# Patient Record
Sex: MALE | Race: WHITE | Employment: OTHER | URBAN - METROPOLITAN AREA
[De-identification: names, ages, dates, MRNs, and addresses within clinical notes are randomized per-mention and may not be internally consistent; named-entity substitution may affect disease eponyms.]

---

## 2017-03-22 ENCOUNTER — TELEPHONE (OUTPATIENT)
Dept: INTERNAL MEDICINE CLINIC | Facility: CLINIC | Age: 67
End: 2017-03-22

## 2017-05-15 ENCOUNTER — TELEPHONE (OUTPATIENT)
Dept: INTERNAL MEDICINE CLINIC | Facility: CLINIC | Age: 67
End: 2017-05-15

## 2017-05-15 NOTE — TELEPHONE ENCOUNTER
Pt is eligible for a Medicare Annual Health Assessment anytime during the next 12 months. Left message to call back [phone].

## 2017-06-26 ENCOUNTER — OFFICE VISIT (OUTPATIENT)
Dept: FAMILY MEDICINE CLINIC | Facility: CLINIC | Age: 67
End: 2017-06-26

## 2017-06-26 ENCOUNTER — LAB ENCOUNTER (OUTPATIENT)
Dept: LAB | Age: 67
End: 2017-06-26
Attending: FAMILY MEDICINE
Payer: MEDICARE

## 2017-06-26 VITALS
SYSTOLIC BLOOD PRESSURE: 124 MMHG | HEART RATE: 67 BPM | BODY MASS INDEX: 19.77 KG/M2 | WEIGHT: 123 LBS | HEIGHT: 66 IN | DIASTOLIC BLOOD PRESSURE: 77 MMHG | TEMPERATURE: 98 F

## 2017-06-26 DIAGNOSIS — G30.1 LATE ONSET ALZHEIMER'S DISEASE WITH BEHAVIORAL DISTURBANCE (HCC): ICD-10-CM

## 2017-06-26 DIAGNOSIS — J44.9 CHRONIC OBSTRUCTIVE PULMONARY DISEASE, UNSPECIFIED COPD TYPE (HCC): ICD-10-CM

## 2017-06-26 DIAGNOSIS — K21.9 GASTROESOPHAGEAL REFLUX DISEASE WITHOUT ESOPHAGITIS: Primary | ICD-10-CM

## 2017-06-26 DIAGNOSIS — F10.10 ALCOHOL ABUSE: ICD-10-CM

## 2017-06-26 DIAGNOSIS — F02.81 LATE ONSET ALZHEIMER'S DISEASE WITH BEHAVIORAL DISTURBANCE (HCC): ICD-10-CM

## 2017-06-26 DIAGNOSIS — K02.9 DENTAL CARIES: ICD-10-CM

## 2017-06-26 DIAGNOSIS — K21.9 GASTROESOPHAGEAL REFLUX DISEASE WITHOUT ESOPHAGITIS: ICD-10-CM

## 2017-06-26 PROCEDURE — 85025 COMPLETE CBC W/AUTO DIFF WBC: CPT

## 2017-06-26 PROCEDURE — G0463 HOSPITAL OUTPT CLINIC VISIT: HCPCS | Performed by: FAMILY MEDICINE

## 2017-06-26 PROCEDURE — 99215 OFFICE O/P EST HI 40 MIN: CPT | Performed by: FAMILY MEDICINE

## 2017-06-26 PROCEDURE — 80053 COMPREHEN METABOLIC PANEL: CPT

## 2017-06-26 PROCEDURE — 36415 COLL VENOUS BLD VENIPUNCTURE: CPT

## 2017-06-26 RX ORDER — FLUTICASONE PROPIONATE 50 MCG
SPRAY, SUSPENSION (ML) NASAL DAILY
COMMUNITY
End: 2017-07-20 | Stop reason: ALTCHOICE

## 2017-06-26 NOTE — PROGRESS NOTES
Patient presents with his wife who gives the entire history past medical history surgical history family history and social issue review.   She states that he is very picky when he is eating he has been drinking much less he drinks now 1 glass of wine daily enlargement or nodularity  Posterior pharynx was normal.  Palate was normal  Lungs were clear bilaterally there was no wheezes, rhonchi or rales  Abdomen was soft non tender non distended bowel sounds were present  Heart was regular rate and rhythm normal

## 2017-07-17 PROBLEM — F17.201 TOBACCO DEPENDENCE IN REMISSION: Status: ACTIVE | Noted: 2017-07-17

## 2017-07-20 ENCOUNTER — OFFICE VISIT (OUTPATIENT)
Dept: FAMILY MEDICINE CLINIC | Facility: CLINIC | Age: 67
End: 2017-07-20

## 2017-07-20 VITALS
DIASTOLIC BLOOD PRESSURE: 61 MMHG | HEIGHT: 66 IN | WEIGHT: 119 LBS | SYSTOLIC BLOOD PRESSURE: 101 MMHG | BODY MASS INDEX: 19.13 KG/M2

## 2017-07-20 DIAGNOSIS — F10.10 ALCOHOL ABUSE: ICD-10-CM

## 2017-07-20 DIAGNOSIS — J44.9 CHRONIC OBSTRUCTIVE PULMONARY DISEASE, UNSPECIFIED COPD TYPE (HCC): ICD-10-CM

## 2017-07-20 DIAGNOSIS — Z00.00 MEDICARE ANNUAL WELLNESS VISIT, SUBSEQUENT: Primary | ICD-10-CM

## 2017-07-20 DIAGNOSIS — Z12.5 SCREENING FOR PROSTATE CANCER: ICD-10-CM

## 2017-07-20 DIAGNOSIS — G30.1 LATE ONSET ALZHEIMER'S DISEASE WITH BEHAVIORAL DISTURBANCE (HCC): ICD-10-CM

## 2017-07-20 DIAGNOSIS — K21.9 GASTROESOPHAGEAL REFLUX DISEASE WITHOUT ESOPHAGITIS: ICD-10-CM

## 2017-07-20 DIAGNOSIS — Z91.81 RISK FOR FALLS: ICD-10-CM

## 2017-07-20 DIAGNOSIS — Z00.00 ENCOUNTER FOR ANNUAL HEALTH EXAMINATION: ICD-10-CM

## 2017-07-20 DIAGNOSIS — H25.9 AGE-RELATED CATARACT OF BOTH EYES, UNSPECIFIED AGE-RELATED CATARACT TYPE: ICD-10-CM

## 2017-07-20 DIAGNOSIS — K02.9 DENTAL CARIES: ICD-10-CM

## 2017-07-20 DIAGNOSIS — F02.81 LATE ONSET ALZHEIMER'S DISEASE WITH BEHAVIORAL DISTURBANCE (HCC): ICD-10-CM

## 2017-07-20 DIAGNOSIS — G81.91 HEMIPLEGIA AFFECTING RIGHT DOMINANT SIDE, UNSPECIFIED ETIOLOGY, UNSPECIFIED HEMIPLEGIA TYPE (HCC): ICD-10-CM

## 2017-07-20 PROBLEM — F17.201 TOBACCO DEPENDENCE IN REMISSION: Status: RESOLVED | Noted: 2017-07-17 | Resolved: 2017-07-20

## 2017-07-20 PROCEDURE — 96160 PT-FOCUSED HLTH RISK ASSMT: CPT | Performed by: FAMILY MEDICINE

## 2017-07-20 NOTE — PROGRESS NOTES
HPI:   Niraj Smith is a 78year old male who presents for a Medicare Subsequent Annual Wellness visit (Pt already had Initial Annual Wellness).     Here with wife Alexandra Moreno   Because of patient's dementia the wife gave me past medical surgical family soci Heterozygous factor V Leiden mutation ([facility] [address] Other and unspecified hyperlipidemia; Other ill-defined conditions(799.89); and Unspecified sleep apnea. He  has a past surgical history that includes electrocardiogram, complete (01/08/1914).     His family h conversations in a noisy background such as a crowded room or restaurant:  No   I get confused about where sounds come from:  No I misunderstand some words in a sentence and need to ask people to repeat themselves:  No   I especially have trouble [place] color, texture, turgor normal, no rashes or lesions   Lymph nodes: Cervical, supraclavicular, and axillary nodes normal   Neurologic: Normal              SUGGESTED VACCINATIONS - Influenza, Pneumococcal, Zoster, Tetanus     Immunization History   Administe [address] [facility] on file in Epic:    [facility] does not have a Power of  for [employer] on file in [place].  Discussed with patient and provided information           PLAN:  The patient indicates understanding of these issues activities?: 0-No     Have you had any memory issues?: 0-No    Fall/Risk Scorin    Scoring Interpretation: 4+ At Risk     Depression Screening (PHQ-2/PHQ-9): Over the LAST 2 WEEKS   Little interest or pleasure in doing things (over the last two weeks)? Glaucoma, AA>50, > 65 No flowsheet data found. Prostate Cancer Screening      PSA  Annually PSA due on 06/02/2018  Update Health Maintenance if applicable   Immunizations      Influenza No orders found for this or any previous visit.  Update Immu

## 2017-07-20 NOTE — PATIENT INSTRUCTIONS
Yoseph Cordero's SCREENING SCHEDULE   Tests on this list are recommended by your physician but may not be covered, or covered at this frequency, by your insurer. Please check with your insurance carrier before scheduling to verify coverage.     Yuan Fernandez to Age 77     Colonoscopy Screen   Covered every 10 years- more often if abnormal Colonoscopy,10 Years due on 06/05/2001 Update Health Maintenance if applicable    Flex Sigmoidoscopy Screen  Covered every 5 years No results found for this or any previous v orders found for this or any previous visit. This may be covered with your prescription benefits, but Medicare does not cover unless Medically needed    Zoster (Not covered by Medicare Part B) No orders found for this or any previous visit.  This may be cov

## 2017-07-28 ENCOUNTER — OFFICE VISIT (OUTPATIENT)
Dept: OPTOMETRY | Facility: CLINIC | Age: 67
End: 2017-07-28

## 2017-07-28 DIAGNOSIS — H52.03 HYPEROPIA WITH PRESBYOPIA, BILATERAL: ICD-10-CM

## 2017-07-28 DIAGNOSIS — H25.813 COMBINED FORMS OF AGE-RELATED CATARACT OF BOTH EYES: Primary | ICD-10-CM

## 2017-07-28 DIAGNOSIS — H52.4 HYPEROPIA WITH PRESBYOPIA, BILATERAL: ICD-10-CM

## 2017-07-28 PROCEDURE — 92014 COMPRE OPH EXAM EST PT 1/>: CPT | Performed by: OPTOMETRIST

## 2017-07-28 PROCEDURE — 92015 DETERMINE REFRACTIVE STATE: CPT | Performed by: OPTOMETRIST

## 2017-07-28 NOTE — ASSESSMENT & PLAN NOTE
New glasses RX given to update as needed. Patient may want to get a distance only pair as he does not read much. Patient's choice.

## 2017-07-28 NOTE — PATIENT INSTRUCTIONS
Hyperopia with presbyopia  New glasses RX given to update as needed. Patient may want to get a distance only pair as he does not read much. Patient's choice. Combined forms of age-related cataract of both eyes  No treatment is required.  Will continue

## 2017-07-28 NOTE — PROGRESS NOTES
Helder Driver is a 78year old male. HPI:     HPI     Patient is in for an annual eye exam. Patient  has mild dementia and was given an RX last year but he did not fill it. Patient has no complaints but wife notes squinting.  She wants to get him a pa Right Left    Pressure 18 12          Pupils       Pupils    Right PERRL    Left PERRL          Visual Fields       Left Right     Full Full          Extraocular Movement       Right Left     Full, Ortho Full, Ortho          Neuro/Psych     Mood/Affect:  N (around 7/28/2018) for Eye Exam.    7/28/2017  Scribed by: Sharmaine Murillo

## 2017-09-05 ENCOUNTER — TELEPHONE (OUTPATIENT)
Dept: OTHER | Age: 67
End: 2017-09-05

## 2017-09-05 NOTE — TELEPHONE ENCOUNTER
Pt called needed referral faxed to Dr Volodymyr Morales office urgently.   Fax# [phone] printed and sent

## 2017-11-07 ENCOUNTER — TELEPHONE (OUTPATIENT)
Dept: FAMILY MEDICINE CLINIC | Facility: CLINIC | Age: 67
End: 2017-11-07

## 2017-11-07 ENCOUNTER — OFFICE VISIT (OUTPATIENT)
Dept: FAMILY MEDICINE CLINIC | Facility: CLINIC | Age: 67
End: 2017-11-07

## 2017-11-07 ENCOUNTER — HOSPITAL ENCOUNTER (OUTPATIENT)
Dept: GENERAL RADIOLOGY | Age: 67
Discharge: HOME | End: 2017-11-07
Attending: FAMILY MEDICINE
Payer: MEDICARE

## 2017-11-07 VITALS
SYSTOLIC BLOOD PRESSURE: 96 MMHG | TEMPERATURE: 98 F | WEIGHT: 126 LBS | RESPIRATION RATE: 18 BRPM | BODY MASS INDEX: 20.25 KG/M2 | DIASTOLIC BLOOD PRESSURE: 58 MMHG | HEART RATE: 84 BPM | HEIGHT: 66 IN

## 2017-11-07 DIAGNOSIS — R93.89 ABNORMAL CHEST X-RAY: Primary | ICD-10-CM

## 2017-11-07 DIAGNOSIS — R05.9 COUGH: ICD-10-CM

## 2017-11-07 DIAGNOSIS — R05.9 COUGH: Primary | ICD-10-CM

## 2017-11-07 PROCEDURE — 71020 XR CHEST PA + LAT CHEST (CPT=71020): CPT | Performed by: FAMILY MEDICINE

## 2017-11-07 PROCEDURE — G0463 HOSPITAL OUTPT CLINIC VISIT: HCPCS | Performed by: FAMILY MEDICINE

## 2017-11-07 PROCEDURE — 99214 OFFICE O/P EST MOD 30 MIN: CPT | Performed by: FAMILY MEDICINE

## 2017-11-07 PROCEDURE — 90653 IIV ADJUVANT VACCINE IM: CPT | Performed by: FAMILY MEDICINE

## 2017-11-07 PROCEDURE — G0008 ADMIN INFLUENZA VIRUS VAC: HCPCS | Performed by: FAMILY MEDICINE

## 2017-11-07 RX ORDER — OMEPRAZOLE 20 MG/1
20 CAPSULE, DELAYED RELEASE ORAL
Qty: 90 CAPSULE | Refills: 1 | Status: SHIPPED | OUTPATIENT
Start: 2017-11-07 | End: 2018-03-15 | Stop reason: ALTCHOICE

## 2017-11-07 NOTE — PROGRESS NOTES
Blood pressure 96/58, pulse 84, temperature 97.6 °F (36.4 °C), temperature source Oral, resp. rate 18, height 5' 6\" (1.676 m), weight 126 lb (57.2 kg). Patient presents today with his wife reporting he has been spitting up for 2 weeks.   She reports elizabet

## 2017-11-08 ENCOUNTER — HOSPITAL ENCOUNTER (OUTPATIENT)
Dept: GENERAL RADIOLOGY | Age: 67
Discharge: HOME | End: 2017-11-08
Attending: FAMILY MEDICINE
Payer: MEDICARE

## 2017-11-08 ENCOUNTER — TELEPHONE (OUTPATIENT)
Dept: FAMILY MEDICINE CLINIC | Facility: CLINIC | Age: 67
End: 2017-11-08

## 2017-11-08 DIAGNOSIS — R93.89 ABNORMAL CHEST X-RAY: ICD-10-CM

## 2017-11-08 PROCEDURE — 71021: CPT

## 2017-11-08 NOTE — TELEPHONE ENCOUNTER
Daughter called back and  was inform of  message below and pt will have it done later in today. Thanks

## 2017-11-08 NOTE — TELEPHONE ENCOUNTER
----- Message from Paco Mansfield DO sent at 11/7/2017  7:17 PM CST -----  Chest xray report request additional view order entered patient to come in for additional view 11/8/2017

## 2017-11-09 ENCOUNTER — TELEPHONE (OUTPATIENT)
Dept: FAMILY MEDICINE CLINIC | Facility: CLINIC | Age: 67
End: 2017-11-09

## 2017-11-09 ENCOUNTER — TELEPHONE (OUTPATIENT)
Dept: OTHER | Age: 67
End: 2017-11-09

## 2017-11-09 NOTE — TELEPHONE ENCOUNTER
Spoke to Pt's daughter . Notified her of  Patient X-ray results . She verbalized understanding. Daughter had further questions and concerns regarding medication Omeprazole. Would like to speak with triage nurse. Pt 's daughter was transferred to ext [phone].

## 2017-11-09 NOTE — TELEPHONE ENCOUNTER
Pt's daughter would like to speak with  [facility] regarding Pt's health status. Daughter wants to know if Pt has stomach ulcers since Pt is taking omeprazole. I informed her Pt was referred to GI in June for stomach issues.  Daughter states she wasn't awar

## 2017-11-10 ENCOUNTER — TELEPHONE (OUTPATIENT)
Dept: FAMILY MEDICINE CLINIC | Facility: CLINIC | Age: 67
End: 2017-11-10

## 2017-11-10 NOTE — TELEPHONE ENCOUNTER
Called and discussed rationale for patient care extensively with his daughter she is to call next week if no improvement with his cough.

## 2018-02-23 ENCOUNTER — PATIENT OUTREACH (OUTPATIENT)
Dept: INTERNAL MEDICINE CLINIC | Facility: CLINIC | Age: 68
End: 2018-02-23

## 2018-03-14 PROBLEM — G30.1 LATE ONSET ALZHEIMER'S DISEASE WITH BEHAVIORAL DISTURBANCE (HCC): Status: ACTIVE | Noted: 2018-03-14

## 2018-03-14 PROBLEM — K21.9 ESOPHAGEAL REFLUX: Status: ACTIVE | Noted: 2018-03-14

## 2018-03-14 PROBLEM — F10.10 ALCOHOL ABUSE: Status: ACTIVE | Noted: 2018-03-14

## 2018-03-14 PROBLEM — I70.0 CALCIFICATION OF AORTA (HCC): Status: ACTIVE | Noted: 2018-03-14

## 2018-03-14 PROBLEM — F02.81 LATE ONSET ALZHEIMER'S DISEASE WITH BEHAVIORAL DISTURBANCE (HCC): Status: ACTIVE | Noted: 2018-03-14

## 2018-03-14 PROBLEM — F02.818 LATE ONSET ALZHEIMER'S DISEASE WITH BEHAVIORAL DISTURBANCE (HCC): Status: ACTIVE | Noted: 2018-03-14

## 2018-03-14 PROBLEM — I70.0 CALCIFICATION OF AORTA: Status: ACTIVE | Noted: 2018-03-14

## 2018-03-15 ENCOUNTER — OFFICE VISIT (OUTPATIENT)
Dept: FAMILY MEDICINE CLINIC | Facility: CLINIC | Age: 68
End: 2018-03-15

## 2018-03-15 VITALS
DIASTOLIC BLOOD PRESSURE: 67 MMHG | HEIGHT: 66 IN | WEIGHT: 134 LBS | TEMPERATURE: 98 F | HEART RATE: 85 BPM | BODY MASS INDEX: 21.53 KG/M2 | SYSTOLIC BLOOD PRESSURE: 112 MMHG

## 2018-03-15 DIAGNOSIS — F10.10 ALCOHOL ABUSE: ICD-10-CM

## 2018-03-15 DIAGNOSIS — F02.81 LATE ONSET ALZHEIMER'S DISEASE WITH BEHAVIORAL DISTURBANCE (HCC): ICD-10-CM

## 2018-03-15 DIAGNOSIS — H25.813 COMBINED FORMS OF AGE-RELATED CATARACT OF BOTH EYES: ICD-10-CM

## 2018-03-15 DIAGNOSIS — G81.91 HEMIPLEGIA AFFECTING RIGHT DOMINANT SIDE, UNSPECIFIED ETIOLOGY, UNSPECIFIED HEMIPLEGIA TYPE (HCC): ICD-10-CM

## 2018-03-15 DIAGNOSIS — Z00.00 MEDICARE ANNUAL WELLNESS VISIT, SUBSEQUENT: Primary | ICD-10-CM

## 2018-03-15 DIAGNOSIS — G82.20 PARAPLEGIA (HCC): ICD-10-CM

## 2018-03-15 DIAGNOSIS — Z00.00 ENCOUNTER FOR ANNUAL HEALTH EXAMINATION: ICD-10-CM

## 2018-03-15 DIAGNOSIS — G30.1 LATE ONSET ALZHEIMER'S DISEASE WITH BEHAVIORAL DISTURBANCE (HCC): ICD-10-CM

## 2018-03-15 DIAGNOSIS — K02.9 DENTAL CARIES: ICD-10-CM

## 2018-03-15 DIAGNOSIS — K21.9 GASTROESOPHAGEAL REFLUX DISEASE, ESOPHAGITIS PRESENCE NOT SPECIFIED: ICD-10-CM

## 2018-03-15 DIAGNOSIS — J44.9 CHRONIC OBSTRUCTIVE PULMONARY DISEASE, UNSPECIFIED COPD TYPE (HCC): ICD-10-CM

## 2018-03-15 DIAGNOSIS — I70.0 CALCIFICATION OF AORTA (HCC): ICD-10-CM

## 2018-03-15 DIAGNOSIS — F10.20 ALCOHOLISM (HCC): ICD-10-CM

## 2018-03-15 PROCEDURE — G0439 PPPS, SUBSEQ VISIT: HCPCS | Performed by: FAMILY MEDICINE

## 2018-03-15 PROCEDURE — 96160 PT-FOCUSED HLTH RISK ASSMT: CPT | Performed by: FAMILY MEDICINE

## 2018-03-15 NOTE — PATIENT INSTRUCTIONS
Yoseph Cordero's SCREENING SCHEDULE   Tests on this list are recommended by your physician but may not be covered, or covered at this frequency, by your insurer. Please check with your insurance carrier before scheduling to verify coverage.     Carl Staley to Age 77     Colonoscopy Screen   Covered every 10 years- more often if abnormal Colonoscopy,10 Years due on 06/05/2001 Update Health Maintenance if applicable    Flex Sigmoidoscopy Screen  Covered every 5 years No results found for this or any previous v orders found for this or any previous visit. This may be covered with your prescription benefits, but Medicare does not cover unless Medically needed    Zoster (Not covered by Medicare Part B) No orders found for this or any previous visit.  This may be cov

## 2018-03-15 NOTE — PROGRESS NOTES
HPI:   Breezy Lund is a 78year old male who presents for a Medicare Subsequent Annual Wellness visit (Pt already had Initial Annual Wellness).       Annual Physical due on 07/20/2018        Fall/Risk Assessment Update needed    Last Fall risk screen w with patient and Family/surrogate (if present), and forms available to patient in AVS       He does NOT have a Power of  for [employer] on file in [facility]    Advance care planning including the explanation and discussion of advance directives standar Allergies.     CURRENT MEDICATIONS:     No outpatient prescriptions have been marked as taking for the 3/15/18 encounter (Office Visit) with Maynor Diaz DO.   MEDICAL INFORMATION:   He  has a past medical history of Anxiety state, unspecified; CVA ( Eyes:  PERRL, conjunctiva/corneas clear, EOM's intact, both eyes   Ears:  Normal TM's and external ear canals, both ears   Nose: Nares normal, septum midline, mucosa normal, no drainage or sinus tenderness   Throat: Lips, mucosa, and tongue normal; teeth ([address]  yuki. Alcohol abuse  Resolved. Combined forms of age-related cataract of both eyes  Due for recheck in Aug  -     OPTOMETRY - INTERNAL    Gastroesophageal reflux disease, esophagitis presence not specified  Stable.     Late onset Alzheimer's dise > 65 No flowsheet data found.     Prostate Cancer Screening      PSA  Annually PSA due on 06/02/2018  Update Health Maintenance if applicable     Immunizations (Update Immunization Activity if applicable)     Influenza  Covered Annually 11/7/2017

## 2018-03-22 ENCOUNTER — APPOINTMENT (OUTPATIENT)
Dept: LAB | Age: 68
End: 2018-03-22
Attending: FAMILY MEDICINE
Payer: MEDICARE

## 2018-03-22 DIAGNOSIS — Z00.00 MEDICARE ANNUAL WELLNESS VISIT, SUBSEQUENT: ICD-10-CM

## 2018-03-22 LAB
CHOLEST SERPL-MCNC: 190 MG/DL (ref 110–200)
GLUCOSE SERPL-MCNC: 86 MG/DL (ref 70–99)
HDLC SERPL-MCNC: 60 MG/DL
LDLC SERPL CALC-MCNC: 113 MG/DL (ref 0–99)
NONHDLC SERPL-MCNC: 130 MG/DL
PSA SERPL-MCNC: 5.4 NG/ML (ref 0–4)
TRIGL SERPL-MCNC: 84 MG/DL (ref 1–149)

## 2018-03-22 PROCEDURE — 82947 ASSAY GLUCOSE BLOOD QUANT: CPT

## 2018-03-22 PROCEDURE — 80061 LIPID PANEL: CPT

## 2018-03-22 PROCEDURE — 36415 COLL VENOUS BLD VENIPUNCTURE: CPT

## 2018-03-27 DIAGNOSIS — R97.20 ELEVATED PSA: Primary | ICD-10-CM

## 2018-05-25 ENCOUNTER — OFFICE VISIT (OUTPATIENT)
Dept: SURGERY | Facility: CLINIC | Age: 68
End: 2018-05-25

## 2018-05-25 ENCOUNTER — APPOINTMENT (OUTPATIENT)
Dept: LAB | Facility: HOSPITAL | Age: 68
End: 2018-05-25
Attending: UROLOGY
Payer: MEDICARE

## 2018-05-25 VITALS
HEART RATE: 80 BPM | BODY MASS INDEX: 20 KG/M2 | SYSTOLIC BLOOD PRESSURE: 102 MMHG | HEIGHT: 68 IN | WEIGHT: 132 LBS | DIASTOLIC BLOOD PRESSURE: 66 MMHG | TEMPERATURE: 98 F

## 2018-05-25 DIAGNOSIS — R97.20 ELEVATED PSA: Primary | ICD-10-CM

## 2018-05-25 DIAGNOSIS — R30.0 DYSURIA: ICD-10-CM

## 2018-05-25 PROCEDURE — 99204 OFFICE O/P NEW MOD 45 MIN: CPT | Performed by: UROLOGY

## 2018-05-25 PROCEDURE — 81001 URINALYSIS AUTO W/SCOPE: CPT

## 2018-05-25 PROCEDURE — G0463 HOSPITAL OUTPT CLINIC VISIT: HCPCS | Performed by: UROLOGY

## 2018-05-25 NOTE — PROGRESS NOTES
SUBJECTIVE:  Maxim Moulton is a 78year old male who presents for a consultation at the request of, and a copy of this note will be sent to, Dr. Gary Brandon, for evaluation of  elevated PSA. He states that the problem is unchanged.  Symptoms include Alcohol use: Yes           0.0 oz/week     Comment: 1-2 glass wine occasional           REVIEW OF SYSTEMS:  RESPIRATORY:  Negative for chest tightness, wheezing, cough, shortness of breath,  sputum production,chest pain or pleurisy.   CARDIOVASCULAR:  Neg discussed options for management. We agreed to have the patient follow-up in 6 months with a repeat PSA at that time. The son believes his father had a biopsy years ago and this is mentioned in my partners note from 2012.   His exam does not demonstrate a

## 2018-08-16 ENCOUNTER — NURSE TRIAGE (OUTPATIENT)
Dept: OTHER | Age: 68
End: 2018-08-16

## 2018-08-16 NOTE — TELEPHONE ENCOUNTER
Daughter reports, Pt has been 'spitting up a lot, spits every where.' Denies spitting up blood. Daughter with Pt at this time, states Pt not in distress, denies shortness of breath.  Pt is alert, confused at times, but states he usually gets confused someti

## 2018-08-17 ENCOUNTER — OFFICE VISIT (OUTPATIENT)
Dept: FAMILY MEDICINE CLINIC | Facility: CLINIC | Age: 68
End: 2018-08-17

## 2018-08-17 ENCOUNTER — HOSPITAL ENCOUNTER (OUTPATIENT)
Dept: GENERAL RADIOLOGY | Age: 68
Discharge: HOME | End: 2018-08-17
Attending: FAMILY MEDICINE
Payer: MEDICARE

## 2018-08-17 VITALS
WEIGHT: 134 LBS | DIASTOLIC BLOOD PRESSURE: 63 MMHG | HEART RATE: 79 BPM | BODY MASS INDEX: 20.31 KG/M2 | RESPIRATION RATE: 16 BRPM | TEMPERATURE: 98 F | SYSTOLIC BLOOD PRESSURE: 101 MMHG | HEIGHT: 68 IN

## 2018-08-17 DIAGNOSIS — R05.9 COUGH: ICD-10-CM

## 2018-08-17 DIAGNOSIS — R05.9 COUGH: Primary | ICD-10-CM

## 2018-08-17 PROCEDURE — 99213 OFFICE O/P EST LOW 20 MIN: CPT | Performed by: FAMILY MEDICINE

## 2018-08-17 PROCEDURE — 71046 X-RAY EXAM CHEST 2 VIEWS: CPT | Performed by: FAMILY MEDICINE

## 2018-08-17 PROCEDURE — G0463 HOSPITAL OUTPT CLINIC VISIT: HCPCS | Performed by: FAMILY MEDICINE

## 2018-08-17 RX ORDER — BENZONATATE 100 MG/1
100 CAPSULE ORAL 3 TIMES DAILY PRN
Qty: 45 CAPSULE | Refills: 0 | Status: SHIPPED | OUTPATIENT
Start: 2018-08-17 | End: 2018-11-30 | Stop reason: ALTCHOICE

## 2018-08-17 RX ORDER — OMEPRAZOLE 20 MG/1
20 CAPSULE, DELAYED RELEASE ORAL
Qty: 90 CAPSULE | Refills: 1 | Status: SHIPPED | OUTPATIENT
Start: 2018-08-17 | End: 2021-09-03

## 2018-08-17 NOTE — PROGRESS NOTES
Blood pressure 101/63, pulse 79, temperature 98.4 °F (36.9 °C), temperature source Oral, resp. rate 16, height 5' 8\" (1.727 m), weight 134 lb (60.8 kg).     Complaining of a cough for 1 week that is nocturnal.  Not some nasal congestion with sneezing and s

## 2018-11-26 ENCOUNTER — OFFICE VISIT (OUTPATIENT)
Dept: SURGERY | Facility: CLINIC | Age: 68
End: 2018-11-26

## 2018-11-26 ENCOUNTER — APPOINTMENT (OUTPATIENT)
Dept: LAB | Age: 68
End: 2018-11-26
Attending: UROLOGY
Payer: MEDICARE

## 2018-11-26 VITALS — DIASTOLIC BLOOD PRESSURE: 65 MMHG | HEART RATE: 80 BPM | TEMPERATURE: 98 F | SYSTOLIC BLOOD PRESSURE: 102 MMHG

## 2018-11-26 DIAGNOSIS — R97.20 ELEVATED PSA: ICD-10-CM

## 2018-11-26 DIAGNOSIS — R97.20 ELEVATED PSA: Primary | ICD-10-CM

## 2018-11-26 PROCEDURE — 84153 ASSAY OF PSA TOTAL: CPT

## 2018-11-26 PROCEDURE — G0463 HOSPITAL OUTPT CLINIC VISIT: HCPCS | Performed by: UROLOGY

## 2018-11-26 PROCEDURE — 99213 OFFICE O/P EST LOW 20 MIN: CPT | Performed by: UROLOGY

## 2018-11-26 PROCEDURE — 36415 COLL VENOUS BLD VENIPUNCTURE: CPT

## 2018-11-26 NOTE — PROGRESS NOTES
Maxim Moulton is a 80year old male. HPI:   Patient presents with:  elevated psa: PT presents for 6 month follow up. PT son states he is not sure if he completed PSA.        25-year-old male with progressive dementia advanced dementia, elevated PSA th Allergies:  No Known Allergies      ROS:       PHYSICAL EXAM:        ASSESSMENT/PLAN:   Assessment   Elevated psa  (primary encounter diagnosis)    Plan:  –We will obtain PSA today. –Will be notified of results in 1-2 weeks.   – Follow-up in 6 months

## 2018-11-29 ENCOUNTER — TELEPHONE (OUTPATIENT)
Dept: SURGERY | Facility: CLINIC | Age: 68
End: 2018-11-29

## 2018-11-29 ENCOUNTER — NURSE TRIAGE (OUTPATIENT)
Dept: OTHER | Age: 68
End: 2018-11-29

## 2018-11-29 NOTE — TELEPHONE ENCOUNTER
Message   Received:  Today   Message Contents   Diego, BEAU [facility] Clinical Staff             Staff,     Patients PSA is significantly elevated compared to 8 months ago.  I spoke to Dr. Chris Mcintosh and he would like to see him in the next

## 2018-11-29 NOTE — TELEPHONE ENCOUNTER
Action Requested: Summary for Provider     []  Critical Lab, Recommendations Needed  [] Need Additional Advice  []   FYI    []   Need Orders  [] Need Medications Sent to Pharmacy  []  Other     SUMMARY: .appt made for tomorrow with [facility]    Reason for

## 2018-11-29 NOTE — TELEPHONE ENCOUNTER
Phoned pt and spoke with son Faheem. He states his father does not understand English well. Informed him he is not listed on CHETAN. He understands to obtain this during next office visit is thankful and advised to call his sister Nelson Benoit.  Phoned Nelson Benoit, pt's Radha Chinchilla

## 2018-11-30 ENCOUNTER — HOSPITAL ENCOUNTER (OUTPATIENT)
Dept: GENERAL RADIOLOGY | Age: 68
Discharge: HOME | End: 2018-11-30
Attending: FAMILY MEDICINE
Payer: MEDICARE

## 2018-11-30 ENCOUNTER — OFFICE VISIT (OUTPATIENT)
Dept: FAMILY MEDICINE CLINIC | Facility: CLINIC | Age: 68
End: 2018-11-30

## 2018-11-30 VITALS
BODY MASS INDEX: 21 KG/M2 | SYSTOLIC BLOOD PRESSURE: 121 MMHG | DIASTOLIC BLOOD PRESSURE: 69 MMHG | WEIGHT: 136 LBS | TEMPERATURE: 99 F | HEART RATE: 90 BPM

## 2018-11-30 DIAGNOSIS — J43.9 ACUTE EXACERBATION OF EMPHYSEMA (HCC): ICD-10-CM

## 2018-11-30 DIAGNOSIS — K21.9 GASTROESOPHAGEAL REFLUX DISEASE, ESOPHAGITIS PRESENCE NOT SPECIFIED: Primary | ICD-10-CM

## 2018-11-30 PROCEDURE — G0463 HOSPITAL OUTPT CLINIC VISIT: HCPCS | Performed by: FAMILY MEDICINE

## 2018-11-30 PROCEDURE — 99214 OFFICE O/P EST MOD 30 MIN: CPT | Performed by: FAMILY MEDICINE

## 2018-11-30 PROCEDURE — 71046 X-RAY EXAM CHEST 2 VIEWS: CPT | Performed by: FAMILY MEDICINE

## 2018-11-30 RX ORDER — AZITHROMYCIN 250 MG/1
TABLET, FILM COATED ORAL
Qty: 6 TABLET | Refills: 0 | Status: SHIPPED | OUTPATIENT
Start: 2018-11-30 | End: 2018-12-14

## 2018-11-30 RX ORDER — PREDNISONE 20 MG/1
20 TABLET ORAL 2 TIMES DAILY
Qty: 10 TABLET | Refills: 0 | Status: SHIPPED | OUTPATIENT
Start: 2018-11-30 | End: 2018-12-05

## 2018-11-30 NOTE — PROGRESS NOTES
Pt complains of cough congestion   Had it for 30 days  No fever   No sob   No chest pain   No neck stiffness  No Headaches    Well-hydrated no shortness of breath no apparent distress but congested   Normocephalic atraumatic pupils were reactive to light a

## 2018-12-05 ENCOUNTER — TELEPHONE (OUTPATIENT)
Dept: FAMILY MEDICINE CLINIC | Facility: CLINIC | Age: 68
End: 2018-12-05

## 2018-12-05 NOTE — TELEPHONE ENCOUNTER
----- Message from Waldemar Marin, DO sent at 12/4/2018  5:29 PM CST -----  Please tell patient or his wife. Patient has no pneumonia just emphysema.

## 2018-12-06 NOTE — TELEPHONE ENCOUNTER
Dr Sepulveda 16- spoke with pt daughter- states pt is still coughing/mucus but better, wants to know if he should be taking anything else for emphysema also wants to know if he recommends he take omeprazole for a certain time period or just indefinitely?

## 2018-12-06 NOTE — TELEPHONE ENCOUNTER
Farooq Bansal (daughter on HIPAA) on Dr. Willa Reddy information and recommendations. Penny Choi verbalized understanding. Advised her to call back if any changes, if patient is worse; she agreed.

## 2018-12-06 NOTE — TELEPHONE ENCOUNTER
2 weeks on omeprazole  They call it excerebration of copd (emphysema) where the emphysema gets a little worse for a time due to infection, over exertion, or inhaling something that make him worse. It should get better.

## 2018-12-14 ENCOUNTER — OFFICE VISIT (OUTPATIENT)
Dept: SURGERY | Facility: CLINIC | Age: 68
End: 2018-12-14

## 2018-12-14 VITALS
SYSTOLIC BLOOD PRESSURE: 107 MMHG | HEART RATE: 88 BPM | BODY MASS INDEX: 20.14 KG/M2 | WEIGHT: 136 LBS | HEIGHT: 69 IN | DIASTOLIC BLOOD PRESSURE: 71 MMHG

## 2018-12-14 DIAGNOSIS — R97.20 ELEVATED PSA: Primary | ICD-10-CM

## 2018-12-14 PROCEDURE — G0463 HOSPITAL OUTPT CLINIC VISIT: HCPCS | Performed by: UROLOGY

## 2018-12-14 PROCEDURE — 99213 OFFICE O/P EST LOW 20 MIN: CPT | Performed by: UROLOGY

## 2018-12-14 RX ORDER — SULFAMETHOXAZOLE AND TRIMETHOPRIM 200; 40 MG/5ML; MG/5ML
20 SUSPENSION ORAL 2 TIMES DAILY
Qty: 280 ML | Refills: 0 | Status: SHIPPED | OUTPATIENT
Start: 2018-12-14 | End: 2019-01-17

## 2018-12-14 RX ORDER — BENZONATATE 100 MG/1
100 CAPSULE ORAL 3 TIMES DAILY PRN
COMMUNITY

## 2018-12-14 NOTE — PROGRESS NOTES
Jolanta Mejia is a 80year old male. HPI:   Patient presents with:  elevated psa: patient presents for elevated psa consult      50-year-old male in follow-up to visit November 26, 2018 accompanied by his son.   He has progressive severe dementia and PHYSICAL EXAM:        ASSESSMENT/PLAN:   Assessment   Elevated psa  (primary encounter diagnosis)    Reviewed findings at length with patient.   I did discuss this with his son and recommended an empiric treatment for 1 week with Bactrim  Double stren

## 2018-12-26 ENCOUNTER — PATIENT OUTREACH (OUTPATIENT)
Dept: CASE MANAGEMENT | Age: 68
End: 2018-12-26

## 2018-12-26 NOTE — PROGRESS NOTES
Outreached to patient in regards to scheduling Medicare Annual exam (AHA). Patient's daughter Hellen Major (verified consent) scheduled appt with pt's PCP for 03/18/2019. Thank you.

## 2019-01-07 ENCOUNTER — TELEPHONE (OUTPATIENT)
Dept: SURGERY | Facility: CLINIC | Age: 69
End: 2019-01-07

## 2019-01-07 NOTE — TELEPHONE ENCOUNTER
Pharmacy calling stating that pt says bactrim medication is too expensive asking if an alternative can be prescribed. Please advise.

## 2019-01-08 NOTE — TELEPHONE ENCOUNTER
Pharmacist asking if there is another antibiotic that can be prescribed for patient? Sulfamethoxazole-trimethoprim is too expensive for patient.       LOV 12/14/18 for Elevated PSA    ASSESSMENT/PLAN:   Assessment   Elevated psa  (primary encounter diagnos

## 2019-01-09 RX ORDER — CIPROFLOXACIN 500 MG/5ML
500 KIT ORAL 2 TIMES DAILY
Qty: 70 ML | Refills: 0 | Status: SHIPPED | OUTPATIENT
Start: 2019-01-09 | End: 2019-01-17 | Stop reason: ALTCHOICE

## 2019-01-09 NOTE — TELEPHONE ENCOUNTER
I called and spoke with pharmacist, she stated she could not tell me how much medications would cost without a script or order. She stated cipro oral suspension could be an option, but I would have to submit an order to see if it is affordable.   Will subm

## 2019-01-09 NOTE — TELEPHONE ENCOUNTER
Bactrim is generic so not sure why it is expensive. Is it because its liquid./  I would ask the pharmacist what he would suggest in liquid form?

## 2019-01-09 NOTE — TELEPHONE ENCOUNTER
Pharmacy calling again, states they have no recommendations regarding alternative rx, states [facility] has to suggest alternative. Pls advise thank you.

## 2019-01-10 ENCOUNTER — TELEPHONE (OUTPATIENT)
Dept: FAMILY MEDICINE CLINIC | Facility: CLINIC | Age: 69
End: 2019-01-10

## 2019-01-10 NOTE — TELEPHONE ENCOUNTER
Patient's daughter calling, has concerns about patient being prescribed multiple antibiotics, was prescribed antibiotic 11/30/18, seen in OV with cough with Dr [facility].  Daughter reports he was later seen by urology and prescribed antibiotics in Dec that was ne

## 2019-01-12 NOTE — TELEPHONE ENCOUNTER
Daughter returned call and notified of below with understanding. Denies further questions at this time.

## 2019-01-12 NOTE — TELEPHONE ENCOUNTER
Called Pt daughter Shayla Bañuelos and the phone number just kept ringing and ringing could not leave a message.

## 2019-01-16 NOTE — TELEPHONE ENCOUNTER
Pt's daughter called pt was rx. But it was too expensive. Caller was not sure of the name of the rx.    Call to advise

## 2019-01-17 RX ORDER — SULFAMETHOXAZOLE AND TRIMETHOPRIM 800; 160 MG/1; MG/1
1 TABLET ORAL 2 TIMES DAILY
Qty: 14 TABLET | Refills: 0 | Status: SHIPPED | OUTPATIENT
Start: 2019-01-17 | End: 2019-01-24

## 2019-01-17 NOTE — TELEPHONE ENCOUNTER
I called and spoke with daughter Celeste Springer. We discussed trying pills vs liquid. Liquid forms were going to cost patient more than $100 out of pocket. I explained the increased cost of these is likely because they are liquid. Pills will likely be cheaper.

## 2019-02-19 ENCOUNTER — OFFICE VISIT (OUTPATIENT)
Dept: FAMILY MEDICINE CLINIC | Facility: CLINIC | Age: 69
End: 2019-02-19
Payer: MEDICARE

## 2019-02-19 VITALS
TEMPERATURE: 98 F | HEART RATE: 81 BPM | DIASTOLIC BLOOD PRESSURE: 70 MMHG | WEIGHT: 136 LBS | HEIGHT: 65.5 IN | SYSTOLIC BLOOD PRESSURE: 113 MMHG | BODY MASS INDEX: 22.39 KG/M2

## 2019-02-19 DIAGNOSIS — F10.10 ALCOHOL ABUSE: ICD-10-CM

## 2019-02-19 DIAGNOSIS — G30.1 LATE ONSET ALZHEIMER'S DISEASE WITH BEHAVIORAL DISTURBANCE (HCC): ICD-10-CM

## 2019-02-19 DIAGNOSIS — J43.9 PULMONARY EMPHYSEMA, UNSPECIFIED EMPHYSEMA TYPE (HCC): ICD-10-CM

## 2019-02-19 DIAGNOSIS — I69.359 HEMIPLEGIA AND HEMIPARESIS FOLLOWING CEREBRAL INFARCTION AFFECTING UNSPECIFIED SIDE (HCC): ICD-10-CM

## 2019-02-19 DIAGNOSIS — E78.00 ELEVATED LDL CHOLESTEROL LEVEL: ICD-10-CM

## 2019-02-19 DIAGNOSIS — K21.9 GASTROESOPHAGEAL REFLUX DISEASE WITHOUT ESOPHAGITIS: ICD-10-CM

## 2019-02-19 DIAGNOSIS — T17.308A CHOKING, INITIAL ENCOUNTER: Primary | ICD-10-CM

## 2019-02-19 DIAGNOSIS — F02.81 LATE ONSET ALZHEIMER'S DISEASE WITH BEHAVIORAL DISTURBANCE (HCC): ICD-10-CM

## 2019-02-19 DIAGNOSIS — I70.0 CALCIFICATION OF AORTA (HCC): ICD-10-CM

## 2019-02-19 PROBLEM — G82.20 PARAPLEGIA (HCC): Status: RESOLVED | Noted: 2018-03-15 | Resolved: 2019-02-19

## 2019-02-19 PROCEDURE — G0463 HOSPITAL OUTPT CLINIC VISIT: HCPCS | Performed by: FAMILY MEDICINE

## 2019-02-19 PROCEDURE — 99215 OFFICE O/P EST HI 40 MIN: CPT | Performed by: FAMILY MEDICINE

## 2019-02-19 NOTE — PROGRESS NOTES
When eating anything  He feels like \"I'm choking. \"  No heartburn  Drinks saira and he can drink    Swallowing difficulty. Coughing on occasion.       Behavior worse   Difficult to control      Patient's past medical surgical family social history was r COMP METABOLIC PANEL (14); Future  - CBC WITH DIFFERENTIAL WITH PLATELET; Future  - ASSAY, THYROID STIM HORMONE; Future    5. Hemiplegia and hemiparesis following cerebral infarction affecting unspecified side (HCC)  stable  - NEURO - INTERNAL    6.  Jessie Treviño

## 2019-03-11 ENCOUNTER — HOSPITAL ENCOUNTER (OUTPATIENT)
Dept: GENERAL RADIOLOGY | Age: 69
Discharge: HOME | End: 2019-03-11
Attending: FAMILY MEDICINE
Payer: MEDICARE

## 2019-03-11 ENCOUNTER — LAB ENCOUNTER (OUTPATIENT)
Dept: LAB | Age: 69
End: 2019-03-11
Attending: FAMILY MEDICINE
Payer: MEDICARE

## 2019-03-11 DIAGNOSIS — R97.20 ELEVATED PSA: ICD-10-CM

## 2019-03-11 DIAGNOSIS — K21.9 GASTROESOPHAGEAL REFLUX DISEASE WITHOUT ESOPHAGITIS: ICD-10-CM

## 2019-03-11 DIAGNOSIS — G30.1 LATE ONSET ALZHEIMER'S DISEASE WITH BEHAVIORAL DISTURBANCE (HCC): ICD-10-CM

## 2019-03-11 DIAGNOSIS — I70.0 CALCIFICATION OF AORTA (HCC): ICD-10-CM

## 2019-03-11 DIAGNOSIS — E78.00 ELEVATED LDL CHOLESTEROL LEVEL: ICD-10-CM

## 2019-03-11 DIAGNOSIS — F02.81 LATE ONSET ALZHEIMER'S DISEASE WITH BEHAVIORAL DISTURBANCE (HCC): ICD-10-CM

## 2019-03-11 DIAGNOSIS — T17.308A CHOKING, INITIAL ENCOUNTER: ICD-10-CM

## 2019-03-11 LAB
ALBUMIN SERPL-MCNC: 3.6 G/DL (ref 3.4–5)
ALBUMIN/GLOB SERPL: 0.8 {RATIO} (ref 1–2)
ALP LIVER SERPL-CCNC: 71 U/L (ref 45–117)
ALT SERPL-CCNC: 38 U/L (ref 16–61)
ANION GAP SERPL CALC-SCNC: 5 MMOL/L (ref 0–18)
AST SERPL-CCNC: 21 U/L (ref 15–37)
BASOPHILS # BLD AUTO: 0.05 X10(3) UL (ref 0–0.2)
BASOPHILS NFR BLD AUTO: 0.5 %
BILIRUB SERPL-MCNC: 0.3 MG/DL (ref 0.1–2)
BUN BLD-MCNC: 17 MG/DL (ref 7–18)
BUN/CREAT SERPL: 23 (ref 10–20)
CALCIUM BLD-MCNC: 9.1 MG/DL (ref 8.5–10.1)
CHLORIDE SERPL-SCNC: 106 MMOL/L (ref 98–107)
CHOLEST SMN-MCNC: 189 MG/DL (ref ?–200)
CO2 SERPL-SCNC: 30 MMOL/L (ref 21–32)
CREAT BLD-MCNC: 0.74 MG/DL (ref 0.7–1.3)
DEPRECATED RDW RBC AUTO: 46.7 FL (ref 35.1–46.3)
EOSINOPHIL # BLD AUTO: 0.33 X10(3) UL (ref 0–0.7)
EOSINOPHIL NFR BLD AUTO: 3.6 %
ERYTHROCYTE [DISTWIDTH] IN BLOOD BY AUTOMATED COUNT: 13.4 % (ref 11–15)
GLOBULIN PLAS-MCNC: 4.6 G/DL (ref 2.8–4.4)
GLUCOSE BLD-MCNC: 85 MG/DL (ref 70–99)
HCT VFR BLD AUTO: 43.1 % (ref 39–53)
HDLC SERPL-MCNC: 63 MG/DL (ref 40–59)
HGB BLD-MCNC: 13.7 G/DL (ref 13–17.5)
IMM GRANULOCYTES # BLD AUTO: 0.04 X10(3) UL (ref 0–1)
IMM GRANULOCYTES NFR BLD: 0.4 %
LDLC SERPL CALC-MCNC: 112 MG/DL (ref ?–100)
LYMPHOCYTES # BLD AUTO: 2.7 X10(3) UL (ref 1–4)
LYMPHOCYTES NFR BLD AUTO: 29.5 %
M PROTEIN MFR SERPL ELPH: 8.2 G/DL (ref 6.4–8.2)
MCH RBC QN AUTO: 30 PG (ref 26–34)
MCHC RBC AUTO-ENTMCNC: 31.8 G/DL (ref 31–37)
MCV RBC AUTO: 94.3 FL (ref 80–100)
MONOCYTES # BLD AUTO: 0.82 X10(3) UL (ref 0.1–1)
MONOCYTES NFR BLD AUTO: 9 %
NEUTROPHILS # BLD AUTO: 5.22 X10 (3) UL (ref 1.5–7.7)
NEUTROPHILS # BLD AUTO: 5.22 X10(3) UL (ref 1.5–7.7)
NEUTROPHILS NFR BLD AUTO: 57 %
NONHDLC SERPL-MCNC: 126 MG/DL (ref ?–130)
OSMOLALITY SERPL CALC.SUM OF ELEC: 293 MOSM/KG (ref 275–295)
PLATELET # BLD AUTO: 309 10(3)UL (ref 150–450)
POTASSIUM SERPL-SCNC: 4 MMOL/L (ref 3.5–5.1)
PSA SERPL-MCNC: 16.2 NG/ML (ref ?–4)
RBC # BLD AUTO: 4.57 X10(6)UL (ref 3.8–5.8)
SODIUM SERPL-SCNC: 141 MMOL/L (ref 136–145)
TRIGL SERPL-MCNC: 70 MG/DL (ref 30–149)
TSI SER-ACNC: 1.92 MIU/ML (ref 0.36–3.74)
VLDLC SERPL CALC-MCNC: 14 MG/DL (ref 0–30)
WBC # BLD AUTO: 9.2 X10(3) UL (ref 4–11)

## 2019-03-11 PROCEDURE — 84153 ASSAY OF PSA TOTAL: CPT

## 2019-03-11 PROCEDURE — 71046 X-RAY EXAM CHEST 2 VIEWS: CPT | Performed by: FAMILY MEDICINE

## 2019-03-11 PROCEDURE — 84443 ASSAY THYROID STIM HORMONE: CPT

## 2019-03-11 PROCEDURE — 80061 LIPID PANEL: CPT

## 2019-03-11 PROCEDURE — 80053 COMPREHEN METABOLIC PANEL: CPT

## 2019-03-11 PROCEDURE — 36415 COLL VENOUS BLD VENIPUNCTURE: CPT

## 2019-03-11 PROCEDURE — 85025 COMPLETE CBC W/AUTO DIFF WBC: CPT

## 2019-03-18 ENCOUNTER — OFFICE VISIT (OUTPATIENT)
Dept: FAMILY MEDICINE CLINIC | Facility: CLINIC | Age: 69
End: 2019-03-18
Payer: MEDICARE

## 2019-03-18 VITALS
HEART RATE: 92 BPM | DIASTOLIC BLOOD PRESSURE: 67 MMHG | BODY MASS INDEX: 22.72 KG/M2 | HEIGHT: 65.5 IN | TEMPERATURE: 97 F | WEIGHT: 138 LBS | SYSTOLIC BLOOD PRESSURE: 102 MMHG

## 2019-03-18 DIAGNOSIS — Z00.00 ENCOUNTER FOR ANNUAL HEALTH EXAMINATION: ICD-10-CM

## 2019-03-18 DIAGNOSIS — I69.359 HEMIPLEGIA AND HEMIPARESIS FOLLOWING CEREBRAL INFARCTION AFFECTING UNSPECIFIED SIDE (HCC): ICD-10-CM

## 2019-03-18 DIAGNOSIS — K02.9 DENTAL CARIES: ICD-10-CM

## 2019-03-18 DIAGNOSIS — F99 PSYCHIATRIC DISTURBANCE: ICD-10-CM

## 2019-03-18 DIAGNOSIS — J43.9 PULMONARY EMPHYSEMA, UNSPECIFIED EMPHYSEMA TYPE (HCC): ICD-10-CM

## 2019-03-18 DIAGNOSIS — R97.20 ELEVATED PSA: ICD-10-CM

## 2019-03-18 DIAGNOSIS — Z00.00 MEDICARE ANNUAL WELLNESS VISIT, SUBSEQUENT: Primary | ICD-10-CM

## 2019-03-18 DIAGNOSIS — Z91.81 RISK FOR FALLS: ICD-10-CM

## 2019-03-18 DIAGNOSIS — Z12.5 SCREENING FOR PROSTATE CANCER: ICD-10-CM

## 2019-03-18 DIAGNOSIS — F10.20 ALCOHOLISM (HCC): ICD-10-CM

## 2019-03-18 DIAGNOSIS — H25.9 AGE-RELATED CATARACT OF BOTH EYES, UNSPECIFIED AGE-RELATED CATARACT TYPE: ICD-10-CM

## 2019-03-18 DIAGNOSIS — I70.0 CALCIFICATION OF AORTA (HCC): ICD-10-CM

## 2019-03-18 DIAGNOSIS — G30.1 LATE ONSET ALZHEIMER'S DISEASE WITH BEHAVIORAL DISTURBANCE (HCC): ICD-10-CM

## 2019-03-18 DIAGNOSIS — F02.81 LATE ONSET ALZHEIMER'S DISEASE WITH BEHAVIORAL DISTURBANCE (HCC): ICD-10-CM

## 2019-03-18 DIAGNOSIS — Z86.73 ARTERIAL ISCHEMIC STROKE, MCA (MIDDLE CEREBRL ARTRY) LFT, REMOTE, RSLV: ICD-10-CM

## 2019-03-18 DIAGNOSIS — E78.00 ELEVATED LDL CHOLESTEROL LEVEL: ICD-10-CM

## 2019-03-18 PROCEDURE — G0439 PPPS, SUBSEQ VISIT: HCPCS | Performed by: FAMILY MEDICINE

## 2019-03-18 PROCEDURE — 99397 PER PM REEVAL EST PAT 65+ YR: CPT | Performed by: FAMILY MEDICINE

## 2019-03-18 PROCEDURE — 96160 PT-FOCUSED HLTH RISK ASSMT: CPT | Performed by: FAMILY MEDICINE

## 2019-03-18 NOTE — PATIENT INSTRUCTIONS
Yoseph Cordero's SCREENING SCHEDULE   Tests on this list are recommended by your physician but may not be covered, or covered at this frequency, by your insurer. Please check with your insurance carrier before scheduling to verify coverage.     Sagrario Elmore abnormal Colonoscopy due on 06/05/1950 Update [facility] if applicable    Flex Sigmoidoscopy Screen  Covered every 5 years No results found for this or any previous visit. No flowsheet data found.      Fecal Occult Blood   Covered Annually No result prescription benefits, but Medicare does not cover unless Medically needed    Zoster (Not covered by Medicare Part B) No orders found for this or any previous visit.  This may be covered with your pharmacy  prescription benefits     Recommended Websites for

## 2019-03-18 NOTE — PROGRESS NOTES
HPI:   Fern Rubio is a 80year old male who presents for a Medicare Subsequent Annual Wellness visit (Pt already had Initial Annual Wellness).       His last annual assessment has been over 1 year: Annual Physical due on 03/15/2019     here with nicholas He has Hearing problems based on screening of functional status. Hearing Problems?: Yes  He has Vision problems based on screening of functional status. Vision Problems? : Yes   He has Walking problems based on screening of functional status.    Diffi directives standard forms performed Face to Face with patient and Family/surrogate (if present), and forms available to patient in AVS       He does NOT have a Power of  for [employer] on file in [place].    Advance care planning including the explana ALLERGIES:   He has No Known Allergies.     CURRENT MEDICATIONS:     Outpatient Medications Marked as Taking for the 3/18/19 encounter (Office Visit) with Rae Bernal DO:  omeprazole 20 MG Oral Capsule Delayed Release Take 1 capsule (20 mg to especially have trouble understanding the speech of women and children:  Yes I have trouble understanding the speaker in a large room such as at a meeting or place of Hinduism:  Yes   Many people I talk to seem to mumble (or don't speak clearly):  Yes Peopl commands. Needs assist with adl.               Vaccination History     Immunization History   Administered Date(s) Administered   • FLUAD High Dose 65 yr and older (98317) 11/07/2017   • Pneumococcal (Prevnar 13) 12/19/2016   • Pneumovax 23 06/29/2015   De activity?: Light  How would you describe your current health state?: Fair  How do you maintain positive mental well-being?: Visiting Family    This section provided for quick review of chart, separate sheet to patient  PREVENTATIVE SERVICES  INDICATIONS AN Illicit injectable drug abusers     Tetanus Toxoid  Only covered with a cut with metal- TD and TDaP Not covered by Medicare Part B) No vaccine history found This may be covered with your prescription benefits, but Medicare does not cover unless Medically

## 2019-04-02 ENCOUNTER — TELEPHONE (OUTPATIENT)
Dept: SURGERY | Facility: CLINIC | Age: 69
End: 2019-04-02

## 2019-04-02 NOTE — TELEPHONE ENCOUNTER
Phoned pt's son, Carole Pond, and spoke with him. Informed him of 's request that I speak with him, however no CHETAN on file. I asked son if he is the . He states he can be.  He offered to conference the pt's wife (his mother) and I agreed t

## 2019-04-02 NOTE — TELEPHONE ENCOUNTER
----- Message from Lakeshia Perez MD sent at 4/1/2019  1:09 PM CDT -----  Please contact the patient's son as the person the patient has severe dementia.   Inform him his PSA from 3 weeks ago remains elevated but significantly improved at 16.20 compared wit

## 2019-04-30 ENCOUNTER — OFFICE VISIT (OUTPATIENT)
Dept: SURGERY | Facility: CLINIC | Age: 69
End: 2019-04-30
Payer: MEDICARE

## 2019-04-30 VITALS
HEIGHT: 65.5 IN | HEART RATE: 83 BPM | DIASTOLIC BLOOD PRESSURE: 71 MMHG | WEIGHT: 138 LBS | BODY MASS INDEX: 22.72 KG/M2 | RESPIRATION RATE: 16 BRPM | SYSTOLIC BLOOD PRESSURE: 109 MMHG

## 2019-04-30 DIAGNOSIS — R30.0 DYSURIA: ICD-10-CM

## 2019-04-30 DIAGNOSIS — R97.20 ELEVATED PSA: Primary | ICD-10-CM

## 2019-04-30 PROCEDURE — G0463 HOSPITAL OUTPT CLINIC VISIT: HCPCS | Performed by: UROLOGY

## 2019-04-30 PROCEDURE — 99213 OFFICE O/P EST LOW 20 MIN: CPT | Performed by: UROLOGY

## 2019-05-19 NOTE — PROGRESS NOTES
Jolanta Rob is a 80year old male. HPI:   Patient presents with:  elevated psa      72-year-old male in follow-up to visit December 14, 2018.   He has progressive severe dementia and has seen me originally for elevated PSA but is rapidly fluctuating breakfast. (For stomach acid) Disp: 90 capsule Rfl: 1       Allergies:  No Known Allergies      ROS:       PHYSICAL EXAM:        ASSESSMENT/PLAN:   Assessment   Elevated psa  (primary encounter diagnosis)  Dysuria    Reviewed findings at length with ulises

## 2019-07-02 ENCOUNTER — OFFICE VISIT (OUTPATIENT)
Dept: FAMILY MEDICINE CLINIC | Facility: CLINIC | Age: 69
End: 2019-07-02
Payer: MEDICARE

## 2019-07-02 VITALS
HEIGHT: 65.5 IN | DIASTOLIC BLOOD PRESSURE: 66 MMHG | HEART RATE: 73 BPM | SYSTOLIC BLOOD PRESSURE: 98 MMHG | RESPIRATION RATE: 14 BRPM | TEMPERATURE: 98 F | WEIGHT: 140 LBS | BODY MASS INDEX: 23.04 KG/M2

## 2019-07-02 DIAGNOSIS — L80 VITILIGO: Primary | ICD-10-CM

## 2019-07-02 DIAGNOSIS — N39.42 URINARY INCONTINENCE WITHOUT SENSORY AWARENESS: ICD-10-CM

## 2019-07-02 DIAGNOSIS — R13.10 DYSPHAGIA, UNSPECIFIED TYPE: ICD-10-CM

## 2019-07-02 PROCEDURE — 99213 OFFICE O/P EST LOW 20 MIN: CPT | Performed by: PHYSICIAN ASSISTANT

## 2019-07-03 PROBLEM — R13.10 DYSPHAGIA: Status: ACTIVE | Noted: 2019-07-03

## 2019-07-03 NOTE — PROGRESS NOTES
HPI:   HPI  77year-old male is here in the office complaining of difficulty swallowing for the past 6 months. Dr Crystal Villanueva referred patient to GI since Feb 2019 for further evaluation, however patient has not made an appointment to follow up.  His daughter • Other (Other) Mother         during    • Diabetes Neg    • Glaucoma Neg        Social History:   Social History    Socioeconomic History      Marital status:       Spouse name: Not on file      Number of children: Not on file      Years Seat Belt: Not Asked        Self-Exams: Not Asked    Social History Narrative      The patient does not use an assistive device. .        The patient does live in a home with stairs.       Review of Systems:   Review of Systems   Constitutional: Negative oriented to person, place, and time. He has normal reflexes. Skin: Skin is intact. Psychiatric: He has a normal mood and affect. There are few hyperpigmented patches on scalp and left hand.     Assessment and Plan[de-identified]     Problem List Items Addressed Th

## 2019-07-25 ENCOUNTER — OFFICE VISIT (OUTPATIENT)
Dept: DERMATOLOGY CLINIC | Facility: CLINIC | Age: 69
End: 2019-07-25
Payer: MEDICARE

## 2019-07-25 DIAGNOSIS — L80 VITILIGO: Primary | ICD-10-CM

## 2019-07-25 PROCEDURE — 99202 OFFICE O/P NEW SF 15 MIN: CPT | Performed by: DERMATOLOGY

## 2019-07-25 RX ORDER — HYDROCORTISONE 25 MG/ML
LOTION TOPICAL
Qty: 120 ML | Refills: 2 | Status: SHIPPED | OUTPATIENT
Start: 2019-07-25

## 2019-08-05 NOTE — PROGRESS NOTES
Althea Patel is a 77year old male. Patient presents with:  Derm Problem: New patient with skin discoloration to scalp, neck, and left hand x 5 months. Unknown cause. Denies flaking or itching. No treatment thus far.              Patient has no kno w/idopathic pleuropuricarditis   • Unspecified sleep apnea      Past Surgical History:   Procedure Laterality Date   • ELECTROCARDIOGRAM, COMPLETE  01/08/1914    Scanned to Media Tab     Social History    Socioeconomic History      Marital status:  Not Asked        Back Care: Not Asked        Exercise: No        Bike Helmet: Not Asked        Seat Belt: Not Asked        Self-Exams: Not Asked    Social History Narrative      The patient does not use an assistive device. .        The patient does live in course discussed. Lesions are fairly localized and there are numerous areas of islands of repigmentation around the follicles. Expectations reviewed. Will begin topicals.   Phototherapy not practical cautious sun exposure may help stimulate repigmentatio

## 2019-09-25 NOTE — PROGRESS NOTES
HPI:    Patient ID: Huma Werner is a 77year old [facility] man with a complex history of advanced dementia who is brought in by his wife and daughter to discuss work-up of trouble swallowing. Mr. Maximo Galan follows with Dr. Bruce Street.   Other medi bowel movements which is a change. We briefly discussed colonoscopy examination for colon cancer screening with his relatively young age. She is certain that he would not cooperate with a bowel prep. Former smoker. Body mass index is 22.62 kg/m². Soft. He exhibits no distension. There is no tenderness. Neurological: He is alert. Pleasantly confused as above. Time speaking. Daughter does almost all the talking. Skin: Skin is warm and dry. ASSESSMENT/PLAN:   No diagnosis found. an IV could be started, he could be sedated immediately by the anesthesiologist and we could wheel him back immediately for the procedure. Waking him up could also be unpredictable.   As below, I discussed risks of him sustaining injuries, even falling out She will call back if family wishes to proceed with invasive testing. No orders of the defined types were placed in this encounter.       Meds This Visit:  Requested Prescriptions      No prescriptions requested or ordered in this encounter       Jaclyn

## 2019-11-04 ENCOUNTER — NURSE TRIAGE (OUTPATIENT)
Dept: FAMILY MEDICINE CLINIC | Facility: CLINIC | Age: 69
End: 2019-11-04

## 2019-11-04 ENCOUNTER — TELEPHONE (OUTPATIENT)
Dept: FAMILY MEDICINE CLINIC | Facility: CLINIC | Age: 69
End: 2019-11-04

## 2019-11-04 NOTE — TELEPHONE ENCOUNTER
Daughter, states that patient has a cough, runny nose and is sneezing. Per daughter pt does not sound good. Transferred call to triage.

## 2019-11-04 NOTE — TELEPHONE ENCOUNTER
Action Requested: Summary for Provider     []  Critical Lab, Recommendations Needed  [] Need Additional Advice  []   FYI    []   Need Orders  [] Need Medications Sent to Pharmacy  []  Other     SUMMARY: Heidi Mustafa (daughter) called for pt who has 2-3 days of pe

## 2019-11-05 NOTE — PROGRESS NOTES
Height 5' 5.5\" (1.664 m). Presents today with a one-week history of a cough that is nocturnal per the wife seems to have some dyspnea. Also some nasal congestion. Quit smoking years ago. No fever or chills per the wife. Patient somewhat combative.

## 2020-01-27 ENCOUNTER — TELEPHONE (OUTPATIENT)
Dept: CASE MANAGEMENT | Age: 70
End: 2020-01-27

## 2020-01-27 NOTE — TELEPHONE ENCOUNTER
Patient is eligible for a 2020 Medicare Advantage Supervisit. Left message to call back [phone].

## 2020-03-02 ENCOUNTER — TELEPHONE (OUTPATIENT)
Dept: CASE MANAGEMENT | Age: 70
End: 2020-03-02

## 2020-03-02 NOTE — TELEPHONE ENCOUNTER
Patient is eligible for a 2020 Medicare Advantage Supervisit. Left message to call back [phone].

## 2020-03-05 ENCOUNTER — TELEPHONE (OUTPATIENT)
Dept: FAMILY MEDICINE CLINIC | Facility: CLINIC | Age: 70
End: 2020-03-05

## 2020-07-31 ENCOUNTER — TELEPHONE (OUTPATIENT)
Dept: CASE MANAGEMENT | Age: 70
End: 2020-07-31

## 2020-07-31 NOTE — TELEPHONE ENCOUNTER
Patient is eligible for a 2020 Medicare Advantage Supervisit. Discussed w/son, Kerri Hyatt. He will call back to schedule.

## 2020-09-15 ENCOUNTER — TELEPHONE (OUTPATIENT)
Dept: CASE MANAGEMENT | Age: 70
End: 2020-09-15

## 2020-09-15 NOTE — TELEPHONE ENCOUNTER
Patient is eligible for a 2020 Medicare Annual Wellness visit. Left message to call back [phone].

## 2020-10-13 ENCOUNTER — NURSE TRIAGE (OUTPATIENT)
Dept: FAMILY MEDICINE CLINIC | Facility: CLINIC | Age: 70
End: 2020-10-13

## 2020-10-13 NOTE — TELEPHONE ENCOUNTER
Action Requested: Summary for Provider     []  Critical Lab, Recommendations Needed  [] Need Additional Advice  []   FYI    []   Need Orders  [] Need Medications Sent to Pharmacy  []  Other     SUMMARY:    Virtual Appointment made for today 12/13/2020    T

## 2020-10-14 ENCOUNTER — TELEPHONE (OUTPATIENT)
Dept: FAMILY MEDICINE CLINIC | Facility: CLINIC | Age: 70
End: 2020-10-14

## 2020-10-14 NOTE — PROGRESS NOTES
HPI: HPI    I spoke Yoseph Cordero's daughter Ana Rosa Ron by telephone , verified date of birth, and discussed current concerns. Ana Rosa Ron states that patient dementia is getting worse. Patient is restless, not sleeping at all during the night.  Patient is usi Laterality Date   • Colonoscopy     • Electrocardiogram, complete  01/08[birth year]1914    Scanned to Media Tab       Family History:     Family History   Problem Relation Age of Onset   • Cancer Father         Lung - Smoker  Cause of death   • Other (Other) Mother Hazards: Not Asked        Sleep Concern: Not Asked        Stress Concern: Not Asked        Weight Concern: Not Asked        Special Diet: Not Asked        Back Care: Not Asked        Exercise: No        Bike Helmet: Not Asked        Seat Belt: Not Asked

## 2020-10-14 NOTE — TELEPHONE ENCOUNTER
Called [facility] and spoke with Latesha Scherer, states that she gave 3 Lorazepam pills last night to the daughter (last stock), she can transfer the script to [facility] . Will close this encounter.

## 2020-10-14 NOTE — TELEPHONE ENCOUNTER
Per pharmacy on file the med LORazepam 0.5 MG Oral Tab, they are in back order so would like to know if can be transfer to different [facility] on file.

## 2020-10-15 ENCOUNTER — TELEPHONE (OUTPATIENT)
Dept: FAMILY MEDICINE CLINIC | Facility: CLINIC | Age: 70
End: 2020-10-15

## 2020-10-15 NOTE — TELEPHONE ENCOUNTER
Two faxes received requesting alternative med or PA. First note states:  Dear prescriber,  This RX has a plan limitation/days supply. Plan does not cover over 3.6 tablets PO daily. A greater daily dose requires a PA. Please advise.     Second fax st

## 2020-10-15 NOTE — TELEPHONE ENCOUNTER
[facility] , spoke with Pharmacist  Aleyda Chambers called inn  for Lorazepam as listed below   ( per Chi )

## 2020-10-15 NOTE — TELEPHONE ENCOUNTER
Frederic, please see message below, would you like to change rx or proceed with PA?   Please reply to pool: EM TRIAGE SUPPORT

## 2020-11-06 ENCOUNTER — TELEPHONE (OUTPATIENT)
Dept: CASE MANAGEMENT | Age: 70
End: 2020-11-06

## 2020-11-06 NOTE — TELEPHONE ENCOUNTER
Patient is eligible for a 2020 Medicare Annual Wellness visit. Left message to call back [phone].

## 2020-11-06 NOTE — TELEPHONE ENCOUNTER
Discussed w/daughter Irena Chaves. Due to pts dementia she states that patient will not wear a mask and may run out of the office. Appointment declined.

## 2021-01-14 RX ORDER — LORAZEPAM 0.5 MG/1
0.5 TABLET ORAL EVERY 6 HOURS PRN
Qty: 30 TABLET | Refills: 1 | Status: SHIPPED | OUTPATIENT
Start: 2021-01-14 | End: 2021-04-09

## 2021-01-14 NOTE — TELEPHONE ENCOUNTER
•  LORazepam 0.5 MG Oral Tab, Take 1 tablet (0.5 mg total) by mouth every 6 (six) hours as needed for Anxiety. , Disp: 30 tablet, Rfl: 1

## 2021-03-08 DIAGNOSIS — Z23 NEED FOR VACCINATION: ICD-10-CM

## 2021-03-08 DIAGNOSIS — R82.90 ABNORMAL URINE: ICD-10-CM

## 2021-04-02 ENCOUNTER — TELEPHONE (OUTPATIENT)
Dept: CASE MANAGEMENT | Age: 71
End: 2021-04-02

## 2021-04-08 NOTE — TELEPHONE ENCOUNTER
Apparently it is possible to do a Medicare annual visit via video. Please inform patient's son and help him schedule this.
Patient is eligible for a 2021 Medicare Annual Wellness visit. Son Faheem states that he will call back to schedule.
Please inform patient/son that I am trying to figure out if I am able to do a Medicare annual visit via video visit.   I would be okay with seeing him in the office without his mask however given office policy it may be difficult to get him from the front d
Son called to schedule annual physical. The patient has dementia and son states that he has a hard time getting the patient to wear his mask. Son was reaching out to see what options he has.   Virtual visit is unavailable due to appointment being a physica
Spoke with son Peace Ragland and informed him that yes we can do a medicare video visit per the message below. Peace Ragland, states that he or his [facility] Bridegroom will call back to schedule the appointment with Dr. Annabel Cantor.
Would I be able to do a Medicare Annual via video visit for this patient?
Age appropriate behavior

## 2021-04-09 RX ORDER — LORAZEPAM 0.5 MG/1
0.5 TABLET ORAL EVERY 6 HOURS PRN
Qty: 30 TABLET | Refills: 1 | Status: SHIPPED | OUTPATIENT
Start: 2021-04-09

## 2021-04-09 NOTE — TELEPHONE ENCOUNTER
Refill request on the following. Current Outpatient Medications   Medication Sig Dispense Refill   • LORazepam 0.5 MG Oral Tab Take 1 tablet (0.5 mg total) by mouth every 6 (six) hours as needed for Anxiety.  30 tablet 1

## 2021-04-29 ENCOUNTER — LAB ENCOUNTER (OUTPATIENT)
Dept: LAB | Age: 71
End: 2021-04-29
Attending: FAMILY MEDICINE
Payer: MEDICARE

## 2021-04-29 ENCOUNTER — NURSE TRIAGE (OUTPATIENT)
Dept: FAMILY MEDICINE CLINIC | Facility: CLINIC | Age: 71
End: 2021-04-29

## 2021-04-29 DIAGNOSIS — R82.90 ABNORMAL URINE: Primary | ICD-10-CM

## 2021-04-29 PROCEDURE — 81001 URINALYSIS AUTO W/SCOPE: CPT | Performed by: FAMILY MEDICINE

## 2021-04-29 PROCEDURE — 87077 CULTURE AEROBIC IDENTIFY: CPT | Performed by: FAMILY MEDICINE

## 2021-04-29 PROCEDURE — 87086 URINE CULTURE/COLONY COUNT: CPT | Performed by: FAMILY MEDICINE

## 2021-04-29 NOTE — TELEPHONE ENCOUNTER
Action Requested: Summary for Provider     []  Critical Lab, Recommendations Needed  [x] Need Additional Advice  []   FYI    []   Need Orders  [] Need Medications Sent to Pharmacy  []  Other     SUMMARY: Daughter requesting recommendations or order for UA,

## 2021-04-29 NOTE — TELEPHONE ENCOUNTER
RN Called patient's daughter (CHETAN). RN informed patient of provider's message below. Daughter verbalizes concerns that patient will not be compliant to go to Lab, daughter requesting further instructions on how to deliver specimen correctly.  Grandson jesus

## 2021-04-29 NOTE — TELEPHONE ENCOUNTER
Daughter Heath Liz calling to inform us urine specimen was sent to the lab,     Specimens in process at this time .  Daughter informed when results are complete and been reviewed  she will be called with Dr. Kirsten Winchester' recommendations

## 2021-05-03 ENCOUNTER — TELEMEDICINE (OUTPATIENT)
Dept: FAMILY MEDICINE CLINIC | Facility: CLINIC | Age: 71
End: 2021-05-03

## 2021-05-03 DIAGNOSIS — R97.20 ELEVATED PSA: Primary | ICD-10-CM

## 2021-05-03 DIAGNOSIS — F02.81 DEMENTIA DUE TO MEDICAL CONDITION WITH BEHAVIORAL DISTURBANCE (HCC): ICD-10-CM

## 2021-05-03 PROCEDURE — 99213 OFFICE O/P EST LOW 20 MIN: CPT | Performed by: FAMILY MEDICINE

## 2021-05-03 NOTE — PROGRESS NOTES
VIDEO VISIT    6 minutes duration. Patient with dementia and also agitation pounds on the walls wanders around at night. Daughter interested in home health. His appetite is diminished.   She was concerned about urinary tract infection but urine test was

## 2021-05-09 RX ORDER — TRAZODONE HYDROCHLORIDE 50 MG/1
50 TABLET ORAL NIGHTLY
Qty: 30 TABLET | Refills: 2 | Status: SHIPPED | OUTPATIENT
Start: 2021-05-09 | End: 2021-08-07

## 2021-05-13 ENCOUNTER — TELEPHONE (OUTPATIENT)
Dept: FAMILY MEDICINE CLINIC | Facility: CLINIC | Age: 71
End: 2021-05-13

## 2021-05-13 ENCOUNTER — HOSPITAL ENCOUNTER (EMERGENCY)
Facility: HOSPITAL | Age: 71
Discharge: HOME | End: 2021-05-13
Attending: EMERGENCY MEDICINE
Payer: MEDICARE

## 2021-05-13 ENCOUNTER — APPOINTMENT (OUTPATIENT)
Dept: CT IMAGING | Facility: HOSPITAL | Age: 71
End: 2021-05-13
Attending: EMERGENCY MEDICINE
Payer: MEDICARE

## 2021-05-13 VITALS
RESPIRATION RATE: 18 BRPM | OXYGEN SATURATION: 97 % | HEART RATE: 74 BPM | SYSTOLIC BLOOD PRESSURE: 120 MMHG | DIASTOLIC BLOOD PRESSURE: 66 MMHG | TEMPERATURE: 98 F

## 2021-05-13 DIAGNOSIS — U07.1 COVID-19: Primary | ICD-10-CM

## 2021-05-13 PROCEDURE — 96374 THER/PROPH/DIAG INJ IV PUSH: CPT

## 2021-05-13 PROCEDURE — 96361 HYDRATE IV INFUSION ADD-ON: CPT

## 2021-05-13 PROCEDURE — 80053 COMPREHEN METABOLIC PANEL: CPT

## 2021-05-13 PROCEDURE — 87086 URINE CULTURE/COLONY COUNT: CPT | Performed by: EMERGENCY MEDICINE

## 2021-05-13 PROCEDURE — 81001 URINALYSIS AUTO W/SCOPE: CPT | Performed by: EMERGENCY MEDICINE

## 2021-05-13 PROCEDURE — 74177 CT ABD & PELVIS W/CONTRAST: CPT | Performed by: EMERGENCY MEDICINE

## 2021-05-13 PROCEDURE — 85025 COMPLETE CBC W/AUTO DIFF WBC: CPT

## 2021-05-13 PROCEDURE — 99453 REM MNTR PHYSIOL PARAM SETUP: CPT

## 2021-05-13 PROCEDURE — 85025 COMPLETE CBC W/AUTO DIFF WBC: CPT | Performed by: EMERGENCY MEDICINE

## 2021-05-13 PROCEDURE — 80053 COMPREHEN METABOLIC PANEL: CPT | Performed by: EMERGENCY MEDICINE

## 2021-05-13 PROCEDURE — 99284 EMERGENCY DEPT VISIT MOD MDM: CPT

## 2021-05-13 RX ORDER — LORAZEPAM 2 MG/ML
1 INJECTION INTRAMUSCULAR ONCE
Status: COMPLETED | OUTPATIENT
Start: 2021-05-13 | End: 2021-05-13

## 2021-05-13 RX ORDER — ONDANSETRON 4 MG/1
4 TABLET, ORALLY DISINTEGRATING ORAL EVERY 4 HOURS PRN
Qty: 10 TABLET | Refills: 0 | Status: SHIPPED | OUTPATIENT
Start: 2021-05-13 | End: 2021-05-20

## 2021-05-13 NOTE — ED INITIAL ASSESSMENT (HPI)
Vomiting blood began today. Pt with hx dementia unable to communicate or relay pain/discomfort. Attempting to remove BP cuff/pulse ox in triage.

## 2021-05-13 NOTE — ED QUICK NOTES
Patient was provided with Home Pulse Oximetry and Incentive Spirometry , and instructed on use. Patient/family verbalized understanding.

## 2021-05-13 NOTE — TELEPHONE ENCOUNTER
Son (CHETAN), reported that patient had episode of vomiting Sunday with little blood in it and then another one last night, son is concern about the health issue and what tests to be done, BUT the problem is patient has   dementia   And does not like to be to

## 2021-05-13 NOTE — ED PROVIDER NOTES
Patient Seen in: [facility] AND [facility] Emergency Department      History   Patient presents with:  Nausea/vomiting  GI Bleeding    Stated Complaint: vomiting blood/    HPI/Subjective:   HPI    81-year-old male with past medical history significant for above.    Physical Exam     ED Triage Vitals [05/13/21 1241]   /65   Pulse 94   Resp 20   Temp 97.4 °F (36.3 °C)   Temp src    SpO2 100 %   O2 Device None (Room air)       Current:/72   Pulse 91   Temp 97.4 °F (36.3 °C)   Resp 18   SpO2 100% order CBC WITH DIFFERENTIAL WITH PLATELET.   Procedure                               Abnormality         Status                     ---------                               -----------         ------                     CBC W/ DIFFERENTIAL[[id number]] 0

## 2021-05-13 NOTE — ED QUICK NOTES
Incontinent of urine, cleaned, linens changed. Repositioned on cart for comfort, call light within reach.

## 2021-05-13 NOTE — ED QUICK NOTES
rec'd care of pt from triage. Vomiting blood onset today. Family states it was \"bright red\" blood. Denies diarrhea. #20g iv started to lac, blood drawn and sent to lab. Soft wrist restraints placed on pt due to pt aggressive, yelling, uncooperative.  Pull

## 2021-05-13 NOTE — ED QUICK NOTES
Got pt dressed. Placed in wheelchair and assisted outside to car where family is waiting.  Dc instructions given to family

## 2021-05-14 ENCOUNTER — TELEPHONE (OUTPATIENT)
Dept: FAMILY MEDICINE CLINIC | Facility: CLINIC | Age: 71
End: 2021-05-14

## 2021-05-14 NOTE — TELEPHONE ENCOUNTER
Talked to Sal son of patient, his sister will call back and make an ER video appointment due to patient is Covid+. See message below.

## 2021-05-14 NOTE — TELEPHONE ENCOUNTER
What  was your temp today? - no fever    How did you take your temp?  oral    What was your pulse ox today? Has been     Are you feeling short of breath today? No    Is the shortness of breath better, the same, or worse than yesterday?    No SOB    A

## 2021-05-14 NOTE — TELEPHONE ENCOUNTER
With [facility] , left message to call back-transfer to triage. Son indicated to call back in 30 minutes that he was in a meeting. Patient was in the ER yesterday and tested positive for COVID and had the monoclonal antibody infusion.

## 2021-05-14 NOTE — TELEPHONE ENCOUNTER
Order Summary    Morenita Lyn   [facility] OP HOME MONITORING PROGRAM [[id number] CUSTOM]  Order #:  [id number]    Questions:   Is patient on oxygen at home?: No   Will pulse ox be given at discharge?: Yes   Has the patient received Monoclonal AB treatment?: No

## 2021-05-14 NOTE — TELEPHONE ENCOUNTER
Pt received PAB infusion yesterday in ER for COVID-19. Please f/u with pt for post-infusion assessment and home monitoring. Thank you!

## 2021-05-14 NOTE — TELEPHONE ENCOUNTER
Was at ER yesterday 5/13/21. CSS=please call and assists. No future appointments.       Clinical Impressions       OVID-19  Disposition       Discharge        ED/IC AVS (Printed 5/13/2021)        Follow-Ups: Call Louisa Chiang DO ([facility]

## 2021-05-17 ENCOUNTER — TELEMEDICINE (OUTPATIENT)
Dept: FAMILY MEDICINE CLINIC | Facility: CLINIC | Age: 71
End: 2021-05-17

## 2021-05-17 DIAGNOSIS — R97.20 ELEVATED PSA: Primary | ICD-10-CM

## 2021-05-17 DIAGNOSIS — K92.0 HEMATEMESIS, PRESENCE OF NAUSEA NOT SPECIFIED: ICD-10-CM

## 2021-05-17 PROCEDURE — 99213 OFFICE O/P EST LOW 20 MIN: CPT | Performed by: FAMILY MEDICINE

## 2021-05-17 RX ORDER — OMEPRAZOLE 20 MG/1
20 CAPSULE, DELAYED RELEASE ORAL
Qty: 90 CAPSULE | Refills: 1 | Status: SHIPPED | OUTPATIENT
Start: 2021-05-17 | End: 2021-12-14

## 2021-05-17 NOTE — PROGRESS NOTES
4.  Elevated PSA VIDEO VISIT    Duration of call 14 minutes    Discussed at length with daughter recent emergency room visit for hematemesis and diarrhea. Is Covid positive. Refuses to wear a mask. Patient with some level of dementia.   He does feed hims

## 2021-05-19 NOTE — TELEPHONE ENCOUNTER
DAY 6/7 MAB.  +COVID 5/13. What  was your temp today? Daughter states he does not have a fever    How did you take your temp? N/A    What was your pulse ox today? Has not done    Are you feeling short of breath today?    No      Is the shortness of b

## 2021-05-21 NOTE — TELEPHONE ENCOUNTER
DAY 8/7 MAB     Patient's daughter states father is doing well with no problems. States \"he either eats a lot or very little. \"  Denies cough, shortness of breath, fever, weakness. Closing encounter.

## 2021-06-09 ENCOUNTER — TELEPHONE (OUTPATIENT)
Dept: CASE MANAGEMENT | Age: 71
End: 2021-06-09

## 2021-06-09 NOTE — TELEPHONE ENCOUNTER
Patient is eligible for a 2021 Medicare Annual Wellness visit. Left message to call back [phone].

## 2021-06-14 ENCOUNTER — NURSE TRIAGE (OUTPATIENT)
Dept: FAMILY MEDICINE CLINIC | Facility: CLINIC | Age: 71
End: 2021-06-14

## 2021-06-28 ENCOUNTER — TELEPHONE (OUTPATIENT)
Dept: GASTROENTEROLOGY | Facility: CLINIC | Age: 71
End: 2021-06-28

## 2021-06-28 NOTE — TELEPHONE ENCOUNTER
Cancelled appointment scheduled for today d/t patient unaware. Will await call back to assist with scheduling follow up with Joe Chung.

## 2021-06-28 NOTE — TELEPHONE ENCOUNTER
APN left vm at pts cell phone. APN contacted home number and spoke with pts son. They were not aware of today's visit and will call back to reschedule.

## 2021-07-30 NOTE — TELEPHONE ENCOUNTER
Action Requested: Summary for Provider     []  Critical Lab, Recommendations Needed  [] Need Additional Advice  []   FYI    []   Need Orders  [] Need Medications Sent to Pharmacy  []  Other     SUMMARY: OV per protocol.   Called Dr. Ranelle Lanes office to get i Physical Therapy  Visit Type: treatment  Precautions:  Medical precautions:  fall risk; contact & special precautions.    Pt is a  54 year old male who was admitted to [facility] on 7/21/2021 with diagnosis of Intra-abdominal abscess (CMS/HCC) (K65.1).  Cleared by [facility].   S/P Colectomy 7/27    Lines:     Basic: NG and IV    Complex: J.P. drain (colostomy)      Lines in chart and on patient reviewed, cautions maintained throughout session.  Safety Measures: bed alarm and bed rails    SUBJECTIVE  Patient agreed to participate in therapy this date.  \"I feel better than yesterday.\"  Patient / Family Goal: return to previous functional status, maximize function and return home    Pain     Location: abdomen    At onset of session (out of 10): 6  RN informed on pain level     OBJECTIVE       Arousal alertness: appropriate responses to stimuli    Affect/Behavior: appropriate, calm, cooperative and flat  Functional Communication/Cognition    Overall status:  Within functional limits  Balance    Sitting: Static: independent back unsupported    Standing - Firm Surface - Eyes Open: Static: supervision double upper extremity support  Dynamic: contact guard/touching/steadying assist double upper extremity support    Bed Mobility:    Rolling left: minimal assist, with verbal cues and with tactile cues      Side-lying to sit: supervision and with verbal cues  Training completed:    Tasks: rolling left and supine to sit    Education details: body mechanics and patient safety  Transfers:    Assistive devices: 2-wheeled walker and gait belt    Sit to stand: contact guard/touching/steadying assist    Stand to sit: contact guard/touching/steadying assist and with verbal cues  Training completed:    Tasks: sit to stand and stand to sit    Education details: body mechanics and patient safety    VC for safe hand placement and motor planning w/ RW  Gait/Ambulation:     Assistance: contact  guard/touching/steadying assist   Assistive device: gait belt and 2-wheeled walker    Distance (ft): 15    Type: decreased ivan    Surface: even  Training Completed:    Tasks: gait training on level surfaces and assistive device use    Education details: body mechanics and patient safety      Interventions     Seated    Lower Extremity: Bilateral: knee flexion, knee extensions, toe raises, heel raises and seated hip flexion, AROM, 15 reps, 1 sets  Training provided: gait training, safety training, transfer training, functional ambulation, bed mobility training and activity tolerance    Skilled input: Verbal instruction/cues       ASSESSMENT    Impairments: range of motion, strength, activity tolerance and pain  Functional Limitations: all functional mobility  Pt demonstrating significant progress this session, likely d/t improved pain management. Pt in bed upon arrival, reporting 6-7/10 pain in abdomen at rest, which did not increase w/ mobility. Pt able to perform roll to left w/ min A and transfer from SL to sit w/ supervision. Once seated at EOB pt demonstrated independence w/ static sitting balance and was able to scoot to EOB independently. He stood w/ supervision and ambulated to chair on opposite side of room w/ RW and CGA for safety. Pt required cues to descend to chair safely. Once seated in chair, pt participated in seated LE therex. Pt encouraged to ambulate in room w/ nursing staff and get to chair as much as possible. Anticipate pt may be able to return home at medical discharge if he continues to progress, however still recommending rehab at this time. Will continue to monitor and update DC rec as necessary.     Recommended Consults: PT: OT  Discharge Recommendations   Recommendation for Discharge: PT IL: Patient is appropriate for daily Physical Therapy        PT/OT Mobility Equipment for Discharge: TBD            Skilled therapy is required to address these limitations in attempt to maximize the  patient's independence.  Progress: improving as expected and progressing toward goals    Pain at end of session: RN informed on pain level 6/10, location: abdomen    End of Session:   Location: in chair  Safety measures: alarm system in place/re-engaged, call light within reach, equipment intact and lines intact  Handoff to: nurse    PLAN    Suggestions for next session as indicated: PT Frequency: 5 days/week  Frequency Comments: 2/5 last 7/30 (R1) SCOTT, JOSEPH        Interventions: body mechanics, strengthening, ROM, safety education, bed mobility, functional transfer training, gait training and stairs retraining  Agreement to plan and goals: patient unable to agree with goals and treatment plan        GOALS  Review Date: 7/28/2021  Long Term Goals: (to be met by time of discharge from hospital)  Sit to supine: Patient will complete sit to supine independent.  Status: progressing/ongoing  Supine to sit: Patient will complete supine to sit independent.  Status: progressing/ongoing  Reposition in bed: Patient will complete repositioning in bed independent.  Status: progressing/ongoing  Sit to stand: Patient will complete sit to stand transfer with 2-wheeled walker, modified independent.   Status: progressing/ongoing  Stand to sit: Patient will complete stand to sit transfer with 2-wheeled walker, modified independent.   Status: progressing/ongoing  Ambulation (even): Patient will ambulate on even surface for 100 feet with 2-wheeled walker, modified independent.   Status: progressing/ongoing  Flight of stairs: Patient will ambulate a flight of stairs with using 1 rail, supervision.     Documented in the chart in the following areas: Assessment.      Therapy procedure time and total treatment time can be found documented on the Time Entry flowsheet

## 2021-08-07 RX ORDER — TRAZODONE HYDROCHLORIDE 50 MG/1
50 TABLET ORAL NIGHTLY
Qty: 90 TABLET | Refills: 0 | Status: SHIPPED | OUTPATIENT
Start: 2021-08-07 | End: 2021-08-12 | Stop reason: DRUGHIGH

## 2021-08-08 NOTE — TELEPHONE ENCOUNTER
CSS=please call and assists patient to schedule for FU OV for further medication refills. No future appointments. Please review; protocol failed/no protocol.      Requested Prescriptions   Pending Prescriptions Disp Refills    TRAZODONE 50 MG Oral

## 2021-08-12 NOTE — TELEPHONE ENCOUNTER
Spoke with [facility] [employer] tech, JAZZY verified. She was informed of CARINE KULKARNI recommendation, she stated understanding.

## 2021-08-12 NOTE — TELEPHONE ENCOUNTER
Daughter Maru Mustafa [facility]) indicated that Niall Velazquez increased patient's trazodone from 50mg to 100mg daily. Patient has been taking 2 tablets of the 50mg and out of medication now and [facility] does not have a script for the increased dosage.  Please a

## 2021-12-10 ENCOUNTER — TELEPHONE (OUTPATIENT)
Dept: CASE MANAGEMENT | Age: 71
End: 2021-12-10

## 2021-12-10 NOTE — TELEPHONE ENCOUNTER
Patient is eligible for a 2022 Medicare Annual Wellness visit. Language Line  Aturo [phone] left msg to call back.

## 2021-12-14 RX ORDER — OMEPRAZOLE 20 MG/1
20 CAPSULE, DELAYED RELEASE ORAL
Qty: 90 CAPSULE | Refills: 3 | Status: ON HOLD | OUTPATIENT
Start: 2021-12-14 | End: 2022-10-25

## 2022-01-11 ENCOUNTER — TELEPHONE (OUTPATIENT)
Dept: CASE MANAGEMENT | Age: 72
End: 2022-01-11

## 2022-01-11 NOTE — TELEPHONE ENCOUNTER
Patient is eligible for a 2022 Medicare Annual Wellness visit. This visit can be scheduled any time in the calendar year. Language Line  [phone] Nataliia berg to call back.

## 2022-10-24 ENCOUNTER — APPOINTMENT (OUTPATIENT)
Dept: CT IMAGING | Facility: HOSPITAL | Age: 72
End: 2022-10-24
Attending: EMERGENCY MEDICINE
Payer: MEDICARE

## 2022-10-24 ENCOUNTER — APPOINTMENT (OUTPATIENT)
Dept: GENERAL RADIOLOGY | Facility: HOSPITAL | Age: 72
End: 2022-10-24
Attending: EMERGENCY MEDICINE
Payer: MEDICARE

## 2022-10-24 ENCOUNTER — NURSE TRIAGE (OUTPATIENT)
Dept: FAMILY MEDICINE CLINIC | Facility: CLINIC | Age: 72
End: 2022-10-24

## 2022-10-24 ENCOUNTER — APPOINTMENT (OUTPATIENT)
Dept: ULTRASOUND IMAGING | Facility: HOSPITAL | Age: 72
End: 2022-10-24
Attending: EMERGENCY MEDICINE
Payer: MEDICARE

## 2022-10-24 ENCOUNTER — HOSPITAL ENCOUNTER (OUTPATIENT)
Facility: HOSPITAL | Age: 72
Setting detail: OBSERVATION
Discharge: HOME HEALTH | End: 2022-10-27
Attending: EMERGENCY MEDICINE | Admitting: HOSPITALIST
Payer: MEDICARE

## 2022-10-24 DIAGNOSIS — R53.1 WEAKNESS: Primary | ICD-10-CM

## 2022-10-24 DIAGNOSIS — R26.9 GAIT DISTURBANCE: ICD-10-CM

## 2022-10-24 PROBLEM — R73.9 HYPERGLYCEMIA: Status: ACTIVE | Noted: 2022-10-24

## 2022-10-24 LAB
ANION GAP SERPL CALC-SCNC: 4 MMOL/L (ref 0–18)
BASOPHILS # BLD AUTO: 0.06 X10(3) UL (ref 0–0.2)
BASOPHILS NFR BLD AUTO: 0.5 %
BILIRUB UR QL: NEGATIVE
BUN BLD-MCNC: 11 MG/DL (ref 7–18)
BUN/CREAT SERPL: 13.8 (ref 10–20)
CALCIUM BLD-MCNC: 9.3 MG/DL (ref 8.5–10.1)
CHLORIDE SERPL-SCNC: 104 MMOL/L (ref 98–112)
CLARITY UR: CLEAR
CO2 SERPL-SCNC: 30 MMOL/L (ref 21–32)
COLOR UR: YELLOW
CREAT BLD-MCNC: 0.8 MG/DL
CRP SERPL-MCNC: <0.29 MG/DL (ref ?–0.3)
DEPRECATED RDW RBC AUTO: 44.9 FL (ref 35.1–46.3)
EOSINOPHIL # BLD AUTO: 0.19 X10(3) UL (ref 0–0.7)
EOSINOPHIL NFR BLD AUTO: 1.5 %
ERYTHROCYTE [DISTWIDTH] IN BLOOD BY AUTOMATED COUNT: 13.2 % (ref 11–15)
ERYTHROCYTE [SEDIMENTATION RATE] IN BLOOD: 30 MM/HR
GFR SERPLBLD BASED ON 1.73 SQ M-ARVRAT: 95 ML/MIN/1.73M2 (ref 60–?)
GLUCOSE BLD-MCNC: 110 MG/DL (ref 70–99)
GLUCOSE UR-MCNC: NEGATIVE MG/DL
HCT VFR BLD AUTO: 41.2 %
HGB BLD-MCNC: 13.6 G/DL
IMM GRANULOCYTES # BLD AUTO: 0.04 X10(3) UL (ref 0–1)
IMM GRANULOCYTES NFR BLD: 0.3 %
KETONES UR-MCNC: NEGATIVE MG/DL
LEUKOCYTE ESTERASE UR QL STRIP.AUTO: NEGATIVE
LYMPHOCYTES # BLD AUTO: 1.9 X10(3) UL (ref 1–4)
LYMPHOCYTES NFR BLD AUTO: 15.2 %
MCH RBC QN AUTO: 30.6 PG (ref 26–34)
MCHC RBC AUTO-ENTMCNC: 33 G/DL (ref 31–37)
MCV RBC AUTO: 92.8 FL
MONOCYTES # BLD AUTO: 1.04 X10(3) UL (ref 0.1–1)
MONOCYTES NFR BLD AUTO: 8.3 %
NEUTROPHILS # BLD AUTO: 9.27 X10 (3) UL (ref 1.5–7.7)
NEUTROPHILS # BLD AUTO: 9.27 X10(3) UL (ref 1.5–7.7)
NEUTROPHILS NFR BLD AUTO: 74.2 %
NITRITE UR QL STRIP.AUTO: NEGATIVE
NT-PROBNP SERPL-MCNC: 33 PG/ML (ref ?–125)
OSMOLALITY SERPL CALC.SUM OF ELEC: 286 MOSM/KG (ref 275–295)
PH UR: 6 [PH] (ref 5–8)
PLATELET # BLD AUTO: 228 10(3)UL (ref 150–450)
POTASSIUM SERPL-SCNC: 3.9 MMOL/L (ref 3.5–5.1)
PROT UR-MCNC: NEGATIVE MG/DL
RBC # BLD AUTO: 4.44 X10(6)UL
SARS-COV-2 RNA RESP QL NAA+PROBE: NOT DETECTED
SODIUM SERPL-SCNC: 138 MMOL/L (ref 136–145)
SP GR UR STRIP: 1.02 (ref 1–1.03)
URATE SERPL-MCNC: 3.4 MG/DL
UROBILINOGEN UR STRIP-ACNC: 0.2
WBC # BLD AUTO: 12.5 X10(3) UL (ref 4–11)

## 2022-10-24 PROCEDURE — 93970 EXTREMITY STUDY: CPT | Performed by: EMERGENCY MEDICINE

## 2022-10-24 PROCEDURE — 72170 X-RAY EXAM OF PELVIS: CPT | Performed by: EMERGENCY MEDICINE

## 2022-10-24 PROCEDURE — 73630 X-RAY EXAM OF FOOT: CPT | Performed by: EMERGENCY MEDICINE

## 2022-10-24 PROCEDURE — 70450 CT HEAD/BRAIN W/O DYE: CPT | Performed by: EMERGENCY MEDICINE

## 2022-10-24 RX ORDER — DIAZEPAM 5 MG/ML
2.5 INJECTION, SOLUTION INTRAMUSCULAR; INTRAVENOUS ONCE
Status: COMPLETED | OUTPATIENT
Start: 2022-10-24 | End: 2022-10-24

## 2022-10-24 NOTE — ED INITIAL ASSESSMENT (HPI)
Per daughter patient is a tiffany, states he has had problems walking over the past few days, unknown injury, R foot is swollen, no erythema appreciated in triage, no leg swelling

## 2022-10-24 NOTE — TELEPHONE ENCOUNTER
Relayed Dr Mary Jane Adams message, daughter wasn't too happy since an appt was made to see Rolando Cuenca at 3:15 but will take him for a prompt eval

## 2022-10-25 ENCOUNTER — APPOINTMENT (OUTPATIENT)
Dept: MRI IMAGING | Facility: HOSPITAL | Age: 72
End: 2022-10-25
Attending: Other
Payer: MEDICARE

## 2022-10-25 PROBLEM — R26.9 GAIT DISTURBANCE: Status: ACTIVE | Noted: 2022-10-25

## 2022-10-25 PROBLEM — Z86.73 HISTORY OF STROKE: Status: ACTIVE | Noted: 2019-03-18

## 2022-10-25 PROCEDURE — 95816 EEG AWAKE AND DROWSY: CPT | Performed by: OTHER

## 2022-10-25 PROCEDURE — 4A10X4Z MONITORING OF CENTRAL NERVOUS ELECTRICAL ACTIVITY, EXTERNAL APPROACH: ICD-10-PCS | Performed by: OTHER

## 2022-10-25 PROCEDURE — 99219 INITIAL OBSERVATION CARE,LEVL II: CPT | Performed by: HOSPITALIST

## 2022-10-25 PROCEDURE — 99203 OFFICE O/P NEW LOW 30 MIN: CPT | Performed by: OTHER

## 2022-10-25 RX ORDER — ASPIRIN 81 MG/1
81 TABLET, CHEWABLE ORAL DAILY
Status: DISCONTINUED | OUTPATIENT
Start: 2022-10-25 | End: 2022-10-27

## 2022-10-25 RX ORDER — ONDANSETRON 2 MG/ML
4 INJECTION INTRAMUSCULAR; INTRAVENOUS EVERY 6 HOURS PRN
Status: DISCONTINUED | OUTPATIENT
Start: 2022-10-25 | End: 2022-10-27

## 2022-10-25 RX ORDER — LORAZEPAM 0.5 MG/1
0.5 TABLET ORAL 2 TIMES DAILY PRN
Status: DISCONTINUED | OUTPATIENT
Start: 2022-10-25 | End: 2022-10-27

## 2022-10-25 RX ORDER — HALOPERIDOL 5 MG/ML
0.5 INJECTION INTRAMUSCULAR EVERY 6 HOURS PRN
Status: DISCONTINUED | OUTPATIENT
Start: 2022-10-25 | End: 2022-10-27

## 2022-10-25 RX ORDER — SODIUM CHLORIDE 9 MG/ML
INJECTION, SOLUTION INTRAVENOUS CONTINUOUS
Status: DISCONTINUED | OUTPATIENT
Start: 2022-10-25 | End: 2022-10-27

## 2022-10-25 RX ORDER — HEPARIN SODIUM 5000 [USP'U]/ML
5000 INJECTION, SOLUTION INTRAVENOUS; SUBCUTANEOUS EVERY 12 HOURS SCHEDULED
Status: DISCONTINUED | OUTPATIENT
Start: 2022-10-25 | End: 2022-10-27

## 2022-10-25 RX ORDER — LORAZEPAM 0.5 MG/1
0.5 TABLET ORAL EVERY 6 HOURS PRN
Status: DISCONTINUED | OUTPATIENT
Start: 2022-10-25 | End: 2022-10-25

## 2022-10-25 RX ORDER — BENZONATATE 100 MG/1
100 CAPSULE ORAL 3 TIMES DAILY PRN
Status: DISCONTINUED | OUTPATIENT
Start: 2022-10-25 | End: 2022-10-27

## 2022-10-25 NOTE — ED QUICK NOTES
Care endorsed to [facility]. Patient resting on stretcher, family at bedside. No current needs.

## 2022-10-25 NOTE — ED QUICK NOTES
Orders for admission, patient is aware of plan and ready to go upstairs. Any questions, please call ED RN Severo Lust at extension [phone].      Patient Covid vaccination status: Unvaccinated     COVID Test Ordered in ED: None    COVID Suspicion at Admission: N/A    Running Infusions:  None    Mental Status/LOC at time of transport: Aox0- hx dementia    Other pertinent information: mitts applied in ER to prevent removal of IV   CIWA score: N/A   NIH score:  N/A

## 2022-10-25 NOTE — PLAN OF CARE
Problem: Risk for Violence-Violent Restraints/Seclusion  Goal: Patient will not express any violent or self-destructive behaviors  Description: Interventions:  - Apply the least restrictive restraint to prevent harm if patient strikes out and is not responding to redirection  - Notify patient and family of reasons restraints applied  - Assist the patient to identify the precipitating event  - Assist the patient to identify alternatives to physically acting out  - Assess for any contributing factors to violent behaviors (substances,  medications, history of trauma)  - Assess the patient's physical comfort, circulation, skin condition, hydration, nutrition and elimination needs   - Assess for the need to continue restraints  - Evaluate the need for an emergency medication  Outcome: Progressing     Problem: Patient Centered Care  Goal: Patient preferences are identified and integrated in the patient's plan of care  Description: Interventions:  - What would you like us to know as we care for you?   - Provide timely, complete, and accurate information to patient/family  - Incorporate patient and family knowledge, values, beliefs, and cultural backgrounds into the planning and delivery of care  - Encourage patient/family to participate in care and decision-making at the level they choose  - Honor patient and family perspectives and choices  Outcome: Progressing     Problem: Patient/Family Goals  Goal: Patient/Family Long Term Goal  Description: Patient's Long Term Goal: Discharge home    Interventions:  - Vitals  -Neuro checks  -Labs  - See additional Care Plan goals for specific interventions  Outcome: Progressing  Goal: Patient/Family Short Term Goal  Description: Patient's Short Term Goal: Improve strength and stability    Interventions:   - PT/OT  - See additional Care Plan goals for specific interventions  Outcome: Progressing     Problem: Safety Risk - Non-Violent Restraints  Goal: Patient will remain free from self-harm  Description: INTERVENTIONS:  - Apply the least restrictive restraint to prevent harm  - Notify patient and family of reasons restraints applied  - Assess for any contributing factors to confusion (electrolyte disturbances, delirium, medications)  - Discontinue any unnecessary medical devices as soon as possible  - Assess the patient's physical comfort, circulation, skin condition, hydration, nutrition and elimination needs   - Reorient and redirection as needed  - Assess for the need to continue restraints  Outcome: Progressing     Patient is alert and orientated x 4. Patient is on RA. Neuro checks Q 4 hours in place. Patient has restraints in place for safety. Patient has IVF running. Plan for MRI.

## 2022-10-25 NOTE — CM/SW NOTE
10/25/22 1400   CM/SW Referral Data   Referral Source    Reason for Referral Discharge planning   Informant Son   Pertinent Medical Hx   Does patient have an established PCP? Yes  Elrigo Edgardo)   Patient Info   Patient's Current Mental Status at Time of Assessment Confused or unable to complete assessment   Patient's [address]   Patient lives with Spouse/Significant other;Son;Daughter   Patient Status Prior to Admission   Independent with ADLs and Mobility Yes   Discharge Needs   Anticipated D/C needs To be determined     Pt discussed during nursing rounds. Dx weakness, hx CVA. From home w/spouse, daughter, and son-in-law. Son reports pt is [email or web address] speaking only and has been weaker of late. PT/OT evals needed for dc recommendation, RN is aware. Plan: TBD    / to remain available for support and/or discharge planning.      GARCÍA Vidal    [phone]

## 2022-10-25 NOTE — PLAN OF CARE
Pt is A&O to self, RA. Med rec completed. Son in law at bed side. Neuro q2h until 8 then q4h. Mitts in place and IVF infusing. Plan is for EEG today. Call light within reach and monitoring will continue.      Problem: Safety Risk - Non-Violent Restraints  Goal: Patient will remain free from self-harm  Description: INTERVENTIONS:  - Apply the least restrictive restraint to prevent harm  - Notify patient and family of reasons restraints applied  - Assess for any contributing factors to confusion (electrolyte disturbances, delirium, medications)  - Discontinue any unnecessary medical devices as soon as possible  - Assess the patient's physical comfort, circulation, skin condition, hydration, nutrition and elimination needs   - Reorient and redirection as needed  - Assess for the need to continue restraints  Outcome: Not Progressing      Problem: Patient Centered Care  Goal: Patient preferences are identified and integrated in the patient's plan of care  Description: Interventions:  - What would you like us to know as we care for you?   - Provide timely, complete, and accurate information to patient/family  - Incorporate patient and family knowledge, values, beliefs, and cultural backgrounds into the planning and delivery of care  - Encourage patient/family to participate in care and decision-making at the level they choose  - Honor patient and family perspectives and choices  10/25/2022 0748 by Emily Mayorga RN  Outcome: Progressing  10/25/2022 0518 by Emily Mayorga, RN  Outcome: Progressing     Problem: Patient/Family Goals  Goal: Patient/Family Long Term Goal  Description: Patient's Long Term Goal:     Interventions:  - EEG  - neuro check   - See additional Care Plan goals for specific interventions  10/25/2022 0748 by Emily Mayorga, RN  Outcome: Progressing  10/25/2022 0518 by Emily Mayorga RN  Outcome: Progressing  Goal: Patient/Family Short Term Goal  Description: Patient's Short Term Goal:   Problem: Safety Risk - Non-Violent Restraints  Goal: Patient will remain free from self-harm  Description: INTERVENTIONS:  - Apply the least restrictive restraint to prevent harm  - Notify patient and family of reasons restraints applied  - Assess for any contributing factors to confusion (electrolyte disturbances, delirium, medications)  - Discontinue any unnecessary medical devices as soon as possible  - Assess the patient's physical comfort, circulation, skin condition, hydration, nutrition and elimination needs   - Reorient and redirection as needed  - Assess for the need to continue restraints  Outcome: Not Progressing       Interventions:   -   - See additional Care Plan goals for specific interventions  10/25/2022 0748 by Ashley Maxwell RN  Outcome: Progressing  10/25/2022 0518 by Ashley Maxwell RN  Outcome: Progressing

## 2022-10-25 NOTE — ED QUICK NOTES
Per family patient will pull at lines/tubes. Mitts applied by SAMANTHA. Inga applied as well. Patient tolerating at this time.

## 2022-10-26 ENCOUNTER — APPOINTMENT (OUTPATIENT)
Dept: MRI IMAGING | Facility: HOSPITAL | Age: 72
End: 2022-10-26
Attending: Other
Payer: MEDICARE

## 2022-10-26 LAB
BASOPHILS # BLD AUTO: 0.05 X10(3) UL (ref 0–0.2)
BASOPHILS NFR BLD AUTO: 0.6 %
DEPRECATED RDW RBC AUTO: 45.7 FL (ref 35.1–46.3)
EOSINOPHIL # BLD AUTO: 0.42 X10(3) UL (ref 0–0.7)
EOSINOPHIL NFR BLD AUTO: 5 %
ERYTHROCYTE [DISTWIDTH] IN BLOOD BY AUTOMATED COUNT: 13.2 % (ref 11–15)
HCT VFR BLD AUTO: 40.5 %
HGB BLD-MCNC: 13.2 G/DL
IMM GRANULOCYTES # BLD AUTO: 0.03 X10(3) UL (ref 0–1)
IMM GRANULOCYTES NFR BLD: 0.4 %
LYMPHOCYTES # BLD AUTO: 2.57 X10(3) UL (ref 1–4)
LYMPHOCYTES NFR BLD AUTO: 30.6 %
MCH RBC QN AUTO: 30.8 PG (ref 26–34)
MCHC RBC AUTO-ENTMCNC: 32.6 G/DL (ref 31–37)
MCV RBC AUTO: 94.6 FL
MONOCYTES # BLD AUTO: 0.75 X10(3) UL (ref 0.1–1)
MONOCYTES NFR BLD AUTO: 8.9 %
NEUTROPHILS # BLD AUTO: 4.59 X10 (3) UL (ref 1.5–7.7)
NEUTROPHILS # BLD AUTO: 4.59 X10(3) UL (ref 1.5–7.7)
NEUTROPHILS NFR BLD AUTO: 54.5 %
PLATELET # BLD AUTO: 227 10(3)UL (ref 150–450)
RBC # BLD AUTO: 4.28 X10(6)UL
WBC # BLD AUTO: 8.4 X10(3) UL (ref 4–11)

## 2022-10-26 PROCEDURE — 99226 SUBSEQUENT OBSERVATION CARE: CPT | Performed by: HOSPITALIST

## 2022-10-26 PROCEDURE — 99212 OFFICE O/P EST SF 10 MIN: CPT | Performed by: OTHER

## 2022-10-26 PROCEDURE — 70551 MRI BRAIN STEM W/O DYE: CPT | Performed by: OTHER

## 2022-10-26 NOTE — PLAN OF CARE
PPt  is A&O to self, RA. Med rec completed. Son in law at bed side. Neuro q2h until 8 then q4h. Mitts in place and IVF infusing. Plan for MRI. Call light within reach and monitoring will continue.  roblem: Patient Centered Care  Goal: Patient preferences are identified and integrated in the patient's plan of care  Description: Interventions:  - What would you like us to know as we care for you?   - Provide timely, complete, and accurate information to patient/family  - Incorporate patient and family knowledge, values, beliefs, and cultural backgrounds into the planning and delivery of care  - Encourage patient/family to participate in care and decision-making at the level they choose  - Honor patient and family perspectives and choices  10/26/2022 0628 by June Arthur RN  Outcome: Progressing  10/26/2022 0354 by June Arthur RN  Outcome: Progressing  10/26/2022 0339 by June Arthur RN  Outcome: Progressing     Problem: Patient/Family Goals  Goal: Patient/Family Long Term Goal  Description: Patient's Long Term Goal:    Interventions:  -  - See additional Care Plan goals for specific interventions  10/26/2022 0628 by June Arthur RN  Outcome: Progressing  10/26/2022 0354 by June Arthur RN  Outcome: Progressing  10/26/2022 0339 by June Arthur RN  Outcome: Progressing  Goal: Patient/Family Short Term Goal  Description: Patient's Short Term Goal:    Interventions:   -   - See additional Care Plan goals for specific interventions  10/26/2022 0628 by June Arthur RN  Outcome: Progressing  10/26/2022 0354 by June Arthur RN  Outcome: Progressing  10/26/2022 0339 by June Arthur RN  Outcome: Progressing     Problem: Risk for Violence-Violent Restraints/Seclusion  Goal: Patient will not express any violent or self-destructive behaviors  Description: Interventions:  - Apply the least restrictive restraint to prevent harm if patient strikes out and is not responding to redirection  - Notify patient and family of reasons restraints applied  - Assist the patient to identify the precipitating event  - Assist the patient to identify alternatives to physically acting out  - Assess for any contributing factors to violent behaviors (substances,  medications, history of trauma)  - Assess the patient's physical comfort, circulation, skin condition, hydration, nutrition and elimination needs   - Assess for the need to continue restraints  - Evaluate the need for an emergency medication  10/26/2022 0628 by Emily Mayorga RN  Outcome: Not Progressing  10/26/2022 0354 by Emily Mayorga RN  Outcome: Not Progressing  10/26/2022 0339 by Emily Mayorga RN  Outcome: Progressing     Problem: Safety Risk - Non-Violent Restraints  Goal: Patient will remain free from self-harm  Description: INTERVENTIONS:  - Apply the least restrictive restraint to prevent harm  - Notify patient and family of reasons restraints applied  - Assess for any contributing factors to confusion (electrolyte disturbances, delirium, medications)  - Discontinue any unnecessary medical devices as soon as possible  - Assess the patient's physical comfort, circulation, skin condition, hydration, nutrition and elimination needs   - Reorient and redirection as needed  - Assess for the need to continue restraints  10/26/2022 0628 by Emily Mayorga RN  Outcome: Not Progressing  10/26/2022 0354 by Emily Mayorga RN  Outcome: Not Progressing

## 2022-10-26 NOTE — PLAN OF CARE
Patient alert and oriented to self. Vitals stable on room air. Patient denies pain. Patient in mitten restraints with renewal due by 0900 10/27. Patient is neuro checks Q4. Patient to have MRI tonight or tomorrow morning with IV sedative. Plan to discharge once medically cleared. Problem: Patient Centered Care  Goal: Patient preferences are identified and integrated in the patient's plan of care  Description: Interventions:  - What would you like us to know as we care for you?  From home with family  - Provide timely, complete, and accurate information to patient/family  - Incorporate patient and family knowledge, values, beliefs, and cultural backgrounds into the planning and delivery of care  - Encourage patient/family to participate in care and decision-making at the level they choose  - Honor patient and family perspectives and choices  Outcome: Progressing     Problem: Patient/Family Goals  Goal: Patient/Family Long Term Goal  Description: Patient's Long Term Goal: Be able to discharge    Interventions:  - Medication administration  - Diagnostic testing  - removal of restraints  - See additional Care Plan goals for specific interventions  Outcome: Progressing  Goal: Patient/Family Short Term Goal  Description: Patient's Short Term Goal: Remove restraints    Interventions:   - Distraction from medical equipment  - Keeping him occupied  - See additional Care Plan goals for specific interventions  Outcome: Progressing     Problem: Safety Risk - Non-Violent Restraints  Goal: Patient will remain free from self-harm  Description: INTERVENTIONS:  - Apply the least restrictive restraint to prevent harm  - Notify patient and family of reasons restraints applied  - Assess for any contributing factors to confusion (electrolyte disturbances, delirium, medications)  - Discontinue any unnecessary medical devices as soon as possible  - Assess the patient's physical comfort, circulation, skin condition, hydration, nutrition and elimination needs   - Reorient and redirection as needed  - Assess for the need to continue restraints  Outcome: Progressing

## 2022-10-27 ENCOUNTER — TELEPHONE (OUTPATIENT)
Dept: FAMILY MEDICINE CLINIC | Facility: CLINIC | Age: 72
End: 2022-10-27

## 2022-10-27 VITALS
OXYGEN SATURATION: 98 % | SYSTOLIC BLOOD PRESSURE: 116 MMHG | DIASTOLIC BLOOD PRESSURE: 69 MMHG | BODY MASS INDEX: 23.32 KG/M2 | HEIGHT: 65 IN | WEIGHT: 140 LBS | TEMPERATURE: 98 F | HEART RATE: 72 BPM | RESPIRATION RATE: 18 BRPM

## 2022-10-27 LAB
ANION GAP SERPL CALC-SCNC: 4 MMOL/L (ref 0–18)
BASOPHILS # BLD AUTO: 0.06 X10(3) UL (ref 0–0.2)
BASOPHILS NFR BLD AUTO: 0.6 %
BUN BLD-MCNC: 16 MG/DL (ref 7–18)
BUN/CREAT SERPL: 21.3 (ref 10–20)
CALCIUM BLD-MCNC: 9.1 MG/DL (ref 8.5–10.1)
CHLORIDE SERPL-SCNC: 105 MMOL/L (ref 98–112)
CO2 SERPL-SCNC: 30 MMOL/L (ref 21–32)
CREAT BLD-MCNC: 0.75 MG/DL
DEPRECATED RDW RBC AUTO: 44.4 FL (ref 35.1–46.3)
EOSINOPHIL # BLD AUTO: 0.5 X10(3) UL (ref 0–0.7)
EOSINOPHIL NFR BLD AUTO: 5.1 %
ERYTHROCYTE [DISTWIDTH] IN BLOOD BY AUTOMATED COUNT: 12.9 % (ref 11–15)
GFR SERPLBLD BASED ON 1.73 SQ M-ARVRAT: 96 ML/MIN/1.73M2 (ref 60–?)
GLUCOSE BLD-MCNC: 94 MG/DL (ref 70–99)
HCT VFR BLD AUTO: 40.6 %
HGB BLD-MCNC: 13.2 G/DL
IMM GRANULOCYTES # BLD AUTO: 0.04 X10(3) UL (ref 0–1)
IMM GRANULOCYTES NFR BLD: 0.4 %
LYMPHOCYTES # BLD AUTO: 2.72 X10(3) UL (ref 1–4)
LYMPHOCYTES NFR BLD AUTO: 27.8 %
MCH RBC QN AUTO: 30.6 PG (ref 26–34)
MCHC RBC AUTO-ENTMCNC: 32.5 G/DL (ref 31–37)
MCV RBC AUTO: 94 FL
MONOCYTES # BLD AUTO: 0.88 X10(3) UL (ref 0.1–1)
MONOCYTES NFR BLD AUTO: 9 %
NEUTROPHILS # BLD AUTO: 5.6 X10 (3) UL (ref 1.5–7.7)
NEUTROPHILS # BLD AUTO: 5.6 X10(3) UL (ref 1.5–7.7)
NEUTROPHILS NFR BLD AUTO: 57.1 %
OSMOLALITY SERPL CALC.SUM OF ELEC: 289 MOSM/KG (ref 275–295)
PLATELET # BLD AUTO: 217 10(3)UL (ref 150–450)
POTASSIUM SERPL-SCNC: 3.7 MMOL/L (ref 3.5–5.1)
RBC # BLD AUTO: 4.32 X10(6)UL
SODIUM SERPL-SCNC: 139 MMOL/L (ref 136–145)
WBC # BLD AUTO: 9.8 X10(3) UL (ref 4–11)

## 2022-10-27 PROCEDURE — 99217 OBSERVATION CARE DISCHARGE: CPT | Performed by: HOSPITALIST

## 2022-10-27 PROCEDURE — 99212 OFFICE O/P EST SF 10 MIN: CPT | Performed by: OTHER

## 2022-10-27 RX ORDER — ASPIRIN 81 MG/1
81 TABLET, CHEWABLE ORAL DAILY
Qty: 30 TABLET | Refills: 11 | Status: SHIPPED | OUTPATIENT
Start: 2022-10-28

## 2022-10-27 NOTE — DISCHARGE PLANNING
Patient chart reviewed for discharge: Medication Reconciliation completed, Specialist/PCP follow up listed, and disease specific Instructions/Education included in After Visit Summary. Discharge RN notified patient's RN of AVS completion and verified all consultants have signed off. Patient's RN to notify DC RN if discharge status changes.         Heri Pham RN, Discharge Leader [id number]

## 2022-10-27 NOTE — CM/SW NOTE
10/27/22 1300   Discharge disposition   Expected discharge disposition [address] [facility] Provider [facility]   Discharge transportation Private car     Pt discussed during nursing rounds. Pt is stable for dc today. MD dc order entered. CM provided patient's son with list of accepting [facility] agencies at bedside. [address] [place] [address] is chosen agency for RN and PT services at [facility]. Agency reserved in [address] and notified of patient's dc home w/family today. Pt's son will provide transport at [place]. Plan: Home w/family with [facility] today. / to remain available for support and/or discharge planning.      Jonah Jones, BSN    [phone]

## 2022-10-27 NOTE — DISCHARGE INSTRUCTIONS
Sometimes managing your health at home requires assistance. The [place] team has recognized your preference to use [facility]. They can be reached at [phone]. The fax number for your reference is [phone]  A representative from the home health agency will contact you or your family to schedule your first visit.

## 2022-10-27 NOTE — CM/SW NOTE
PT/OT recommending HH w/PT & OT at dc, family agreeable to dc recommendation. [facility] referrals sent in [place], [place] completed. Plan: Home w/family with [facility] pending agency choice and medical clearance.     BABAK AdamsN    [phone]

## 2022-10-27 NOTE — PLAN OF CARE
Patient is alert to self. Patients vitals stable. Patient denies pain. AVS discussed with patient and family. Patient and family had no further questions. Patient to discharge to home with [facility]. Problem: Risk for Violence-Violent Restraints/Seclusion  Goal: Patient will not express any violent or self-destructive behaviors  Description: Interventions:  - Apply the least restrictive restraint to prevent harm if patient strikes out and is not responding to redirection  - Notify patient and family of reasons restraints applied  - Assist the patient to identify the precipitating event  - Assist the patient to identify alternatives to physically acting out  - Assess for any contributing factors to violent behaviors (substances,  medications, history of trauma)  - Assess the patient's physical comfort, circulation, skin condition, hydration, nutrition and elimination needs   - Assess for the need to continue restraints  - Evaluate the need for an emergency medication  Outcome: Adequate for Discharge     Problem: Patient Centered Care  Goal: Patient preferences are identified and integrated in the patient's plan of care  Description: Interventions:  - What would you like us to know as we care for you?  From home with family  - Provide timely, complete, and accurate information to patient/family  - Incorporate patient and family knowledge, values, beliefs, and cultural backgrounds into the planning and delivery of care  - Encourage patient/family to participate in care and decision-making at the level they choose  - Honor patient and family perspectives and choices  Outcome: Adequate for Discharge     Problem: Patient/Family Goals  Goal: Patient/Family Long Term Goal  Description: Patient's Long Term Goal: Be able to go home    Interventions:  - Medication administration  - See additional Care Plan goals for specific interventions  Outcome: Adequate for Discharge     Problem: Safety Risk - Non-Violent Restraints  Goal: Patient will remain free from self-harm  Description: INTERVENTIONS:  - Apply the least restrictive restraint to prevent harm  - Notify patient and family of reasons restraints applied  - Assess for any contributing factors to confusion (electrolyte disturbances, delirium, medications)  - Discontinue any unnecessary medical devices as soon as possible  - Assess the patient's physical comfort, circulation, skin condition, hydration, nutrition and elimination needs   - Reorient and redirection as needed  - Assess for the need to continue restraints  Outcome: Adequate for Discharge

## 2022-10-28 ENCOUNTER — PATIENT OUTREACH (OUTPATIENT)
Dept: CASE MANAGEMENT | Age: 72
End: 2022-10-28

## 2022-10-31 ENCOUNTER — TELEPHONE (OUTPATIENT)
Dept: FAMILY MEDICINE CLINIC | Facility: CLINIC | Age: 72
End: 2022-10-31

## 2022-10-31 NOTE — PROGRESS NOTES
Attempted to reach the patient/daughter to complete a [facility] FU call. Left a message for the pt/famly to call the NCM back at, [phone].     A TE was sent to the PCP office for an appointment request.

## 2022-10-31 NOTE — TELEPHONE ENCOUNTER
Multiple attempts were made to reach the patient/family for TCM that were unsuccessful. The patient was recently hospitalized for deconditioning/inablity to ambulate. The pt does not have a HFU appt scheduled at this time. A TCM/HFU appt is recommended by 11/3/2022 as the pt is a high risk for readmission. Please advise. BOOK BY DATE (last date for TCM): 11/10/2022      Please f/u with the pt's family and assist with scheduling a TCM/HFU appt. Thank you!

## 2022-11-08 ENCOUNTER — TELEPHONE (OUTPATIENT)
Dept: FAMILY MEDICINE CLINIC | Facility: CLINIC | Age: 72
End: 2022-11-08

## 2022-11-09 NOTE — TELEPHONE ENCOUNTER
[facility] nurse is returning  call. Please call back at [phone]    States her phone for some reason goes straight to voicemail at times. Will await call back from the doctor tomorrow. Additional Notes: Dermaplane,gl,zinc\\nWaxed lip/brows

## 2022-11-09 NOTE — TELEPHONE ENCOUNTER
Eugenie from [facility] is returning the call. Does not want to leave a detail message regarding patient.  Call back #[phone]

## 2022-11-10 NOTE — TELEPHONE ENCOUNTER
Contacted Eugenie from [facility] and call transferred to [address] [place] [address]

## 2022-12-01 ENCOUNTER — TELEPHONE (OUTPATIENT)
Dept: FAMILY MEDICINE CLINIC | Facility: CLINIC | Age: 72
End: 2022-12-01

## 2022-12-01 NOTE — TELEPHONE ENCOUNTER
[facility] orders received, signed by provider and faxed back successfully. [facility] with scan sheet.

## 2022-12-01 NOTE — TELEPHONE ENCOUNTER
Irvin Garber from [facility] is calling on a status update on order that were faxed - 11/25/22.

## 2022-12-15 ENCOUNTER — TELEPHONE (OUTPATIENT)
Dept: FAMILY MEDICINE CLINIC | Facility: CLINIC | Age: 72
End: 2022-12-15

## 2022-12-15 NOTE — TELEPHONE ENCOUNTER
[facility] orders received, signed by provider and faxed back successfully. [facility] with scan sheet.

## 2023-01-03 ENCOUNTER — TELEPHONE (OUTPATIENT)
Dept: FAMILY MEDICINE CLINIC | Facility: CLINIC | Age: 73
End: 2023-01-03

## 2023-01-10 ENCOUNTER — NURSE TRIAGE (OUTPATIENT)
Dept: FAMILY MEDICINE CLINIC | Facility: CLINIC | Age: 73
End: 2023-01-10

## 2023-01-10 NOTE — TELEPHONE ENCOUNTER
Spoke to [facility] and advised of the note below and daughter prefer's to wait until tomorrow. She stated, \"he's eating and drinking ok, has dementia. I think it's his medication making him tired. \" I asked her which medicine is making him tired. She stated the QUEtiapine 25 MG and the Lorazepam. I advised the Quetiapine should only be taken at night and the last time he had Lorazepam filled was     LORazepam 0.5 MG Oral Tab 30 tablet 1 4/9/2021     She will \"see how he's acting/ feeling and will take him to the ER as needed but will bring him into the office as scheduled tomorrow. \"

## 2023-01-10 NOTE — TELEPHONE ENCOUNTER
I called his daughter who advised that he has not been seen in the office in person in over 3 years. Last office visit in person was 11/5/2019. Per his daughter she feels like Dr. Mary Jane Adams doesn't want to see her father so he always tells them to take him to the ER. I did adamately direct her to ER and explained that if he came into the office he would have to submit a urine specimen and I asked her will he be able to do so. She stated, \"I don't know. Can't we just  a cup and have him urinate in that and then bring it? That's what they do with home health. \"  I explained that the ER would be best in case he needs to be straight- cathed in order to get urine specimen (explained what that means) and I advised that can't be done in the office setting. I also advised that an elderly patient that is triaged as below in Jeanna's note that he can become confused and septic and that would require him to be eventually admitted. Sylvia Sellers stated \"one time I called the office about a foot pain and we were told to take him to the ER and he was kept for 4 days and tested and nothing was wrong. I advised that the ER will typically never admit anyone as inpatient unless deemed necessary. Noted in his chart the last annual physical wellness visit was 3/18/19. His daughter insisted to bring patient into the office to see [facility] [employer]. Advised should her father worsen, develop fever to go to ER.

## 2023-01-11 ENCOUNTER — OFFICE VISIT (OUTPATIENT)
Dept: FAMILY MEDICINE CLINIC | Facility: CLINIC | Age: 73
End: 2023-01-11

## 2023-01-11 VITALS
HEIGHT: 65 IN | BODY MASS INDEX: 18.66 KG/M2 | DIASTOLIC BLOOD PRESSURE: 67 MMHG | SYSTOLIC BLOOD PRESSURE: 102 MMHG | WEIGHT: 112 LBS | HEART RATE: 92 BPM

## 2023-01-11 DIAGNOSIS — R82.90 ABNORMAL URINE ODOR: Primary | ICD-10-CM

## 2023-01-11 PROCEDURE — 3008F BODY MASS INDEX DOCD: CPT | Performed by: PHYSICIAN ASSISTANT

## 2023-01-11 PROCEDURE — 3074F SYST BP LT 130 MM HG: CPT | Performed by: PHYSICIAN ASSISTANT

## 2023-01-11 PROCEDURE — 99213 OFFICE O/P EST LOW 20 MIN: CPT | Performed by: PHYSICIAN ASSISTANT

## 2023-01-11 PROCEDURE — 1126F AMNT PAIN NOTED NONE PRSNT: CPT | Performed by: PHYSICIAN ASSISTANT

## 2023-01-11 PROCEDURE — 3078F DIAST BP <80 MM HG: CPT | Performed by: PHYSICIAN ASSISTANT

## 2023-01-11 NOTE — TELEPHONE ENCOUNTER
Requesting Rx:    LORazepam 0.5 MG Oral Tab 30 tablet     Send to [facility] in [place] on file.

## 2023-01-12 RX ORDER — LORAZEPAM 0.5 MG/1
0.5 TABLET ORAL EVERY 6 HOURS PRN
Qty: 30 TABLET | Refills: 1 | Status: SHIPPED | OUTPATIENT
Start: 2023-01-12

## 2023-01-13 ENCOUNTER — LAB ENCOUNTER (OUTPATIENT)
Dept: LAB | Age: 73
End: 2023-01-13
Attending: PHYSICIAN ASSISTANT
Payer: MEDICARE

## 2023-01-13 DIAGNOSIS — R82.90 ABNORMAL URINE ODOR: ICD-10-CM

## 2023-01-13 LAB
BILIRUB UR QL: NEGATIVE
COLOR UR: YELLOW
GLUCOSE UR-MCNC: NEGATIVE MG/DL
KETONES UR-MCNC: NEGATIVE MG/DL
NITRITE UR QL STRIP.AUTO: NEGATIVE
PH UR: 7.5 [PH] (ref 5–8)
RBC #/AREA URNS AUTO: >10 /HPF
RBC #/AREA URNS AUTO: >10 /HPF
SP GR UR STRIP: 1.02 (ref 1–1.03)
UROBILINOGEN UR STRIP-ACNC: 0.2
WBC #/AREA URNS AUTO: >50 /HPF
WBC #/AREA URNS AUTO: >50 /HPF
WBC CLUMPS UR QL AUTO: PRESENT /HPF
WBC CLUMPS UR QL AUTO: PRESENT /HPF

## 2023-01-13 PROCEDURE — 81015 MICROSCOPIC EXAM OF URINE: CPT

## 2023-01-13 PROCEDURE — 81001 URINALYSIS AUTO W/SCOPE: CPT

## 2023-01-13 PROCEDURE — 87086 URINE CULTURE/COLONY COUNT: CPT

## 2023-01-16 ENCOUNTER — TELEPHONE (OUTPATIENT)
Dept: FAMILY MEDICINE CLINIC | Facility: CLINIC | Age: 73
End: 2023-01-16

## 2023-01-16 DIAGNOSIS — R82.90 ABNORMAL URINE ODOR: Primary | ICD-10-CM

## 2023-01-17 NOTE — TELEPHONE ENCOUNTER
Spoke with patient's daughter ( & Name verified). Discussed UA and urine culture results with daughter. Will recheck UA. Patient's daughter verbalized understanding.

## 2023-01-17 NOTE — TELEPHONE ENCOUNTER
Spoke with daughter , states urine continues to have odors , very strong \" acid - vinegar\"  Type of smell     Daughter very concerned with odor and UA results   Patient is incontinent of  urine , uses diapers     Please advise and thank you.       Best call back number: Shaniqua Rosales at [phone]

## 2023-04-17 DIAGNOSIS — F02.818 DEMENTIA DUE TO MEDICAL CONDITION WITH BEHAVIORAL DISTURBANCE (HCC): ICD-10-CM

## 2023-04-19 NOTE — TELEPHONE ENCOUNTER
Please review refill protocol failed/ no protocol  Requested Prescriptions   Pending Prescriptions Disp Refills    QUETIAPINE 25 MG Oral Tab [Pharmacy Med Name: QUETIAPINE 25MG TABLETS] 90 tablet 1     Sig: TAKE 1 TABLET(25 MG) BY MOUTH EVERY NIGHT       There is no refill protocol information for this order

## 2023-04-20 RX ORDER — QUETIAPINE FUMARATE 25 MG/1
25 TABLET, FILM COATED ORAL NIGHTLY
Qty: 90 TABLET | Refills: 3 | Status: SHIPPED | OUTPATIENT
Start: 2023-04-20

## 2023-04-20 NOTE — TELEPHONE ENCOUNTER
No future appointments. Please review; protocol failed/no protocol. Requested Prescriptions   Pending Prescriptions Disp Refills    QUEtiapine 25 MG Oral Tab [Pharmacy Med Name: QUETIAPINE 25MG TABLETS] 90 tablet 3     Sig: Take 1 tablet (25 mg total) by mouth nightly.        There is no refill protocol information for this order           Recent Outpatient Visits              3 months ago Abnormal urine odor    [facility] [place], [employer]    Office Visit    1 year ago Elevated PSA    [facility], [address], [place], [place],     Telemedicine    1 year ago Elevated PSA    [facility], [address], [place], Raul BOOKER, DO    Telemedicine    2 years ago Insomnia due to mental condition    [address], [address], [place], [place]    Telemedicine    3 years ago Late onset Alzheimer's disease with behavioral disturbance [facility])    [address], [facility], [address] [place] [address],     Office Visit

## 2023-07-26 ENCOUNTER — HOSPITAL ENCOUNTER (EMERGENCY)
Facility: HOSPITAL | Age: 73
Discharge: HOME | End: 2023-07-27
Attending: EMERGENCY MEDICINE
Payer: MEDICARE

## 2023-07-26 DIAGNOSIS — S09.90XA INJURY OF HEAD, INITIAL ENCOUNTER: ICD-10-CM

## 2023-07-26 DIAGNOSIS — S01.81XA LACERATION OF FOREHEAD, INITIAL ENCOUNTER: Primary | ICD-10-CM

## 2023-07-26 PROCEDURE — 12011 RPR F/E/E/N/L/M 2.5 CM/<: CPT

## 2023-07-26 PROCEDURE — 99284 EMERGENCY DEPT VISIT MOD MDM: CPT

## 2023-07-27 ENCOUNTER — APPOINTMENT (OUTPATIENT)
Dept: CT IMAGING | Facility: HOSPITAL | Age: 73
End: 2023-07-27
Attending: EMERGENCY MEDICINE
Payer: MEDICARE

## 2023-07-27 VITALS
OXYGEN SATURATION: 98 % | TEMPERATURE: 97 F | DIASTOLIC BLOOD PRESSURE: 69 MMHG | SYSTOLIC BLOOD PRESSURE: 116 MMHG | RESPIRATION RATE: 17 BRPM | BODY MASS INDEX: 20 KG/M2 | WEIGHT: 120 LBS | HEART RATE: 81 BPM

## 2023-07-27 PROCEDURE — 70450 CT HEAD/BRAIN W/O DYE: CPT | Performed by: EMERGENCY MEDICINE

## 2023-07-27 NOTE — ED INITIAL ASSESSMENT (HPI)
Patient fell around 930- 10pm tonight. Per family he has dementia and fell tonight and hit head on kitchen counter. Patient non-verbal at baseline. On ASA. Lac to left forehead. Denies LOC.

## 2023-07-28 ENCOUNTER — PATIENT OUTREACH (OUTPATIENT)
Dept: CASE MANAGEMENT | Age: 73
End: 2023-07-28

## 2023-07-28 NOTE — PROGRESS NOTES
1st attempt ED f/up apt request   Pt son asked to call sister John Wick who handles pt's apts    Leah Long  PCP  [address] [place] [phone]  Apt: Aug 7 @3:10pm    Confirmed w/ pt dtr  Closing encounter

## 2024-01-02 ENCOUNTER — NURSE TRIAGE (OUTPATIENT)
Dept: FAMILY MEDICINE CLINIC | Facility: CLINIC | Age: 74
End: 2024-01-02

## 2024-01-02 NOTE — TELEPHONE ENCOUNTER
Please reply to pool: EM RN TRIAGE  Action Requested: Summary for Provider     []  Critical Lab, Recommendations Needed  [] Need Additional Advice  [x]   FYI    []   Need Orders  [] Need Medications Sent to Pharmacy  []  Other     SUMMARY: Patient's daughter contacts clinic reporting cough x 1 week.  Patient with dementia, she states it is difficult to assess symptoms because he won't report his symptoms to her, but she has heard him coughing x 1 week.  Denies changes in mental status.  Does not believe he has a fever, he does not appear to be shortn of breath.  Has not checked for covid.  Home covid test advised.  Requesting back to back appointments for herself as well as her son.  Home covid test advised, IC for any worsening of symptoms.  Acute visit booked 01/04.      Reason for call: Cough  Onset: Data Unavailable                       Reason for Disposition   Continuous (nonstop) coughing interferes with work or school and no improvement using cough treatment per Care Advice   Patient wants to be seen    Protocols used: Cough-A-OH

## 2024-01-04 ENCOUNTER — OFFICE VISIT (OUTPATIENT)
Dept: FAMILY MEDICINE CLINIC | Facility: CLINIC | Age: 74
End: 2024-01-04

## 2024-01-04 VITALS
BODY MASS INDEX: 21.99 KG/M2 | HEIGHT: 65 IN | WEIGHT: 132 LBS | SYSTOLIC BLOOD PRESSURE: 103 MMHG | DIASTOLIC BLOOD PRESSURE: 70 MMHG | HEART RATE: 85 BPM

## 2024-01-04 DIAGNOSIS — R05.1 ACUTE COUGH: Primary | ICD-10-CM

## 2024-01-04 DIAGNOSIS — R09.81 NASAL CONGESTION: ICD-10-CM

## 2024-01-04 PROCEDURE — 3008F BODY MASS INDEX DOCD: CPT | Performed by: PHYSICIAN ASSISTANT

## 2024-01-04 PROCEDURE — 1159F MED LIST DOCD IN RCRD: CPT | Performed by: PHYSICIAN ASSISTANT

## 2024-01-04 PROCEDURE — 3074F SYST BP LT 130 MM HG: CPT | Performed by: PHYSICIAN ASSISTANT

## 2024-01-04 PROCEDURE — 99213 OFFICE O/P EST LOW 20 MIN: CPT | Performed by: PHYSICIAN ASSISTANT

## 2024-01-04 PROCEDURE — 1126F AMNT PAIN NOTED NONE PRSNT: CPT | Performed by: PHYSICIAN ASSISTANT

## 2024-01-04 PROCEDURE — 3078F DIAST BP <80 MM HG: CPT | Performed by: PHYSICIAN ASSISTANT

## 2024-01-04 RX ORDER — FLUTICASONE PROPIONATE 50 MCG
2 SPRAY, SUSPENSION (ML) NASAL DAILY
Qty: 16 G | Refills: 2 | Status: SHIPPED | OUTPATIENT
Start: 2024-01-04 | End: 2024-02-03

## 2024-01-04 RX ORDER — CODEINE PHOSPHATE AND GUAIFENESIN 10; 100 MG/5ML; MG/5ML
5 SOLUTION ORAL EVERY 6 HOURS PRN
Qty: 120 ML | Refills: 0 | Status: SHIPPED | OUTPATIENT
Start: 2024-01-04

## 2024-01-04 RX ORDER — ASPIRIN 81 MG/1
81 TABLET, CHEWABLE ORAL DAILY
Qty: 30 TABLET | Refills: 11 | Status: SHIPPED | OUTPATIENT
Start: 2024-01-04

## 2024-01-04 NOTE — PROGRESS NOTES
HPI:     Cough  This is a new problem. The current episode started in the past 7 days. The problem has been unchanged. The cough is Non-productive. Associated symptoms include nasal congestion and rhinorrhea. Pertinent negatives include no chest pain, chills, ear pain, fever, postnasal drip, rash, sore throat, shortness of breath or wheezing. He has tried nothing for the symptoms.     Medications:     Current Outpatient Medications   Medication Sig Dispense Refill    guaiFENesin-codeine (CHERATUSSIN AC) 100-10 MG/5ML Oral Solution Take 5 mL by mouth every 6 (six) hours as needed. 120 mL 0    fluticasone propionate 50 MCG/ACT Nasal Suspension 2 sprays by Each Nare route daily for 30 doses. 16 g 2    aspirin 81 MG Oral Chew Tab Chew 1 tablet (81 mg total) by mouth daily. 30 tablet 11    QUEtiapine 25 MG Oral Tab Take 1 tablet (25 mg total) by mouth nightly. 90 tablet 3    LORazepam 0.5 MG Oral Tab Take 1 tablet (0.5 mg total) by mouth every 6 (six) hours as needed for Anxiety. 30 tablet 1       Allergies:   No Known Allergies    History:     Health Maintenance   Topic Date Due    Colorectal Cancer Screening  Never done    COVID-19 Vaccine (1) Never done    Zoster Vaccines (1 of 2) Never done    Pneumococcal Vaccine: 65+ Years (3 - PPSV23 or PCV20) 06/29/2020    PSA  05/13/2023    Influenza Vaccine (1) 10/01/2023    MA Annual Health Assessment  01/01/2024    Annual Depression Screening  01/01/2024    Fall Risk Screening (Annual)  01/01/2024       No LMP for male patient.   Past Medical History:     Past Medical History:   Diagnosis Date    Anxiety state, unspecified     CVA (cerebral infarction)     Dementia (HCC)     Depression     Heterozygous factor V Leiden mutation (HCC)     Other and unspecified hyperlipidemia     Other ill-defined conditions(799.89)     Cardiomegaly Pleural effusion w/idopathic pleuropuricarditis    Stroke (HCC)     Unspecified sleep apnea        Past Surgical History:     Past Surgical History:    Procedure Laterality Date    Colonoscopy      Electrocardiogram, complete  1914    Scanned to Media Tab       Family History:     Family History   Problem Relation Age of Onset    Cancer Father         Lung - Smoker  Cause of death    Other (Other) Mother         during     Diabetes Neg     Glaucoma Neg        Social History:     Social History     Socioeconomic History    Marital status:      Spouse name: Not on file    Number of children: Not on file    Years of education: Not on file    Highest education level: Not on file   Occupational History    Not on file   Tobacco Use    Smoking status: Former    Smokeless tobacco: Never   Vaping Use    Vaping Use: Never used   Substance and Sexual Activity    Alcohol use: No     Alcohol/week: 0.0 standard drinks of alcohol     Comment: none recently    Drug use: No    Sexual activity: Not on file   Other Topics Concern     Service Not Asked    Blood Transfusions Not Asked    Caffeine Concern Yes     Comment: 1 cups coffee daily    Occupational Exposure Not Asked    Hobby Hazards Not Asked    Sleep Concern Not Asked    Stress Concern Not Asked    Weight Concern Not Asked    Special Diet Not Asked    Back Care Not Asked    Exercise No    Bike Helmet Not Asked    Seat Belt Not Asked    Self-Exams Not Asked   Social History Narrative    The patient does not use an assistive device..      The patient does live in a home with stairs.     Social Determinants of Health     Financial Resource Strain: Not on file   Food Insecurity: Not on file   Transportation Needs: Not on file   Physical Activity: Not on file   Stress: Not on file   Social Connections: Not on file   Housing Stability: Not on file       Review of Systems:   Review of Systems   Constitutional:  Negative for activity change, chills, fatigue and fever.   HENT:  Positive for congestion and rhinorrhea. Negative for ear discharge, ear pain, postnasal drip, sinus pressure, sinus pain and  sore throat.    Respiratory:  Positive for cough. Negative for chest tightness, shortness of breath and wheezing.    Cardiovascular:  Negative for chest pain and palpitations.   Gastrointestinal:  Negative for abdominal distention, abdominal pain, blood in stool, constipation, diarrhea, nausea and vomiting.   Skin:  Negative for rash.        Vitals:    01/04/24 1417   BP: 103/70   Pulse: 85   Weight: 132 lb (59.9 kg)   Height: 5' 5\" (1.651 m)     Body mass index is 21.97 kg/m².    Physical Exam:   Physical Exam  Vitals reviewed.   Constitutional:       General: He is not in acute distress.     Appearance: He is well-developed.   HENT:      Head: Normocephalic and atraumatic.      Right Ear: Tympanic membrane, ear canal and external ear normal. There is no impacted cerumen.      Left Ear: Tympanic membrane, ear canal and external ear normal. There is no impacted cerumen.      Nose: Nose normal.      Mouth/Throat:      Mouth: Mucous membranes are moist.      Pharynx: Oropharynx is clear. No oropharyngeal exudate or posterior oropharyngeal erythema.   Eyes:      General:         Right eye: No discharge.         Left eye: No discharge.      Conjunctiva/sclera: Conjunctivae normal.   Cardiovascular:      Rate and Rhythm: Normal rate and regular rhythm.      Heart sounds: Normal heart sounds, S1 normal and S2 normal. No murmur heard.  Pulmonary:      Effort: Pulmonary effort is normal.      Breath sounds: Normal breath sounds. No wheezing or rales.   Chest:      Chest wall: No tenderness.   Lymphadenopathy:      Cervical: No cervical adenopathy.   Skin:     Findings: No rash.   Neurological:      Mental Status: He is alert and oriented to person, place, and time.   Psychiatric:         Behavior: Behavior is cooperative.          Assessment and Plan::     Problem List Items Addressed This Visit    None  Visit Diagnoses       Acute cough    -  Primary    Relevant Medications    guaiFENesin-codeine (CHERATUSSIN AC) 100-10  MG/5ML Oral Solution    Nasal congestion        Relevant Medications    fluticasone propionate 50 MCG/ACT Nasal Suspension          Discussed plan of care with pt and pt is in agreement.All questions answered. Pt to call with questions or concerns.

## 2024-04-04 NOTE — LETTER
[facility]  [address] [facility] [facility] [address]     Office Visit    Denita Miranda   1941 04/04/24       Subjective:     Chief Complaint   Patient presents with    Abdominal Pain     Lower abdominal pain started last week. She has had some nausea.         History of Present illness:  Ms. Miranda is a 83 y.o. female  who presents for the above complaints. She has been weaning off of Lyrica. She is not having any more lower abdominal pain. Had pain at the beginning of the week and had no appetite. Couple of days of diarrhea. No known sick contacts. She has been attending regular Religion services. She relates the stomach pain and nausea to weaning of Lyrica. She experienced a previous episode. She is weaning off Lyrica due to feeling like it has not been effective.     She admits to feeling better than she did. She declines Zofran.     Objective:     Allergies:  No Known Allergies     Medical History:    Past Medical History:   Diagnosis Date    Anxiety state, unspecified 5/11/2015    takes lorazepam     Arthritis     Diverticulosis of colon (without mention of hemorrhage)     pt states hx of diverticulosis; pt states no problems now    GERD (gastroesophageal reflux disease)     managed w/med    History of hypoglycemia     Hypertension     managed w/meds    Multinodular goiter 3/1/2018    Overweight(278.02)     bmi 38.6    Preglaucoma, unspecified     Recurrent pulmonary emboli (HCC) 8/22/2022    Sinus problem         Family History:    Family History   Problem Relation Age of Onset    Alzheimer's Disease Father     Dementia Father     Stroke Mother     Thyroid Disease Neg Hx     Diabetes Neg Hx     Thyroid Cancer Neg Hx         Surgical History:   Past Surgical History:   Procedure Laterality Date    BREAST BIOPSY Right     CERVICAL LAMINECTOMY  11/2018    Cervical laminoplasty C4-C6    CHOLECYSTECTOMY  2003    HERNIA REPAIR      HYSTERECTOMY (CERVIX STATUS UNKNOWN)      OVARY  Date: 8/23/2024  Patient name: Yoseph Cordero  YOB: 1950  Medical Record Number: [id number]  Primary Coverage: Payor: [facility] / Plan: [facility] HMO / Product Type: Medicare /   Secondary Coverage:   Insurance ID: [id number]  Patient Address: [address] [place] [address]  Telephone Information:   Home Phone [phone]   Mobile [phone]       Encounter Date: 8/23/2024  Provider: BEAU Walton  Diagnosis:     ICD-10-CM   1. Pressure injury of sacral region, stage 4 ([facility])  L89.154   2. 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 Pressure injury of right heel, stage 4 (HCC)  L89.614         Wound 07/23/24 1 Heel Right (Active)   Date First Assessed: 07/23/24    Wound Number (Wound Clinic Only): 1  Primary Wound Type: Pressure Injury  Location: Heel  Wound Location Orientation: Right      Assessments 7/23/2024  8:50 AM 8/23/2024 11:17 AM   Wound Image        Site Assessment -- Moist;Red;Pink;Yellow   Closure -- Not approximated   Drainage Amount -- Moderate   Drainage Description -- Serosanguineous   Treatments -- Cleansed;Wound ;Topical (Barrier/Moisturizer/Ointment)   Dressing -- ABD Pad;Kerlix roll;Tape   Dressing Changed -- Changed   Dressing Status -- Clean;Dry;Intact   Wound Length (cm) 6.5 cm 3.8 cm   Wound Width (cm) 4.4 cm 1.5 cm   Wound Surface Area (cm^2) 28.6 cm^2 5.7 cm^2   Wound Depth (cm) 1 cm 0.1 cm   Wound Volume (cm^3) 28.6 cm^3 0.57 cm^3   Wound Healing % -- 98   Margins -- Well-defined edges   Non-staged Wound Description -- Full thickness   Madhuri-wound Assessment -- Dry;Intact;Pink   Wound Granulation Tissue -- Red;Pink   Wound Bed Granulation (%) -- 95 %   Wound Bed Epithelium (%) -- 0 %   Wound Bed Slough (%) -- 5 %   Wound Bed Eschar (%) -- 0 %   Wound Bed Fibrin (%) -- 0 %   State of Healing -- Early/partial granulation   Wound Odor -- None   Exposed Structure Bone Bone   Tunneling? -- No   Pressure Injury Stage -- Stage 4       Active  Orders   Date Order Priority Status Authorizing Provider   07/25/24 1711 OP Wound Dressing Routine Active OrvilleGeorge yoderBEAU     - Dressing:    Collagen     - Dressing:    Hydrofera transfer     - Dressing:    ABD pad     - Dressing:    Kerlix     - Cleansing:    Cleanse with normal saline or wound cleanser     - Frequency:    Change dressing three times a day   07/23/24 1016 Debridement Pressure Injury Right Heel Routine Active Sury Blake DPM       Wound 07/23/24 2 Hip Left (Active)   Date First Assessed: 07/23/24    Wound Number (Wound Clinic Only): 2  Primary Wound Type: Pressure Injury  Location: Hip  Wound Location Orientation: Left      Assessments 7/23/2024  8:50 AM 8/23/2024 11:17 AM   Wound Image       Site Assessment Moist;Tan;Yellow Moist;Yellow;Red   Closure Not approximated Not approximated   Drainage Amount Large Moderate   Drainage Description Serous;Yellow Serosanguineous   Treatments Wound ;Vashe Cleansed;Wound ;Wound Vac - Neg Pressure   Wound Vac Brand -- Medela   Dressing ABD Pad;Gauze Wound vac sponge;Tape   Dressing Changed Changed Changed   Dressing Status Dressing Changed Clean;Dry;Intact   Wound Length (cm) -- 4 cm   Wound Width (cm) -- 4 cm   Wound Surface Area (cm^2) -- 16 cm^2   Wound Depth (cm) -- 0.8 cm   Wound Volume (cm^3) -- 12.8 cm^3   Wound Healing % -- 83   Margins Poorly defined Well-defined edges;Epibole (Rolled edges)   Non-staged Wound Description Full thickness Full thickness   Madhuri-wound Assessment Purple;Pink;Painful Dry;Pink   Wound Granulation Tissue -- Red   Wound Bed Granulation (%) -- 70 %   Wound Bed Epithelium (%) -- 0 %   Wound Bed Slough (%) 100 % 30 %   Wound Bed Eschar (%) -- 0 %   Wound Bed Fibrin (%) -- 0 %   State of Healing Undermining Early/partial granulation   Wound Odor None None   Undermining? Yes Yes   Number of Undermines 1 1   Undermine 1 Start Position 12 12   Undermine 1 End Position 12 2   Pressure Injury Stage -- Stage  3       Active Orders   Date Order Priority Status Authorizing Provider   07/25/24 1711 OP Wound Dressing Routine Active George Sampson APRN     - Dressing:    Dry gauze     - Dressing:    ABD pad     - Additional Wound Dressing Information:    traid     - Cleansing:    Cleanse with normal saline or wound cleanser     - Frequency:    Change dressing daily and PRN   07/25/24 1711 OP Wound Dressing Routine Active George Sampson APRN       Wound 07/23/24 3 Sacrum (Active)   Date First Assessed: 07/23/24    Wound Number (Wound Clinic Only): 3  Primary Wound Type: Pressure Injury  Location: Sacrum      Assessments 7/23/2024  8:50 AM 8/23/2024 11:17 AM   Wound Image       Site Assessment Moist;Painful;Yellow;Red Moist;Black;Yellow;Red   Closure Not approximated Not approximated   Drainage Amount Large Large   Drainage Description Yellow Serosanguineous   Treatments Wound ;Topical (Barrier/Moisturizer/Ointment);Special Tape Cleansed;Wound ;Topical (Barrier/Moisturizer/Ointment);Dakins   Dressing ABD Pad;Gauze Gauze;ABD Pad;Tape   Dressing Changed Changed Changed   Dressing Status -- Clean;Dry;Intact   Wound Length (cm) 7 cm 9 cm   Wound Width (cm) 7 cm 6 cm   Wound Surface Area (cm^2) 49 cm^2 54 cm^2   Wound Depth (cm) 2.6 cm 1.3 cm   Wound Volume (cm^3) 127.4 cm^3 70.2 cm^3   Wound Healing % -- 45   Margins Poorly defined Well-defined edges;Not attached   Non-staged Wound Description Full thickness Full thickness   Madhuri-wound Assessment Maceration;Painful;Excoriated;Pink Excoriated;Pink;Blanchable erythema   Wound Granulation Tissue Pink Red   Wound Bed Granulation (%) 30 % 60 %   Wound Bed Epithelium (%) -- 0 %   Wound Bed Slough (%) 40 % 10 %   Wound Bed Eschar (%) 30 % 20 %   Wound Bed Fibrin (%) -- 0 %   State of Healing -- Early/partial granulation   Wound Odor Mild Mild   Exposed Structure Bone Necrosis;Bone Bone   Undermining? Yes Yes   Number of Undermines 1 1   Undermine 1 Start Position 7 7    Undermine 1 End Position 11 12   Pressure Injury Stage Stage 4 Stage 4       Active Orders   Date Order Priority Status Authorizing Provider   07/25/24 1711 OP Wound Dressing Routine Active George Sampson APRN     - Dressing:    Dry gauze     - Dressing:    ABD pad     - Additional Wound Dressing Information:    dakin soaked     - Cleansing:    Cleanse with Dakins     - Frequency:    Change dressing two times a day   07/25/24 1711 OP Wound Dressing Routine Active George Sampson APRN   07/23/24 1011 Debridement Pressure Injury Sacrum Routine Active George Sampson APRN       Wound 07/23/24 4 Ankle Right;Medial (Active)   Date First Assessed: 07/23/24    Wound Number (Wound Clinic Only): 4  Primary Wound Type: Pressure Injury  Location: Ankle  Wound Location Orientation: Right;Medial      Assessments 7/23/2024  8:50 AM 8/23/2024 11:17 AM   Wound Image       Site Assessment Moist;Pink Dry;Black   Closure Not approximated Not approximated   Drainage Amount Scant Scant   Drainage Description Serosanguineous Serosanguineous   Treatments Wound ;Honey Gel Cleansed;Wound ;Topical (Barrier/Moisturizer/Ointment);Honey Gel   Dressing Kerlix roll ABD Pad;Kerlix roll;Tape   Dressing Changed Changed Changed   Dressing Status Dressing Changed Clean;Dry;Intact   Wound Length (cm) 0.6 cm 1 cm   Wound Width (cm) 0.8 cm 1.2 cm   Wound Surface Area (cm^2) 0.48 cm^2 1.2 cm^2   Wound Depth (cm) 0 cm 0.2 cm   Wound Volume (cm^3) 0 cm^3 0.24 cm^3   Margins Poorly defined Poorly defined   Non-staged Wound Description Full thickness Not applicable   Madhuri-wound Assessment -- Denuded;Red   Wound Bed Granulation (%) 20 % 0 %   Wound Bed Epithelium (%) -- 0 %   Wound Bed Slough (%) -- 0 %   Wound Bed Eschar (%) 80 % 100 %   Wound Bed Fibrin (%) -- 0 %   State of Healing Non-healing Non-healing;Eschar   Wound Odor None None   Undermining? -- Yes   Number of Undermines -- 1   Undermine 1 Start Position -- 9   Undermine 1 End Position  -- 2   Pressure Injury Stage Unstageable Unstageable       Inactive Orders   Date Order Priority Status Authorizing Provider   07/25/24 2500 OP Wound Dressing Routine Completed George Sampson APRN     - Dressing:    Kerlix     - Dressing:    Dry gauze     - Dressing:    Honey gel     - Cleansing:    Cleanse with normal saline or wound cleanser     - Frequency:    Change dressing daily and PRN     Home Health Orders:    Please read orders closely as they have changed.    Wounds: Left ankle and Left Heel: Healed. No dressings needed. Patient to wear offloading boots for protection.     Wound: Left Hip     Wound Cleaning and Dressings:  Showering directions: May not shower  Wound cleansing:  Cleanse with normal saline, wound cleanser or Acetic Acid 0.25% solution  Wound cleaning frequency:  Cleanse with dressing changes  Wound product: Collagen, Black foam, vac drape  Dressing change frequency:  Change dressing 3x per week    Negative Pressure Wound Therapy:  Medela NPWT at 125mmHg continuous suction.    Wound: Sacrum     Wound Cleaning and Dressings:  Showering directions: May not shower  Wound cleansing:  Cleanse with normal saline, wound cleanser or Acetic Acid 0.25% solution  Wound cleaning frequency:  Cleanse with dressing changes  Wound product: Honey gel, gauze or alginate, bordered foam or ABD pad and tape  Dressing change frequency: Change dressing daily and PRN if dressing becomes displaced or soiled by urine or feces    Wound: Right Heel     Wound Cleaning and Dressings:  Showering directions: May not shower  Wound cleansing:  Cleanse with normal saline, wound cleanser or Acetic Acid 0.25% solution  Wound cleaning frequency:  Cleanse with dressing changes  Wound product: Hydrofera transfer, gauze, kerlix, tape.  Dressing change frequency: Change dressing 3x/week    Wound: Right Ankle     Wound Cleaning and Dressings:  Showering directions: May not shower  Wound cleansing:  Cleanse with normal saline, wound  cleanser or Acetic Acid 0.25% solution  Wound cleaning frequency:  Cleanse with dressing changes  Wound product: Honey gel, Hydrofera transfer, ABD pad, kerlix, tape.  Dressing change frequency: Change dressing 3x/week    Miscellaneous/Additional Orders:  Offloading: Air mattress and re-positioning often (every two hours)    Follow Up:  Return 2-3 weeks, for APN x 30 minutes.     UYN LIVINGSTON DNP, CWS, NPI [id number]

## 2024-05-02 ENCOUNTER — HOSPITAL ENCOUNTER (OUTPATIENT)
Dept: GENERAL RADIOLOGY | Age: 74
Discharge: HOME | DRG: 176 | End: 2024-05-02
Attending: FAMILY MEDICINE
Payer: MEDICARE

## 2024-05-02 ENCOUNTER — NURSE TRIAGE (OUTPATIENT)
Dept: FAMILY MEDICINE CLINIC | Facility: CLINIC | Age: 74
End: 2024-05-02

## 2024-05-02 ENCOUNTER — LAB ENCOUNTER (OUTPATIENT)
Dept: LAB | Age: 74
End: 2024-05-02
Attending: FAMILY MEDICINE
Payer: MEDICARE

## 2024-05-02 ENCOUNTER — HOSPITAL ENCOUNTER (OUTPATIENT)
Dept: GENERAL RADIOLOGY | Age: 74
Discharge: HOME | End: 2024-05-02
Attending: FAMILY MEDICINE
Payer: MEDICARE

## 2024-05-02 ENCOUNTER — OFFICE VISIT (OUTPATIENT)
Dept: FAMILY MEDICINE CLINIC | Facility: CLINIC | Age: 74
End: 2024-05-02

## 2024-05-02 ENCOUNTER — LAB ENCOUNTER (OUTPATIENT)
Dept: LAB | Age: 74
DRG: 176 | End: 2024-05-02
Attending: FAMILY MEDICINE
Payer: MEDICARE

## 2024-05-02 VITALS
BODY MASS INDEX: 21.66 KG/M2 | HEART RATE: 97 BPM | WEIGHT: 130 LBS | SYSTOLIC BLOOD PRESSURE: 130 MMHG | HEIGHT: 65 IN | DIASTOLIC BLOOD PRESSURE: 67 MMHG

## 2024-05-02 DIAGNOSIS — R23.3 ECCHYMOSES, SPONTANEOUS: ICD-10-CM

## 2024-05-02 DIAGNOSIS — G30.1 LATE ONSET ALZHEIMER'S DISEASE WITH BEHAVIORAL DISTURBANCE (HCC): ICD-10-CM

## 2024-05-02 DIAGNOSIS — M79.89 SWELLING OF RIGHT FOOT: ICD-10-CM

## 2024-05-02 DIAGNOSIS — F02.818 LATE ONSET ALZHEIMER'S DISEASE WITH BEHAVIORAL DISTURBANCE (HCC): ICD-10-CM

## 2024-05-02 DIAGNOSIS — Z86.73 HISTORY OF STROKE: ICD-10-CM

## 2024-05-02 DIAGNOSIS — M79.89 SWELLING OF RIGHT FOOT: Primary | ICD-10-CM

## 2024-05-02 DIAGNOSIS — G81.91 HEMIPLEGIA AFFECTING RIGHT DOMINANT SIDE, UNSPECIFIED ETIOLOGY, UNSPECIFIED HEMIPLEGIA TYPE (HCC): ICD-10-CM

## 2024-05-02 DIAGNOSIS — F02.818 DEMENTIA DUE TO MEDICAL CONDITION WITH BEHAVIORAL DISTURBANCE (HCC): ICD-10-CM

## 2024-05-02 LAB
ALBUMIN SERPL-MCNC: 4.4 G/DL (ref 3.2–4.8)
ALBUMIN/GLOB SERPL: 1.3 {RATIO} (ref 1–2)
ALP LIVER SERPL-CCNC: 79 U/L
ALT SERPL-CCNC: 11 U/L
ANION GAP SERPL CALC-SCNC: 7 MMOL/L (ref 0–18)
AST SERPL-CCNC: 17 U/L (ref ?–34)
BASOPHILS # BLD AUTO: 0.08 X10(3) UL (ref 0–0.2)
BASOPHILS NFR BLD AUTO: 0.6 %
BILIRUB SERPL-MCNC: 0.4 MG/DL (ref 0.2–1.1)
BNP SERPL-MCNC: 8 PG/ML
BUN BLD-MCNC: 13 MG/DL (ref 9–23)
BUN/CREAT SERPL: 13.1 (ref 10–20)
CALCIUM BLD-MCNC: 9.5 MG/DL (ref 8.7–10.4)
CHLORIDE SERPL-SCNC: 107 MMOL/L (ref 98–112)
CO2 SERPL-SCNC: 28 MMOL/L (ref 21–32)
CREAT BLD-MCNC: 0.99 MG/DL
D DIMER PPP FEU-MCNC: 11.06 UG/ML FEU (ref ?–0.72)
DEPRECATED RDW RBC AUTO: 43.5 FL (ref 35.1–46.3)
EGFRCR SERPLBLD CKD-EPI 2021: 81 ML/MIN/1.73M2 (ref 60–?)
EOSINOPHIL # BLD AUTO: 0.46 X10(3) UL (ref 0–0.7)
EOSINOPHIL NFR BLD AUTO: 3.7 %
ERYTHROCYTE [DISTWIDTH] IN BLOOD BY AUTOMATED COUNT: 13.1 % (ref 11–15)
FASTING STATUS PATIENT QL REPORTED: YES
GLOBULIN PLAS-MCNC: 3.4 G/DL (ref 2–3.5)
GLUCOSE BLD-MCNC: 131 MG/DL (ref 70–99)
HCT VFR BLD AUTO: 42.1 %
HGB BLD-MCNC: 14 G/DL
IMM GRANULOCYTES # BLD AUTO: 0.05 X10(3) UL (ref 0–1)
IMM GRANULOCYTES NFR BLD: 0.4 %
INR BLD: 0.97 (ref 0.8–1.2)
LYMPHOCYTES # BLD AUTO: 2.12 X10(3) UL (ref 1–4)
LYMPHOCYTES NFR BLD AUTO: 17.2 %
MCH RBC QN AUTO: 30 PG (ref 26–34)
MCHC RBC AUTO-ENTMCNC: 33.3 G/DL (ref 31–37)
MCV RBC AUTO: 90.1 FL
MONOCYTES # BLD AUTO: 1.14 X10(3) UL (ref 0.1–1)
MONOCYTES NFR BLD AUTO: 9.3 %
NEUTROPHILS # BLD AUTO: 8.47 X10 (3) UL (ref 1.5–7.7)
NEUTROPHILS # BLD AUTO: 8.47 X10(3) UL (ref 1.5–7.7)
NEUTROPHILS NFR BLD AUTO: 68.8 %
OSMOLALITY SERPL CALC.SUM OF ELEC: 296 MOSM/KG (ref 275–295)
PLATELET # BLD AUTO: 214 10(3)UL (ref 150–450)
POTASSIUM SERPL-SCNC: 4.2 MMOL/L (ref 3.5–5.1)
PROT SERPL-MCNC: 7.8 G/DL (ref 5.7–8.2)
PROTHROMBIN TIME: 13.5 SECONDS (ref 11.6–14.8)
RBC # BLD AUTO: 4.67 X10(6)UL
SODIUM SERPL-SCNC: 142 MMOL/L (ref 136–145)
TSI SER-ACNC: 2.83 MIU/ML (ref 0.55–4.78)
URATE SERPL-MCNC: 3.9 MG/DL
WBC # BLD AUTO: 12.3 X10(3) UL (ref 4–11)

## 2024-05-02 PROCEDURE — 1160F RVW MEDS BY RX/DR IN RCRD: CPT | Performed by: FAMILY MEDICINE

## 2024-05-02 PROCEDURE — 85025 COMPLETE CBC W/AUTO DIFF WBC: CPT

## 2024-05-02 PROCEDURE — 85610 PROTHROMBIN TIME: CPT

## 2024-05-02 PROCEDURE — 3008F BODY MASS INDEX DOCD: CPT | Performed by: FAMILY MEDICINE

## 2024-05-02 PROCEDURE — 84443 ASSAY THYROID STIM HORMONE: CPT

## 2024-05-02 PROCEDURE — 83880 ASSAY OF NATRIURETIC PEPTIDE: CPT

## 2024-05-02 PROCEDURE — 73610 X-RAY EXAM OF ANKLE: CPT | Performed by: FAMILY MEDICINE

## 2024-05-02 PROCEDURE — 85379 FIBRIN DEGRADATION QUANT: CPT

## 2024-05-02 PROCEDURE — 99214 OFFICE O/P EST MOD 30 MIN: CPT | Performed by: FAMILY MEDICINE

## 2024-05-02 PROCEDURE — 3078F DIAST BP <80 MM HG: CPT | Performed by: FAMILY MEDICINE

## 2024-05-02 PROCEDURE — 80053 COMPREHEN METABOLIC PANEL: CPT

## 2024-05-02 PROCEDURE — 1159F MED LIST DOCD IN RCRD: CPT | Performed by: FAMILY MEDICINE

## 2024-05-02 PROCEDURE — 3075F SYST BP GE 130 - 139MM HG: CPT | Performed by: FAMILY MEDICINE

## 2024-05-02 PROCEDURE — 36415 COLL VENOUS BLD VENIPUNCTURE: CPT

## 2024-05-02 PROCEDURE — 73630 X-RAY EXAM OF FOOT: CPT | Performed by: FAMILY MEDICINE

## 2024-05-02 PROCEDURE — 84550 ASSAY OF BLOOD/URIC ACID: CPT

## 2024-05-02 RX ORDER — AMOXICILLIN 250 MG/5ML
500 POWDER, FOR SUSPENSION ORAL 3 TIMES DAILY
Qty: 300 ML | Refills: 0 | Status: SHIPPED | OUTPATIENT
Start: 2024-05-02 | End: 2024-05-05

## 2024-05-02 NOTE — PROGRESS NOTES
Blood pressure 130/67, pulse 97, height 5' 5\" (1.651 m), weight 130 lb (59 kg).          2-day history of swelling and some redness of the right foot.  Patient is nonverbal.  History of stroke and Alzheimer's.  Patient does stand on his own at home.  Currently is in a wheelchair.  He lives with family.  They feed him regularly.  Has a least 1 bowel movement daily.  Daughter notices some bruising dorsum of left hand no trauma noted previously.    Objective edema noted dorsum of right foot with some erythema laterally    Patient does grimace when his right calf is squeezed    Otherwise he is nonverbal and cooperative.    Assessment #1 right foot swelling #2 Alzheimer's dementia #3 hemiplegia #4 nonverbal #5 ecchymosis #6 calf tenderness    Plan #1 x-ray of right foot and ankle ordered #2 follow-up with family #3 hemiplegia wheelchair as needed #4 follow-up with family #5 blood test ordered #6 D-dimer    Cephalexin prescription possible cellulitis    Will follow with results

## 2024-05-02 NOTE — TELEPHONE ENCOUNTER
Action Requested: Summary for Provider     []  Critical Lab, Recommendations Needed  [] Need Additional Advice  [x]   FYI    []   Need Orders  [] Need Medications Sent to Pharmacy  []  Other     SUMMARY: Daughter Tyra(on HIPAA) indicated that thinks patient fell because she found him on his knees on the floor and helped him get up. Patient has dementia so can't really tell her much. Since then has noticed that patient is having trouble walking-does not look like patient is pressure on the right foot, the right foot is swollen, and squints like he is in pain when daughter touches his foot. No other symptoms. Wanted patient seen. Appointment made for today at 4:10pm with Dr Sheets in [place].   Reason for call: Fall (Right foot swollen, not able to put pressure on it)  Onset: Apr 30, 2024  Reason for Disposition   Patient wants to be seen    Protocols used: Ankle and Foot Injury-A-[place]

## 2024-05-03 ENCOUNTER — HOSPITAL ENCOUNTER (OUTPATIENT)
Dept: ULTRASOUND IMAGING | Facility: HOSPITAL | Age: 74
Discharge: HOME | End: 2024-05-03
Attending: FAMILY MEDICINE
Payer: MEDICARE

## 2024-05-03 ENCOUNTER — HOSPITAL ENCOUNTER (OUTPATIENT)
Dept: CT IMAGING | Facility: HOSPITAL | Age: 74
Discharge: HOME | End: 2024-05-03
Attending: FAMILY MEDICINE
Payer: MEDICARE

## 2024-05-03 ENCOUNTER — HOSPITAL ENCOUNTER (OUTPATIENT)
Dept: ULTRASOUND IMAGING | Facility: HOSPITAL | Age: 74
Discharge: HOME | DRG: 176 | End: 2024-05-03
Attending: FAMILY MEDICINE
Payer: MEDICARE

## 2024-05-03 ENCOUNTER — HOSPITAL ENCOUNTER (INPATIENT)
Facility: HOSPITAL | Age: 74
LOS: 2 days | Discharge: HOME HEALTH | End: 2024-05-05
Attending: STUDENT IN AN ORGANIZED HEALTH CARE EDUCATION/TRAINING PROGRAM | Admitting: STUDENT IN AN ORGANIZED HEALTH CARE EDUCATION/TRAINING PROGRAM
Payer: MEDICARE

## 2024-05-03 ENCOUNTER — HOSPITAL ENCOUNTER (INPATIENT)
Facility: HOSPITAL | Age: 74
LOS: 2 days | Discharge: HOME HEALTH | DRG: 176 | End: 2024-05-05
Attending: STUDENT IN AN ORGANIZED HEALTH CARE EDUCATION/TRAINING PROGRAM | Admitting: STUDENT IN AN ORGANIZED HEALTH CARE EDUCATION/TRAINING PROGRAM
Payer: MEDICARE

## 2024-05-03 ENCOUNTER — HOSPITAL ENCOUNTER (OUTPATIENT)
Dept: CT IMAGING | Facility: HOSPITAL | Age: 74
Discharge: HOME | DRG: 176 | End: 2024-05-03
Attending: FAMILY MEDICINE
Payer: MEDICARE

## 2024-05-03 DIAGNOSIS — R00.0 TACHYCARDIA: ICD-10-CM

## 2024-05-03 DIAGNOSIS — I26.99 ACUTE PULMONARY EMBOLISM WITHOUT ACUTE COR PULMONALE, UNSPECIFIED PULMONARY EMBOLISM TYPE (HCC): Primary | ICD-10-CM

## 2024-05-03 DIAGNOSIS — R60.0 LEG EDEMA, RIGHT: ICD-10-CM

## 2024-05-03 LAB
ANION GAP SERPL CALC-SCNC: 8 MMOL/L (ref 0–18)
APTT PPP: 25 SECONDS (ref 23.3–35.6)
BASOPHILS # BLD AUTO: 0.07 X10(3) UL (ref 0–0.2)
BASOPHILS NFR BLD AUTO: 0.5 %
BNP SERPL-MCNC: 10 PG/ML
BUN BLD-MCNC: 11 MG/DL (ref 9–23)
BUN/CREAT SERPL: 14.5 (ref 10–20)
CALCIUM BLD-MCNC: 9.4 MG/DL (ref 8.7–10.4)
CHLORIDE SERPL-SCNC: 106 MMOL/L (ref 98–112)
CO2 SERPL-SCNC: 30 MMOL/L (ref 21–32)
CREAT BLD-MCNC: 0.76 MG/DL
DEPRECATED RDW RBC AUTO: 45.1 FL (ref 35.1–46.3)
EGFRCR SERPLBLD CKD-EPI 2021: 95 ML/MIN/1.73M2 (ref 60–?)
EOSINOPHIL # BLD AUTO: 0.48 X10(3) UL (ref 0–0.7)
EOSINOPHIL NFR BLD AUTO: 3.6 %
ERYTHROCYTE [DISTWIDTH] IN BLOOD BY AUTOMATED COUNT: 13.3 % (ref 11–15)
GLUCOSE BLD-MCNC: 104 MG/DL (ref 70–99)
HCT VFR BLD AUTO: 40.9 %
HGB BLD-MCNC: 13.3 G/DL
IMM GRANULOCYTES # BLD AUTO: 0.04 X10(3) UL (ref 0–1)
IMM GRANULOCYTES NFR BLD: 0.3 %
INR BLD: 0.99 (ref 0.8–1.2)
LYMPHOCYTES # BLD AUTO: 2.35 X10(3) UL (ref 1–4)
LYMPHOCYTES NFR BLD AUTO: 17.7 %
MCH RBC QN AUTO: 29.6 PG (ref 26–34)
MCHC RBC AUTO-ENTMCNC: 32.5 G/DL (ref 31–37)
MCV RBC AUTO: 91.1 FL
MONOCYTES # BLD AUTO: 1.22 X10(3) UL (ref 0.1–1)
MONOCYTES NFR BLD AUTO: 9.2 %
NEUTROPHILS # BLD AUTO: 9.1 X10 (3) UL (ref 1.5–7.7)
NEUTROPHILS # BLD AUTO: 9.1 X10(3) UL (ref 1.5–7.7)
NEUTROPHILS NFR BLD AUTO: 68.7 %
OSMOLALITY SERPL CALC.SUM OF ELEC: 298 MOSM/KG (ref 275–295)
PLATELET # BLD AUTO: 238 10(3)UL (ref 150–450)
POTASSIUM SERPL-SCNC: 4.2 MMOL/L (ref 3.5–5.1)
PROTHROMBIN TIME: 13.7 SECONDS (ref 11.6–14.8)
RBC # BLD AUTO: 4.49 X10(6)UL
SODIUM SERPL-SCNC: 144 MMOL/L (ref 136–145)
TROPONIN I SERPL HS-MCNC: 7 NG/L
WBC # BLD AUTO: 13.3 X10(3) UL (ref 4–11)

## 2024-05-03 PROCEDURE — 99223 1ST HOSP IP/OBS HIGH 75: CPT | Performed by: HOSPITALIST

## 2024-05-03 PROCEDURE — 71260 CT THORAX DX C+: CPT | Performed by: FAMILY MEDICINE

## 2024-05-03 PROCEDURE — 93971 EXTREMITY STUDY: CPT | Performed by: FAMILY MEDICINE

## 2024-05-03 RX ORDER — HALOPERIDOL 5 MG/ML
2 INJECTION INTRAMUSCULAR EVERY 6 HOURS PRN
Status: DISCONTINUED | OUTPATIENT
Start: 2024-05-03 | End: 2024-05-05

## 2024-05-03 RX ORDER — QUETIAPINE FUMARATE 25 MG/1
25 TABLET, FILM COATED ORAL NIGHTLY
Status: DISCONTINUED | OUTPATIENT
Start: 2024-05-04 | End: 2024-05-05

## 2024-05-03 RX ORDER — HEPARIN SODIUM AND DEXTROSE 10000; 5 [USP'U]/100ML; G/100ML
INJECTION INTRAVENOUS CONTINUOUS
Status: DISCONTINUED | OUTPATIENT
Start: 2024-05-04 | End: 2024-05-05

## 2024-05-03 RX ORDER — HEPARIN SODIUM AND DEXTROSE 10000; 5 [USP'U]/100ML; G/100ML
18 INJECTION INTRAVENOUS ONCE
Status: COMPLETED | OUTPATIENT
Start: 2024-05-03 | End: 2024-05-04

## 2024-05-03 RX ORDER — HYDRALAZINE HYDROCHLORIDE 20 MG/ML
10 INJECTION INTRAMUSCULAR; INTRAVENOUS EVERY 4 HOURS PRN
Status: DISCONTINUED | OUTPATIENT
Start: 2024-05-03 | End: 2024-05-05

## 2024-05-03 RX ORDER — LORAZEPAM 0.5 MG/1
0.5 TABLET ORAL EVERY 6 HOURS PRN
Status: DISCONTINUED | OUTPATIENT
Start: 2024-05-03 | End: 2024-05-05

## 2024-05-03 RX ORDER — HEPARIN SODIUM 1000 [USP'U]/ML
80 INJECTION, SOLUTION INTRAVENOUS; SUBCUTANEOUS ONCE
Status: COMPLETED | OUTPATIENT
Start: 2024-05-03 | End: 2024-05-03

## 2024-05-03 RX ORDER — PANTOPRAZOLE SODIUM 40 MG/1
40 TABLET, DELAYED RELEASE ORAL
Status: DISCONTINUED | OUTPATIENT
Start: 2024-05-04 | End: 2024-05-05

## 2024-05-03 RX ORDER — ACETAMINOPHEN 325 MG/1
650 TABLET ORAL EVERY 6 HOURS PRN
Status: DISCONTINUED | OUTPATIENT
Start: 2024-05-03 | End: 2024-05-05

## 2024-05-03 RX ORDER — ONDANSETRON 2 MG/ML
4 INJECTION INTRAMUSCULAR; INTRAVENOUS EVERY 6 HOURS PRN
Status: DISCONTINUED | OUTPATIENT
Start: 2024-05-03 | End: 2024-05-05

## 2024-05-03 NOTE — ED INITIAL ASSESSMENT (HPI)
Pt was sent to ED from Dr Sheets for CT that was done today that showed PE. Pt also had an xray done    Pt is non verbal at baseline. Hx of Alzheimer's. Pt had a xray of the foot and it showed that foot is fracture.

## 2024-05-04 LAB
ANION GAP SERPL CALC-SCNC: 7 MMOL/L (ref 0–18)
APTT PPP: 41.1 SECONDS (ref 23.3–35.6)
APTT PPP: 70.3 SECONDS (ref 23.3–35.6)
ATRIAL RATE: 90 BPM
BASOPHILS # BLD AUTO: 0.06 X10(3) UL (ref 0–0.2)
BASOPHILS NFR BLD AUTO: 0.5 %
BILIRUB UR QL: NEGATIVE
BUN BLD-MCNC: 9 MG/DL (ref 9–23)
BUN/CREAT SERPL: 12.2 (ref 10–20)
CALCIUM BLD-MCNC: 9.2 MG/DL (ref 8.7–10.4)
CHLORIDE SERPL-SCNC: 106 MMOL/L (ref 98–112)
CLARITY UR: CLEAR
CO2 SERPL-SCNC: 28 MMOL/L (ref 21–32)
CREAT BLD-MCNC: 0.74 MG/DL
DEPRECATED RDW RBC AUTO: 42.4 FL (ref 35.1–46.3)
EGFRCR SERPLBLD CKD-EPI 2021: 96 ML/MIN/1.73M2 (ref 60–?)
EOSINOPHIL # BLD AUTO: 0.4 X10(3) UL (ref 0–0.7)
EOSINOPHIL NFR BLD AUTO: 3.4 %
ERYTHROCYTE [DISTWIDTH] IN BLOOD BY AUTOMATED COUNT: 13 % (ref 11–15)
GLUCOSE BLD-MCNC: 123 MG/DL (ref 70–99)
GLUCOSE UR-MCNC: NORMAL MG/DL
HCT VFR BLD AUTO: 38.3 %
HGB BLD-MCNC: 13.3 G/DL
IMM GRANULOCYTES # BLD AUTO: 0.05 X10(3) UL (ref 0–1)
IMM GRANULOCYTES NFR BLD: 0.4 %
KETONES UR-MCNC: NEGATIVE MG/DL
LEUKOCYTE ESTERASE UR QL STRIP.AUTO: NEGATIVE
LYMPHOCYTES # BLD AUTO: 2.23 X10(3) UL (ref 1–4)
LYMPHOCYTES NFR BLD AUTO: 18.8 %
MCH RBC QN AUTO: 30.9 PG (ref 26–34)
MCHC RBC AUTO-ENTMCNC: 34.7 G/DL (ref 31–37)
MCV RBC AUTO: 88.9 FL
MONOCYTES # BLD AUTO: 1.02 X10(3) UL (ref 0.1–1)
MONOCYTES NFR BLD AUTO: 8.6 %
NEUTROPHILS # BLD AUTO: 8.11 X10 (3) UL (ref 1.5–7.7)
NEUTROPHILS # BLD AUTO: 8.11 X10(3) UL (ref 1.5–7.7)
NEUTROPHILS NFR BLD AUTO: 68.3 %
NITRITE UR QL STRIP.AUTO: NEGATIVE
OSMOLALITY SERPL CALC.SUM OF ELEC: 292 MOSM/KG (ref 275–295)
P AXIS: 51 DEGREES
P-R INTERVAL: 142 MS
PH UR: 6 [PH] (ref 5–8)
PLATELET # BLD AUTO: 217 10(3)UL (ref 150–450)
POTASSIUM SERPL-SCNC: 3.6 MMOL/L (ref 3.5–5.1)
PROT UR-MCNC: NEGATIVE MG/DL
Q-T INTERVAL: 362 MS
QRS DURATION: 70 MS
QTC CALCULATION (BEZET): 442 MS
R AXIS: 61 DEGREES
RBC # BLD AUTO: 4.31 X10(6)UL
SODIUM SERPL-SCNC: 141 MMOL/L (ref 136–145)
SP GR UR STRIP: 1.01 (ref 1–1.03)
T AXIS: 57 DEGREES
UROBILINOGEN UR STRIP-ACNC: NORMAL
VENTRICULAR RATE: 90 BPM
WBC # BLD AUTO: 11.9 X10(3) UL (ref 4–11)

## 2024-05-04 PROCEDURE — 99233 SBSQ HOSP IP/OBS HIGH 50: CPT | Performed by: HOSPITALIST

## 2024-05-04 RX ORDER — HEPARIN SODIUM 1000 [USP'U]/ML
30 INJECTION, SOLUTION INTRAVENOUS; SUBCUTANEOUS ONCE
Qty: 10 ML | Refills: 0 | Status: COMPLETED | OUTPATIENT
Start: 2024-05-04 | End: 2024-05-04

## 2024-05-04 NOTE — ED QUICK NOTES
Orders for admission, patient is aware of plan and ready to go upstairs. Any questions, please call ED RN Richa at extension [phone].     Patient Covid vaccination status: Unvaccinated     COVID Test Ordered in ED: None    COVID Suspicion at Admission: N/A    Running Infusions:    [START ON 5/4/2024] continuous dose heparin          Mental Status/LOC at time of transport: Aox0-1, nonverbal    Other pertinent information:   CIWA score: N/A   NIH score:  N/A

## 2024-05-04 NOTE — H&P
[facility]  part of [facility]    History & Physical    Yoseph Cordero Patient Status:  Emergency    1950 MRN [id number]   Location [facility] EMERGENCY DEPARTMENT Attending Erin Spring MD   Hosp Day # 0 PCP Irving Sheets, DO     Date:  5/3/2024  Date of Admission:  5/3/2024    Chief Complaint:  Chief Complaint   Patient presents with    Abnormal Result       History of Present Illness:  Yoseph Cordero is a(n) 73 year old male, with a past medical history significant for CVA, factor V Leiden mutation and dementia is minimally verbal however as per family they had been noticing he had been limping somewhat appeared to be discomfort in relation to his right foot he also noted some swelling and redness prompting a visit to his primary care physician's office where workup indicated an elevated D-dimer and a possible subacute/chronic medial malleolus fracture of the right ankle.  A follow-up CT scan and lower extremity Dopplers indicated presence of a right lower lobe segmental PE with no signs of a DVT.  As per family patient has not shown any signs of chest discomfort or shortness of breath, at this time smiling does not appear to be in any distress    History:  Past Medical History:    Anxiety state, unspecified    CVA (cerebral infarction)    Dementia (HCC)    Depression    Heterozygous factor V Leiden mutation (HCC)    Other and unspecified hyperlipidemia    Other ill-defined conditions(799.89)    Cardiomegaly Pleural effusion w/idopathic pleuropuricarditis    Stroke (HCC)    Unspecified sleep apnea     Past Surgical History:   Procedure Laterality Date    Colonoscopy      Electrocardiogram, complete  1914    Scanned to Media Tab     Family History   Problem Relation Age of Onset    Cancer Father         Lung - Smoker  Cause of death    Other (Other) Mother         during     Diabetes Neg     Glaucoma Neg       reports that he has quit smoking. He has never  used smokeless tobacco. He reports that he does not drink alcohol and does not use drugs.    Allergies:  No Known Allergies    Home Medications:  Prior to Admission Medications   Prescriptions Last Dose Informant Patient Reported? Taking?   LORazepam 0.5 MG Oral Tab   No No   Sig: Take 1 tablet (0.5 mg total) by mouth every 6 (six) hours as needed for Anxiety.   QUEtiapine 25 MG Oral Tab   No No   Sig: Take 1 tablet (25 mg total) by mouth nightly.   amoxicillin 250 MG/5ML Oral Recon Susp   No No   Sig: Take 10 mL (500 mg total) by mouth 3 (three) times daily.   aspirin 81 MG Oral Chew Tab   No No   Sig: Chew 1 tablet (81 mg total) by mouth daily.   fluticasone propionate 50 MCG/ACT Nasal Suspension   No No   Si sprays by Each Nare route daily for 30 doses.   guaiFENesin-codeine (CHERATUSSIN AC) 100-10 MG/5ML Oral Solution   No No   Sig: Take 5 mL by mouth every 6 (six) hours as needed.      Facility-Administered Medications: None       Review of Systems:  Unable to obtain secondary to patient current mental status    Physical Exam:  Temp:  [97.5 °F (36.4 °C)] 97.5 °F (36.4 °C)  Pulse:  [89-97] 97  Resp:  [16-22] 16  BP: (112-151)/(68-96) 151/71  SpO2:  [92 %-99 %] 98 %    General: Confused  Diffuse skin problem:  None.  Eye:  Pupils are equal, round and reactive to light, extraocular movements are intact, Normal conjunctiva.  HENT:  Normocephalic, oral mucosa is moist.  Head:  Normocephalic, atraumatic.  Neck:  Supple, non-tender, no carotid bruit, no jugular venous distention, no lymphadenopathy, no thyromegaly.  Respiratory:  Lungs are clear to auscultation, respirations are non-labored, breath sounds are equal, symmetrical chest wall expansion.  Cardiovascular:  Normal rate, regular rhythm, no murmur, no edema.  Gastrointestinal:  Soft, non-tender, non-distended, normal bowel sounds, no organomegaly.  Lymphatics:  No lymphadenopathy neck, axilla, groin.  Musculoskeletal: Right leg splinted  Feet:  Normal  pulses.  Neurologic: Confused, no focal deficits, cranial nerves II-XII are grossly intact.  Cognition and Speech: Nonverbal  Psychiatric: Demented      Laboratory Data:   Lab Results   Component Value Date    WBC 13.3 05/03/2024    HGB 13.3 05/03/2024    HCT 40.9 05/03/2024    .0 05/03/2024    CREATSERUM 0.76 05/03/2024    BUN 11 05/03/2024     05/03/2024    K 4.2 05/03/2024     05/03/2024    CO2 30.0 05/03/2024     05/03/2024    CA 9.4 05/03/2024    PTT 25.0 05/03/2024    INR 0.99 05/03/2024       Imaging:  CT CHEST(CONTRAST ONLY) (CPT=71260)    Result Date: 5/3/2024  CONCLUSION:  1. Segmental right lower lobe pulmonary emboli.  Probable small nonocclusive embolus at the left main pulmonary artery branch point.  A dedicated pulmonary embolus protocol was not utilized. 2. 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 3. Mild-to-moderate coronary artery calcification. 4. Prior granulomatous disease in the chest..    Dictated by (CST): Apolinar Barry MD on 5/03/2024 at 4:58 PM     Finalized by (CST): Apolinar Barry MD on 5/03/2024 at 5:12 PM          US VENOUS DOPPLER LEG RIGHT - DIAG IMG (CPT=93971)    Result Date: 5/3/2024  CONCLUSION: No DVT    Dictated by (CST): Crow Ellis MD on 5/03/2024 at 3:51 PM     Finalized by (CST): Crow Ellis MD on 5/03/2024 at 3:52 PM          XR FOOT, COMPLETE (MIN 3 VIEWS), RIGHT (CPT=73630)    Result Date: 5/3/2024  CONCLUSION:  1. Cortical irregularity involving the medial malleolus, which could represent a nondisplaced fracture of uncertain chronicity, possibly subacute to chronic.  Correlate clinically for overlying point tenderness. 2. Stable mild Achilles enthesopathy.    Dictated by (CST): El Weiss MD on 5/03/2024 at 9:22 AM     Finalized by (CST): El Weiss MD on 5/03/2024 at 9:23 AM          XR ANKLE (MIN 3 VIEWS), RIGHT  (CPT=73610)    Result Date: 5/3/2024  CONCLUSION:  1. Cortical irregularity involving the medial malleolus, which could represent a nondisplaced fracture of uncertain chronicity, possibly subacute to chronic.  Correlate clinically for overlying point tenderness. 2. Stable mild Achilles enthesopathy.    Dictated by (CST): El Weiss MD on 5/03/2024 at 9:17 AM     Finalized by (CST): El Weiss MD on 5/03/2024 at 9:22 AM            Assessment and Plan:    Acute right lower lobe segmental PE  Patient currently on heparin drip, will likely require lifelong anticoagulation in view of factor V mutation, will check 2D echo to rule out right heart strain.  Consider need for pulmonary consult    Right medial malleolus fracture  Currently splinted, orthopedics consulted, Tylenol/Norco as needed for pain    Dementia without signs of agitation  Continue supportive care, use Haldol if show signs of agitation.    Leukocytosis  Likely stress response, however will check UA since patient is demented unable to give a history    Prophylaxis  IV heparin drip    CODE STATUS  Full    Primary care physician  Irving Sheets,     MDM: High, acute and severe exacerbation of chronic illness posing threat to life.  IV medications requiring close inpatient monitoring  60 minutes spent on this admission - examining patient, obtaining history, reviewing previous medical records, going over test results/imaging and discussing plan of care. All questions answered.     Disposition  Clinical course will dictate outcome      TJ COX MD  5/3/2024  11:10 PM

## 2024-05-04 NOTE — PROGRESS NOTES
[facility]  Progress Note     Yoseph Cordero  : 1950    Status: Inpatient  Day #: 1    Attending: Ric Evans MD  PCP: Irving Sheets,      Assessment and Plan:    Acute right lower lobe segmental PE  H/o factor V mutation  -cont heparin drip  -f/u echo  -monitor respiratory status     Right medial malleolus fracture  Currently splinted, orthopedics consulted, Tylenol/Norco as needed for pain  F/u ortho     Dementia without signs of agitation  Continue supportive care, use Haldol if show signs of agitation.     Leukocytosis  Likely stress response, however will check UA since patient is demented unable to give a history  -UA ok       DVT Mechanical Prophylaxis:        DVT Pharmacologic Prophylaxis   Medication    heparin (Porcine) 99541 units/250mL infusion PE/DVT/THROMBUS CONTINUOUS               Subjective:      Initial Chief Complaint:  right foot swelling    nonverbal      Objective:      Temp:  [97.5 °F (36.4 °C)-99.4 °F (37.4 °C)] 99.4 °F (37.4 °C)  Pulse:  [89-97] 96  Resp:  [16-22] 18  BP: (112-151)/(57-96) 150/57  SpO2:  [92 %-99 %] 95 %  General:  Alert, no distress  HEENT:  Normocephalic, atraumatic  Cardiac:  Regular rate, regular rhythm  Pulmonary:  Clear to auscultation bilaterally, respirations unlabored  Gastrointestinal:  Soft, non-tender, normal bowel sounds  Musculoskeletal: RLE splinted  Extremities:  No edema, no cyanosis, no clubbing  Neurologic:  nonfocal  Psychiatric:  Normal affect, calm and appropriate    Intake/Output Summary (Last 24 hours) at 2024 1413  Last data filed at 2024 1408  Gross per 24 hour   Intake 480 ml   Output 1600 ml   Net -1120 ml         Recent Labs   Lab 24  1649 24  2044 24  0412   WBC 12.3* 13.3* 11.9*   HGB 14.0 13.3 13.3   HCT 42.1 40.9 38.3*   .0 238.0 217.0   RBC 4.67 4.49 4.31   MCV 90.1 91.1 88.9   MCH 30.0 29.6 30.9   MCHC 33.3 32.5 34.7   RDW 13.1 13.3 13.0   NEPRELIM 8.47* 9.10* 8.11*     Recent  Labs   Lab 05/02/24  1649 05/03/24  2044 05/04/24  0412 05/04/24  1124   BUN 13 11 9  --    CREATSERUM 0.99 0.76 0.74  --    CA 9.5 9.4 9.2  --    ALB 4.4  --   --   --     144 141  --    K 4.2 4.2 3.6  --     106 106  --    CO2 28.0 30.0 28.0  --    * 104* 123*  --    BILT 0.4  --   --   --    AST 17  --   --   --    ALT 11  --   --   --    ALKPHO 79  --   --   --    TP 7.8  --   --   --    PTT  --  25.0 41.1* 70.3*   INR 0.97 0.99  --   --    DDIMER 11.06*  --   --   --    TSH 2.834  --   --   --        CT CHEST(CONTRAST ONLY) (CPT=71260)    Result Date: 5/3/2024  CONCLUSION:  1. Segmental right lower lobe pulmonary emboli.  Probable small nonocclusive embolus at the left main pulmonary artery branch point.  A dedicated pulmonary embolus protocol was not utilized. 2. 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 3. Mild-to-moderate coronary artery calcification. 4. Prior granulomatous disease in the chest..    Dictated by (CST): Apolinar Barry MD on 5/03/2024 at 4:58 PM     Finalized by (CST): Apolinar Barry MD on 5/03/2024 at 5:12 PM          US VENOUS DOPPLER LEG RIGHT - DIAG IMG (CPT=93971)    Result Date: 5/3/2024  CONCLUSION: No DVT    Dictated by (CST): Crow Ellis MD on 5/03/2024 at 3:51 PM     Finalized by (CST): Crow Ellis MD on 5/03/2024 at 3:52 PM          XR FOOT, COMPLETE (MIN 3 VIEWS), RIGHT (CPT=73630)    Result Date: 5/3/2024  CONCLUSION:  1. Cortical irregularity involving the medial malleolus, which could represent a nondisplaced fracture of uncertain chronicity, possibly subacute to chronic.  Correlate clinically for overlying point tenderness. 2. Stable mild Achilles enthesopathy.    Dictated by (CST): El Weiss MD on 5/03/2024 at 9:22 AM     Finalized by (CST): El Weiss MD on 5/03/2024 at 9:23 AM          XR ANKLE (MIN 3 VIEWS), RIGHT (CPT=73610)    Result Date:  5/3/2024  CONCLUSION:  1. Cortical irregularity involving the medial malleolus, which could represent a nondisplaced fracture of uncertain chronicity, possibly subacute to chronic.  Correlate clinically for overlying point tenderness. 2. Stable mild Achilles enthesopathy.    Dictated by (CST): El Weiss MD on 5/03/2024 at 9:17 AM     Finalized by (CST): El Weiss MD on 5/03/2024 at 9:22 AM         EKG 12 Lead    Result Date: 5/4/2024  Normal sinus rhythm Possible Left atrial enlargement Low voltage QRS, consider pulmonary disease, pericardial effusion, or normal variant Borderline ECG No previous ECGs found in Muse Confirmed by CHLOÉ BUTCHER, CHI ([id number]) on 5/4/2024 12:07:14 PM   Medications:   QUEtiapine  25 mg Oral Nightly    pantoprazole  40 mg Oral QAM AC      PRN Meds:   LORazepam    ondansetron    acetaminophen    hydrALAzine    haloperidol lactate    Supplementary Documentation:        [facility]. Time spent on chart/note review, review of labs/imaging, discussion with patient's family, physical exam, discussion with staff, consultants, coordinating care, writing progress note, discussion of plan of care.      Ric Evans MD

## 2024-05-04 NOTE — PROGRESS NOTES
PT orders received chart reviewed. Pt undergoing ortho consult for R medial malleolus fx. PT will defer eval until pt has weight bearing and activity orders.   Per ortho notes: right medial malleolus fracture, non-displaced. Recommend splint for now. F/u as outpt in next 1-2 weeks for cast or camwalker boot. Pulmonary embolism management per medical service. No surgery planned on right ankle.

## 2024-05-04 NOTE — CONSULTS
Pt seen and examined.  Full consult dictated.  Impression:  right medial malleolus fracture, non-displaced.  Recommend splint for now.  F/u as outpt in next 1-2 weeks for cast or camwalker boot.  Pulmonary embolism management per medical service.  No surgery planned on right ankle.

## 2024-05-04 NOTE — CONSULTS
[facility]    PATIENT'S NAME: KUSH GODINEZ   ATTENDING PHYSICIAN: Ric Evans MD   CONSULTING PHYSICIAN: Prieto Ling MD   PATIENT ACCOUNT#:   [id number]    LOCATION:  [address] [place] [address]  MEDICAL RECORD #:   [id number]       YOB: 1950  ADMISSION DATE:       05/03/2024      CONSULT DATE:  05/04/2024    REPORT OF CONSULTATION      REASON FOR CONSULTATION:  Right ankle injury.    HISTORY OF PRESENT ILLNESS:  The patient is a 73-year-old male with dementia who apparently suffered a fall at home this past Tuesday or Wednesday.  He was found on the floor at the side of the bed and may have fallen from the bed.  He was reluctant to bear weight after the injury.  It is unclear whether he is having significant pain as he is not verbal, but his facial expressions would indicate some discomfort with movement or pressure on the right foot.  He was eventually brought to the emergency room, where laboratory findings showed an elevated D-dimer.  He eventually underwent a CT scan of the chest, which was suggestive of a pulmonary embolism.  He is now admitted for anticoagulation and treatment of his pulmonary embolism.  A splint was applied to the right ankle at the time of evaluation in the ER.  He normally ambulates with no assistive devices.  He did have a stroke at a young age and has right hemiplegia.  He does not normally use a cane or a walker for ambulation.    PAST MEDICAL HISTORY:  Factor V Leiden mutation, dementia, previous CVA, and hemiplegia.    MEDICATIONS:  Multiple, listed on the record.    ALLERGIES:  No known drug allergies.    SOCIAL HISTORY:  The patient lives with his daughter and wife.  He normally ambulates with no assistive devices.  He has slowed in his activity level due to the dementia.  He is a nonsmoker.    REVIEW OF SYSTEMS:  Unremarkable.  There is no chest pain, abdominal pain, nausea, vomiting, night sweats, fevers, chills, or unexplained weight loss noted.      PHYSICAL  EXAMINATION:    GENERAL:  An elderly male in no distress.  He does not answer questions, but appears in no distress and no discomfort.    EXTREMITIES:  Examination of the right ankle reveals skin intact with minimal ecchymosis along the medial ankle.  He is tender over the medial malleolus.  Lateral ankle nontender.  Toes are neurologically intact to light touch sensory testing.    X-rays of the right ankle show a nondisplaced medial malleolar fracture.  No significant degenerative change in the ankle.    ASSESSMENT AND PLAN:  The patient has a nondisplaced medial malleolar fracture.  I recommend nonoperative treatment.  Suggest continued use of the temporary splint for now.  Advised nonweightbearing.  Continue anticoagulation per the medical service.  Anticipate transitioning to a walker boot or short leg cast in the next 7 to 10 days.  Follow up as an outpatient for further treatment.  I discussed the plan with his son who was present in the room.    Dictated By Prieto Ling MD  d: 05/04/2024 11:14:27  t: 05/04/2024 12:15:42  [facility] [id number]  [facility]/

## 2024-05-04 NOTE — ED PROVIDER NOTES
[facility] Note  Patient: Yoseph Cordero Age: 73 year old Sex: male      MRN: [id number]  : 1950    Patient Seen in: [facility] Emergency Department    History     Chief Complaint   Patient presents with    Abnormal Result     Stated Complaint: PE and R foot fracture    History obtained from: Patient and patient's son    Patient is a 73-year-old male with a past medical history of CVA with residual hemiplegia, factor V Leiden mutation, dementia sent in by PCP after outpatient workup showed evidence of pulmonary embolism.  Per patient's son, beginning on 5 days ago, the patient began limping and complaining of pain in the right ankle.  Was seen by PCP yesterday who had labs and x-rays performed.  X-ray showed evidence of a medial malleolus irregularity concerning for possible fracture.  D-dimer was elevated so patient had a CT chest with IV contrast and a right lower extremity venous duplex performed.  No evidence of DVT on the ultrasound but CT showed evidence of a pulmonary embolism and patient was directed to the emergency department.  No reported shortness of breath, cough or fevers.  No complaints of chest pain.    Review of Systems:  Review of Systems  Positive for stated complaint: PE and R foot fracture. Constitutional and vital signs reviewed. All other systems reviewed and negative except as noted above.    Patient History:  Past Medical History:    Anxiety state, unspecified    CVA (cerebral infarction)    Dementia (HCC)    Depression    Heterozygous factor V Leiden mutation (HCC)    Other and unspecified hyperlipidemia    Other ill-defined conditions(799.89)    Cardiomegaly Pleural effusion w/idopathic pleuropuricarditis    Stroke (HCC)    Unspecified sleep apnea       Past Surgical History:   Procedure Laterality Date    Colonoscopy      Electrocardiogram, complete  1914    Scanned to Media Tab        Family History   Problem Relation Age of Onset    Cancer Father          Lung - Smoker  Cause of death    Other (Other) Mother         during     Diabetes Neg     Glaucoma Neg        Specific Social Determinants of Health:   Social History     Socioeconomic History    Marital status:    Tobacco Use    Smoking status: Former    Smokeless tobacco: Never   Vaping Use    Vaping status: Never Used   Substance and Sexual Activity    Alcohol use: No     Alcohol/week: 0.0 standard drinks of alcohol     Comment: none recently    Drug use: No   Other Topics Concern    Caffeine Concern Yes     Comment: 1 cups coffee daily    Exercise No   Social History Narrative    The patient does not use an assistive device..      The patient does live in a home with stairs.           PSFH elements reviewed from today and agreed except as otherwise stated in HPI.    Physical Exam     ED Triage Vitals [24 1823]   /88   Pulse 94   Resp 18   Temp 97.5 °F (36.4 °C)   Temp src    SpO2 92 %   O2 Device None (Room air)       Current:/71   Pulse 97   Temp 97.5 °F (36.4 °C)   Resp 16   Wt 58.4 kg   SpO2 98%   BMI 21.42 kg/m²         Physical Exam  Constitutional:       Appearance: He is well-developed.   HENT:      Head: Normocephalic and atraumatic.      Right Ear: External ear normal.      Left Ear: External ear normal.      Nose: Nose normal.   Eyes:      Conjunctiva/sclera: Conjunctivae normal.      Pupils: Pupils are equal, round, and reactive to light.   Cardiovascular:      Rate and Rhythm: Normal rate and regular rhythm.      Pulses: Normal pulses.      Heart sounds: Normal heart sounds.   Pulmonary:      Effort: Pulmonary effort is normal.      Breath sounds: Normal breath sounds.   Abdominal:      General: Bowel sounds are normal.      Palpations: Abdomen is soft.      Tenderness: There is no abdominal tenderness.   Musculoskeletal:         General: Normal range of motion.      Cervical back: Normal range of motion and neck supple.      Comments: Mild tenderness to  palpation over the medial malleolus of the right leg, no significant lower extremity swelling or calf tenderness or skin changes   Skin:     General: Skin is warm and dry.      Findings: No rash.   Neurological:      General: No focal deficit present.      Mental Status: He is alert and oriented to person, place, and time.      Deep Tendon Reflexes: Reflexes are normal and symmetric.   Psychiatric:         Mood and Affect: Mood normal.         Behavior: Behavior normal.         ED Course   Labs:   Labs Reviewed   BASIC METABOLIC PANEL (8) - Abnormal; Notable for the following components:       Result Value    Glucose 104 (*)     Calculated Osmolality 298 (*)     All other components within normal limits   CBC W/ DIFFERENTIAL - Abnormal; Notable for the following components:    WBC 13.3 (*)     Neutrophil Absolute Prelim 9.10 (*)     Neutrophil Absolute 9.10 (*)     Monocyte Absolute 1.22 (*)     All other components within normal limits   TROPONIN I HIGH SENSITIVITY - Normal   BNP (B TYPE NATRIURETIC PEPTIDE) - Normal   PTT, ACTIVATED - Normal   PROTHROMBIN TIME (PT) - Normal   CBC WITH DIFFERENTIAL WITH PLATELET    Narrative:     The following orders were created for panel order CBC With Differential With Platelet.  Procedure                               Abnormality         Status                     ---------                               -----------         ------                     CBC W/ DIFFERENTIAL[[id number]]          Abnormal            Final result                 Please view results for these tests on the individual orders.     Radiology findings:  I personally reviewed the images.   CT CHEST(CONTRAST ONLY) (CPT=71260)    Result Date: 5/3/2024  CONCLUSION:  1. Segmental right lower lobe pulmonary emboli.  Probable small nonocclusive embolus at the left main pulmonary artery branch point.  A dedicated pulmonary embolus protocol was not utilized. 2. Peripheral left lower lobe opacity may be related to  postinflammatory scarring, or residual from a small previous pulmonary infarct. 3. Mild-to-moderate coronary artery calcification. 4. Prior granulomatous disease in the chest..    Dictated by (CST): Apolinar Barry MD on 5/03/2024 at 4:58 PM     Finalized by (CST): Apolinar Barry MD on 5/03/2024 at 5:12 PM          US VENOUS DOPPLER LEG RIGHT - DIAG IMG (CPT=93971)    Result Date: 5/3/2024  CONCLUSION: No DVT    Dictated by (CST): Crow Ellis MD on 5/03/2024 at 3:51 PM     Finalized by (CST): Crow Ellis MD on 5/03/2024 at 3:52 PM           EKG as interpreted by me: Ventricular rate 90, normal sinus rhythm, normal axis, no parable prolongation, narrow QRS, QTc 442 ms, no ST segment elevations or depressions, no abnormal T wave inversions  Cardiac Monitor: Interpreted by me.   Pulse Readings from Last 1 Encounters:   05/03/24 97   , sinus,     External non-ED records reviewed independently by me: None ankle x-rays performed yesterday were reviewed and as follows:    Impression   CONCLUSION:  1. Cortical irregularity involving the medial malleolus, which could represent a nondisplaced fracture of uncertain chronicity, possibly subacute to chronic.  Correlate clinically for overlying point tenderness.  2. Stable mild Achilles enthesopathy.       MDM   73-year-old male with a past medical history of CVA with residual hemiplegia, factor V Leiden mutation, dementia brought in by PCP after outpatient workup showed evidence of pulmonary embolism and possible right ankle fracture.    Differential diagnoses considered includes, but is not limited to: Pulmonary embolism, right ankle fracture versus osteoarthritis    Will obtain the following tests:, Troponin, BNP, PT, PTT, EKG  Please see ED course for my independent review of these tests/imaging results.    Initial Medications/Therapeutics administered: Heparin    Chronic conditions affecting care: CVA with residual hemiplegia, factor V Leiden mutation, dementia    Workup  and medications considered but not ordered: No elevated biomarkers or evidence of right heart strain to indicate need for emergent echo    Social Determinants of Health that impacted care: None    ED Course as of 05/03/24 2330  ------------------------------------------------------------  Time: 05/03 2329  Comment: Reassuring with no troponin or BNP elevation to suggest right heart strain.  Remained hemodynamically stable with no hypoxia, tachycardia or tachypnea.  Heparin started for anticoagulation.  I discussed patient's case with the [facility] accepted the patient for admission.  Short leg postmold splint was applied to the right lower extremity and orthopedic surgery was made aware of new consult.        A short leg postmold splint was applied to the lower extremity.  After application of the splint I returned and re-examined the patient.  The splint was adequately immobilizing the joint, and distal to the splint, the patient's circulation and sensation was intact.      Critical Care:  I spent a total of 36 minutes of critical care time in obtaining history, performing a physical exam, bedside monitoring of interventions, collecting and interpreting tests and discussion with consultants but not including time spent performing procedures.      Disposition and Plan     Clinical Impression:  1. Acute pulmonary embolism without acute cor pulmonale, unspecified pulmonary embolism type (HCC)        Disposition:  Admit    Follow-up:  No follow-up provider specified.    Medications Prescribed:  Current Discharge Medication List          Hospital Problems       Present on Admission  Date Reviewed: 5/2/2024            ICD-10-CM Noted POA    * (Principal) Acute pulmonary embolism without acute cor pulmonale, unspecified pulmonary embolism type (HCC) I26.99 5/3/2024 Unknown        This note may have been created using voice dictation technology and may include inadvertent errors.      [facility] MD  Emergency  Medicine

## 2024-05-04 NOTE — PLAN OF CARE
Problem: Patient Centered Care  Goal: Patient preferences are identified and integrated in the patient's plan of care  Description: Interventions:  - What would you like us to know as we care for you? Patient lives at home with family  - Provide timely, complete, and accurate information to patient/family  - Incorporate patient and family knowledge, values, beliefs, and cultural backgrounds into the planning and delivery of care  - Encourage patient/family to participate in care and decision-making at the level they choose  - Honor patient and family perspectives and choices  Outcome: Progressing     Problem: Safety Risk - Non-Violent Restraints  Goal: Patient will remain free from self-harm  Description: INTERVENTIONS:  - Apply the least restrictive restraint to prevent harm  - Notify patient and family of reasons restraints applied  - Assess for any contributing factors to confusion (electrolyte disturbances, delirium, medications)  - Discontinue any unnecessary medical devices as soon as possible  - Assess the patient's physical comfort, circulation, skin condition, hydration, nutrition and elimination needs   - Reorient and redirection as needed  - Assess for the need to continue restraints  Outcome: Progressing     Problem: RESPIRATORY - ADULT  Goal: Achieves optimal ventilation and oxygenation  Description: INTERVENTIONS:  - Assess for changes in respiratory status  - Assess for changes in mentation and behavior  - Position to facilitate oxygenation and minimize respiratory effort  - Oxygen supplementation based on oxygen saturation or ABGs  - Provide Smoking Cessation handout, if applicable  - Encourage broncho-pulmonary hygiene including cough, deep breathe, Incentive Spirometry  - Assess the need for suctioning and perform as needed  - Assess and instruct to report SOB or any respiratory difficulty  - Respiratory Therapy support as indicated  - Manage/alleviate anxiety  - Monitor for signs/symptoms of CO2  retention  Outcome: Progressing   Patient is nonverbal baseline; hx alzheimers and stroke. Heparin gtt infusing for PE. Awaiting orthopedic sx consult; fx to right foot. Urine sent overnight. Primofit in place. Hand roxane restraints in place; patient pulling lines. 2Decho ordered. Please call tyson Mayen [phone] with updates. Family made aware of poc. All needs met at this time.

## 2024-05-04 NOTE — PLAN OF CARE
Alert to self. Assistance with meals. Restraints in place. Heparin drip at 12ml next ptt tomorrow morning 5/5. Alarms in place. Seen by ortho non surgical for right foot fx. Splint in place. Echo to be done tomorrow morning  Problem: Patient Centered Care  Goal: Patient preferences are identified and integrated in the patient's plan of care  Description: Interventions:  - What would you like us to know as we care for you? Lives with wife and daughter  - Provide timely, complete, and accurate information to patient/family  - Incorporate patient and family knowledge, values, beliefs, and cultural backgrounds into the planning and delivery of care  - Encourage patient/family to participate in care and decision-making at the level they choose  - Honor patient and family perspectives and choices  Outcome: Progressing     Problem: RESPIRATORY - ADULT  Goal: Achieves optimal ventilation and oxygenation  Description: INTERVENTIONS:  - Assess for changes in respiratory status  - Assess for changes in mentation and behavior  - Position to facilitate oxygenation and minimize respiratory effort  - Oxygen supplementation based on oxygen saturation or ABGs  - Provide Smoking Cessation handout, if applicable  - Encourage broncho-pulmonary hygiene including cough, deep breathe, Incentive Spirometry  - Assess the need for suctioning and perform as needed  - Assess and instruct to report SOB or any respiratory difficulty  - Respiratory Therapy support as indicated  - Manage/alleviate anxiety  - Monitor for signs/symptoms of CO2 retention  Outcome: Progressing     Problem: Safety Risk - Non-Violent Restraints  Goal: Patient will remain free from self-harm  Description: INTERVENTIONS:  - Apply the least restrictive restraint to prevent harm  - Notify patient and family of reasons restraints applied  - Assess for any contributing factors to confusion (electrolyte disturbances, delirium, medications)  - Discontinue any unnecessary  medical devices as soon as possible  - Assess the patient's physical comfort, circulation, skin condition, hydration, nutrition and elimination needs   - Reorient and redirection as needed  - Assess for the need to continue restraints  Outcome: Not Progressing

## 2024-05-05 ENCOUNTER — APPOINTMENT (OUTPATIENT)
Dept: CV DIAGNOSTICS | Facility: HOSPITAL | Age: 74
End: 2024-05-05
Attending: HOSPITALIST
Payer: MEDICARE

## 2024-05-05 ENCOUNTER — APPOINTMENT (OUTPATIENT)
Dept: CV DIAGNOSTICS | Facility: HOSPITAL | Age: 74
DRG: 176 | End: 2024-05-05
Attending: HOSPITALIST
Payer: MEDICARE

## 2024-05-05 VITALS
OXYGEN SATURATION: 100 % | DIASTOLIC BLOOD PRESSURE: 71 MMHG | WEIGHT: 131.19 LBS | RESPIRATION RATE: 18 BRPM | TEMPERATURE: 99 F | HEART RATE: 100 BPM | SYSTOLIC BLOOD PRESSURE: 151 MMHG | BODY MASS INDEX: 22 KG/M2

## 2024-05-05 LAB
APTT PPP: 72 SECONDS (ref 23.3–35.6)
BASOPHILS # BLD AUTO: 0.08 X10(3) UL (ref 0–0.2)
BASOPHILS NFR BLD AUTO: 0.6 %
DEPRECATED RDW RBC AUTO: 42.7 FL (ref 35.1–46.3)
EOSINOPHIL # BLD AUTO: 0.55 X10(3) UL (ref 0–0.7)
EOSINOPHIL NFR BLD AUTO: 3.8 %
ERYTHROCYTE [DISTWIDTH] IN BLOOD BY AUTOMATED COUNT: 13.1 % (ref 11–15)
HCT VFR BLD AUTO: 42.2 %
HGB BLD-MCNC: 14.1 G/DL
IMM GRANULOCYTES # BLD AUTO: 0.06 X10(3) UL (ref 0–1)
IMM GRANULOCYTES NFR BLD: 0.4 %
LYMPHOCYTES # BLD AUTO: 3.06 X10(3) UL (ref 1–4)
LYMPHOCYTES NFR BLD AUTO: 21.2 %
MCH RBC QN AUTO: 30.2 PG (ref 26–34)
MCHC RBC AUTO-ENTMCNC: 33.4 G/DL (ref 31–37)
MCV RBC AUTO: 90.4 FL
MONOCYTES # BLD AUTO: 1.28 X10(3) UL (ref 0.1–1)
MONOCYTES NFR BLD AUTO: 8.9 %
NEUTROPHILS # BLD AUTO: 9.38 X10 (3) UL (ref 1.5–7.7)
NEUTROPHILS # BLD AUTO: 9.38 X10(3) UL (ref 1.5–7.7)
NEUTROPHILS NFR BLD AUTO: 65.1 %
PLATELET # BLD AUTO: 250 10(3)UL (ref 150–450)
RBC # BLD AUTO: 4.67 X10(6)UL
WBC # BLD AUTO: 14.4 X10(3) UL (ref 4–11)

## 2024-05-05 PROCEDURE — 93306 TTE W/DOPPLER COMPLETE: CPT | Performed by: HOSPITALIST

## 2024-05-05 PROCEDURE — 99239 HOSP IP/OBS DSCHRG MGMT >30: CPT | Performed by: HOSPITALIST

## 2024-05-05 NOTE — DISCHARGE SUMMARY
[facility]  Discharge Summary     Yoesph Cordero  : 1950    Status: Inpatient  Day #: 2    Attending: Ric Evans MD  PCP: Irving Sheets DO     Date of Admission:  5/3/2024  Date of Discharge:  2024     Hospital Discharge Diagnoses:     Acute RLL segmental PE  H/o factor V mutation  Acute R medial malleolus fracture  Dementia  Leukocytosis      History of Present Illness:     Copied from Admission H&P:    Yoseph Cordero is a(n) 73 year old male, with a past medical history significant for CVA, factor V Leiden mutation and dementia is minimally verbal however as per family they had been noticing he had been limping somewhat appeared to be discomfort in relation to his right foot he also noted some swelling and redness prompting a visit to his primary care physician's office where workup indicated an elevated D-dimer and a possible subacute/chronic medial malleolus fracture of the right ankle.  A follow-up CT scan and lower extremity Dopplers indicated presence of a right lower lobe segmental PE with no signs of a DVT.  As per family patient has not shown any signs of chest discomfort or shortness of breath, at this time smiling does not appear to be in any distress      Hospital Course:     Acute right lower lobe segmental PE  H/o factor V mutation  -cont heparin drip - plan to transition to xarelto. To stay on indefinitely  -echo unremarkable  -monitor respiratory status - 100% RA     Right medial malleolus fracture  Currently splinted, orthopedics consulted, Tylenol/Norco as needed for pain  F/u ortho     Dementia without signs of agitation  Continue supportive care, use Haldol if show signs of agitation.     Leukocytosis  Likely stress response, however will check UA since patient is demented unable to give a history  -UA ok     Consultants         Provider   Role Specialty     Hung Harp MD      Consulting Physician SURGERY, ORTHOPEDIC             Physical Exam:    Blood pressure 151/71, pulse 100, temperature 98.6 °F (37 °C), temperature source Axillary, resp. rate 18, weight 131 lb 3.2 oz (59.5 kg), SpO2 100%.  General:  Alert, no distress  HEENT:  Normocephalic, atraumatic  Cardiac:  Regular rate, regular rhythm  Pulmonary:  Clear to auscultation bilaterally, respirations unlabored  Gastrointestinal:  Soft, non-tender, normal bowel sounds  Musculoskeletal:  No joint swelling  Extremities:  No edema, no cyanosis, no clubbing  Neurologic:  nonfocal  Psychiatric:  Normal affect, calm and appropriate         Discharge Medications        START taking these medications        Instructions Prescription details   rivaroxaban 15 & 20 MG Tbpk      Take As Directed based on package instructions: Days 1-21: 15 mg by mouth twice daily Days 22-30: 20 mg by mouth once daily   Quantity: 51 each  Refills: 0            CONTINUE taking these medications        Instructions Prescription details   guaiFENesin-codeine 100-10 MG/5ML Soln  Commonly known as: Cheratussin AC      Take 5 mL by mouth every 6 (six) hours as needed.   Quantity: 120 mL  Refills: 0     LORazepam 0.5 MG Tabs  Commonly known as: Ativan      Take 1 tablet (0.5 mg total) by mouth every 6 (six) hours as needed for Anxiety.   Quantity: 30 tablet  Refills: 1     QUEtiapine 25 MG Tabs  Commonly known as: SEROquel      Take 1 tablet (25 mg total) by mouth nightly.   Quantity: 90 tablet  Refills: 3            STOP taking these medications      amoxicillin 250 MG/5ML Susr  Commonly known as: AMOXIL        aspirin 81 MG Chew        fluticasone propionate 50 MCG/ACT Susp  Commonly known as: Flonase                  Where to Get Your Medications        These medications were sent to [id number] - [place], [place] - [address] AT [facility], [phone], [phone]  [address], [facility] [address]      Phone: [phone]   rivaroxaban 15 & 20 MG Tbpk        Follow-up  Information       Irving Sheets, DO Follow up.    Specialty: Family Medicine  Contact information:  [address]  [address]  [place] [address] [phone]                             Hospital Discharge Diagnoses:  acute RLL PE    Lace+ Score: 67  59-90 High Risk  29-58 Medium Risk  0-28   Low Risk.    TCM Follow-Up Recommendation:  LACE > 58: High Risk of readmission after discharge from the hospital.        I spent >30 minutes on this discharge. Discussed treatment and discharge plans.       Ric Evans MD

## 2024-05-05 NOTE — DISCHARGE INSTRUCTIONS
Sometimes managing your health at home requires assistance.  The [facility] team has recognized your preference to use [facility].  They can be reached by phone at [phone].  The fax number for your reference is [phone].  A representative from the home health agency will contact you or your family to schedule your first visit.

## 2024-05-05 NOTE — CM/SW NOTE
05/05/24 1300   Discharge disposition   Expected discharge disposition Home-Health   Post Acute Care Provider [facility]   Discharge transportation Private car     SW informed  provider of pt discharge and requested follow up with pt/family for SOC in the community.     Imani Castorena, MSW, LSW  Social Work/Case Management

## 2024-05-05 NOTE — PLAN OF CARE
Patient is non-verbal. Heparin gtt discontinued. Restraint charting completed. Frequent rounding done throughout the shift. Safety precautions in place. Discharge packet gone over with son and any questions had answered at bedside.     Problem: Patient Centered Care  Goal: Patient preferences are identified and integrated in the patient's plan of care  Description: Interventions:  - What would you like us to know as we care for you? From home with family  - Provide timely, complete, and accurate information to patient/family  - Incorporate patient and family knowledge, values, beliefs, and cultural backgrounds into the planning and delivery of care  - Encourage patient/family to participate in care and decision-making at the level they choose  - Honor patient and family perspectives and choices  Outcome: Completed     Problem: Safety Risk - Non-Violent Restraints  Goal: Patient will remain free from self-harm  Description: INTERVENTIONS:  - Apply the least restrictive restraint to prevent harm  - Notify patient and family of reasons restraints applied  - Assess for any contributing factors to confusion (electrolyte disturbances, delirium, medications)  - Discontinue any unnecessary medical devices as soon as possible  - Assess the patient's physical comfort, circulation, skin condition, hydration, nutrition and elimination needs   - Reorient and redirection as needed  - Assess for the need to continue restraints  Outcome: Completed     Problem: RESPIRATORY - ADULT  Goal: Achieves optimal ventilation and oxygenation  Description: INTERVENTIONS:  - Assess for changes in respiratory status  - Assess for changes in mentation and behavior  - Position to facilitate oxygenation and minimize respiratory effort  - Oxygen supplementation based on oxygen saturation or ABGs  - Provide Smoking Cessation handout, if applicable  - Encourage broncho-pulmonary hygiene including cough, deep breathe, Incentive Spirometry  - Assess the  need for suctioning and perform as needed  - Assess and instruct to report SOB or any respiratory difficulty  - Respiratory Therapy support as indicated  - Manage/alleviate anxiety  - Monitor for signs/symptoms of CO2 retention  Outcome: Completed

## 2024-05-05 NOTE — CM/SW NOTE
MDO to ALMA ROSA for Xarelto.  Per Donal at [facility] pt's preferred pharmacy, pt OOP cost will be $47.00 but it is out of stock.  Closest in stock pharmacy is [facility] pharmacy @ [address] in [place] @[phone].  SW informed pt RN.  Pt is current w/[facility] for PT/OT/RN and HHA.  F2F uploaded.    SW/CM to remain available for support and/or discharge planning.      Imani Castorena, MSW, LSW  Social Work/Case Management

## 2024-05-05 NOTE — PROGRESS NOTES
[facility]  Progress Note     Yoseph Cordero  : 1950    Status: Inpatient  Day #: 2    Attending: Ric Evans MD  PCP: Irving Sheets,      Assessment and Plan:    Acute right lower lobe segmental PE  H/o factor V mutation  -cont heparin drip - plan to transition to xarelto  -f/u echo result  -monitor respiratory status - 100% RA     Right medial malleolus fracture  Currently splinted, orthopedics consulted, Tylenol/Norco as needed for pain  F/u ortho     Dementia without signs of agitation  Continue supportive care, use Haldol if show signs of agitation.     Leukocytosis  Likely stress response, however will check UA since patient is demented unable to give a history  -UA ok    Dispo:  anticipate discharge home pending echo result       DVT Mechanical Prophylaxis:        DVT Pharmacologic Prophylaxis   Medication    heparin (Porcine) 06350 units/250mL infusion PE/DVT/THROMBUS CONTINUOUS               Subjective:      Initial Chief Complaint:  right foot swelling    nonverbal      Objective:      Temp:  [98 °F (36.7 °C)-99.2 °F (37.3 °C)] 98.6 °F (37 °C)  Pulse:  [] 100  Resp:  [17-18] 18  BP: (105-160)/(54-89) 160/89  SpO2:  [96 %-100 %] 100 %  General:  Alert, no distress  HEENT:  Normocephalic, atraumatic  Cardiac:  Regular rate, regular rhythm  Pulmonary:  Clear to auscultation bilaterally, respirations unlabored  Gastrointestinal:  Soft, non-tender, normal bowel sounds  Musculoskeletal: RLE splinted  Extremities:  No edema, no cyanosis, no clubbing  Neurologic:  nonfocal  Psychiatric:  Normal affect, calm and appropriate    Intake/Output Summary (Last 24 hours) at 2024 1004  Last data filed at 2024 0857  Gross per 24 hour   Intake 1326.4 ml   Output 2550 ml   Net -1223.6 ml         Recent Labs   Lab 24  2044 24  0412 24  0839   WBC 13.3* 11.9* 14.4*   HGB 13.3 13.3 14.1   HCT 40.9 38.3* 42.2   .0 217.0 250.0   RBC 4.49 4.31 4.67   MCV 91.1 88.9  90.4   MCH 29.6 30.9 30.2   MCHC 32.5 34.7 33.4   RDW 13.3 13.0 13.1   NEPRELIM 9.10* 8.11* 9.38*     Recent Labs   Lab 05/02/24  1649 05/03/24 2044 05/03/24 2044 05/04/24  0412 05/04/24  1124 05/05/24  0840   BUN 13 11  --  9  --   --    CREATSERUM 0.99 0.76  --  0.74  --   --    CA 9.5 9.4  --  9.2  --   --    ALB 4.4  --   --   --   --   --     144  --  141  --   --    K 4.2 4.2  --  3.6  --   --     106  --  106  --   --    CO2 28.0 30.0  --  28.0  --   --    * 104*  --  123*  --   --    BILT 0.4  --   --   --   --   --    AST 17  --   --   --   --   --    ALT 11  --   --   --   --   --    ALKPHO 79  --   --   --   --   --    TP 7.8  --   --   --   --   --    PTT  --  25.0   < > 41.1* 70.3* 72.0*   INR 0.97 0.99  --   --   --   --    DDIMER 11.06*  --   --   --   --   --    TSH 2.834  --   --   --   --   --     < > = values in this interval not displayed.       CT CHEST(CONTRAST ONLY) (CPT=71260)    Result Date: 5/3/2024  CONCLUSION:  1. Segmental right lower lobe pulmonary emboli.  Probable small nonocclusive embolus at the left main pulmonary artery branch point.  A dedicated pulmonary embolus protocol was not utilized. 2. 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 3. Mild-to-moderate coronary artery calcification. 4. Prior granulomatous disease in the chest..    Dictated by (CST): Apolinar aBrry MD on 5/03/2024 at 4:58 PM     Finalized by (CST): Apolinar Barry MD on 5/03/2024 at 5:12 PM          US VENOUS DOPPLER LEG RIGHT - DIAG IMG (CPT=93971)    Result Date: 5/3/2024  CONCLUSION: No DVT    Dictated by (CST): Crow Ellis MD on 5/03/2024 at 3:51 PM     Finalized by (CST): Crow Ellis MD on 5/03/2024 at 3:52 PM         EKG 12 Lead    Result Date: 5/4/2024  Normal sinus rhythm Possible Left atrial enlargement Low voltage QRS, consider pulmonary disease, pericardial effusion, or normal variant  Borderline ECG No previous ECGs found in [facility] Confirmed by CHLOÉ BUTCHER PRATIK ([id number]) on 5/4/2024 12:07:14 PM   Medications:   QUEtiapine  25 mg Oral Nightly    pantoprazole  40 mg Oral QAM AC      PRN Meds:   LORazepam    ondansetron    acetaminophen    hydrALAzine    haloperidol lactate    Supplementary Documentation:        [facility]. Time spent on chart/note review, review of labs/imaging, discussion with patient's family, physical exam, discussion with staff, consultants, coordinating care, writing progress note, discussion of plan of care.      Ric Evans MD

## 2024-05-05 NOTE — PLAN OF CARE
Mitt restraints in place. Heparin drip infusing. ACE wrap on RLE. Fall precautions in place. Plan for ECHO.    Problem: Patient Centered Care  Goal: Patient preferences are identified and integrated in the patient's plan of care  Description: Interventions:  - What would you like us to know as we care for you? From home with family  - Provide timely, complete, and accurate information to patient/family  - Incorporate patient and family knowledge, values, beliefs, and cultural backgrounds into the planning and delivery of care  - Encourage patient/family to participate in care and decision-making at the level they choose  - Honor patient and family perspectives and choices  5/5/2024 0312 by Fina Adan, RN  Outcome: Progressing  5/5/2024 0312 by Fina Adan RN  Outcome: Progressing     Problem: RESPIRATORY - ADULT  Goal: Achieves optimal ventilation and oxygenation  Description: INTERVENTIONS:  - Assess for changes in respiratory status  - Assess for changes in mentation and behavior  - Position to facilitate oxygenation and minimize respiratory effort  - Oxygen supplementation based on oxygen saturation or ABGs  - Provide Smoking Cessation handout, if applicable  - Encourage broncho-pulmonary hygiene including cough, deep breathe, Incentive Spirometry  - Assess the need for suctioning and perform as needed  - Assess and instruct to report SOB or any respiratory difficulty  - Respiratory Therapy support as indicated  - Manage/alleviate anxiety  - Monitor for signs/symptoms of CO2 retention  5/5/2024 0312 by Fina Adan, RN  Outcome: Progressing  5/5/2024 0312 by Fina Adan, RN  Outcome: Progressing

## 2024-05-06 ENCOUNTER — PATIENT OUTREACH (OUTPATIENT)
Dept: CASE MANAGEMENT | Age: 74
End: 2024-05-06

## 2024-05-06 ENCOUNTER — TELEPHONE (OUTPATIENT)
Dept: ORTHOPEDICS CLINIC | Facility: CLINIC | Age: 74
End: 2024-05-06

## 2024-05-06 ENCOUNTER — TELEPHONE (OUTPATIENT)
Dept: FAMILY MEDICINE CLINIC | Facility: CLINIC | Age: 74
End: 2024-05-06

## 2024-05-06 DIAGNOSIS — I26.99 PE (PULMONARY THROMBOEMBOLISM) (HCC): Primary | ICD-10-CM

## 2024-05-06 DIAGNOSIS — Z02.9 ENCOUNTERS FOR UNSPECIFIED ADMINISTRATIVE PURPOSE: ICD-10-CM

## 2024-05-06 PROCEDURE — 1111F DSCHRG MED/CURRENT MED MERGE: CPT

## 2024-05-06 PROCEDURE — 1159F MED LIST DOCD IN RCRD: CPT

## 2024-05-06 NOTE — TELEPHONE ENCOUNTER
Patient's daughter Tyra called (on CHETAN), verified patient's Name and . States patient was recently hospitalized (5/3 to ) - diagnosed with acute right lower lobe segmental PE. He was discharged on Xarelto and was told that he might need to be on the medication for life. Daughter wants to know if there is an assistance program for patients with fixed income where he can enroll to help obtain the medication that is cost effective. Advised to schedule an appointment for hospital followup. Appointment scheduled.    Future Appointments   Date Time Provider Department Center   2024  2:00 PM Irving Sheets DO [facility] [place]   2024  3:00 PM Prieto Ling MD [facility]

## 2024-05-06 NOTE — PROGRESS NOTES
Initial Post Discharge Follow Up   Discharge Date: 5/5/24  Contact Date: 5/6/2024    Consent Verification:  Assessment Completed With: Other: vinicius Mary Permission received per patient?  written  HIPAA Verified?  Yes    Discharge Dx:   Acute RLL segmental PE  H/o factor V mutation  Acute R medial malleolus fracture  Dementia  Leukocytosis    General:   How have you been since your discharge from the hospital? Dtr Tyra reports pt feeling better, since hospital discharge--tolerating Xarelto, as prescribed, soft cast in place to right ankle, appetite adequate, staying hydrated. Dtr denies pt with any fever, chills, headache, vision changes, dizziness, nausea, vomiting, diarrhea, bleeding,chest pain or shortness of breath at this time. Dtr confirms [facility] saw pt today for start of care.  Do you have any pain since discharge?  No    How well was your pain managed while in the hospital?   On a scale of 1-5   1- Very Poor and 5- Very well   Very Well  When you were leaving the hospital were your discharge instructions reviewed with you? Yes  How well were your discharge instructions explained to you?   On a scale of 1-5   1- Very Poor and 5- Very well   Very Well  Do you have any questions about your discharge instructions?  No  Before leaving the hospital was your diagnoses explained to you? No  Do you have any questions about your diagnoses? No  Are you able to perform normal daily activities of living as you have prior to your hospital stay (dressing, bathing, ambulating to the bathroom, etc)? yes  (NCM) Was patient given a different diet per AVS? no    Medications:   Current Outpatient Medications   Medication Sig Dispense Refill    rivaroxaban 15 & 20 MG Oral Tablet Therapy Pack Take As Directed based on package instructions: Days 1-21: 15 mg by mouth twice daily Days 22-30: 20 mg by mouth once daily 51 each 0    guaiFENesin-codeine (CHERATUSSIN AC) 100-10 MG/5ML Oral Solution Take 5 mL by mouth every 6 (six)  hours as needed. 120 mL 0    QUEtiapine 25 MG Oral Tab Take 1 tablet (25 mg total) by mouth nightly. 90 tablet 3    LORazepam 0.5 MG Oral Tab Take 1 tablet (0.5 mg total) by mouth every 6 (six) hours as needed for Anxiety. 30 tablet 1     Were there any changes to your current medication(s) noted on the AVS? Yes  START taking:  rivaroxaban  STOP taking:  amoxicillin 250 MG/5ML Susr (AMOXIL)  aspirin 81 MG Chew  fluticasone propionate 50 MCG/ACT Susp (Flonase)  If so, were these medication changes discussed with you prior to leaving the hospital? Yes  If a new medication was prescribed:    Was the new medication's purpose & side effects reviewed? Yes  Do you have any questions about your new medication? No  Did you  your discharge medications when you left the hospital? Yes  Let's go over your medications together to make sure we are not missing anything. Medications Reviewed  Are there any reasons that keep you from taking your medication as prescribed? No  Are you having any concerns with constipation? No  Did patient receive their flu shot (Sept-March)? No    Discharge medications reviewed/discussed/and reconciled against outpatient medications with patient.  Any changes or updates to medications sent to PCP.  Patient Acknowledged     Referrals/orders at D/C:  Referrals/orders placed at D/C? yes  What services:   Home health--RN, PT/OT   (If HH was ordered) Has HH been set up?  Yes    If Yes: With Whom:   [facility]. They can be reached by phone at [phone]. The fax number for your reference is [phone]  DME ordered at D/C? Yes  What? Soft cast right ankle  From where? hospital  Have you received your (DME)? yes    Discharge orders, AVS reviewed and discussed with patient. Any changes or updates to orders sent to PCP.  Patient Acknowledged      SDOH:   Transportation:   Transportation Needs: No Transportation Needs (5/6/2024)    Transportation Needs     Lack of Transportation: No      Financial Strain:   Financial Resource Strain: Low Risk  (5/6/2024)    Financial Resource Strain     Difficulty of Paying Living Expenses: Not very hard     Med Affordability: No       Follow up appointments:    Follow-up Information    Follow up With Specialties Details Why Contact Info   Irving Sheets, DO Family Medicine Follow up  [address]  [place] [address]  [phone]   Prieto Ling MD SURGERY, ORTHOPEDIC Follow up in 1 week(s)  [address]  [facility] [id number] [phone]     Your appointments       Date & Time Appointment Department ([place])    May 07, 2024 2:00 PM CDT Hospital Follow Up with Irving Sheets,  [facility] [address], [place] ([facility] [place])        May 08, 2024 3:00 PM CDT Consult with Prieto Ling MD [facility] ([facility])              [facility], [place] [facility] [place]  [address]  [place] [id number] [phone] [facility]  [address] [address] [phone]            TCC  Was TCC ordered: No    PCP (If no TCC appointment)  Does patient already have a PCP appointment scheduled? Yes  NCM Confirmed PCP office TCM appointment with patient on 5/07/2024    Specialist    Does the patient have any other follow up appointment(s) needing to be scheduled? Yes  If yes: NCM reviewed upcoming specialist appointment with patient: Yes  Does the patient need assistance scheduling appointment(s): No--dtr already called Dr. Ling's office today--she is awaiting return call RE: appt    Is there any reason as to why you cannot make your appointment(s)?  No     Needs post D/C:   Now that you are home, are there any needs or concerns you need addressed before your next visit with your PCP?  (DME, meds,  questions, etc.): No    Interventions by NCM:   Discussed diet, activity, medications and need for f/u visits. Dtr conforms 5/07/2024 TCM appt with Dr. Sheets. Dtr also called Dr. Ling's office today--awaiting return call RE: when ortho f/u appt should be scheduled. This NCM also relayed to dtr [email or web address] website and # to see if pt qualifies for pt medication assistance program. Dtr aware when to contact PCP/specialists and when to seek emergency care. No further questions/concerns at this time.    Overall Rating:   How would you rate the care you received while in the hospital? excellent    CCM referral placed:    No    BOOK BY DATE: 5/19/2024

## 2024-05-06 NOTE — TELEPHONE ENCOUNTER
Patient was seen by you as a consult in the hospital on 5/4/24. Patient has appointment for 5/8/24 at [facility]. This appointment was made before you saw patient in hospital. Consult note states that you want to see as follow up in 1-2 weeks for CAM boot or cast. Would you like for us to move that appointment to following week for patient?     Please advise

## 2024-05-06 NOTE — TELEPHONE ENCOUNTER
Per daughter was told from Dr Ling to reschedule appointment for next week 5/17 on Friday. No openings available.Please advise

## 2024-05-06 NOTE — TELEPHONE ENCOUNTER
Action Requested: Summary for Provider     []  Critical Lab, Recommendations Needed  [] Need Additional Advice  [x]   FYI    []   Need Orders  [] Need Medications Sent to Pharmacy  []  Other     SUMMARY: Vasiliy nurse from [facility] health    Patient admitted today for physical therapy and nursing with [facility].    Reason for call: No chief complaint on file.  Onset: Data Unavailable

## 2024-05-07 ENCOUNTER — OFFICE VISIT (OUTPATIENT)
Dept: FAMILY MEDICINE CLINIC | Facility: CLINIC | Age: 74
End: 2024-05-07

## 2024-05-07 ENCOUNTER — APPOINTMENT (OUTPATIENT)
Dept: GENERAL RADIOLOGY | Facility: HOSPITAL | Age: 74
DRG: 101 | End: 2024-05-07
Attending: EMERGENCY MEDICINE

## 2024-05-07 ENCOUNTER — APPOINTMENT (OUTPATIENT)
Dept: CT IMAGING | Facility: HOSPITAL | Age: 74
DRG: 101 | End: 2024-05-07
Attending: EMERGENCY MEDICINE

## 2024-05-07 ENCOUNTER — HOSPITAL ENCOUNTER (INPATIENT)
Facility: HOSPITAL | Age: 74
LOS: 2 days | Discharge: HOME HEALTH | DRG: 101 | End: 2024-05-10
Attending: EMERGENCY MEDICINE | Admitting: INTERNAL MEDICINE

## 2024-05-07 VITALS
DIASTOLIC BLOOD PRESSURE: 70 MMHG | HEART RATE: 128 BPM | SYSTOLIC BLOOD PRESSURE: 110 MMHG | HEIGHT: 65 IN | BODY MASS INDEX: 21.83 KG/M2 | WEIGHT: 131 LBS

## 2024-05-07 DIAGNOSIS — R00.0 TACHYCARDIA: ICD-10-CM

## 2024-05-07 DIAGNOSIS — F02.818 LATE ONSET ALZHEIMER'S DISEASE WITH BEHAVIORAL DISTURBANCE (HCC): ICD-10-CM

## 2024-05-07 DIAGNOSIS — D68.51 FACTOR V LEIDEN (HCC): ICD-10-CM

## 2024-05-07 DIAGNOSIS — G30.1 LATE ONSET ALZHEIMER'S DISEASE WITH BEHAVIORAL DISTURBANCE (HCC): ICD-10-CM

## 2024-05-07 DIAGNOSIS — I26.99 ACUTE PULMONARY EMBOLISM WITHOUT ACUTE COR PULMONALE, UNSPECIFIED PULMONARY EMBOLISM TYPE (HCC): ICD-10-CM

## 2024-05-07 DIAGNOSIS — Z86.73 HISTORY OF STROKE: ICD-10-CM

## 2024-05-07 DIAGNOSIS — S92.901D CLOSED FRACTURE OF RIGHT FOOT WITH ROUTINE HEALING, SUBSEQUENT ENCOUNTER: ICD-10-CM

## 2024-05-07 DIAGNOSIS — D72.829 LEUKOCYTOSIS, UNSPECIFIED TYPE: Primary | ICD-10-CM

## 2024-05-07 DIAGNOSIS — R00.0 TACHYCARDIA: Primary | ICD-10-CM

## 2024-05-07 DIAGNOSIS — R41.82 ALTERED MENTAL STATUS, UNSPECIFIED ALTERED MENTAL STATUS TYPE: ICD-10-CM

## 2024-05-07 DIAGNOSIS — G81.91 HEMIPLEGIA AFFECTING RIGHT DOMINANT SIDE, UNSPECIFIED ETIOLOGY, UNSPECIFIED HEMIPLEGIA TYPE (HCC): ICD-10-CM

## 2024-05-07 LAB
ALBUMIN SERPL-MCNC: 4.3 G/DL (ref 3.2–4.8)
ALBUMIN SERPL-MCNC: 4.3 G/DL (ref 3.2–4.8)
ALBUMIN/GLOB SERPL: 1.3 {RATIO} (ref 1–2)
ALP LIVER SERPL-CCNC: 71 U/L
ALP LIVER SERPL-CCNC: 71 U/L
ALT SERPL-CCNC: 20 U/L
ALT SERPL-CCNC: 20 U/L
ANION GAP SERPL CALC-SCNC: 6 MMOL/L (ref 0–18)
ANION GAP SERPL CALC-SCNC: 6 MMOL/L (ref 0–18)
APTT PPP: 39.8 SECONDS (ref 23–36)
AST SERPL-CCNC: 20 U/L (ref ?–34)
AST SERPL-CCNC: 20 U/L (ref ?–34)
BASOPHILS # BLD AUTO: 0.08 X10(3) UL (ref 0–0.2)
BASOPHILS NFR BLD AUTO: 0.5 %
BILIRUB DIRECT SERPL-MCNC: 0.1 MG/DL (ref ?–0.3)
BILIRUB SERPL-MCNC: 0.4 MG/DL (ref 0.2–1.1)
BILIRUB SERPL-MCNC: 0.4 MG/DL (ref 0.2–1.1)
BILIRUB UR QL: NEGATIVE
BNP SERPL-MCNC: 6 PG/ML
BUN BLD-MCNC: 16 MG/DL (ref 9–23)
BUN BLD-MCNC: 16 MG/DL (ref 9–23)
BUN/CREAT SERPL: 20 (ref 10–20)
BUN/CREAT SERPL: 20 (ref 10–20)
CALCIUM BLD-MCNC: 9.5 MG/DL (ref 8.7–10.4)
CALCIUM BLD-MCNC: 9.5 MG/DL (ref 8.7–10.4)
CHLORIDE SERPL-SCNC: 105 MMOL/L (ref 98–112)
CHLORIDE SERPL-SCNC: 105 MMOL/L (ref 98–112)
CLARITY UR: CLEAR
CO2 SERPL-SCNC: 31 MMOL/L (ref 21–32)
CO2 SERPL-SCNC: 31 MMOL/L (ref 21–32)
COLOR UR: YELLOW
CREAT BLD-MCNC: 0.8 MG/DL
CREAT BLD-MCNC: 0.8 MG/DL
DEPRECATED RDW RBC AUTO: 45.2 FL (ref 35.1–46.3)
EGFRCR SERPLBLD CKD-EPI 2021: 94 ML/MIN/1.73M2 (ref 60–?)
EGFRCR SERPLBLD CKD-EPI 2021: 94 ML/MIN/1.73M2 (ref 60–?)
EOSINOPHIL # BLD AUTO: 0.25 X10(3) UL (ref 0–0.7)
EOSINOPHIL NFR BLD AUTO: 1.7 %
ERYTHROCYTE [DISTWIDTH] IN BLOOD BY AUTOMATED COUNT: 13.4 % (ref 11–15)
FLUAV + FLUBV RNA SPEC NAA+PROBE: NEGATIVE
FLUAV + FLUBV RNA SPEC NAA+PROBE: NEGATIVE
GLOBULIN PLAS-MCNC: 3.2 G/DL (ref 2–3.5)
GLUCOSE BLD-MCNC: 111 MG/DL (ref 70–99)
GLUCOSE BLD-MCNC: 111 MG/DL (ref 70–99)
GLUCOSE UR-MCNC: NORMAL MG/DL
HCT VFR BLD AUTO: 39.7 %
HGB BLD-MCNC: 12.6 G/DL
IMM GRANULOCYTES # BLD AUTO: 0.08 X10(3) UL (ref 0–1)
IMM GRANULOCYTES NFR BLD: 0.5 %
INR BLD: 1.83 (ref 0.8–1.2)
KETONES UR-MCNC: NEGATIVE MG/DL
LEUKOCYTE ESTERASE UR QL STRIP.AUTO: NEGATIVE
LYMPHOCYTES # BLD AUTO: 1.82 X10(3) UL (ref 1–4)
LYMPHOCYTES NFR BLD AUTO: 12.1 %
MCH RBC QN AUTO: 29.3 PG (ref 26–34)
MCHC RBC AUTO-ENTMCNC: 31.7 G/DL (ref 31–37)
MCV RBC AUTO: 92.3 FL
MONOCYTES # BLD AUTO: 1.55 X10(3) UL (ref 0.1–1)
MONOCYTES NFR BLD AUTO: 10.3 %
NEUTROPHILS # BLD AUTO: 11.29 X10 (3) UL (ref 1.5–7.7)
NEUTROPHILS # BLD AUTO: 11.29 X10(3) UL (ref 1.5–7.7)
NEUTROPHILS NFR BLD AUTO: 74.9 %
NITRITE UR QL STRIP.AUTO: NEGATIVE
OSMOLALITY SERPL CALC.SUM OF ELEC: 296 MOSM/KG (ref 275–295)
OSMOLALITY SERPL CALC.SUM OF ELEC: 296 MOSM/KG (ref 275–295)
PH UR: 5.5 [PH] (ref 5–8)
PLATELET # BLD AUTO: 346 10(3)UL (ref 150–450)
POTASSIUM SERPL-SCNC: 3.8 MMOL/L (ref 3.5–5.1)
POTASSIUM SERPL-SCNC: 3.8 MMOL/L (ref 3.5–5.1)
PROT SERPL-MCNC: 7.5 G/DL (ref 5.7–8.2)
PROT SERPL-MCNC: 7.5 G/DL (ref 5.7–8.2)
PROT UR-MCNC: 20 MG/DL
PROTHROMBIN TIME: 22.3 SECONDS (ref 11.6–14.8)
RBC # BLD AUTO: 4.3 X10(6)UL
RBC #/AREA URNS AUTO: >10 /HPF
RSV RNA SPEC NAA+PROBE: NEGATIVE
SARS-COV-2 RNA RESP QL NAA+PROBE: NOT DETECTED
SODIUM SERPL-SCNC: 142 MMOL/L (ref 136–145)
SODIUM SERPL-SCNC: 142 MMOL/L (ref 136–145)
SP GR UR STRIP: 1.02 (ref 1–1.03)
TROPONIN I SERPL HS-MCNC: 7 NG/L
UROBILINOGEN UR STRIP-ACNC: NORMAL
WBC # BLD AUTO: 15.1 X10(3) UL (ref 4–11)

## 2024-05-07 PROCEDURE — 1160F RVW MEDS BY RX/DR IN RCRD: CPT | Performed by: FAMILY MEDICINE

## 2024-05-07 PROCEDURE — 70450 CT HEAD/BRAIN W/O DYE: CPT | Performed by: EMERGENCY MEDICINE

## 2024-05-07 PROCEDURE — 1111F DSCHRG MED/CURRENT MED MERGE: CPT | Performed by: FAMILY MEDICINE

## 2024-05-07 PROCEDURE — 3008F BODY MASS INDEX DOCD: CPT | Performed by: FAMILY MEDICINE

## 2024-05-07 PROCEDURE — 99496 TRANSJ CARE MGMT HIGH F2F 7D: CPT | Performed by: FAMILY MEDICINE

## 2024-05-07 PROCEDURE — 3074F SYST BP LT 130 MM HG: CPT | Performed by: FAMILY MEDICINE

## 2024-05-07 PROCEDURE — 1159F MED LIST DOCD IN RCRD: CPT | Performed by: FAMILY MEDICINE

## 2024-05-07 PROCEDURE — 3078F DIAST BP <80 MM HG: CPT | Performed by: FAMILY MEDICINE

## 2024-05-07 PROCEDURE — 71045 X-RAY EXAM CHEST 1 VIEW: CPT | Performed by: EMERGENCY MEDICINE

## 2024-05-07 NOTE — ED INITIAL ASSESSMENT (HPI)
Pt to ED accompanied by family.  Pt w/ hx of dementia, was recently discharged from hospital d/t foot fx and was incidentally found to have a PE.  Per family, pt was at f/u visit w/ Dr. Sheets and was told to come to ED d/t increased fatigue and tachycardia.  Per daughter, denies any n/v/d, fevers, hematuria, melena, or hematemesis.

## 2024-05-07 NOTE — ED PROVIDER NOTES
Patient Seen in: [facility] Emergency Department    History     Chief Complaint   Patient presents with    Tachycardia    Fatigue       HPI    73-year-old male with history of previous CVA, dementia who is a very poor historian so all history is from family was at the bedside family brought the patient here today with decreased responsiveness and worsening mental status been going on for the past couple days.  Patient was just discharged from the hospital couple days ago after suffering an ankle fracture and subsequently diagnosed with a PE during his hospital stay.  Patient was started on Xarelto prior to discharge from the hospital.  Since then lates that his responsiveness and mental state has decreased.  They saw his doctor today who sent him here for evaluation since patient also has been tachycardic up to 120 and his primary care doctor's office today.    History reviewed.   Past Medical History:    Anxiety state, unspecified    CVA (cerebral infarction)    Dementia (HCC)    Depression    Heterozygous factor V Leiden mutation (HCC)    Other and unspecified hyperlipidemia    Other ill-defined conditions(799.89)    Cardiomegaly Pleural effusion w/idopathic pleuropuricarditis    Stroke (HCC)    Unspecified sleep apnea       History reviewed.   Past Surgical History:   Procedure Laterality Date    Colonoscopy      Electrocardiogram, complete  1914    Scanned to Media Tab         Medications :  (Not in a hospital admission)       Family History   Problem Relation Age of Onset    Cancer Father         Lung - Smoker  Cause of death    Other (Other) Mother         during     Diabetes Neg     Glaucoma Neg        Smoking Status:   Social History     Socioeconomic History    Marital status:    Tobacco Use    Smoking status: Former    Smokeless tobacco: Never   Vaping Use    Vaping status: Never Used   Substance and Sexual Activity    Alcohol use: No     Alcohol/week: 0.0 standard drinks of  alcohol     Comment: none recently    Drug use: No   Other Topics Concern    Caffeine Concern Yes     Comment: 1 cups coffee daily    Exercise No       Constitutional and vital signs reviewed.      Social History and Family History elements reviewed from today, pertinent positives to the presenting problem noted.    Physical Exam     ED Triage Vitals [05/07/24 1723]   /74   Pulse 113   Resp 16   Temp 97.8 °F (36.6 °C)   Temp src Temporal   SpO2 97 %   O2 Device None (Room air)       All measures to prevent infection transmission during my interaction with the patient were taken. Handwashing was performed prior to and after the exam.  Stethoscope and any equipment used during my examination was cleaned with super sani-cloth germicidal wipes following the exam.     Physical Exam  Vitals and nursing note reviewed.   Eyes:      Pupils: Pupils are equal, round, and reactive to light.   Cardiovascular:      Rate and Rhythm: Tachycardia present.   Pulmonary:      Effort: Pulmonary effort is normal.   Abdominal:      Palpations: Abdomen is soft.      Comments: Rectal exam normal tone, brown stool, guaiac negative   Skin:     General: Skin is warm and dry.   Neurological:      Mental Status: He is alert.         ED Course        Labs Reviewed   COMP METABOLIC PANEL (14) - Abnormal; Notable for the following components:       Result Value    Glucose 111 (*)     Calculated Osmolality 296 (*)     All other components within normal limits   BASIC METABOLIC PANEL (8) - Abnormal; Notable for the following components:    Glucose 111 (*)     Calculated Osmolality 296 (*)     All other components within normal limits   PTT, ACTIVATED - Abnormal; Notable for the following components:    PTT 39.8 (*)     All other components within normal limits   PROTHROMBIN TIME (PT) - Abnormal; Notable for the following components:    PT 22.3 (*)     INR 1.83 (*)     All other components within normal limits   URINALYSIS, ROUTINE - Abnormal;  Notable for the following components:    Blood Urine 2+ (*)     Protein Urine 20 (*)     RBC Urine >10 (*)     All other components within normal limits   CBC W/ DIFFERENTIAL - Abnormal; Notable for the following components:    WBC 15.1 (*)     HGB 12.6 (*)     Neutrophil Absolute Prelim 11.29 (*)     Neutrophil Absolute 11.29 (*)     Monocyte Absolute 1.55 (*)     All other components within normal limits   HEPATIC FUNCTION PANEL (7) - Normal   TROPONIN I HIGH SENSITIVITY - Normal   BNP (B TYPE NATRIURETIC PEPTIDE) - Normal   SARS-COV-2/FLU A AND B/RSV BY PCR (GENEXPERT) - Normal    Narrative:     This test is intended for the qualitative detection and differentiation of SARS-CoV-2, influenza A, influenza B, and respiratory syncytial virus (RSV) viral RNA in nasopharyngeal or nares swabs from individuals suspected of respiratory viral infection consistent with COVID-19 by their healthcare provider. Signs and symptoms of respiratory viral infection due to SARS-CoV-2, influenza, and RSV can be similar.                                    Test performed using the Xpert Xpress SARS-CoV-2/FLU/RSV (real time RT-PCR)  assay on the GeneXpert instrument, [email or web address], [place] [address].                   This test is being used under the Food and Drug Administration's Emergency Use Authorization.                                    The authorized Fact Sheet for Healthcare Providers for this assay is available upon request from the laboratory.   CBC WITH DIFFERENTIAL WITH PLATELET    Narrative:     The following orders were created for panel order CBC With Differential With Platelet.                  Procedure                               Abnormality         Status                                     ---------                               -----------         ------                                     CBC W/ DIFFERENTIAL[[id number]]          Abnormal            Final result                                                 Please  view results for these tests on the individual orders.   RAINBOW DRAW BLUE   URINE CULTURE, ROUTINE     EKG    Rate, intervals and axes as noted on EKG Report.  Rate: 111  Rhythm: Sinus tachycardia  Reading: Sinus tachycardia, 111, normal axis normal axis with , left posterior fascicular block           As Interpreted by me    Imaging Results Available and Reviewed while in ED: CT BRAIN OR HEAD (16522)    Result Date: 5/7/2024  CONCLUSION:   No acute intracranial abnormality.  Moderately extensive left hemispheric and right temporal lobe gliosis is similar to the prior exam.  Mild-to-moderate sinusitis.    elm-remote     Dictated by (CST): Quintin Strickland MD on 5/07/2024 at 9:41 PM     Finalized by (CST): Quintin Strickland MD on 5/07/2024 at 9:43 PM          XR CHEST AP PORTABLE  (CPT=71045)    Result Date: 5/7/2024  CONCLUSION:  1. Stable mild scarring in the left lower lobe. 2. Otherwise no acute cardiopulmonary process.    Dictated by (CST): El Weiss MD on 5/07/2024 at 6:24 PM     Finalized by (CST): El Weiss MD on 5/07/2024 at 6:25 PM         ED Medications Administered:   Medications   sodium chloride 0.9 % IV bolus 500 mL (0 mL Intravenous Stopped 5/7/24 2333)         MDM     Vitals:    05/07/24 2200 05/07/24 2230 05/07/24 2330 05/08/24 0017   BP: 147/59 153/65 146/60 140/54   BP Location:    Right arm   Pulse: 110 107 115 119   Resp: 15 18 17 20   Temp:    99.6 °F (37.6 °C)   TempSrc:    Axillary   SpO2: 96% 96% 95% 95%   Weight:         *I personally reviewed and interpreted all ED vitals.    Pulse Ox: 97%, Room air, Normal     Monitor Interpretation:   sinus tachycardia as interpreted by me.  The cardiac monitor was ordered to monitor cardiac rate and rhythm.    Differential Diagnosis/ Diagnostic Considerations: Including but not limited to infection, arrhythmia, electrode abnormalities, CVA, intracranial hemorrhage    Complicating Factors: The patient already has does not have any pertinent  problems on file. to contribute to the complexity of this ED evaluation.    Medical Decision Making  Patient brought in by family due to altered mental status and persistent tachycardia for the past couple days after recently getting discharged from the hospital.  Saw primary care provider today and sent him to the ER.  Will need to get a full workup including labs, x-ray, EKG, CT    Amount and/or Complexity of Data Reviewed  Independent Historian: caregiver     Details: Family at bedside  External Data Reviewed: labs, radiology and ECG.     Details: From previous admission  Labs: ordered. Decision-making details documented in ED Course.  Radiology: ordered and independent interpretation performed.  ECG/medicine tests: ordered and independent interpretation performed.  Discussion of management or test interpretation with external provider(s): Discussed case with cardiology and notified of consult    Also discussed case with neurology and notified of consult    Risk  Prescription drug management.  Decision regarding hospitalization.    Critical Care  Total time providing critical care: 45 minutes      I reviewed images of the chest x-ray agree with stable findings from previous as read by radiologist.  Labs have a slightly elevated WBC count but otherwise there is nothing else acute on labs.  EKG shows sinus tachycardia but otherwise nothing else acute.  CT did not show anything acute on a lot of chronic findings.  At this time there is no signs of infection and unsure why the patient has persistent tachycardia.  But due to the altered mental status versus tachycardia will admit for further evaluation along with cardiology and neurology consultation.  I discussed in detail all these findings and my discussions with the specialist with the family at the bedside.  They do agree with this plan.    I discussed case with hospitalist who accepts admission    Condition upon leaving the department: Stable    Disposition and  Plan     Clinical Impression:  1. Tachycardia    2. Altered mental status, unspecified altered mental status type        Disposition:  Admit    Follow-up:  No follow-up provider specified.    Medications Prescribed:  Current Discharge Medication List          Hospital Problems       Present on Admission  Date Reviewed: 5/7/2024            ICD-10-CM Noted POA    * (Principal) Tachycardia R00.0 5/7/2024 Unknown    Leukocytosis D72.829 5/7/2024 Yes

## 2024-05-07 NOTE — PAYOR COMM NOTE
--------------  ADMISSION REVIEW     [place] [facility] [place] #:  [id number]  Authorization Number: [id number]    Admit date: 5/3/24  Admit time: 11:31 PM       History   Patient is a 73-year-old male with a past medical history of CVA with residual hemiplegia, factor V Leiden mutation, dementia sent in by PCP after outpatient workup showed evidence of pulmonary embolism.  Per patient's son, beginning on 5 days ago, the patient began limping and complaining of pain in the right ankle.  Was seen by PCP yesterday who had labs and x-rays performed.  X-ray showed evidence of a medial malleolus irregularity concerning for possible fracture.  D-dimer was elevated so patient had a CT chest with IV contrast and a right lower extremity venous duplex performed.  No evidence of DVT on the ultrasound but CT showed evidence of a pulmonary embolism and patient was directed to the emergency department.  No reported shortness of breath, cough or fevers.  No complaints of chest pain.  ED Triage Vitals [05/03/24 1823]   /88   Pulse 94   Resp 18   Temp 97.5 °F (36.4 °C)   SpO2 92 %   O2 Device None (Room air)   Physical Exam  HENT:      Head: Normocephalic and atraumatic.      Right Ear: External ear normal.      Left Ear: External ear normal.      Nose: Nose normal.   Eyes:      Conjunctiva/sclera: Conjunctivae normal.      Pupils: Pupils are equal, round, and reactive to light.   Cardiovascular:      Rate and Rhythm: Normal rate and regular rhythm.      Pulses: Normal pulses.      Heart sounds: Normal heart sounds.   Pulmonary:      Effort: Pulmonary effort is normal.      Breath sounds: Normal breath sounds.   Abdominal:      General: Bowel sounds are normal.      Palpations: Abdomen is soft.      Tenderness: There is no abdominal tenderness.   Musculoskeletal:         General: Normal range of motion.      Cervical back: Normal range of motion and neck supple.      Comments: Mild tenderness to palpation over the medial  malleolus of the right leg, no significant lower extremity swelling or calf tenderness or skin changes   Skin:     General: Skin is warm and dry.      Findings: No rash.   Neurological:      General: No focal deficit present.      Mental Status: He is alert and oriented to person, place, and time.      Deep Tendon Reflexes: Reflexes are normal and symmetric.     Labs Reviewed   BASIC METABOLIC PANEL (8) - Abnormal; Notable for the following components:       Result Value    Glucose 104 (*)     Calculated Osmolality 298 (*)     All other components within normal limits   CBC W/ DIFFERENTIAL - Abnormal; Notable for the following components:    WBC 13.3 (*)     Neutrophil Absolute Prelim 9.10 (*)     Neutrophil Absolute 9.10 (*)     Monocyte Absolute 1.22 (*)      Disposition and Plan   Clinical Impression:  1. Acute pulmonary embolism without acute cor pulmonale, unspecified pulmonary embolism type (HCC)    Disposition:  Admit      History & Physical  History of Present Illness:  Yoseph Cordero is a(n) 73 year old male, with a past medical history significant for CVA, factor V Leiden mutation and dementia is minimally verbal however as per family they had been noticing he had been limping somewhat appeared to be discomfort in relation to his right foot he also noted some swelling and redness prompting a visit to his primary care physician's office where workup indicated an elevated D-dimer and a possible subacute/chronic medial malleolus fracture of the right ankle.  A follow-up CT scan and lower extremity Dopplers indicated presence of a right lower lobe segmental PE with no signs of a DVT.    Temp:  [97.5 °F (36.4 °C)] 97.5 °F (36.4 °C)  Pulse:  [89-97] 97  Resp:  [16-22] 16  BP: (112-151)/(68-96) 151/71  SpO2:  [92 %-99 %] 98 %     General: Confused  Diffuse skin problem:  None.  Eye:  Pupils are equal, round and reactive to light, extraocular movements are intact, Normal conjunctiva.  HENT:  Normocephalic, oral  mucosa is moist.  Head:  Normocephalic, atraumatic.  Neck:  Supple, non-tender, no carotid bruit, no jugular venous distention, no lymphadenopathy, no thyromegaly.  Respiratory:  Lungs are clear to auscultation, respirations are non-labored, breath sounds are equal, symmetrical chest wall expansion.  Cardiovascular:  Normal rate, regular rhythm, no murmur, no edema.  Gastrointestinal:  Soft, non-tender, non-distended, normal bowel sounds, no organomegaly.  Lymphatics:  No lymphadenopathy neck, axilla, groin.  Musculoskeletal: Right leg splinted  Feet:  Normal pulses.  Neurologic: Confused, no focal deficits, cranial nerves II-XII are grossly intact.  Cognition and Speech: Nonverbal  Psychiatric: Demented        Laboratory Data:         Lab Results   Component Value Date     WBC 13.3 05/03/2024     HGB 13.3 05/03/2024     HCT 40.9 05/03/2024     .0 05/03/2024     CREATSERUM 0.76 05/03/2024     BUN 11 05/03/2024      05/03/2024     K 4.2 05/03/2024      05/03/2024     CO2 30.0 05/03/2024      05/03/2024     CA 9.4 05/03/2024     PTT 25.0 05/03/2024     INR 0.99 05/03/2024       Imaging:  CT CHEST(CONTRAST ONLY) (CPT=71260)  Result Date: 5/3/2024  CONCLUSION:         1. Segmental right lower lobe pulmonary emboli.  Probable small nonocclusive embolus at the left main pulmonary artery branch point.  A dedicated pulmonary embolus protocol was not utilized. 2. 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 3. Mild-to-moderate coronary artery calcification. 4. Prior granulomatous disease in the chest..    Dictated by (CST): Apolinar Barry MD on 5/03/2024 at 4:58 PM     Finalized by (CST): Apolinar Barry MD on 5/03/2024 at 5:12 PM           US VENOUS DOPPLER LEG RIGHT - DIAG IMG (CPT=93971)   Result Date: 5/3/2024  CONCLUSION:        No DVT    Dictated by (CST): Crow Ellis MD on 5/03/2024 at 3:51 PM      Finalized by (CST): Crow Ellis MD on 5/03/2024 at 3:52 PM           XR FOOT, COMPLETE (MIN 3 VIEWS), RIGHT (CPT=73630)   Result Date: 5/3/2024  CONCLUSION:         1. Cortical irregularity involving the medial malleolus, which could represent a nondisplaced fracture of uncertain chronicity, possibly subacute to chronic.  Correlate clinically for overlying point tenderness. 2. Stable mild Achilles enthesopathy.    Dictated by (CST): El Weiss MD on 5/03/2024 at 9:22 AM     Finalized by (CST): El Weiss MD on 5/03/2024 at 9:23 AM           XR ANKLE (MIN 3 VIEWS), RIGHT (CPT=73610)   Result Date: 5/3/2024  CONCLUSION:         1. Cortical irregularity involving the medial malleolus, which could represent a nondisplaced fracture of uncertain chronicity, possibly subacute to chronic.  Correlate clinically for overlying point tenderness. 2. Stable mild Achilles enthesopathy.    Dictated by (CST): El Weiss MD on 5/03/2024 at 9:17 AM     Finalized by (CST): El Weiss MD on 5/03/2024 at 9:22 AM             Assessment and Plan:  Acute right lower lobe segmental PE  Patient currently on heparin drip, will likely require lifelong anticoagulation in view of factor V mutation, will check 2D echo to rule out right heart strain.  Consider need for pulmonary consult     Right medial malleolus fracture  Currently splinted, orthopedics consulted, Tylenol/Norco as needed for pain     Dementia without signs of agitation  Continue supportive care, use Haldol if show signs of agitation.     Leukocytosis  Likely stress response, however will check UA since patient is demented unable to give a history     Prophylaxis  IV heparin drip       5/4/24  Temp:  [97.5 °F (36.4 °C)-99.4 °F (37.4 °C)] 99.4 °F (37.4 °C)  Pulse:  [89-97] 96  Resp:  [16-22] 18  BP: (112-151)/(57-96) 150/57  SpO2:  [92 %-99 %] 95 %    General:  Alert  HEENT:  Normocephalic, atraumatic  Cardiac:  Regular rate, regular  rhythm  Pulmonary:  Clear to auscultation bilaterally, respirations unlabored  Gastrointestinal:  Soft, non-tender, normal bowel sounds  Musculoskeletal: RLE splinted  Extremities:  No edema, no cyanosis, no clubbing  Neurologic:  nonfocal  Lab 05/02/24 1649 05/03/24 2044 05/04/24 0412   WBC 12.3* 13.3* 11.9*   HGB 14.0 13.3 13.3   HCT 42.1 40.9 38.3*   .0 238.0 217.0   RBC 4.67 4.49 4.31   MCV 90.1 91.1 88.9   MCH 30.0 29.6 30.9   MCHC 33.3 32.5 34.7   RDW 13.1 13.3 13.0   NEPRELIM 8.47* 9.10* 8.11*      Lab 05/02/24 1649 05/03/24 2044 05/04/24 0412 05/04/24  1124   BUN 13 11 9  --    CREATSERUM 0.99 0.76 0.74  --    CA 9.5 9.4 9.2  --    ALB 4.4  --   --   --     144 141  --    K 4.2 4.2 3.6  --     106 106  --    CO2 28.0 30.0 28.0  --    * 104* 123*  --    BILT 0.4  --   --   --    AST 17  --   --   --    ALT 11  --   --   --    ALKPHO 79  --   --   --    TP 7.8  --   --   --    PTT  --  25.0 41.1* 70.3*     Assessment and Plan:  Acute right lower lobe segmental PE  H/o factor V mutation  -cont heparin drip  -f/u echo  -monitor respiratory status     Right medial malleolus fracture  Currently splinted, orthopedics consulted, Tylenol/Norco as needed for pain  F/u ortho     Dementia without signs of agitation  Continue supportive care, use Haldol if show signs of agitation.     Leukocytosis  Likely stress response, however will check UA since patient is demented unable to give a history  -UA ok        DATE OF DISCHARGE: 5/5/24    Vitals (last day) before discharge       Date/Time Temp Pulse Resp BP SpO2 Weight O2 Device O2 Flow Rate (L/min) [facility]    05/05/24 0700 98.6 °F (37 °C) 100 18 160/89 100 % -- None (Room air) --     05/05/24 0532 98 °F (36.7 °C) 100 18 105/59 96 % -- None (Room air) --     05/04/24 1900 99.2 °F (37.3 °C) -- 18 137/54 97 % -- None (Room air) --     05/04/24 1800 99 °F (37.2 °C) -- 17 132/62 97 % -- -- -- AT    05/04/24 0926 99.4 °F (37.4 °C) -- 18 150/57  95 % -- None (Room air) -- AT

## 2024-05-07 NOTE — PROGRESS NOTES
Blood pressure 110/70, pulse (!) 128, height 5' 5\" (1.651 m), weight 131 lb (59.4 kg).          Presents today following up for hospitalization.Subjective:   Yoseph Cordero is a 73 year old male who presents for hospital follow up.   He was discharged from [facility] to Home Health Care Services  Admission Date: 5/3/24   Discharge Date: 5/5/24  Hospital Discharge Diagnosis:     Interactive contact within 2 business days post discharge first initiated on Date: 5/6/2024    During the visit, the following was completed:  Obtained and reviewed discharge summary, continuity of care documents, and Hospitalist notes  Reviewed Labs (CBC, CMP)    HPI: Patient recently admitted for foot fracture and pulmonary embolism discharged 2 days ago    History/Other:   Current Medications:  Medication Reconciliation:  I am aware of an inpatient discharge within the last 30 days.  The discharge medication list has been reconciled with the patient's current medication list and reviewed by me.  See medication list for additions of new medication, and changes to current doses of medications and discontinued medications.  Outpatient Medications Marked as Taking for the 5/7/24 encounter (Office Visit) with Irving Sheets, DO   Medication Sig    rivaroxaban 15 & 20 MG Oral Tablet Therapy Pack Take As Directed based on package instructions: Days 1-21: 15 mg by mouth twice daily Days 22-30: 20 mg by mouth once daily    LORazepam 0.5 MG Oral Tab Take 1 tablet (0.5 mg total) by mouth every 6 (six) hours as needed for Anxiety.       Review of Systems     History obtained from unobtainable from patient due to mental status  Alert, cooperative, no distress, appears stated age   Normocephalic, without obvious abnormality, atraumatic   PERRL, conjunctiva/corneas clear, EOM's intact, both eyes   Normal TM's and external ear canals, both ears   Nares normal, septum midline, mucosa normal, no drainage or sinus tenderness   Lips, mucosa, and tongue  normal; teeth and gums normal   Supple, symmetrical, trachea midline, no adenopathy, thyroid: not enlarged, symmetric, no tenderness/mass/nodules, no carotid bruit or JVD   Symmetric, no curvature, ROM normal, no CVA tenderness   Clear to auscultation bilaterally, respirations unlabored   No tenderness or deformity   Regular rate and rhythm, S1, S2 normal, no murmur, rub or gallop   Soft, non-tender, bowel sounds active all four quadrants,  no masses, no organomegaly   Normal male   Normal tone, normal prostate, no masses or tenderness   Extremities normal, atraumatic, no cyanosis or edema   2+ and symmetric   Skin color, texture, turgor normal, no rashes or lesions   Cervical, supraclavicular, and axillary nodes normal   Normal     Objective:   Physical Exam  General Appearance:  Somnolent   Head:  Atraumatic   Eyes:  Sunken   Ears:  No trauma externally   Nose: No trauma externally   Throat: Poor dentition   Neck: No masses   Back:   No trauma   Lungs:   Clear to auscultation   Chest Wall:  Symmetric   Heart:  Tachycardic regular rate rhythm   Abdomen:   No masses   Genitalia: Deferred   Rectal: Deferred   Extremities: Splint placed   Pulses: Weak   Skin: Intact   Lymph nodes: No enlargement   Neurologic: Not responsive to verbal stimuli     /70   Pulse (!) 128   Ht 5' 5\" (1.651 m)   Wt 131 lb (59.4 kg)   BMI 21.80 kg/m²  Estimated body mass index is 21.8 kg/m² as calculated from the following:    Height as of this encounter: 5' 5\" (1.651 m).    Weight as of this encounter: 131 lb (59.4 kg).    Assessment & Plan:   1. Leukocytosis, unspecified type (Primary)  2. Tachycardia        No follow-ups on file.   1. Leukocytosis, unspecified type  Somnolent with tachycardia    2. Tachycardia  Will send to [facility] ER discussed with Jeramy in triage    3. Acute pulmonary embolism without acute cor pulmonale, unspecified pulmonary embolism type (HCC)  On rivaroxaban family interested in more affordable treatment    4.  Closed fracture of right foot with routine healing, subsequent encounter  Nonweightbearing    5. Late onset Alzheimer's disease with behavioral disturbance (HCC)  Discussed case with son and daughter present    6. History of stroke  In wheelchair    7. Hemiplegia affecting right dominant side, unspecified etiology, unspecified hemiplegia type (HCC)  In wheelchair    8. Factor V Leiden (HCC)  Rivaroxaban

## 2024-05-08 ENCOUNTER — APPOINTMENT (OUTPATIENT)
Dept: CV DIAGNOSTICS | Facility: HOSPITAL | Age: 74
DRG: 101 | End: 2024-05-08
Attending: INTERNAL MEDICINE

## 2024-05-08 PROBLEM — R56.9 SEIZURES (HCC): Status: ACTIVE | Noted: 2024-05-08

## 2024-05-08 PROBLEM — R41.82 ALTERED MENTAL STATUS, UNSPECIFIED ALTERED MENTAL STATUS TYPE: Status: ACTIVE | Noted: 2024-05-08

## 2024-05-08 LAB
ATRIAL RATE: 111 BPM
P AXIS: 76 DEGREES
P-R INTERVAL: 124 MS
Q-T INTERVAL: 324 MS
QRS DURATION: 74 MS
QTC CALCULATION (BEZET): 440 MS
R AXIS: 113 DEGREES
T AXIS: 57 DEGREES
VENTRICULAR RATE: 111 BPM

## 2024-05-08 PROCEDURE — 93308 TTE F-UP OR LMTD: CPT | Performed by: INTERNAL MEDICINE

## 2024-05-08 PROCEDURE — 99223 1ST HOSP IP/OBS HIGH 75: CPT | Performed by: OTHER

## 2024-05-08 PROCEDURE — 99233 SBSQ HOSP IP/OBS HIGH 50: CPT | Performed by: HOSPITALIST

## 2024-05-08 PROCEDURE — 99223 1ST HOSP IP/OBS HIGH 75: CPT | Performed by: INTERNAL MEDICINE

## 2024-05-08 PROCEDURE — 95816 EEG AWAKE AND DROWSY: CPT | Performed by: OTHER

## 2024-05-08 RX ORDER — LEVETIRACETAM 500 MG/1
500 TABLET ORAL 2 TIMES DAILY
Status: DISCONTINUED | OUTPATIENT
Start: 2024-05-08 | End: 2024-05-08

## 2024-05-08 RX ORDER — LORAZEPAM 0.5 MG/1
0.5 TABLET ORAL EVERY 6 HOURS PRN
Status: DISCONTINUED | OUTPATIENT
Start: 2024-05-08 | End: 2024-05-10

## 2024-05-08 NOTE — TELEPHONE ENCOUNTER
Soft cast should be stable enough for him at this time. We will get x-rays at follow up appointment to see if walker boot is needed at that time

## 2024-05-08 NOTE — ED QUICK NOTES
Orders for admission, patient is aware of plan and ready to go upstairs. Any questions, please call ED RN Britney at extension [phone].     Patient Covid vaccination status: Unvaccinated     COVID Test Ordered in ED: SARS-CoV-2/Flu A and B/RSV by PCR (GeneXpert)    COVID Suspicion at Admission: N/A    Running Infusions:  None    Mental Status/LOC at time of transport: A+Ox1     Other pertinent information: dementia  CIWA score: N/A   NIH score:  N/A

## 2024-05-08 NOTE — PLAN OF CARE
Patient transferred from ED. Nepali speaking, non-verbal. Patient's son assisted with admission questions. Max assist due to right ankle fracture --ACE wrap in place.  Blood cultures taken. IV fluid bolus given. Fall precautions in place. Plan for EEG.    Problem: Patient Centered Care  Goal: Patient preferences are identified and integrated in the patient's plan of care  Description: Interventions:  - What would you like us to know as we care for you? Speaks Peruvian  - Provide timely, complete, and accurate information to patient/family  - Incorporate patient and family knowledge, values, beliefs, and cultural backgrounds into the planning and delivery of care  - Encourage patient/family to participate in care and decision-making at the level they choose  - Honor patient and family perspectives and choices  5/8/2024 0345 by Fina Adan RN  Outcome: Progressing  5/8/2024 0345 by Fina Adan RN  Outcome: Progressing     Problem: Patient/Family Goals  Goal: Patient/Family Long Term Goal  Description: Patient's Long Term Goal: Discharge home    Interventions:  - Control HR  - give scheduled meds  - See additional Care Plan goals for specific interventions  5/8/2024 0345 by Fina Adan RN  Outcome: Progressing  5/8/2024 0345 by Fina Adan RN  Outcome: Progressing  Goal: Patient/Family Short Term Goal  Description: Patient's Short Term Goal: Control HR    Interventions:   - Monitor HR  - give meds as needed  - cardiology to see  - See additional Care Plan goals for specific interventions  5/8/2024 0345 by Fina Adan RN  Outcome: Not Progressing  5/8/2024 0345 by Fina Adan RN  Outcome: Progressing     Problem: NEUROLOGICAL - ADULT  Goal: Achieves stable or improved neurological status  Description: INTERVENTIONS  - Assess for and report changes in neurological status  - Initiate measures to prevent increased intracranial pressure  - Maintain blood pressure and fluid volume within  ordered parameters to optimize cerebral perfusion and minimize risk of hemorrhage  - Monitor temperature, glucose, and sodium. Initiate appropriate interventions as ordered  5/8/2024 0345 by Fina Adan RN  Outcome: Progressing  5/8/2024 0345 by Fina Adan RN  Outcome: Progressing     Problem: Altered Communication/Language Barrier  Goal: Patient/Family is able to understand and participate in their care  Description: Interventions:  - Assess communication ability and preferred communication style  - Implement communication aides and strategies  - Use visual cues when possible  - Listen attentively, be patient, do not interrupt  - Minimize distractions  - Allow time for understanding and response  - Establish method for patient to ask for assistance (call light)  - Provide an  as needed  - Communicate barriers and strategies to overcome with those who interact with patient  5/8/2024 0345 by Fina Adan RN  Outcome: Progressing  5/8/2024 0345 by Fina Adan RN  Outcome: Progressing     Problem: CARDIOVASCULAR - ADULT  Goal: Maintains optimal cardiac output and hemodynamic stability  Description: INTERVENTIONS:  - Monitor vital signs, rhythm, and trends  - Monitor for bleeding, hypotension and signs of decreased cardiac output  - Evaluate effectiveness of vasoactive medications to optimize hemodynamic stability  - Monitor arterial and/or venous puncture sites for bleeding and/or hematoma  - Assess quality of pulses, skin color and temperature  - Assess for signs of decreased coronary artery perfusion - ex. Angina  - Evaluate fluid balance, assess for edema, trend weights  5/8/2024 0345 by Fina Adan RN  Outcome: Progressing  5/8/2024 0345 by Fina Adan RN  Outcome: Progressing  Goal: Absence of cardiac arrhythmias or at baseline  Description: INTERVENTIONS:  - Continuous cardiac monitoring, monitor vital signs, obtain 12 lead EKG if indicated  - Evaluate effectiveness  of antiarrhythmic and heart rate control medications as ordered  - Initiate emergency measures for life threatening arrhythmias  - Monitor electrolytes and administer replacement therapy as ordered  5/8/2024 0345 by Fina Adan, RN  Outcome: Progressing  5/8/2024 0345 by Fina Adan, RN  Outcome: Progressing

## 2024-05-08 NOTE — CM/SW NOTE
05/08/24 0800   CM/SW Referral Data   Referral Source Social Work (self-referral)   Reason for Referral Discharge planning   Informant EMR   Readmission Assessment   Factors that patient feels contributed to this readmission Acute/Chronic Clinical Presentation   Pt's living situation prior to admission? Home with family   Pt's level of independence at discharge? Some assist (mod)   Was full assessment completed by SW/DERICK on prior admission? No (comment)   Was the recommended discharge plan achieved? Yes   Was pt. discharged w/out services? No   Medical Hx   Does patient have an established PCP? Yes  (Irving Sheets)   Patient Info   Patient's Home Environment House   Patient lives with Spouse/Significant other;Son;Daughter   Patient Status Prior to Admission   Independent with ADLs and Mobility No   Pt. requires assistance with Housework;Driving;Ambulating   Services in place prior to admission Home Health Care;DME/Supplies at home   Home Health Provider Info [facility]   Type of DME/Supplies Standard Walker   Discharge Needs   Anticipated D/C needs Home health care       SW self referred pt for DC Planning as pt is a READMISSION. Pt last DC 'd from [facility] on 5/5.     Per chart, pt is from home w/ family. Above assessment based on chart review. Per RN rounds, Pt is non verbal at baseline.     Pt is currently active w/ [facility] for RN, PT/OT and HH aide services. F2F entered.    PLAN: Home w/ [facility] - pending med clear      SW/DERICK to remain available for support and/or discharge planning.         Myra Luu, MSW, LSW [id number]

## 2024-05-08 NOTE — H&P
[facility]  part of [facility]    History and Physical    Yoseph Cordero Patient Status:  Emergency    1950 MRN [id number]   Location [facility] EMERGENCY DEPARTMENT Attending Pj Avilez,    Hosp Day # 0 PCP Irving Sheets DO     Date:  2024  Date of Admission:  2024    History provided by:patient and spouse  HPI:     Chief Complaint   Patient presents with    Tachycardia    Fatigue     HPI  73-year-old male with significant past medical history of dementia, history of CVA, recent diagnosis pulmonary embolism, factor V Leyden mutation on Xarelto who present to the emergency room after visit with PCP today.  Of note patient was recently admitted from May 3, 2024 through May 5, 2024.  During that admission was diagnosed with an acute right lower lobe segmental PE.  TTE unremarkable.  Recent diagnosis of right medial malleolus fracture as well.  Discharged with appropriate follow-up.  Today while visiting his primary care provider based on exam patient was found to be somnolent/lethargic with tachycardia.  Decision was made to send back to the emergency room.    In the ED blood pressure 140/54, pulse 119 temperature 99.6.  Complete blood count notable for a white blood cell count of 15.1, otherwise unremarkable workup.  CT brain with no acute intracranial abnormality.    Cardiology and neurology consulted.      History     Past Medical History:    Anxiety state, unspecified    CVA (cerebral infarction)    Dementia (HCC)    Depression    Heterozygous factor V Leiden mutation (HCC)    Other and unspecified hyperlipidemia    Other ill-defined conditions(799.89)    Cardiomegaly Pleural effusion w/idopathic pleuropuricarditis    Stroke (HCC)    Unspecified sleep apnea     Past Surgical History:   Procedure Laterality Date    Colonoscopy      Electrocardiogram, complete  1914    Scanned to Media Tab     Family History   Problem Relation Age of Onset    Cancer  Father         Lung - Smoker  Cause of death    Other (Other) Mother         during     Diabetes Neg     Glaucoma Neg      Social History:  Social History     Socioeconomic History    Marital status:    Tobacco Use    Smoking status: Former    Smokeless tobacco: Never   Vaping Use    Vaping status: Never Used   Substance and Sexual Activity    Alcohol use: No     Alcohol/week: 0.0 standard drinks of alcohol     Comment: none recently    Drug use: No   Other Topics Concern    Caffeine Concern Yes     Comment: 1 cups coffee daily    Exercise No   Social History Narrative    The patient does not use an assistive device..      The patient does live in a home with stairs.     Social Determinants of Health     Financial Resource Strain: Low Risk  (2024)    Financial Resource Strain     Difficulty of Paying Living Expenses: Not very hard     Med Affordability: No   Food Insecurity: No Food Insecurity (2024)    Food Insecurity     Food Insecurity: Never true   Transportation Needs: No Transportation Needs (2024)    Transportation Needs     Lack of Transportation: No   Housing Stability: Low Risk  (2024)    Housing Stability     Housing Instability: No     Allergies/Medications:   Allergies: No Known Allergies  (Not in a hospital admission)      Review of Systems:     Unable to perform ROS: Dementia       Physical Exam:   Vital Signs:  Blood pressure 153/65, pulse 107, temperature 97.8 °F (36.6 °C), temperature source Temporal, resp. rate 18, weight 130 lb 15.3 oz (59.4 kg), SpO2 96%.  Physical Exam  Constitutional:       Appearance: He is ill-appearing.   HENT:      Head: Normocephalic.      Nose: Nose normal.      Mouth/Throat:      Mouth: Mucous membranes are moist.   Eyes:      Pupils: Pupils are equal, round, and reactive to light.   Cardiovascular:      Rate and Rhythm: Tachycardia present.   Pulmonary:      Effort: Pulmonary effort is normal.      Breath sounds: Normal breath sounds.    Abdominal:      General: Abdomen is flat.   Skin:     General: Skin is warm.   Neurological:      Mental Status: He is disoriented.           Results:     Lab Results   Component Value Date    WBC 15.1 (H) 05/07/2024    HGB 12.6 (L) 05/07/2024    HCT 39.7 05/07/2024    .0 05/07/2024    CREATSERUM 0.80 05/07/2024    CREATSERUM 0.80 05/07/2024    BUN 16 05/07/2024    BUN 16 05/07/2024     05/07/2024     05/07/2024    K 3.8 05/07/2024    K 3.8 05/07/2024     05/07/2024     05/07/2024    CO2 31.0 05/07/2024    CO2 31.0 05/07/2024     (H) 05/07/2024     (H) 05/07/2024    CA 9.5 05/07/2024    CA 9.5 05/07/2024    ALB 4.3 05/07/2024    ALB 4.3 05/07/2024    ALKPHO 71 05/07/2024    ALKPHO 71 05/07/2024    BILT 0.4 05/07/2024    BILT 0.4 05/07/2024    TP 7.5 05/07/2024    TP 7.5 05/07/2024    AST 20 05/07/2024    AST 20 05/07/2024    ALT 20 05/07/2024    ALT 20 05/07/2024    PTT 39.8 (H) 05/07/2024    INR 1.83 (H) 05/07/2024    TSH 2.834 05/02/2024    PSA 16.20 (H) 03/11/2019    DDIMER 11.06 (H) 05/02/2024    ESRML 30 (H) 10/24/2022    CRP <0.29 10/24/2022    TROPHS 7 05/07/2024     CT BRAIN OR HEAD (02119)    Result Date: 5/7/2024  CONCLUSION:   No acute intracranial abnormality.  Moderately extensive left hemispheric and right temporal lobe gliosis is similar to the prior exam.  Mild-to-moderate sinusitis.    elm-remote     Dictated by (CST): Quintin Strickland MD on 5/07/2024 at 9:41 PM     Finalized by (CST): Quintin Strickland MD on 5/07/2024 at 9:43 PM          XR CHEST AP PORTABLE  (CPT=71045)    Result Date: 5/7/2024  CONCLUSION:  1. Stable mild scarring in the left lower lobe. 2. Otherwise no acute cardiopulmonary process.    Dictated by (CST): El Weiss MD on 5/07/2024 at 6:24 PM     Finalized by (CST): El Weiss MD on 5/07/2024 at 6:25 PM         EKG 12 Lead    Result Date: 5/7/2024  Sinus tachycardia Left posterior fascicular block Abnormal ECG When compared  with ECG of 03-MAY-2024 18:33, Left posterior fascicular block is now Present     Assessment/Plan:   #  Tachycardia  -  Recent PE diagnosis.    -  Unclear cause.  Continue on cardiac monitor.   -  Cardiology consulted await further recs.      #  Leukocytosis  No clear infectious source.  Urine and blood cultures pending.   - Possible stress reaction.      # PE  #Factor V Leyden Mutation  - On Xarelto (currently on 15 mg BID until completed 21 days then switch to 20 mg dialy).      #Dementia  -Alzheimer's Diagnosis.    - On Quetiapine nightly.  Lorazapem for anxiety.      diet: general  ivf: none  dvt prophylaxis: On xarelto  antibiotics: none  tubes: none  central lines: none  code status: full code  dispo: home upon medical clearance    Greater than 70 minutes, >50% time spent counseling and coordinating care regarding treatment and workup.            **Certification      PHYSICIAN Certification of Need for Inpatient Hospitalization - Initial Certification    Patient will require inpatient services that will reasonably be expected to span two midnight's based on the clinical documentation in H+P.   Based on patients current state of illness, I anticipate that, after discharge, patient will require TBD.        Frank Gates MD  5/7/2024

## 2024-05-08 NOTE — PLAN OF CARE
Yoseph Cordero Patient Status:  Inpatient    1950 MRN [id number]   Location [facility] 3W/SW Attending Rocio Cavazos MD   Hosp Day # 0 PCP Irving Sheets, DO       Cardiology Nocturnal APN Note    Page Received: Dr. Avilez, ED Physician    HPI:     Patient is a 73 year old male with PMH of  CVA, factor V Leiden, HLD, sleep apnea and dementia. Pt presented to the ED with altered mental status for the past couple of days. Patient recently hospitalized earlier this month for acute ankle fracture. Hospital course complicated by PE. Patient was started on Xarelto. Discharged on 2024. Pt seen by his doctor and sent to ED for evaluation of tachycardia and altered mental status that has progressed from baseline. In ED, pt's HR was noted to be in the 120s. MCI consulted for tachycardia. Echocardiogram 2024 showed normal left ventricular cavity size, wall thickness and systolic function. EF 60-65%. HPI obtained from chart review and information.         ED Clinical Course    EKG: Sinus tachycardia with rate in the 110s    Labs: Troponin negative, BNP negative, WBC 15.1, Hgb 12.6    Imaging: CXR unremarkable  CT brain/head unremarkable    Medications: 0.9 NS bolus        Assessment/Plan:    - Continue to monitor overnight on telemetry  - Formal cardiology consult to follow in AM.         BEAU Richardson  [facility]  2024  5:09 AM

## 2024-05-08 NOTE — HOME CARE LIAISON
This pt is current with [facility] for RN/PT/OT services. Pt will need a CHELLY entered and Quality data delivered by [address]. Notified ALMA ROSA Renteria.

## 2024-05-08 NOTE — PROGRESS NOTES
[facility]  part of [facility]    Progress Note    Yoseph Cordero Patient Status:  Inpatient    1950 MRN [id number]   Location [facility] 3W/SW Attending Bridget Davis MD   Hosp Day # 0 PCP Irving Sheets DO     Chief Complaint: tachycardia    SUBJECTIVE:    No acute events overnight.  Patient denies chest pain, sob.  No fevers/chills.  No n/v/abd pain.  Patient nonverbal at baseline.      10 ROS completed and otherwise negative.    OBJECTIVE:  Vital signs in last 24 hours:  /59 (BP Location: Right arm)   Pulse 100   Temp 98.5 °F (36.9 °C) (Axillary)   Resp 18   Wt 141 lb (64 kg)   SpO2 99%   BMI 23.46 kg/m²     Intake/Output:    Intake/Output Summary (Last 24 hours) at 2024 1605  Last data filed at 2024 1500  Gross per 24 hour   Intake 2125 ml   Output 1700 ml   Net 425 ml       Wt Readings from Last 3 Encounters:   24 141 lb (64 kg)   24 131 lb (59.4 kg)   24 131 lb 3.2 oz (59.5 kg)       Exam     Gen: No acute distress  Pulm: Lungs clear, normal respiratory effort  CV: Heart with regular rate and rhythm  Abd: Abdomen soft, nontender, bowel sounds present  Neuro: No acute focal deficits  MSK: Full range of motion in extremities  Skin: Warm and dry  Psych: Normal affect  Ext: no c/c/e    Data Review:     Labs:   Lab Results   Component Value Date    WBC 15.1 2024    HGB 12.6 2024    HCT 39.7 2024    .0 2024    CREATSERUM 0.80 2024    CREATSERUM 0.80 2024    BUN 16 2024    BUN 16 2024     2024     2024    K 3.8 2024    K 3.8 2024     2024     2024    CO2 31.0 2024    CO2 31.0 2024     2024     2024    CA 9.5 2024    CA 9.5 2024    ALB 4.3 2024    ALB 4.3 2024    ALKPHO 71 2024    ALKPHO 71 2024    BILT 0.4 2024    BILT 0.4 2024     TP 7.5 05/07/2024    TP 7.5 05/07/2024    AST 20 05/07/2024    AST 20 05/07/2024    ALT 20 05/07/2024    ALT 20 05/07/2024    PTT 39.8 05/07/2024    INR 1.83 05/07/2024       Imaging: Reviewed    CT BRAIN OR HEAD (09118)    Result Date: 5/7/2024  CONCLUSION:   No acute intracranial abnormality.  Moderately extensive left hemispheric and right temporal lobe gliosis is similar to the prior exam.  Mild-to-moderate sinusitis.    elm-remote     Dictated by (CST): Quintin Strickland MD on 5/07/2024 at 9:41 PM     Finalized by (CST): Quintin Strickland MD on 5/07/2024 at 9:43 PM          XR CHEST AP PORTABLE  (CPT=71045)    Result Date: 5/7/2024  CONCLUSION:  1. Stable mild scarring in the left lower lobe. 2. Otherwise no acute cardiopulmonary process.    Dictated by (CST): El Weiss MD on 5/07/2024 at 6:24 PM     Finalized by (CST): El Weiss MD on 5/07/2024 at 6:25 PM           Meds:   Current Facility-Administered Medications   Medication Dose Route Frequency    LORazepam (Ativan) tab 0.5 mg  0.5 mg Oral Q6H PRN    rivaroxaban (Xarelto) tab 15 mg  15 mg Oral BID with meals    [START ON 5/26/2024] rivaroxaban (Xarelto) tab 20 mg  20 mg Oral Daily with food         Assessment  Patient Active Problem List   Diagnosis    Hemiplegia affecting right dominant side (HCC)    Chronic obstructive pulmonary disease (HCC)    Combined forms of age-related cataract of both eyes    Esophageal reflux    Late onset Alzheimer's disease with behavioral disturbance (HCC)    Alcohol abuse    Calcification of aorta (HCC)    Alcoholism (HCC)    History of stroke    Dysphagia    Hyperglycemia    Weakness    Gait disturbance    Abnormal involuntary movement    Acute pulmonary embolism without acute cor pulmonale, unspecified pulmonary embolism type (HCC)    Factor V Leiden (HCC)    Leukocytosis    Tachycardia    Altered mental status, unspecified altered mental status type    Seizures ([facility])       Plan:     #  Tachycardia - Sinus  -   Recent PE diagnosis.    -  Unclear cause.  Continue on cardiac monitor. Could be related to fever/seizures  -  Cardiology consulted      #  Leukocytosis  - No clear infectious source.  Urine and blood cultures pending.   - Possible stress reaction.  Will Monitor     # PE  #Factor V Leyden Mutation  - On Xarelto (currently on 15 mg BID until completed 21 days then switch to 20 mg dialy).       #Dementia  -Alzheimer's Diagnosis.    - On Quetiapine nightly.  Lorazapem for anxiety.       #Seizures  -with abnormal EEG neuro recs were for Keppra 500 mg bid but daughter wanted to discuss with family - she was counseled on risk of delaying treatment - will start if family agrees    diet: general  ivf: none  dvt prophylaxis: On xarelto  antibiotics: none  tubes: none  central lines: none  code status: full code  dispo: home upon medical clearance      MDM: high level, severe exacerbation of chronic illness posing a threat to life, IV medication requiring close monitoring in hospital.     Bridget Davis MD  5/8/2024  4:05 PM

## 2024-05-08 NOTE — PROCEDURES
EEG report    REFERRING PHYSICIAN: Bridget Davis MD  PCP and phone number:  Irving Sheets DO  [phone]    TECHNIQUE: 21 channels of EEG, 2 channels of EOG, and 1 channel of EKG were recorded utilizing the International 10/20 System. The recording was performed in a digitized monopolar referential format and playback was reformatted into various referential and bipolar montages utilizing appropriate filter settings. Automatic seizure and spike detection programs were utilized throughout the recording.  Video was recorded during the study    CLINICAL DATA:  Patient is sent for the evaluation of possible seizures.    MEDICATION:  Continuous Medications:    Scheduled Medications:  No current outpatient medications on file.  PRN Medications:    LORazepam    ACTIVATION:  Hyperventilation: Not done  Photic stimulation: Not done  Sleep: Normal sleep architecture was seen.    BACKGROUND  While the patient was awake, the posterior dominant rhythm consisted of well-regulated 8-9 Hz rhythmic waveforms, symmetrically distributed over both posterior quadrants and was reactive to eye opening.    EEG ABNORMALITY    Epileptiform Discharges,  Sharp waves slow wave complexes were seen occasionally with phase reversal at C3.    IMPRESSION:    This is an abnormal EEG. Focal epileptiform discharges were seen in the left central region. This constellation of findings is consistent with an abnormality in the left central region that is potentially epileptogenic in nature.

## 2024-05-08 NOTE — CONSULTS
[facility]  [address] [place] [address], [address]  [facility] [address]  [phone]          INPATIENT NEUROLOGY   INITIAL CONSULT NOTE       [facility]  part of [facility]    Report of Consultation    Yoseph Cordero Patient Status:  Inpatient     1950 MRN [id number]    Location [facility] 3W/SW Attending Bridget Davis MD    Hosp Day # 0 PCP Irving Sheets DO      Date of Admission:  2024  Date of Consult:  2024    HPI:     Yoseph Cordero is a 73 year old man with past medical history of stroke with right-sided hemiparesis, dementia, recent diagnosis of PE put on Xarelto, factor V Leiden, hyperlipidemia who presented with lethargy, tachycardia and a low-grade temperature.  EEG with sharp waves in the left central region  He has been encephalopathic for the past few days.  He was hospitalized with an ankle fracture that was complicated by a PE.      CURRENT MEDICATIONS  No current outpatient medications on file.       OUTPATIENT MEDICATIONS  No current facility-administered medications on file prior to encounter.     Current Outpatient Medications on File Prior to Encounter   Medication Sig Dispense Refill    rivaroxaban 15 & 20 MG Oral Tablet Therapy Pack Take As Directed based on package instructions: Days 1-21: 15 mg by mouth twice daily Days 22-30: 20 mg by mouth once daily 51 each 0    LORazepam 0.5 MG Oral Tab Take 1 tablet (0.5 mg total) by mouth every 6 (six) hours as needed for Anxiety. 30 tablet 1        MEDICAL HISTORY  Past Medical History:    Anxiety state, unspecified    CVA (cerebral infarction)    Dementia (HCC)    Depression    Heterozygous factor V Leiden mutation (HCC)    Other and unspecified hyperlipidemia    Other ill-defined conditions(799.89)    Cardiomegaly Pleural effusion w/idopathic pleuropuricarditis    Pulmonary embolism (HCC)    Stroke (HCC)    Unspecified sleep apnea       ?SURGICAL HISTORY  Past Surgical  History:   Procedure Laterality Date    Colonoscopy      Electrocardiogram, complete  1914    Scanned to Media Tab       SOCIAL HISTORY  Social History     Socioeconomic History    Marital status:    Tobacco Use    Smoking status: Former    Smokeless tobacco: Never   Vaping Use    Vaping status: Never Used   Substance and Sexual Activity    Alcohol use: No     Alcohol/week: 0.0 standard drinks of alcohol     Comment: none recently    Drug use: No   Other Topics Concern    Caffeine Concern Yes     Comment: 1 cups coffee daily    Exercise No       FAMILY HISTORY  Family History   Problem Relation Age of Onset    Cancer Father         Lung - Smoker  Cause of death    Other (Other) Mother         during     Diabetes Neg     Glaucoma Neg        ALLERGIES  No Known Allergies    ?REVIEW OF SYSTEMS:   Unable to obtain     ?PHYSICAL EXAM:   /68 (BP Location: Right arm)   Pulse 94   Temp 99.5 °F (37.5 °C) (Axillary)   Resp 18   Wt 141 lb (64 kg)   SpO2 97%   BMI 23.46 kg/m²   General appearance: Well appearing, and in no acute distress  Skin: skin color, texture normal.  No rashes or lesions.    Head: Normocephalic, atraumatic.    Neurological:  Mental Status: Alert, globally aphasic   Cranial Nerves: EOMI, right facial droop  Motor: right upper extremity plegia, LUE moves to stimulation, LE moves below gravity   Reflexes: UE and LE reflexes are equal and reactive  Sensation: intact to light touch  Coordination: Finger-to- nose-finger intact bilaterally   Gait: not assessed         LABS/DATA:    Lab Results   Component Value Date    WBC 15.1 2024    HGB 12.6 2024    HCT 39.7 2024    .0 2024    CREATSERUM 0.80 2024    CREATSERUM 0.80 2024    BUN 16 2024    BUN 16 2024     2024     2024    K 3.8 2024    K 3.8 2024     2024     2024    CO2 31.0 2024    CO2 31.0 2024      05/07/2024     05/07/2024    CA 9.5 05/07/2024    CA 9.5 05/07/2024    ALB 4.3 05/07/2024    ALB 4.3 05/07/2024    ALKPHO 71 05/07/2024    ALKPHO 71 05/07/2024    BILT 0.4 05/07/2024    BILT 0.4 05/07/2024    TP 7.5 05/07/2024    TP 7.5 05/07/2024    AST 20 05/07/2024    AST 20 05/07/2024    ALT 20 05/07/2024    ALT 20 05/07/2024    PTT 39.8 05/07/2024    INR 1.83 05/07/2024       HGBA1C:  No results found for: \"A1C\", \"EAG\"             IMAGING:  CT BRAIN OR HEAD (04383)    Result Date: 5/7/2024  CONCLUSION:   No acute intracranial abnormality.  Moderately extensive left hemispheric and right temporal lobe gliosis is similar to the prior exam.  Mild-to-moderate sinusitis.    elm-remote     Dictated by (CST): Quintin Strickland MD on 5/07/2024 at 9:41 PM     Finalized by (CST): Quintin Strickland MD on 5/07/2024 at 9:43 PM          I PERSONALLY REVIEWED THESE IMAGES.     ASSESSMENT:  The patient is a 73 year old man with past medical history of stroke with right-sided hemiparesis, dementia, recent diagnosis of PE put on Xarelto, factor V Leiden, hyperlipidemia who presented with lethargy, tachycardia and a low-grade temperature.  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  I explained that untreated seizures can lead to significant morbidity and mortality.  If they decide to start Keppra please let neurology know or order Keppra 500 mg twice daily.    -Seizure precautions  -MRI brain with and without unlikely to be diagnostic as EEG abnormalities are left central which is in the area of the old stroke bed    No further inpatient neurological testing  needed.  Follow up in 1 month.  Please call w/ further questions.        This note was prepared using Dragon Medical voice recognition dictation software and as a result, errors may occur. When identified, these errors have been corrected. While every attempt is made to correct errors during dictation, discrepancies may still exist    BRANDI Amanda DO   Staff Neurologist   5/8/2024  11:39 AM

## 2024-05-08 NOTE — PLAN OF CARE
Patient nonverbal and unable to access orientation as patient does not answer questions or nod in English or when  is used. Patient calm and cooperative. Patient with good appetite. EEG completed this morning - see neurology note. Echo completed this evening. Family at bedside throughout shift and updated on plan of care.    Problem: Patient Centered Care  Goal: Patient preferences are identified and integrated in the patient's plan of care  Description: Interventions:  - What would you like us to know as we care for you? Speaks [place]ian  - Provide timely, complete, and accurate information to patient/family  - Incorporate patient and family knowledge, values, beliefs, and cultural backgrounds into the planning and delivery of care  - Encourage patient/family to participate in care and decision-making at the level they choose  - Honor patient and family perspectives and choices  Outcome: Progressing     Problem: Patient/Family Goals  Goal: Patient/Family Long Term Goal  Description: Patient's Long Term Goal: Discharge home    Interventions:  - Control HR  - give scheduled meds  - See additional Care Plan goals for specific interventions  Outcome: Progressing  Goal: Patient/Family Short Term Goal  Description: Patient's Short Term Goal: Control HR    Interventions:   - Monitor HR  - give meds as needed  - cardiology to see  - See additional Care Plan goals for specific interventions  Outcome: Progressing     Problem: NEUROLOGICAL - ADULT  Goal: Achieves stable or improved neurological status  Description: INTERVENTIONS  - Assess for and report changes in neurological status  - Initiate measures to prevent increased intracranial pressure  - Maintain blood pressure and fluid volume within ordered parameters to optimize cerebral perfusion and minimize risk of hemorrhage  - Monitor temperature, glucose, and sodium. Initiate appropriate interventions as ordered  Outcome: Progressing     Problem: SAFETY ADULT -  FALL  Goal: Free from fall injury  Description: INTERVENTIONS:  - Assess pt frequently for physical needs  - Identify cognitive and physical deficits and behaviors that affect risk of falls.  - [place] fall precautions as indicated by assessment.  - Educate pt/family on patient safety including physical limitations  - Instruct pt to call for assistance with activity based on assessment  - Modify environment to reduce risk of injury  - Provide assistive devices as appropriate  - Consider OT/PT consult to assist with strengthening/mobility  - Encourage toileting schedule  Outcome: Progressing     Problem: Altered Communication/Language Barrier  Goal: Patient/Family is able to understand and participate in their care  Description: Interventions:  - Assess communication ability and preferred communication style  - Implement communication aides and strategies  - Use visual cues when possible  - Listen attentively, be patient, do not interrupt  - Minimize distractions  - Allow time for understanding and response  - Establish method for patient to ask for assistance (call light)  - Provide an  as needed  - Communicate barriers and strategies to overcome with those who interact with patient  Outcome: Progressing     Problem: CARDIOVASCULAR - ADULT  Goal: Maintains optimal cardiac output and hemodynamic stability  Description: INTERVENTIONS:  - Monitor vital signs, rhythm, and trends  - Monitor for bleeding, hypotension and signs of decreased cardiac output  - Evaluate effectiveness of vasoactive medications to optimize hemodynamic stability  - Monitor arterial and/or venous puncture sites for bleeding and/or hematoma  - Assess quality of pulses, skin color and temperature  - Assess for signs of decreased coronary artery perfusion - ex. Angina  - Evaluate fluid balance, assess for edema, trend weights  Outcome: Progressing  Goal: Absence of cardiac arrhythmias or at baseline  Description: INTERVENTIONS:  -  Continuous cardiac monitoring, monitor vital signs, obtain 12 lead EKG if indicated  - Evaluate effectiveness of antiarrhythmic and heart rate control medications as ordered  - Initiate emergency measures for life threatening arrhythmias  - Monitor electrolytes and administer replacement therapy as ordered  Outcome: Progressing

## 2024-05-08 NOTE — TELEPHONE ENCOUNTER
Spoke with patient's daughter (HIPAA verified). She endorses that her dad is back in the hospital. She canceled the appointment for later today and rescheduled for next week, 5/16/24 at 2 pm. She was wondering if patient could be rounded on in the hospital, patient is in a soft cast and was wondering if he needs a boot or something more stable.

## 2024-05-09 ENCOUNTER — APPOINTMENT (OUTPATIENT)
Dept: GENERAL RADIOLOGY | Facility: HOSPITAL | Age: 74
DRG: 101 | End: 2024-05-09
Attending: ORTHOPAEDIC SURGERY

## 2024-05-09 LAB
ANION GAP SERPL CALC-SCNC: 7 MMOL/L (ref 0–18)
BASOPHILS # BLD AUTO: 0.07 X10(3) UL (ref 0–0.2)
BASOPHILS NFR BLD AUTO: 0.5 %
BUN BLD-MCNC: 13 MG/DL (ref 9–23)
BUN/CREAT SERPL: 17.1 (ref 10–20)
CALCIUM BLD-MCNC: 9.4 MG/DL (ref 8.7–10.4)
CHLORIDE SERPL-SCNC: 103 MMOL/L (ref 98–112)
CO2 SERPL-SCNC: 27 MMOL/L (ref 21–32)
CREAT BLD-MCNC: 0.76 MG/DL
DEPRECATED RDW RBC AUTO: 42.1 FL (ref 35.1–46.3)
EGFRCR SERPLBLD CKD-EPI 2021: 95 ML/MIN/1.73M2 (ref 60–?)
EOSINOPHIL # BLD AUTO: 0.44 X10(3) UL (ref 0–0.7)
EOSINOPHIL NFR BLD AUTO: 3.3 %
ERYTHROCYTE [DISTWIDTH] IN BLOOD BY AUTOMATED COUNT: 12.8 % (ref 11–15)
GLUCOSE BLD-MCNC: 124 MG/DL (ref 70–99)
HCT VFR BLD AUTO: 38.3 %
HGB BLD-MCNC: 12.5 G/DL
IMM GRANULOCYTES # BLD AUTO: 0.08 X10(3) UL (ref 0–1)
IMM GRANULOCYTES NFR BLD: 0.6 %
LYMPHOCYTES # BLD AUTO: 1.9 X10(3) UL (ref 1–4)
LYMPHOCYTES NFR BLD AUTO: 14.4 %
MAGNESIUM SERPL-MCNC: 2.1 MG/DL (ref 1.6–2.6)
MCH RBC QN AUTO: 29.3 PG (ref 26–34)
MCHC RBC AUTO-ENTMCNC: 32.6 G/DL (ref 31–37)
MCV RBC AUTO: 89.9 FL
MONOCYTES # BLD AUTO: 1.3 X10(3) UL (ref 0.1–1)
MONOCYTES NFR BLD AUTO: 9.8 %
NEUTROPHILS # BLD AUTO: 9.41 X10 (3) UL (ref 1.5–7.7)
NEUTROPHILS # BLD AUTO: 9.41 X10(3) UL (ref 1.5–7.7)
NEUTROPHILS NFR BLD AUTO: 71.4 %
OSMOLALITY SERPL CALC.SUM OF ELEC: 286 MOSM/KG (ref 275–295)
PLATELET # BLD AUTO: 374 10(3)UL (ref 150–450)
POTASSIUM SERPL-SCNC: 3.8 MMOL/L (ref 3.5–5.1)
RBC # BLD AUTO: 4.26 X10(6)UL
SODIUM SERPL-SCNC: 137 MMOL/L (ref 136–145)
WBC # BLD AUTO: 13.2 X10(3) UL (ref 4–11)

## 2024-05-09 PROCEDURE — 99232 SBSQ HOSP IP/OBS MODERATE 35: CPT | Performed by: OTHER

## 2024-05-09 PROCEDURE — 1159F MED LIST DOCD IN RCRD: CPT | Performed by: HOSPITALIST

## 2024-05-09 PROCEDURE — 3074F SYST BP LT 130 MM HG: CPT | Performed by: HOSPITALIST

## 2024-05-09 PROCEDURE — 99233 SBSQ HOSP IP/OBS HIGH 50: CPT | Performed by: HOSPITALIST

## 2024-05-09 PROCEDURE — 3078F DIAST BP <80 MM HG: CPT | Performed by: HOSPITALIST

## 2024-05-09 PROCEDURE — 73610 X-RAY EXAM OF ANKLE: CPT | Performed by: ORTHOPAEDIC SURGERY

## 2024-05-09 RX ORDER — POTASSIUM CHLORIDE 20 MEQ/1
40 TABLET, EXTENDED RELEASE ORAL ONCE
Status: COMPLETED | OUTPATIENT
Start: 2024-05-09 | End: 2024-05-09

## 2024-05-09 RX ORDER — LEVETIRACETAM 500 MG/1
500 TABLET ORAL 2 TIMES DAILY
Status: DISCONTINUED | OUTPATIENT
Start: 2024-05-09 | End: 2024-05-10

## 2024-05-09 NOTE — PROGRESS NOTES
[facility]  [address] [place] [address], [address]  [facility] [address]  [phone]          INPATIENT NEUROLOGY   FOLLOW UP CONSULT NOTE       [facility]  part of [facility]    Report of Consultation    Yoseph Cordero Patient Status:  Inpatient     1950 MRN [id number]    Location [facility] 3W/SW Attending Bridget Davis MD    Hosp Day # 1 PCP Irving Sheets DO      Date of Admission:  2024  Date of Consult Follow Up:  2024        INTERVAL HPI:   -No acute events.         ?PHYSICAL EXAM:   /77 (BP Location: Right arm)   Pulse 111   Temp 99.3 °F (37.4 °C) (Axillary)   Resp 18   Wt 133 lb 9.6 oz (60.6 kg)   SpO2 97%   BMI 22.23 kg/m²   General appearance: Well appearing, and in no acute distress  Skin: skin color, texture normal.  No rashes or lesions.    Head: Normocephalic, atraumatic.    Neurological exam:  Awake, alert, sang one sentence, otherwise aphasic       LABS/DATA:    Lab Results   Component Value Date    WBC 13.2 2024    HGB 12.5 2024    HCT 38.3 2024    .0 2024    CREATSERUM 0.76 2024    BUN 13 2024     2024    K 3.8 2024     2024    CO2 27.0 2024     2024    CA 9.4 2024    MG 2.1 2024       HGBA1C:  No results found for: \"A1C\", \"EAG\"           IMAGING:  CT BRAIN OR HEAD (32459)    Result Date: 2024  CONCLUSION:   No acute intracranial abnormality.  Moderately extensive left hemispheric and right temporal lobe gliosis is similar to the prior exam.  Mild-to-moderate sinusitis.    elm-remote     Dictated by (CST): Quintin Strickland MD on 2024 at 9:41 PM     Finalized by (CST): Quintin Strickland MD on 2024 at 9:43 PM          XR CHEST AP PORTABLE  (CPT=71045)    Result Date: 2024  CONCLUSION:  1. Stable mild scarring in the left lower lobe. 2. Otherwise no acute cardiopulmonary process.    Dictated by (CST):  El Weiss MD on 5/07/2024 at 6:24 PM     Finalized by (CST): El Weiss MD on 5/07/2024 at 6:25 PM          CT CHEST(CONTRAST ONLY) (CPT=71260)    Result Date: 5/3/2024  CONCLUSION:  1. Segmental right lower lobe pulmonary emboli.  Probable small nonocclusive embolus at the left main pulmonary artery branch point.  A dedicated pulmonary embolus protocol was not utilized. 2. 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 3. Mild-to-moderate coronary artery calcification. 4. Prior granulomatous disease in the chest..    Dictated by (CST): Apolinar Barry MD on 5/03/2024 at 4:58 PM     Finalized by (CST): Apolinar Barry MD on 5/03/2024 at 5:12 PM          US VENOUS DOPPLER LEG RIGHT - DIAG IMG (CPT=93971)    Result Date: 5/3/2024  CONCLUSION: No DVT    Dictated by (CST): Crow Ellis MD on 5/03/2024 at 3:51 PM     Finalized by (CST): Crow Ellis MD on 5/03/2024 at 3:52 PM          XR FOOT, COMPLETE (MIN 3 VIEWS), RIGHT (CPT=73630)    Result Date: 5/3/2024  CONCLUSION:  1. Cortical irregularity involving the medial malleolus, which could represent a nondisplaced fracture of uncertain chronicity, possibly subacute to chronic.  Correlate clinically for overlying point tenderness. 2. Stable mild Achilles enthesopathy.    Dictated by (CST): El Weiss MD on 5/03/2024 at 9:22 AM     Finalized by (CST): El Weiss MD on 5/03/2024 at 9:23 AM          XR ANKLE (MIN 3 VIEWS), RIGHT (CPT=73610)    Result Date: 5/3/2024  CONCLUSION:  1. Cortical irregularity involving the medial malleolus, which could represent a nondisplaced fracture of uncertain chronicity, possibly subacute to chronic.  Correlate clinically for overlying point tenderness. 2. Stable mild Achilles enthesopathy.    Dictated by (CST): El Weiss MD on 5/03/2024 at 9:17 AM     Finalized by (CST): El Weiss MD on 5/03/2024 at 9:22  AM              ASSESSMENT:  The patient is a 73 year old man with past medical history of stroke with right-sided hemiparesis, dementia, recent diagnosis of PE put on Xarelto, factor V Leiden, hyperlipidemia who presented with lethargy, tachycardia and a low-grade temperature.  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  Follow up in 1 month with epileptologist only.  Please call w/ further questions.        This note was prepared using Dragon Medical voice recognition dictation software and as a result, errors may occur. When identified, these errors have been corrected. While every attempt is made to correct errors during dictation, discrepancies may still exist    BRANDI Amanda DO   Staff Neurologist   5/9/2024  1:30 PM

## 2024-05-09 NOTE — TELEPHONE ENCOUNTER
Spoke with daughter, relayed information about boot v soft cast. She is asking if someone can come see him in the hospital. He is inpatient right now.

## 2024-05-09 NOTE — PROGRESS NOTES
Cardiology Progress Note    Yoseph Cordero Patient Status:  Inpatient    1950 MRN [id number]   Location [facility] 3W/SW Attending Bridget Davis MD   Hosp Day # 1 PCP Irving Sheets, DO     Interval Note:  Patient resting in bed  No acute distress      --------------------------------------------------------------------------------------------------------------------------------  ROS 12 systems reviewed, pertinent findings above.  ROS    History:  Past Medical History:    Anxiety state, unspecified    CVA (cerebral infarction)    Dementia (HCC)    Depression    Heterozygous factor V Leiden mutation (HCC)    Other and unspecified hyperlipidemia    Other ill-defined conditions(799.89)    Cardiomegaly Pleural effusion w/idopathic pleuropuricarditis    Pulmonary embolism (HCC)    Stroke (HCC)    Unspecified sleep apnea     Past Surgical History:   Procedure Laterality Date    Colonoscopy      Electrocardiogram, complete  1914    Scanned to Media Tab     Family History   Problem Relation Age of Onset    Cancer Father         Lung - Smoker  Cause of death    Other (Other) Mother         during     Diabetes Neg     Glaucoma Neg       reports that he has quit smoking. He has never used smokeless tobacco. He reports that he does not drink alcohol and does not use drugs.    Objective:   Temp: 99.3 °F (37.4 °C)  Pulse: 111  Resp: 18  BP: 139/77    Intake/Output:     Intake/Output Summary (Last 24 hours) at 2024 1419  Last data filed at 2024 1205  Gross per 24 hour   Intake 550 ml   Output 2150 ml   Net -1600 ml       Physical Exam:    General: Resting in bed  No shortness of breath, looking comfortable  HEENT: Normocephalic, anicteric sclera, neck supple.  Neck: No JVD, carotids 2+, no bruits.  Cardiac: Regular rate and rhythm. S1, S2 normal. No murmur, pericardial rub, S3.  Lungs: Clear without wheezes, rales, rhonchi or dullness.  Normal excursions and effort.  Abdomen:  Soft, non-tender. BS-present.  Extremities: Without clubbing, cyanosis or edema.  Peripheral pulses are 2+.  Neurologic: Non-focal  Skin: Warm and dry.       Assessment     CVA with R sided hemiparesis  Dementia & altered mental state  Neurology following  EEG positive for seizures     Sinus tachycardia:   Can be multifactorial including low grade fever and seizures.  Recent PE   Troponin I- 7  BNP 6  LV EF 60-65%     Right ankle fracture  Status post fall           Plan:  Xarelto 15 mg twice daily  SZ medications per Neurology  Will sign off.  Please call with any concerns or questions    Level of care: L3    Guy Jalloh MD  [facility]      5/9/2024  2:19 PM

## 2024-05-09 NOTE — PROGRESS NOTES
[facility]  part of [facility]    Progress Note    Yoseph Cordero Patient Status:  Inpatient    1950 MRN [id number]   Location [facility] 3W/SW Attending Bridget Davis MD   Hosp Day # 1 PCP Irving Sheets DO     Chief Complaint: tachycardia    SUBJECTIVE:    No acute events overnight.  Appears comfortable. Patient nonverbal at baseline.        OBJECTIVE:  Vital signs in last 24 hours:  /77 (BP Location: Right arm)   Pulse 111   Temp 99.3 °F (37.4 °C) (Axillary)   Resp 18   Wt 133 lb 9.6 oz (60.6 kg)   SpO2 97%   BMI 22.23 kg/m²     Intake/Output:    Intake/Output Summary (Last 24 hours) at 2024 1435  Last data filed at 2024 1205  Gross per 24 hour   Intake 550 ml   Output 2150 ml   Net -1600 ml       Wt Readings from Last 3 Encounters:   24 133 lb 9.6 oz (60.6 kg)   24 131 lb (59.4 kg)   24 131 lb 3.2 oz (59.5 kg)       Exam     Gen: No acute distress  Pulm: Lungs clear, normal respiratory effort  CV: Heart with regular rate and rhythm  Abd: Abdomen soft, nontender, bowel sounds present  Neuro: No acute focal deficits  MSK: Full range of motion in extremities  Skin: Warm and dry  Psych: Normal affect  Ext: no c/c/e    Data Review:     Labs:   Lab Results   Component Value Date    WBC 13.2 2024    HGB 12.5 2024    HCT 38.3 2024    .0 2024    CREATSERUM 0.76 2024    BUN 13 2024     2024    K 3.8 2024     2024    CO2 27.0 2024     2024    CA 9.4 2024    MG 2.1 2024       Imaging: Reviewed    CT BRAIN OR HEAD (19095)    Result Date: 2024  CONCLUSION:   No acute intracranial abnormality.  Moderately extensive left hemispheric and right temporal lobe gliosis is similar to the prior exam.  Mild-to-moderate sinusitis.    elm-remote     Dictated by (CST): Quintin Strickland MD on 2024 at 9:41 PM     Finalized by (CST): Quintin Strickland  LEOPOLDO GARZA on 5/07/2024 at 9:43 PM          XR CHEST AP PORTABLE  (CPT=71045)    Result Date: 5/7/2024  CONCLUSION:  1. Stable mild scarring in the left lower lobe. 2. Otherwise no acute cardiopulmonary process.    Dictated by (CST): El Weiss MD on 5/07/2024 at 6:24 PM     Finalized by (CST): El Weiss MD on 5/07/2024 at 6:25 PM           Meds:   Current Facility-Administered Medications   Medication Dose Route Frequency    levETIRAcetam (Keppra) tab 500 mg  500 mg Oral BID    LORazepam (Ativan) tab 0.5 mg  0.5 mg Oral Q6H PRN    rivaroxaban (Xarelto) tab 15 mg  15 mg Oral BID with meals    [START ON 5/26/2024] rivaroxaban (Xarelto) tab 20 mg  20 mg Oral Daily with food         Assessment  Patient Active Problem List   Diagnosis    Hemiplegia affecting right dominant side (HCC)    Chronic obstructive pulmonary disease (HCC)    Combined forms of age-related cataract of both eyes    Esophageal reflux    Late onset Alzheimer's disease with behavioral disturbance (HCC)    Alcohol abuse    Calcification of aorta (HCC)    Alcoholism (HCC)    History of stroke    Dysphagia    Hyperglycemia    Weakness    Gait disturbance    Abnormal involuntary movement    Acute pulmonary embolism without acute cor pulmonale, unspecified pulmonary embolism type (HCC)    Factor V Leiden (HCC)    Leukocytosis    Tachycardia    Altered mental status, unspecified altered mental status type    Seizures ([facility])       Plan:     #  Tachycardia - Sinus  -  Recent PE diagnosis.    -  Unclear cause.  Continue on cardiac monitor. Could be related to fever/seizures  -  Cardiology consulted      #  Leukocytosis  - No clear infectious source.  Urine and blood cultures pending.   - Possible stress reaction.  Will Monitor     # PE  #Factor V Leyden Mutation  - On Xarelto (currently on 15 mg BID until completed 21 days then switch to 20 mg dialy).       #Dementia  -Alzheimer's Diagnosis.    - On Quetiapine nightly.  Lorazapem for anxiety.        #Seizures  -with abnormal EEG neuro recs were for Keppra 500 mg bid but daughter wanted to discuss with family - she was counseled on risk of delaying treatment - family agrees to starting Keppra - they are anxious about he will tolerate the medication so would like to watch him tonight on the new medication    diet: general  ivf: none  dvt prophylaxis: On xarelto  antibiotics: none  tubes: none  central lines: none  code status: full code  dispo: home upon medical clearance      MDM: high level, severe exacerbation of chronic illness posing a threat to life, IV medication requiring close monitoring in hospital.       Greater than 55 minutes spent, Greater than 50% spent counseling and in coordination of care, reviewing labs, discussing results with patient, coordinating care with care team and ordering labs for tomorrow and adjusting medications as needed

## 2024-05-09 NOTE — PROGRESS NOTES
Progress Note  Yoseph Cordero Patient Status:  Inpatient    1950 MRN [id number]   Location [facility] 3W/SW Attending Bridget Davis MD   Hosp Day # 1 PCP Irving Sheets,      SUBJECTIVE:      VITALS:  /77 (BP Location: Right arm)   Pulse 111   Temp 99.3 °F (37.4 °C) (Axillary)   Resp 18   Wt 133 lb 9.6 oz (60.6 kg)   SpO2 97%   BMI 22.23 kg/m²     INTAKE/OUTPUT:    Intake/Output Summary (Last 24 hours) at 2024 1127  Last data filed at 2024 0844  Gross per 24 hour   Intake 1020 ml   Output 2500 ml   Net -1480 ml     Last 3 Weights   24 0430 133 lb 9.6 oz (60.6 kg)   24 0130 141 lb (64 kg)   24 1723 130 lb 15.3 oz (59.4 kg)   24 1409 131 lb (59.4 kg)   24 0532 131 lb 3.2 oz (59.5 kg)   24 0000 131 lb 3.2 oz (59.5 kg)   24 2105 128 lb 12 oz (58.4 kg)     LABS:  Recent Labs   Lab 24  0412 24  1831 24  0601   * 111*  111* 124*   BUN 9 16  16 13   CREATSERUM 0.74 0.80  0.80 0.76   EGFRCR 96 94  94 95   CA 9.2 9.5  9.5 9.4    142  142 137   K 3.6 3.8  3.8 3.8    105  105 103   CO2 28.0 31.0  31.0 27.0     Recent Labs   Lab 24  0839 24  18324  0601   RBC 4.67 4.30 4.26   HGB 14.1 12.6* 12.5*   HCT 42.2 39.7 38.3*   MCV 90.4 92.3 89.9   MCH 30.2 29.3 29.3   MCHC 33.4 31.7 32.6   RDW 13.1 13.4 12.8   NEPRELIM 9.38* 11.29* 9.41*   WBC 14.4* 15.1* 13.2*   .0 346.0 374.0     No results for input(s): \"TROP\", \"CK\" in the last 168 hours.    DIAGNOSTICS:    TELEMETRY:       ROS: Negative unless noted above     PHYSICAL EXAM:  General: Alert and oriented x 3. No apparent distress.  HEENT: Normocephalic, sclera are nonicteric. Hearing appropriate bilaterally.  Neck: No JVD or Carotid bruits. Trachea midline.   Cardiac: Regular rate and rhythm. S1, S2 auscultated. No murmurs, rubs, or gallops appreciated.   Lungs: Clear without wheezes, rales, rhonchi or dullness. Chest  expansion symmetrical. Regular effort.  Abdomen: Soft, non-tender, +BS. No hepatosplenomegaly or appreciable masses.   Extremities: Without clubbing, cyanosis or edema.  Peripheral pulses are 2+.  Neurologic: Motor and sensory nerves grossly intact.   Psych: Appropriate affect   Skin: Warm and dry. No obvious lesions, wounds, or ulcerations.     MEDICATIONS:   rivaroxaban  15 mg Oral BID with meals    [START ON 5/26/2024] rivaroxaban  20 mg Oral Daily with food     ASSESSMENT:    Acute PE, Diagnosed on recent admission 5/3/24-5/5/24  Factor V Leiden Mutation   - On Xarelto acute PE dosing     Tachycardia   - On admission sinus with rates in 110s-120    Leukocytosis   - Urine and Blood cx pending     Seizures  - Neurology following, Abnormal EEG, Keppra     Dementia  - Non verbal at baseline     Hx CVA     PLAN:            Plan of care discussed with patient and RN.         Violet Pimentel, APRN  5/9/2024  11:27 AM  [phone] ([place])  [phone] ([place])

## 2024-05-09 NOTE — CONSULTS
Pt known to me from prior admission.  Recommend transitioning to cam walker boot for right ankle injury.  Repeat xrays ordered.

## 2024-05-09 NOTE — DISCHARGE SUMMARY
[facility]  part of [facility]    Discharge Summary    Yoseph Cordero Patient Status:  Inpatient    1950 MRN [id number]   Location [facility] 3W/SW Attending Bridget Davis MD   Hosp Day # 2 PCP Irving Sheets DO     Date of Admission: 2024      Date of Discharge: 05/10/24      Admitting Diagnosis: Tachycardia [R00.0]  Altered mental status, unspecified altered mental status type [R41.82]    Hospital Discharge Diagnoses:  Seizure disorder    Lace+ Score: 79  59-90 High Risk  29-58 Medium Risk  0-28   Low Risk.    TCM Follow-Up Recommendation:  LACE > 58: High Risk of readmission after discharge from the hospital.          Problem List:   Patient Active Problem List   Diagnosis    Hemiplegia affecting right dominant side (HCC)    Chronic obstructive pulmonary disease (HCC)    Combined forms of age-related cataract of both eyes    Esophageal reflux    Late onset Alzheimer's disease with behavioral disturbance (HCC)    Alcohol abuse    Calcification of aorta (HCC)    Alcoholism (HCC)    History of stroke    Dysphagia    Hyperglycemia    Weakness    Gait disturbance    Abnormal involuntary movement    Acute pulmonary embolism without acute cor pulmonale, unspecified pulmonary embolism type (HCC)    Factor V Leiden (HCC)    Leukocytosis    Tachycardia    Altered mental status, unspecified altered mental status type    Seizures (HCC)         Physical Exam:     Gen: No acute distress  Pulm: Lungs clear, normal respiratory effort  CV: Heart with regular rate and rhythm  Abd: Abdomen soft, nontender, nondistended, bowel sounds present  Neuro: No acute focal deficits  MSK: Full range of motion in extremities  Skin: Warm and dry  Psych: Normal affect  Ext: no c/c/e      History of Present Illness: Per Dr Gates    73-year-old male with significant past medical history of dementia, history of CVA, recent diagnosis pulmonary embolism, factor V Leyden mutation on Xarelto  who present to the emergency room after visit with PCP today.  Of note patient was recently admitted from May 3, 2024 through May 5, 2024.  During that admission was diagnosed with an acute right lower lobe segmental PE.  TTE unremarkable.  Recent diagnosis of right medial malleolus fracture as well.  Discharged with appropriate follow-up.  Today while visiting his primary care provider based on exam patient was found to be somnolent/lethargic with tachycardia.  Decision was made to send back to the emergency room.     In the ED blood pressure 140/54, pulse 119 temperature 99.6.  Complete blood count notable for a white blood cell count of 15.1, otherwise unremarkable workup.  CT brain with no acute intracranial abnormality.     Cardiology and neurology consulted.    Hospital Course:     #  Tachycardia - Sinus  -  Recent PE diagnosis.    -  Unclear cause.  Continue on cardiac monitor. Could be related to fever/seizures  -  Cardiology consulted      #  Leukocytosis  - No clear infectious source.  Urine and blood cultures pending.   - Possible stress reaction.  Will Monitor     # PE  #Factor V Leyden Mutation  - On Xarelto (currently on 15 mg BID until completed 21 days then switch to 20 mg dialy).       #Dementia  -Alzheimer's Diagnosis.    - On Quetiapine nightly.  Lorazapem for anxiety.       #Seizures  -with abnormal EEG neuro recs were for Keppra 500 mg bid     Discharge Condition: Stable    Discharge Medications:      Discharge Medications        START taking these medications        Instructions Prescription details   levETIRAcetam 500 MG Tabs  Commonly known as: Keppra      Take 1 tablet (500 mg total) by mouth 2 (two) times daily.   Quantity: 60 tablet  Refills: 0            CONTINUE taking these medications        Instructions Prescription details   LORazepam 0.5 MG Tabs  Commonly known as: Ativan      Take 1 tablet (0.5 mg total) by mouth every 6 (six) hours as needed for Anxiety.   Quantity: 30  tablet  Refills: 1     rivaroxaban 15 & 20 MG Tbpk      Take As Directed based on package instructions: Days 1-21: 15 mg by mouth twice daily Days 22-30: 20 mg by mouth once daily   Quantity: 51 each  Refills: 0               Where to Get Your Medications        These medications were sent to [id number] - [place], [place] - [address] AT [facility] [address] [place], [phone], [phone] [address], [place] [address]      Phone: [phone]   levETIRAcetam 500 MG Tabs             Greater than 30 minutes spent on preparation and coordination of this discharge

## 2024-05-09 NOTE — PAYOR COMM NOTE
--------------  ADMISSION REVIEW     Payor: RAYMOND PENA O  Subscriber #:  [id number]  Authorization Number: [id number]    Admit date: 5/8/24  Admit time: 12:54 AM       History   HPI  73-year-old male with history of previous CVA, dementia who is a very poor historian so all history is from family was at the bedside family brought the patient here today with decreased responsiveness and worsening mental status been going on for the past couple days.  Patient was just discharged from the hospital couple days ago after suffering an ankle fracture and subsequently diagnosed with a PE during his hospital stay.  Patient was started on Xarelto prior to discharge from the hospital.  Since then lates that his responsiveness and mental state has decreased.  They saw his doctor today who sent him here for evaluation since patient also has been tachycardic up to 120 and his primary care doctor's office today.    ED Triage Vitals [05/07/24 1723]   /74   Pulse 113   Resp 16   Temp 97.8 °F (36.6 °C)   Temp src Temporal   SpO2 97 %   O2 Device None (Room air)     Eyes:      Pupils: Pupils are equal, round, and reactive to light.   Cardiovascular:      Rate and Rhythm: Tachycardia present.   Pulmonary:      Effort: Pulmonary effort is normal.   Abdominal:      Palpations: Abdomen is soft.      Comments: Rectal exam normal tone, brown stool, guaiac negative   Skin:     General: Skin is warm and dry.   Neurological:      Mental Status: He is alert.   Labs Reviewed   COMP METABOLIC PANEL (14) - Abnormal; Notable for the following components:       Result Value    Glucose 111 (*)     Calculated Osmolality 296 (*)     All other components within normal limits   BASIC METABOLIC PANEL (8) - Abnormal; Notable for the following components:    Glucose 111 (*)     Calculated Osmolality 296 (*)     All other components within normal limits   PTT, ACTIVATED - Abnormal; Notable for the following components:    PTT 39.8 (*)     All other  components within normal limits   PROTHROMBIN TIME (PT) - Abnormal; Notable for the following components:    PT 22.3 (*)     INR 1.83 (*)     All other components within normal limits   URINALYSIS, ROUTINE - Abnormal; Notable for the following components:    Blood Urine 2+ (*)     Protein Urine 20 (*)     RBC Urine >10 (*)     All other components within normal limits   CBC W/ DIFFERENTIAL - Abnormal; Notable for the following components:    WBC 15.1 (*)     HGB 12.6 (*)     Neutrophil Absolute Prelim 11.29 (*)     Neutrophil Absolute 11.29 (*)     Monocyte Absolute 1.55 (*)    EKG    Rate, intervals and axes as noted on EKG Report.  Rate: 111  Rhythm: Sinus tachycardia  Reading: Sinus tachycardia, 111, normal axis normal axis with , left posterior fascicular block    Imaging Results Available and Reviewed while in ED: CT BRAIN OR HEAD (34637)  Result Date: 5/7/2024  CONCLUSION:   No acute intracranial abnormality.  Moderately extensive left hemispheric and right temporal lobe gliosis is similar to the prior exam.  Mild-to-moderate sinusitis.    elm-remote     Dictated by (CST): Quintin Strickland MD on 5/07/2024 at 9:41 PM     Finalized by (CST): Quintin Strickland MD on 5/07/2024 at 9:43 PM          XR CHEST AP PORTABLE  (CPT=71045)  Result Date: 5/7/2024  CONCLUSION:  1. Stable mild scarring in the left lower lobe. 2. Otherwise no acute cardiopulmonary process.    Dictated by (CST): El Weiss MD on 5/07/2024 at 6:24 PM     Finalized by (CST): El Weiss MD on 5/07/2024 at 6:25 PM         ED Medications Administered:   Medications   sodium chloride 0.9 % IV bolus 500 mL (0 mL Intravenous Stopped 5/7/24 1601)      Disposition and Plan   Clinical Impression:  1. Tachycardia    2. Altered mental status, unspecified altered mental status type      Disposition:  Admit    History and Physical      HPI:   73-year-old male with significant past medical history of dementia, history of CVA, recent diagnosis pulmonary  embolism, factor V Leyden mutation on Xarelto who present to the emergency room after visit with PCP today.  Of note patient was recently admitted from May 3, 2024 through May 5, 2024.  During that admission was diagnosed with an acute right lower lobe segmental PE.  TTE unremarkable.  Recent diagnosis of right medial malleolus fracture as well.  Discharged with appropriate follow-up.  Today while visiting his primary care provider based on exam patient was found to be somnolent/lethargic with tachycardia.  Decision was made to send back to the emergency room.     In the ED blood pressure 140/54, pulse 119 temperature 99.6.  Complete blood count notable for a white blood cell count of 15.1, otherwise unremarkable workup.  CT brain with no acute intracranial abnormality.     Cardiology and neurology consulted.   Blood pressure 153/65, pulse 107, temperature 97.8 °F (36.6 °C), temperature source Temporal, resp. rate 18, weight 130 lb 15.3 oz (59.4 kg), SpO2 96%.  Physical Exam  Constitutional:       Appearance: He is ill-appearing.   HENT:      Head: Normocephalic.      Nose: Nose normal.      Mouth/Throat:      Mouth: Mucous membranes are moist.   Eyes:      Pupils: Pupils are equal, round, and reactive to light.   Cardiovascular:      Rate and Rhythm: Tachycardia present.   Pulmonary:      Effort: Pulmonary effort is normal.      Breath sounds: Normal breath sounds.   Abdominal:      General: Abdomen is flat.   Skin:     General: Skin is warm.   Neurological:      Mental Status: He is disoriented.               Results:            Lab Results   Component Value Date     WBC 15.1 (H) 05/07/2024     HGB 12.6 (L) 05/07/2024     HCT 39.7 05/07/2024     .0 05/07/2024     CREATSERUM 0.80 05/07/2024     CREATSERUM 0.80 05/07/2024     BUN 16 05/07/2024     BUN 16 05/07/2024      05/07/2024      05/07/2024     K 3.8 05/07/2024     K 3.8 05/07/2024      05/07/2024      05/07/2024     CO2 31.0  05/07/2024     CO2 31.0 05/07/2024      (H) 05/07/2024      (H) 05/07/2024     CA 9.5 05/07/2024     CA 9.5 05/07/2024     ALB 4.3 05/07/2024     ALB 4.3 05/07/2024     ALKPHO 71 05/07/2024     ALKPHO 71 05/07/2024     BILT 0.4 05/07/2024     BILT 0.4 05/07/2024     TP 7.5 05/07/2024     TP 7.5 05/07/2024     AST 20 05/07/2024     AST 20 05/07/2024     ALT 20 05/07/2024     ALT 20 05/07/2024     PTT 39.8 (H) 05/07/2024     INR 1.83 (H) 05/07/2024     Assessment/Plan:   #  Tachycardia  -  Recent PE diagnosis.    -  Unclear cause.  Continue on cardiac monitor.   -  Cardiology consulted await further recs.       #  Leukocytosis  No clear infectious source.  Urine and blood cultures pending.   - Possible stress reaction.       # PE  #Factor V Leyden Mutation  - On Xarelto (currently on 15 mg BID until completed 21 days then switch to 20 mg dialy).       #Dementia  -Alzheimer's Diagnosis.    - On Quetiapine nightly.  Lorazapem for anxiety.       diet: general        5/8/24  HPI.  Yoseph Cordero is a 73 year old male with a history of CVA & right-sided hemiparesis, factor V Leiden deficiency, HLD and  severe dementia who was admitted yesterday for altered mental state and tachycardia.     Patient was admitted over the weekend for right ankle fracture and he was diagnosed with pulmonary embolism.  He was started on Xarelto and discharged home on Sunday.     Post hospital discharge, patient was seen by his physician at home who noticed that he was more lethargic and he was sent to hospital.   Patient was noted to have heart rate in 120s.  His temperature was noted to be 99.6 °F.     Cardiac workup  Troponin I 7  BNP 6  EKG: Sinus tachycardia at 111 bpm left posterior hemiblock  CBC: WBC 15.1 H/H 12.6/39.7    HEENT: Normocephalic, anicteric sclera, neck supple.  Neck: No JVD, carotids 2+, no bruits.  Cardiac: Regular rate and rhythm. S1, S2 normal. No murmur, pericardial rub, S3.  Lungs: Clear without  wheezes, rales, rhonchi or dullness.  Normal excursions and effort.  Abdomen: Soft, non-tender. BS-present.  Extremities: Without clubbing, cyanosis or edema.  Peripheral pulses are 2+.  Neurologic: Right-sided hemiparesis with contractures  Skin: Warm and dry.         Assessment:       CVA with R sided hemiparesis  Dementia & altered mental state  Neurology following  EEG positive for seizures     Sinus tachycardia:   Can be multifactorial including low grade fever and seizures.  Recent PE   Troponin I- 7  BNP 6     Right ankle fracture  Status post fall         Plan:  Xarelto 15 mg twice daily  Repeat echo & Doppler  Follow CBC      NEUROLOGY  HPI:   Yoseph Cordero is a 73 year old man with past medical history of stroke with right-sided hemiparesis, dementia, recent diagnosis of PE put on Xarelto, factor V Leiden, hyperlipidemia who presented with lethargy, tachycardia and a low-grade temperature.  EEG with sharp waves in the left central region  He has been encephalopathic for the past few days.  He was hospitalized with an ankle fracture that was complicated by a PE.       Neurological:  Mental Status: Alert, globally aphasic   Cranial Nerves: EOMI, right facial droop  Motor: right upper extremity plegia, LUE moves to stimulation, LE moves below gravity   Reflexes: UE and LE reflexes are equal and reactive  Sensation: intact to light touch  Coordination: Finger-to- nose-finger intact bilaterally   Gait: not assessed      Lab Results   Component Value Date     WBC 15.1 05/07/2024     HGB 12.6 05/07/2024     HCT 39.7 05/07/2024     .0 05/07/2024   ASSESSMENT:  The patient is a 73 year old man with past medical history of stroke with right-sided hemiparesis, dementia, recent diagnosis of PE put on Xarelto, factor V Leiden, hyperlipidemia who presented with lethargy, tachycardia and a low-grade temperature.  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  I explained that untreated seizures can lead to significant morbidity and mortality.  If they decide to start Keppra please let neurology know or order Keppra 500 mg twice daily.    -Seizure precautions  -MRI brain with and without unlikely to be diagnostic as EEG abnormalities are left central which is in the area of the old stroke bed       MEDICATIONS ADMINISTERED IN LAST 1 DAY:  levETIRAcetam (Keppra) tab 500 mg       Date Action Dose Route User    5/8/2024 1205 Given 500 mg Oral Shiloh Nevarez RN          potassium chloride (Klor-Con M20) tab 40 mEq       Date Action Dose Route User    5/9/2024 0805 Given 40 mEq Oral Shiloh Nevarez RN          rivaroxaban (Xarelto) tab 15 mg       Date Action Dose Route User    5/8/2024 2258 Given 15 mg Oral Fina Adan RN            Vitals (last day)       Date/Time Temp Pulse Resp BP SpO2 Weight O2 Device O2 Flow Rate (L/min) [facility]    05/09/24 0804 99.3 °F (37.4 °C) 111 18 139/77 97 % -- None (Room air) --     05/09/24 0207 99.3 °F (37.4 °C) 109 18 116/70 96 % -- None (Room air) --     05/08/24 2101 99.9 °F (37.7 °C) 118 -- 150/72 96 % -- None (Room air) --     05/08/24 1444 98.5 °F (36.9 °C) 100 18 123/59 99 % -- None (Room air) --     05/08/24 0802 99.5 °F (37.5 °C) 94 18 139/68 97 % -- None (Room air) --     05/08/24 0353 99.1 °F (37.3 °C) 100 18 136/63 95 % -- None (Room air) --     05/08/24 0017 99.6 °F (37.6 °C) 119 20 140/54 95 % -- None (Room air) -- IM

## 2024-05-09 NOTE — PLAN OF CARE
Patient calm and cooperative. With tachycardia overnight. Offered fluids frequently. Fall precautions in place. Family updated on plan of care. Plan for family discussion with Neurology Thursday regarding seizure medication.    Problem: Patient Centered Care  Goal: Patient preferences are identified and integrated in the patient's plan of care  Description: Interventions:  - What would you like us to know as we care for you? Speaks French  - Provide timely, complete, and accurate information to patient/family  - Incorporate patient and family knowledge, values, beliefs, and cultural backgrounds into the planning and delivery of care  - Encourage patient/family to participate in care and decision-making at the level they choose  - Honor patient and family perspectives and choices  Outcome: Progressing     Problem: Patient/Family Goals  Goal: Patient/Family Long Term Goal  Description: Patient's Long Term Goal: Discharge home    Interventions:  - Control HR  - give scheduled meds  - See additional Care Plan goals for specific interventions  Outcome: Progressing     Problem: Patient/Family Goals  Goal: Patient/Family Short Term Goal  Description: Patient's Short Term Goal: Control HR    Interventions:   - Monitor HR  - give meds as needed  - cardiology to see  - See additional Care Plan goals for specific interventions  Outcome: Progressing     Problem: NEUROLOGICAL - ADULT    Problem: Patient Centered Care  Goal: Patient preferences are identified and integrated in the patient's plan of care  Description: Interventions:  - What would you like us to know as we care for you? Speaks French  - Provide timely, complete, and accurate information to patient/family  - Incorporate patient and family knowledge, values, beliefs, and cultural backgrounds into the planning and delivery of care  - Encourage patient/family to participate in care and decision-making at the level they choose  - Honor patient and family perspectives  and choices  5/9/2024 0126 by Fina Adan RN  Outcome: Progressing  5/9/2024 0125 by Fina Adan RN  Outcome: Progressing     Problem: Patient/Family Goals  Goal: Patient/Family Long Term Goal  Description: Patient's Long Term Goal: Discharge home    Interventions:  - Control HR  - give scheduled meds  - See additional Care Plan goals for specific interventions  5/9/2024 0126 by Fina Adan RN  Outcome: Progressing  5/9/2024 0125 by Fina Adan RN  Outcome: Progressing     Problem: Patient/Family Goals  Goal: Patient/Family Short Term Goal  Description: Patient's Short Term Goal: Control HR    Interventions:   - Monitor HR  - give meds as needed  - cardiology to see  - See additional Care Plan goals for specific interventions  5/9/2024 0126 by Fina Adan RN  Outcome: Progressing  5/9/2024 0125 by Fina Adan RN  Outcome: Progressing     Problem: NEUROLOGICAL - ADULT  Goal: Achieves stable or improved neurological status  Description: INTERVENTIONS  - Assess for and report changes in neurological status  - Initiate measures to prevent increased intracranial pressure  - Maintain blood pressure and fluid volume within ordered parameters to optimize cerebral perfusion and minimize risk of hemorrhage  - Monitor temperature, glucose, and sodium. Initiate appropriate interventions as ordered  5/9/2024 0126 by Fina Adan RN  Outcome: Progressing  5/9/2024 0125 by Fina Adan RN  Outcome: Progressing     Problem: SAFETY ADULT - FALL  Goal: Free from fall injury  Description: INTERVENTIONS:  - Assess pt frequently for physical needs  - Identify cognitive and physical deficits and behaviors that affect risk of falls.  - [place] fall precautions as indicated by assessment.  - Educate pt/family on patient safety including physical limitations  - Instruct pt to call for assistance with activity based on assessment  - Modify environment to reduce risk of injury  - Provide  assistive devices as appropriate  - Consider OT/PT consult to assist with strengthening/mobility  - Encourage toileting schedule  5/9/2024 0126 by Fina Adan RN  Outcome: Progressing  5/9/2024 0125 by Fina Adan RN  Outcome: Progressing     Problem: Altered Communication/Language Barrier  Goal: Patient/Family is able to understand and participate in their care  Description: Interventions:  - Assess communication ability and preferred communication style  - Implement communication aides and strategies  - Use visual cues when possible  - Listen attentively, be patient, do not interrupt  - Minimize distractions  - Allow time for understanding and response  - Establish method for patient to ask for assistance (call light)  - Provide an  as needed  - Communicate barriers and strategies to overcome with those who interact with patient  5/9/2024 0126 by Fina Adan RN  Outcome: Progressing  5/9/2024 0125 by Fina Adan RN  Outcome: Progressing     Problem: CARDIOVASCULAR - ADULT  Goal: Maintains optimal cardiac output and hemodynamic stability  Description: INTERVENTIONS:  - Monitor vital signs, rhythm, and trends  - Monitor for bleeding, hypotension and signs of decreased cardiac output  - Evaluate effectiveness of vasoactive medications to optimize hemodynamic stability  - Monitor arterial and/or venous puncture sites for bleeding and/or hematoma  - Assess quality of pulses, skin color and temperature  - Assess for signs of decreased coronary artery perfusion - ex. Angina  - Evaluate fluid balance, assess for edema, trend weights  5/9/2024 0126 by Fina Adan RN  Outcome: Progressing  5/9/2024 0125 by Fina Adan RN  Outcome: Progressing  Goal: Absence of cardiac arrhythmias or at baseline  Description: INTERVENTIONS:  - Continuous cardiac monitoring, monitor vital signs, obtain 12 lead EKG if indicated  - Evaluate effectiveness of antiarrhythmic and heart rate control  medications as ordered  - Initiate emergency measures for life threatening arrhythmias  - Monitor electrolytes and administer replacement therapy as ordered  5/9/2024 0126 by Fina Adan, RN  Outcome: Not Progressing  5/9/2024 0125 by Fina Adan, RN  Outcome: Progressing

## 2024-05-10 ENCOUNTER — TELEPHONE (OUTPATIENT)
Dept: FAMILY MEDICINE CLINIC | Facility: CLINIC | Age: 74
End: 2024-05-10

## 2024-05-10 VITALS
DIASTOLIC BLOOD PRESSURE: 68 MMHG | RESPIRATION RATE: 18 BRPM | WEIGHT: 136.5 LBS | TEMPERATURE: 99 F | BODY MASS INDEX: 23 KG/M2 | OXYGEN SATURATION: 96 % | HEART RATE: 110 BPM | SYSTOLIC BLOOD PRESSURE: 126 MMHG

## 2024-05-10 LAB
ANION GAP SERPL CALC-SCNC: 9 MMOL/L (ref 0–18)
BASOPHILS # BLD AUTO: 0.07 X10(3) UL (ref 0–0.2)
BASOPHILS NFR BLD AUTO: 0.5 %
BUN BLD-MCNC: 14 MG/DL (ref 9–23)
BUN/CREAT SERPL: 19.7 (ref 10–20)
CALCIUM BLD-MCNC: 9.7 MG/DL (ref 8.7–10.4)
CHLORIDE SERPL-SCNC: 102 MMOL/L (ref 98–112)
CO2 SERPL-SCNC: 27 MMOL/L (ref 21–32)
CREAT BLD-MCNC: 0.71 MG/DL
DEPRECATED RDW RBC AUTO: 41.6 FL (ref 35.1–46.3)
EGFRCR SERPLBLD CKD-EPI 2021: 97 ML/MIN/1.73M2 (ref 60–?)
EOSINOPHIL # BLD AUTO: 0.32 X10(3) UL (ref 0–0.7)
EOSINOPHIL NFR BLD AUTO: 2.1 %
ERYTHROCYTE [DISTWIDTH] IN BLOOD BY AUTOMATED COUNT: 12.8 % (ref 11–15)
GLUCOSE BLD-MCNC: 114 MG/DL (ref 70–99)
HCT VFR BLD AUTO: 38.9 %
HGB BLD-MCNC: 12.8 G/DL
IMM GRANULOCYTES # BLD AUTO: 0.11 X10(3) UL (ref 0–1)
IMM GRANULOCYTES NFR BLD: 0.7 %
LYMPHOCYTES # BLD AUTO: 2.19 X10(3) UL (ref 1–4)
LYMPHOCYTES NFR BLD AUTO: 14.4 %
MAGNESIUM SERPL-MCNC: 2.2 MG/DL (ref 1.6–2.6)
MCH RBC QN AUTO: 29.2 PG (ref 26–34)
MCHC RBC AUTO-ENTMCNC: 32.9 G/DL (ref 31–37)
MCV RBC AUTO: 88.8 FL
MONOCYTES # BLD AUTO: 1.29 X10(3) UL (ref 0.1–1)
MONOCYTES NFR BLD AUTO: 8.5 %
NEUTROPHILS # BLD AUTO: 11.2 X10 (3) UL (ref 1.5–7.7)
NEUTROPHILS # BLD AUTO: 11.2 X10(3) UL (ref 1.5–7.7)
NEUTROPHILS NFR BLD AUTO: 73.8 %
OSMOLALITY SERPL CALC.SUM OF ELEC: 287 MOSM/KG (ref 275–295)
PLATELET # BLD AUTO: 419 10(3)UL (ref 150–450)
POTASSIUM SERPL-SCNC: 4.1 MMOL/L (ref 3.5–5.1)
POTASSIUM SERPL-SCNC: 4.1 MMOL/L (ref 3.5–5.1)
RBC # BLD AUTO: 4.38 X10(6)UL
SODIUM SERPL-SCNC: 138 MMOL/L (ref 136–145)
WBC # BLD AUTO: 15.2 X10(3) UL (ref 4–11)

## 2024-05-10 PROCEDURE — 99239 HOSP IP/OBS DSCHRG MGMT >30: CPT | Performed by: HOSPITALIST

## 2024-05-10 RX ORDER — LEVETIRACETAM 500 MG/1
500 TABLET ORAL 2 TIMES DAILY
Qty: 60 TABLET | Refills: 0 | Status: SHIPPED | OUTPATIENT
Start: 2024-05-10

## 2024-05-10 NOTE — CM/SW NOTE
05/10/24 0800   Discharge disposition   Expected discharge disposition Home-Health   Post Acute Care Provider [facility]   Discharge transportation Private car       Per chart and RN rounds, pt is cleared for DC today.    [facility] notified.      Pt is cleared from SW/CM stand point.      PLAN: Home w/ [facility]          Myra Luu, MSW, LSW [id number]

## 2024-05-10 NOTE — PLAN OF CARE
No acute changes overnight. No complaints of pain. Q2 turns. Safety precautions maintained and in place. Plan for home w/ HH pending medical clearance.    Problem: Patient Centered Care  Goal: Patient preferences are identified and integrated in the patient's plan of care  Description: Interventions:  - What would you like us to know as we care for you? Speaks Beninese  - Provide timely, complete, and accurate information to patient/family  - Incorporate patient and family knowledge, values, beliefs, and cultural backgrounds into the planning and delivery of care  - Encourage patient/family to participate in care and decision-making at the level they choose  - Honor patient and family perspectives and choices  Outcome: Progressing     Problem: Patient/Family Goals  Goal: Patient/Family Long Term Goal  Description: Patient's Long Term Goal: Discharge home    Interventions:  - Control HR  - give scheduled meds  - See additional Care Plan goals for specific interventions  Outcome: Progressing  Goal: Patient/Family Short Term Goal  Description: Patient's Short Term Goal: Control HR    Interventions:   - Monitor HR  - give meds as needed  - cardiology to see  - See additional Care Plan goals for specific interventions  Outcome: Progressing     Problem: NEUROLOGICAL - ADULT  Goal: Achieves stable or improved neurological status  Description: INTERVENTIONS  - Assess for and report changes in neurological status  - Initiate measures to prevent increased intracranial pressure  - Maintain blood pressure and fluid volume within ordered parameters to optimize cerebral perfusion and minimize risk of hemorrhage  - Monitor temperature, glucose, and sodium. Initiate appropriate interventions as ordered  Outcome: Progressing     Problem: SAFETY ADULT - FALL  Goal: Free from fall injury  Description: INTERVENTIONS:  - Assess pt frequently for physical needs  - Identify cognitive and physical deficits and behaviors that affect risk of  falls.  - [place] fall precautions as indicated by assessment.  - Educate pt/family on patient safety including physical limitations  - Instruct pt to call for assistance with activity based on assessment  - Modify environment to reduce risk of injury  - Provide assistive devices as appropriate  - Consider OT/PT consult to assist with strengthening/mobility  - Encourage toileting schedule  Outcome: Progressing     Problem: Altered Communication/Language Barrier  Goal: Patient/Family is able to understand and participate in their care  Description: Interventions:  - Assess communication ability and preferred communication style  - Implement communication aides and strategies  - Use visual cues when possible  - Listen attentively, be patient, do not interrupt  - Minimize distractions  - Allow time for understanding and response  - Establish method for patient to ask for assistance (call light)  - Provide an  as needed  - Communicate barriers and strategies to overcome with those who interact with patient  Outcome: Progressing     Problem: CARDIOVASCULAR - ADULT  Goal: Maintains optimal cardiac output and hemodynamic stability  Description: INTERVENTIONS:  - Monitor vital signs, rhythm, and trends  - Monitor for bleeding, hypotension and signs of decreased cardiac output  - Evaluate effectiveness of vasoactive medications to optimize hemodynamic stability  - Monitor arterial and/or venous puncture sites for bleeding and/or hematoma  - Assess quality of pulses, skin color and temperature  - Assess for signs of decreased coronary artery perfusion - ex. Angina  - Evaluate fluid balance, assess for edema, trend weights  Outcome: Progressing  Goal: Absence of cardiac arrhythmias or at baseline  Description: INTERVENTIONS:  - Continuous cardiac monitoring, monitor vital signs, obtain 12 lead EKG if indicated  - Evaluate effectiveness of antiarrhythmic and heart rate control medications as ordered  - Initiate  emergency measures for life threatening arrhythmias  - Monitor electrolytes and administer replacement therapy as ordered  Outcome: Progressing

## 2024-05-10 NOTE — CONSULTS
Ankle radiographs 5/9/24 reviewed.  No change in alignment of medial malleolus fracture/mortise.  Ok to discharge home from orthopedic surgery standpoint.  F/u as outpt in 2 weeks.  Remain NWB RLE.

## 2024-05-10 NOTE — TELEPHONE ENCOUNTER
Received a call from Jessica RN with [facility] for resumption of care however, per Jessica they cannot reach the patient or anyone listed on the release of information. She advised the care may be delayed until Sunday. After disconnecting the call I saw in the chart that patient was in [facility] and released today at 12:26 so I called Jessica back to inform her of this. She thanked me and will attempt to reach patient.

## 2024-05-10 NOTE — DISCHARGE PLANNING
Patient and his daughter were provided with discharge instructions, education, and follow up information. Prescriptions were already sent electronically to patient's pharmacy. Patient's daughter verbalizes understanding of follow up information, specifically following up with neurologist specializing in epilepsy, PCP, orthopedic doctor, wearing CAM boot, patient's non-weight bearing status for the right leg, understanding seizure precautions and safety, medications prescribed. They were provided with the Xarelto 30 day free trial card and assistance program phone number on the back of the card. Patient and his daughter  have no questions after reviewing all instructions and will be going home.     Adeola SINGH, Discharge Leader [phone]

## 2024-05-13 ENCOUNTER — PATIENT OUTREACH (OUTPATIENT)
Dept: CASE MANAGEMENT | Age: 74
End: 2024-05-13

## 2024-05-13 NOTE — PAYOR COMM NOTE
--------------  DISCHARGE REVIEW    Payor: [facility] [place]  Subscriber #:  [id number]  Authorization Number: [id number]    Admit date: 24  Admit time:  12:54 AM  Discharge Date: 5/10/2024 12:26 PM     Admitting Physician: Rocio Cavazos MD  Attending Physician:  No att. providers found  Primary Care Physician: Irving Sheets DO          Discharge Summary Notes        Discharge Summary signed by Bridget Davis MD at 5/10/2024 10:06 AM       Author: Bridget Davis MD Specialty: HOSPITALIST, Internal Medicine Author Type: Physician    Filed: 5/10/2024 10:06 AM Date of Service: 5/10/2024 10:06 AM Status: Addendum    : Bridget Davis MD (Physician)    Related Notes: Original Note by Bridget Davis MD (Physician) filed at 2024 12:25 PM           [facility]  part of [facility]    Discharge Summary    Yoseph Cordero Patient Status:  Inpatient    1950 MRN [id number]   Location [facility] 3W/SW Attending Bridget Davis MD   Hosp Day # 2 PCP Irving Sheets DO     Date of Admission: 2024      Date of Discharge: 05/10/24      Admitting Diagnosis: Tachycardia [R00.0]  Altered mental status, unspecified altered mental status type [R41.82]    Hospital Discharge Diagnoses:  Seizure disorder    Lace+ Score: 79  59-90 High Risk  29-58 Medium Risk  0-28   Low Risk.    TCM Follow-Up Recommendation:  LACE > 58: High Risk of readmission after discharge from the hospital.          Problem List:   Patient Active Problem List   Diagnosis    Hemiplegia affecting right dominant side (HCC)    Chronic obstructive pulmonary disease (HCC)    Combined forms of age-related cataract of both eyes    Esophageal reflux    Late onset Alzheimer's disease with behavioral disturbance (HCC)    Alcohol abuse    Calcification of aorta (HCC)    Alcoholism (HCC)    History of stroke    Dysphagia    Hyperglycemia    Weakness    Gait disturbance    Abnormal  involuntary movement    Acute pulmonary embolism without acute cor pulmonale, unspecified pulmonary embolism type (HCC)    Factor V Leiden (HCC)    Leukocytosis    Tachycardia    Altered mental status, unspecified altered mental status type    Seizures (HCC)         Physical Exam:     Gen: No acute distress  Pulm: Lungs clear, normal respiratory effort  CV: Heart with regular rate and rhythm  Abd: Abdomen soft, nontender, nondistended, bowel sounds present  Neuro: No acute focal deficits  MSK: Full range of motion in extremities  Skin: Warm and dry  Psych: Normal affect  Ext: no c/c/e      History of Present Illness: Per Dr Gates    73-year-old male with significant past medical history of dementia, history of CVA, recent diagnosis pulmonary embolism, factor V Leyden mutation on Xarelto who present to the emergency room after visit with PCP today.  Of note patient was recently admitted from May 3, 2024 through May 5, 2024.  During that admission was diagnosed with an acute right lower lobe segmental PE.  TTE unremarkable.  Recent diagnosis of right medial malleolus fracture as well.  Discharged with appropriate follow-up.  Today while visiting his primary care provider based on exam patient was found to be somnolent/lethargic with tachycardia.  Decision was made to send back to the emergency room.     In the ED blood pressure 140/54, pulse 119 temperature 99.6.  Complete blood count notable for a white blood cell count of 15.1, otherwise unremarkable workup.  CT brain with no acute intracranial abnormality.     Cardiology and neurology consulted.    Hospital Course:     #  Tachycardia - Sinus  -  Recent PE diagnosis.    -  Unclear cause.  Continue on cardiac monitor. Could be related to fever/seizures  -  Cardiology consulted      #  Leukocytosis  - No clear infectious source.  Urine and blood cultures pending.   - Possible stress reaction.  Will Monitor     # PE  #Factor V Leyden Mutation  - On Xarelto  (currently on 15 mg BID until completed 21 days then switch to 20 mg dialy).       #Dementia  -Alzheimer's Diagnosis.    - On Quetiapine nightly.  Lorazapem for anxiety.       #Seizures  -with abnormal EEG neuro recs were for Keppra 500 mg bid     Discharge Condition: Stable    Discharge Medications:      Discharge Medications        START taking these medications        Instructions Prescription details   levETIRAcetam 500 MG Tabs  Commonly known as: Keppra      Take 1 tablet (500 mg total) by mouth 2 (two) times daily.   Quantity: 60 tablet  Refills: 0            CONTINUE taking these medications        Instructions Prescription details   LORazepam 0.5 MG Tabs  Commonly known as: Ativan      Take 1 tablet (0.5 mg total) by mouth every 6 (six) hours as needed for Anxiety.   Quantity: 30 tablet  Refills: 1     rivaroxaban 15 & 20 MG Tbpk      Take As Directed based on package instructions: Days 1-21: 15 mg by mouth twice daily Days 22-30: 20 mg by mouth once daily   Quantity: 51 each  Refills: 0               Where to Get Your Medications        These medications were sent to [id number] - [place], [place] - [address] AT [facility], [phone], [phone]  [address] , [place] [address]      Phone: [phone]   levETIRAcetam 500 MG Tabs             Greater than 30 minutes spent on preparation and coordination of this discharge    Electronically signed by Bridget Davis MD on 5/10/2024 10:06 AM         REVIEWER COMMENTS

## 2024-05-13 NOTE — PROGRESS NOTES
NCM placed call to patient for HFU Non-TCM, LM requesting a call back to [phone] with a condition update.    Discharge Dx:   Tachycardia  Altered mental status, unspecified AMS type  Seizure disorder  Recent DX of right medial malleolus fracture

## 2024-05-15 ENCOUNTER — TELEPHONE (OUTPATIENT)
Dept: FAMILY MEDICINE CLINIC | Facility: CLINIC | Age: 74
End: 2024-05-15

## 2024-05-15 NOTE — TELEPHONE ENCOUNTER
HH orders received, signed by provider and faxed back successfully.    Placed in HH folder with scan sheet.

## 2024-05-16 ENCOUNTER — HOSPITAL ENCOUNTER (OUTPATIENT)
Dept: GENERAL RADIOLOGY | Facility: HOSPITAL | Age: 74
Discharge: HOME | End: 2024-05-16
Attending: ORTHOPAEDIC SURGERY

## 2024-05-16 ENCOUNTER — OFFICE VISIT (OUTPATIENT)
Dept: ORTHOPEDICS CLINIC | Facility: CLINIC | Age: 74
End: 2024-05-16

## 2024-05-16 DIAGNOSIS — Z47.89 ORTHOPEDIC AFTERCARE: ICD-10-CM

## 2024-05-16 DIAGNOSIS — S82.54XD NONDISPLACED FRACTURE OF MEDIAL MALLEOLUS OF RIGHT TIBIA, SUBSEQUENT ENCOUNTER FOR CLOSED FRACTURE WITH ROUTINE HEALING: Primary | ICD-10-CM

## 2024-05-16 PROCEDURE — 99213 OFFICE O/P EST LOW 20 MIN: CPT | Performed by: ORTHOPAEDIC SURGERY

## 2024-05-16 PROCEDURE — 1159F MED LIST DOCD IN RCRD: CPT | Performed by: ORTHOPAEDIC SURGERY

## 2024-05-16 PROCEDURE — 1111F DSCHRG MED/CURRENT MED MERGE: CPT | Performed by: ORTHOPAEDIC SURGERY

## 2024-05-16 PROCEDURE — 1160F RVW MEDS BY RX/DR IN RCRD: CPT | Performed by: ORTHOPAEDIC SURGERY

## 2024-05-16 PROCEDURE — 73610 X-RAY EXAM OF ANKLE: CPT | Performed by: ORTHOPAEDIC SURGERY

## 2024-05-16 NOTE — PROGRESS NOTES
NURSING INTAKE COMMENTS:   Chief Complaint   Patient presents with    Consult     Right ankle fracture, unable to say if he is in pain       HPI: This 73 year old male presents today with complaints of right ankle injury follow-up.  Is been 2 weeks since time of his injury.  He has been wearing the boot remain nonweightbearing since the injury.  He has dementia and his family cares for him.  His grandson has been carrying him up and down stairs the family has noted some swelling in the right knee over the past few days.  No recent injury.    Past Medical History:    Anxiety state, unspecified    CVA (cerebral infarction)    Dementia (HCC)    Depression    Heterozygous factor V Leiden mutation (HCC)    Other and unspecified hyperlipidemia    Other ill-defined conditions(799.89)    Cardiomegaly Pleural effusion w/idopathic pleuropuricarditis    Pulmonary embolism (HCC)    Stroke ([facility])    Unspecified sleep apnea     Past Surgical History:   Procedure Laterality Date    Colonoscopy      Electrocardiogram, complete  1914    Scanned to Media Tab     Current Outpatient Medications   Medication Sig Dispense Refill    levETIRAcetam 500 MG Oral Tab Take 1 tablet (500 mg total) by mouth 2 (two) times daily. 60 tablet 0    rivaroxaban 15 & 20 MG Oral Tablet Therapy Pack Take As Directed based on package instructions: Days 1-21: 15 mg by mouth twice daily Days 22-30: 20 mg by mouth once daily 51 each 0    LORazepam 0.5 MG Oral Tab Take 1 tablet (0.5 mg total) by mouth every 6 (six) hours as needed for Anxiety. 30 tablet 1     No Known Allergies  Family History   Problem Relation Age of Onset    Cancer Father         Lung - Smoker  Cause of death    Other (Other) Mother         during     Diabetes Neg     Glaucoma Neg        Social History     Occupational History    Not on file   Tobacco Use    Smoking status: Former    Smokeless tobacco: Never   Vaping Use    Vaping status: Never Used   Substance and Sexual  Activity    Alcohol use: No     Alcohol/week: 0.0 standard drinks of alcohol     Comment: none recently    Drug use: No    Sexual activity: Not on file        Review of Systems:  GENERAL: denies fevers, chills, night sweats, fatigue, unintentional weight loss/gain  SKIN: denies skin lesions, open sores, rash  HEENT:denies recent vision change, new nasal congestion,hearing loss, tinnitus, sore throat, headaches  RESPIRATORY: denies new shortness of breath, cough, asthma, wheezing  CARDIOVASCULAR: denies chest pain, leg cramps with exertion, palpitations, leg swelling  GI: denies abdominal pain, nausea, vomiting, diarrhea, constipation, hematochezia, worsening heartburn or stomach ulcers  : denies dysuria, hematuria, incontinence, increased frequency, urgency, difficulty urinating  MUSCULOSKELETAL: denies musculoskeletal complaints other than in HPI  NEURO: denies numbness, tingling, weakness, balance issues, dizziness, memory loss  PSYCHIATRIC: denies Hx of depression, anxiety, other psychiatric disorders  HEMATOLOGIC: denies blood clots, anemia, blood clotting disorders, blood transfusion  ENDOCRINE: denies autoimmune disease, thyroid issues, or diabetes  ALLERGY: denies asthma, seasonal allergies    Physical Examination:    There were no vitals taken for this visit.  Constitutional: appears well hydrated, alert and responsive, no acute distress noted  Extremities: Right knee 1+ effusion.  There does not appear to be any pain with passive range of motion of the knee.  Right ankle completely nontender.  Swelling dramatically improvement.  Mild ecchymosis over the anterior distal leg medial and lateral.  Neurological: Unchanged    Imaging:   XR ANKLE (MIN 3 VIEWS), RIGHT (CPT=73610)    Result Date: 5/16/2024         PROCEDURE: XR ANKLE (MIN 3 VIEWS), RIGHT (CPT=73610)  COMPARISON: [facility], XR ANKLE (MIN 3 VIEWS), RIGHT (CPT=73610), 5/09/2024, 7:03 PM.  INDICATIONS: Right ankle orthopedic aftercare   TECHNIQUE: Three views Findings and impression:  Stable 5 x 1 millimeter mildly distracted cortical fracture along the lateral margin of the medial malleolus  No other fracture  Normal alignment    Dictated by (CST): Crow Ellis MD on 2024 at 2:41 PM     Finalized by (CST): Crow Ellis MD on 2024 at 2:43 PM          XR ANKLE (MIN 3 VIEWS), RIGHT (CPT=73610)    Result Date: 2024  PROCEDURE: XR ANKLE (MIN 3 VIEWS), RIGHT (CPT=73610)  COMPARISON: [facility], XR ANKLE (MIN 3 VIEWS), RIGHT (CPT=73610), 2024, 4:44 PM.  INDICATIONS: Follow up right ankle fracture post accidental fall.  TECHNIQUE: 3 views were obtained.    FINDINGS/CONCLUSION:          Unchanged nondisplaced fracture of the medial malleolus.  No new fracture.  No acute dislocation.  Vascular calcifications are again seen.  Soft tissue swelling of the ankle.    elm-remote     Dictated by (CST): Quintin Strickland MD on 2024 at 7:48 PM     Finalized by (CST): Quintin Strickland MD on 2024 at 7:50 PM          CARD ECHO LIMITED (CPT=93308)    Result Date: 2024  Transthoracic Echocardiogram Name:Yoseph Cordero Date: 2024 :  1950 Ht:  (65in)  BP: 123 / 59 MRN:  [id number]    Age:  73years    Wt:  (141lb) HR: 108bpm Loc:  [facility]       Gndr: M          BSA: 1.71m^2 Sonographer: Barbra DALE Ordering:    Guy Jalloh Consulting:  Aaron Cheung ---------------------------------------------------------------------------- History/Indications:   Tachycardia, Recent PE. ---------------------------------------------------------------------------- Procedure information:  A transthoracic echocardiogram, limited study was performed. Additional evaluation included M-mode and limited 2D.  Patient status:  Inpatient.  Location:  Echo laboratory.    Comparison was made to the study of 2024.    This was a routine study. Transthoracic echocardiography for diagnosis. Image quality was adequate. The study  was technically limited due to poor acoustic window availability, poor patient compliance, and agitation. ---------------------------------------------------------------------------- Conclusions: 1. Left ventricle: The cavity size was normal. Wall thickness was normal.    Systolic function was normal. The estimated ejection fraction was 60-65%,    by biplane method of disks. Wall motion is normal; there are no regional    wall motion abnormalities. Unable to assess LV diastolic function. 2. Right ventricle: The cavity size was normal. Systolic function was    normal. 3. Pulmonary arteries: The peak systolic pressure is 31mm Hg. * ---------------------------------------------------------------------------- * Findings: Left ventricle:  The cavity size was normal. Wall thickness was normal. Systolic function was normal. The estimated ejection fraction was 60-65%, by biplane method of disks. Wall motion is normal; there are no regional wall motion abnormalities. Unable to assess LV diastolic function. Left atrium:  The atrium was normal in size. Right ventricle:  The cavity size was normal. Systolic function was normal. Mitral valve:  The valve was structurally normal. Leaflet separation was normal. Aortic valve:  Well visualized.  The valve was trileaflet. The leaflets were normal thickness. Cusp separation was normal. Tricuspid valve:  Well visualized. The annulus is normal-sized. The leaflets are normal thickness. No echocardiographic evidence for tricuspid prolapse. Doppler:  Transvalvular velocity was within the normal range. There was no evidence for stenosis. There was trace regurgitation. Pericardium:   There was no pericardial effusion. Pleura:  No evidence of pleural fluid accumulation. Pulmonary arteries: There was no evidence of pulmonary hypertension. Systemic veins:  Central venous respirophasic diameter changes are blunted (< 50%). Inferior vena cava: The IVC was normal-sized.  ---------------------------------------------------------------------------- Measurements  Left ventricle         Value        Ref       05/05/2024  IVS thickness, ED,     1.0   cm     0.6 - 1.0 1.0  PLAX  LV ID, ED, PLAX    (L) 3.9   cm     4.2 - 5.8 4.1  LV ID, ES, PLAX        2.6   cm     2.5 - 4.0 2.6  LV PW thickness,       1.0   cm     0.6 - 1.0 1.0  ED, PLAX  IVS/LV PW ratio,       1.00         --------- 1.00  ED, PLAX  LV PW/LV ID ratio,     0.26         --------- 0.24  ED, PLAX  LV ejection            63    %      52 - 72   67  fraction  LV end-diastolic       69    ml     69 - 185  ----------  volume, 1-p A4C  LV ejection            66    %      46 - 74   ----------  fraction, 1-p A4C  Stroke volume, 1-p     45    ml     --------- ----------  A4C  LV end-diastolic       41    ml/m^2 37 - 93   ----------  volume/bsa, 1-p  A4C  Stroke volume/bsa,     27    ml/m^2 --------- ----------  1-p A4C  LV end-diastolic   (L) 46    ml     62 - 150  ----------  volume, 2-p  LV end-systolic    (L) 17    ml     21 - 61   ----------  volume, 2-p  LV ejection            64    %      52 - 72   ----------  fraction, 2-p  Stroke volume, 2-p     29    ml     --------- ----------  LV end-diastolic   (L) 27    ml/m^2 34 - 74   ----------  volume/bsa, 2-p  LV end-systolic    (L) 10    ml/m^2 11 - 31   ----------  volume/bsa, 2-p  Stroke volume/bsa,     16.9  ml/m^2 --------- ----------  2-p  Pulmonary artery       Value        Ref       05/05/2024  PA pressure, S, DP     31    mm Hg  --------- ----------  Tricuspid valve        Value        Ref       05/05/2024  Tricuspid regurg       2.42  m/sec  <=2.8     2.27  peak velocity  Tricuspid peak         23    mm Hg  --------- 21  RV-RA gradient  Systemic veins         Value        Ref       05/05/2024  Estimated CVP          8     mm Hg  --------- ----------  Inferior vena cava     Value        Ref       05/05/2024  ID                     1.7   cm     <=2.1     ----------  Right  ventricle        Value        Ref       05/05/2024  TAPSE, 2D              1.93  cm     >=1.70    1.65  TAPSE, MM              1.93  cm     >=1.70    1.65  RV pressure, S, DP     31    mm Hg  --------- ----------  RV s', lateral         18.5  cm/sec >=9.5     11.0 Legend: (L)  and  (H)  marta values outside specified reference range. ---------------------------------------------------------------------------- Prepared and electronically signed by Logan Milligan 05/09/2024 07:03     CT BRAIN OR HEAD (55781)    Result Date: 5/7/2024  PROCEDURE: CT BRAIN OR HEAD (CPT=70450)  COMPARISON: [facility], CT BRAIN OR HEAD (CPT=70450), 7/27/2023, 0:27 AM.  [facility], MRI BRAIN WO ACUTE (3) SEQUENCE(CPT=70551), 10/26/2022, 9:01 PM.  [facility], CT BRAIN OR HEAD (CPT=70450), 10/24/2022,  8:59 PM.  INDICATIONS: altered mental status.  TECHNIQUE: CT images were obtained without contrast material.  Automated exposure control for dose reduction was used.  Dose information is transmitted to the ACR (American College of Radiology) NRDR (National Radiology Data Registry) which includes the Dose Index Registry.  FINDINGS:  VENTRICLES: No hydrocephalus.  EXTRA-AXIAL: No extraaxial hemorrhage.  No midline shift or herniation.  PARENCHYMA: No CT evidence of acute or subacute infarct.  No mass.  Moderately extensive left hemispheric encephalomalacia and gliosis, as well as right temporal gliosis.  These findings are similar to the prior exam.    SOFT TISSUES: No acute abnormality. BONES: No acute fracture. SINUSES: Mucosal thickening in the maxillary and ethmoid sinuses. ORBITS: No acute abnormality. MASTOIDS: Clear. EACS: Clear.          CONCLUSION:   No acute intracranial abnormality.  Moderately extensive left hemispheric and right temporal lobe gliosis is similar to the prior exam.  Mild-to-moderate sinusitis.    elm-remote     Dictated by (CST): Quintin Strickland MD on 5/07/2024 at 9:41 PM      Finalized by (CST): Quintin Strickland MD on 2024 at 9:43 PM          XR CHEST AP PORTABLE  (CPT=71045)    Result Date: 2024  PROCEDURE: XR CHEST AP PORTABLE  (CPT=71045) TIME: 18:04.  COMPARISON: [facility], CT CHEST(CONTRAST ONLY) (CPT=71260), 2024, 4:08 PM.  [facility], XR CHEST PA + LAT CHEST (ZHD=25804), 3/11/2019, 10:19 AM.  INDICATIONS: Rapid heart rate.  TECHNIQUE:   Single view.   FINDINGS:  CARDIAC/VASC: Normal.  No cardiac silhouette abnormality or cardiomegaly.  Unremarkable pulmonary vasculature.  MEDIAST/MARTINA:   No visible mass or adenopathy. LUNGS/PLEURA: Mild scarring is again noted in the left lower lobe.  No pleural effusion or pneumothorax. BONES: There is mild acromioclavicular joint osteoarthritis bilaterally. OTHER: Negative.          CONCLUSION:  1. Stable mild scarring in the left lower lobe. 2. Otherwise no acute cardiopulmonary process.    Dictated by (CST): El Weiss MD on 2024 at 6:24 PM     Finalized by (CST): El Weiss MD on 2024 at 6:25 PM          CARD ECHO 2D DOPPLER (CPT=93306)    Result Date: 2024  Transthoracic Echocardiogram Name:Yoseph Cordero Date: 2024 :  1950 Ht:  (65in)  BP: 105 / 59 MRN:  [id number]    Age:  73years    Wt:  (131lb) HR: 97bpm Loc:  [facility]       Gndr: M          BSA: 1.65m^2 Sonographer: Jewel Ordering:    Nyasia Stafford Consulting:  Hung Harp ---------------------------------------------------------------------------- History/Indications:  Pulmonary Embolism.  Elevated D-Dimer. ---------------------------------------------------------------------------- Procedure information:  A transthoracic complete 2D study was performed. Additional evaluation included M-mode, complete spectral Doppler, and color Doppler.  Patient status:  Inpatient.  Location:  Bedside.    No prior study was available for comparison.    This was a routine study.  Transthoracic echocardiography for diagnosis and ventricular function evaluation. Image quality was adequate. The study was technically limited due to poor patient compliance, restricted patient mobility, body habitus, agitation, and patient supine. ECG rhythm:   Normal sinus ---------------------------------------------------------------------------- Conclusions: 1. Left ventricle: The cavity size was normal. Wall thickness was normal.    Systolic function was normal. The estimated ejection fraction was 60-65%,    by visual assessment. No diagnostic evidence for regional wall motion    abnormalities. Left ventricular diastolic function parameters were    normal. 2. Left atrium: The left atrial volume was normal. 3. Aortic valve: There was mild regurgitation. Impressions:  No previous study was available for comparison. * ---------------------------------------------------------------------------- * Findings: Left ventricle:  The cavity size was normal. Wall thickness was normal. Systolic function was normal. The estimated ejection fraction was 60-65%, by visual assessment. No diagnostic evidence for regional wall motion abnormalities. Left ventricular diastolic function parameters were normal. Left atrium:  The left atrial volume was normal. Right ventricle:  The cavity size was normal. Systolic function was normal. Right atrium:  Well visualized. The atrium was normal in size. Mitral valve:  The valve was structurally normal. Leaflet separation was normal.  Doppler:  Transvalvular velocity was within the normal range. There was no evidence for stenosis. There was trace regurgitation. Aortic valve:   The valve was trileaflet. The leaflets were mildly calcified. Cusp separation was normal.  Doppler:  Transvalvular velocity was within the normal range. There was no evidence for stenosis. There was mild regurgitation.    The peak systolic gradient was 6mm Hg. Tricuspid valve:  The valve is structurally normal. Leaflet  separation was normal.  Doppler:  Transvalvular velocity was within the normal range. There was no evidence for stenosis. There was trivial regurgitation. Pulmonic valve:   The valve is structurally normal. Cusp separation was normal.  Doppler:  Transvalvular velocity was within the normal range. There was no evidence for stenosis. There was no significant regurgitation. Pericardium:   There was no pericardial effusion. Aorta: Aortic root: The aortic root was normal. Pulmonary arteries: Systolic pressure could not be accurately estimated. Systemic veins:  Not visualized. ---------------------------------------------------------------------------- Measurements  Left ventricle                    Value        Ref  IVS thickness, ED, PLAX           1.0   cm     0.6 -                                                 1.0  LV ID, ED, PLAX               (L) 4.1   cm     4.2 -                                                 5.8  LV ID, ES, PLAX                   2.6   cm     2.5 -                                                 4.0  LV PW thickness, ED, PLAX         1.0   cm     0.6 -                                                 1.0  IVS/LV PW ratio, ED, PLAX         1.00         --------  LV PW/LV ID ratio, ED, PLAX       0.24         --------  LV ejection fraction              67    %      52 - 72  Stroke volume/bsa, 2D             22    ml/m^2 --------  LV e', lateral                    13.1  cm/sec >=10.0  LV E/e', lateral                  6            <=13  LV e', medial                     9.3   cm/sec >=7.0  LV E/e', medial                   8            --------  LV e', average                    11.2  cm/sec --------  LV E/e', average                  7            <=14  LVOT                              Value        Ref  LVOT ID                           1.8   cm     --------  LVOT peak velocity, S             0.9   m/sec  --------  LVOT VTI, S                       14.0  cm     --------  LVOT peak gradient, S              3     mm Hg  --------  LVOT mean gradient, S             2     mm Hg  --------  Stroke volume (SV), LVOT DP       36    ml     --------  Stroke index (SV/bsa), LVOT       22    ml/m^2 --------  DP  Aortic valve                      Value        Ref  Aortic valve peak velocity, S     1.25  m/sec  --------  Aortic peak gradient, S           6     mm Hg  --------  Velocity ratio, peak, LVOT/AV     0.72         --------  Aortic root                       Value        Ref  Aortic root ID                    3.4   cm     2.4 -                                                 3.9  Left atrium                       Value        Ref  LA volume, S                      31    ml     18 - 58  LA volume/bsa, S                  19    ml/m^2 16 - 34  LA volume, ES, 1-p A4C            29    ml     18 - 58  LA volume, ES, 1-p A2C            31    ml     18 - 58  LA volume, ES, A/L                32    ml     --------  LA volume/bsa, ES, A/L            19    ml/m^2 16 - 34  Mitral valve                      Value        Ref  Mitral E-wave peak velocity       0.75  m/sec  --------  Mitral A-wave peak velocity       0.92  m/sec  --------  Mitral deceleration time          179   ms     --------  Mitral peak gradient, D           2     mm Hg  --------  Mitral E/A ratio, peak            0.8          --------  Tricuspid valve                   Value        Ref  Tricuspid regurg peak             2.27  m/sec  <=2.8  velocity  Tricuspid peak RV-RA gradient     21    mm Hg  --------  Right atrium                      Value        Ref  RA ID, S-I, ES, A4C               3.7   cm     3.4 -                                                 5.3  RA ID/bsa, S-I, ES, A4C           2.3   cm/m^2 1.8 -                                                 3.0  RA area, ES, A4C              (L) 9     cm^2   10 - 18  RA volume, ES, 1-p A4C            19    ml     --------  RA volume/bsa, ES, 1-p A4C        11    ml/m^2 11 - 39  Right ventricle                    Value        Ref  TAPSE, 2D                     (L) 1.65  cm     >=1.70  TAPSE, MM                     (L) 1.65  cm     >=1.70  RV s', lateral                    11.0  cm/sec >=9.5 Legend: (L)  and  (H)  marta values outside specified reference range. ---------------------------------------------------------------------------- Prepared and electronically signed by Darrion Kimball MD 05/05/2024 12:35     CT CHEST(CONTRAST ONLY) (CPT=71260)    Result Date: 5/3/2024  PROCEDURE: CT CHEST(CONTRAST ONLY) (CPT=71260)  COMPARISON: [facility], CT ABDOMEN PELVIS IV CONTRAST NO ORAL (ER), 5/13/2021, 2:37 PM.  [facility], CT CHEST W CONTRAST, 1/02/2008, 5:30 AM.  INDICATIONS: Tachycardia.  Palpitations.  TECHNIQUE: CT images of the chest were obtained with non-ionic intravenous contrast material.  Automated exposure control for dose reduction was used. Adjustment of the mA and/or kV was done based on the patient's size. Use of iterative reconstruction technique for dose reduction was used. Dose information is transmitted to the ACR (American College of Radiology) NRDR (National Radiology Data Registry) which includes the Dose Index Registry.  FINDINGS:  CARDIAC: Mild-to-moderate coronary artery calcification. MEDIASTINUM/MARTINA: No suspicious mass or lymphadenopathy.  Calcified mediastinal and left hilar lymph nodes. PULMONARY ARTERIES: Segmental nonocclusive emboli in the right lower lobe.  Probable small nonocclusive embolus at the left main pulmonary artery branch point.  A pulmonary embolus protocol was not utilized to evaluate beyond segmental vessels. AORTA: Normal.  No aneurysm or dissection. LUNGS/PLEURA: Few calcified bilateral pulmonary granulomas.  Biapical pleural parenchymal scarring with left apical pleural calcification.  Probable subsegmental scarring in the lateral basal and posterior basal left lower lobe. No consolidation or pleural effusion. CHEST WALL: Mild symmetric bilateral  gynecomastia is unchanged.  No mass or axillary adenopathy.  LIMITED ABDOMEN: Small hiatal hernia.  A 3.5 cm enhancing lesion in the spleen measured 2.8 cm in 2008, and is unchanged from 2021. and likely represents a hemangioma. BONES: Normal.  No suspect bone lesion or fracture.  OTHER: Negative.          CONCLUSION:  1. Segmental right lower lobe pulmonary emboli.  Probable small nonocclusive embolus at the left main pulmonary artery branch point.  A dedicated pulmonary embolus protocol was not utilized. 2. 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 3. Mild-to-moderate coronary artery calcification. 4. Prior granulomatous disease in the chest..    Dictated by (CST): Apolinar Barry MD on 5/03/2024 at 4:58 PM     Finalized by (CST): Apolinar Barry MD on 5/03/2024 at 5:12 PM          US VENOUS DOPPLER LEG RIGHT - DIAG IMG (CPT=93971)    Result Date: 5/3/2024  PROCEDURE: US VENOUS DOPPLER LEG RIGHT-DIAG IMG (CPT=93971)  COMPARISON: [facility], US VENOUS DOPPLER LEG BILAT-DIAG IMG (CPT=93970), 10/24/2022, 7:50 PM.  INDICATIONS: Rt Leg edema; Tachycardia  TECHNIQUE: Color duplex Doppler venous ultrasound of the right lower extremity was performed in the usual manner.  FINDINGS: The femoral and popliteal veins appear normal.  Normal flow was demonstrated with color and pulsed Doppler.  Visualized portions of the great and small saphenous, posterior tibial, and peroneal veins appear normal.   THROMBI: None visible. COMPRESSIBILITY: Normal. OTHER: Negative.         CONCLUSION: No DVT    Dictated by (CST): Crow Elils MD on 5/03/2024 at 3:51 PM     Finalized by (CST): Crow Ellis MD on 5/03/2024 at 3:52 PM          XR FOOT, COMPLETE (MIN 3 VIEWS), RIGHT (CPT=73630)    Result Date: 5/3/2024  PROCEDURE: XR FOOT, COMPLETE (MIN 3 VIEWS), RIGHT (CPT=73630)  COMPARISON: [facility], XR  ANKLE (MIN 3 VIEWS), RIGHT (CPT=73610), 5/02/2024, 4:44 PM.  [facility], XR FOOT, COMPLETE (MIN 3 VIEWS), RIGHT (CPT=73630), 10/24/2022, 8:09 PM.  INDICATIONS: Right foot pain and swelling post fall.  TECHNIQUE: 3 views were obtained.   FINDINGS:  BONES: There is subtle cortical irregularity involving the medial aspect of the medial malleolus.  The right foot is otherwise without acute fracture or dislocation.  There is stable mild Achilles enthesopathy. SOFT TISSUES: There is soft tissue swelling throughout the dorsal aspect of the foot extending to the ankle.  Vascular calcifications are again noted posterior to the ankle. EFFUSION: None visible. OTHER: Negative.         CONCLUSION:  1. Cortical irregularity involving the medial malleolus, which could represent a nondisplaced fracture of uncertain chronicity, possibly subacute to chronic.  Correlate clinically for overlying point tenderness. 2. Stable mild Achilles enthesopathy.    Dictated by (CST): El Weiss MD on 5/03/2024 at 9:22 AM     Finalized by (CST): El Weiss MD on 5/03/2024 at 9:23 AM          XR ANKLE (MIN 3 VIEWS), RIGHT (CPT=73610)    Result Date: 5/3/2024  PROCEDURE: XR ANKLE (MIN 3 VIEWS), RIGHT (CPT=73610)  COMPARISON: [facility], XR FOOT, COMPLETE (MIN 3 VIEWS), RIGHT (CPT=73630), 10/24/2022, 8:09 PM.  [facility], XR FOOT, COMPLETE (MIN 3 VIEWS), RIGHT (CPT=73630), 5/02/2024, 4:44 PM.  INDICATIONS: Right ankle pain and swelling post fall.  TECHNIQUE: 3 views were obtained.   FINDINGS:  BONES: There is subtle cortical irregularity involving the medial aspect of the medial malleolus.  The mortise is otherwise intact.  There is stable mild Achilles enthesopathy. SOFT TISSUES: There is soft tissue swelling throughout the dorsal aspect of the foot extending to the ankle.  Vascular calcifications are again noted posterior to the ankle. EFFUSION: None visible.  OTHER: Negative.         CONCLUSION:  1. Cortical irregularity involving the medial malleolus, which could represent a nondisplaced fracture of uncertain chronicity, possibly subacute to chronic.  Correlate clinically for overlying point tenderness. 2. Stable mild Achilles enthesopathy.    Dictated by (CST): El Weiss MD on 5/03/2024 at 9:17 AM     Finalized by (CST): El Weiss MD on 5/03/2024 at 9:22 AM             Labs:  Lab Results   Component Value Date    WBC 15.2 (H) 05/10/2024    HGB 12.8 (L) 05/10/2024    .0 05/10/2024      Lab Results   Component Value Date     (H) 05/10/2024    BUN 14 05/10/2024    CREATSERUM 0.71 05/10/2024    GFR >60 07/07/2014    GFRNAA 92 05/13/2021    GFRAA 106 05/13/2021        Assessment and Plan:  Diagnoses and all orders for this visit:    Nondisplaced fracture of medial malleolus of right tibia, subsequent encounter for closed fracture with routine healing    Orthopedic aftercare  -     XR ANKLE (MIN 3 VIEWS), RIGHT (CPT=73610); Future        Assessment: Right medial malleolus fracture, healing.  Right knee effusion    Plan: I advised icing and topical Voltaren gel to the right knee.  Consider aspiration and injection if the swelling persist.  As far as the ankle is concerned, advised continued use of the walker boot.  He may begin partial weightbearing on the extremity with a walker.  Follow-up x-rays in 3 to 4 weeks.    Follow Up: Return in about 4 weeks (around 6/13/2024).    SHAYNA GUZMAN MD

## 2024-05-17 ENCOUNTER — TELEPHONE (OUTPATIENT)
Dept: FAMILY MEDICINE CLINIC | Facility: CLINIC | Age: 74
End: 2024-05-17

## 2024-05-17 DIAGNOSIS — F02.818 LATE ONSET ALZHEIMER'S DISEASE WITH BEHAVIORAL DISTURBANCE (HCC): ICD-10-CM

## 2024-05-17 DIAGNOSIS — G30.1 LATE ONSET ALZHEIMER'S DISEASE WITH BEHAVIORAL DISTURBANCE (HCC): ICD-10-CM

## 2024-05-17 DIAGNOSIS — Z86.73 HISTORY OF STROKE: ICD-10-CM

## 2024-05-17 DIAGNOSIS — D72.829 LEUKOCYTOSIS, UNSPECIFIED TYPE: Primary | ICD-10-CM

## 2024-05-17 NOTE — TELEPHONE ENCOUNTER
Spoke with dennis SINGH at [facility], Date of Birth verified  She was informed of MD recommendation, she stated understanding.  She will have HH phlebotomist handle the labs, it's more convenient for patient.   Other order will be fax to our office.         JORY

## 2024-05-17 NOTE — TELEPHONE ENCOUNTER
Spoke with Jessica SINGH at [facility],  Date of Birth verified.  She is calling to notify PCP, she did care services today for patient. She is requesting order for  for eval, care giver resources to patient.  Patient has stage 2 pressure ulcer, wound care RN is needed for evaluation/ consultation, she will fax to us order for review.  She stated usually they use barrier cream in the area, keep the area clean and dry.   Also need order for one additional nurse visit for continuing care as patient is high risk for re admission, we'll need additional assessment.  Also pt will need lab order as his wbc was high during the hospital stay.  Please call [phone]  pls advise, thanks in advance.

## 2024-05-22 ENCOUNTER — TELEPHONE (OUTPATIENT)
Dept: FAMILY MEDICINE CLINIC | Facility: CLINIC | Age: 74
End: 2024-05-22

## 2024-05-22 NOTE — TELEPHONE ENCOUNTER
Spoke to Salud- supervisor from [facility]. She just wanted to update Dr. Sheets that they will be doing  the social work evaluation the week of 6/16/24.    Dr. Sheets, informational message. No action needed.

## 2024-05-29 ENCOUNTER — TELEPHONE (OUTPATIENT)
Dept: FAMILY MEDICINE CLINIC | Facility: CLINIC | Age: 74
End: 2024-05-29

## 2024-05-29 NOTE — TELEPHONE ENCOUNTER
I received a call from patient daughter and she stated that needs a refill on Xarelto. Per daughter patient was given the starter pack when patient was in the hospital. Per daughter patient is on 20 mg of Xarelto once daily now.  Per daughter its hard to bring patient in to the office. If you need to see him can a video visit be done? Please advise   I

## 2024-05-31 NOTE — TELEPHONE ENCOUNTER
Left detailed voicemail for daughter, Tyra (ok per consent form) to call back our office or send a [employer] message if has any questions. Office phone number provided with telephone hours.    Informed of medication sent and to which pharmacy

## 2024-06-03 ENCOUNTER — NURSE TRIAGE (OUTPATIENT)
Dept: FAMILY MEDICINE CLINIC | Facility: CLINIC | Age: 74
End: 2024-06-03

## 2024-06-03 ENCOUNTER — OFFICE VISIT (OUTPATIENT)
Dept: FAMILY MEDICINE CLINIC | Facility: CLINIC | Age: 74
End: 2024-06-03

## 2024-06-03 VITALS
RESPIRATION RATE: 18 BRPM | HEIGHT: 65 IN | SYSTOLIC BLOOD PRESSURE: 119 MMHG | WEIGHT: 136 LBS | HEART RATE: 112 BPM | BODY MASS INDEX: 22.66 KG/M2 | DIASTOLIC BLOOD PRESSURE: 68 MMHG

## 2024-06-03 DIAGNOSIS — L89.619 PRESSURE INJURY OF SKIN OF RIGHT HEEL, UNSPECIFIED INJURY STAGE: Primary | ICD-10-CM

## 2024-06-03 DIAGNOSIS — B35.3 TINEA PEDIS OF RIGHT FOOT: ICD-10-CM

## 2024-06-03 PROCEDURE — 3078F DIAST BP <80 MM HG: CPT | Performed by: FAMILY MEDICINE

## 2024-06-03 PROCEDURE — 1160F RVW MEDS BY RX/DR IN RCRD: CPT | Performed by: FAMILY MEDICINE

## 2024-06-03 PROCEDURE — 1111F DSCHRG MED/CURRENT MED MERGE: CPT | Performed by: FAMILY MEDICINE

## 2024-06-03 PROCEDURE — 3074F SYST BP LT 130 MM HG: CPT | Performed by: FAMILY MEDICINE

## 2024-06-03 PROCEDURE — 99214 OFFICE O/P EST MOD 30 MIN: CPT | Performed by: FAMILY MEDICINE

## 2024-06-03 PROCEDURE — 3008F BODY MASS INDEX DOCD: CPT | Performed by: FAMILY MEDICINE

## 2024-06-03 PROCEDURE — 1159F MED LIST DOCD IN RCRD: CPT | Performed by: FAMILY MEDICINE

## 2024-06-03 RX ORDER — CEPHALEXIN 500 MG/1
500 CAPSULE ORAL 4 TIMES DAILY
Qty: 28 CAPSULE | Refills: 0 | Status: SHIPPED | OUTPATIENT
Start: 2024-06-03 | End: 2024-06-10

## 2024-06-03 NOTE — PROGRESS NOTES
Yoseph Cordero is a 73 year old male.  HPI:   Patient is here due to lesion noted in heel that has been draining, pt had recent fracture and has been in walking boot that triggered lesion, of note he also has a pressure sacral ulcer that has been managed with colloid cream with improvement, this cream has been used in heel with improvement, no fever reported  Current Outpatient Medications   Medication Sig Dispense Refill    miconazole 2 % External Powder Apply in toes daily 85 g 0    cephalexin 500 MG Oral Cap Take 1 capsule (500 mg total) by mouth 4 (four) times daily for 7 days. 28 capsule 0    rivaroxaban (XARELTO) 20 MG Oral Tab Take 1 tablet (20 mg total) by mouth daily with food. 90 tablet 0    levETIRAcetam 500 MG Oral Tab Take 1 tablet (500 mg total) by mouth 2 (two) times daily. 60 tablet 0    LORazepam 0.5 MG Oral Tab Take 1 tablet (0.5 mg total) by mouth every 6 (six) hours as needed for Anxiety. 30 tablet 1      Past Medical History:    Anxiety state, unspecified    CVA (cerebral infarction)    Dementia (HCC)    Depression    Heterozygous factor V Leiden mutation (HCC)    Other and unspecified hyperlipidemia    Other ill-defined conditions(799.89)    Cardiomegaly Pleural effusion w/idopathic pleuropuricarditis    Pulmonary embolism (HCC)    Stroke (HCC)    Unspecified sleep apnea      Social History:  Social History     Socioeconomic History    Marital status:    Tobacco Use    Smoking status: Former    Smokeless tobacco: Never   Vaping Use    Vaping status: Never Used   Substance and Sexual Activity    Alcohol use: No     Alcohol/week: 0.0 standard drinks of alcohol     Comment: none recently    Drug use: No   Other Topics Concern    Caffeine Concern Yes     Comment: 1 cups coffee daily    Exercise No   Social History Narrative    The patient does not use an assistive device..      The patient does live in a home with stairs.     Social Determinants of Health     Financial Resource Strain: Low  Risk  (5/6/2024)    Financial Resource Strain     Difficulty of Paying Living Expenses: Not very hard     Med Affordability: No   Food Insecurity: No Food Insecurity (5/8/2024)    Food Insecurity     Food Insecurity: Never true   Transportation Needs: No Transportation Needs (5/8/2024)    Transportation Needs     Lack of Transportation: No   Housing Stability: Low Risk  (5/8/2024)    Housing Stability     Housing Instability: No          EXAM:   /68   Pulse 112   Resp 18   Ht 5' 5\" (1.651 m)   Wt 136 lb (61.7 kg)   BMI 22.63 kg/m²     Physical Exam  Vitals and nursing note reviewed.   Constitutional:       General: He is not in acute distress.  Pulmonary:      Effort: Pulmonary effort is normal.   Musculoskeletal:        Feet:    Feet:      Right foot:      Skin integrity: Fissure (between toes) present.   Neurological:      Mental Status: He is alert and oriented to person, place, and time.   Psychiatric:         Mood and Affect: Mood normal.         Behavior: Behavior normal.            ASSESSMENT AND PLAN:   1. Pressure injury of skin of right heel, unspecified injury stage  Noted pictures from home with evidence of cellulitis, lesion has improved, continue wound care and will contact HH to increase frequency of visit, sample sent for evaluation, start antibiotic per orders  - Anaerobic Culture  - Aerobic Bacterial Culture [E]; Future  - cephalexin 500 MG Oral Cap; Take 1 capsule (500 mg total) by mouth 4 (four) times daily for 7 days.  Dispense: 28 capsule; Refill: 0  - Aerobic Bacterial Culture [E]    2. Tinea pedis of right foot    - miconazole 2 % External Powder; Apply in toes daily  Dispense: 85 g; Refill: 0       The patient indicates understanding of these issues and agrees to the plan.      The above note was creating using Dragon speech recognition technology. Please excuse any typos.

## 2024-06-03 NOTE — TELEPHONE ENCOUNTER
Action Requested: Summary for Provider     []  Critical Lab, Recommendations Needed  [] Need Additional Advice  []   FYI    []   Need Orders  [] Need Medications Sent to Pharmacy  []  Other     SUMMARY: IC. Daughter declined and prefers to take patient to [facility] office for wound care.     Future Appointments   Date Time Provider Department Center   6/3/2024  6:00 PM Tyra Pereira MD [facility] [place]   6/13/2024  3:00 PM Prieto Ling MD [facility]     Reason for call: Skin Problem  Onset: Data Unavailable    Patient sees home health nurse. He was seen 2 weeks ago. He now has a sore on his heel. There is black and oozing. Daughter has been putting a cream on it that was prescribed for his skin ulcer by his tailbone. Denies pain, fever.    Reason for Disposition   Black (necrotic) or blisters develop in wound    Protocols used: Wound Infection-A-OH

## 2024-06-04 ENCOUNTER — TELEPHONE (OUTPATIENT)
Dept: FAMILY MEDICINE CLINIC | Facility: CLINIC | Age: 74
End: 2024-06-04

## 2024-06-04 ENCOUNTER — MED REC SCAN ONLY (OUTPATIENT)
Dept: FAMILY MEDICINE CLINIC | Facility: CLINIC | Age: 74
End: 2024-06-04

## 2024-06-04 NOTE — TELEPHONE ENCOUNTER
Patient's daughter Tyra called (on CHETAN), verified patient's Name and . States patient was seen yesterday and was prescribed cephalexin. She wants to confirm dosing instruction for the medication as she has never seen an antibiotic that needs to be taken as often as four times a day. Prescription reviewed and confirmed correct dosing. Relayed that patient can take medication with apple sauce, yogurt or shakes if he has a hard time swallowing pills. Verbalized understanding and had no further questions at this time.

## 2024-06-06 ENCOUNTER — TELEPHONE (OUTPATIENT)
Dept: FAMILY MEDICINE CLINIC | Facility: CLINIC | Age: 74
End: 2024-06-06

## 2024-06-10 ENCOUNTER — PATIENT MESSAGE (OUTPATIENT)
Dept: FAMILY MEDICINE CLINIC | Facility: CLINIC | Age: 74
End: 2024-06-10

## 2024-06-10 ENCOUNTER — TELEPHONE (OUTPATIENT)
Dept: FAMILY MEDICINE CLINIC | Facility: CLINIC | Age: 74
End: 2024-06-10

## 2024-06-10 DIAGNOSIS — R56.9 SEIZURES (HCC): Primary | ICD-10-CM

## 2024-06-10 RX ORDER — LEVETIRACETAM 500 MG/1
500 TABLET ORAL 2 TIMES DAILY
Qty: 180 TABLET | Refills: 3 | Status: SHIPPED | OUTPATIENT
Start: 2024-06-10

## 2024-06-10 NOTE — TELEPHONE ENCOUNTER
Verified name and  of patient.    Daughter of patient (on CHETAN) was advised the referral was placed.    Information provided:             Referred to Department Information      Department Address City Phone     [facility] [phone] [facility] [address] [place] [phone]

## 2024-06-10 NOTE — TELEPHONE ENCOUNTER
Daughter of patient is requesting neurologist referral for epilepsy to provider closer to where patient lives- states that they were referred to [facility] in [place] after being discharged from [facility] but that it is a long commute.    Please advise and thank you.        Verified name and  of patient.    Daughter of patient (Tyra on CHETAN) calling to request that forms be filled out by Dr. Sheets to receive patient financial assistance for Xarelto.    Patient is taking Xarelto due to pulmonary embolism.    This RN assisted daughter of patient with attaching document to M[employer]ie [employer] message- message forwarded to Dr. Sheets in patient message encounter 6/10/24.

## 2024-06-10 NOTE — TELEPHONE ENCOUNTER
Verified name and  of patient.    Daughter of patient calling to follow up on refill request- she was advised that prescription was sent:         Disp Refills Start End    levETIRAcetam 500 MG Oral Tab 180 tablet 3 6/10/2024 --    Sig - Route: Take 1 tablet (500 mg total) by mouth 2 (two) times daily. - Oral    Sent to pharmacy as: levETIRAcetam 500 MG Oral Tablet (Keppra)    E-Prescribing Status: Receipt confirmed by pharmacy (6/10/2024 12:52 PM CDT)      Pharmacy    [id number] - [place], [place] - [facility] [address] AT [facility]  [address], [phone], [phone]

## 2024-06-10 NOTE — TELEPHONE ENCOUNTER
I received a call from [facility] that patient will like for you to refill his levETIRAcetam 500 MG Oral Tab. This medication has not been given by you in the past. Please advise

## 2024-06-11 ENCOUNTER — TELEPHONE (OUTPATIENT)
Dept: FAMILY MEDICINE CLINIC | Facility: CLINIC | Age: 74
End: 2024-06-11

## 2024-06-12 NOTE — TELEPHONE ENCOUNTER
Noted      Future Appointments   Date Time Provider Department Center   6/13/2024  3:00 PM Prieto Ling MD [facility]   6/25/2024 10:00 AM Nikki Sabillon MD [facility]

## 2024-06-13 ENCOUNTER — HOSPITAL ENCOUNTER (OUTPATIENT)
Dept: GENERAL RADIOLOGY | Facility: HOSPITAL | Age: 74
Discharge: HOME | End: 2024-06-13
Attending: ORTHOPAEDIC SURGERY
Payer: MEDICARE

## 2024-06-13 ENCOUNTER — OFFICE VISIT (OUTPATIENT)
Dept: ORTHOPEDICS CLINIC | Facility: CLINIC | Age: 74
End: 2024-06-13
Payer: MEDICARE

## 2024-06-13 DIAGNOSIS — S82.54XD NONDISPLACED FRACTURE OF MEDIAL MALLEOLUS OF RIGHT TIBIA, SUBSEQUENT ENCOUNTER FOR CLOSED FRACTURE WITH ROUTINE HEALING: Primary | ICD-10-CM

## 2024-06-13 DIAGNOSIS — S82.54XD NONDISPLACED FRACTURE OF MEDIAL MALLEOLUS OF RIGHT TIBIA, SUBSEQUENT ENCOUNTER FOR CLOSED FRACTURE WITH ROUTINE HEALING: ICD-10-CM

## 2024-06-13 PROCEDURE — 1160F RVW MEDS BY RX/DR IN RCRD: CPT

## 2024-06-13 PROCEDURE — 99024 POSTOP FOLLOW-UP VISIT: CPT

## 2024-06-13 PROCEDURE — 73610 X-RAY EXAM OF ANKLE: CPT | Performed by: ORTHOPAEDIC SURGERY

## 2024-06-13 PROCEDURE — 1159F MED LIST DOCD IN RCRD: CPT

## 2024-06-13 NOTE — PROGRESS NOTES
NURSING INTAKE COMMENTS:   Chief Complaint   Patient presents with    Fracture     F/u right ankle Fracture. Patient has dementia and when he tries to apply pressure on foot he does facial expression that has pain. He was hospitalized 5/7/24, has a new wound that  started 3 weeks ago. He is taking Cephalexin, son does not know dosage.       HPI: This 74 year old male presents today with complaints of right ankle injury follow up. It has been 6 weeks since the time of his injury. He has remained nonweightbearing at this time. Family states that they are trying to get a pressure wound under control on the right heel at this time. The wound care nurse placed a new dressing yesterday and family was told to leave in place at this time. Family states patient makes faces that he is in pain when trying to stand on this foot. They are unsure if it is dur to the ankle fracture or the pressure wound.     Past Medical History:    Anxiety state, unspecified    CVA (cerebral infarction)    Dementia (HCC)    Depression    Heterozygous factor V Leiden mutation ([facility])    Other and unspecified hyperlipidemia    Other ill-defined conditions(799.89)    Cardiomegaly Pleural effusion w/idopathic pleuropuricarditis    Pulmonary embolism (HCC)    Stroke ([facility])    Unspecified sleep apnea     Past Surgical History:   Procedure Laterality Date    Colonoscopy      Electrocardiogram, complete  01/08/1914    Scanned to Media Tab     Current Outpatient Medications   Medication Sig Dispense Refill    levETIRAcetam 500 MG Oral Tab Take 1 tablet (500 mg total) by mouth 2 (two) times daily. 180 tablet 3    miconazole 2 % External Powder Apply in toes daily 85 g 0    rivaroxaban (XARELTO) 20 MG Oral Tab Take 1 tablet (20 mg total) by mouth daily with food. 90 tablet 0    LORazepam 0.5 MG Oral Tab Take 1 tablet (0.5 mg total) by mouth every 6 (six) hours as needed for Anxiety. 30 tablet 1     No Known Allergies  Family History   Problem Relation Age of  Onset    Cancer Father         Lung - Smoker  Cause of death    Other (Other) Mother         during     Diabetes Neg     Glaucoma Neg        Social History     Occupational History    Not on file   Tobacco Use    Smoking status: Former    Smokeless tobacco: Never   Vaping Use    Vaping status: Never Used   Substance and Sexual Activity    Alcohol use: No     Alcohol/week: 0.0 standard drinks of alcohol     Comment: none recently    Drug use: No    Sexual activity: Not on file        Review of Systems:  GENERAL: denies fevers, chills, night sweats, fatigue, unintentional weight loss/gain  SKIN: denies skin lesions, open sores, rash  HEENT:denies recent vision change, new nasal congestion,hearing loss, tinnitus, sore throat, headaches  RESPIRATORY: denies new shortness of breath, cough, asthma, wheezing  CARDIOVASCULAR: denies chest pain, leg cramps with exertion, palpitations, leg swelling  GI: denies abdominal pain, nausea, vomiting, diarrhea, constipation, hematochezia, worsening heartburn or stomach ulcers  : denies dysuria, hematuria, incontinence, increased frequency, urgency, difficulty urinating  MUSCULOSKELETAL: denies musculoskeletal complaints other than in HPI  NEURO: denies numbness, tingling, weakness, balance issues, dizziness, memory loss  PSYCHIATRIC: denies Hx of depression, anxiety, other psychiatric disorders  HEMATOLOGIC: denies blood clots, anemia, blood clotting disorders, blood transfusion  ENDOCRINE: denies autoimmune disease, thyroid issues, or diabetes  ALLERGY: denies asthma, seasonal allergies    Physical Examination:    There were no vitals taken for this visit.  Constitutional: appears well hydrated, alert and responsive, no acute distress noted  Extremities: Hard to assess tenderness over medial malleolus due to current pressure wound and dressing in the area. Swelling has continued to improve. Good capillary refill of all five digits on the right foot.   Neurological:  Unchanged     Imaging:   XR ANKLE (MIN 3 VIEWS), RIGHT (CPT=73610)    Result Date: 5/16/2024         PROCEDURE: XR ANKLE (MIN 3 VIEWS), RIGHT (CPT=73610)  COMPARISON: [facility], XR ANKLE (MIN 3 VIEWS), RIGHT (CPT=73610), 5/09/2024, 7:03 PM.  INDICATIONS: Right ankle orthopedic aftercare  TECHNIQUE: Three views Findings and impression:  Stable 5 x 1 millimeter mildly distracted cortical fracture along the lateral margin of the medial malleolus  No other fracture  Normal alignment    Dictated by (CST): Crow Ellis MD on 5/16/2024 at 2:41 PM     Finalized by (CST): Crow Ellis MD on 5/16/2024 at 2:43 PM             Labs:  Lab Results   Component Value Date    WBC 15.2 (H) 05/10/2024    HGB 12.8 (L) 05/10/2024    .0 05/10/2024      Lab Results   Component Value Date     (H) 05/10/2024    BUN 14 05/10/2024    CREATSERUM 0.71 05/10/2024    GFR >60 07/07/2014    GFRNAA 92 05/13/2021    GFRAA 106 05/13/2021        Assessment and Plan:  Diagnoses and all orders for this visit:    Nondisplaced fracture of medial malleolus of right tibia, subsequent encounter for closed fracture with routine healing  -     XR ANKLE (MIN 3 VIEWS), RIGHT (CPT=73610); Future        Assessment: Right medial malleolus fracture, healing. Pressure wound over left foot    Plan: I advised icing and elevation for swelling in the foot and ankle at this time. I provided some home exercises for patient to work on quadriceps strengthening to help with balance and transfers. He may continue to weight bear as tolerated. Discontinue walker boot. Continue pressure wound care as instructed by wound clinic. Accompanied by several family members.     Follow up as needed     Follow Up: Return if symptoms worsen or fail to improve.    Cass Salazar PA-C

## 2024-06-14 ENCOUNTER — TELEPHONE (OUTPATIENT)
Dept: FAMILY MEDICINE CLINIC | Facility: CLINIC | Age: 74
End: 2024-06-14

## 2024-06-14 NOTE — TELEPHONE ENCOUNTER
Verified name and  of patient.    Daughter of patient (on CHETAN) calling to states that the patient assistance program is requesting a letter that states the following:    Patient cannot afford the Xarelto with me program co-pay.    Patient cannot afford the insurance co-pay.    Patient needs to appeal the decision.    Letter pended in communications for your approval.    She is asking that letter be signed by Dr. Sheets and faxed to:    Fax# [phone]   She is requesting a [employer] message when this has been completed.

## 2024-06-20 ENCOUNTER — TELEPHONE (OUTPATIENT)
Dept: FAMILY MEDICINE CLINIC | Facility: CLINIC | Age: 74
End: 2024-06-20

## 2024-06-21 ENCOUNTER — TELEPHONE (OUTPATIENT)
Dept: FAMILY MEDICINE CLINIC | Facility: CLINIC | Age: 74
End: 2024-06-21

## 2024-06-21 NOTE — TELEPHONE ENCOUNTER
[facility]  [phone] phone or fax, same # she states    Order for wound care was faxed to Denise; on June 12th and 14th; could he sign and fax back?

## 2024-06-21 NOTE — TELEPHONE ENCOUNTER
HH orders from 06/17/24 received, signed by provider and faxed back successfully.    Placed in HH folder with scan sheet.

## 2024-06-24 ENCOUNTER — TELEPHONE (OUTPATIENT)
Dept: FAMILY MEDICINE CLINIC | Facility: CLINIC | Age: 74
End: 2024-06-24

## 2024-06-24 NOTE — TELEPHONE ENCOUNTER
Please contact family and advise of above message provide information for  for possible nursing home placement.

## 2024-06-24 NOTE — TELEPHONE ENCOUNTER
DME order from [facility] for Hospital Bed.  Signed by .  Faxed back successfully.

## 2024-06-24 NOTE — TELEPHONE ENCOUNTER
[facility] orders dated 6/12/24 and 6/21/24 received, signed by provider and faxed back successfully.    Placed in HH folder with scan sheet.

## 2024-06-24 NOTE — TELEPHONE ENCOUNTER
Spoke to Hui- RN from [facility]. She said that this has not been discussed with the family.     Hui has been trying to get a  involved for awhile for the daughter and she seems uncertain about moving forward.     There is an appointment scheduled for Wednesday for a  to meet with the family and RN asked if this could be discussed. Hui said that she will have the  begin this discussion and provide resources if agreeable.     She will update us.

## 2024-06-24 NOTE — TELEPHONE ENCOUNTER
Per Hui/nurse, nurse is suggesting that patient needs a long term or skilled facility as suppose to a Home Health Care due to patient medical needs or necessity.

## 2024-06-25 ENCOUNTER — OFFICE VISIT (OUTPATIENT)
Dept: NEUROLOGY | Facility: CLINIC | Age: 74
End: 2024-06-25

## 2024-06-25 VITALS — OXYGEN SATURATION: 97 % | HEART RATE: 118 BPM | SYSTOLIC BLOOD PRESSURE: 144 MMHG | DIASTOLIC BLOOD PRESSURE: 67 MMHG

## 2024-06-25 DIAGNOSIS — G30.8 SEVERE ALZHEIMER'S DEMENTIA OF OTHER ONSET WITHOUT BEHAVIORAL DISTURBANCE, PSYCHOTIC DISTURBANCE, MOOD DISTURBANCE, OR ANXIETY (HCC): ICD-10-CM

## 2024-06-25 DIAGNOSIS — G40.009 LOCALIZATION-RELATED (FOCAL) (PARTIAL) IDIOPATHIC EPILEPSY AND EPILEPTIC SYNDROMES WITH SEIZURES OF LOCALIZED ONSET, NOT INTRACTABLE, WITHOUT STATUS EPILEPTICUS (HCC): Primary | ICD-10-CM

## 2024-06-25 DIAGNOSIS — F02.C0 SEVERE ALZHEIMER'S DEMENTIA OF OTHER ONSET WITHOUT BEHAVIORAL DISTURBANCE, PSYCHOTIC DISTURBANCE, MOOD DISTURBANCE, OR ANXIETY (HCC): ICD-10-CM

## 2024-06-25 PROCEDURE — 1160F RVW MEDS BY RX/DR IN RCRD: CPT | Performed by: INTERNAL MEDICINE

## 2024-06-25 PROCEDURE — 1159F MED LIST DOCD IN RCRD: CPT | Performed by: INTERNAL MEDICINE

## 2024-06-25 PROCEDURE — 3077F SYST BP >= 140 MM HG: CPT | Performed by: INTERNAL MEDICINE

## 2024-06-25 PROCEDURE — 1111F DSCHRG MED/CURRENT MED MERGE: CPT | Performed by: INTERNAL MEDICINE

## 2024-06-25 PROCEDURE — G2211 COMPLEX E/M VISIT ADD ON: HCPCS | Performed by: INTERNAL MEDICINE

## 2024-06-25 PROCEDURE — 3078F DIAST BP <80 MM HG: CPT | Performed by: INTERNAL MEDICINE

## 2024-06-25 PROCEDURE — 99215 OFFICE O/P EST HI 40 MIN: CPT | Performed by: INTERNAL MEDICINE

## 2024-06-25 NOTE — PROGRESS NOTES
[facility]  [address] [place] [address], [address]  [facility] [address]  [phone]            Neurology Initial Visit     Referred By: Dr. Sheets    Chief Complaint:   Chief Complaint   Patient presents with    Hospital F/U     Pt seen in hospital & had an abnormal EEG. He was started on levetiracetam 500 mg twice daily. Spouse & daughter would like to discuss. Pt was \"dozing off\" in the hospital otherwise had no apparent signs of seizure activity.        HPI:     Yoseph Cordero is a 74 year old male with left MCA stroke in his 20s, severe dementia, factor V Leiden mutation, pulmonary embolism on Xarelto who presents for evaluation of seizures.  Patient was hospitalized in May for tachycardia, found to have PE.  Had also recently been found to have right ankle fracture.  At that time, he had an overall downtrend in interaction over past few months which prompted EEG to evaluate for seizures.  EEG showed left posterior quadrant epileptiform activity which prompted addition of Keppra 500 mg twice daily.  He has never had a clinical seizure that they are aware of, but since discharge from the hospital in May, he has been declining further.  Per family, decline is somewhat related to a pressure ulcer that he developed over his right heel.  It has made it difficult for him to walk and he has been getting weaker in both legs as a result.  He will stand for transfers but when he stands with assistance, both legs bend and he luis to the ground.  Getting PT, OT, home health services which is helping.  Prior to the right ankle fracture and pressure ulcer he was able to stand and walk, previously would have wandering behavior where he would leave the house frequently.  He will stare off occasionally.    Now behavior is more calm, sleeps throughout the night.  He will feed himself with a spoon but needs reminders to continue eating though he has a good appetite overall.  They have to cut  food very small for him to be able to swallow and chew it.  Needs assistance with all ADLs.  Still taking Keppra 500 mg twice daily.  They have to crush it up and mix it in with food but he is able to keep down    Had stroke when he was in his 20s but had very good recovery from this.  Was ambulatory with some right arm weakness, but language function recovered well.  He was the life of the party before he started showing signs of dementia nearly 8 years ago.  In the past, tried Aricept but could not tolerate.    Past Medical History:    Anxiety state, unspecified    CVA (cerebral infarction)    Dementia (HCC)    Depression    Heterozygous factor V Leiden mutation (HCC)    Other and unspecified hyperlipidemia    Other ill-defined conditions(799.89)    Cardiomegaly Pleural effusion w/idopathic pleuropuricarditis    Pulmonary embolism (HCC)    Stroke (HCC)    Unspecified sleep apnea       Past Surgical History:   Procedure Laterality Date    Colonoscopy      Electrocardiogram, complete  1914    Scanned to Media Tab       Social history:  History   Smoking Status    Former   Smokeless Tobacco    Never     History   Alcohol Use No     Comment: none recently     History   Drug Use No       Family History   Problem Relation Age of Onset    Cancer Father         Lung - Smoker  Cause of death    Other (Other) Mother         during     Diabetes Neg     Glaucoma Neg          Current Outpatient Medications:     levETIRAcetam 500 MG Oral Tab, Take 1 tablet (500 mg total) by mouth 2 (two) times daily., Disp: 180 tablet, Rfl: 3    miconazole 2 % External Powder, Apply in toes daily, Disp: 85 g, Rfl: 0    rivaroxaban (XARELTO) 20 MG Oral Tab, Take 1 tablet (20 mg total) by mouth daily with food., Disp: 90 tablet, Rfl: 0    LORazepam 0.5 MG Oral Tab, Take 1 tablet (0.5 mg total) by mouth every 6 (six) hours as needed for Anxiety., Disp: 30 tablet, Rfl: 1    No Known Allergies    ROS:   As in HPI, the rest of the 14  system review was done and was negative      Physical Exam:  Vitals:    06/25/24 1021   BP: 144/67   Pulse: 118   SpO2: 97%       General: No apparent distress, well nourished, well groomed.  Head- Normocephalic, atraumatic  Eyes- No redness or swelling  ENT- Hearing intact  Neck- No masses or adenopathy  Cv: pulses were palpable and normal, no cyanosis or edema     Neurological:   Mental Status:  Alert, follows some commands with me making, does not follow verbal cues.  Mostly nonverbal except able to speak 1 word when prompted by daughter.  Occasionally stares off but looks up immediately when you touch shoulder    Cranial Nerves:  Tracks examiner, blinks to threat in all quadrants, face symmetric    Motor Exam:  Few spontaneous movements in all 4 limbs, but able to reach for a glass with left arm and hold glass.    Cogwheel rigidity and occasional rest tremor in LUE  Flexion contracture in RUE at elbow    Sensory Exam:  Pinprick- Looks at each limb with PP, no withdrawal to noxious    Deep Tendon Reflexes:  2+ BUE  Left quad contracts with patellar but otherwise absent in BLE    Coordination:  No abnormal movements except occasional rest tremor LUE    Gait:  Non-ambulatory    Labs:    Lab Results   Component Value Date    TSH 2.834 05/02/2024     Lab Results   Component Value Date    HDL 63 (H) 03/11/2019     (H) 03/11/2019    TRIG 70 03/11/2019     Lab Results   Component Value Date    HGB 12.8 (L) 05/10/2024    HCT 38.9 (L) 05/10/2024    MCV 88.8 05/10/2024    WBC 15.2 (H) 05/10/2024    .0 05/10/2024      Lab Results   Component Value Date    BUN 14 05/10/2024    CREAT 0.8 07/07/2014    CA 9.7 05/10/2024    ALT 20 05/07/2024    ALT 20 05/07/2024    AST 20 05/07/2024    AST 20 05/07/2024    ALKPHOS 61 06/30/2015    ALB 4.3 05/07/2024    ALB 4.3 05/07/2024     05/10/2024    K 4.1 05/10/2024    K 4.1 05/10/2024     05/10/2024    CO2 27.0 05/10/2024      I have reviewed labs.    Imaging  Studies:  I have independently reviewed imaging.  CT brain 5/7/24: Severe left hemipshere encephalomalacia and global atrophy      Assessment   Yoseph Cordero is a 74 year old male with left MCA stroke in his 20s, severe dementia, factor V Leiden mutation, pulmonary embolism on Xarelto, who presents for evaluation of seizures and dementia.  Had abnormal EEG in May but no clinical seizures have been noted.  Family is concerned about decrease in walking and interaction.  Decreased interaction and walking are likely multifactorial related to progression of dementia and physical deconditioning related to right ankle fracture and right heel wound.  Overall seems less likely that seizures or antiseizure medicines are contributing to his decline.    1. 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  Offered to liquid suspension, they will think about it for now.    2. Severe Alzheimer's dementia of other onset without behavioral disturbance, psychotic disturbance, mood disturbance, or anxiety (HCC)  -Could be mixed vascular/AD but overall is severe at this point  -Discussed that family should discuss goals of care  -Offered palliative consult, they will let me know if they want to proceed       Education and counseling provided to patient. Instructed patient to call my office or seek medical attention immediately if symptoms worsen.  Patient verbalized understanding of information given. All  questions were answered. All side effects of drugs were discussed.     Time spent for examination, counseling and coordination of care such as potential treatment options- 54 minutes with more than 50% of the time spent counseling the patient.    Return to clinic in: Return in about 8 weeks (around 8/20/2024) for Video visit.    Nikki Sabillon MD

## 2024-06-26 ENCOUNTER — TELEPHONE (OUTPATIENT)
Dept: FAMILY MEDICINE CLINIC | Facility: CLINIC | Age: 74
End: 2024-06-26

## 2024-06-26 NOTE — TELEPHONE ENCOUNTER
JORY Ramos I received a call from [facility]  from [facility] . To inform you that the social evaluation has completed today and referral were made with the family for in home care and transportation and discussed long term options with family.

## 2024-06-28 ENCOUNTER — TELEPHONE (OUTPATIENT)
Dept: FAMILY MEDICINE CLINIC | Facility: CLINIC | Age: 74
End: 2024-06-28

## 2024-07-08 ENCOUNTER — TELEPHONE (OUTPATIENT)
Dept: FAMILY MEDICINE CLINIC | Facility: CLINIC | Age: 74
End: 2024-07-08

## 2024-07-08 NOTE — TELEPHONE ENCOUNTER
Spoke with patient's daughter per CHETAN, Date of Birth verified  She is looking for appt with Dr Sheets for patient wound check on his tail bone, right heel and on left side of hip.   Patient is under [facility] nursing services but she is requesting patient to be seen more than once a visit, as she think patient wound is getting worse and been draining a lot, he will need more dressing supply.    She did called [facility] but the Nurse will see patient later this week, the wound care RN comes once week, they need to come more often.   Last week, [facility] advised patient to see PCP for follow up on wound check.   Follow up appt made with MD.           Future Appointments   Date Time Provider Department Center   7/9/2024  3:00 PM Irving Sheets DO [facility] [place]   7/17/2024  3:00 PM George Sampson APRN [facility] [facility] [place]   8/21/2024  2:00 PM Nikki Sabillon MD [facility] [place] [facility]

## 2024-07-09 ENCOUNTER — HOSPITAL ENCOUNTER (INPATIENT)
Facility: HOSPITAL | Age: 74
LOS: 10 days | Discharge: NURSING, REHAB OR LONG-TERM CARE | End: 2024-07-19
Attending: EMERGENCY MEDICINE | Admitting: STUDENT IN AN ORGANIZED HEALTH CARE EDUCATION/TRAINING PROGRAM
Payer: MEDICARE

## 2024-07-09 ENCOUNTER — OFFICE VISIT (OUTPATIENT)
Dept: FAMILY MEDICINE CLINIC | Facility: CLINIC | Age: 74
End: 2024-07-09

## 2024-07-09 ENCOUNTER — APPOINTMENT (OUTPATIENT)
Dept: GENERAL RADIOLOGY | Facility: HOSPITAL | Age: 74
End: 2024-07-09
Attending: EMERGENCY MEDICINE
Payer: MEDICARE

## 2024-07-09 ENCOUNTER — TELEPHONE (OUTPATIENT)
Dept: INTERNAL MEDICINE UNIT | Facility: HOSPITAL | Age: 74
End: 2024-07-09

## 2024-07-09 VITALS
DIASTOLIC BLOOD PRESSURE: 64 MMHG | OXYGEN SATURATION: 97 % | HEART RATE: 124 BPM | HEIGHT: 65 IN | BODY MASS INDEX: 23 KG/M2 | SYSTOLIC BLOOD PRESSURE: 108 MMHG

## 2024-07-09 DIAGNOSIS — L89.614 PRESSURE INJURY OF RIGHT HEEL, STAGE 4 (HCC): Primary | ICD-10-CM

## 2024-07-09 DIAGNOSIS — L89.304 PRESSURE INJURY OF BUTTOCK, STAGE 4, UNSPECIFIED LATERALITY (HCC): ICD-10-CM

## 2024-07-09 DIAGNOSIS — L89.224 PRESSURE INJURY OF LEFT HIP, STAGE 4 (HCC): ICD-10-CM

## 2024-07-09 DIAGNOSIS — L98.499 INFECTED ULCER OF SKIN, UNSPECIFIED ULCER STAGE (HCC): Primary | ICD-10-CM

## 2024-07-09 DIAGNOSIS — L08.9 INFECTED ULCER OF SKIN, UNSPECIFIED ULCER STAGE (HCC): Primary | ICD-10-CM

## 2024-07-09 PROBLEM — L89.90 INFECTED DECUBITUS ULCER: Status: ACTIVE | Noted: 2024-07-09

## 2024-07-09 LAB
ALBUMIN SERPL-MCNC: 4.1 G/DL (ref 3.2–4.8)
ALBUMIN/GLOB SERPL: 1.1 {RATIO} (ref 1–2)
ALP LIVER SERPL-CCNC: 71 U/L
ALT SERPL-CCNC: 28 U/L
ANION GAP SERPL CALC-SCNC: 9 MMOL/L (ref 0–18)
AST SERPL-CCNC: 26 U/L (ref ?–34)
BASOPHILS # BLD AUTO: 0.06 X10(3) UL (ref 0–0.2)
BASOPHILS NFR BLD AUTO: 0.3 %
BILIRUB SERPL-MCNC: 0.4 MG/DL (ref 0.2–1.1)
BUN BLD-MCNC: 13 MG/DL (ref 9–23)
BUN/CREAT SERPL: 14.8 (ref 10–20)
CALCIUM BLD-MCNC: 9.2 MG/DL (ref 8.7–10.4)
CHLORIDE SERPL-SCNC: 105 MMOL/L (ref 98–112)
CO2 SERPL-SCNC: 26 MMOL/L (ref 21–32)
CREAT BLD-MCNC: 0.88 MG/DL
DEPRECATED RDW RBC AUTO: 47.3 FL (ref 35.1–46.3)
EGFRCR SERPLBLD CKD-EPI 2021: 91 ML/MIN/1.73M2 (ref 60–?)
EOSINOPHIL # BLD AUTO: 0.03 X10(3) UL (ref 0–0.7)
EOSINOPHIL NFR BLD AUTO: 0.1 %
ERYTHROCYTE [DISTWIDTH] IN BLOOD BY AUTOMATED COUNT: 14.7 % (ref 11–15)
GLOBULIN PLAS-MCNC: 3.6 G/DL (ref 2–3.5)
GLUCOSE BLD-MCNC: 155 MG/DL (ref 70–99)
HCT VFR BLD AUTO: 34.9 %
HGB BLD-MCNC: 10.7 G/DL
IMM GRANULOCYTES # BLD AUTO: 0.21 X10(3) UL (ref 0–1)
IMM GRANULOCYTES NFR BLD: 0.9 %
LACTATE SERPL-SCNC: 2.2 MMOL/L (ref 0.5–2)
LACTATE SERPL-SCNC: 2.8 MMOL/L (ref 0.5–2)
LYMPHOCYTES # BLD AUTO: 0.5 X10(3) UL (ref 1–4)
LYMPHOCYTES NFR BLD AUTO: 2.2 %
MCH RBC QN AUTO: 26.7 PG (ref 26–34)
MCHC RBC AUTO-ENTMCNC: 30.7 G/DL (ref 31–37)
MCV RBC AUTO: 87 FL
MONOCYTES # BLD AUTO: 0.49 X10(3) UL (ref 0.1–1)
MONOCYTES NFR BLD AUTO: 2.2 %
NEUTROPHILS # BLD AUTO: 21.05 X10 (3) UL (ref 1.5–7.7)
NEUTROPHILS # BLD AUTO: 21.05 X10(3) UL (ref 1.5–7.7)
NEUTROPHILS NFR BLD AUTO: 94.3 %
OSMOLALITY SERPL CALC.SUM OF ELEC: 293 MOSM/KG (ref 275–295)
PLATELET # BLD AUTO: 565 10(3)UL (ref 150–450)
POTASSIUM SERPL-SCNC: 3.6 MMOL/L (ref 3.5–5.1)
PROT SERPL-MCNC: 7.7 G/DL (ref 5.7–8.2)
RBC # BLD AUTO: 4.01 X10(6)UL
SODIUM SERPL-SCNC: 140 MMOL/L (ref 136–145)
WBC # BLD AUTO: 22.3 X10(3) UL (ref 4–11)

## 2024-07-09 PROCEDURE — 3008F BODY MASS INDEX DOCD: CPT | Performed by: FAMILY MEDICINE

## 2024-07-09 PROCEDURE — 99223 1ST HOSP IP/OBS HIGH 75: CPT | Performed by: HOSPITALIST

## 2024-07-09 PROCEDURE — 1160F RVW MEDS BY RX/DR IN RCRD: CPT | Performed by: FAMILY MEDICINE

## 2024-07-09 PROCEDURE — 1159F MED LIST DOCD IN RCRD: CPT | Performed by: FAMILY MEDICINE

## 2024-07-09 PROCEDURE — 3078F DIAST BP <80 MM HG: CPT | Performed by: FAMILY MEDICINE

## 2024-07-09 PROCEDURE — 73630 X-RAY EXAM OF FOOT: CPT | Performed by: EMERGENCY MEDICINE

## 2024-07-09 PROCEDURE — 99213 OFFICE O/P EST LOW 20 MIN: CPT | Performed by: FAMILY MEDICINE

## 2024-07-09 PROCEDURE — 3074F SYST BP LT 130 MM HG: CPT | Performed by: FAMILY MEDICINE

## 2024-07-09 RX ORDER — ACETAMINOPHEN 160 MG/5ML
1000 SOLUTION ORAL ONCE
Status: COMPLETED | OUTPATIENT
Start: 2024-07-09 | End: 2024-07-09

## 2024-07-09 RX ORDER — ACETAMINOPHEN 500 MG
500 TABLET ORAL EVERY 4 HOURS PRN
Status: DISCONTINUED | OUTPATIENT
Start: 2024-07-09 | End: 2024-07-15

## 2024-07-09 RX ORDER — LEVETIRACETAM 500 MG/1
500 TABLET ORAL 2 TIMES DAILY
Status: DISCONTINUED | OUTPATIENT
Start: 2024-07-09 | End: 2024-07-12

## 2024-07-09 RX ORDER — SODIUM CHLORIDE 9 MG/ML
INJECTION, SOLUTION INTRAVENOUS CONTINUOUS
Status: DISCONTINUED | OUTPATIENT
Start: 2024-07-09 | End: 2024-07-14

## 2024-07-09 RX ORDER — ONDANSETRON 2 MG/ML
4 INJECTION INTRAMUSCULAR; INTRAVENOUS EVERY 6 HOURS PRN
Status: DISCONTINUED | OUTPATIENT
Start: 2024-07-09 | End: 2024-07-19

## 2024-07-09 NOTE — PROGRESS NOTES
Blood pressure 108/64, pulse (!) 124, height 5' 5\" (1.651 m), SpO2 97%.          Patient presents today with daughter and wife reporting he has pressure ulcers multiple.  The large 1 on the hip 1 large 1 on the tailbone 1 on the heel    Objective left hip with large pressure ulcer noted visible subcutaneous tissue    Sacral area with large ulcer noted also muscle visible    Right heel with very large pressure ulcer noted and exposed muscle    Significant odor noted from presacral ulcer and from right heel ulcer    Assessment large infected pressure ulcers deep multiple    Patient with tachycardia    Plan emergency department advised family refused ambulance    Discussed with the ER triage nurse James

## 2024-07-09 NOTE — TELEPHONE ENCOUNTER
Received call from Dr. Sheets office in regards to patient being directly admitted to hospital. Hospital at full capacity. Patient directed to go to ER.

## 2024-07-10 ENCOUNTER — APPOINTMENT (OUTPATIENT)
Dept: MRI IMAGING | Facility: HOSPITAL | Age: 74
End: 2024-07-10
Attending: HOSPITALIST
Payer: MEDICARE

## 2024-07-10 ENCOUNTER — TELEPHONE (OUTPATIENT)
Dept: FAMILY MEDICINE CLINIC | Facility: CLINIC | Age: 74
End: 2024-07-10

## 2024-07-10 LAB
ANION GAP SERPL CALC-SCNC: 6 MMOL/L (ref 0–18)
BASOPHILS # BLD AUTO: 0.04 X10(3) UL (ref 0–0.2)
BASOPHILS NFR BLD AUTO: 0.2 %
BUN BLD-MCNC: 8 MG/DL (ref 9–23)
BUN/CREAT SERPL: 14 (ref 10–20)
CALCIUM BLD-MCNC: 8.5 MG/DL (ref 8.7–10.4)
CHLORIDE SERPL-SCNC: 105 MMOL/L (ref 98–112)
CO2 SERPL-SCNC: 27 MMOL/L (ref 21–32)
CREAT BLD-MCNC: 0.57 MG/DL
DEPRECATED RDW RBC AUTO: 46.9 FL (ref 35.1–46.3)
EGFRCR SERPLBLD CKD-EPI 2021: 104 ML/MIN/1.73M2 (ref 60–?)
EOSINOPHIL # BLD AUTO: 0.14 X10(3) UL (ref 0–0.7)
EOSINOPHIL NFR BLD AUTO: 0.8 %
ERYTHROCYTE [DISTWIDTH] IN BLOOD BY AUTOMATED COUNT: 14.8 % (ref 11–15)
GLUCOSE BLD-MCNC: 117 MG/DL (ref 70–99)
HCT VFR BLD AUTO: 27.5 %
HGB BLD-MCNC: 8.6 G/DL
IMM GRANULOCYTES # BLD AUTO: 0.15 X10(3) UL (ref 0–1)
IMM GRANULOCYTES NFR BLD: 0.9 %
LACTATE SERPL-SCNC: 1.4 MMOL/L (ref 0.5–2)
LACTATE SERPL-SCNC: 2.5 MMOL/L (ref 0.5–2)
LYMPHOCYTES # BLD AUTO: 1.25 X10(3) UL (ref 1–4)
LYMPHOCYTES NFR BLD AUTO: 7.4 %
MCH RBC QN AUTO: 26.9 PG (ref 26–34)
MCHC RBC AUTO-ENTMCNC: 31.3 G/DL (ref 31–37)
MCV RBC AUTO: 85.9 FL
MONOCYTES # BLD AUTO: 0.66 X10(3) UL (ref 0.1–1)
MONOCYTES NFR BLD AUTO: 3.9 %
NEUTROPHILS # BLD AUTO: 14.67 X10 (3) UL (ref 1.5–7.7)
NEUTROPHILS # BLD AUTO: 14.67 X10(3) UL (ref 1.5–7.7)
NEUTROPHILS NFR BLD AUTO: 86.8 %
OSMOLALITY SERPL CALC.SUM OF ELEC: 285 MOSM/KG (ref 275–295)
PLATELET # BLD AUTO: 436 10(3)UL (ref 150–450)
POTASSIUM SERPL-SCNC: 3.2 MMOL/L (ref 3.5–5.1)
RBC # BLD AUTO: 3.2 X10(6)UL
SODIUM SERPL-SCNC: 138 MMOL/L (ref 136–145)
WBC # BLD AUTO: 16.9 X10(3) UL (ref 4–11)

## 2024-07-10 PROCEDURE — 73718 MRI LOWER EXTREMITY W/O DYE: CPT | Performed by: HOSPITALIST

## 2024-07-10 PROCEDURE — 99222 1ST HOSP IP/OBS MODERATE 55: CPT | Performed by: SURGERY

## 2024-07-10 PROCEDURE — 99233 SBSQ HOSP IP/OBS HIGH 50: CPT | Performed by: HOSPITALIST

## 2024-07-10 RX ORDER — SODIUM HYPOCHLORITE 1.25 MG/ML
SOLUTION TOPICAL 2 TIMES DAILY
Status: DISCONTINUED | OUTPATIENT
Start: 2024-07-10 | End: 2024-07-17

## 2024-07-10 RX ORDER — IBUPROFEN 600 MG/1
600 TABLET ORAL EVERY 4 HOURS PRN
Status: DISCONTINUED | OUTPATIENT
Start: 2024-07-10 | End: 2024-07-16

## 2024-07-10 NOTE — PROGRESS NOTES
[facility]  part of [facility]    Progress Note    Yoseph Cordero Patient Status:  Inpatient    1950 MRN [id number]   Location [facility] 5SW/SE Attending Bridget Davis MD   Hosp Day # 1 PCP Irving Sheets DO     Chief Complaint: rt heel wound    SUBJECTIVE:    No acute events overnight.  Patient denies chest pain, sob.  No fevers/chills.  No n/v/abd pain.  Pt comfortable.     10 ROS completed and otherwise negative.    OBJECTIVE:  Vital signs in last 24 hours:  BP (P) 127/69 (BP Location: Right arm)   Pulse (!) (P) 127   Temp 100.1 °F (37.8 °C) (Axillary)   Resp 20   Ht 5' 7\" (1.702 m)   Wt 135 lb 11.2 oz (61.6 kg)   SpO2 97%   BMI 21.25 kg/m²     Intake/Output:    Intake/Output Summary (Last 24 hours) at 7/10/2024 1206  Last data filed at 7/10/2024 1029  Gross per 24 hour   Intake 2895 ml   Output 1300 ml   Net 1595 ml       Wt Readings from Last 3 Encounters:   24 135 lb 11.2 oz (61.6 kg)   24 135 lb 11.2 oz (61.6 kg)   24 136 lb (61.7 kg)       Exam     Gen: No acute distress  Pulm: Lungs clear, normal respiratory effort  CV: Heart with regular rate and rhythm  Abd: Abdomen soft, nontender, bowel sounds present  Neuro: No acute focal deficits  MSK: moves extremities  Skin: sacral decub, hip decub dressing in place; rt heel ulcer  Psych: Normal affect  Ext: no cyanosis    Data Review:     Labs:   Lab Results   Component Value Date    WBC 16.9 07/10/2024    HGB 8.6 07/10/2024    HCT 27.5 07/10/2024    .0 07/10/2024    CREATSERUM 0.57 07/10/2024    BUN 8 07/10/2024     07/10/2024    K 3.2 07/10/2024     07/10/2024    CO2 27.0 07/10/2024     07/10/2024    CA 8.5 07/10/2024    ALB 4.1 2024    ALKPHO 71 2024    BILT 0.4 2024    TP 7.7 2024    AST 26 2024    ALT 28 2024       Imaging: Reviewed    XR FOOT, COMPLETE (MIN 3 VIEWS), RIGHT (CPT=73630)    Result Date:  7/9/2024  CONCLUSION:  1. Again seen is osseous erosion involving the posterior calcaneus, not significant changed from the prior study.  2. Soft tissue swelling of the right foot without radiographic evidence of underlying osseous injury.    Dictated by (CST): Marcial Mendoza MD on 7/09/2024 at 8:22 PM     Finalized by (CST): Marcial Mendoza MD on 7/09/2024 at 8:25 PM           Meds:   Current Facility-Administered Medications   Medication Dose Route Frequency    potassium chloride 40 mEq in 250mL sodium chloride 0.9% IVPB premix  40 mEq Intravenous Once    sodium hypochlorite (Dakin's) 0.125 % external solution   Topical BID    vancomycin (Vancocin) 1,000 mg in sodium chloride 0.9% 250 mL IVPB-ADDV  15 mg/kg Intravenous Q24H    piperacillin-tazobactam (Zosyn) 3.375 g in dextrose 5% 100 mL IVPB-ADDV  3.375 g Intravenous Q8H    sodium chloride 0.9% infusion   Intravenous Continuous    acetaminophen (Tylenol Extra Strength) tab 500 mg  500 mg Oral Q4H PRN    ondansetron (Zofran) 4 MG/2ML injection 4 mg  4 mg Intravenous Q6H PRN    OLANZapine (Zyprexa) 5 mg in sterile water for injection (PF) IM injection  5 mg Intramuscular Q12H PRN    levETIRAcetam (Keppra) tab 500 mg  500 mg Oral BID         Assessment  Patient Active Problem List   Diagnosis    Hemiplegia affecting right dominant side (HCC)    Chronic obstructive pulmonary disease (HCC)    Combined forms of age-related cataract of both eyes    Esophageal reflux    Late onset Alzheimer's disease with behavioral disturbance (HCC)    Alcohol abuse    Calcification of aorta (HCC)    Alcoholism ([facility])    History of stroke    Dysphagia    Hyperglycemia    Weakness    Gait disturbance    Abnormal involuntary movement    Acute pulmonary embolism without acute cor pulmonale, unspecified pulmonary embolism type (HCC)    Factor V Leiden (HCC)    Leukocytosis    Tachycardia    Altered mental status, unspecified altered mental status type    Seizures ([facility])    Infected ulcer of  skin, unspecified ulcer stage (HCC)    Infected decubitus ulcer       Plan:     Infected rt heel pressure ulcer  Sacral decubitus ulcer  Left Hip ulcer  - all POA  - wound care on consult  - podiatry consulted for heel ulcer   - MRI pending for this  - gen surgery consulted for sacral and hip ulcer - plan bedside debridement of sacral and left hip ulcers tomorrow      Sepsis with bacteremia  - due to above  - cont IV abx  - await final wound and blood cx  - ID on consult     Other PMH  CVA with chronic rt hemiparesis  Dementia - essentially nonverbal  PE, Factor V Leiden - on xarelto  HTN  HOLLI    Prophylaxis:   DVT with xarelto - hold for debridement - resume when cleared by surgery    Dispo: pending clinical course    Global A/P  - reviewed labs and vitals from today  - reviewed notes of the day  - cbc, bmp, mag ordered for tomorrow  - discussed need to stay in the hospital today due to sepsis  - cont IV abx  - discussed with patient/RN and care team    MDM: high level,    Bridget Davis MD  7/10/2024  12:06 PM    **Certification      PHYSICIAN Certification of Need for Inpatient Hospitalization - Initial Certification    Patient will require inpatient services that will reasonably be expected to span two midnight's based on the clinical documentation in H+P.   Based on patients current state of illness, I anticipate that, after discharge, patient will require TBD.

## 2024-07-10 NOTE — CONSULTS
[facility]  part of [facility] ID CONSULT NOTE    Yoseph Cordero Patient Status:  Inpatient    1950 MRN [id number]   Location [facility] 5SW/SE Attending Bridget Davis MD   Hosp Day # 1 PCP Irving Sheets DO       Reason for Consultation:  Bacteremia, wounds    ASSESSMENT:    Antibiotics: Vancomycin, zosyn    # Acute Proteus bacteremia from likely wound source with sacral, ischial and R heel wound  # Leukocytosis  # CVA, bedbound  # H/o PE    PLAN:  -  Continue on vancomycin and zosyn.  -  Surgery and podiatry to see.  -  Follow fever curve, wbc.  -  Reviewed labs, micro, imaging reports, available old records.  -  Case d/w patient, RN.    History of Present Illness:  Yoseph Cordero is a 74 year old male with a history of CVA, PE, dementia, mostly bedbound, who presented to [facility] ED on  after being seen by PCP with concerns for worsening wounds. Patient did have a XR of R foot showing osteomyelitis on . On arrival, Tmax 101.7, wbc 22.3, lactate 2.8, wound swab obtained, started on vancomycin and zosyn. Blood cx with Proteus. Plans to be seen by podiatry and general surgery. ID consulted.    History:  Past Medical History:    Anxiety state, unspecified    CVA (cerebral infarction)    Dementia (HCC)    Depression    Heterozygous factor V Leiden mutation (HCC)    Other and unspecified hyperlipidemia    Other ill-defined conditions(799.89)    Cardiomegaly Pleural effusion w/idopathic pleuropuricarditis    Pulmonary embolism (HCC)    Stroke (HCC)    Unspecified sleep apnea     Past Surgical History:   Procedure Laterality Date    Colonoscopy      Electrocardiogram, complete  1914    Scanned to Media Tab     Family History   Problem Relation Age of Onset    Cancer Father         Lung - Smoker  Cause of death    Other (Other) Mother         during     Diabetes Neg     Glaucoma Neg       reports that he has quit smoking. He has never used  smokeless tobacco. He reports that he does not drink alcohol and does not use drugs.    Allergies:  No Known Allergies    Medications:    Current Facility-Administered Medications:     sodium hypochlorite (Dakin's) 0.125 % external solution, , Topical, BID    vancomycin (Vancocin) 1,000 mg in sodium chloride 0.9% 250 mL IVPB-ADDV, 15 mg/kg, Intravenous, Q24H    piperacillin-tazobactam (Zosyn) 3.375 g in dextrose 5% 100 mL IVPB-ADDV, 3.375 g, Intravenous, Q8H    sodium chloride 0.9% infusion, , Intravenous, Continuous    acetaminophen (Tylenol Extra Strength) tab 500 mg, 500 mg, Oral, Q4H PRN    ondansetron (Zofran) 4 MG/2ML injection 4 mg, 4 mg, Intravenous, Q6H PRN    OLANZapine (Zyprexa) 5 mg in sterile water for injection (PF) IM injection, 5 mg, Intramuscular, Q12H PRN    levETIRAcetam (Keppra) tab 500 mg, 500 mg, Oral, BID    Review of Systems:  ROS unable to be reviewed.    Physical Exam:  Vital signs: Blood pressure 127/69, pulse 115, temperature (!) 100.5 °F (38.1 °C), temperature source Axillary, resp. rate 20, height 5' 7\" (1.702 m), weight 135 lb 11.2 oz (61.6 kg), SpO2 97%.    General: Awake, in bed  HEENT: Moist mucous membranes. EOMI   Neck: No lymphadenopathy.  Supple.  Cardiovascular: Regular rate and rhythm  Respiratory: CTAB  Abdomen: Soft, no TTP  Musculoskeletal: No edema noted  Integument: Wound pictures reviewed with necrotic wounds  Lines: PIV+  : Purewick draining yellow urine    Laboratory Data:  Recent Labs   Lab 07/10/24  0613   RBC 3.20*   HGB 8.6*   HCT 27.5*   MCV 85.9   MCH 26.9   MCHC 31.3   RDW 14.8   NEPRELIM 14.67*   WBC 16.9*   .0     Recent Labs   Lab 07/09/24  1907 07/10/24  0614   * 117*   BUN 13 8*   CREATSERUM 0.88 0.57*   CA 9.2 8.5*   ALB 4.1  --     138   K 3.6 3.2*    105   CO2 26.0 27.0   ALKPHO 71  --    AST 26  --    ALT 28  --    BILT 0.4  --    TP 7.7  --        Microbiology: Reviewed in EMR    Radiology: Reviewed    Thank you for allowing  us to participate in the care of this patient. Please do not hesitate to call if you have any questions.   We will continue to follow with you and will make further recommendations based on his progress.    SAILAJA Chavez [facility]  [phone]  7/10/2024

## 2024-07-10 NOTE — PROGRESS NOTES
07/10/24 0838   Wound 07/09/24 Heel Right   Date First Assessed/Time First Assessed: 07/09/24 2026   Present on Original Admission: Yes  Primary Wound Type: Pressure Injury  Location: Heel  Wound Location Orientation: Right  Wound Description (Comments): odor, necrotic tissue   Wound Image    Site Assessment Black;Fragile;Moist;Painful;White;Tan;Yellow   Drainage Amount Moderate   Drainage Description Odor;Purulent;Tan;Serosanguineous   Treatments Cleansed;Saline;Site Care   Dressing 4x4s;Aquacel Foam;Eye Shield;Kerlix roll;Tape  (damp saline gauze dressing, will obtain orders)   Dressing Changed Changed   Dressing Status Clean;Dry;Intact;Dressing Changed   Wound Depth (cm)   (necrotic tissue)   Madhuri-wound Assessment Fragile;Moist;Pink;Maceration   Wound Bed Slough (%) 25 %   Wound Bed Eschar (%) 75 %   State of Healing Non-healing   Wound Odor Mild   Pressure Injury Stage U   Wound 07/09/24 Hip Left   Date First Assessed/Time First Assessed: 07/09/24 2028   Present on Original Admission: Yes  Primary Wound Type: Pressure Injury  Location: Hip  Wound Location Orientation: Left  Wound Description (Comments): odor eschar   Wound Image    Site Assessment Dry;Black;Fragile;Painful;Yellow;Pink   Drainage Amount None   Treatments Cleansed;Saline   Dressing 4x4s;Aquacel Foam  (damp saline gauze, will obtain orders)   Dressing Changed Changed   Dressing Status Clean;Dry;Intact;Dressing Changed   Wound Depth (cm)   (intact eschar)   Madhuri-wound Assessment Fragile;Pink   Wound Bed Slough (%) 5 %   Wound Bed Eschar (%) 95 %   State of Healing Non-healing   Wound Odor Mild   Pressure Injury Stage U   Wound 07/09/24 Sacrum   Date First Assessed/Time First Assessed: 07/09/24 2029   Present on Original Admission: Yes  Primary Wound Type: Pressure Injury  Location: Sacrum  Wound Description (Comments): odor unstagable   Wound Image     Site Assessment Moist;Painful;Pink;Fragile;Black;Tan;Yellow   Drainage Amount Scant   Drainage  Description Tan;Odor;Serosanguineous   Treatments Cleansed;Saline;Site Care;Topical (Barrier/Moisturizer/Ointment)  (zinc to the flori wound)   Dressing 4x4s;Aquacel Foam  (damp saline gauze, will obtain orders)   Dressing Changed Changed   Dressing Status Clean;Dry;Intact;Dressing Changed   Wound Depth (cm)   (necrotic tissue)   Flori-wound Assessment Fragile;Pink;Maceration;White   Wound Bed Slough (%) 25 %   Wound Bed Eschar (%) 75 %   State of Healing Non-healing   Wound Odor Strong   Pressure Injury Stage U   Wound 07/10/24 Ankle Anterior;Right   Date First Assessed/Time First Assessed: 07/10/24 0843   Present on Original Admission: Yes  Primary Wound Type: Pressure Injury  Location: Ankle  Wound Location Orientation: Anterior;Right  Wound Description (Comments): yellow slough   Wound Image    Site Assessment Moist;Fragile;Painful;Yellow   Drainage Amount Scant   Drainage Description Yellow   Treatments Cleansed;Saline;Site Care   Dressing 2x2s;Aquacel Foam  (damp saline gauze will obtain orders)   Dressing Changed Changed   Dressing Status Clean;Dry;Intact;Dressing Changed   Wound Length (cm) 2 cm   Wound Width (cm) 2 cm   Wound Surface Area (cm^2) 4 cm^2   Wound Depth (cm)   (slough tissue)   Flori-wound Assessment Fragile;Pink   Wound Bed Slough (%) 100 %   State of Healing Non-healing   Wound Odor None   Tunneling? No   Undermining? No   Sinus Tracts? No   Pressure Injury Stage U   Wound Follow Up   Follow up needed Yes   Comfort and Environment Interventions   Specialty Bed/Mattress   (air pump is on the Isotour gel mattress)     WOUND CARE NOTE    History:  Past Medical History:    Anxiety state, unspecified    CVA (cerebral infarction)    Dementia (HCC)    Depression    Heterozygous factor V Leiden mutation (HCC)    Other and unspecified hyperlipidemia    Other ill-defined conditions(959.89)    Cardiomegaly Pleural effusion w/idopathic pleuropuricarditis    Pulmonary embolism (HCC)    Stroke (HCC)     Unspecified sleep apnea     Past Surgical History:   Procedure Laterality Date    Colonoscopy      Electrocardiogram, complete  01/08/1914    Scanned to Media Tab      Social History     Socioeconomic History    Marital status:    Tobacco Use    Smoking status: Former    Smokeless tobacco: Never   Vaping Use    Vaping status: Never Used   Substance and Sexual Activity    Alcohol use: No     Alcohol/week: 0.0 standard drinks of alcohol     Comment: none recently    Drug use: No   Other Topics Concern    Caffeine Concern Yes     Comment: 1 cups coffee daily    Exercise No   Social History Narrative    The patient does not use an assistive device..      The patient does live in a home with stairs.     Social Determinants of Health     Financial Resource Strain: Low Risk  (5/6/2024)    Financial Resource Strain     Difficulty of Paying Living Expenses: Not very hard     Med Affordability: No   Food Insecurity: No Food Insecurity (7/10/2024)    Food Insecurity     Food Insecurity: Never true   Transportation Needs: No Transportation Needs (7/10/2024)    Transportation Needs     Lack of Transportation: No   Housing Stability: Low Risk  (7/10/2024)    Housing Stability     Housing Instability: No       PLAN   Recommendations:  May consider surgical evaluation for right heel, left hip and sacral necrotic pressure injuries  Podiatry, ID are currently on consult  PT and OT are on consult  Dietary consult for recommendations for nutrition to optimize wound healing  Turn schedules  Specialty bed: Air pump is on the Isotour gel mattress  Heels elevated using pillows, heel boots to offload heels  Use of lift equipment  To prevent sliding: decrease head of bed and elevate foot of bed as medical condition tolerates  Prompt incontinence care  Moisture barrier for incontinence. May consider zinc to the sacral flori wound  Glucose control to help promote wound healing    Wound(s)  Location: Right Heel, left hip and  sacrum  Cleansing  Saline   Topical 1/4 St Dakins damp on gauze and over the wounds, pack into depth  Dressings Dry gauze  Secure with Sacral foam for sacrum, Border foam for left hip and heel cup and Kerlix roll for right heel  Frequency Q 12 hrs and prn   Location: Right medial ankle  Cleansing  Saline   Topical Medi honey from unit dist  Dressings Small amt of medi honey on 2x2 gauze, dry gauze  Secure with Border foam  Frequency daily       Discharge Recommendations: Recommend the pt have an air mattress at home if he does not have one at home,      OBJECTIVE   MD Consult new right heel, sacrum, left hip wounds      ASSESSMENT   Oral Score:  Oral Scale Score: 9    Chart Reviewed: yes    Wound(s):       Allergies: Patient has no known allergies.    Labs:   Lab Results   Component Value Date    WBC 16.9 (H) 07/10/2024    HGB 8.6 (L) 07/10/2024    HCT 27.5 (L) 07/10/2024    .0 07/10/2024    CREATSERUM 0.57 (L) 07/10/2024    BUN 8 (L) 07/10/2024     07/10/2024    K 3.2 (L) 07/10/2024     07/10/2024    CO2 27.0 07/10/2024     (H) 07/10/2024    CA 8.5 (L) 07/10/2024    ALB 4.1 07/09/2024    ALKPHO 71 07/09/2024    BILT 0.4 07/09/2024    TP 7.7 07/09/2024    AST 26 07/09/2024    ALT 28 07/09/2024    MG 2.2 05/10/2024     No results found for: \"PREALBUMIN\"      Time Spent 1 Hour.    Jessica Landry RN  [facility]  [facility]  [phone]

## 2024-07-10 NOTE — DIETARY NOTE
ADULT NUTRITION INITIAL ASSESSMENT      RECOMMENDATIONS TO MD: See Nutrition Intervention for Oral Nutrition Supplement ( ONS )     Pt is at moderate nutrition risk.  Malnutrition criteria pending completion of Nutrition Focus Physical Exam ( NFPE).      ADMITTING DIAGNOSIS:  Infected ulcer of skin, unspecified ulcer stage (HCC) [L98.499, L08.9]     PERTINENT PAST MEDICAL HISTORY:    Past Medical History:    Anxiety state, unspecified    CVA (cerebral infarction)    Dementia (HCC)    Depression    Heterozygous factor V Leiden mutation (HCC)    Other and unspecified hyperlipidemia    Other ill-defined conditions(799.89)    Cardiomegaly Pleural effusion w/idopathic pleuropuricarditis    Pulmonary embolism (HCC)    Stroke (HCC)    Unspecified sleep apnea    has a past surgical history that includes electrocardiogram, complete (01/08/1914) and colonoscopy.     PATIENT STATUS: Initial 07/10/24: Patient (pt) identified at Nutrition risk due to pressure injuries after the screening process.  Medical findings:  Infected right heel pressure ulcer with acute sepsis, leukocytosis. Plan surgery per Surgery services.   Upon visit, pt is bound to bed,  non verbal, right hemiparesis from prior CVA. Muscle contractures noted right upper and lower extremities. But smiling during interview with family. Family feeding pt lunch meal. Pt eats well with good appetite. Ate 90% this am, consistent with RN report. Diet hx/eval:    Family reports pt eating well  at home. Wt eval:    stable at 131-135# , no recent wt changes noted. Nutrition findings:   deferred NFPE d/t MD walked in twice to eval Wounds with RN. Discussed with family provision to initiate ONS and protein & vit/min modular (Stevan) to aid in wound healing, family agreeable.     FOOD/NUTRITION INTAKE ANALYSIS:    Current Appetite: Good  Current Intake:  75-90% ( recorded 90% this am, RD observed pt being fed by family ~ 75%)  Current Intake Meeting Needs: Yes, and oral  nutrition supplements (ONS) to maximize  Percent Meals Eaten (last 6 days)       Date/Time Percent Meals Eaten (%)    07/10/24 1029 90 %           Food Allergies: No Known Food Allergies (NKFA)  Cultural/Ethnic/Methodist Preferences: None    GASTROINTESTINAL: +BM 7/10 soft brown small stool x 1 and On modified diet likely r/t prior CVA      MEDICATIONS: reviewed    sodium chloride 100 mL/hr at 07/10/24 1437      sodium hypochlorite   Topical BID    vancomycin  15 mg/kg Intravenous Q24H    piperacillin-tazobactam  3.375 g Intravenous Q8H    levETIRAcetam  500 mg Oral BID       LABS: reviewed giving KCL replacement.   Recent Labs     07/09/24  1907 07/10/24  0614   * 117*   BUN 13 8*   CREATSERUM 0.88 0.57*   CA 9.2 8.5*    138   K 3.6 3.2*    105   CO2 26.0 27.0   OSMOCALC 293 285       NUTRITION RELATED PHYSICAL FINDINGS:  - Nutrition Focused Physical Exam (NFPE):  deferred, MD walked in twice to evaluate Wounds.     - Fluid Accumulation: non-pitting Left Foot  see RN documentation for details  - Skin Integrity:  Mulitple unstageable pressure injuries  see RN documentation for details    ANTHROPOMETRICS:  HT: 170.2 cm (5' 7\")  WT: 61.6 kg (135 lb 11.2 oz)   BMI: Body mass index is 21.25 kg/m².  BMI CLASSIFICATION: <22 considered underweight for advanced age  IBW: 148 lbs        91% IBW  Usual Body Wt: 130-135 lbs       100% UBW  WEIGHT HISTORY:    Patient Weight(s) for the past 336 hrs:   Weight   07/09/24 2216 61.6 kg (135 lb 11.2 oz)     Wt Readings from Last 10 Encounters:   07/09/24 61.6 kg (135 lb 11.2 oz)   07/09/24 61.6 kg (135 lb 11.2 oz)   06/03/24 61.7 kg (136 lb)   05/10/24 61.9 kg (136 lb 8 oz)   05/07/24 59.4 kg (131 lb)   05/05/24 59.5 kg (131 lb 3.2 oz)   05/02/24 59 kg (130 lb)   01/04/24 59.9 kg (132 lb)   07/26/23 54.4 kg (120 lb)   01/11/23 50.8 kg (112 lb)       NUTRITION DIAGNOSIS/PROBLEM:    Increased nutrient needs related to Increased demand for nutrient  for wound healing  as evidenced by multiple pressure injuries     NUTRITION INTERVENTION:     NUTRITION PRESCRIPTION:   Estimated Nutrition needs: Dosing wt of 61.6 kg --wt taken on 7/9 .  Calories: 2228-5148 calories/day (30-35 calories per kg Dosing wt)  Protein:  g protein/day (1.5-1.7 g protein/kg  Dosing wt)    - Diet:       Procedures    Regular/General diet Texture Consistency: Soft / Easy to Chew ; Is Patient on Accuchecks? No      - RD  Care Plan: Initiated ONS (oral nutritional supplements)  - Meals and snacks: Arranged snacks and continue good po intake.   - Medical Food Supplements- Ensure Enlive (350 calories/ 20 g protein each) Daily Vanilla mixed with Stevan at bfast, And another Stevan mixed in water at Dinner-added by RD,  Rational/Benefits discussed.  - Vitamin and mineral supplements: none/ Stevan provides Vit C, E, B12, Zinc, Calcium, Collagen and HMB ( with Arginine & Glutamine Proteins)   - Feeding assistance: feed by staff  - Nutrition education: Discussed importance of increased protein/calories and vit & min and adequate fluid   for wound healing process.   - Coordination of nutrition care: collaboration with other providers  - Discharge and transfer of nutrition care to new setting or provider: monitor plans      MONITOR AND EVALUATE/NUTRITION GOALS:  - Food and Nutrient Intake:    Monitor: adequacy of PO intake and adequacy of supplement intake  - Food and Nutrient Administration:    Monitor: N/A  - Anthropometric Measurement:    Monitor weight  - Nutrition Goals:    maintain wt within 5%, PO and supplement greater than 75% of needs, labs within acceptable limits, support wound healing, and improved GI status      RD will follow up.    Bijal Elizabeth, RD, LDN, [facility]  Clinical Dietitian  [phone]

## 2024-07-10 NOTE — PHYSICAL THERAPY NOTE
PHYSICAL THERAPY EVALUATION - INPATIENT     Room Number: 560/560-A  Evaluation Date: 7/10/2024  Type of Evaluation: Initial   Physician Order: PT Eval and Treat    Presenting Problem: Infected right heel pressure ulcer with acute sepsis.  Co-Morbidities : chronic right hemiparesis from prior cerebrovascular accident and chronic right heel, left ischial, and sacral decubitus ulcer, dementia. Right medial malleolus fracture, non-displaced 5/2024  Reason for Therapy: Mobility Dysfunction and Discharge Planning    PHYSICAL THERAPY ASSESSMENT   Patient is a 74 year old male admitted 7/9/2024 for Infected right heel pressure ulcer with acute sepsis..  Prior to admission, patient's baseline is unknown.  Patient is currently functioning below baseline with bed mobility, transfers, gait, maintaining seated position, and standing prolonged periods.  Patient is requiring maximum assist and dependent as a result of the following impairments: decreased functional strength, decreased endurance/aerobic capacity, pain, impaired sitting and standing balance, decreased muscular endurance, cognitive deficits (A&O x 0), and limited LE strength and ROM.  Physical Therapy will continue to follow for duration of hospitalization.    Patient will benefit from continued skilled PT Services to promote return to prior level of function and safety with continuous assistance and gradual rehabilitative therapy .    PLAN  PT Treatment Plan: Bed mobility;Body mechanics;Family education;Range of motion;Stair training;Transfer training;Strengthening;Endurance  Rehab Potential : Guarded  Frequency (Obs): 3-5x/week    PHYSICAL THERAPY MEDICAL/SOCIAL HISTORY     Problem List  Principal Problem:    Infected ulcer of skin, unspecified ulcer stage (HCC)  Active Problems:    Infected decubitus ulcer      HOME SITUATION  Home Situation  Type of Home: House  Lives With: Family  Drives: No  Patient Owned Equipment:  (standard walker ? per EMR from 5/2024)        SUBJECTIVE  Patient non-verbal throughout.     PHYSICAL THERAPY EXAMINATION   OBJECTIVE  Precautions: Bed/chair alarm; needed  Fall Risk: High fall risk    WEIGHT BEARING RESTRICTION  Weight Bearing Restriction: None     PAIN ASSESSMENT  Rating: Unable to rate          COGNITION  Overall Cognitive Status:  Impaired  Arousal/Alertness:  lethargic  Following Commands:  does not follow commands  Initiation: cues to initiate tasks  Safety Judgement:  decreased awareness of need for safety  Awareness of Errors:  decreased awareness of errors   Awareness of Deficits:  not aware of deficits    RANGE OF MOTION AND STRENGTH ASSESSMENT  Upper extremity ROM and strength are impaired. R side deficits, history of CVA  Lower extremity ROM is impaired R ankle/foot and R knee PROM, limited extension/DF and guarded  Lower extremity strength is impaired, unable to formally test 2* impaired command following to hold against resistance.     BALANCE  Static Sitting: Poor  Dynamic Sitting: Poor -  Static Standing: Not tested  Dynamic Standing: Not tested    ADDITIONAL TESTS   *Wounds, gauze bandaging in place        ACTIVITY TOLERANCE  Pulse: (!) 127  Heart Rate Source: Monitor     BP: 127/69  BP Location: Right arm  BP Method: Automatic  Patient Position: Sitting    O2 WALK  Oxygen Therapy  SPO2% on Room Air at Rest: 97    AM-PAC '6-Clicks' INPATIENT SHORT FORM - BASIC MOBILITY  How much difficulty does the patient currently have...  Patient Difficulty: Turning over in bed (including adjusting bedclothes, sheets and blankets)?: A Lot   Patient Difficulty: Sitting down on and standing up from a chair with arms (e.g., wheelchair, bedside commode, etc.): Unable   Patient Difficulty: Moving from lying on back to sitting on the side of the bed?: A Lot   How much help from another person does the patient currently need...   Help from Another: Moving to and from a bed to a chair (including a wheelchair)?: Total   Help from  Another: Need to walk in hospital room?: Total   Help from Another: Climbing 3-5 steps with a railing?: Total     AM-PAC Score:  Raw Score: 8   Approx Degree of Impairment: 86.62%   Standardized Score (AM-PAC Scale): 28.58   CMS Modifier (G-Code): CM    FUNCTIONAL ABILITY STATUS  Functional Mobility/Gait Assessment  Gait Assistance: Not tested  Rolling: Max A x 1  Supine to Sit: dependent. Tolerated sitting EOB for 8 minutes requiring initial Max A to maintain sitting balance improving to CGA as time in sitting increased. Intermittent R lateral lean. Tolerance in sitting limited by fatigue.   Sit to Supine: Max A x 2    Exercise/Education Provided:  Bed mobility  Body mechanics  Functional activity tolerated  Posture  ROM  Strengthening  Transfer training    The patient's Approx Degree of Impairment: 86.62% has been calculated based on documentation in the [facility] '6 clicks' Inpatient Basic Mobility Short Form.  Research supports that patients with this level of impairment may benefit from LTC, however, prior level of function unknown and pt admitted from home, will benefit from rehab to address current deficits.  Final disposition will be made by interdisciplinary medical team.    Patient received supine in bed. RN approved activity. Utilized language line for Kinyarwanda interpretation ID # [id number]. Therapist educated pt on POC. Patient non-verbal and A&O x 0-1, intermittent awareness/visual tracking to name. Initially lethargic with alertness improving once activity initiated. Unable to express pain but grimacing noted with repositioning. *Vitals stable throughout.     Patient End of Session: In bed;Needs met;Call light within reach;RN aware of session/findings;Alarm set. Pillow placement to limit pressure on bony prominences.     CURRENT GOALS  Goals to be met by: 7/24/24  Patient Goal Patient's self-stated goal is: return to PLOF   Goal #1 Patient is able to demonstrate supine - sit EOB @ level: moderate assistance      Goal #1   Current Status    Goal #2 Patient is able to demonstrate transfers EOB to/from Chair/Wheelchair at assistance level: moderate assistance with stand pivot transfer technique     Goal #2  Current Status    Goal #3 Patient is able to tolerate 1 minutes of static standing with UE support on RW requiring Mod A x 1 to improve tolerance/participation during functional tasks.    Goal #3   Current Status    Goal #4 Patient to demonstrate independence with home activity/exercise instructions provided to patient in preparation for discharge.   Goal #4   Current Status      Patient Evaluation Complexity Level:  History Moderate - 1 or 2 personal factors and/or co-morbidities   Examination of body systems Moderate - addressing a total of 3 or more elements   Clinical Presentation  Moderate - Evolving   Clinical Decision Making  Moderate Complexity

## 2024-07-10 NOTE — ED PROVIDER NOTES
Patient Seen in: [facility] Emergency Department    History     Chief Complaint   Patient presents with    Fever       HPI    History is provided by patient/independent historian: patient's daughter  74 year old male with history of dementia, factor V Leiden, PE, on Xarelto, bedbound and resultant pressure ulcers, brought in for foul-smelling drainage and pain.  Daughter reports she has been significantly change the dressing, and its mostly \"dead\".    History reviewed.   Past Medical History:    Anxiety state, unspecified    CVA (cerebral infarction)    Dementia (HCC)    Depression    Heterozygous factor V Leiden mutation (HCC)    Other and unspecified hyperlipidemia    Other ill-defined conditions(799.89)    Cardiomegaly Pleural effusion w/idopathic pleuropuricarditis    Pulmonary embolism (HCC)    Stroke (HCC)    Unspecified sleep apnea         History reviewed.   Past Surgical History:   Procedure Laterality Date    Colonoscopy      Electrocardiogram, complete  01/08/1914    Scanned to Media Tab         Home Medications reviewed :  (Not in a hospital admission)        History reviewed.   Social History     Socioeconomic History    Marital status:    Tobacco Use    Smoking status: Former    Smokeless tobacco: Never   Vaping Use    Vaping status: Never Used   Substance and Sexual Activity    Alcohol use: No     Alcohol/week: 0.0 standard drinks of alcohol     Comment: none recently    Drug use: No   Other Topics Concern    Caffeine Concern Yes     Comment: 1 cups coffee daily    Exercise No         ROS  Review of Systems   Respiratory:  Negative for shortness of breath.    Cardiovascular:  Negative for chest pain.   Skin:  Positive for wound.   All other systems reviewed and are negative.     All other pertinent organ systems are reviewed and are negative.      Physical Exam     ED Triage Vitals [07/09/24 1853]   /69   Pulse (!) 142   Resp 20   Temp (!) 100.5 °F (38.1 °C)   Temp src    SpO2  100 %   O2 Device None (Room air)     Vital signs reviewed.      Physical Exam  Vitals and nursing note reviewed.   Cardiovascular:      Rate and Rhythm: Tachycardia present.      Pulses: Normal pulses.   Pulmonary:      Effort: No respiratory distress.   Abdominal:      General: There is no distension.   Musculoskeletal:        Legs:         Feet:    Neurological:      Mental Status: He is alert.         ED Course       Labs:     Labs Reviewed   COMP METABOLIC PANEL (14) - Abnormal; Notable for the following components:       Result Value    Glucose 155 (*)     Globulin  3.6 (*)     All other components within normal limits   LACTIC ACID, PLASMA - Abnormal; Notable for the following components:    Lactic Acid 2.8 (*)     All other components within normal limits   CBC W/ DIFFERENTIAL - Abnormal; Notable for the following components:    WBC 22.3 (*)     HGB 10.7 (*)     HCT 34.9 (*)     MCHC 30.7 (*)     RDW-SD 47.3 (*)     .0 (*)     Neutrophil Absolute Prelim 21.05 (*)     Neutrophil Absolute 21.05 (*)     Lymphocyte Absolute 0.50 (*)     All other components within normal limits   CBC WITH DIFFERENTIAL WITH PLATELET    Narrative:     The following orders were created for panel order CBC With Differential With Platelet.                  Procedure                               Abnormality         Status                                     ---------                               -----------         ------                                     CBC W/ DIFFERENTIAL[[id number]]          Abnormal            Final result                                                 Please view results for these tests on the individual orders.   LACTIC ACID REFLEX POST POSTIVE   RAINBOW DRAW LAVENDER   RAINBOW DRAW LIGHT GREEN   RAINBOW DRAW BLUE   RAINBOW DRAW GOLD   BLOOD CULTURE   BLOOD CULTURE   AEROBIC BACTERIAL CULTURE   AEROBIC BACTERIAL CULTURE   AEROBIC BACTERIAL CULTURE         My EKG Interpretation:   As reviewed and  Interpreted by me      Imaging Results Available and Reviewed while in ED:   XR FOOT, COMPLETE (MIN 3 VIEWS), RIGHT (CPT=73630)    Result Date: 7/9/2024  CONCLUSION:  1. Again seen is osseous erosion involving the posterior calcaneus, not significant changed from the prior study.  2. Soft tissue swelling of the right foot without radiographic evidence of underlying osseous injury.    Dictated by (CST): Marcial Mendoza MD on 7/09/2024 at 8:22 PM     Finalized by (CST): Marcial Mendoza MD on 7/09/2024 at 8:25 PM         My review and independent interpretation of XR images: no obvious osteo. Radiology report corroborates this in addition to other details as reported by them.      Decision rules/scores evaluated: none      Diagnostic labs/tests considered but not ordered: none    ED Medications Administered:   Medications   sodium chloride 0.9 % IV bolus 1,845 mL (1,845 mL Intravenous New Bag 7/9/24 1948)   vancomycin (Vancocin) 1,000 mg in sodium chloride 0.9% 250 mL IVPB-ADDV (has no administration in time range)   acetaminophen (Tylenol) 160 MG/5ML oral liquid 1,000 mg (1,000 mg Oral Given 7/9/24 1917)   piperacillin-tazobactam (Zosyn) 4.5 g in dextrose 5% 100 mL IVPB-ADDV (4.5 g Intravenous New Bag 7/9/24 2004)            I performed the sepsis reassessment at 7:55 PM.      [facility]       Medical Decision Making      Differential Diagnosis: After obtaining the patient's history, performing the physical exam and reviewing the diagnostics, multiple initial diagnoses were considered based on the presenting problem including infection, cellulitis, osteomyelitis, UTI, covid    External document review: I personally reviewed available external medical records for any recent pertinent discharge summaries, testing, and procedures - the findings are as follows: visit earlier today with Dr. Sheets with concern for infection    Complicating Factors: The patient already  has a past medical history of Anxiety state, unspecified,  CVA (cerebral infarction), Dementia (HCC), Depression, Heterozygous factor V Leiden mutation (HCC), Other and unspecified hyperlipidemia, Other ill-defined conditions(799.89), Pulmonary embolism (HCC) (05/03/2024), Stroke (HCC), and Unspecified sleep apnea. to contribute to the complexity of this ED evaluation.    Procedures performed: none    Discussed management with physician/appropriate source: Dr. Hodgson, Dr. Smith, Dr. Blake    Considered admission/deescalation of care for: infected wound    Social determinants of health affecting patient care: none    Prescription medications considered: discussed continuing current medication regimen    The patient requires continuous monitoring for: infected wound    Shared decision making: discussed possible admission    Critical Care Time:  Total critical care time for this patient was 30 minutes exclusive of separately billable procedures, but in obtaining history, performing a physical exam, bedside monitoring of interventions, collecting and interpreting test, and discussion with consultants.        Disposition and Plan     Clinical Impression:  1. Infected ulcer of skin, unspecified ulcer stage ([facility])        Disposition:  Admit    Follow-up:  No follow-up provider specified.    Medications Prescribed:  Current Discharge Medication List          Hospital Problems       Present on Admission  Date Reviewed: 7/9/2024            ICD-10-CM Noted POA    * (Principal) Infected ulcer of skin, unspecified ulcer stage ([facility]) L98.499, L08.9 7/9/2024 Unknown

## 2024-07-10 NOTE — OCCUPATIONAL THERAPY NOTE
OCCUPATIONAL THERAPY EVALUATION - INPATIENT     Room Number: 560/560-A  Evaluation Date: 7/10/2024  Type of Evaluation: Initial       Physician Order: IP Consult to Occupational Therapy  Reason for Therapy: ADL/IADL Dysfunction and Discharge Planning    OCCUPATIONAL THERAPY ASSESSMENT     Patient is a 74 year old male admitted 7/9/2024 for infected ulcer of skin; R heel pressure wound; septic.  Prior to admission, pt was living with family; assist for all ADLs and bedbound.  Patient is currently at baseline of dependence; requiring assist of 2 for all mobility. Pt able to sit at EOB requiring up to max A to maintain balance. Min A with LUE support (RUE in flexed contracted position). Pt repositioned on L side to offload pressure from R heel (prevalan boots donned). OT to sign off. Recommend akua lift transfers while in house.    RN cleared pt for participation in OT session, which was completed in collaboration with PT.  used. Pt non-verbal.  ID#[id number]. Upon arrival, pt was supine in bed and agreeable to activity. No visitors present during session. Pt was left in bed and alarm was activated. Call light and all needs left in reach. Handoff given to RN.     DC OT    OCCUPATIONAL THERAPY MEDICAL/SOCIAL HISTORY     Problem List  Principal Problem:    Infected ulcer of skin, unspecified ulcer stage (HCC)  Active Problems:    Infected decubitus ulcer      Past Medical History  Past Medical History:    Anxiety state, unspecified    CVA (cerebral infarction)    Dementia (HCC)    Depression    Heterozygous factor V Leiden mutation (HCC)    Other and unspecified hyperlipidemia    Other ill-defined conditions(799.89)    Cardiomegaly Pleural effusion w/idopathic pleuropuricarditis    Pulmonary embolism (HCC)    Stroke (HCC)    Unspecified sleep apnea       Past Surgical History  Past Surgical History:   Procedure Laterality Date    Colonoscopy      Electrocardiogram, complete  01/08/1914    Scanned to  Media Tab       HOME SITUATION  Type of Home: House     Lives With: Family                      Drives: No       SUBJECTIVE  Pt non-verbal    OCCUPATIONAL THERAPY EXAMINATION      OBJECTIVE  Precautions: Bed/chair alarm; needed  Fall Risk: High fall risk    PAIN ASSESSMENT  Rating: Non-verbal signs of pain/discomfort observed  Location: R foot during bed mobility       O2 SATURATIONS  Oxygen Therapy  SPO2% on Room Air at Rest: 97     ACTIVITIES OF DAILY LIVING ASSESSMENT  AM-PAC ‘6-Clicks’ Inpatient Daily Activity Short Form  How much help from another person does the patient currently need…  -   Putting on and taking off regular lower body clothing?: Total  -   Bathing (including washing, rinsing, drying)?: Total  -   Toileting, which includes using toilet, bedpan or urinal? : Total  -   Putting on and taking off regular upper body clothing?: Total  -   Taking care of personal grooming such as brushing teeth?: Total  -   Eating meals?: A Lot    AM-PAC Score:  Score: 7  Approx Degree of Impairment: 92.44%  Standardized Score (AM-PAC Scale): 20.13  CMS Modifier (G-Code): CM      BED MOBILITY  Supine to Sit : Dependent  Sit to Supine (OT): Dependent      FUNCTIONAL TRANSFER ASSESSMENT  Supine to Sit : Dependent          FUNCTIONAL ADL ASSESSMENT  Overall total A  1 to 1 feed    The patient's Approx Degree of Impairment: 92.44% has been calculated based on documentation in the [facility] '6 clicks' Inpatient Daily Activity Short Form.  Research supports that patients with this level of impairment may benefit from LTAC. Final disposition will be made by interdisciplinary medical team.         Patient Evaluation Complexity Level:   Occupational Profile/Medical History MODERATE - Expanded review of history including review of medical or therapy record   Specific performance deficits impacting engagement in ADL/IADL MODERATE  3 - 5 performance deficits   Client Assessment/Performance Deficits MODERATE - Comorbidities  and min to mod modifications of tasks    Clinical Decision Making MODERATE - Analysis of occupational profile, detailed assessments, several treatment options    Overall Complexity MODERATE     OT Session Time: 20 minutes  Self-Care Home Management: 10 minutes         Elicia Leon OT  [facility]  Inpatient Rehabilitation  Occupational Therapy  [phone];;

## 2024-07-10 NOTE — CONSULTS
[facility]  part of [facility]    Report of Consultation    Yoseph Cordero Patient Status:  Inpatient    1950 MRN [id number]   Location [facility] 5SW/SE Attending Bridget Davis MD   Hosp Day # 1 PCP Irving Sheets DO     Date of Admission:  2024  Date of Consult:  2024  Reason for Consultation:   Infected ulcer of skin, unspecified ulcer stage (HCC)    History of Present Illness:   Patient is a 74 year old male who was admitted to the hospital for Infected ulcer of skin, unspecified ulcer stage (HCC):    Yoseph is a 73yo male with history of dementia and stroke presenting to the ED with worsening decubitus ulcers. Now foul smelling, reportedly worsening over the past few months. Pt nonverbal at baseline. WBC 16.9. afebrile.    Past Medical History  Past Medical History:    Anxiety state, unspecified    CVA (cerebral infarction)    Dementia (HCC)    Depression    Heterozygous factor V Leiden mutation (HCC)    Other and unspecified hyperlipidemia    Other ill-defined conditions(799.89)    Cardiomegaly Pleural effusion w/idopathic pleuropuricarditis    Pulmonary embolism (HCC)    Stroke (HCC)    Unspecified sleep apnea       Past Surgical History  Past Surgical History:   Procedure Laterality Date    Colonoscopy      Electrocardiogram, complete  1914    Scanned to Media Tab       Family History  Family History   Problem Relation Age of Onset    Cancer Father         Lung - Smoker  Cause of death    Other (Other) Mother         during     Diabetes Neg     Glaucoma Neg        Social History  Social History     Socioeconomic History    Marital status:    Tobacco Use    Smoking status: Former    Smokeless tobacco: Never   Vaping Use    Vaping status: Never Used   Substance and Sexual Activity    Alcohol use: No     Alcohol/week: 0.0 standard drinks of alcohol     Comment: none recently    Drug use: No   Other Topics Concern    Caffeine Concern  Yes     Comment: 1 cups coffee daily    Exercise No   Social History Narrative    The patient does not use an assistive device..      The patient does live in a home with stairs.     Social Determinants of Health     Financial Resource Strain: Low Risk  (2024)    Financial Resource Strain     Difficulty of Paying Living Expenses: Not very hard     Med Affordability: No   Food Insecurity: No Food Insecurity (7/10/2024)    Food Insecurity     Food Insecurity: Never true   Transportation Needs: No Transportation Needs (7/10/2024)    Transportation Needs     Lack of Transportation: No   Housing Stability: Low Risk  (7/10/2024)    Housing Stability     Housing Instability: No           Current Medications:  Current Facility-Administered Medications   Medication Dose Route Frequency    sodium hypochlorite (Dakin's) 0.125 % external solution   Topical BID    vancomycin (Vancocin) 1,000 mg in sodium chloride 0.9% 250 mL IVPB-ADDV  15 mg/kg Intravenous Q24H    piperacillin-tazobactam (Zosyn) 3.375 g in dextrose 5% 100 mL IVPB-ADDV  3.375 g Intravenous Q8H    sodium chloride 0.9% infusion   Intravenous Continuous    acetaminophen (Tylenol Extra Strength) tab 500 mg  500 mg Oral Q4H PRN    ondansetron (Zofran) 4 MG/2ML injection 4 mg  4 mg Intravenous Q6H PRN    OLANZapine (Zyprexa) 5 mg in sterile water for injection (PF) IM injection  5 mg Intramuscular Q12H PRN    levETIRAcetam (Keppra) tab 500 mg  500 mg Oral BID     Medications Prior to Admission   Medication Sig    traMADol 50 MG Oral Tab Take 1 tablet (50 mg total) by mouth every 6 (six) hours as needed for Pain (for pain or breakthrough pain.).    levETIRAcetam 500 MG Oral Tab Take 1 tablet (500 mg total) by mouth 2 (two) times daily.    miconazole 2 % External Powder Apply in toes daily    rivaroxaban (XARELTO) 20 MG Oral Tab Take 1 tablet (20 mg total) by mouth daily with food.    [] cephalexin 500 MG Oral Cap Take 1 capsule (500 mg total) by mouth 4  (four) times daily for 7 days.    LORazepam 0.5 MG Oral Tab Take 1 tablet (0.5 mg total) by mouth every 6 (six) hours as needed for Anxiety. (Patient not taking: Reported on 7/9/2024)       Allergies  No Known Allergies    Review of Systems:   Review of Systems   Unable to perform ROS: Patient nonverbal        Physical Exam:   Vital Signs:  Blood pressure 146/62, pulse 115, temperature 99.2 °F (37.3 °C), temperature source Oral, resp. rate 18, height 5' 7\" (1.702 m), weight 135 lb 11.2 oz (61.6 kg), SpO2 96%.     Physical Exam  Vitals reviewed.   Constitutional:       General: He is not in acute distress.     Appearance: Normal appearance.   HENT:      Head: Normocephalic and atraumatic.      Left Ear: External ear normal.      Nose: Nose normal.   Pulmonary:      Effort: Pulmonary effort is normal. No respiratory distress.   Abdominal:      Palpations: Abdomen is soft.      Tenderness: There is no abdominal tenderness.   Skin:     Comments: Ulcers seen. Sacral wound foul smelling, some necrotic tissue and purulent drainage. Left hip wound with necrotic tissue.   Neurological:      Mental Status: Mental status is at baseline.         Results:     Laboratory Data:  Lab Results   Component Value Date    WBC 16.9 (H) 07/10/2024    HGB 8.6 (L) 07/10/2024    HCT 27.5 (L) 07/10/2024    .0 07/10/2024    CREATSERUM 0.57 (L) 07/10/2024    BUN 8 (L) 07/10/2024     07/10/2024    K 3.2 (L) 07/10/2024     07/10/2024    CO2 27.0 07/10/2024     (H) 07/10/2024    CA 8.5 (L) 07/10/2024    ALB 4.1 07/09/2024    ALKPHO 71 07/09/2024    TP 7.7 07/09/2024    AST 26 07/09/2024    ALT 28 07/09/2024    PTT 39.8 (H) 05/07/2024    INR 1.83 (H) 05/07/2024    PTP 22.3 (H) 05/07/2024    TSH 2.834 05/02/2024    PSA 16.20 (H) 03/11/2019    DDIMER 11.06 (H) 05/02/2024    ESRML 30 (H) 10/24/2022    CRP <0.29 10/24/2022    MG 2.2 05/10/2024         Imaging:  MRI FOOT, RIGHT (CPT=73718)    Result Date:  7/10/2024  CONCLUSION:  1. Stage IV heel ulcer extending down to bone with osteomyelitis involving the dorsal calcaneus. 2. Superior aspect of stage IV heel ulcer involves the distal Achilles tendon and there is a fluid-filled longitudinal split partial tear extending over a length of 6 cm, and this could be a intra tendinous abscess.     Dictated by (CST): Apolinar Barry MD on 7/10/2024 at 2:22 PM     Finalized by (CST): Apolinar Barry MD on 7/10/2024 at 2:28 PM          XR FOOT, COMPLETE (MIN 3 VIEWS), RIGHT (CPT=73630)    Result Date: 7/9/2024  CONCLUSION:  1. Again seen is osseous erosion involving the posterior calcaneus, not significant changed from the prior study.  2. Soft tissue swelling of the right foot without radiographic evidence of underlying osseous injury.    Dictated by (CST): Marcial Mendoza MD on 7/09/2024 at 8:22 PM     Finalized by (CST): Marcial Mendoza MD on 7/09/2024 at 8:25 PM              Impression:     Infected ulcer of skin, unspecified ulcer stage (HCC)      Infected decubitus ulcer      Recommendations:  Pt seen. Infected decubitus ulcers on sacrum, left hip, and right heel. Recommend podiatry for heel wound. Plan bedside debridement of sacral and left hip ulcers tomorrow. Discussed with family, they are understanding and agreeable. IV abx per ID. Wound care following.    Thank you for allowing me to participate in the care of your patient.    Taco Pearson PA-C  7/10/2024      Addendum:    Pt seen and examined.  I agree with Taco Pearson'sSAILAJA note.  Will plan for gentle bedside debridement of obviously necrotic tissue.  Santyl or other enzymatic topical cream to open wound.    Frederic Qureshi MD

## 2024-07-10 NOTE — PLAN OF CARE
Problem: Patient Centered Care  Goal: Patient preferences are identified and integrated in the patient's plan of care  Description: Interventions:  - What would you like us to know as we care for you? Patient unable to state   - Provide timely, complete, and accurate information to patient/family  - Incorporate patient and family knowledge, values, beliefs, and cultural backgrounds into the planning and delivery of care  - Encourage patient/family to participate in care and decision-making at the level they choose  - Honor patient and family perspectives and choices  Outcome: Progressing     Problem: SKIN/TISSUE INTEGRITY - ADULT  Goal: Incision(s), wounds(s) or drain site(s) healing without S/S of infection  Description: INTERVENTIONS:  - Assess and document risk factors for pressure ulcer development  - Assess and document skin integrity  - Assess and document dressing/incision, wound bed, drain sites and surrounding tissue  - Implement wound care per orders  - Initiate isolation precautions as appropriate  - Initiate Pressure Ulcer prevention bundle as indicated  Outcome: Progressing     Problem: PAIN - ADULT  Goal: Verbalizes/displays adequate comfort level or patient's stated pain goal  Description: INTERVENTIONS:  - Encourage pt to monitor pain and request assistance  - Assess pain using appropriate pain scale  - Administer analgesics based on type and severity of pain and evaluate response  - Implement non-pharmacological measures as appropriate and evaluate response  - Consider cultural and social influences on pain and pain management  - Manage/alleviate anxiety  - Utilize distraction and/or relaxation techniques  - Monitor for opioid side effects  - Notify MD/LIP if interventions unsuccessful or patient reports new pain  - Anticipate increased pain with activity and pre-medicate as appropriate  Outcome: Progressing     Problem: RISK FOR INFECTION - ADULT  Goal: Absence of fever/infection during  anticipated neutropenic period  Description: INTERVENTIONS  - Monitor WBC  - Administer growth factors as ordered  - Implement neutropenic guidelines  Outcome: Progressing     Problem: SAFETY ADULT - FALL  Goal: Free from fall injury  Description: INTERVENTIONS:  - Assess pt frequently for physical needs  - Identify cognitive and physical deficits and behaviors that affect risk of falls.  - [place] fall precautions as indicated by assessment.  - Educate pt/family on patient safety including physical limitations  - Instruct pt to call for assistance with activity based on assessment  - Modify environment to reduce risk of injury  - Provide assistive devices as appropriate  - Consider OT/PT consult to assist with strengthening/mobility  - Encourage toileting schedule  Outcome: Progressing     Problem: DISCHARGE PLANNING  Goal: Discharge to home or other facility with appropriate resources  Description: INTERVENTIONS:  - Identify barriers to discharge w/pt and caregiver  - Include patient/family/discharge partner in discharge planning  - Arrange for needed discharge resources and transportation as appropriate  - Identify discharge learning needs (meds, wound care, etc)  - Arrange for interpreters to assist at discharge as needed  - Consider post-discharge preferences of patient/family/discharge partner  - Complete POLST form as appropriate  - Assess patient's ability to be responsible for managing their own health  - Refer to Case Management Department for coordinating discharge planning if the patient needs post-hospital services based on physician/LIP order or complex needs related to functional status, cognitive ability or social support system  Outcome: Progressing      Patient septic, MD aware. IV fluids infusing. Febrile, PRN tylenol administered. Wound treatment done. Air mattress in place. For MRI. Family aware of plan of care. Safety measures in place. Frequent rounding and repositioning by nursing staff.

## 2024-07-10 NOTE — H&P
[facility]    PATIENT'S NAME: KUSH GODINEZ   ATTENDING PHYSICIAN: Vasiliy Truong MD   PATIENT ACCOUNT#:   [id number]    LOCATION:  [address]  MEDICAL RECORD #:   [id number]       YOB: 1950  ADMISSION DATE:       07/09/2024    HISTORY AND PHYSICAL EXAMINATION    CHIEF COMPLAINT:  Infected right heel pressure ulcer with acute sepsis.    HISTORY OF PRESENT ILLNESS:  The patient is a 74-year-old  male with chronic right hemiparesis from prior cerebrovascular accident and chronic right heel, left ischial, and sacral decubitus ulcers.  He has been developing necrotic changes in his right heel lately, and he has been having tachycardia and generalized fatigue and weakness.  Today, he was sent to the emergency department for evaluation.  Reviewing the records, he had cultures from his right heel in June 2024 which grew Proteus mirabilis and prevotella species.  Today, in the emergency room, white blood cell count of 22.3 with left shift, platelet count 565.  X-ray of the right heel still pending.  CT scan of the pelvis also still pending.  Complete metabolic panel was obtained.  Blood cultures were obtained.  Lactic acid 2.8.  Started on IV fluids, sepsis bolus.  Aerobic cultures obtained from the right heel.  Patient was started empirically on IV vancomycin and Zosyn, and he will be admitted to the hospital for further management.    PAST MEDICAL HISTORY:  Cerebrovascular accident with chronic right hemiparesis and chronic decubitus ulcer on the right heel, left ischial, and sacral area.  Factor V Leiden and pulmonary embolism, currently anticoagulated with Xarelto; dementia; anxiety; hypertension; hyperlipidemia; obstructive sleep apnea.    PAST SURGICAL HISTORY:  None.    MEDICATIONS:  Please see medication reconciliation list.    FAMILY HISTORY:  Father had lung cancer.    SOCIAL HISTORY:  Ex-tobacco and alcohol user.  No current tobacco, alcohol, or drug use.  Bedbound.   Requires assistance in his basic activities of daily living.     REVIEW OF SYSTEMS:  Unobtainable from the patient.  Family reported that the right heel wound has been draining and with odor that is strong.  Also some erythema noted by his family from the sacral and left ischial area.  Patient himself is nonverbal, unable to provide any review of systems.      PHYSICAL EXAMINATION:    GENERAL:  Patient is alert, does not seem to be in distress.  VITAL SIGNS:  Temperature 100.5; pulse 140 upon arrival, sinus tachycardia; respiratory rate 29; blood pressure 130/52, upon arrival 108/64; pulse ox 96% on room air.    HEENT:  Atraumatic.  Oropharynx clear.  Dry mucous membranes.  Ears, nose normal.  Eyes:  Anicteric sclerae.  NECK:  Supple.  No lymphadenopathy.  Trachea midline.  Full range of motion.  LUNGS:  Clear to auscultation bilaterally.  Normal respiratory effort.    HEART:  Regular rate, rhythm.  S1 and S2 auscultated.  Tachycardiac.   ABDOMEN:  Soft, nondistended.  No tenderness.  EXTREMITIES:  Muscle contractures noted right upper and lower extremities.  Right heel with necrotic base ulcer and foul-smelling odor, erythema involving the heel area and bilateral ankle.  Left ischial area with decubitus ulcer and granulation base.  Sacral area stage 4 pressure ulcer with granulation base.  Mild erythema around the wound.  NEUROLOGIC:  Chronic right hemiparesis.  No other focal findings.    ASSESSMENT:    1.   Infected decubitus ulcer, right heel.  Rule out osteomyelitis.  Possible infection of the left ischial and sacral stage 4 pressure ulcers as well.  2.   Acute sepsis.    PLAN:  Patient will be admitted to general medical floor.  IV fluids.  IV vancomycin and Zosyn.  Monitor lactic acid and hemodynamic status.  Follow up on blood and would cultures.  Obtain podiatry and infectious disease consult.  MRI scan of the right heel and CT scan of the pelvis to evaluate left ischial and sacral pressure ulcers.  Further  recommendations to follow.     Dictated By Kina Hodgson MD  d: 07/09/2024 20:26:44  t: 07/09/2024 21:28:07  [facility] [id number]  FB/

## 2024-07-10 NOTE — ED QUICK NOTES
Orders for admission, patient is aware of plan and ready to go upstairs. Any questions, please call ED SAMANTHA quintero at extension [phone].     Patient Covid vaccination status: Unvaccinated     COVID Test Ordered in ED: None    COVID Suspicion at Admission: N/A    Running Infusions:    sodium chloride 1,845 mL (07/09/24 1948)        Mental Status/LOC at time of transport: non verb dementia Estonian speaking    Other pertinent information:   CIWA score: N/A   NIH score:  N/A

## 2024-07-10 NOTE — CM/SW NOTE
Pt discussed in RN DC rounds. SW was informed that patient is current with [facility] - RN/OT. SW placed f2f. [facility] liaison notified.     ALMA ROSA/CM to remain available for support and/or discharge planning.     Samantha Olivares, MSW, LSW   x [phone]

## 2024-07-10 NOTE — PLAN OF CARE
Turned every 2 hours. Dressings changed. Family at bedside updated on plan of care. Tolerating diet. All safety measures in place.     Problem: Patient Centered Care  Goal: Patient preferences are identified and integrated in the patient's plan of care  Description: Interventions:  - What would you like us to know as we care for you? Home with family  - Provide timely, complete, and accurate information to patient/family  - Incorporate patient and family knowledge, values, beliefs, and cultural backgrounds into the planning and delivery of care  - Encourage patient/family to participate in care and decision-making at the level they choose  - Honor patient and family perspectives and choices  Outcome: Progressing     Problem: SKIN/TISSUE INTEGRITY - ADULT  Goal: Incision(s), wounds(s) or drain site(s) healing without S/S of infection  Description: INTERVENTIONS:  - Assess and document risk factors for pressure ulcer development  - Assess and document skin integrity  - Assess and document dressing/incision, wound bed, drain sites and surrounding tissue  - Implement wound care per orders  - Initiate isolation precautions as appropriate  - Initiate Pressure Ulcer prevention bundle as indicated  Outcome: Progressing     Problem: PAIN - ADULT  Goal: Verbalizes/displays adequate comfort level or patient's stated pain goal  Description: INTERVENTIONS:  - Encourage pt to monitor pain and request assistance  - Assess pain using appropriate pain scale  - Administer analgesics based on type and severity of pain and evaluate response  - Implement non-pharmacological measures as appropriate and evaluate response  - Consider cultural and social influences on pain and pain management  - Manage/alleviate anxiety  - Utilize distraction and/or relaxation techniques  - Monitor for opioid side effects  - Notify MD/LIP if interventions unsuccessful or patient reports new pain  - Anticipate increased pain with activity and pre-medicate as  appropriate  Outcome: Progressing     Problem: RISK FOR INFECTION - ADULT  Goal: Absence of fever/infection during anticipated neutropenic period  Description: INTERVENTIONS  - Monitor WBC  - Administer growth factors as ordered  - Implement neutropenic guidelines  Outcome: Progressing     Problem: SAFETY ADULT - FALL  Goal: Free from fall injury  Description: INTERVENTIONS:  - Assess pt frequently for physical needs  - Identify cognitive and physical deficits and behaviors that affect risk of falls.  - Morton fall precautions as indicated by assessment.  - Educate pt/family on patient safety including physical limitations  - Instruct pt to call for assistance with activity based on assessment  - Modify environment to reduce risk of injury  - Provide assistive devices as appropriate  - Consider OT/PT consult to assist with strengthening/mobility  - Encourage toileting schedule  Outcome: Progressing     Problem: DISCHARGE PLANNING  Goal: Discharge to home or other facility with appropriate resources  Description: INTERVENTIONS:  - Identify barriers to discharge w/pt and caregiver  - Include patient/family/discharge partner in discharge planning  - Arrange for needed discharge resources and transportation as appropriate  - Identify discharge learning needs (meds, wound care, etc)  - Arrange for interpreters to assist at discharge as needed  - Consider post-discharge preferences of patient/family/discharge partner  - Complete POLST form as appropriate  - Assess patient's ability to be responsible for managing their own health  - Refer to Case Management Department for coordinating discharge planning if the patient needs post-hospital services based on physician/LIP order or complex needs related to functional status, cognitive ability or social support system  Outcome: Progressing

## 2024-07-10 NOTE — PAYOR COMM NOTE
--------------  ADMISSION REVIEW     Payor: RAYMOND PENA O  Subscriber #:  [id number]  Authorization Number: [id number]    Admit date: 7/9/24  Admit time: 10:06 PM     Patient Seen in: [facility] Emergency Department    History     Chief Complaint   Patient presents with    Fever     History is provided by patient/independent historian: patient's daughter  74 year old male with history of dementia, factor V Leiden, PE, on Xarelto, bedbound and resultant pressure ulcers, brought in for foul-smelling drainage and pain.  Daughter reports she has been significantly change the dressing, and its mostly \"dead\".    History reviewed.   Past Medical History:    Anxiety state, unspecified    CVA (cerebral infarction)    Dementia (HCC)    Depression    Heterozygous factor V Leiden mutation (HCC)    Other and unspecified hyperlipidemia    Other ill-defined conditions(799.89)    Cardiomegaly Pleural effusion w/idopathic pleuropuricarditis    Pulmonary embolism (HCC)    Stroke (HCC)    Unspecified sleep apnea   History reviewed.   Past Surgical History:   Procedure Laterality Date    Colonoscopy      Electrocardiogram, complete  01/08/1914    Scanned to Media Tab     Physical Exam     ED Triage Vitals [07/09/24 1853]   /69   Pulse (!) 142   Resp 20   Temp (!) 100.5 °F (38.1 °C)   Temp src    SpO2 100 %   O2 Device None (Room air)     Physical Exam  Vitals and nursing note reviewed.   Cardiovascular:      Rate and Rhythm: Tachycardia present.      Pulses: Normal pulses.   Pulmonary:      Effort: No respiratory distress.   Abdominal:      General: There is no distension.   Musculoskeletal:        Legs:         Feet:    Neurological:      Mental Status: He is alert.      Labs Reviewed   COMP METABOLIC PANEL (14) - Abnormal; Notable for the following components:       Result Value    Glucose 155 (*)     Globulin  3.6 (*)     All other components within normal limits   LACTIC ACID, PLASMA - Abnormal; Notable for the following  components:    Lactic Acid 2.8 (*)     All other components within normal limits   CBC W/ DIFFERENTIAL - Abnormal; Notable for the following components:    WBC 22.3 (*)     HGB 10.7 (*)     HCT 34.9 (*)     MCHC 30.7 (*)     RDW-SD 47.3 (*)     .0 (*)     Neutrophil Absolute Prelim 21.05 (*)     Neutrophil Absolute 21.05 (*)     Lymphocyte Absolute 0.50 (*)     All other components within normal limits   BLOOD CULTURE   BLOOD CULTURE   AEROBIC BACTERIAL CULTURE   AEROBIC BACTERIAL CULTURE   AEROBIC BACTERIAL CULTURE     XR FOOT, COMPLETE    1. Again seen is osseous erosion involving the posterior calcaneus, not significant changed from the prior study.  2. Soft tissue swelling of the right foot without radiographic evidence of underlying osseous injury.        ED Medications Administered:   Medications   sodium chloride 0.9 % IV bolus 1,845 mL (1,845 mL Intravenous New Bag 7/9/24 1948)   acetaminophen (Tylenol) 160 MG/5ML oral liquid 1,000 mg (1,000 mg Oral Given 7/9/24 1917)   piperacillin-tazobactam (Zosyn) 4.5 g in dextrose 5% 100 mL IVPB-ADDV (4.5 g Intravenous New Bag 7/9/24 2004)       Medical Decision Making    External document review: I personally reviewed available external medical records for any recent pertinent discharge summaries, testing, and procedures - the findings are as follows: visit earlier today with Dr. Sheets with concern for infection    Complicating Factors: The patient already  has a past medical history of Anxiety state, unspecified, CVA (cerebral infarction), Dementia ([facility]), Depression, Heterozygous factor V Leiden mutation ([facility]), Other and unspecified hyperlipidemia, Other ill-defined conditions(799.89), Pulmonary embolism ([facility]) (05/03/2024), Stroke ([facility]), and Unspecified sleep apnea. to contribute to the complexity of this ED evaluation.    The patient requires continuous monitoring for: infected wound    Disposition and Plan     Clinical Impression:  1. Infected ulcer of  skin, unspecified ulcer stage (HCC)        History and Physical   CHIEF COMPLAINT:  Infected right heel pressure ulcer with acute sepsis.     HISTORY OF PRESENT ILLNESS:  The patient is a 74-year-old  male with chronic right hemiparesis from prior cerebrovascular accident and chronic right heel, left ischial, and sacral decubitus ulcers.  He has been developing necrotic changes in his right heel lately, and he has been having tachycardia and generalized fatigue and weakness.  Today, he was sent to the emergency department for evaluation.  Reviewing the records, he had cultures from his right heel in June 2024 which grew Proteus mirabilis and prevotella species.  Today, in the emergency room, white blood cell count of 22.3 with left shift, platelet count 565.  X-ray of the right heel still pending.  CT scan of the pelvis also still pending.  Complete metabolic panel was obtained.  Blood cultures were obtained.  Lactic acid 2.8.  Started on IV fluids, sepsis bolus.  Aerobic cultures obtained from the right heel.  Patient was started empirically on IV vancomycin and Zosyn, and he will be admitted to the hospital for further management.     PAST MEDICAL HISTORY:  Cerebrovascular accident with chronic right hemiparesis and chronic decubitus ulcer on the right heel, left ischial, and sacral area.  Factor V Leiden and pulmonary embolism, currently anticoagulated with Xarelto; dementia; anxiety; hypertension; hyperlipidemia; obstructive sleep apnea.     PAST SURGICAL HISTORY:  None.     MEDICATIONS:  Please see medication reconciliation list.     FAMILY HISTORY:  Father had lung cancer.     SOCIAL HISTORY:  Ex-tobacco and alcohol user.  No current tobacco, alcohol, or drug use.  Bedbound.  Requires assistance in his basic activities of daily living.      REVIEW OF SYSTEMS:  Unobtainable from the patient.  Family reported that the right heel wound has been draining and with odor that is strong.  Also some erythema  noted by his family from the sacral and left ischial area.  Patient himself is nonverbal, unable to provide any review of systems.       PHYSICAL EXAMINATION:    GENERAL:  Patient is alert, does not seem to be in distress.  VITAL SIGNS:  Temperature 100.5; pulse 140 upon arrival, sinus tachycardia; respiratory rate 29; blood pressure 130/52, upon arrival 108/64; pulse ox 96% on room air.    HEENT:  Atraumatic.  Oropharynx clear.  Dry mucous membranes.  Ears, nose normal.  Eyes:  Anicteric sclerae.  NECK:  Supple.  No lymphadenopathy.  Trachea midline.  Full range of motion.  LUNGS:  Clear to auscultation bilaterally.  Normal respiratory effort.    HEART:  Regular rate, rhythm.  S1 and S2 auscultated.  Tachycardiac.   ABDOMEN:  Soft, nondistended.  No tenderness.  EXTREMITIES:  Muscle contractures noted right upper and lower extremities.  Right heel with necrotic base ulcer and foul-smelling odor, erythema involving the heel area and bilateral ankle.  Left ischial area with decubitus ulcer and granulation base.  Sacral area stage 4 pressure ulcer with granulation base.  Mild erythema around the wound.  NEUROLOGIC:  Chronic right hemiparesis.  No other focal findings.     ASSESSMENT:    1.       Infected decubitus ulcer, right heel.  Rule out osteomyelitis.  Possible infection of the left ischial and sacral stage 4 pressure ulcers as well.  2.       Acute sepsis.     PLAN:  Patient will be admitted to general medical floor.  IV fluids.  IV vancomycin and Zosyn.  Monitor lactic acid and hemodynamic status.  Follow up on blood and would cultures.  Obtain podiatry and infectious disease consult.  MRI scan of the right heel and CT scan of the pelvis to evaluate left ischial and sacral pressure ulcers.  Further recommendations to follow.     7/10:     ID:    Reason for Consultation:  Bacteremia, wounds     ASSESSMENT:     Antibiotics: Vancomycin, zosyn     # Acute Proteus bacteremia from likely wound source with sacral,  ischial and R heel wound  # Leukocytosis  # CVA, bedbound  # H/o PE     PLAN:  -  Continue on vancomycin and zosyn.  -  Surgery and podiatry to see.  -  Follow fever curve, wbc.  -  Reviewed labs, micro, imaging reports, available old records.  -  Case d/w patient, RN.     History of Present Illness:  Yoseph Cordero is a 74 year old male with a history of CVA, PE, dementia, mostly bedbound, who presented to [facility] ED on 7/9 after being seen by PCP with concerns for worsening wounds. Patient did have a XR of R foot showing osteomyelitis on 6/13. On arrival, Tmax 101.7, wbc 22.3, lactate 2.8, wound swab obtained, started on vancomycin and zosyn. Blood cx with Proteus. Plans to be seen by podiatry and general surgery. ID consulted.     Current Facility-Administered Medications:     sodium hypochlorite (Dakin's) 0.125 % external solution, , Topical, BID    vancomycin (Vancocin) 1,000 mg in sodium chloride 0.9% 250 mL IVPB-ADDV, 15 mg/kg, Intravenous, Q24H    piperacillin-tazobactam (Zosyn) 3.375 g in dextrose 5% 100 mL IVPB-ADDV, 3.375 g, Intravenous, Q8H    sodium chloride 0.9% infusion, , Intravenous, Continuous    acetaminophen (Tylenol Extra Strength) tab 500 mg, 500 mg, Oral, Q4H PRN    ondansetron (Zofran) 4 MG/2ML injection 4 mg, 4 mg, Intravenous, Q6H PRN    OLANZapine (Zyprexa) 5 mg in sterile water for injection (PF) IM injection, 5 mg, Intramuscular, Q12H PRN    levETIRAcetam (Keppra) tab 500 mg, 500 mg, Oral, BID     Physical Exam:  Vital signs: Blood pressure 127/69, pulse 115, temperature (!) 100.5 °F (38.1 °C), temperature source Axillary, resp. rate 20, height 5' 7\" (1.702 m), weight 135 lb 11.2 oz (61.6 kg), SpO2 97%.     General: Awake, in bed  HEENT: Moist mucous membranes. EOMI   Neck: No lymphadenopathy.  Supple.  Cardiovascular: Regular rate and rhythm  Respiratory: CTAB  Abdomen: Soft, no TTP  Musculoskeletal: No edema noted  Integument: Wound pictures reviewed with necrotic wounds  Lines:  PIV+  : Purewick draining yellow urine     Lab 07/10/24  0613   RBC 3.20*   HGB 8.6*   HCT 27.5*   MCV 85.9   MCH 26.9   MCHC 31.3   RDW 14.8   NEPRELIM 14.67*   WBC 16.9*   .0      Lab 07/09/24  1907 07/10/24  0614   * 117*   BUN 13 8*   CREATSERUM 0.88 0.57*   CA 9.2 8.5*   ALB 4.1  --     138   K 3.6 3.2*    105   CO2 26.0 27.0     MEDICATIONS ADMINISTERED IN LAST 1 DAY:  acetaminophen (Tylenol Extra Strength) tab 500 mg       Date Action Dose Route User    7/10/2024 1254 Given 500 mg Oral Gely Gomez RN    7/10/2024 0800 Given 500 mg Oral Gely Gomez RN    7/10/2024 0357 Given 500 mg Oral Kirsten Scales RN          acetaminophen (Tylenol) 160 MG/5ML oral liquid 1,000 mg       Date Action Dose Route User    Admitted on 7/9/2024 7/9/2024 1917 Given 1,000 mg Oral Sena Swan RN          sodium hypochlorite (Dakin's) 0.125 % external solution       Date Action Dose Route User    7/10/2024 1143 Given (none) Topical Gely Gomez RN          levETIRAcetam (Keppra) tab 500 mg       Date Action Dose Route User    7/10/2024 0800 Given 500 mg Oral Gely Gomez RN    7/9/2024 2325 Given 500 mg Oral Kirsten Scales RN          piperacillin-tazobactam (Zosyn) 3.375 g in dextrose 5% 100 mL IVPB-ADDV       Date Action Dose Route User    7/10/2024 1433 New Bag 3.375 g Intravenous Gely Gomez RN    7/10/2024 0501 New Bag 3.375 g Intravenous Kirsten Scales RN          piperacillin-tazobactam (Zosyn) 4.5 g in dextrose 5% 100 mL IVPB-ADDV       Date Action Dose Route User    7/9/2024 2004 New Bag 4.5 g Intravenous Sena Swan RN          potassium chloride 40 mEq in 250mL sodium chloride 0.9% IVPB premix       Date Action Dose Route User    7/10/2024 0906 New Bag 40 mEq Intravenous Gely Gomez, SAMANTHA          sodium chloride 0.9% infusion       Date Action Dose Route User    7/10/2024 1437 New Bag (none) Intravenous Gely Gomez, SAMANTHA    7/9/2024 2324 New Bag (none)  Intravenous Kirsten Scales, RN          sodium chloride 0.9 % IV bolus 1,845 mL       Date Action Dose Route User    7/9/2024 1948 New Bag 1,845 mL Intravenous Sena Swan, SAMANTHA          vancomycin (Vancocin) 1,000 mg in sodium chloride 0.9% 250 mL IVPB-ADDV       Date Action Dose Route User    7/9/2024 2103 New Bag 1,000 mg Intravenous Sena Swan, RN            Vitals (last day)       Date/Time Temp Pulse Resp BP SpO2 Weight O2 Device O2 Flow Rate (L/min) [facility]    07/10/24 1251 100.5 °F (38.1 °C) 115 -- -- -- -- -- --     07/10/24 1118 -- 127 -- 127/69 -- -- -- --     07/10/24 0906 100.1 °F (37.8 °C) -- -- -- -- -- -- --     07/10/24 0754 101 °F (38.3 °C) 119 20 130/70 97 % -- None (Room air) --     07/10/24 0503 100.8 °F (38.2 °C) -- -- -- -- -- -- --     07/10/24 0355 100.7 °F (38.2 °C) 125 16 140/67 97 % -- None (Room air) --     07/09/24 2327 -- 115 -- -- -- -- -- -- MO    07/09/24 2216 99.4 °F (37.4 °C) 119 18 106/50 96 % 135 lb 11.2 oz (61.6 kg) None (Room air) --     07/09/24 2145 -- 118 19 108/54 96 % -- -- -- CC    07/09/24 2130 -- 119 21 109/56 95 % -- None (Room air) --     07/09/24 2126 101.7 °F (38.7 °C) -- -- -- -- -- -- --     07/09/24 2115 -- 119 24 115/51 95 % -- -- --     07/09/24 2110 -- 122 22 -- 94 % -- -- --     07/09/24 2100 -- 121 27 111/53 95 % -- None (Room air) --     07/09/24 2050 -- 125 23 -- -- -- -- --     07/09/24 2045 -- 121 27 103/51 96 % -- None (Room air) --     07/09/24 2030 -- 126 25 117/49 98 % -- None (Room air) --     07/09/24 2015 -- 125 29 124/53 96 % -- None (Room air) --     07/09/24 2000 -- 125 23 131/52 96 % -- None (Room air) --     07/09/24 1945 -- 133 22 119/70 97 % -- None (Room air) --     07/09/24 1930 -- 135 25 132/62 97 % -- None (Room air) --     07/09/24 1853 100.5 °F (38.1 °C) 142 20 157/69 100 % -- None (Room air) --

## 2024-07-11 ENCOUNTER — TELEPHONE (OUTPATIENT)
Dept: FAMILY MEDICINE CLINIC | Facility: CLINIC | Age: 74
End: 2024-07-11

## 2024-07-11 LAB
ANION GAP SERPL CALC-SCNC: 4 MMOL/L (ref 0–18)
APTT PPP: 37.3 SECONDS (ref 23–36)
BUN BLD-MCNC: 10 MG/DL (ref 9–23)
BUN/CREAT SERPL: 17.5 (ref 10–20)
CALCIUM BLD-MCNC: 8.6 MG/DL (ref 8.7–10.4)
CHLORIDE SERPL-SCNC: 112 MMOL/L (ref 98–112)
CO2 SERPL-SCNC: 28 MMOL/L (ref 21–32)
CREAT BLD-MCNC: 0.57 MG/DL
DEPRECATED RDW RBC AUTO: 45 FL (ref 35.1–46.3)
EGFRCR SERPLBLD CKD-EPI 2021: 104 ML/MIN/1.73M2 (ref 60–?)
ERYTHROCYTE [DISTWIDTH] IN BLOOD BY AUTOMATED COUNT: 14.6 % (ref 11–15)
ERYTHROCYTE [SEDIMENTATION RATE] IN BLOOD: 98 MM/HR
GLUCOSE BLD-MCNC: 109 MG/DL (ref 70–99)
HCT VFR BLD AUTO: 27.1 %
HGB BLD-MCNC: 8.6 G/DL
INR BLD: 1.36 (ref 0.8–1.2)
LACTATE SERPL-SCNC: 0.8 MMOL/L (ref 0.5–2)
MAGNESIUM SERPL-MCNC: 1.7 MG/DL (ref 1.6–2.6)
MCH RBC QN AUTO: 26.8 PG (ref 26–34)
MCHC RBC AUTO-ENTMCNC: 31.7 G/DL (ref 31–37)
MCV RBC AUTO: 84.4 FL
OSMOLALITY SERPL CALC.SUM OF ELEC: 298 MOSM/KG (ref 275–295)
PLATELET # BLD AUTO: 375 10(3)UL (ref 150–450)
POTASSIUM SERPL-SCNC: 3.5 MMOL/L (ref 3.5–5.1)
POTASSIUM SERPL-SCNC: 3.5 MMOL/L (ref 3.5–5.1)
PROTHROMBIN TIME: 17.6 SECONDS (ref 11.6–14.8)
RBC # BLD AUTO: 3.21 X10(6)UL
SODIUM SERPL-SCNC: 144 MMOL/L (ref 136–145)
WBC # BLD AUTO: 12.3 X10(3) UL (ref 4–11)

## 2024-07-11 PROCEDURE — 99233 SBSQ HOSP IP/OBS HIGH 50: CPT | Performed by: HOSPITALIST

## 2024-07-11 PROCEDURE — 0JBM0ZZ EXCISION OF LEFT UPPER LEG SUBCUTANEOUS TISSUE AND FASCIA, OPEN APPROACH: ICD-10-PCS

## 2024-07-11 PROCEDURE — 11042 DBRDMT SUBQ TIS 1ST 20SQCM/<: CPT

## 2024-07-11 PROCEDURE — 0HB6XZZ EXCISION OF BACK SKIN, EXTERNAL APPROACH: ICD-10-PCS

## 2024-07-11 PROCEDURE — 99223 1ST HOSP IP/OBS HIGH 75: CPT | Performed by: STUDENT IN AN ORGANIZED HEALTH CARE EDUCATION/TRAINING PROGRAM

## 2024-07-11 PROCEDURE — 11045 DBRDMT SUBQ TISS EACH ADDL: CPT

## 2024-07-11 RX ORDER — MAGNESIUM OXIDE 400 MG/1
400 TABLET ORAL ONCE
Status: COMPLETED | OUTPATIENT
Start: 2024-07-11 | End: 2024-07-11

## 2024-07-11 NOTE — PLAN OF CARE
Patient temperature and heart rate was elevated. Doctor ordered ibuprofen and IV bolus, patient's temperature and heart rate improved.   Fall precautions in place. Call light in reach.    Problem: Patient Centered Care  Goal: Patient preferences are identified and integrated in the patient's plan of care  Description: Interventions:  - What would you like us to know as we care for you? None stated, none verbal.  - Provide timely, complete, and accurate information to patient/family  - Incorporate patient and family knowledge, values, beliefs, and cultural backgrounds into the planning and delivery of care  - Encourage patient/family to participate in care and decision-making at the level they choose  - Honor patient and family perspectives and choices  Outcome: Progressing     Problem: SKIN/TISSUE INTEGRITY - ADULT  Goal: Incision(s), wounds(s) or drain site(s) healing without S/S of infection  Description: INTERVENTIONS:  - Assess and document risk factors for pressure ulcer development  - Assess and document skin integrity  - Assess and document dressing/incision, wound bed, drain sites and surrounding tissue  - Implement wound care per orders  - Initiate isolation precautions as appropriate  - Initiate Pressure Ulcer prevention bundle as indicated  Outcome: Progressing     Problem: PAIN - ADULT  Goal: Verbalizes/displays adequate comfort level or patient's stated pain goal  Description: INTERVENTIONS:  - Encourage pt to monitor pain and request assistance  - Assess pain using appropriate pain scale  - Administer analgesics based on type and severity of pain and evaluate response  - Implement non-pharmacological measures as appropriate and evaluate response  - Consider cultural and social influences on pain and pain management  - Manage/alleviate anxiety  - Utilize distraction and/or relaxation techniques  - Monitor for opioid side effects  - Notify MD/LIP if interventions unsuccessful or patient reports new  pain  - Anticipate increased pain with activity and pre-medicate as appropriate  Outcome: Progressing     Problem: RISK FOR INFECTION - ADULT  Goal: Absence of fever/infection during anticipated neutropenic period  Description: INTERVENTIONS  - Monitor WBC  - Administer growth factors as ordered  - Implement neutropenic guidelines  Outcome: Progressing     Problem: SAFETY ADULT - FALL  Goal: Free from fall injury  Description: INTERVENTIONS:  - Assess pt frequently for physical needs  - Identify cognitive and physical deficits and behaviors that affect risk of falls.  - [place] fall precautions as indicated by assessment.  - Educate pt/family on patient safety including physical limitations  - Instruct pt to call for assistance with activity based on assessment  - Modify environment to reduce risk of injury  - Provide assistive devices as appropriate  - Consider OT/PT consult to assist with strengthening/mobility  - Encourage toileting schedule  Outcome: Progressing     Problem: DISCHARGE PLANNING  Goal: Discharge to home or other facility with appropriate resources  Description: INTERVENTIONS:  - Identify barriers to discharge w/pt and caregiver  - Include patient/family/discharge partner in discharge planning  - Arrange for needed discharge resources and transportation as appropriate  - Identify discharge learning needs (meds, wound care, etc)  - Arrange for interpreters to assist at discharge as needed  - Consider post-discharge preferences of patient/family/discharge partner  - Complete POLST form as appropriate  - Assess patient's ability to be responsible for managing their own health  - Refer to Case Management Department for coordinating discharge planning if the patient needs post-hospital services based on physician/LIP order or complex needs related to functional status, cognitive ability or social support system  Outcome: Progressing

## 2024-07-11 NOTE — PROCEDURES
Procedure Note    The risks, benefits, alternatives and expected recovery were explained.  The pt's family expressed understanding and wished to proceed with the procedure.      Preop:  Infected left hip and sacral decubitus ulcers  Postop:  Same  Procedure: Sharp excisional debridement of 3cm x 3cm infected left hip decubitus ulcer including skin and subcutaneous tissue and sharp excisional debridement of 5cm x 5cm sacral decubitus ulcer including skin  EBL:  Minimal    Description:  The skin surrounding the left hip and sacral wound were prepped and draped in sterile fashion. Scissors were used to remove necrotic tissue from the left hip wound. Debridement went down to adipose tissue, and the remaining tissue appears clean and healthy. The wound was cleaned and dressed. Attention was turned to the sacral wound. Again, scissors were used to remove necrotic tissue from the sacral wound. The wound was cleaned and dressed. The patient tolerated the procedure well. Continue dressing changes per wound care.      Taco Pearson PA-C

## 2024-07-11 NOTE — CONSULTS
[facility]  part of [facility]    Report of Consultation    Yoseph Cordero Patient Status:  Inpatient    1950 MRN [id number]   Location [facility] 5SW/SE Attending Uvaldo Echevarria MD   Hosp Day # 2 PCP Irving Sheets DO     Date of Admission:  2024  Date of Consult:  24  Consulted by Samaria Biggs MD    Reason for Consultation:    Right heel osteomyelitis     History of Present Illness:  Yoseph Cordero is a a(n) 74 year old male that is nonverbal with history of dementia that I have been consulted to see for right heel ulcer with osteomyelitis.  Pt's daughter is at bedside, stating that the wound has been present since May. Denies any pedal pain    History:  Past Medical History:    Anxiety state, unspecified    CVA (cerebral infarction)    Dementia (HCC)    Depression    Heterozygous factor V Leiden mutation (HCC)    Other and unspecified hyperlipidemia    Other ill-defined conditions(799.89)    Cardiomegaly Pleural effusion w/idopathic pleuropuricarditis    Pulmonary embolism (HCC)    Stroke (HCC)    Unspecified sleep apnea     Past Surgical History:   Procedure Laterality Date    Colonoscopy      Electrocardiogram, complete  1914    Scanned to Media Tab     Family History   Problem Relation Age of Onset    Cancer Father         Lung - Smoker  Cause of death    Other (Other) Mother         during     Diabetes Neg     Glaucoma Neg       reports that he has quit smoking. He has never used smokeless tobacco. He reports that he does not drink alcohol and does not use drugs.    Allergies:  No Known Allergies    Medications:    Current Facility-Administered Medications:     [Held by provider] rivaroxaban (Xarelto) tab 20 mg, 20 mg, Oral, Daily with food    sodium hypochlorite (Dakin's) 0.125 % external solution, , Topical, BID    ibuprofen (Motrin) tab 600 mg, 600 mg, Oral, Q4H PRN    vancomycin (Vancocin) 1,000 mg in sodium chloride 0.9% 250 mL  IVPB-ADDV, 15 mg/kg, Intravenous, Q24H    piperacillin-tazobactam (Zosyn) 3.375 g in dextrose 5% 100 mL IVPB-ADDV, 3.375 g, Intravenous, Q8H    sodium chloride 0.9% infusion, , Intravenous, Continuous    acetaminophen (Tylenol Extra Strength) tab 500 mg, 500 mg, Oral, Q4H PRN    ondansetron (Zofran) 4 MG/2ML injection 4 mg, 4 mg, Intravenous, Q6H PRN    OLANZapine (Zyprexa) 5 mg in sterile water for injection (PF) IM injection, 5 mg, Intramuscular, Q12H PRN    levETIRAcetam (Keppra) tab 500 mg, 500 mg, Oral, BID    Review of Systems:    Comprehensive ROS reviewed and negative except for what's stated above.  Including negative for chest pain, shortness of breath, syncope.   Physical Exam:        Derm: Necrotic right heel ulceration with malodor and purulent drainage. Probing to bone noted with no crepitus.     Vasc: Nonpalable pedal pulses. CFT brisk to digits    Neuro: Absent  protective sensation to digits    MSK: No pain with palpation to RLE.     Imaging:  MRI FOOT, RIGHT (CPT=73718)    Result Date: 7/10/2024  CONCLUSION:  1. Stage IV heel ulcer extending down to bone with osteomyelitis involving the dorsal calcaneus. 2. Superior aspect of stage IV heel ulcer involves the distal Achilles tendon and there is a fluid-filled longitudinal split partial tear extending over a length of 6 cm, and this could be a intra tendinous abscess.     Dictated by (CST): Apolinar Barry MD on 7/10/2024 at 2:22 PM     Finalized by (CST): Apolinar Barry MD on 7/10/2024 at 2:28 PM          XR FOOT, COMPLETE (MIN 3 VIEWS), RIGHT (CPT=73630)    Result Date: 7/9/2024  CONCLUSION:  1. Again seen is osseous erosion involving the posterior calcaneus, not significant changed from the prior study.  2. Soft tissue swelling of the right foot without radiographic evidence of underlying osseous injury.    Dictated by (CST): Marcial Mendoza MD on 7/09/2024 at 8:22 PM     Finalized by (CST): Marcial Mendoza MD on 7/09/2024 at 8:25 PM             Laboratory Data:  Recent Labs   Lab 07/10/24  0613 07/11/24  0535   RBC 3.20* 3.21*   HGB 8.6* 8.6*   HCT 27.5* 27.1*   MCV 85.9 84.4   MCH 26.9 26.8   MCHC 31.3 31.7   RDW 14.8 14.6   NEPRELIM 14.67*  --    WBC 16.9* 12.3*   .0 375.0       Impression and Plan:  Patient Active Problem List   Diagnosis    Hemiplegia affecting right dominant side (HCC)    Chronic obstructive pulmonary disease (HCC)    Combined forms of age-related cataract of both eyes    Esophageal reflux    Late onset Alzheimer's disease with behavioral disturbance (HCC)    Alcohol abuse    Calcification of aorta (HCC)    Alcoholism (HCC)    History of stroke    Dysphagia    Hyperglycemia    Weakness    Gait disturbance    Abnormal involuntary movement    Acute pulmonary embolism without acute cor pulmonale, unspecified pulmonary embolism type (HCC)    Factor V Leiden (HCC)    Leukocytosis    Tachycardia    Altered mental status, unspecified altered mental status type    Seizures (HCC)    Infected ulcer of skin, unspecified ulcer stage (HCC)    Infected decubitus ulcer       Pt seen and examined. Findings discussed with pt.  MRI of Right heel with positive osteomyelitis.   Plan for debridement of all necrotic tissue and bone of right heel once patient is cleared by vascular team.  No guarantees were made or implied.   Recommend vascular consult  WB as tolerated.  Continue IV abx per ID recs  Continue heel offloading.      Thank you for allowing me to participate in the care of your patient.    Sury Blake DPM   7/11/2024  12:22 PM

## 2024-07-11 NOTE — PLAN OF CARE
Pt a&o X 0, room air, on tele, max assist. No signs of pain during this shift except during bedside procedure. Wound debridement to left hip and sacrum done bedside this afternoon, family bedside. Podiatry stopped by and I&D to be done tomorrow. Safety precautions maintained. Frequent nurse rounding. Call light in reach.     Problem: Patient Centered Care  Goal: Patient preferences are identified and integrated in the patient's plan of care  Description: Interventions:  - What would you like us to know as we care for you? From home with wife   - Provide timely, complete, and accurate information to patient/family  - Incorporate patient and family knowledge, values, beliefs, and cultural backgrounds into the planning and delivery of care  - Encourage patient/family to participate in care and decision-making at the level they choose  - Honor patient and family perspectives and choices  Outcome: Progressing     Problem: SKIN/TISSUE INTEGRITY - ADULT  Goal: Incision(s), wounds(s) or drain site(s) healing without S/S of infection  Description: INTERVENTIONS:  - Assess and document risk factors for pressure ulcer development  - Assess and document skin integrity  - Assess and document dressing/incision, wound bed, drain sites and surrounding tissue  - Implement wound care per orders  - Initiate isolation precautions as appropriate  - Initiate Pressure Ulcer prevention bundle as indicated  Outcome: Progressing     Problem: PAIN - ADULT  Goal: Verbalizes/displays adequate comfort level or patient's stated pain goal  Description: INTERVENTIONS:  - Encourage pt to monitor pain and request assistance  - Assess pain using appropriate pain scale  - Administer analgesics based on type and severity of pain and evaluate response  - Implement non-pharmacological measures as appropriate and evaluate response  - Consider cultural and social influences on pain and pain management  - Manage/alleviate anxiety  - Utilize distraction  and/or relaxation techniques  - Monitor for opioid side effects  - Notify MD/LIP if interventions unsuccessful or patient reports new pain  - Anticipate increased pain with activity and pre-medicate as appropriate  Outcome: Progressing     Problem: RISK FOR INFECTION - ADULT  Goal: Absence of fever/infection during anticipated neutropenic period  Description: INTERVENTIONS  - Monitor WBC  - Administer growth factors as ordered  - Implement neutropenic guidelines  Outcome: Progressing     Problem: SAFETY ADULT - FALL  Goal: Free from fall injury  Description: INTERVENTIONS:  - Assess pt frequently for physical needs  - Identify cognitive and physical deficits and behaviors that affect risk of falls.  - [place] fall precautions as indicated by assessment.  - Educate pt/family on patient safety including physical limitations  - Instruct pt to call for assistance with activity based on assessment  - Modify environment to reduce risk of injury  - Provide assistive devices as appropriate  - Consider OT/PT consult to assist with strengthening/mobility  - Encourage toileting schedule  Outcome: Progressing     Problem: DISCHARGE PLANNING  Goal: Discharge to home or other facility with appropriate resources  Description: INTERVENTIONS:  - Identify barriers to discharge w/pt and caregiver  - Include patient/family/discharge partner in discharge planning  - Arrange for needed discharge resources and transportation as appropriate  - Identify discharge learning needs (meds, wound care, etc)  - Arrange for interpreters to assist at discharge as needed  - Consider post-discharge preferences of patient/family/discharge partner  - Complete POLST form as appropriate  - Assess patient's ability to be responsible for managing their own health  - Refer to Case Management Department for coordinating discharge planning if the patient needs post-hospital services based on physician/LIP order or complex needs related to functional status,  cognitive ability or social support system  Outcome: Progressing

## 2024-07-11 NOTE — PROGRESS NOTES
INFECTIOUS DISEASE PROGRESS NOTE  [facility]  part of [facility] ID PROGRESS NOTE    Yoseph Cordero Patient Status:  Inpatient    1950 MRN [id number]   Location [facility] 5SW/SE Attending Uvaldo Echevarria MD   Hosp Day # 2 PCP Irving Sheets,      Subjective:  ROS reviewed. Tmax 101.3 last night. Family at bedside.    ASSESSMENT:    Antibiotics: Vancomycin, zosyn     # Acute Proteus bacteremia from likely wound source with sacral, ischial and R heel wound   -Wound cx Proteus, Enterococcus, Strep constellatus  # Leukocytosis  # CVA, bedbound  # H/o PE     PLAN:  -  Continue on vancomycin and zosyn.  -  Surgery planning debridement.  -  Await podiatry recommendations.  -  Follow fever curve, wbc.  -  Reviewed labs, micro, imaging reports, available old records.  -  Case d/w patient, RN.     History of Present Illness:  Yoseph Cordero is a 74 year old male with a history of CVA, PE, dementia, mostly bedbound, who presented to [facility] ED on  after being seen by PCP with concerns for worsening wounds. Patient did have a XR of R foot showing osteomyelitis on . On arrival, Tmax 101.7, wbc 22.3, lactate 2.8, wound swab obtained, started on vancomycin and zosyn. Blood cx with Proteus. Plans to be seen by podiatry and general surgery. ID consulted.    Physical Exam:  /57 (BP Location: Right arm)   Pulse 94   Temp 97.6 °F (36.4 °C) (Axillary)   Resp 20   Ht 5' 7\" (1.702 m)   Wt 135 lb 11.2 oz (61.6 kg)   SpO2 99%   BMI 21.25 kg/m²     Gen:   Awake, in bed  HEENT:  EOMI, neck supple  CV/lungs:  RRR, CTAB  Abdom:  Soft, no TTP  Skin/extrem:  No rashes, R heel wrapped, wound pictures reviewed  :   Purewick draining yellow urine  Lines:  PIV+    Laboratory Data: Reviewed    Microbiology: Reviewed    Radiology: Reviewed      SAILAJA [facility]  [phone]  2024

## 2024-07-11 NOTE — PROGRESS NOTES
[facility]  part of [facility]    Progress Note    Yoseph Cordero Patient Status:  Inpatient    1950 MRN [id number]   Location [facility] 5SW/SE Attending Uvaldo Echevarria MD   Hosp Day # 2 PCP Irving Sheets DO     Chief Complaint:   Chief Complaint   Patient presents with    Fever   Infected R heel pressure ulcer    Subjective:   Yoseph Cordero is sleeping. Nonverbal at baseline. Overnight per RN pt had fever 101F and was tachycardic --> given 1L NS bolus and ibuprofen.     Objective:   Objective:    Blood pressure 109/46, pulse 90, temperature 97.7 °F (36.5 °C), temperature source Axillary, resp. rate 16, height 5' 7\" (1.702 m), weight 135 lb 11.2 oz (61.6 kg), SpO2 98%.    Physical Exam:    General: No acute distress. Nonverbal poor dentition  Respiratory: Clear to auscultation bilaterally. No wheezes. No rhonchi.  Cardiovascular: S1, S2. Regular rate and rhythm. No murmurs, rubs or gallops.   Abdomen: Soft, nontender, nondistended.  Positive bowel sounds. No rebound or guarding.  Neurologic: No focal neurological deficits.   Musculoskeletal: Moves all extremities.  Extremities: R foot wrapped.       Results:   Results:    Labs:  Recent Labs   Lab 07/09/24  1907 07/10/24  0613 24  0535   WBC 22.3* 16.9* 12.3*   HGB 10.7* 8.6* 8.6*   MCV 87.0 85.9 84.4   .0* 436.0 375.0       Recent Labs   Lab 07/09/24  1907 07/10/24  0614 24  0535   * 117* 109*   BUN 13 8* 10   CREATSERUM 0.88 0.57* 0.57*   CA 9.2 8.5* 8.6*   ALB 4.1  --   --     138 144   K 3.6 3.2* 3.5  3.5    105 112   CO2 26.0 27.0 28.0   ALKPHO 71  --   --    AST 26  --   --    ALT 28  --   --    BILT 0.4  --   --    TP 7.7  --   --        Estimated Creatinine Clearance: 100.6 mL/min (A) (based on SCr of 0.57 mg/dL (L)).    No results for input(s): \"PTP\", \"INR\" in the last 168 hours.         Culture:  Hospital Encounter on 24   1. Aerobic Bacterial Culture     Status:  Abnormal    Collection Time: 07/09/24  7:53 PM    Specimen: Sacrum; Other   Result Value Ref Range    Aerobic Culture Result 2+ growth Gram Negative Rashid (A) N/A    Aerobic Culture Result 1+ growth Enterococcus faecalis NOT VRE (A) N/A    Aerobic Smear No WBCs seen N/A    Aerobic Smear 1+ Gram Negative Rods N/A   2. Blood Culture     Status: Abnormal (Preliminary result)    Collection Time: 07/09/24  7:53 PM    Specimen: Blood,peripheral   Result Value Ref Range    Blood Culture Result Positive N/A    Blood Smear Gram Negative Rods (A) N/A       Cardiac  No results for input(s): \"TROP\", \"PBNP\" in the last 168 hours.      Imaging: Imaging data reviewed in Epic.  MRI FOOT, RIGHT (CPT=73718)    Result Date: 7/10/2024  CONCLUSION:  1. Stage IV heel ulcer extending down to bone with osteomyelitis involving the dorsal calcaneus. 2. Superior aspect of stage IV heel ulcer involves the distal Achilles tendon and there is a fluid-filled longitudinal split partial tear extending over a length of 6 cm, and this could be a intra tendinous abscess.     Dictated by (CST): Apolinar Barry MD on 7/10/2024 at 2:22 PM     Finalized by (CST): Apolinar Barry MD on 7/10/2024 at 2:28 PM          XR FOOT, COMPLETE (MIN 3 VIEWS), RIGHT (CPT=73630)    Result Date: 7/9/2024  CONCLUSION:  1. Again seen is osseous erosion involving the posterior calcaneus, not significant changed from the prior study.  2. Soft tissue swelling of the right foot without radiographic evidence of underlying osseous injury.    Dictated by (CST): Marcial Mendoza MD on 7/09/2024 at 8:22 PM     Finalized by (CST): Marcial Mendoza MD on 7/09/2024 at 8:25 PM           Medications:    magnesium oxide  400 mg Oral Once    sodium hypochlorite   Topical BID    vancomycin  15 mg/kg Intravenous Q24H    piperacillin-tazobactam  3.375 g Intravenous Q8H    levETIRAcetam  500 mg Oral BID         Assessment and Plan:   Assessment & Plan:        Acute proteus bacteremia/ severe sepsis    Sacral decubitus ulcer POA  L hip ulcer. - POA  IV vancomycin + zosyn empirically   ID, general surgery on consult.   Wound care   PT/OT - [facility]  Blood cx 2/2 proteus mirabilis   Wound culture proteus mirabilis + enterococcus feacalis   WBC trending down. Lactic acid elevated on admit normal now   Plans for bedside debridement of sacral and L hip ulcer by [place] today   Given 1L NS bolus overnight will give remaining 848mL now for sepsis protocol.   H/o CVA with chronic R hemiparesis   H/o seizure d/o   Dementia - essentially nonverbal   Hold xarelto for debridement   PT/OT - [facility].   Keppra   H/o VTE  Factor V leiden   Xarelto on hold as above.   Infected R heel stage IV pressure ulcer POA with OM  Podiatry consult.   MRI reviewed Stage IV heel ulcer with osteomyelitis of dorsal calcaneus. Possible intra tendinous abscess.   IV vancomycin + zosyn.   ESR.   Other medical problems  HTN  HOLLI      >55min spent, >50% spent counseling and coordinating care in the form of educating pt/family and d/w consultants and staff. Most of the time spent discussing the above plan.        Plan of care discussed with patient or family at bedside.    Uvaldo Echevarria MD  Hospitalist          Supplementary Documentation:     Quality:  DVT Prophylaxis: SCD for now   CODE status: Full  Dispo: per clinical course           Estimated date of discharge: TBD  Discharge is dependent on: clinical stability  At this point Mr. Cordero is expected to be discharge to: home

## 2024-07-12 ENCOUNTER — APPOINTMENT (OUTPATIENT)
Dept: CT IMAGING | Facility: HOSPITAL | Age: 74
End: 2024-07-12
Attending: STUDENT IN AN ORGANIZED HEALTH CARE EDUCATION/TRAINING PROGRAM
Payer: MEDICARE

## 2024-07-12 LAB
ALBUMIN SERPL-MCNC: 3.1 G/DL (ref 3.2–4.8)
ALBUMIN/GLOB SERPL: 1.1 {RATIO} (ref 1–2)
ALP LIVER SERPL-CCNC: 59 U/L
ALT SERPL-CCNC: 23 U/L
AMMONIA PLAS-MCNC: 16 UMOL/L (ref 11–32)
ANION GAP SERPL CALC-SCNC: 6 MMOL/L (ref 0–18)
APTT PPP: 34.3 SECONDS (ref 23–36)
AST SERPL-CCNC: 21 U/L (ref ?–34)
BACTERIA BLD CULT: POSITIVE
BACTERIA BLD CULT: POSITIVE
BASE EXCESS BLD CALC-SCNC: 6.4 MMOL/L (ref ?–2)
BASOPHILS # BLD AUTO: 0.03 X10(3) UL (ref 0–0.2)
BASOPHILS NFR BLD AUTO: 0.2 %
BILIRUB SERPL-MCNC: 0.2 MG/DL (ref 0.2–1.1)
BILIRUB UR QL: NEGATIVE
BLACTX-M ISLT/SPM QL: NOT DETECTED
BUN BLD-MCNC: 15 MG/DL (ref 9–23)
BUN/CREAT SERPL: 28.8 (ref 10–20)
CALCIUM BLD-MCNC: 8.6 MG/DL (ref 8.7–10.4)
CHLORIDE SERPL-SCNC: 109 MMOL/L (ref 98–112)
CLARITY UR: CLEAR
CO2 SERPL-SCNC: 28 MMOL/L (ref 21–32)
COLOR UR: COLORLESS
CREAT BLD-MCNC: 0.52 MG/DL
DEPRECATED HBV CORE AB SER IA-ACNC: 691.3 NG/ML
DEPRECATED RDW RBC AUTO: 45.3 FL (ref 35.1–46.3)
DEPRECATED RDW RBC AUTO: 45.5 FL (ref 35.1–46.3)
EGFRCR SERPLBLD CKD-EPI 2021: 106 ML/MIN/1.73M2 (ref 60–?)
EOSINOPHIL # BLD AUTO: 0.27 X10(3) UL (ref 0–0.7)
EOSINOPHIL NFR BLD AUTO: 2.2 %
ERYTHROCYTE [DISTWIDTH] IN BLOOD BY AUTOMATED COUNT: 14.6 % (ref 11–15)
ERYTHROCYTE [DISTWIDTH] IN BLOOD BY AUTOMATED COUNT: 14.7 % (ref 11–15)
GLOBULIN PLAS-MCNC: 2.8 G/DL (ref 2–3.5)
GLUCOSE BLD-MCNC: 131 MG/DL (ref 70–99)
GLUCOSE BLDC GLUCOMTR-MCNC: 141 MG/DL (ref 70–99)
GLUCOSE BLDC GLUCOMTR-MCNC: 150 MG/DL (ref 70–99)
GLUCOSE UR-MCNC: NORMAL MG/DL
HCO3 BLDA-SCNC: 29.9 MEQ/L (ref 21–27)
HCT VFR BLD AUTO: 25.6 %
HCT VFR BLD AUTO: 25.9 %
HGB BLD-MCNC: 7.9 G/DL
HGB BLD-MCNC: 8 G/DL
IMM GRANULOCYTES # BLD AUTO: 0.09 X10(3) UL (ref 0–1)
IMM GRANULOCYTES NFR BLD: 0.7 %
INR BLD: 1.22 (ref 0.8–1.2)
IRON SATN MFR SERPL: 15 %
IRON SERPL-MCNC: 16 UG/DL
KETONES UR-MCNC: NEGATIVE MG/DL
LACTATE SERPL-SCNC: 1.2 MMOL/L (ref 0.5–2)
LEUKOCYTE ESTERASE UR QL STRIP.AUTO: NEGATIVE
LYMPHOCYTES # BLD AUTO: 1.53 X10(3) UL (ref 1–4)
LYMPHOCYTES NFR BLD AUTO: 12.6 %
MAGNESIUM SERPL-MCNC: 1.7 MG/DL (ref 1.6–2.6)
MCH RBC QN AUTO: 26.2 PG (ref 26–34)
MCH RBC QN AUTO: 26.2 PG (ref 26–34)
MCHC RBC AUTO-ENTMCNC: 30.9 G/DL (ref 31–37)
MCHC RBC AUTO-ENTMCNC: 30.9 G/DL (ref 31–37)
MCV RBC AUTO: 84.8 FL
MCV RBC AUTO: 84.9 FL
MODIFIED ALLEN TEST: POSITIVE
MONOCYTES # BLD AUTO: 0.74 X10(3) UL (ref 0.1–1)
MONOCYTES NFR BLD AUTO: 6.1 %
NEUTROPHILS # BLD AUTO: 9.47 X10 (3) UL (ref 1.5–7.7)
NEUTROPHILS # BLD AUTO: 9.47 X10(3) UL (ref 1.5–7.7)
NEUTROPHILS NFR BLD AUTO: 78.2 %
NITRITE UR QL STRIP.AUTO: NEGATIVE
O2 CT BLD-SCNC: 17.6 VOL% (ref 15–23)
OSMOLALITY SERPL CALC.SUM OF ELEC: 299 MOSM/KG (ref 275–295)
PCO2 BLDA: 41 MM HG (ref 35–45)
PH BLDA: 7.48 [PH] (ref 7.35–7.45)
PH UR: 7.5 [PH] (ref 5–8)
PLATELET # BLD AUTO: 380 10(3)UL (ref 150–450)
PLATELET # BLD AUTO: 417 10(3)UL (ref 150–450)
PO2 BLDA: 78 MM HG (ref 80–100)
POTASSIUM SERPL-SCNC: 3.2 MMOL/L (ref 3.5–5.1)
PROT SERPL-MCNC: 5.9 G/DL (ref 5.7–8.2)
PROT UR-MCNC: NEGATIVE MG/DL
PROTEUS SP DNA BLD POS QL NAA+NON-PROBE: DETECTED
PROTHROMBIN TIME: 16.2 SECONDS (ref 11.6–14.8)
PUNCTURE CHARGE: YES
RBC # BLD AUTO: 3.02 X10(6)UL
RBC # BLD AUTO: 3.05 X10(6)UL
SAO2 % BLDA: 97.7 % (ref 94–100)
SODIUM SERPL-SCNC: 143 MMOL/L (ref 136–145)
SP GR UR STRIP: 1.01 (ref 1–1.03)
TIBC SERPL-MCNC: 104 UG/DL (ref 250–425)
TRANSFERRIN SERPL-MCNC: 70 MG/DL (ref 215–365)
TSI SER-ACNC: 4.13 MIU/ML (ref 0.55–4.78)
UROBILINOGEN UR STRIP-ACNC: NORMAL
WBC # BLD AUTO: 12.1 X10(3) UL (ref 4–11)
WBC # BLD AUTO: 12.7 X10(3) UL (ref 4–11)

## 2024-07-12 PROCEDURE — 99291 CRITICAL CARE FIRST HOUR: CPT | Performed by: HOSPITALIST

## 2024-07-12 PROCEDURE — 70450 CT HEAD/BRAIN W/O DYE: CPT | Performed by: STUDENT IN AN ORGANIZED HEALTH CARE EDUCATION/TRAINING PROGRAM

## 2024-07-12 PROCEDURE — 99233 SBSQ HOSP IP/OBS HIGH 50: CPT | Performed by: HOSPITALIST

## 2024-07-12 PROCEDURE — 99222 1ST HOSP IP/OBS MODERATE 55: CPT | Performed by: INTERNAL MEDICINE

## 2024-07-12 PROCEDURE — 95816 EEG AWAKE AND DROWSY: CPT | Performed by: INTERNAL MEDICINE

## 2024-07-12 RX ORDER — POTASSIUM CHLORIDE 20 MEQ/1
40 TABLET, EXTENDED RELEASE ORAL ONCE
Status: DISCONTINUED | OUTPATIENT
Start: 2024-07-12 | End: 2024-07-12

## 2024-07-12 RX ORDER — MAGNESIUM SULFATE HEPTAHYDRATE 40 MG/ML
2 INJECTION, SOLUTION INTRAVENOUS ONCE
Status: COMPLETED | OUTPATIENT
Start: 2024-07-12 | End: 2024-07-12

## 2024-07-12 RX ORDER — ACETAMINOPHEN 325 MG/1
650 TABLET ORAL EVERY 6 HOURS PRN
Status: DISCONTINUED | OUTPATIENT
Start: 2024-07-12 | End: 2024-07-15

## 2024-07-12 RX ORDER — LEVETIRACETAM 500 MG/5ML
500 INJECTION, SOLUTION, CONCENTRATE INTRAVENOUS EVERY 12 HOURS
Status: DISCONTINUED | OUTPATIENT
Start: 2024-07-12 | End: 2024-07-14

## 2024-07-12 NOTE — PROGRESS NOTES
[facility]  part of [facility]    Progress Note    Yoseph Cordero Patient Status:  Inpatient    1950 MRN [id number]   Location [facility] 5SW/SE Attending Uvaldo Echevarria MD   Hosp Day # 3 PCP Irving Sheets DO     Chief Complaint:   Chief Complaint   Patient presents with    Fever   Infected R heel pressure ulcer    Subjective:   Yoseph Cordero is nonverbal smiling. Overnight events noted. Has been febrile all night Tmax 102.2F tachycardic. RRT called for pt being obtunded ABG ok, CT head pending.   Objective:   Objective:    Blood pressure 118/49, pulse 110, temperature 99 °F (37.2 °C), temperature source Axillary, resp. rate 20, height 5' 7\" (1.702 m), weight 147 lb 6.4 oz (66.9 kg), SpO2 97%.    Physical Exam:    General: No acute distress. Nonverbal. Eyes open smiling. R hemiparesis chronic   Respiratory: Clear to auscultation bilaterally. No wheezes. No rhonchi.  Cardiovascular: S1, S2. Regular rate and rhythm. No murmurs, rubs or gallops.   Abdomen: Soft, nontender, nondistended.  Positive bowel sounds. No rebound or guarding.  Neurologic: R hemiparesis. Rigid LUE  Extremities: R foot wrapped + R foot edema.       Results:   Results:    Labs:  Recent Labs   Lab 07/09/24  1907 07/10/24  0613 07/11/24  0535 24  0822 24  0625   WBC 22.3* 16.9* 12.3*  --  12.7*   HGB 10.7* 8.6* 8.6*  --  8.0*   MCV 87.0 85.9 84.4  --  84.9   .0* 436.0 375.0  --  417.0   INR  --   --   --  1.36*  --        Recent Labs   Lab 07/09/24  1907 07/10/24  0614 24  0535 24  0625   * 117* 109* 131*   BUN 13 8* 10 15   CREATSERUM 0.88 0.57* 0.57* 0.52*   CA 9.2 8.5* 8.6* 8.6*   ALB 4.1  --   --  3.1*    138 144 143   K 3.6 3.2* 3.5  3.5 3.2*    105 112 109   CO2 26.0 27.0 28.0 28.0   ALKPHO 71  --   --  59   AST 26  --   --  21   ALT 28  --   --  23   BILT 0.4  --   --  0.2   TP 7.7  --   --  5.9       Estimated Creatinine Clearance: 118.3 mL/min  (A) (based on SCr of 0.52 mg/dL (L)).    Recent Labs   Lab 07/11/24  0822   PTP 17.6*   INR 1.36*            Culture:  Hospital Encounter on 07/09/24   1. Aerobic Bacterial Culture     Status: Abnormal    Collection Time: 07/09/24  7:53 PM    Specimen: Sacrum; Other   Result Value Ref Range    Aerobic Culture Result 2+ growth Gram Negative Rashid (A) N/A    Aerobic Culture Result 1+ growth Enterococcus faecalis NOT VRE (A) N/A    Aerobic Smear No WBCs seen N/A    Aerobic Smear 1+ Gram Negative Rods N/A   2. Blood Culture     Status: Abnormal    Collection Time: 07/09/24  7:53 PM    Specimen: Blood,peripheral   Result Value Ref Range    Blood Culture Result Positive N/A    Blood Culture Result Proteus mirabilis (A) N/A    Blood Smear Gram Negative Rods (A) N/A    Blood Smear Positive Blood Culture (A) N/A       Cardiac  No results for input(s): \"TROP\", \"PBNP\" in the last 168 hours.      Imaging: Imaging data reviewed in Epic.  MRI FOOT, RIGHT (CPT=73718)    Result Date: 7/10/2024  CONCLUSION:  1. Stage IV heel ulcer extending down to bone with osteomyelitis involving the dorsal calcaneus. 2. Superior aspect of stage IV heel ulcer involves the distal Achilles tendon and there is a fluid-filled longitudinal split partial tear extending over a length of 6 cm, and this could be a intra tendinous abscess.     Dictated by (CST): Apolinar Barry MD on 7/10/2024 at 2:22 PM     Finalized by (CST): Apolinar Barry MD on 7/10/2024 at 2:28 PM           Medications:    [Held by provider] rivaroxaban  20 mg Oral Daily with food    meropenem  500 mg Intravenous Q8H    sodium hypochlorite   Topical BID    vancomycin  15 mg/kg Intravenous Q24H    levETIRAcetam  500 mg Oral BID         Assessment and Plan:   Assessment & Plan:        Acute proteus bacteremia/ severe sepsis   Sacral decubitus ulcer POA  L hip ulcer. - POA  IV vancomycin. Zosyn stopped. Started on meropenem.   ID, general surgery on consult.   Wound care   PT/OT - [facility]  Blood  cx 2/2 proteus mirabilis. Rpt pending  Wound culture proteus mirabilis + enterococcus feacalis   Lactic acid nl now. Still with fevers.   S/p bedside sharp excisional debridement of infected L hip decubitus ulcer and sacral decubitus ulcer 7/11   Acute metabolic encephalopathy   H/o CVA with chronic R hemiparesis   H/o seizure d/o   Dementia - essentially nonverbal   Hold xarelto for surgery    PT/OT - [facility].   Keppra 500mg BID.   CT head pending. Ammonia nl, ABG ok.   Check EEG. AMS likely secondary to sepsis  H/o VTE  Factor V leiden   Xarelto on hold as above.   Infected R heel stage IV pressure ulcer POA with OM  Podiatry on consult. Will need debridement of necrotic bone R heel  MRI reviewed Stage IV heel ulcer with osteomyelitis of dorsal calcaneus. Possible intra tendinous abscess.   IV vancomycin + merrem.   Arterial US pending.   Vascular surgery consulted.   Other medical problems  HTN  HOLLI      >55min spent, >50% spent counseling and coordinating care in the form of educating pt/family and d/w consultants and staff. Most of the time spent discussing the above plan.        Plan of care discussed with patient or family at bedside. Spoke to dtr Tyra updated on POC. All questions answered.     Uvaldo Echevarria MD  Hospitalist          Supplementary Documentation:     Quality:  DVT Prophylaxis: SCD for now   CODE status: Full  Dispo: per clinical course           Estimated date of discharge: TBD  Discharge is dependent on: clinical stability  At this point Mr. Cordero is expected to be discharge to: home

## 2024-07-12 NOTE — CONSULTS
[facility]  [address] [place] [address], [address]  [facility] [address]  [phone]            Yoseph Cordero Patient Status:  Inpatient    1950 MRN [id number]   Location [facility] 5SW/SE Attending Uvaldo Echevarria MD   Hosp Day # 3 PCP Irving Sheets DO     Date of Admission:  2024  Date of Consult:  2024  Reason for Consultation:   Episode of unresponsiveness.    History of Present Illness:   Patient is a 74 year old male with left MCA stroke in his 20s, severe dementia, factor V Leiden mutation, pulmonary embolism on Xarelto who presents for evaluation of seizures. who was admitted to the hospital for Infected ulcer of skin, unspecified ulcer stage (HCC): This morning, rapid response was called due to episode of unresponsiveness.  Patient had eyes closed and was not responding.  Episode resolved spontaneously and he returned back to normal until around 8 AM when it recurred a second time.  His son was in the room that time and said that he had never seen an episode like this at home before.  This episode also resolved spontaneously.  Patient was also seen by me in the clinic for episodes of staring, but per family and that his eyes were open during those episodes.  Was started on Keppra in May when he had an abnormal EEG showing left posterior quadrant epileptiform activity, but has never had a clinical seizure in the past.  Taking Keppra, but his Xarelto has been held while in the hospital and anticipation of debridement.    Past Medical History  Past Medical History:    Anxiety state, unspecified    CVA (cerebral infarction)    Dementia (HCC)    Depression    Heterozygous factor V Leiden mutation (HCC)    Other and unspecified hyperlipidemia    Other ill-defined conditions(799.89)    Cardiomegaly Pleural effusion w/idopathic pleuropuricarditis    Pulmonary embolism (HCC)    Stroke (HCC)    Unspecified sleep apnea       Past Surgical History  Past  Surgical History:   Procedure Laterality Date    Colonoscopy      Electrocardiogram, complete  1914    Scanned to Media Tab       Family History  Family History   Problem Relation Age of Onset    Cancer Father         Lung - Smoker  Cause of death    Other (Other) Mother         during     Diabetes Neg     Glaucoma Neg        Social History  Pediatric History   Patient Parents    Not on file     Other Topics Concern     Service Not Asked    Blood Transfusions Not Asked    Caffeine Concern Yes     Comment: 1 cups coffee daily    Occupational Exposure Not Asked    Hobby Hazards Not Asked    Sleep Concern Not Asked    Stress Concern Not Asked    Weight Concern Not Asked    Special Diet Not Asked    Back Care Not Asked    Exercise No    Bike Helmet Not Asked    Seat Belt Not Asked    Self-Exams Not Asked   Social History Narrative    The patient does not use an assistive device..      The patient does live in a home with stairs.           Current Medications:  Current Facility-Administered Medications   Medication Dose Route Frequency    potassium chloride 40 mEq in 250mL sodium chloride 0.9% IVPB premix  40 mEq Intravenous Once    levETIRAcetam (Keppra) 500 mg/5mL injection 500 mg  500 mg Intravenous Q12H    piperacillin-tazobactam (Zosyn) 3.375 g in dextrose 5% 100 mL IVPB-ADDV  3.375 g Intravenous Q8H    [Held by provider] rivaroxaban (Xarelto) tab 20 mg  20 mg Oral Daily with food    sodium hypochlorite (Dakin's) 0.125 % external solution   Topical BID    ibuprofen (Motrin) tab 600 mg  600 mg Oral Q4H PRN    sodium chloride 0.9% infusion   Intravenous Continuous    acetaminophen (Tylenol Extra Strength) tab 500 mg  500 mg Oral Q4H PRN    ondansetron (Zofran) 4 MG/2ML injection 4 mg  4 mg Intravenous Q6H PRN    OLANZapine (Zyprexa) 5 mg in sterile water for injection (PF) IM injection  5 mg Intramuscular Q12H PRN     Medications Prior to Admission   Medication Sig    traMADol 50 MG Oral Tab Take  1 tablet (50 mg total) by mouth every 6 (six) hours as needed for Pain (for pain or breakthrough pain.).    levETIRAcetam 500 MG Oral Tab Take 1 tablet (500 mg total) by mouth 2 (two) times daily.    miconazole 2 % External Powder Apply in toes daily    rivaroxaban (XARELTO) 20 MG Oral Tab Take 1 tablet (20 mg total) by mouth daily with food.    [] cephalexin 500 MG Oral Cap Take 1 capsule (500 mg total) by mouth 4 (four) times daily for 7 days.    LORazepam 0.5 MG Oral Tab Take 1 tablet (0.5 mg total) by mouth every 6 (six) hours as needed for Anxiety. (Patient not taking: Reported on 2024)       Allergies  No Known Allergies    Review of Systems:   As in HPI, the rest of the 14 system review was done and was negative    Physical Exam:     Vitals:    24 0858 24 0903 24 0924 24 1048   BP: 94/48 94/45 102/45 108/48   Pulse: 90  92 97   Resp:    22   Temp:    98.4 °F (36.9 °C)   TempSrc:    Axillary   SpO2: 96%  100% 100%   Weight:       Height:           General: No apparent distress, well nourished, well groomed.    Neurological:     Mental Status:  Awake, alert, nonverbal.  Not following commands but opens eyes to voice.    Cranial Nerves:  Blinks to threat, face symmetric.    Motor Exam:  Strength- Moving left arm and leg spontaneously, more in arm than leg.    RUE flexion contracture at elbow  No movement to noxious RLE.    Sensory Exam:  No withdrawal to noxious on right side  Moves LUE and LLE to light touch    Deep Tendon Reflexes:  3+ BUE, 2+ left patellar, absent RLE    Coordination:  No abnormal movements      Results:     Laboratory Data:  Lab Results   Component Value Date    WBC 12.1 (H) 2024    HGB 7.9 (L) 2024    HCT 25.6 (L) 2024    .0 2024    CREATSERUM 0.52 (L) 2024    BUN 15 2024     2024    K 3.2 (L) 2024     2024    CO2 28.0 2024     (H) 2024    CA 8.6 (L) 2024     ALB 3.1 (L) 07/12/2024    ALKPHO 59 07/12/2024    TP 5.9 07/12/2024    AST 21 07/12/2024    ALT 23 07/12/2024    PTT 34.3 07/12/2024    INR 1.22 (H) 07/12/2024    PTP 16.2 (H) 07/12/2024    TSH 2.834 05/02/2024    PSA 16.20 (H) 03/11/2019    DDIMER 11.06 (H) 05/02/2024    ESRML 98 (H) 07/11/2024    CRP <0.29 10/24/2022    MG 1.7 07/12/2024         Imaging:    CT BRAIN OR HEAD (57735)    Result Date: 7/12/2024  CONCLUSION:  1. No acute intracranial hemorrhage or other acute intracranial abnormality. 2. 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 3. Disproportionate mesial temporal volume loss, which is unchanged, and can be seen in the setting of Alzheimer's disease. 4. Intracranial atherosclerosis. 5. Left maxillary sinus retention cyst or polyp.   elm-remote  Dictated by (CST): Jarvis Gardiner MD on 7/12/2024 at 7:58 AM     Finalized by (CST): Jarvis Gardiner MD on 7/12/2024 at 8:02 AM          MRI FOOT, RIGHT (CPT=73718)    Result Date: 7/10/2024  CONCLUSION:  1. Stage IV heel ulcer extending down to bone with osteomyelitis involving the dorsal calcaneus. 2. Superior aspect of stage IV heel ulcer involves the distal Achilles tendon and there is a fluid-filled longitudinal split partial tear extending over a length of 6 cm, and this could be a intra tendinous abscess.     Dictated by (CST): Apolinar Barry MD on 7/10/2024 at 2:22 PM     Finalized by (CST): Apolinar Barry MD on 7/10/2024 at 2:28 PM                 Impression:   Assessment   Yoseph Cordero is a 74 year old male with left MCA stroke in his 20s, severe dementia, factor V Leiden mutation, pulmonary embolism on Xarelto, who presents for evaluation of episodes of unresponsiveness. Had 2 episodes today in the setting of fever, treatment for sepsis and infected right heel pressure ulcer.  Return to baseline range time.    1.  Episodes of unresponsiveness  -Likely  fluctuation in level of alertness due to sepsis, infected heel ulcer  -EEG showed no seizures  -MRI brain  -Delirium precautions     Thank you for allowing me to participate in the care of your patient.    Nikki Sabillon MD  7/12/2024

## 2024-07-12 NOTE — PLAN OF CARE
Call light within reach. Bed alarm on. Safety precautions in place. RRT called in AM for difficulty to arouse. Refer to RRT note.    Problem: Patient Centered Care  Goal: Patient preferences are identified and integrated in the patient's plan of care  Description: Interventions:  - Provide timely, complete, and accurate information to patient/family  - Incorporate patient and family knowledge, values, beliefs, and cultural backgrounds into the planning and delivery of care  - Encourage patient/family to participate in care and decision-making at the level they choose  - Honor patient and family perspectives and choices  7/12/2024 0400 by Jonathan Reyes RN  Outcome: Progressing  7/12/2024 0357 by Jonathan Reyes RN  Outcome: Progressing     Problem: PAIN - ADULT  Goal: Verbalizes/displays adequate comfort level or patient's stated pain goal  Description: INTERVENTIONS:  - Encourage pt to monitor pain and request assistance  - Assess pain using appropriate pain scale  - Administer analgesics based on type and severity of pain and evaluate response  - Implement non-pharmacological measures as appropriate and evaluate response  - Consider cultural and social influences on pain and pain management  - Manage/alleviate anxiety  - Utilize distraction and/or relaxation techniques  - Monitor for opioid side effects  - Notify MD/LIP if interventions unsuccessful or patient reports new pain  - Anticipate increased pain with activity and pre-medicate as appropriate  7/12/2024 0400 by Jonathan Reyes RN  Outcome: Progressing  7/12/2024 0357 by Jonathan Reyes RN  Outcome: Progressing     Problem: SAFETY ADULT - FALL  Goal: Free from fall injury  Description: INTERVENTIONS:  - Assess pt frequently for physical needs  - Identify cognitive and physical deficits and behaviors that affect risk of falls.  - [place]ona fall precautions as indicated by assessment.  - Educate pt/family on patient safety including physical limitations  -  Instruct pt to call for assistance with activity based on assessment  - Modify environment to reduce risk of injury  - Provide assistive devices as appropriate  - Consider OT/PT consult to assist with strengthening/mobility  - Encourage toileting schedule  7/12/2024 0400 by Jonathan Reyes RN  Outcome: Progressing  7/12/2024 0357 by Jonathan Reyes RN  Outcome: Progressing     Problem: DISCHARGE PLANNING  Goal: Discharge to home or other facility with appropriate resources  Description: INTERVENTIONS:  - Identify barriers to discharge w/pt and caregiver  - Include patient/family/discharge partner in discharge planning  - Arrange for needed discharge resources and transportation as appropriate  - Identify discharge learning needs (meds, wound care, etc)  - Arrange for interpreters to assist at discharge as needed  - Consider post-discharge preferences of patient/family/discharge partner  - Complete POLST form as appropriate  - Assess patient's ability to be responsible for managing their own health  - Refer to Case Management Department for coordinating discharge planning if the patient needs post-hospital services based on physician/LIP order or complex needs related to functional status, cognitive ability or social support system  7/12/2024 0400 by Jonathan Reyes RN  Outcome: Progressing  7/12/2024 0357 by Jonathan Reyes RN  Outcome: Progressing     Problem: NEUROLOGICAL - ADULT  Goal: Absence of seizures  Description: INTERVENTIONS  - Monitor for seizure activity  - Administer anti-seizure medications as ordered  - Monitor neurological status  Outcome: Progressing     Problem: DECISION MAKING  Goal: Pt/Family able to effectively weigh alternatives and participate in decision making related to treatment and care  Description: INTERVENTIONS:  - Determine when there are differences between patient's view, family's view, and healthcare provider's view of condition  - Facilitate patient and family articulation of  goals for care  - Help patient and family identify pros/cons of alternative solutions  - Provide information as requested by patient/family  - Respect patient/family right to receive or not to receive information  - Serve as a liaison between patient and family and health care team  - Initiate Consults from Ethics, Palliative Care or initiate Family Care Conference as is appropriate  Outcome: Progressing     Problem: CARDIOVASCULAR - ADULT  Goal: Maintains optimal cardiac output and hemodynamic stability  Description: INTERVENTIONS:  - Monitor vital signs, rhythm, and trends  - Monitor for bleeding, hypotension and signs of decreased cardiac output  - Evaluate effectiveness of vasoactive medications to optimize hemodynamic stability  - Monitor arterial and/or venous puncture sites for bleeding and/or hematoma  - Assess quality of pulses, skin color and temperature  - Assess for signs of decreased coronary artery perfusion - ex. Angina  - Evaluate fluid balance, assess for edema, trend weights  Outcome: Progressing  Goal: Absence of cardiac arrhythmias or at baseline  Description: INTERVENTIONS:  - Continuous cardiac monitoring, monitor vital signs, obtain 12 lead EKG if indicated  - Evaluate effectiveness of antiarrhythmic and heart rate control medications as ordered  - Initiate emergency measures for life threatening arrhythmias  - Monitor electrolytes and administer replacement therapy as ordered  Outcome: Progressing     Problem: Impaired Swallowing  Goal: Minimize aspiration risk  Description: Interventions:  - Patient should be alert and upright for all feedings (90 degrees preferred)  - Offer food and liquids at a slow rate  - No straws  - Encourage small bites of food and small sips of liquid  - Offer pills one at a time, crush or deliver with applesauce as needed  - Discontinue feeding and notify MD (or speech pathologist) if coughing or persistent throat clearing or wet/gurgly vocal quality is  noted  Outcome: Progressing     Problem: Impaired Communication  Goal: Patient will achieve maximal communication potential  Description: Interventions:  - Ask \"yes/no\" questions to facilitate patient's ability to communicate wants and needs  Outcome: Progressing

## 2024-07-12 NOTE — CM/SW NOTE
07/12/24 1200   CM/SW Referral Data   Referral Source Social Work (self-referral)   Reason for Referral Discharge planning   Informant Son;Daughter   Medical Hx   Does patient have an established PCP? Yes  (Irving Sheets)   Patient Info   Patient's Current Mental Status at Time of Assessment Alert;Oriented   Patient's Home Environment House   Patient lives with Spouse/Significant other   Patient Status Prior to Admission   Independent with ADLs and Mobility No   Services in place prior to admission DME/Supplies at home   Type of DME/Supplies Wheelchair   Discharge Needs   Anticipated D/C needs Subacute rehab       SW self-referral for discharge planning.     SW intern met with pt, son Faheem at bedside. Above assessment completed.    Pt is home with wife.     Pt uses a wheelchair at home.   No home O2.     [facility] current with [facility] with RN/PT services. Faheem states that his sister, Tyra, is more involved with pt's care and will know more information. SW intern spoke with pt's daughter Tyra via phone.     Daughter states that pt is current with [facility] but has not been happy with the services they have provided. SW intern informed Tyra that PT recommends [facility] for pt at discharge. Tyra states that she will have to discuss with pt's wife to see if this is something they want for pt. Tyra states that if pt's wife does not want [facility] for pt they would want to do HH but with a new HH agency.     ALMA ROSA to send [facility] & HH referrals in [place]. F2F completed.    Plan: New HH agency vs [facility] - pending list/choice & med clear    ALMA ROSA Polo Intern, MSW candidate.

## 2024-07-12 NOTE — PROGRESS NOTES
INFECTIOUS DISEASE PROGRESS NOTE  [facility]  part of [facility] ID PROGRESS NOTE    Yoseph Cordero Patient Status:  Inpatient    1950 MRN [id number]   Location [facility] 5SW/SE Attending Uvaldo Echevarria MD   Hosp Day # 3 PCP Irving Sheets,      Subjective:  ROS limited. Was more lethargic last night with fevers. S/p bedside debridement of wounds yesterday.    ASSESSMENT:    Antibiotics: Zosyn  Vancomycin, meropenem     # Acute Proteus bacteremia from likely wound source with sacral, ischial and R heel wound   -Wound cx Proteus, Enterococcus, Strep constellatus  # Leukocytosis  # CVA, bedbound  # H/o PE     PLAN:  -  Stop vancomycin and meropenem and start zosyn.  -  Podiatry planning debridement.  -  Await podiatry recommendations.  -  Follow fever curve, wbc.  -  Reviewed labs, micro, imaging reports, available old records.  -  Case d/w patient, son, primary, RN.     History of Present Illness:  Yoseph Cordero is a 74 year old male with a history of CVA, PE, dementia, mostly bedbound, who presented to [facility] ED on  after being seen by PCP with concerns for worsening wounds. Patient did have a XR of R foot showing osteomyelitis on . On arrival, Tmax 101.7, wbc 22.3, lactate 2.8, wound swab obtained, started on vancomycin and zosyn. Blood cx with Proteus. Plans to be seen by podiatry and general surgery. ID consulted.    Physical Exam:  /48 (BP Location: Right arm)   Pulse 97   Temp 98.4 °F (36.9 °C) (Axillary)   Resp 22   Ht 5' 7\" (1.702 m)   Wt 147 lb 6.4 oz (66.9 kg)   SpO2 100%   BMI 23.09 kg/m²     Gen:   Awake, in bed  HEENT:  EOMI, neck supple  CV/lungs:  RRR, CTAB  Abdom:  Soft, no TTP  Skin/extrem:  No rashes, R heel wrapped, wound pictures reviewed  :   Purewick draining yellow urine  Lines:  PIV+    Laboratory Data: Reviewed    Microbiology: Reviewed    Radiology: Reviewed      SAILAJA Chavez [facility]  [facility]  [phone]  7/12/2024

## 2024-07-12 NOTE — PROGRESS NOTES
RRT called to room 560    On my arrival patient lying in bed obtunded, as per RN change in mental status barely responding to sternal rub.  Other than being febrile vitals stable, patient with history of seizure disorder.    On exam  Temperature 99  Pulse 110  /49  Pulse ox 96% on room air  Obtunded, minimally arousable, opening eyes to deep sternal rub  Chest clear to auscultation  Heart tachycardic  Abdomen soft  Right sided weakness, likely baseline    Assessment and plan    Acute on chronic encephalopathy  Unclear etiology at this time, patient not retaining CO2, will get head CT to further evaluate.  Check ammonia levels along with routine labs.  Will monitor with neurochecks every 4 hours.  Cannot rule out seizure/postictal state    35 minutes of critical care time spent with patient occluding coordinating care with ancillary staff.

## 2024-07-12 NOTE — PROGRESS NOTES
07/12/24 1045   Wound 07/09/24 Heel Right   Date First Assessed/Time First Assessed: 07/09/24 2026   Present on Original Admission: Yes  Primary Wound Type: Pressure Injury  Location: Heel  Wound Location Orientation: Right  Wound Description (Comments): odor, necrotic tissue   Wound Image     Site Assessment Fragile;Moist;Painful;Pink;Red;Black;Tan  (necrotic tissue is  at the edge)   Drainage Amount Small   Drainage Description Serosanguineous   Treatments Cleansed;Dakins   Dressing 4x4s;Gauze;Kerlix roll;Tape   Dressing Changed Changed   Dressing Status Clean;Dry;Intact;Dressing Changed   Madhuri-wound Assessment Fragile;Pink;Maceration;White   Wound Granulation Tissue Red;Pink   Wound Bed Granulation (%) 25 %   Wound Bed Eschar (%) 75 %   State of Healing Early/partial granulation   Wound Odor None   Pressure Injury Stage U   Wound 07/09/24 Hip Left   Date First Assessed/Time First Assessed: 07/09/24 2028   Present on Original Admission: Yes  Primary Wound Type: Pressure Injury  Location: Hip  Wound Location Orientation: Left  Wound Description (Comments): odor eschar   Dressing Status Intact   Wound 07/09/24 Sacrum   Date First Assessed/Time First Assessed: 07/09/24 2029   Present on Original Admission: Yes  Primary Wound Type: Pressure Injury  Location: Sacrum  Wound Description (Comments): odor unstagable   Dressing Status Intact   Wound Follow Up   Follow up needed Yes

## 2024-07-12 NOTE — TELEPHONE ENCOUNTER
Residential  orders received, signed by provider and faxed back successfully.    Placed in HH folder with scan sheet.

## 2024-07-12 NOTE — PLAN OF CARE
Problem: Patient Centered Care  Goal: Patient preferences are identified and integrated in the patient's plan of care  Description: Interventions:  - What would you like us to know as we care for you?     - Provide timely, complete, and accurate information to patient/family  - Incorporate patient and family knowledge, values, beliefs, and cultural backgrounds into the planning and delivery of care  - Encourage patient/family to participate in care and decision-making at the level they choose  - Honor patient and family perspectives and choices  Outcome: Progressing     Problem: SKIN/TISSUE INTEGRITY - ADULT  Goal: Incision(s), wounds(s) or drain site(s) healing without S/S of infection  Description: INTERVENTIONS:  - Assess and document risk factors for pressure ulcer development  - Assess and document skin integrity  - Assess and document dressing/incision, wound bed, drain sites and surrounding tissue  - Implement wound care per orders  - Initiate isolation precautions as appropriate  - Initiate Pressure Ulcer prevention bundle as indicated  Outcome: Progressing     Problem: PAIN - ADULT  Goal: Verbalizes/displays adequate comfort level or patient's stated pain goal  Description: INTERVENTIONS:  - Encourage pt to monitor pain and request assistance  - Assess pain using appropriate pain scale  - Administer analgesics based on type and severity of pain and evaluate response  - Implement non-pharmacological measures as appropriate and evaluate response  - Consider cultural and social influences on pain and pain management  - Manage/alleviate anxiety  - Utilize distraction and/or relaxation techniques  - Monitor for opioid side effects  - Notify MD/LIP if interventions unsuccessful or patient reports new pain  - Anticipate increased pain with activity and pre-medicate as appropriate  Outcome: Progressing     Problem: RISK FOR INFECTION - ADULT  Goal: Absence of fever/infection during anticipated neutropenic  period  Description: INTERVENTIONS  - Monitor WBC  - Administer growth factors as ordered  - Implement neutropenic guidelines  Outcome: Progressing     Problem: SAFETY ADULT - FALL  Goal: Free from fall injury  Description: INTERVENTIONS:  - Assess pt frequently for physical needs  - Identify cognitive and physical deficits and behaviors that affect risk of falls.  - [place] fall precautions as indicated by assessment.  - Educate pt/family on patient safety including physical limitations  - Instruct pt to call for assistance with activity based on assessment  - Modify environment to reduce risk of injury  - Provide assistive devices as appropriate  - Consider OT/PT consult to assist with strengthening/mobility  - Encourage toileting schedule  Outcome: Progressing     Problem: DISCHARGE PLANNING  Goal: Discharge to home or other facility with appropriate resources  Description: INTERVENTIONS:  - Identify barriers to discharge w/pt and caregiver  - Include patient/family/discharge partner in discharge planning  - Arrange for needed discharge resources and transportation as appropriate  - Identify discharge learning needs (meds, wound care, etc)  - Arrange for interpreters to assist at discharge as needed  - Consider post-discharge preferences of patient/family/discharge partner  - Complete POLST form as appropriate  - Assess patient's ability to be responsible for managing their own health  - Refer to Case Management Department for coordinating discharge planning if the patient needs post-hospital services based on physician/LIP order or complex needs related to functional status, cognitive ability or social support system  Outcome: Progressing     Problem: NEUROLOGICAL - ADULT  Goal: Absence of seizures  Description: INTERVENTIONS  - Monitor for seizure activity  - Administer anti-seizure medications as ordered  - Monitor neurological status  Outcome: Progressing     Problem: DECISION MAKING  Goal: Pt/Family able  to effectively weigh alternatives and participate in decision making related to treatment and care  Description: INTERVENTIONS:  - Determine when there are differences between patient's view, family's view, and healthcare provider's view of condition  - Facilitate patient and family articulation of goals for care  - Help patient and family identify pros/cons of alternative solutions  - Provide information as requested by patient/family  - Respect patient/family right to receive or not to receive information  - Serve as a liaison between patient and family and health care team  - Initiate Consults from Ethics, Palliative Care or initiate Family Care Conference as is appropriate  Outcome: Progressing     Problem: CARDIOVASCULAR - ADULT  Goal: Maintains optimal cardiac output and hemodynamic stability  Description: INTERVENTIONS:  - Monitor vital signs, rhythm, and trends  - Monitor for bleeding, hypotension and signs of decreased cardiac output  - Evaluate effectiveness of vasoactive medications to optimize hemodynamic stability  - Monitor arterial and/or venous puncture sites for bleeding and/or hematoma  - Assess quality of pulses, skin color and temperature  - Assess for signs of decreased coronary artery perfusion - ex. Angina  - Evaluate fluid balance, assess for edema, trend weights  Outcome: Progressing  Goal: Absence of cardiac arrhythmias or at baseline  Description: INTERVENTIONS:  - Continuous cardiac monitoring, monitor vital signs, obtain 12 lead EKG if indicated  - Evaluate effectiveness of antiarrhythmic and heart rate control medications as ordered  - Initiate emergency measures for life threatening arrhythmias  - Monitor electrolytes and administer replacement therapy as ordered  Outcome: Progressing     Problem: Impaired Swallowing  Goal: Minimize aspiration risk  Description: Interventions:  - Patient should be alert and upright for all feedings (90 degrees preferred)  - Offer food and liquids at a  slow rate  - No straws  - Encourage small bites of food and small sips of liquid  - Offer pills one at a time, crush or deliver with applesauce as needed  - Discontinue feeding and notify MD (or speech pathologist) if coughing or persistent throat clearing or wet/gurgly vocal quality is noted  Outcome: Progressing     Problem: Impaired Communication  Goal: Patient will achieve maximal communication potential  Description: Interventions:  - Ask \"yes/no\" questions to facilitate patient's ability to communicate wants and needs  Outcome: Progressing

## 2024-07-12 NOTE — PROGRESS NOTES
RRT    *See RRT Documentation Record*    Reason the RRT was called: Patient difficult to arouse with change in LOC  Assessment of patient leading up to RRT: Patient very lethargic. Had to sternal rub; still difficult to arouse.  Interventions/Testing: Accucheck, ABGs  Patient Outcome/Disposition: Remains on unit. ABG's showing metabolic acidosis.  Family Notified: yes  Name of family notified: Tyra Cordero

## 2024-07-12 NOTE — PROGRESS NOTES
[facility]  part of [facility]    Progress Note    Yoseph Cordero Patient Status:  Inpatient    1950 MRN [id number]   Location [facility] 5SW/SE Attending Uvaldo Echevarria MD   Hosp Day # 3 PCP Irving Sheets DO       Subjective:   Overnight events reviewed. Pt laying in bed, appears comfortable.     Objective:   Vital Signs:  Blood pressure 108/48, pulse 97, temperature 98.4 °F (36.9 °C), temperature source Axillary, resp. rate 22, height 5' 7\" (1.702 m), weight 147 lb 6.4 oz (66.9 kg), SpO2 100%.    Physical Exam     General: No acute distress. Nonverbal.  HEENT: Moist mucous membranes. Anicteric.   Neck: Supple, No JVD.   Respiratory: Nonlabored resp.  Cardiovascular: Regular rate.   Abdomen: Soft, NT, no rebound tnd, no guarding, nondistended.  No masses. Normal bowel sounds.    Neurologic: No focal neurological deficits.  Musculoskeletal: Full range of motion of all extremities. No calf tenderness. No swelling noted.  Integument: No lesions. No erythema. Left hip and sacral wound dressing changed.  Psychiatric: Appropriate mood and affect.         Assessment and Plan:     Infected ulcer of skin, unspecified ulcer stage (HCC)      Infected decubitus ulcer    Continue dressing changes. Podiatry to manage heel wound, planning debridement. Continue IV abx.     Results:     Lab Results   Component Value Date    WBC 12.1 (H) 2024    HGB 7.9 (L) 2024    HCT 25.6 (L) 2024    .0 2024    CREATSERUM 0.52 (L) 2024    BUN 15 2024     2024    K 3.2 (L) 2024     2024    CO2 28.0 2024     (H) 2024    CA 8.6 (L) 2024    ALB 3.1 (L) 2024    ALKPHO 59 2024    BILT 0.2 2024    TP 5.9 2024    AST 21 2024    ALT 23 2024    PTT 34.3 2024    INR 1.22 (H) 2024    TSH 2.834 2024    PSA 16.20 (H) 2019    DDIMER 11.06 (H) 2024    ESRML  98 (H) 07/11/2024    CRP <0.29 10/24/2022    MG 1.7 07/12/2024       CT BRAIN OR HEAD (79054)    Result Date: 7/12/2024  CONCLUSION:  1. No acute intracranial hemorrhage or other acute intracranial abnormality. 2. 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 3. Disproportionate mesial temporal volume loss, which is unchanged, and can be seen in the setting of Alzheimer's disease. 4. Intracranial atherosclerosis. 5. Left maxillary sinus retention cyst or polyp.   elm-remote  Dictated by (CST): Jarvis Gardiner MD on 7/12/2024 at 7:58 AM     Finalized by (CST): Jarvis Gardiner MD on 7/12/2024 at 8:02 AM          MRI FOOT, RIGHT (CPT=73718)    Result Date: 7/10/2024  CONCLUSION:  1. Stage IV heel ulcer extending down to bone with osteomyelitis involving the dorsal calcaneus. 2. Superior aspect of stage IV heel ulcer involves the distal Achilles tendon and there is a fluid-filled longitudinal split partial tear extending over a length of 6 cm, and this could be a intra tendinous abscess.     Dictated by (CST): Apolinar Barry MD on 7/10/2024 at 2:22 PM     Finalized by (CST): Apolinar Barry MD on 7/10/2024 at 2:28 PM                       Taco Pearson PA-C  7/12/2024

## 2024-07-12 NOTE — PROGRESS NOTES
07/12/24 1045   Wound 07/09/24 Heel Right   Date First Assessed/Time First Assessed: 07/09/24 2026   Present on Original Admission: Yes  Primary Wound Type: Pressure Injury  Location: Heel  Wound Location Orientation: Right  Wound Description (Comments): odor, necrotic tissue   Wound Image     Site Assessment Fragile;Moist;Painful;Pink;Red;Black;Tan  (necrotic tissue is  at the edge)   Drainage Amount Small   Drainage Description Serosanguineous   Treatments Cleansed;Dakins   Dressing 4x4s;Gauze;Kerlix roll;Tape   Dressing Changed Changed   Dressing Status Clean;Dry;Intact;Dressing Changed   Madhuri-wound Assessment Fragile;Pink;Maceration;White   Wound Granulation Tissue Red;Pink   Wound Bed Granulation (%) 25 %   Wound Bed Eschar (%) 75 %   State of Healing Early/partial granulation   Wound Odor None   Pressure Injury Stage U   Wound 07/09/24 Hip Left   Date First Assessed/Time First Assessed: 07/09/24 2028   Present on Original Admission: Yes  Primary Wound Type: Pressure Injury  Location: Hip  Wound Location Orientation: Left  Wound Description (Comments): odor eschar   Dressing Status Intact   Wound 07/09/24 Sacrum   Date First Assessed/Time First Assessed: 07/09/24 2029   Present on Original Admission: Yes  Primary Wound Type: Pressure Injury  Location: Sacrum  Wound Description (Comments): odor unstagable   Dressing Status Intact   Wound Follow Up   Follow up needed Yes     Wound Care Services  Asked by the nurse for assist with the pt's right heel dressing change. The pt. is awake, non verbal. His sons are at the bedside, educated them on the right heel wound and dressings, The pt. grimaces in pain when the right heel is lifted for dressing changes. See the wound assessment. Noted eschar separation on the wound edge and red/ pink tissue at the base. The rest of the necrotic tissue is adherent. Dressing changed, heel protector reapplied. The pt's left hip and sacral wounds were debrided bedside.  Dressings intact. The nurse was assisting. All questions answered. Call light in reach.

## 2024-07-12 NOTE — PROCEDURES
Inpatient Video EEG report    REFERRING PHYSICIAN: Uvaldo Echevarria MD      DURATION: 23:05  TECHNIQUE: 21 channels of EEG, 2 channels of EOG, and 1 channel of EKG were recorded utilizing the 10-20 International System of Electrode Placement. The recording was performed in a digitized monopolar referential format and playback was reformatted into various referential and bipolar montages utilizing appropriate filter settings. Automatic seizure and spike detection programs were utilized throughout the recording. Video was recorded during the study  CLINICAL DATA:  Patient is sent for the evaluation of possible seizures.    MEDICATION:  Continuous Medications:   sodium chloride 125 mL/hr at 07/12/24 1105     Scheduled Medications:  No current outpatient medications on file.  PRN Medications:    ibuprofen    acetaminophen    ondansetron    OLANZapine (Zyprexa) 5 mg in sterile water for injection (PF) IM injection    BACKGROUND:  The posterior dominant rhythm consisted of fairly well-organized 7-8 Hz rhythmic waveforms, symmetrically distributed over both posterior quadrants and was reactive to eye opening.    Sleep: Not captured    EEG ABNORMALITY:  Slowing:None  Epileptiform activity:None  Seizures: None  Abnormal movements:None  Reduced amplitude seen over the left hemisphere compared to the right    IMPRESSION:  This is an abnormal wake and drowsy EEG due to mild slowing of the background activity, and suppressed amplitude over the left hemisphere compared to the right.    No epileptiform activity nor seizures were seen.    This is consistent with a mild global encephaloapthy, with superimposed cerebral dysfunction over the left hemisphere.     Nikki Sabillon M.D.  Neurology

## 2024-07-13 ENCOUNTER — APPOINTMENT (OUTPATIENT)
Dept: ULTRASOUND IMAGING | Facility: HOSPITAL | Age: 74
End: 2024-07-13
Attending: HOSPITALIST
Payer: MEDICARE

## 2024-07-13 ENCOUNTER — APPOINTMENT (OUTPATIENT)
Dept: MRI IMAGING | Facility: HOSPITAL | Age: 74
End: 2024-07-13
Attending: HOSPITALIST
Payer: MEDICARE

## 2024-07-13 PROBLEM — R29.818 TRANSIENT NEUROLOGICAL SYMPTOMS: Status: ACTIVE | Noted: 2024-07-13

## 2024-07-13 LAB
ALBUMIN SERPL-MCNC: 3.3 G/DL (ref 3.2–4.8)
ALBUMIN/GLOB SERPL: 1.1 {RATIO} (ref 1–2)
ALP LIVER SERPL-CCNC: 67 U/L
ALT SERPL-CCNC: 36 U/L
ANION GAP SERPL CALC-SCNC: 6 MMOL/L (ref 0–18)
ANION GAP SERPL CALC-SCNC: 6 MMOL/L (ref 0–18)
APTT PPP: 32.1 SECONDS (ref 23–36)
APTT PPP: 51 SECONDS (ref 23–36)
APTT PPP: 60.4 SECONDS (ref 23–36)
AST SERPL-CCNC: 37 U/L (ref ?–34)
BASOPHILS # BLD AUTO: 0.04 X10(3) UL (ref 0–0.2)
BASOPHILS NFR BLD AUTO: 0.3 %
BILIRUB SERPL-MCNC: 0.2 MG/DL (ref 0.2–1.1)
BUN BLD-MCNC: 14 MG/DL (ref 9–23)
BUN BLD-MCNC: 16 MG/DL (ref 9–23)
BUN/CREAT SERPL: 24.6 (ref 10–20)
BUN/CREAT SERPL: 28.1 (ref 10–20)
CALCIUM BLD-MCNC: 8.6 MG/DL (ref 8.7–10.4)
CALCIUM BLD-MCNC: 8.8 MG/DL (ref 8.7–10.4)
CHLORIDE SERPL-SCNC: 110 MMOL/L (ref 98–112)
CHLORIDE SERPL-SCNC: 111 MMOL/L (ref 98–112)
CO2 SERPL-SCNC: 25 MMOL/L (ref 21–32)
CO2 SERPL-SCNC: 26 MMOL/L (ref 21–32)
CREAT BLD-MCNC: 0.57 MG/DL
CREAT BLD-MCNC: 0.57 MG/DL
DEPRECATED RDW RBC AUTO: 45.3 FL (ref 35.1–46.3)
DEPRECATED RDW RBC AUTO: 45.4 FL (ref 35.1–46.3)
EGFRCR SERPLBLD CKD-EPI 2021: 104 ML/MIN/1.73M2 (ref 60–?)
EGFRCR SERPLBLD CKD-EPI 2021: 104 ML/MIN/1.73M2 (ref 60–?)
EOSINOPHIL # BLD AUTO: 0.25 X10(3) UL (ref 0–0.7)
EOSINOPHIL NFR BLD AUTO: 1.7 %
ERYTHROCYTE [DISTWIDTH] IN BLOOD BY AUTOMATED COUNT: 14.7 % (ref 11–15)
ERYTHROCYTE [DISTWIDTH] IN BLOOD BY AUTOMATED COUNT: 14.8 % (ref 11–15)
GLOBULIN PLAS-MCNC: 2.9 G/DL (ref 2–3.5)
GLUCOSE BLD-MCNC: 115 MG/DL (ref 70–99)
GLUCOSE BLD-MCNC: 142 MG/DL (ref 70–99)
HCT VFR BLD AUTO: 26.8 %
HCT VFR BLD AUTO: 27.4 %
HGB BLD-MCNC: 8.4 G/DL
HGB BLD-MCNC: 8.6 G/DL
IMM GRANULOCYTES # BLD AUTO: 0.14 X10(3) UL (ref 0–1)
IMM GRANULOCYTES NFR BLD: 0.9 %
LYMPHOCYTES # BLD AUTO: 1.96 X10(3) UL (ref 1–4)
LYMPHOCYTES NFR BLD AUTO: 13 %
MAGNESIUM SERPL-MCNC: 1.9 MG/DL (ref 1.6–2.6)
MCH RBC QN AUTO: 26.3 PG (ref 26–34)
MCH RBC QN AUTO: 26.4 PG (ref 26–34)
MCHC RBC AUTO-ENTMCNC: 31.3 G/DL (ref 31–37)
MCHC RBC AUTO-ENTMCNC: 31.4 G/DL (ref 31–37)
MCV RBC AUTO: 83.8 FL
MCV RBC AUTO: 84 FL
MONOCYTES # BLD AUTO: 0.95 X10(3) UL (ref 0.1–1)
MONOCYTES NFR BLD AUTO: 6.3 %
NEUTROPHILS # BLD AUTO: 11.78 X10 (3) UL (ref 1.5–7.7)
NEUTROPHILS # BLD AUTO: 11.78 X10(3) UL (ref 1.5–7.7)
NEUTROPHILS NFR BLD AUTO: 77.8 %
OSMOLALITY SERPL CALC.SUM OF ELEC: 296 MOSM/KG (ref 275–295)
OSMOLALITY SERPL CALC.SUM OF ELEC: 297 MOSM/KG (ref 275–295)
PLATELET # BLD AUTO: 445 10(3)UL (ref 150–450)
PLATELET # BLD AUTO: 451 10(3)UL (ref 150–450)
POTASSIUM SERPL-SCNC: 3.7 MMOL/L (ref 3.5–5.1)
POTASSIUM SERPL-SCNC: 4 MMOL/L (ref 3.5–5.1)
POTASSIUM SERPL-SCNC: 4 MMOL/L (ref 3.5–5.1)
PROT SERPL-MCNC: 6.2 G/DL (ref 5.7–8.2)
RBC # BLD AUTO: 3.2 X10(6)UL
RBC # BLD AUTO: 3.26 X10(6)UL
SODIUM SERPL-SCNC: 142 MMOL/L (ref 136–145)
SODIUM SERPL-SCNC: 142 MMOL/L (ref 136–145)
WBC # BLD AUTO: 14.6 X10(3) UL (ref 4–11)
WBC # BLD AUTO: 15.1 X10(3) UL (ref 4–11)

## 2024-07-13 PROCEDURE — 99233 SBSQ HOSP IP/OBS HIGH 50: CPT | Performed by: HOSPITALIST

## 2024-07-13 PROCEDURE — 93922 UPR/L XTREMITY ART 2 LEVELS: CPT | Performed by: HOSPITALIST

## 2024-07-13 PROCEDURE — 70551 MRI BRAIN STEM W/O DYE: CPT | Performed by: HOSPITALIST

## 2024-07-13 PROCEDURE — 99232 SBSQ HOSP IP/OBS MODERATE 35: CPT | Performed by: OTHER

## 2024-07-13 RX ORDER — HEPARIN SODIUM 1000 [USP'U]/ML
30 INJECTION, SOLUTION INTRAVENOUS; SUBCUTANEOUS ONCE
Status: COMPLETED | OUTPATIENT
Start: 2024-07-13 | End: 2024-07-13

## 2024-07-13 RX ORDER — HEPARIN SODIUM AND DEXTROSE 10000; 5 [USP'U]/100ML; G/100ML
18 INJECTION INTRAVENOUS ONCE
Status: COMPLETED | OUTPATIENT
Start: 2024-07-13 | End: 2024-07-13

## 2024-07-13 RX ORDER — HEPARIN SODIUM 1000 [USP'U]/ML
80 INJECTION, SOLUTION INTRAVENOUS; SUBCUTANEOUS ONCE
Status: COMPLETED | OUTPATIENT
Start: 2024-07-13 | End: 2024-07-13

## 2024-07-13 RX ORDER — HEPARIN SODIUM 1000 [USP'U]/ML
30 INJECTION, SOLUTION INTRAVENOUS; SUBCUTANEOUS ONCE
Status: CANCELLED | OUTPATIENT
Start: 2024-07-13 | End: 2024-07-13

## 2024-07-13 RX ORDER — HEPARIN SODIUM AND DEXTROSE 10000; 5 [USP'U]/100ML; G/100ML
INJECTION INTRAVENOUS CONTINUOUS
Status: DISCONTINUED | OUTPATIENT
Start: 2024-07-13 | End: 2024-07-16

## 2024-07-13 RX ORDER — FERROUS SULFATE 325(65) MG
325 TABLET, DELAYED RELEASE (ENTERIC COATED) ORAL
Status: DISCONTINUED | OUTPATIENT
Start: 2024-07-13 | End: 2024-07-17

## 2024-07-13 RX ORDER — ACETAMINOPHEN 500 MG
500 TABLET ORAL EVERY 8 HOURS
Status: DISCONTINUED | OUTPATIENT
Start: 2024-07-13 | End: 2024-07-19

## 2024-07-13 NOTE — PROGRESS NOTES
[facility]  part of [facility]    Progress Note    Yoseph Cordero Patient Status:  Inpatient    1950 MRN [id number]   Location [facility] 5SW/SE Attending Uvaldo Echevarria MD   Hosp Day # 4 PCP Irving Sheets DO     Chief Complaint:   Chief Complaint   Patient presents with    Fever   Infected R heel pressure ulcer    Subjective:   Yoseph Cordero is eating breakfast. Son at bedside feeding him. Pt grimacing multiple times during my interview. Eating well. Nonverbal     Overnight temp 100.6 and tachycardic 124. Received 500mL NS bolus   Objective:   Objective:    Blood pressure 124/65, pulse 112, temperature 98.5 °F (36.9 °C), temperature source Axillary, resp. rate 20, height 5' 7\" (1.702 m), weight 147 lb 3.2 oz (66.8 kg), SpO2 98%.    Physical Exam:    General:  Nonverbal. Grimacing R hemiparesis chronic   Respiratory: Clear to auscultation bilaterally. No wheezes. No rhonchi.  Cardiovascular: S1, S2. Regular rate and rhythm. No murmurs, rubs or gallops.   Abdomen: Soft, nontender, nondistended.  Positive bowel sounds. No rebound or guarding.  Neurologic: R hemiparesis. Rigid LUE  Extremities: R foot wrapped + R foot edema.       Results:   Results:    Labs:  Recent Labs   Lab 07/10/24  0613 24  0535 24  0822 24  0625 24  0924 24  0539   WBC 16.9* 12.3*  --  12.7* 12.1* 15.1*   HGB 8.6* 8.6*  --  8.0* 7.9* 8.6*   MCV 85.9 84.4  --  84.9 84.8 84.0   .0 375.0  --  417.0 380.0 451.0*   INR  --   --  1.36*  --  1.22*  --        Recent Labs   Lab 24  1907 07/10/24  0614 24  0535 24  0625 24  0539   *   < > 109* 131* 115*   BUN 13   < > 10 15 16   CREATSERUM 0.88   < > 0.57* 0.52* 0.57*   CA 9.2   < > 8.6* 8.6* 8.6*   ALB 4.1  --   --  3.1* 3.3      < > 144 143 142   K 3.6   < > 3.5  3.5 3.2* 4.0  4.0      < > 112 109 110   CO2 26.0   < > 28.0 28.0 26.0   ALKPHO 71  --   --  59 67   AST 26  --    --  21 37*   ALT 28  --   --  23 36   BILT 0.4  --   --  0.2 0.2   TP 7.7  --   --  5.9 6.2    < > = values in this interval not displayed.       Estimated Creatinine Clearance: 107.9 mL/min (A) (based on SCr of 0.57 mg/dL (L)).    Recent Labs   Lab 07/11/24  0822 07/12/24 0924   PTP 17.6* 16.2*   INR 1.36* 1.22*            Culture:  Hospital Encounter on 07/09/24   1. Blood Culture     Status: None (Preliminary result)    Collection Time: 07/11/24 11:18 PM    Specimen: Blood,peripheral   Result Value Ref Range    Blood Culture Result No Growth 1 Day N/A   2. Aerobic Bacterial Culture     Status: Abnormal    Collection Time: 07/09/24  7:53 PM    Specimen: Sacrum; Other   Result Value Ref Range    Aerobic Culture Result 2+ growth Gram Negative Rashid (A) N/A    Aerobic Culture Result 1+ growth Enterococcus faecalis NOT VRE (A) N/A    Aerobic Smear No WBCs seen N/A    Aerobic Smear 1+ Gram Negative Rods N/A       Cardiac  No results for input(s): \"TROP\", \"PBNP\" in the last 168 hours.      Imaging: Imaging data reviewed in [facility].  CT BRAIN OR HEAD (33133)    Result Date: 7/12/2024  CONCLUSION:  1. No acute intracranial hemorrhage or other acute intracranial abnormality. 2. 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 3. Disproportionate mesial temporal volume loss, which is unchanged, and can be seen in the setting of Alzheimer's disease. 4. Intracranial atherosclerosis. 5. Left maxillary sinus retention cyst or polyp.   elm-remote  Dictated by (CST): Jarvis Gardiner MD on 7/12/2024 at 7:58 AM     Finalized by (CST): Jarvis Gardiner MD on 7/12/2024 at 8:02 AM           Medications:    levETIRAcetam  500 mg Intravenous Q12H    piperacillin-tazobactam  3.375 g Intravenous Q8H    [Held by provider] rivaroxaban  20 mg Oral Daily with food    sodium hypochlorite   Topical BID         Assessment and Plan:   Assessment & Plan:      Acute  proteus bacteremia/ severe sepsis   Sacral decubitus ulcer POA  L hip ulcer. - POA  IV zosyn   ID, general surgery on consult.   Wound care   PT/OT - [facility]  Blood cx 2/2 proteus mirabilis. Rpt blood cx neg x 4   Wound culture proteus mirabilis + enterococcus feacalis   Lactic acid nl now. Fevers downtrending  S/p bedside sharp excisional debridement of infected L hip decubitus ulcer and sacral decubitus ulcer 7/11   Episode of unresponsiveness - resolved.   H/o CVA with chronic R hemiparesis   H/o seizure d/o   Dementia - essentially nonverbal   Hold xarelto for surgery    PT/OT - [facility].   Keppra 500mg BID.   CT head no acute abnormality old large L MCA CVA. Volume loss   Ammonia and TSH normal.   EEG no seizure activity.   Episode of unresponsiveness likely from sepsis   Sinus tachycardia  Likely secondary to infection/low grade temps. Tachycardia coincides with fevers  Lactic acid normal.   Recent ECHO from May 24' LVEF 60-65%  Will start heparin gtt until surgery for foot  Prev admit -110   H/o VTE  Factor V leiden   Xarelto on hold as above.   Recent PE May 24' will start heparin gtt in anticipation of surgery   Infected R heel stage IV pressure ulcer POA with OM  Podiatry and vascular surgery on consult. Will need debridement of necrotic bone R heel vs BKA. Family not ready for BKA  MRI reviewed Stage IV heel ulcer with osteomyelitis of dorsal calcaneus. Possible intra tendinous abscess.   IV zosyn  Arterial US pending.   Acute anemia   No overt bleeding seen.   Combination of ACD and iron def.   PO ferrous sulfate BID.   FOB pending   Baseline Hb 12. Came in at 10.7   Other medical problems  HTN  HOLLI      >55min spent, >50% spent counseling and coordinating care in the form of educating pt/family and d/w consultants and staff. Most of the time spent discussing the above plan.        Plan of care discussed with patient or family at bedside.   Uvaldo Echevarria MD  Hospitalist          Supplementary  Documentation:     Quality:  DVT Prophylaxis: heparin gtt.    CODE status: Full  Dispo: per clinical course           Estimated date of discharge: TBD  Discharge is dependent on: clinical stability  At this point Mr. Cordero is expected to be discharge to: home

## 2024-07-13 NOTE — PLAN OF CARE
Problem: Patient Centered Care  Goal: Patient preferences are identified and integrated in the patient's plan of care  Description: Interventions:  - What would you like us to know as we care for you? From home with wife  - Provide timely, complete, and accurate information to patient/family  - Incorporate patient and family knowledge, values, beliefs, and cultural backgrounds into the planning and delivery of care  - Encourage patient/family to participate in care and decision-making at the level they choose  - Honor patient and family perspectives and choices  Outcome: Progressing     Problem: SKIN/TISSUE INTEGRITY - ADULT  Goal: Incision(s), wounds(s) or drain site(s) healing without S/S of infection  Description: INTERVENTIONS:  - Assess and document risk factors for pressure ulcer development  - Assess and document skin integrity  - Assess and document dressing/incision, wound bed, drain sites and surrounding tissue  - Implement wound care per orders  - Initiate isolation precautions as appropriate  - Initiate Pressure Ulcer prevention bundle as indicated  Outcome: Progressing     Problem: PAIN - ADULT  Goal: Verbalizes/displays adequate comfort level or patient's stated pain goal  Description: INTERVENTIONS:  - Encourage pt to monitor pain and request assistance  - Assess pain using appropriate pain scale  - Administer analgesics based on type and severity of pain and evaluate response  - Implement non-pharmacological measures as appropriate and evaluate response  - Consider cultural and social influences on pain and pain management  - Manage/alleviate anxiety  - Utilize distraction and/or relaxation techniques  - Monitor for opioid side effects  - Notify MD/LIP if interventions unsuccessful or patient reports new pain  - Anticipate increased pain with activity and pre-medicate as appropriate  Outcome: Progressing     Problem: RISK FOR INFECTION - ADULT  Goal: Absence of fever/infection during anticipated  neutropenic period  Description: INTERVENTIONS  - Monitor WBC  - Administer growth factors as ordered  - Implement neutropenic guidelines  Outcome: Progressing     Problem: SAFETY ADULT - FALL  Goal: Free from fall injury  Description: INTERVENTIONS:  - Assess pt frequently for physical needs  - Identify cognitive and physical deficits and behaviors that affect risk of falls.  - [place] fall precautions as indicated by assessment.  - Educate pt/family on patient safety including physical limitations  - Instruct pt to call for assistance with activity based on assessment  - Modify environment to reduce risk of injury  - Provide assistive devices as appropriate  - Consider OT/PT consult to assist with strengthening/mobility  - Encourage toileting schedule  Outcome: Progressing     Problem: DISCHARGE PLANNING  Goal: Discharge to home or other facility with appropriate resources  Description: INTERVENTIONS:  - Identify barriers to discharge w/pt and caregiver  - Include patient/family/discharge partner in discharge planning  - Arrange for needed discharge resources and transportation as appropriate  - Identify discharge learning needs (meds, wound care, etc)  - Arrange for interpreters to assist at discharge as needed  - Consider post-discharge preferences of patient/family/discharge partner  - Complete POLST form as appropriate  - Assess patient's ability to be responsible for managing their own health  - Refer to Case Management Department for coordinating discharge planning if the patient needs post-hospital services based on physician/LIP order or complex needs related to functional status, cognitive ability or social support system  Outcome: Progressing     Problem: NEUROLOGICAL - ADULT  Goal: Absence of seizures  Description: INTERVENTIONS  - Monitor for seizure activity  - Administer anti-seizure medications as ordered  - Monitor neurological status  Outcome: Progressing     Problem: DECISION MAKING  Goal:  Pt/Family able to effectively weigh alternatives and participate in decision making related to treatment and care  Description: INTERVENTIONS:  - Determine when there are differences between patient's view, family's view, and healthcare provider's view of condition  - Facilitate patient and family articulation of goals for care  - Help patient and family identify pros/cons of alternative solutions  - Provide information as requested by patient/family  - Respect patient/family right to receive or not to receive information  - Serve as a liaison between patient and family and health care team  - Initiate Consults from Ethics, Palliative Care or initiate Family Care Conference as is appropriate  Outcome: Progressing     Problem: CARDIOVASCULAR - ADULT  Goal: Maintains optimal cardiac output and hemodynamic stability  Description: INTERVENTIONS:  - Monitor vital signs, rhythm, and trends  - Monitor for bleeding, hypotension and signs of decreased cardiac output  - Evaluate effectiveness of vasoactive medications to optimize hemodynamic stability  - Monitor arterial and/or venous puncture sites for bleeding and/or hematoma  - Assess quality of pulses, skin color and temperature  - Assess for signs of decreased coronary artery perfusion - ex. Angina  - Evaluate fluid balance, assess for edema, trend weights  Outcome: Progressing  Goal: Absence of cardiac arrhythmias or at baseline  Description: INTERVENTIONS:  - Continuous cardiac monitoring, monitor vital signs, obtain 12 lead EKG if indicated  - Evaluate effectiveness of antiarrhythmic and heart rate control medications as ordered  - Initiate emergency measures for life threatening arrhythmias  - Monitor electrolytes and administer replacement therapy as ordered  Outcome: Progressing     Problem: Impaired Swallowing  Goal: Minimize aspiration risk  Description: Interventions:  - Patient should be alert and upright for all feedings (90 degrees preferred)  - Offer food  and liquids at a slow rate  - No straws  - Encourage small bites of food and small sips of liquid  - Offer pills one at a time, crush or deliver with applesauce as needed  - Discontinue feeding and notify MD (or speech pathologist) if coughing or persistent throat clearing or wet/gurgly vocal quality is noted  Outcome: Progressing     Problem: Impaired Communication  Goal: Patient will achieve maximal communication potential  Description: Interventions:  - Encourage use of alternative/augmentative communication to express basic wants and needs (use of communication/letter board/or smartphone/tablet/handwriting)  - Provide modeling for options to improve word retrieval in known context  - Allow additional time for processing after asking questions or providing instructions  - Ask \"yes/no\" questions to facilitate patient's ability to communicate wants and needs  Outcome: Progressing     Vital signs stable at this time; however, patient had elevated heart rate and was febrile overnight, MD made aware with orders to bolus 500 ML which yielded improvement with medication. Family at bedside overnight. Fall precautions in place- bed alarm on and locked in lowest position. Call light within reach. Frequent rounding by nursing staff.

## 2024-07-13 NOTE — PLAN OF CARE
Problem: Patient Centered Care  Goal: Patient preferences are identified and integrated in the patient's plan of care  Description: Interventions:  - What would you like us to know as we care for you? From home with wife  - Provide timely, complete, and accurate information to patient/family  - Incorporate patient and family knowledge, values, beliefs, and cultural backgrounds into the planning and delivery of care  - Encourage patient/family to participate in care and decision-making at the level they choose  - Honor patient and family perspectives and choices  Outcome: Progressing     Problem: SKIN/TISSUE INTEGRITY - ADULT  Goal: Incision(s), wounds(s) or drain site(s) healing without S/S of infection  Description: INTERVENTIONS:  - Assess and document risk factors for pressure ulcer development  - Assess and document skin integrity  - Assess and document dressing/incision, wound bed, drain sites and surrounding tissue  - Implement wound care per orders  - Initiate isolation precautions as appropriate  - Initiate Pressure Ulcer prevention bundle as indicated  Outcome: Progressing     Problem: PAIN - ADULT  Goal: Verbalizes/displays adequate comfort level or patient's stated pain goal  Description: INTERVENTIONS:  - Encourage pt to monitor pain and request assistance  - Assess pain using appropriate pain scale  - Administer analgesics based on type and severity of pain and evaluate response  - Implement non-pharmacological measures as appropriate and evaluate response  - Consider cultural and social influences on pain and pain management  - Manage/alleviate anxiety  - Utilize distraction and/or relaxation techniques  - Monitor for opioid side effects  - Notify MD/LIP if interventions unsuccessful or patient reports new pain  - Anticipate increased pain with activity and pre-medicate as appropriate  Outcome: Progressing     Problem: RISK FOR INFECTION - ADULT  Goal: Absence of fever/infection during anticipated  neutropenic period  Description: INTERVENTIONS  - Monitor WBC  - Administer growth factors as ordered  - Implement neutropenic guidelines  Outcome: Progressing     Problem: SAFETY ADULT - FALL  Goal: Free from fall injury  Description: INTERVENTIONS:  - Assess pt frequently for physical needs  - Identify cognitive and physical deficits and behaviors that affect risk of falls.  - [place] fall precautions as indicated by assessment.  - Educate pt/family on patient safety including physical limitations  - Instruct pt to call for assistance with activity based on assessment  - Modify environment to reduce risk of injury  - Provide assistive devices as appropriate  - Consider OT/PT consult to assist with strengthening/mobility  - Encourage toileting schedule  Outcome: Progressing     Problem: DISCHARGE PLANNING  Goal: Discharge to home or other facility with appropriate resources  Description: INTERVENTIONS:  - Identify barriers to discharge w/pt and caregiver  - Include patient/family/discharge partner in discharge planning  - Arrange for needed discharge resources and transportation as appropriate  - Identify discharge learning needs (meds, wound care, etc)  - Arrange for interpreters to assist at discharge as needed  - Consider post-discharge preferences of patient/family/discharge partner  - Complete POLST form as appropriate  - Assess patient's ability to be responsible for managing their own health  - Refer to Case Management Department for coordinating discharge planning if the patient needs post-hospital services based on physician/LIP order or complex needs related to functional status, cognitive ability or social support system  Outcome: Progressing     Problem: NEUROLOGICAL - ADULT  Goal: Absence of seizures  Description: INTERVENTIONS  - Monitor for seizure activity  - Administer anti-seizure medications as ordered  - Monitor neurological status  Outcome: Progressing     Problem: DECISION MAKING  Goal:  Pt/Family able to effectively weigh alternatives and participate in decision making related to treatment and care  Description: INTERVENTIONS:  - Determine when there are differences between patient's view, family's view, and healthcare provider's view of condition  - Facilitate patient and family articulation of goals for care  - Help patient and family identify pros/cons of alternative solutions  - Provide information as requested by patient/family  - Respect patient/family right to receive or not to receive information  - Serve as a liaison between patient and family and health care team  - Initiate Consults from Ethics, Palliative Care or initiate Family Care Conference as is appropriate  Outcome: Progressing     Problem: CARDIOVASCULAR - ADULT  Goal: Maintains optimal cardiac output and hemodynamic stability  Description: INTERVENTIONS:  - Monitor vital signs, rhythm, and trends  - Monitor for bleeding, hypotension and signs of decreased cardiac output  - Evaluate effectiveness of vasoactive medications to optimize hemodynamic stability  - Monitor arterial and/or venous puncture sites for bleeding and/or hematoma  - Assess quality of pulses, skin color and temperature  - Assess for signs of decreased coronary artery perfusion - ex. Angina  - Evaluate fluid balance, assess for edema, trend weights  Outcome: Progressing  Goal: Absence of cardiac arrhythmias or at baseline  Description: INTERVENTIONS:  - Continuous cardiac monitoring, monitor vital signs, obtain 12 lead EKG if indicated  - Evaluate effectiveness of antiarrhythmic and heart rate control medications as ordered  - Initiate emergency measures for life threatening arrhythmias  - Monitor electrolytes and administer replacement therapy as ordered  Outcome: Progressing     Problem: Impaired Swallowing  Goal: Minimize aspiration risk  Description: Interventions:  - Patient should be alert and upright for all feedings (90 degrees preferred)  - Offer food  and liquids at a slow rate  - No straws  - Encourage small bites of food and small sips of liquid  - Offer pills one at a time, crush or deliver with applesauce as needed  - Discontinue feeding and notify MD (or speech pathologist) if coughing or persistent throat clearing or wet/gurgly vocal quality is noted  Outcome: Progressing     Problem: Impaired Communication  Goal: Patient will achieve maximal communication potential  Description: Interventions:  - Encourage use of alternative/augmentative communication to express basic wants and needs (use of communication/letter board/or smartphone/tablet/handwriting)  - Allow additional time for processing after asking questions or providing instructions  Outcome: Progressing   Safety precautions on place. Family at bedside.Wounds cleaned and dressings changed. Patient started on heparin drip. Call light within reach.

## 2024-07-13 NOTE — PROGRESS NOTES
[facility]  [address] [place] [address], [address]  [facility] [address]  [phone]            Yoseph Cordero Patient Status:  Inpatient    1950 MRN [id number]   Location [facility] 5SW/SE Attending Uvaldo Echevarria MD   Hosp Day # 4 PCP Irving Sheets, DO     Subjective:  Yoseph Cordero is a(n) 74 year old male.    Hospital course to date:     Left MCA stroke in his 20s, severe dementia, factor V Leiden mutation, pulmonary embolism on Xarelto, presented with episodes of unresponsiveness,    History of unresponsive episodes, in the setting of fever, treatment for sepsis and infected right heel pressure ulcer.  EEG that was done few months ago showed possible discharges from the left side and therefore he was treated with Keppra.  MRI of the brain was ordered again.  Same dose of Keppra was continued.    Current Facility-Administered Medications   Medication Dose Route Frequency    heparin (Porcine) 76641 units/250mL infusion PE/DVT/THROMBUS CONTINUOUS  200-3,000 Units/hr Intravenous Continuous    ferrous sulfate DR tab 325 mg  325 mg Oral Daily with breakfast    acetaminophen (Tylenol Extra Strength) tab 500 mg  500 mg Oral Q8H    levETIRAcetam (Keppra) 500 mg/5mL injection 500 mg  500 mg Intravenous Q12H    piperacillin-tazobactam (Zosyn) 3.375 g in dextrose 5% 100 mL IVPB-ADDV  3.375 g Intravenous Q8H    acetaminophen (Tylenol) tab 650 mg  650 mg Oral Q6H PRN    [Held by provider] rivaroxaban (Xarelto) tab 20 mg  20 mg Oral Daily with food    sodium hypochlorite (Dakin's) 0.125 % external solution   Topical BID    ibuprofen (Motrin) tab 600 mg  600 mg Oral Q4H PRN    sodium chloride 0.9% infusion   Intravenous Continuous    acetaminophen (Tylenol Extra Strength) tab 500 mg  500 mg Oral Q4H PRN    ondansetron (Zofran) 4 MG/2ML injection 4 mg  4 mg Intravenous Q6H PRN    OLANZapine (Zyprexa) 5 mg in sterile water for injection (PF) IM injection  5 mg Intramuscular  Q12H PRN       Objective:  Blood pressure 113/74, pulse 106, temperature 97.9 °F (36.6 °C), temperature source Axillary, resp. rate 20, height 67\", weight 147 lb 3.2 oz (66.8 kg), SpO2 99%.    Physical Exam:  Vitals:    07/13/24 0304 07/13/24 0504 07/13/24 0550 07/13/24 0903   BP:  124/65  113/74   Pulse: 109 112  106   Resp:  20  20   Temp: 97.7 °F (36.5 °C) 98.5 °F (36.9 °C)  97.9 °F (36.6 °C)   TempSrc: Axillary Axillary  Axillary   SpO2: 98% 98%  99%   Weight:   147 lb 3.2 oz (66.8 kg)    Height:           General: No apparent distress, well nourished, well groomed.  Head- Normocephalic, atraumatic  Eyes- No redness or swelling  Neck- No masses or adenopathy  CV: pulses were palpable and normal, no cyanosis or edema     Neurological:     Mental Status- Alert nonverbal but looks around some pays attention to things happening around him.    Lab Results   Component Value Date    WBC 14.6 (H) 07/13/2024    HGB 8.4 (L) 07/13/2024    HCT 26.8 (L) 07/13/2024    .0 07/13/2024    CREATSERUM 0.57 (L) 07/13/2024    BUN 14 07/13/2024     07/13/2024    K 3.7 07/13/2024     07/13/2024    CO2 25.0 07/13/2024     (H) 07/13/2024    CA 8.8 07/13/2024    ALB 3.3 07/13/2024    ALKPHO 67 07/13/2024    BILT 0.2 07/13/2024    TP 6.2 07/13/2024    AST 37 (H) 07/13/2024    ALT 36 07/13/2024    PTT 34.3 07/12/2024    INR 1.22 (H) 07/12/2024    TSH 4.131 07/12/2024    PSA 16.20 (H) 03/11/2019    DDIMER 11.06 (H) 05/02/2024    ESRML 98 (H) 07/11/2024    CRP <0.29 10/24/2022    MG 1.9 07/13/2024       CT BRAIN OR HEAD (56273)    Result Date: 7/12/2024  CONCLUSION:  1. No acute intracranial hemorrhage or other acute intracranial abnormality. 2. 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 3. Disproportionate mesial temporal volume loss, which is unchanged, and can be seen in the setting of Alzheimer's disease. 4. Intracranial  atherosclerosis. 5. Left maxillary sinus retention cyst or polyp.   elm-remote  Dictated by (CST): Jarvis Gardiner MD on 7/12/2024 at 7:58 AM     Finalized by (CST): Jarvis Gardiner MD on 7/12/2024 at 8:02 AM                 Assessment:  Patient Active Problem List   Diagnosis    Hemiplegia affecting right dominant side (HCC)    Chronic obstructive pulmonary disease (HCC)    Combined forms of age-related cataract of both eyes    Esophageal reflux    Late onset Alzheimer's disease with behavioral disturbance (HCC)    Alcohol abuse    Calcification of aorta (HCC)    Alcoholism (HCC)    History of stroke    Dysphagia    Hyperglycemia    Weakness    Gait disturbance    Abnormal involuntary movement    Acute pulmonary embolism without acute cor pulmonale, unspecified pulmonary embolism type (HCC)    Factor V Leiden (HCC)    Leukocytosis    Tachycardia    Altered mental status, unspecified altered mental status type    Seizures (HCC)    Infected ulcer of skin, unspecified ulcer stage (HCC)    Infected decubitus ulcer     Patient with repeated episodes of staring, history of prior significant encephalomalacia, prior strokes.  Will continue with Keppra.  MRI of the brain is still pending.    Lon Tejeda MD  7/13/2024  9:45 AM

## 2024-07-14 LAB
ANION GAP SERPL CALC-SCNC: 6 MMOL/L (ref 0–18)
APTT PPP: 29.3 SECONDS (ref 23–36)
APTT PPP: 56 SECONDS (ref 23–36)
BUN BLD-MCNC: 18 MG/DL (ref 9–23)
BUN/CREAT SERPL: 29.5 (ref 10–20)
CALCIUM BLD-MCNC: 8.8 MG/DL (ref 8.7–10.4)
CHLORIDE SERPL-SCNC: 107 MMOL/L (ref 98–112)
CO2 SERPL-SCNC: 26 MMOL/L (ref 21–32)
CREAT BLD-MCNC: 0.61 MG/DL
D DIMER PPP FEU-MCNC: 1.87 UG/ML FEU (ref ?–0.73)
DEPRECATED RDW RBC AUTO: 44.7 FL (ref 35.1–46.3)
EGFRCR SERPLBLD CKD-EPI 2021: 101 ML/MIN/1.73M2 (ref 60–?)
ERYTHROCYTE [DISTWIDTH] IN BLOOD BY AUTOMATED COUNT: 14.9 % (ref 11–15)
GLUCOSE BLD-MCNC: 129 MG/DL (ref 70–99)
HCT VFR BLD AUTO: 25.5 %
HGB BLD-MCNC: 8.3 G/DL
MCH RBC QN AUTO: 26.9 PG (ref 26–34)
MCHC RBC AUTO-ENTMCNC: 32.5 G/DL (ref 31–37)
MCV RBC AUTO: 82.8 FL
OSMOLALITY SERPL CALC.SUM OF ELEC: 292 MOSM/KG (ref 275–295)
PLATELET # BLD AUTO: 427 10(3)UL (ref 150–450)
PLATELET # BLD AUTO: 427 10(3)UL (ref 150–450)
POTASSIUM SERPL-SCNC: 3.6 MMOL/L (ref 3.5–5.1)
RBC # BLD AUTO: 3.08 X10(6)UL
SODIUM SERPL-SCNC: 139 MMOL/L (ref 136–145)
WBC # BLD AUTO: 19 X10(3) UL (ref 4–11)

## 2024-07-14 PROCEDURE — 99233 SBSQ HOSP IP/OBS HIGH 50: CPT | Performed by: HOSPITALIST

## 2024-07-14 PROCEDURE — 99232 SBSQ HOSP IP/OBS MODERATE 35: CPT | Performed by: OTHER

## 2024-07-14 RX ORDER — LEVETIRACETAM 100 MG/ML
250 SOLUTION ORAL EVERY MORNING
Status: DISCONTINUED | OUTPATIENT
Start: 2024-07-14 | End: 2024-07-19

## 2024-07-14 RX ORDER — LEVETIRACETAM 100 MG/ML
500 SOLUTION ORAL NIGHTLY
Status: DISCONTINUED | OUTPATIENT
Start: 2024-07-14 | End: 2024-07-19

## 2024-07-14 NOTE — PROGRESS NOTES
[facility]  [address] [place] [address], [address]  [facility] [address]  [phone]            Yoseph Cordero Patient Status:  Inpatient    1950 MRN [id number]   Location [facility] 5SW/SE Attending Uvaldo Echevarria MD   Hosp Day # 5 PCP Irving Sheets, DO     Subjective:  Yoseph Cordero is a(n) 74 year old male.    Hospital course to date:     Left MCA stroke in his 20s, severe dementia, factor V Leiden mutation, pulmonary embolism on Xarelto, presented with episodes of unresponsiveness,    History of unresponsive episodes, in the setting of fever, treatment for sepsis and infected right heel pressure ulcer.  EEG that was done few months ago showed possible discharges from the left side and therefore he was treated with Keppra.  MRI of the brain was ordered again.  MRI of the brain showed the same old strokes, both no acute changes.  Same dose of Keppra was continued.    Current Facility-Administered Medications   Medication Dose Route Frequency    metoprolol tartrate (Lopressor) tab 25 mg  25 mg Oral 2x Daily(Beta Blocker)    heparin (Porcine) 30690 units/250mL infusion PE/DVT/THROMBUS CONTINUOUS  200-3,000 Units/hr Intravenous Continuous    ferrous sulfate DR tab 325 mg  325 mg Oral Daily with breakfast    acetaminophen (Tylenol Extra Strength) tab 500 mg  500 mg Oral Q8H    levETIRAcetam (Keppra) 500 mg/5mL injection 500 mg  500 mg Intravenous Q12H    piperacillin-tazobactam (Zosyn) 3.375 g in dextrose 5% 100 mL IVPB-ADDV  3.375 g Intravenous Q8H    acetaminophen (Tylenol) tab 650 mg  650 mg Oral Q6H PRN    [Held by provider] rivaroxaban (Xarelto) tab 20 mg  20 mg Oral Daily with food    sodium hypochlorite (Dakin's) 0.125 % external solution   Topical BID    ibuprofen (Motrin) tab 600 mg  600 mg Oral Q4H PRN    acetaminophen (Tylenol Extra Strength) tab 500 mg  500 mg Oral Q4H PRN    ondansetron (Zofran) 4 MG/2ML injection 4 mg  4 mg Intravenous Q6H PRN     OLANZapine (Zyprexa) 5 mg in sterile water for injection (PF) IM injection  5 mg Intramuscular Q12H PRN       Objective:  Blood pressure 125/61, pulse (!) 121, temperature 99.2 °F (37.3 °C), temperature source Axillary, resp. rate 18, height 67\", weight 147 lb 3.2 oz (66.8 kg), SpO2 98%.    Physical Exam:  Vitals:    07/13/24 2222 07/14/24 0347 07/14/24 0800 07/14/24 0920   BP: 128/45 118/49 135/61 125/61   Pulse: 119 (!) 121     Resp: 18 18 18    Temp: 99.2 °F (37.3 °C) 100 °F (37.8 °C) 99.2 °F (37.3 °C)    TempSrc: Axillary Axillary Axillary    SpO2: 98% 98% 98% 98%   Weight:       Height:           General: No apparent distress, well nourished, well groomed.  Head- Normocephalic, atraumatic  Eyes- No redness or swelling  Neck- No masses or adenopathy  CV: pulses were palpable and normal, no cyanosis or edema     Neurological:     Mental Status- Alert nonverbal but looks around some pays attention to things happening around him.    Lab Results   Component Value Date    WBC 19.0 (H) 07/14/2024    HGB 8.3 (L) 07/14/2024    HCT 25.5 (L) 07/14/2024    .0 07/14/2024    .0 07/14/2024    CREATSERUM 0.61 (L) 07/14/2024    BUN 18 07/14/2024     07/14/2024    K 3.6 07/14/2024     07/14/2024    CO2 26.0 07/14/2024     (H) 07/14/2024    CA 8.8 07/14/2024    ALB 3.3 07/13/2024    ALKPHO 67 07/13/2024    BILT 0.2 07/13/2024    TP 6.2 07/13/2024    AST 37 (H) 07/13/2024    ALT 36 07/13/2024    PTT 56.0 (H) 07/14/2024    INR 1.22 (H) 07/12/2024    TSH 4.131 07/12/2024    PSA 16.20 (H) 03/11/2019    DDIMER 1.87 (H) 07/14/2024    ESRML 98 (H) 07/11/2024    CRP <0.29 10/24/2022    MG 1.9 07/13/2024       MRI BRAIN WO ACUTE (3) SEQUENCE (CPT=70551)    Result Date: 7/13/2024  CONCLUSION:  1. No acute infarct or hemorrhage. 2. Old large left MCA territory infarct with compensatory dilatation of the left lateral ventricle, and wallerian degeneration. 3. Changes of chronic small vessel disease in cerebral  white matter. 4. Stable ventriculomegaly related to cerebral volume loss.    Dictated by (CST): Apolinar Barry MD on 7/13/2024 at 1:02 PM     Finalized by (CST): Apolinar Barry MD on 7/13/2024 at 1:08 PM             MRI of the brain was independently reviewed as above.    Assessment:  Patient Active Problem List   Diagnosis    Hemiplegia affecting right dominant side (HCC)    Chronic obstructive pulmonary disease (HCC)    Combined forms of age-related cataract of both eyes    Esophageal reflux    Late onset Alzheimer's disease with behavioral disturbance (HCC)    Alcohol abuse    Calcification of aorta (HCC)    Alcoholism (HCC)    History of stroke    Dysphagia    Hyperglycemia    Weakness    Gait disturbance    Abnormal involuntary movement    Acute pulmonary embolism without acute cor pulmonale, unspecified pulmonary embolism type ([facility])    Factor V Leiden ([facility])    Leukocytosis    Tachycardia    Altered mental status, unspecified altered mental status type    Seizures (HCC)    Infected ulcer of skin, unspecified ulcer stage (HCC)    Infected decubitus ulcer    Transient neurological symptoms     Patient with repeated episodes of staring, history of prior significant encephalomalacia, prior strokes.  Will continue with Keppra.  MRI of the brain \did not show any acute changes.  Follow-up in the clinic in 1 month.  Otherwise no other neurological manage at this time, patient potentially could be discharged from neurology standpoint.    Lon Tejeda MD

## 2024-07-14 NOTE — PROGRESS NOTES
Progress Note     Yoseph Cordero Patient Status:  Inpatient    1950 MRN [id number]   Location [facility] 5SW/SE Attending Uvaldo Echevarria MD   Hosp Day # 5 PCP Irving Sheets,        Subjective:   S: Patient seen at bedside and son alongside.      Planned for debridement of right heel at bedside today      Review of Systems:   10 point ROS completed and was negative, except for pertinent positive and negatives stated in subjective.    Objective:   Vital signs:  Temp:  [98 °F (36.7 °C)-100 °F (37.8 °C)] 99.2 °F (37.3 °C)  Pulse:  [108-121] 121  Resp:  [18] 18  BP: (113-135)/(45-61) 125/61  SpO2:  [98 %] 98 %    Wt Readings from Last 3 Encounters:   24 147 lb 3.2 oz (66.8 kg)   24 135 lb 11.2 oz (61.6 kg)   24 136 lb (61.7 kg)       Intake/Output:    Intake/Output Summary (Last 24 hours) at 2024 1317  Last data filed at 2024 0400  Gross per 24 hour   Intake 500 ml   Output 3900 ml   Net -3400 ml         Physical Exam:    General: No acute distress. Alert ,         Respiratory: Clear to auscultation bilaterally. No wheezes. No rhonchi.    Cardiovascular: S1, S2. Regular rate and rhythm. No murmurs, rubs or gallops.   Abdomen: Soft, nontender, nondistended.  Positive bowel sounds. No rebound or guarding.  Neurologic: No focal neurological deficits.   Musculoskeletal: Moves all extremities.  Extremities: No edema.  Right LE  Pt contracted in bed    Wound right heel - necrotic plug very loose, fibrous tissue surrounding  Palpable calcaneal bone.  Achilles tendon visible and fat pad , mild maceration  No odor.  Good red bleeding tissue    Left LE palpable pulses  Non palpable pulses right  Sensation - minimal sensation with debridement      Results:   Diagnostic Data:      Labs:    Selected labs - last 24 hours:  Endo  Lytes  Renal   Glu 129  Na 139 Ca 8.8  BUN 18   POC Gluc  -  K 3.6 PO4 -  Cr 0.61   A1c -  Cl 107 Mg -  eGFR 101   TSH -  CO2 26.0         LFT  CBC  Other    AST -  WBC 19.0  PTT 56.0 Procal -   ALT -  Hb 8.3  INR - CRP -   APk -  Hct 25.5  Trop - D dim 1.87   T david -  .0; 427.0  pBNP -  BNP -  - Ferritin  -   Prot -    CK  - Lactate  -   Alb -    LDL  - COVID  -     Imaging: Imaging data reviewed in Epic.      Medications:    metoprolol tartrate  25 mg Oral 2x Daily(Beta Blocker)    levETIRAcetam  500 mg Oral Nightly    levETIRAcetam  250 mg Oral QAM    ferrous sulfate  325 mg Oral Daily with breakfast    acetaminophen  500 mg Oral Q8H    piperacillin-tazobactam  3.375 g Intravenous Q8H    [Held by provider] rivaroxaban  20 mg Oral Daily with food    sodium hypochlorite   Topical BID       Assessment & Plan:   ASSESSMENT / PLAN:     #decubitus ulcer right heel  Ulcer to bone and OM of calcaneus    Plan of care:  Blood flow studies done today   Bedside debride today.  No anesthesia needed.      After sterile prep of right heel, wound addressed.  Dimensions approximately 2x2 cm in diameter  Probe to calcaneus- bone  Necrotic rim excised.  Fibrous tissue excised.  Fat and achilles tendon visible. Mild maceration about wound.  Sharp debridement with 15 blade to level of bone    Deep cultures taken  Saline wet to dry dressing    Plan of care discussed with RN and son at bedside  Discussed low likelihood of this heel wound healing  Soft tissue coverage is poor, tendon is exposed   Even if calcaneal bone removed, poor coverage of wound  He has dementia and has started with contractures, sacral wounds as well.  I think he would be better served with a knee level amputation.  It is not as likely that he will get back walking.  Son wants to exhaust all options to save the leg.  Dr Blake will be following         Ashley Dao, [facility]  [facility]  [address], [address] [address]   [address] [place] [address]   [address] [place] [address]  [email or web address]  [email or web address]  t:  [phone]   f: [phone]            Supplementary Documentation:

## 2024-07-14 NOTE — PROGRESS NOTES
[facility]  part of [facility]    Progress Note    Yoseph Cordero Patient Status:  Inpatient    1950 MRN [id number]   Location [facility] 5SW/SE Attending Uvaldo Echevarria MD   Hosp Day # 5 PCP Irving Sheets DO     Chief Complaint:   Chief Complaint   Patient presents with    Fever   Infected R heel pressure ulcer    Subjective:   Yoseph Cordero is resting. Son in law at bedside. Tmax 100.0F overnight but pt was sweaty and hot per RN. Pt wakes and smiles. Nonverbal.     Objective:   Objective:    Blood pressure 135/61, pulse (!) 121, temperature 99.2 °F (37.3 °C), temperature source Axillary, resp. rate 18, height 5' 7\" (1.702 m), weight 147 lb 3.2 oz (66.8 kg), SpO2 98%.    Physical Exam:    General:  Nonverbal. R hemiparesis chronic   Respiratory: Clear to auscultation bilaterally. No wheezes. No rhonchi.  Cardiovascular: S1, S2. Regular rate and rhythm. No murmurs, rubs or gallops.   Abdomen: Soft, nontender, nondistended.  Positive bowel sounds. No rebound or guarding.  Neurologic: R hemiparesis. Rigid LUE  Extremities: R foot wrapped + R foot edema.       Results:   Results:    Labs:  Recent Labs   Lab 24  0822 2424 24  0539 24  0902 24  0513   WBC  --  12.7* 12.1* 15.1* 14.6* 19.0*   HGB  --  8.0* 7.9* 8.6* 8.4* 8.3*   MCV  --  84.9 84.8 84.0 83.8 82.8   PLT  --  417.0 380.0 451.0* 445.0 427.0  427.0   INR 1.36*  --  1.22*  --   --   --        Recent Labs   Lab 24  1907 07/10/24  0614 24  0625 24  0539 24  0902 24  0513   *   < > 131* 115* 142* 129*   BUN 13   < > 15 16 14 18   CREATSERUM 0.88   < > 0.52* 0.57* 0.57* 0.61*   CA 9.2   < > 8.6* 8.6* 8.8 8.8   ALB 4.1  --  3.1* 3.3  --   --       < > 143 142 142 139   K 3.6   < > 3.2* 4.0  4.0 3.7 3.6      < > 109 110 111 107   CO2 26.0   < > 28.0 26.0 25.0 26.0   ALKPHO 71  --  59 67  --   --    AST 26  --  21 37*  --    --    ALT 28  --  23 36  --   --    BILT 0.4  --  0.2 0.2  --   --    TP 7.7  --  5.9 6.2  --   --     < > = values in this interval not displayed.       Estimated Creatinine Clearance: 100.8 mL/min (A) (based on SCr of 0.61 mg/dL (L)).    Recent Labs   Lab 07/11/24  0822 07/12/24  0924   PTP 17.6* 16.2*   INR 1.36* 1.22*            Culture:  Hospital Encounter on 07/09/24   1. Blood Culture     Status: None (Preliminary result)    Collection Time: 07/11/24 11:18 PM    Specimen: Blood,peripheral   Result Value Ref Range    Blood Culture Result No Growth 2 Days N/A   2. Aerobic Bacterial Culture     Status: Abnormal    Collection Time: 07/09/24  7:53 PM    Specimen: Sacrum; Other   Result Value Ref Range    Aerobic Culture Result 2+ growth Gram Negative Rashid (A) N/A    Aerobic Culture Result 1+ growth Enterococcus faecalis NOT VRE (A) N/A    Aerobic Smear No WBCs seen N/A    Aerobic Smear 1+ Gram Negative Rods N/A       Cardiac  No results for input(s): \"TROP\", \"PBNP\" in the last 168 hours.      Imaging: Imaging data reviewed in [facility].  MRI BRAIN WO ACUTE (3) SEQUENCE (CPT=70551)    Result Date: 7/13/2024  CONCLUSION:  1. No acute infarct or hemorrhage. 2. Old large left MCA territory infarct with compensatory dilatation of the left lateral ventricle, and wallerian degeneration. 3. Changes of chronic small vessel disease in cerebral white matter. 4. Stable ventriculomegaly related to cerebral volume loss.    Dictated by (CST): Apolinar Barry MD on 7/13/2024 at 1:02 PM     Finalized by (CST): Apolinar Barry MD on 7/13/2024 at 1:08 PM           Medications:    metoprolol tartrate  25 mg Oral 2x Daily(Beta Blocker)    ferrous sulfate  325 mg Oral Daily with breakfast    acetaminophen  500 mg Oral Q8H    levETIRAcetam  500 mg Intravenous Q12H    piperacillin-tazobactam  3.375 g Intravenous Q8H    [Held by provider] rivaroxaban  20 mg Oral Daily with food    sodium hypochlorite   Topical BID         Assessment and Plan:    Assessment & Plan:      Acute proteus bacteremia/ severe sepsis   Sacral decubitus ulcer POA  L hip ulcer. - POA  IV zosyn   ID, general surgery on consult.   Wound care   PT/OT - [facility]  Blood cx 2/2 proteus mirabilis. Rpt blood cx neg x 4   Wound culture proteus mirabilis + enterococcus feacalis   Lactic acid nl now. Fevers downtrending  S/p bedside sharp excisional debridement of infected L hip decubitus ulcer and sacral decubitus ulcer 7/11   Episode of unresponsiveness - resolved.   H/o CVA with chronic R hemiparesis   H/o seizure d/o   Dementia - essentially nonverbal   Hold xarelto for surgery. On heparin gtt.    PT/OT - [facility].   Keppra 500mg BID.   CT head no acute abnormality old large L MCA CVA. Volume loss   EEG no seizure activity.   Episode of unresponsiveness likely from sepsis   Sinus tachycardia  Likely secondary to infection/low grade temps. Tachycardia coincides with fevers  Lactic acid normal.   Recent ECHO from May 24' LVEF 60-65%  Will start heparin gtt until surgery for foot  Prev admit -110. Was seen by cards  Metoprolol 25mg BID    H/o VTE  Factor V leiden   Xarelto on hold as above.   Recent PE May 24' heparin gtt   Infected R heel stage IV pressure ulcer POA with OM  Podiatry and vascular surgery on consult. Will need debridement of necrotic bone R heel vs BKA. Family not ready for BKA  Plans for bedside debridement today   MRI reviewed Stage IV heel ulcer with osteomyelitis of dorsal calcaneus. Possible intra tendinous abscess.   IV zosyn  Arterial US pending.   Acute anemia   No overt bleeding seen.   Combination of ACD and iron def.   PO ferrous sulfate BID.   FOB pending   Baseline Hb 12. Came in at 10.7   Other medical problems  HTN  HOLLI    Dispo: palliative care consult for ongoing GOC tomorrow.       >55min spent, >50% spent counseling and coordinating care in the form of educating pt/family and d/w consultants and staff. Most of the time spent discussing the above plan.         Plan of care discussed with patient or family at bedside.   Uvaldo Echevarria MD  Hospitalist          Supplementary Documentation:     Quality:  DVT Prophylaxis: heparin gtt.    CODE status: Full  Dispo: per clinical course           Estimated date of discharge: TBD  Discharge is dependent on: clinical stability  At this point Mr. Cordero is expected to be discharge to: home

## 2024-07-14 NOTE — PLAN OF CARE
Problem: Patient Centered Care  Goal: Patient preferences are identified and integrated in the patient's plan of care  Description: Interventions:  - What would you like us to know as we care for you? From home with wife  - Provide timely, complete, and accurate information to patient/family  - Incorporate patient and family knowledge, values, beliefs, and cultural backgrounds into the planning and delivery of care  - Encourage patient/family to participate in care and decision-making at the level they choose  - Honor patient and family perspectives and choices  Outcome: Progressing     Problem: SKIN/TISSUE INTEGRITY - ADULT  Goal: Incision(s), wounds(s) or drain site(s) healing without S/S of infection  Description: INTERVENTIONS:  - Assess and document risk factors for pressure ulcer development  - Assess and document skin integrity  - Assess and document dressing/incision, wound bed, drain sites and surrounding tissue  - Implement wound care per orders  - Initiate isolation precautions as appropriate  - Initiate Pressure Ulcer prevention bundle as indicated  Outcome: Progressing     Problem: PAIN - ADULT  Goal: Verbalizes/displays adequate comfort level or patient's stated pain goal  Description: INTERVENTIONS:  - Encourage pt to monitor pain and request assistance  - Assess pain using appropriate pain scale  - Administer analgesics based on type and severity of pain and evaluate response  - Implement non-pharmacological measures as appropriate and evaluate response  - Consider cultural and social influences on pain and pain management  - Manage/alleviate anxiety  - Utilize distraction and/or relaxation techniques  - Monitor for opioid side effects  - Notify MD/LIP if interventions unsuccessful or patient reports new pain  - Anticipate increased pain with activity and pre-medicate as appropriate  Outcome: Progressing     Problem: RISK FOR INFECTION - ADULT  Goal: Absence of fever/infection during anticipated  neutropenic period  Description: INTERVENTIONS  - Monitor WBC  - Administer growth factors as ordered  - Implement neutropenic guidelines  Outcome: Progressing     Problem: SAFETY ADULT - FALL  Goal: Free from fall injury  Description: INTERVENTIONS:  - Assess pt frequently for physical needs  - Identify cognitive and physical deficits and behaviors that affect risk of falls.  - Odell fall precautions as indicated by assessment.  - Educate pt/family on patient safety including physical limitations  - Instruct pt to call for assistance with activity based on assessment  - Modify environment to reduce risk of injury  - Provide assistive devices as appropriate  - Consider OT/PT consult to assist with strengthening/mobility  - Encourage toileting schedule  Outcome: Progressing     Problem: DISCHARGE PLANNING  Goal: Discharge to home or other facility with appropriate resources  Description: INTERVENTIONS:  - Identify barriers to discharge w/pt and caregiver  - Include patient/family/discharge partner in discharge planning  - Arrange for needed discharge resources and transportation as appropriate  - Identify discharge learning needs (meds, wound care, etc)  - Arrange for interpreters to assist at discharge as needed  - Consider post-discharge preferences of patient/family/discharge partner  - Complete POLST form as appropriate  - Assess patient's ability to be responsible for managing their own health  - Refer to Case Management Department for coordinating discharge planning if the patient needs post-hospital services based on physician/LIP order or complex needs related to functional status, cognitive ability or social support system  Outcome: Progressing     Problem: NEUROLOGICAL - ADULT  Goal: Absence of seizures  Description: INTERVENTIONS  - Monitor for seizure activity  - Administer anti-seizure medications as ordered  - Monitor neurological status  Outcome: Progressing     Problem: DECISION MAKING  Goal:  Pt/Family able to effectively weigh alternatives and participate in decision making related to treatment and care  Description: INTERVENTIONS:  - Determine when there are differences between patient's view, family's view, and healthcare provider's view of condition  - Facilitate patient and family articulation of goals for care  - Help patient and family identify pros/cons of alternative solutions  - Provide information as requested by patient/family  - Respect patient/family right to receive or not to receive information  - Serve as a liaison between patient and family and health care team  - Initiate Consults from Ethics, Palliative Care or initiate Family Care Conference as is appropriate  Outcome: Progressing     Problem: CARDIOVASCULAR - ADULT  Goal: Maintains optimal cardiac output and hemodynamic stability  Description: INTERVENTIONS:  - Monitor vital signs, rhythm, and trends  - Monitor for bleeding, hypotension and signs of decreased cardiac output  - Evaluate effectiveness of vasoactive medications to optimize hemodynamic stability  - Monitor arterial and/or venous puncture sites for bleeding and/or hematoma  - Assess quality of pulses, skin color and temperature  - Assess for signs of decreased coronary artery perfusion - ex. Angina  - Evaluate fluid balance, assess for edema, trend weights  Outcome: Progressing  Goal: Absence of cardiac arrhythmias or at baseline  Description: INTERVENTIONS:  - Continuous cardiac monitoring, monitor vital signs, obtain 12 lead EKG if indicated  - Evaluate effectiveness of antiarrhythmic and heart rate control medications as ordered  - Initiate emergency measures for life threatening arrhythmias  - Monitor electrolytes and administer replacement therapy as ordered  Outcome: Progressing     Problem: Impaired Swallowing  Goal: Minimize aspiration risk  Description: Interventions:  - Patient should be alert and upright for all feedings (90 degrees preferred)  - Offer food  and liquids at a slow rate  - No straws  - Encourage small bites of food and small sips of liquid  - Offer pills one at a time, crush or deliver with applesauce as needed  - Discontinue feeding and notify MD (or speech pathologist) if coughing or persistent throat clearing or wet/gurgly vocal quality is noted  Outcome: Progressing     Problem: Impaired Communication  Goal: Patient will achieve maximal communication potential  Description: Interventions:    Outcome: Progressing   Safety precautions on place. Heparin drip stopped per Mentzelopoulos order. Debridement planning for today. Family at bedside. Call light within reach.

## 2024-07-14 NOTE — PLAN OF CARE
Patient VSS, no acute changes during shift. Updated patient and family on plan of care. Frequent rounding, no needs at this time. Call light always in reach and safety precautions in place.     Problem: Patient Centered Care  Goal: Patient preferences are identified and integrated in the patient's plan of care  Description: Interventions:  - What would you like us to know as we care for you? From home with wife  - Provide timely, complete, and accurate information to patient/family  - Incorporate patient and family knowledge, values, beliefs, and cultural backgrounds into the planning and delivery of care  - Encourage patient/family to participate in care and decision-making at the level they choose  - Honor patient and family perspectives and choices  Outcome: Progressing     Problem: SKIN/TISSUE INTEGRITY - ADULT  Goal: Incision(s), wounds(s) or drain site(s) healing without S/S of infection  Description: INTERVENTIONS:  - Assess and document risk factors for pressure ulcer development  - Assess and document skin integrity  - Assess and document dressing/incision, wound bed, drain sites and surrounding tissue  - Implement wound care per orders  - Initiate isolation precautions as appropriate  - Initiate Pressure Ulcer prevention bundle as indicated  Outcome: Progressing     Problem: PAIN - ADULT  Goal: Verbalizes/displays adequate comfort level or patient's stated pain goal  Description: INTERVENTIONS:  - Encourage pt to monitor pain and request assistance  - Assess pain using appropriate pain scale  - Administer analgesics based on type and severity of pain and evaluate response  - Implement non-pharmacological measures as appropriate and evaluate response  - Consider cultural and social influences on pain and pain management  - Manage/alleviate anxiety  - Utilize distraction and/or relaxation techniques  - Monitor for opioid side effects  - Notify MD/LIP if interventions unsuccessful or patient reports new  pain  - Anticipate increased pain with activity and pre-medicate as appropriate  Outcome: Progressing     Problem: RISK FOR INFECTION - ADULT  Goal: Absence of fever/infection during anticipated neutropenic period  Description: INTERVENTIONS  - Monitor WBC  - Administer growth factors as ordered  - Implement neutropenic guidelines  Outcome: Progressing     Problem: SAFETY ADULT - FALL  Goal: Free from fall injury  Description: INTERVENTIONS:  - Assess pt frequently for physical needs  - Identify cognitive and physical deficits and behaviors that affect risk of falls.  - [place] fall precautions as indicated by assessment.  - Educate pt/family on patient safety including physical limitations  - Instruct pt to call for assistance with activity based on assessment  - Modify environment to reduce risk of injury  - Provide assistive devices as appropriate  - Consider OT/PT consult to assist with strengthening/mobility  - Encourage toileting schedule  Outcome: Progressing     Problem: DISCHARGE PLANNING  Goal: Discharge to home or other facility with appropriate resources  Description: INTERVENTIONS:  - Identify barriers to discharge w/pt and caregiver  - Include patient/family/discharge partner in discharge planning  - Arrange for needed discharge resources and transportation as appropriate  - Identify discharge learning needs (meds, wound care, etc)  - Arrange for interpreters to assist at discharge as needed  - Consider post-discharge preferences of patient/family/discharge partner  - Complete POLST form as appropriate  - Assess patient's ability to be responsible for managing their own health  - Refer to Case Management Department for coordinating discharge planning if the patient needs post-hospital services based on physician/LIP order or complex needs related to functional status, cognitive ability or social support system  Outcome: Progressing     Problem: NEUROLOGICAL - ADULT  Goal: Absence of  seizures  Description: INTERVENTIONS  - Monitor for seizure activity  - Administer anti-seizure medications as ordered  - Monitor neurological status  Outcome: Progressing     Problem: DECISION MAKING  Goal: Pt/Family able to effectively weigh alternatives and participate in decision making related to treatment and care  Description: INTERVENTIONS:  - Determine when there are differences between patient's view, family's view, and healthcare provider's view of condition  - Facilitate patient and family articulation of goals for care  - Help patient and family identify pros/cons of alternative solutions  - Provide information as requested by patient/family  - Respect patient/family right to receive or not to receive information  - Serve as a liaison between patient and family and health care team  - Initiate Consults from Ethics, Palliative Care or initiate Family Care Conference as is appropriate  Outcome: Progressing     Problem: CARDIOVASCULAR - ADULT  Goal: Maintains optimal cardiac output and hemodynamic stability  Description: INTERVENTIONS:  - Monitor vital signs, rhythm, and trends  - Monitor for bleeding, hypotension and signs of decreased cardiac output  - Evaluate effectiveness of vasoactive medications to optimize hemodynamic stability  - Monitor arterial and/or venous puncture sites for bleeding and/or hematoma  - Assess quality of pulses, skin color and temperature  - Assess for signs of decreased coronary artery perfusion - ex. Angina  - Evaluate fluid balance, assess for edema, trend weights  Outcome: Progressing  Goal: Absence of cardiac arrhythmias or at baseline  Description: INTERVENTIONS:  - Continuous cardiac monitoring, monitor vital signs, obtain 12 lead EKG if indicated  - Evaluate effectiveness of antiarrhythmic and heart rate control medications as ordered  - Initiate emergency measures for life threatening arrhythmias  - Monitor electrolytes and administer replacement therapy as  ordered  Outcome: Progressing     Problem: Impaired Swallowing  Goal: Minimize aspiration risk  Description: Interventions:  - Patient should be alert and upright for all feedings (90 degrees preferred)  - Offer food and liquids at a slow rate  - No straws  - Encourage small bites of food and small sips of liquid  - Offer pills one at a time, crush or deliver with applesauce as needed  - Discontinue feeding and notify MD (or speech pathologist) if coughing or persistent throat clearing or wet/gurgly vocal quality is noted  Outcome: Progressing     Problem: Impaired Communication  Goal: Patient will achieve maximal communication potential  Description: Interventions:  - Ask \"yes/no\" questions to facilitate patient's ability to communicate wants and needs  Outcome: Progressing

## 2024-07-15 PROBLEM — Z51.5 PALLIATIVE CARE BY SPECIALIST: Status: ACTIVE | Noted: 2024-07-15

## 2024-07-15 LAB
ALBUMIN SERPL-MCNC: 3.6 G/DL (ref 3.2–4.8)
ALBUMIN/GLOB SERPL: 1.1 {RATIO} (ref 1–2)
ALP LIVER SERPL-CCNC: 81 U/L
ALT SERPL-CCNC: 49 U/L
ANION GAP SERPL CALC-SCNC: 8 MMOL/L (ref 0–18)
APTT PPP: 52.8 SECONDS (ref 23–36)
APTT PPP: 63.7 SECONDS (ref 23–36)
APTT PPP: 82 SECONDS (ref 23–36)
AST SERPL-CCNC: 38 U/L (ref ?–34)
BASOPHILS # BLD AUTO: 0.09 X10(3) UL (ref 0–0.2)
BASOPHILS NFR BLD AUTO: 0.5 %
BILIRUB SERPL-MCNC: 0.3 MG/DL (ref 0.2–1.1)
BUN BLD-MCNC: 18 MG/DL (ref 9–23)
BUN/CREAT SERPL: 29.5 (ref 10–20)
CALCIUM BLD-MCNC: 9.6 MG/DL (ref 8.7–10.4)
CHLORIDE SERPL-SCNC: 107 MMOL/L (ref 98–112)
CO2 SERPL-SCNC: 27 MMOL/L (ref 21–32)
CREAT BLD-MCNC: 0.61 MG/DL
DEPRECATED RDW RBC AUTO: 47.9 FL (ref 35.1–46.3)
EGFRCR SERPLBLD CKD-EPI 2021: 101 ML/MIN/1.73M2 (ref 60–?)
EOSINOPHIL # BLD AUTO: 0.41 X10(3) UL (ref 0–0.7)
EOSINOPHIL NFR BLD AUTO: 2.3 %
ERYTHROCYTE [DISTWIDTH] IN BLOOD BY AUTOMATED COUNT: 15.3 % (ref 11–15)
GLOBULIN PLAS-MCNC: 3.4 G/DL (ref 2–3.5)
GLUCOSE BLD-MCNC: 99 MG/DL (ref 70–99)
HCT VFR BLD AUTO: 29.9 %
HGB BLD-MCNC: 9.2 G/DL
IMM GRANULOCYTES # BLD AUTO: 0.32 X10(3) UL (ref 0–1)
IMM GRANULOCYTES NFR BLD: 1.8 %
LYMPHOCYTES # BLD AUTO: 2.83 X10(3) UL (ref 1–4)
LYMPHOCYTES NFR BLD AUTO: 16.1 %
MCH RBC QN AUTO: 26.5 PG (ref 26–34)
MCHC RBC AUTO-ENTMCNC: 30.8 G/DL (ref 31–37)
MCV RBC AUTO: 86.2 FL
MONOCYTES # BLD AUTO: 1.09 X10(3) UL (ref 0.1–1)
MONOCYTES NFR BLD AUTO: 6.2 %
NEUTROPHILS # BLD AUTO: 12.89 X10 (3) UL (ref 1.5–7.7)
NEUTROPHILS # BLD AUTO: 12.89 X10(3) UL (ref 1.5–7.7)
NEUTROPHILS NFR BLD AUTO: 73.1 %
OSMOLALITY SERPL CALC.SUM OF ELEC: 296 MOSM/KG (ref 275–295)
PLATELET # BLD AUTO: 433 10(3)UL (ref 150–450)
POTASSIUM SERPL-SCNC: 3.9 MMOL/L (ref 3.5–5.1)
PROT SERPL-MCNC: 7 G/DL (ref 5.7–8.2)
RBC # BLD AUTO: 3.47 X10(6)UL
SODIUM SERPL-SCNC: 142 MMOL/L (ref 136–145)
WBC # BLD AUTO: 17.6 X10(3) UL (ref 4–11)

## 2024-07-15 PROCEDURE — 99222 1ST HOSP IP/OBS MODERATE 55: CPT | Performed by: INTERNAL MEDICINE

## 2024-07-15 PROCEDURE — 99233 SBSQ HOSP IP/OBS HIGH 50: CPT | Performed by: HOSPITALIST

## 2024-07-15 RX ORDER — HYDROCODONE BITARTRATE AND ACETAMINOPHEN 5; 325 MG/1; MG/1
1 TABLET ORAL EVERY 6 HOURS PRN
Status: DISCONTINUED | OUTPATIENT
Start: 2024-07-15 | End: 2024-07-19

## 2024-07-15 RX ORDER — HEPARIN SODIUM 1000 [USP'U]/ML
30 INJECTION, SOLUTION INTRAVENOUS; SUBCUTANEOUS ONCE
Status: COMPLETED | OUTPATIENT
Start: 2024-07-15 | End: 2024-07-15

## 2024-07-15 NOTE — PHYSICAL THERAPY NOTE
PHYSICAL THERAPY TREATMENT NOTE - INPATIENT     Room Number: 560/560-A       Presenting Problem: Infected right heel pressure ulcer with acute sepsis.  Co-Morbidities : chronic right hemiparesis from prior cerebrovascular accident and chronic right heel, left ischial, and sacral decubitus ulcer, dementia. Right medial malleolus fracture, non-displaced 5/2024    Problem List  Principal Problem:    Infected ulcer of skin, unspecified ulcer stage (HCC)  Active Problems:    Infected decubitus ulcer    Transient neurological symptoms    Palliative care by specialist      PHYSICAL THERAPY ASSESSMENT   Patient demonstrates limited progress this session, goals  remain in progress.      Patient is requiring dependent as a result of the following impairments: decreased functional strength, decreased endurance/aerobic capacity, impaired coordination, decreased muscular endurance, and medical status.     Patient continues to function near baseline with bed mobility, transfers, and gait.  Contributing factors to remaining limitations include decreased functional strength, decreased muscular endurance, and cognitive deficits (non-verbal, oriented to self only).  Next session anticipate patient to progress bed mobility and transfers.  Physical Therapy will continue to follow patient for duration of hospitalization.    Patient continues to benefit from continued skilled PT services: at discharge to promote prior level of function and safety with additional support and return home with home health PT.    PLAN  PT Treatment Plan: Bed mobility;Endurance;Energy conservation;Patient education;Family education;Strengthening;Range of motion;Transfer training;Balance training  Frequency (Obs): 3-5x/week    SUBJECTIVE  Pt in bed at start, smiling at therapist. Son in law agreeable to patient participating in therapy this morning.     OBJECTIVE  Precautions: Bed/chair alarm (non-verbal)    WEIGHT BEARING RESTRICTION                PAIN  ASSESSMENT   Rating: Unable to rate          BALANCE  Static Sitting: Poor +  Dynamic Sitting: Poor -  Static Standing: Not tested  Dynamic Standing: Not tested      AM-PAC '6-Clicks' INPATIENT SHORT FORM - BASIC MOBILITY  How much difficulty does the patient currently have...  Patient Difficulty: Turning over in bed (including adjusting bedclothes, sheets and blankets)?: A Lot   Patient Difficulty: Sitting down on and standing up from a chair with arms (e.g., wheelchair, bedside commode, etc.): Unable   Patient Difficulty: Moving from lying on back to sitting on the side of the bed?: A Lot   How much help from another person does the patient currently need...   Help from Another: Moving to and from a bed to a chair (including a wheelchair)?: Total   Help from Another: Need to walk in hospital room?: Total   Help from Another: Climbing 3-5 steps with a railing?: Total     AM-PAC Score:  Raw Score: 8   Approx Degree of Impairment: 86.62%   Standardized Score (AM-PAC Scale): 28.58   CMS Modifier (G-Code): CM    FUNCTIONAL ABILITY STATUS  Functional Mobility/Gait Assessment  Gait Assistance: Not tested  Rolling: dependent  Supine to Sit: dependent  Sit to Supine: dependent  Sit to Stand:  not tested    Skilled Therapy Provided: Pt received in bed at start of session, smiling and cooperative. KIMBERLY present throughout session. Talked with dtrTyra, on phone during session. Discussed discharge planning. Based on family goals, pts mentation and medical status, recommend pt return home with HHPT services and continued 24 hr support from family. Would also recommend a akua lift and hospital bed. Pts KIMBERLY and grandson have been dependently lifting patient to chair and around home since May. Anticipate pt would be able to progress mobility more at home where he is comfortable due to dementia. During session today pt able to completed supine to sit EOB with max A x 2. Tolerated sitting EOB x 10 min with min-mod A. Assisted with  ROM of B Ues and trunk motion as well. Pt with significant rigidity and stiffness in joints. Encouraged family to perform PROM activities daily. Son in law agreeable. Pt returned to supine at end of session, needs in reach and RN present in room at end. Case mgmt contacted regarding updated discharge recommendations as well.     The patient's Approx Degree of Impairment: 86.62% has been calculated based on documentation in the [facility] '6 clicks' Inpatient Daily Activity Short Form.  Research supports that patients with this level of impairment may benefit from LTAC, however recommend pt return home with HHPT and 24 hr care from family.    Final disposition will be made by interdisciplinary medical team.      Patient End of Session: In bed;Needs met;With  staff;Call light within reach;RN aware of session/findings;All patient questions and concerns addressed;Family present    CURRENT GOALS   Goals to be met by: 7/24/24  Patient Goal Patient's self-stated goal is: return to PLOF   Goal #1 Patient is able to demonstrate supine - sit EOB @ level: moderate assistance      Goal #1   Current Status  Progressing: Max A x 2   Goal #2 Patient is able to demonstrate transfers EOB to/from Chair/Wheelchair at assistance level: moderate assistance with stand pivot transfer technique      Goal #2  Current Status  NT   Goal #3 Patient is able to tolerate 1 minutes of static standing with UE support on RW requiring Mod A x 1 to improve tolerance/participation during functional tasks.    Goal #3   Current Status  NT   Goal #4 Patient to demonstrate independence with home activity/exercise instructions provided to patient in preparation for discharge.   Goal #4   Current Status  Progressing     Therapeutic Activity: 30 minutes

## 2024-07-15 NOTE — CM/SW NOTE
Pt discussed in RN DC Rounds. ALMA ROSA was informed that patient's family likely wishes to take patient home. PT is recommending requirement- akua and hospital bed. Alma Rosa followed up with patient's Son in law in the room, and he deferred ALMA ROSA to call his wife Tyra. ALMA ROSA called Tyra, left voicemail. Md was sent message via epic in regards to verbiage below. Alma Rosa will send aidin referral to [facility].     Yoseph has a history of Infected ulcer of skin, unspecified ulcer stage (HCC), Infected decubitus ulcer  Chronic obstructive pulmonary disease (HCC), Dysphagia  which would require Yoseph to elevate the head of the bed to alleviate pain, promote good body alignment, prevent contractures, and prevent respiratory infections.  Patient is bed bound and is not able to reposition himself and needs the bed to alleviate pain in his  upper and lower extremities  Yoseph also requires frequent and immediate changes in body position in ways that's not feasible in ordinary bed  It is in my opinion that a semi-electric hospital bed is reasonable and necessary.      Yoseph would be  bed confined without the use of a lift for transfers between bed, a chair, wheelchair        ALMA ROSA/DERICK to remain available for support and/or discharge planning.     Samantha Olivares, MSW, LSW   x [phone]

## 2024-07-15 NOTE — PLAN OF CARE
Patient alert and non verbal. Does smile when being spoken to. No signs of discomfort noted. Continues with heparin drip, titrated per protocol. Continues with Zosyn. Tolerating diet, 1:1 feed. Voiding via primofit. Incontinent BM today. Dressings to left hip and sacrum changed. Family at bedside throughout the day. Bed locked and in lowest position; bed alarm active; frequent rounds made.     Problem: Patient Centered Care  Goal: Patient preferences are identified and integrated in the patient's plan of care  Description: Interventions:  - What would you like us to know as we care for you? From home with wife  - Provide timely, complete, and accurate information to patient/family  - Incorporate patient and family knowledge, values, beliefs, and cultural backgrounds into the planning and delivery of care  - Encourage patient/family to participate in care and decision-making at the level they choose  - Honor patient and family perspectives and choices  Outcome: Progressing     Problem: SKIN/TISSUE INTEGRITY - ADULT  Goal: Incision(s), wounds(s) or drain site(s) healing without S/S of infection  Description: INTERVENTIONS:  - Assess and document risk factors for pressure ulcer development  - Assess and document skin integrity  - Assess and document dressing/incision, wound bed, drain sites and surrounding tissue  - Implement wound care per orders  - Initiate isolation precautions as appropriate  - Initiate Pressure Ulcer prevention bundle as indicated  Outcome: Progressing     Problem: PAIN - ADULT  Goal: Verbalizes/displays adequate comfort level or patient's stated pain goal  Description: INTERVENTIONS:  - Encourage pt to monitor pain and request assistance  - Assess pain using appropriate pain scale  - Administer analgesics based on type and severity of pain and evaluate response  - Implement non-pharmacological measures as appropriate and evaluate response  - Consider cultural and social influences on pain and  pain management  - Manage/alleviate anxiety  - Utilize distraction and/or relaxation techniques  - Monitor for opioid side effects  - Notify MD/LIP if interventions unsuccessful or patient reports new pain  - Anticipate increased pain with activity and pre-medicate as appropriate  Outcome: Progressing     Problem: RISK FOR INFECTION - ADULT  Goal: Absence of fever/infection during anticipated neutropenic period  Description: INTERVENTIONS  - Monitor WBC  - Administer growth factors as ordered  - Implement neutropenic guidelines  Outcome: Progressing     Problem: SAFETY ADULT - FALL  Goal: Free from fall injury  Description: INTERVENTIONS:  - Assess pt frequently for physical needs  - Identify cognitive and physical deficits and behaviors that affect risk of falls.  - [place] fall precautions as indicated by assessment.  - Educate pt/family on patient safety including physical limitations  - Instruct pt to call for assistance with activity based on assessment  - Modify environment to reduce risk of injury  - Provide assistive devices as appropriate  - Consider OT/PT consult to assist with strengthening/mobility  - Encourage toileting schedule  Outcome: Progressing     Problem: DISCHARGE PLANNING  Goal: Discharge to home or other facility with appropriate resources  Description: INTERVENTIONS:  - Identify barriers to discharge w/pt and caregiver  - Include patient/family/discharge partner in discharge planning  - Arrange for needed discharge resources and transportation as appropriate  - Identify discharge learning needs (meds, wound care, etc)  - Arrange for interpreters to assist at discharge as needed  - Consider post-discharge preferences of patient/family/discharge partner  - Complete POLST form as appropriate  - Assess patient's ability to be responsible for managing their own health  - Refer to Case Management Department for coordinating discharge planning if the patient needs post-hospital services based on  physician/LIP order or complex needs related to functional status, cognitive ability or social support system  Outcome: Progressing     Problem: NEUROLOGICAL - ADULT  Goal: Absence of seizures  Description: INTERVENTIONS  - Monitor for seizure activity  - Administer anti-seizure medications as ordered  - Monitor neurological status  Outcome: Progressing     Problem: DECISION MAKING  Goal: Pt/Family able to effectively weigh alternatives and participate in decision making related to treatment and care  Description: INTERVENTIONS:  - Determine when there are differences between patient's view, family's view, and healthcare provider's view of condition  - Facilitate patient and family articulation of goals for care  - Help patient and family identify pros/cons of alternative solutions  - Provide information as requested by patient/family  - Respect patient/family right to receive or not to receive information  - Serve as a liaison between patient and family and health care team  - Initiate Consults from Ethics, Palliative Care or initiate Family Care Conference as is appropriate  Outcome: Progressing     Problem: CARDIOVASCULAR - ADULT  Goal: Maintains optimal cardiac output and hemodynamic stability  Description: INTERVENTIONS:  - Monitor vital signs, rhythm, and trends  - Monitor for bleeding, hypotension and signs of decreased cardiac output  - Evaluate effectiveness of vasoactive medications to optimize hemodynamic stability  - Monitor arterial and/or venous puncture sites for bleeding and/or hematoma  - Assess quality of pulses, skin color and temperature  - Assess for signs of decreased coronary artery perfusion - ex. Angina  - Evaluate fluid balance, assess for edema, trend weights  Outcome: Progressing  Goal: Absence of cardiac arrhythmias or at baseline  Description: INTERVENTIONS:  - Continuous cardiac monitoring, monitor vital signs, obtain 12 lead EKG if indicated  - Evaluate effectiveness of antiarrhythmic  and heart rate control medications as ordered  - Initiate emergency measures for life threatening arrhythmias  - Monitor electrolytes and administer replacement therapy as ordered  Outcome: Progressing     Problem: Impaired Swallowing  Goal: Minimize aspiration risk  Description: Interventions:  - Patient should be alert and upright for all feedings (90 degrees preferred)  - Offer food and liquids at a slow rate  - No straws  - Encourage small bites of food and small sips of liquid  - Offer pills one at a time, crush or deliver with applesauce as needed  - Discontinue feeding and notify MD (or speech pathologist) if coughing or persistent throat clearing or wet/gurgly vocal quality is noted  Outcome: Progressing     Problem: Impaired Communication  Goal: Patient will achieve maximal communication potential  Description: Interventions:    Outcome: Progressing      no

## 2024-07-15 NOTE — PLAN OF CARE
Patient VSS, no acute changes during shift. Updated patient on plan of care. Frequent rounding, no needs at this time. Call light always in reach and safety precautions in place.     Problem: Patient Centered Care  Goal: Patient preferences are identified and integrated in the patient's plan of care  Description: Interventions:  - What would you like us to know as we care for you? From home with wife  - Provide timely, complete, and accurate information to patient/family  - Incorporate patient and family knowledge, values, beliefs, and cultural backgrounds into the planning and delivery of care  - Encourage patient/family to participate in care and decision-making at the level they choose  - Honor patient and family perspectives and choices  Outcome: Progressing     Problem: SKIN/TISSUE INTEGRITY - ADULT  Goal: Incision(s), wounds(s) or drain site(s) healing without S/S of infection  Description: INTERVENTIONS:  - Assess and document risk factors for pressure ulcer development  - Assess and document skin integrity  - Assess and document dressing/incision, wound bed, drain sites and surrounding tissue  - Implement wound care per orders  - Initiate isolation precautions as appropriate  - Initiate Pressure Ulcer prevention bundle as indicated  Outcome: Progressing     Problem: PAIN - ADULT  Goal: Verbalizes/displays adequate comfort level or patient's stated pain goal  Description: INTERVENTIONS:  - Encourage pt to monitor pain and request assistance  - Assess pain using appropriate pain scale  - Administer analgesics based on type and severity of pain and evaluate response  - Implement non-pharmacological measures as appropriate and evaluate response  - Consider cultural and social influences on pain and pain management  - Manage/alleviate anxiety  - Utilize distraction and/or relaxation techniques  - Monitor for opioid side effects  - Notify MD/LIP if interventions unsuccessful or patient reports new pain  -  Anticipate increased pain with activity and pre-medicate as appropriate  Outcome: Progressing     Problem: RISK FOR INFECTION - ADULT  Goal: Absence of fever/infection during anticipated neutropenic period  Description: INTERVENTIONS  - Monitor WBC  - Administer growth factors as ordered  - Implement neutropenic guidelines  Outcome: Progressing     Problem: SAFETY ADULT - FALL  Goal: Free from fall injury  Description: INTERVENTIONS:  - Assess pt frequently for physical needs  - Identify cognitive and physical deficits and behaviors that affect risk of falls.  - Hardy fall precautions as indicated by assessment.  - Educate pt/family on patient safety including physical limitations  - Instruct pt to call for assistance with activity based on assessment  - Modify environment to reduce risk of injury  - Provide assistive devices as appropriate  - Consider OT/PT consult to assist with strengthening/mobility  - Encourage toileting schedule  Outcome: Progressing     Problem: DISCHARGE PLANNING  Goal: Discharge to home or other facility with appropriate resources  Description: INTERVENTIONS:  - Identify barriers to discharge w/pt and caregiver  - Include patient/family/discharge partner in discharge planning  - Arrange for needed discharge resources and transportation as appropriate  - Identify discharge learning needs (meds, wound care, etc)  - Arrange for interpreters to assist at discharge as needed  - Consider post-discharge preferences of patient/family/discharge partner  - Complete POLST form as appropriate  - Assess patient's ability to be responsible for managing their own health  - Refer to Case Management Department for coordinating discharge planning if the patient needs post-hospital services based on physician/LIP order or complex needs related to functional status, cognitive ability or social support system  Outcome: Progressing     Problem: NEUROLOGICAL - ADULT  Goal: Absence of seizures  Description:  INTERVENTIONS  - Monitor for seizure activity  - Administer anti-seizure medications as ordered  - Monitor neurological status  Outcome: Progressing     Problem: DECISION MAKING  Goal: Pt/Family able to effectively weigh alternatives and participate in decision making related to treatment and care  Description: INTERVENTIONS:  - Determine when there are differences between patient's view, family's view, and healthcare provider's view of condition  - Facilitate patient and family articulation of goals for care  - Help patient and family identify pros/cons of alternative solutions  - Provide information as requested by patient/family  - Respect patient/family right to receive or not to receive information  - Serve as a liaison between patient and family and health care team  - Initiate Consults from Ethics, Palliative Care or initiate Family Care Conference as is appropriate  Outcome: Progressing     Problem: CARDIOVASCULAR - ADULT  Goal: Maintains optimal cardiac output and hemodynamic stability  Description: INTERVENTIONS:  - Monitor vital signs, rhythm, and trends  - Monitor for bleeding, hypotension and signs of decreased cardiac output  - Evaluate effectiveness of vasoactive medications to optimize hemodynamic stability  - Monitor arterial and/or venous puncture sites for bleeding and/or hematoma  - Assess quality of pulses, skin color and temperature  - Assess for signs of decreased coronary artery perfusion - ex. Angina  - Evaluate fluid balance, assess for edema, trend weights  Outcome: Progressing  Goal: Absence of cardiac arrhythmias or at baseline  Description: INTERVENTIONS:  - Continuous cardiac monitoring, monitor vital signs, obtain 12 lead EKG if indicated  - Evaluate effectiveness of antiarrhythmic and heart rate control medications as ordered  - Initiate emergency measures for life threatening arrhythmias  - Monitor electrolytes and administer replacement therapy as ordered  Outcome: Progressing      Problem: Impaired Swallowing  Goal: Minimize aspiration risk  Description: Interventions:  - Patient should be alert and upright for all feedings (90 degrees preferred)  - Offer food and liquids at a slow rate  - No straws  - Encourage small bites of food and small sips of liquid  - Offer pills one at a time, crush or deliver with applesauce as needed  - Discontinue feeding and notify MD (or speech pathologist) if coughing or persistent throat clearing or wet/gurgly vocal quality is noted  Outcome: Progressing     Problem: Impaired Communication  Goal: Patient will achieve maximal communication potential  Description: Interventions:  - Allow additional time for processing after asking questions or providing instructions  Outcome: Progressing

## 2024-07-15 NOTE — PROGRESS NOTES
[facility]  part of [facility]    Progress Note    Yoseph Cordero Patient Status:  Inpatient    1950 MRN [id number]   Location [facility] 5SW/SE Attending Uvaldo Echevarria MD   Hosp Day # 6 PCP Irving Sheets DO     Chief Complaint:   Chief Complaint   Patient presents with    Fever   Infected R heel pressure ulcer    Subjective:   Yoseph Cordero is resting - opens eyes and smiles. No events overnight. No further fevers.   Objective:   Objective:    Blood pressure 103/56, pulse 98, temperature 98 °F (36.7 °C), temperature source Axillary, resp. rate 18, height 5' 7\" (1.702 m), weight 147 lb 3.2 oz (66.8 kg), SpO2 97%.    Physical Exam:    General:  Nonverbal. R hemiparesis chronic wakes up and smiles.   Respiratory: Clear to auscultation bilaterally. No wheezes. No rhonchi.  Cardiovascular: S1, S2. Regular rate and rhythm. No murmurs, rubs or gallops.   Abdomen: Soft, nontender, nondistended.  Positive bowel sounds. No rebound or guarding.  Neurologic: R hemiparesis. Rigid LUE  Extremities: R foot wrapped + R foot edema.       Results:   Results:    Labs:  Recent Labs   Lab 24  0822 24  0624  0924  0539 24  0902 24  0513   WBC  --  12.7* 12.1* 15.1* 14.6* 19.0*   HGB  --  8.0* 7.9* 8.6* 8.4* 8.3*   MCV  --  84.9 84.8 84.0 83.8 82.8   PLT  --  417.0 380.0 451.0* 445.0 427.0  427.0   INR 1.36*  --  1.22*  --   --   --        Recent Labs   Lab 24  1907 07/10/24  0614 24  0625 24  0539 24  0902 24  0513   *   < > 131* 115* 142* 129*   BUN 13   < > 15 16 14 18   CREATSERUM 0.88   < > 0.52* 0.57* 0.57* 0.61*   CA 9.2   < > 8.6* 8.6* 8.8 8.8   ALB 4.1  --  3.1* 3.3  --   --       < > 143 142 142 139   K 3.6   < > 3.2* 4.0  4.0 3.7 3.6      < > 109 110 111 107   CO2 26.0   < > 28.0 26.0 25.0 26.0   ALKPHO 71  --  59 67  --   --    AST 26  --  21 37*  --   --    ALT 28  --  23 36  --   --     BILT 0.4  --  0.2 0.2  --   --    TP 7.7  --  5.9 6.2  --   --     < > = values in this interval not displayed.       Estimated Creatinine Clearance: 100.8 mL/min (A) (based on SCr of 0.61 mg/dL (L)).    Recent Labs   Lab 07/11/24  0822 07/12/24  0924   PTP 17.6* 16.2*   INR 1.36* 1.22*            Culture:  Hospital Encounter on 07/09/24   1. Aerobic Bacterial Culture     Status: None (Preliminary result)    Collection Time: 07/14/24  1:57 PM    Specimen: Foot; Other   Result Value Ref Range    Aerobic Smear 4+ WBCs seen N/A    Aerobic Smear 3+ Gram Positive Rods N/A    Aerobic Smear 2+ Gram positive cocci in pairs N/A   2. Blood Culture     Status: None (Preliminary result)    Collection Time: 07/11/24 11:18 PM    Specimen: Blood,peripheral   Result Value Ref Range    Blood Culture Result No Growth 3 Days N/A       Cardiac  No results for input(s): \"TROP\", \"PBNP\" in the last 168 hours.      Imaging: Imaging data reviewed in [facility].  MRI BRAIN WO ACUTE (3) SEQUENCE (CPT=70551)    Result Date: 7/13/2024  CONCLUSION:  1. No acute infarct or hemorrhage. 2. Old large left MCA territory infarct with compensatory dilatation of the left lateral ventricle, and wallerian degeneration. 3. Changes of chronic small vessel disease in cerebral white matter. 4. Stable ventriculomegaly related to cerebral volume loss.    Dictated by (CST): Apolinar Barry MD on 7/13/2024 at 1:02 PM     Finalized by (CST): Apolinar Barry MD on 7/13/2024 at 1:08 PM           Medications:    metoprolol tartrate  25 mg Oral 2x Daily(Beta Blocker)    levETIRAcetam  500 mg Oral Nightly    levETIRAcetam  250 mg Oral QAM    ferrous sulfate  325 mg Oral Daily with breakfast    acetaminophen  500 mg Oral Q8H    piperacillin-tazobactam  3.375 g Intravenous Q8H    [Held by provider] rivaroxaban  20 mg Oral Daily with food    sodium hypochlorite   Topical BID         Assessment and Plan:   Assessment & Plan:      Acute proteus bacteremia/ severe sepsis   Sacral  decubitus ulcer POA  L hip ulcer. - POA  IV zosyn   ID, general surgery on consult.   Wound care   PT/OT - [facility]  Blood cx 2/2 proteus mirabilis. Rpt blood cx neg x 4   Wound culture proteus mirabilis + enterococcus feacalis   Lactic acid nl now. Afebrile now.   S/p bedside sharp excisional debridement of infected L hip decubitus ulcer and sacral decubitus ulcer 7/11   Episode of unresponsiveness - resolved.   H/o CVA with chronic R hemiparesis   H/o seizure d/o   Dementia - essentially nonverbal   Hold xarelto for surgery. On heparin gtt.    PT/OT - [facility].   Keppra 500mg BID.   CT head no acute abnormality old large L MCA CVA. Volume loss   EEG no seizure activity.   Episode of unresponsiveness likely from sepsis   Acute Chronic Sinus tachycardia  Likely secondary to infection/low grade temps. Tachycardia coincides with fevers  Lactic acid normal.   Recent ECHO from May 24' LVEF 60-65%  Prev admit -110. Was seen by cards  Metoprolol 25mg BID  hold for sBP < 100 or HR < 60   H/o VTE  Factor V leiden   Xarelto on hold as above.   Recent PE May 24' heparin gtt for now if no surgery planned --> resume xarelto   D dimer downtrending 11 ---> 1   Infected R heel stage IV pressure ulcer POA with OM  Podiatry and vascular surgery on consult.   MRI reviewed Stage IV heel ulcer with osteomyelitis of dorsal calcaneus. Possible intra tendinous abscess.   S/p bedside debridement 7/14 probe to calcaneus bone. Necrotic rim excised, fibrous tissue excised, fat and achilles tendon visible. Sharp debridement to level of bone.   Family not ready for BKA. He has dementia and has started with contractures, sacral wounds as well.  I think he would be better served with a knee level amputation.  It is not as likely that he will get back walking.  Son wants to exhaust all options to save the leg. Cx pending   IV zosyn  Arterial US pending.   Acute anemia   No overt bleeding seen.   Combination of ACD and iron def.   PO ferrous sulfate  BID.   FOB pending   Baseline Hb 12. Came in at 10.7   Other medical problems  HTN  HOLLI    Dispo: palliative care consult for ongoing GOC      >55min spent, >50% spent counseling and coordinating care in the form of educating pt/family and d/w consultants and staff. Most of the time spent discussing the above plan.        Plan of care discussed with patient or family at bedside.   Uvaldo Echevarria MD  Hospitalist          Supplementary Documentation:     Quality:  DVT Prophylaxis: heparin gtt.    CODE status: Full  Dispo: per clinical course           Estimated date of discharge: TBD  Discharge is dependent on: clinical stability  At this point Mr. Cordero is expected to be discharge to: home

## 2024-07-15 NOTE — CONSULTS
Palliative Care Initial Inpatient Consult    Yoseph Cordero Patient Status:  Inpatient    1950 MRN [id number]   Location [facility] 5SW/SE Attending Uvaldo Echevarria MD   Hosp Day # 6 PCP Irving Sheets DO     Date of Consult: 7/15/2024  Patient seen at: [facility]    Reason for Consultation: Consult requested for goals of care and care coordination.    Subjective     History of Present Illness: Patient is a 74 year old male with medical history of  CVA, dementia, is non-verbal, and has been bed/wheelchair bound since late May according to his son. Has had chronic right heel, left ischial, and sacral decubitus ulcers since he became bed bound . He now presents to [facility] emergency department with necrotic changes on the right heel ulcer and generalized weakness. Upon ED assessment, patient was admitted to the hospital and started on IV fluids and antibiotics - Vancomycin and Zosyn History was obtained from [facility] and family interview.  Our palliative care service was asked to evaluate the patient for a goals of care discussion and symptom management.      Review of Systems: Palliative Care symptom needs assessed:     Unable to assess     Palliative Care Social History:   Marital Status:   Children: Yes  Living Situation Prior to Admit: Home  Occupational History: Retired  Personal: From [place] as a young man. 3 children. Lives at home with wife. Non-verbal.    Spiritual  Yoseph ACOSTA     requested:  NA    Medical History: obtained from [facility]  Past Medical History:    Anxiety state, unspecified    CVA (cerebral infarction)    Dementia (HCC)    Depression    Heterozygous factor V Leiden mutation (HCC)    Other and unspecified hyperlipidemia    Other ill-defined conditions(799.89)    Cardiomegaly Pleural effusion w/idopathic pleuropuricarditis    Pulmonary embolism (HCC)    Stroke (HCC)    Unspecified sleep apnea     Past Surgical History:   Procedure Laterality  Date    Colonoscopy      Electrocardiogram, complete  1914    Scanned to Media Tab       Family History: obtained from [facility]  Family History   Problem Relation Age of Onset    Cancer Father         Lung - Smoker  Cause of death    Other (Other) Mother         during     Diabetes Neg     Glaucoma Neg        Allergies:  No Known Allergies    Medications:     Current Facility-Administered Medications:     metoprolol tartrate (Lopressor) tab 25 mg, 25 mg, Oral, 2x Daily(Beta Blocker)    levETIRAcetam (Keppra) 100 MG/ML oral solution 500 mg, 500 mg, Oral, Nightly    levETIRAcetam (Keppra) 100 MG/ML oral solution 250 mg, 250 mg, Oral, QAM    heparin (Porcine) 05867 units/250mL infusion PE/DVT/THROMBUS CONTINUOUS, 200-3,000 Units/hr, Intravenous, Continuous    ferrous sulfate DR tab 325 mg, 325 mg, Oral, Daily with breakfast    acetaminophen (Tylenol Extra Strength) tab 500 mg, 500 mg, Oral, Q8H    piperacillin-tazobactam (Zosyn) 3.375 g in dextrose 5% 100 mL IVPB-ADDV, 3.375 g, Intravenous, Q8H    acetaminophen (Tylenol) tab 650 mg, 650 mg, Oral, Q6H PRN    [Held by provider] rivaroxaban (Xarelto) tab 20 mg, 20 mg, Oral, Daily with food    sodium hypochlorite (Dakin's) 0.125 % external solution, , Topical, BID    ibuprofen (Motrin) tab 600 mg, 600 mg, Oral, Q4H PRN    acetaminophen (Tylenol Extra Strength) tab 500 mg, 500 mg, Oral, Q4H PRN    ondansetron (Zofran) 4 MG/2ML injection 4 mg, 4 mg, Intravenous, Q6H PRN    OLANZapine (Zyprexa) 5 mg in sterile water for injection (PF) IM injection, 5 mg, Intramuscular, Q12H PRN  No current outpatient medications on file.         Palliative Performance Scale: 40 %  % Ambulation Activity Level Self-Care Intake Consciousness   100 Full  Normal  No Disease Full Normal Full   90 Full  Normal  Some Disease Full Normal Full   80 Full  Normal w/effort  Some Disease Full Normal or reduced Full   70 Reduced  Can't Perform Job  Some Disease Full Normal or reduced Full   60  Reduced  Can't Perform Hobby   Significant Disease Occ Assist Normal or reduced Full or confused   50 Mainly sit/lie Can't do any work  Extensive Disease Partial Assist Normal or reduced Full or confused   40 Mainly in bed Can't do any work  Extensive Disease Mainly Assist Normal or reduced Full or confused   30 Bed Bound Can't do any work  Extensive Disease Max Assist  Total Care Reduced  Drowsy/confused   20 Bed Bound Can't do any work  Extensive Disease Max Assist  Total Care Minimal  Drowsy/confused   10 Bed Bound Can't do any work  Extensive Disease Max Assist  Total Care Mouth Care  Drowsy/confused   0 Death        Objective      Vital Signs:  Blood pressure 103/56, pulse 98, temperature 98 °F (36.7 °C), temperature source Axillary, resp. rate 18, height 5' 7\" (1.702 m), weight 147 lb 3.2 oz (66.8 kg), SpO2 97%.  Body mass index is 23.05 kg/m².  Non-verbal signs of pain present: No    Physical Exam:  General: Alert, awake and in no  apparent respiratory distress.  HEENT: AT/NC.   Cardiac: RRR  Lungs: Normal effort on RA  Abdomen: Soft, non-tender, non-distended, normal bowel sounds X 4 quadrants   Extremities: contractures, wounds as noted  Skin: Warm and dry.    Hematology:  Lab Results   Component Value Date    WBC 17.6 (H) 07/15/2024    HGB 9.2 (L) 07/15/2024    HCT 29.9 (L) 07/15/2024    .0 07/15/2024       Coags:  Lab Results   Component Value Date    INR 1.22 (H) 07/12/2024    PTT 52.8 (H) 07/15/2024       Chemistry:  Lab Results   Component Value Date    CREATSERUM 0.61 (L) 07/14/2024    BUN 18 07/14/2024     07/14/2024    K 3.6 07/14/2024     07/14/2024    CO2 26.0 07/14/2024     (H) 07/14/2024    CA 8.8 07/14/2024    ALB 3.3 07/13/2024    ALKPHO 67 07/13/2024    BILT 0.2 07/13/2024    TP 6.2 07/13/2024    AST 37 (H) 07/13/2024    ALT 36 07/13/2024    PSA 16.20 (H) 03/11/2019    DDIMER 1.87 (H) 07/14/2024    MG 1.9 07/13/2024       Imaging:  MRI BRAIN WO ACUTE (3) SEQUENCE  (CPT=70551)    Result Date: 7/13/2024  CONCLUSION:  1. No acute infarct or hemorrhage. 2. Old large left MCA territory infarct with compensatory dilatation of the left lateral ventricle, and wallerian degeneration. 3. Changes of chronic small vessel disease in cerebral white matter. 4. Stable ventriculomegaly related to cerebral volume loss.    Dictated by (CST): Apolinar Barry MD on 7/13/2024 at 1:02 PM     Finalized by (CST): Apolinar Barry MD on 7/13/2024 at 1:08 PM           Assessment and Recommendations        Infected ulcer of skin, unspecified ulcer stage (HCC)    Infected decubitus ulcer    Transient neurological symptoms    Symptom Management      Pain:  -prescribed tramadol by his PCP  -I would prefer Norco  -start 5mg q6hr prn for wound pain     Prevention of Constipation:    - Recommend Senna-S 8.6mg (2) tabs BID Scheduled   - Recommend Miralax 17g daily Scheduled   - Recommend Dulcolax suppository daily PRN    2.     Serious Illness Conversation:   Code Status: Full  POA: NA, surrogate would be his wife Cassi  I met Yoseph and spoke with Tyra his daughter. Her  Dwaine was there with her on speaker phone.   She is the main point person/care taker etc. She said they make decisions all together as a family.   He has been struggling with mobility since May when he had an ankle fracture after slipping off the bed.   They see the wounds he has as preventable and are having a very difficult time with anger and frustration toward the home health company they feel as inadequately treating/preventing them.   Tyra was surprised when I mentioned the recommendation of amputation. She asked 'isn't that the worst case scenario?' She is under the impression that the blood supply was being assessed and trial of debridement on offer prior to having to resort to amputation.  She said \"If they have to do that, I don't think he'll ever be mobile again and then he will just waste away.\"  I mentioned that  unfortunately sometimes wounds are never able to be healed and that these complex processes that are happening COULD be a transition to a terminal illness.   I think the our approach should continue to be try/hope for the best with continued education for the family on what we can expect. I will follow along with them.      Palliative Care Follow Up: Palliative care team will Continue to follow while inpatient    Thank you for allowing Palliative Care services to participate in the care of Yoseph Cordero.    A total of 55 minutes were spent on this visit, which included all of the following: direct face to face contact, history taking, physical examination, and >50% was spent counseling and coordinating care.    Pj Rehman MD  7/15/2024  9:55 AM  Palliative Care Services  [facility] [phone]    Note to patient:  The 21st Century Cures Act makes medical notes like these available to patients in the interest of transparency. However, be advised this is a medical document. It is intended as peer to peer communication. It is written in medical language and may contain abbreviations or verbiage that are unfamiliar. It may appear blunt or direct. Medical documents are intended to carry relevant information, facts as evident, and the clinical opinion of the practitioner.

## 2024-07-16 ENCOUNTER — TELEPHONE (OUTPATIENT)
Dept: FAMILY MEDICINE CLINIC | Facility: CLINIC | Age: 74
End: 2024-07-16

## 2024-07-16 LAB
ANION GAP SERPL CALC-SCNC: 8 MMOL/L (ref 0–18)
ANION GAP SERPL CALC-SCNC: 9 MMOL/L (ref 0–18)
APTT PPP: 74.5 SECONDS (ref 23–36)
APTT PPP: 91.5 SECONDS (ref 23–36)
BUN BLD-MCNC: 27 MG/DL (ref 9–23)
BUN BLD-MCNC: 28 MG/DL (ref 9–23)
BUN/CREAT SERPL: 40.3 (ref 10–20)
BUN/CREAT SERPL: 41.8 (ref 10–20)
CALCIUM BLD-MCNC: 9.2 MG/DL (ref 8.7–10.4)
CALCIUM BLD-MCNC: 9.4 MG/DL (ref 8.7–10.4)
CHLORIDE SERPL-SCNC: 105 MMOL/L (ref 98–112)
CHLORIDE SERPL-SCNC: 105 MMOL/L (ref 98–112)
CO2 SERPL-SCNC: 25 MMOL/L (ref 21–32)
CO2 SERPL-SCNC: 25 MMOL/L (ref 21–32)
CREAT BLD-MCNC: 0.67 MG/DL
CREAT BLD-MCNC: 0.67 MG/DL
DEPRECATED RDW RBC AUTO: 47.2 FL (ref 35.1–46.3)
EGFRCR SERPLBLD CKD-EPI 2021: 99 ML/MIN/1.73M2 (ref 60–?)
EGFRCR SERPLBLD CKD-EPI 2021: 99 ML/MIN/1.73M2 (ref 60–?)
ERYTHROCYTE [DISTWIDTH] IN BLOOD BY AUTOMATED COUNT: 15.6 % (ref 11–15)
GLUCOSE BLD-MCNC: 113 MG/DL (ref 70–99)
GLUCOSE BLD-MCNC: 114 MG/DL (ref 70–99)
HCT VFR BLD AUTO: 28.2 %
HGB BLD-MCNC: 8.9 G/DL
IRON SATN MFR SERPL: 30 %
IRON SERPL-MCNC: 49 UG/DL
MCH RBC QN AUTO: 26.5 PG (ref 26–34)
MCHC RBC AUTO-ENTMCNC: 31.6 G/DL (ref 31–37)
MCV RBC AUTO: 83.9 FL
OSMOLALITY SERPL CALC.SUM OF ELEC: 292 MOSM/KG (ref 275–295)
OSMOLALITY SERPL CALC.SUM OF ELEC: 294 MOSM/KG (ref 275–295)
PLATELET # BLD AUTO: 503 10(3)UL (ref 150–450)
PLATELET # BLD AUTO: 503 10(3)UL (ref 150–450)
POTASSIUM SERPL-SCNC: 4.1 MMOL/L (ref 3.5–5.1)
POTASSIUM SERPL-SCNC: 4.1 MMOL/L (ref 3.5–5.1)
RBC # BLD AUTO: 3.36 X10(6)UL
SODIUM SERPL-SCNC: 138 MMOL/L (ref 136–145)
SODIUM SERPL-SCNC: 139 MMOL/L (ref 136–145)
TIBC SERPL-MCNC: 161 UG/DL (ref 250–425)
TRANSFERRIN SERPL-MCNC: 108 MG/DL (ref 215–365)
WBC # BLD AUTO: 16.2 X10(3) UL (ref 4–11)

## 2024-07-16 PROCEDURE — 99233 SBSQ HOSP IP/OBS HIGH 50: CPT | Performed by: HOSPITALIST

## 2024-07-16 PROCEDURE — 99223 1ST HOSP IP/OBS HIGH 75: CPT | Performed by: INTERNAL MEDICINE

## 2024-07-16 RX ORDER — HEPARIN SODIUM AND DEXTROSE 10000; 5 [USP'U]/100ML; G/100ML
18 INJECTION INTRAVENOUS ONCE
Status: COMPLETED | OUTPATIENT
Start: 2024-07-16 | End: 2024-07-16

## 2024-07-16 RX ORDER — HEPARIN SODIUM AND DEXTROSE 10000; 5 [USP'U]/100ML; G/100ML
INJECTION INTRAVENOUS CONTINUOUS
Status: DISCONTINUED | OUTPATIENT
Start: 2024-07-16 | End: 2024-07-17

## 2024-07-16 NOTE — CM/SW NOTE
Pt discussed in RN Rounds. SW was informed by MD that family is wishing for rehab. SW will provide [facility] list to family. Patient will require insurance auth.    Plan: Pending medical clearance, DC to [facility], NATALIE, *choice/auth    SW/CM to remain available for support and/or discharge planning.     Samantha Olivares MSW, LSW   x [phone]

## 2024-07-16 NOTE — PLAN OF CARE
Discussed plan of care for day, including all given medications and their indications.  IV Zosyn continued.  Wound care to right heel, sacrum, and left hip maintained.  Right heel reassessed by podiatrist.  All wounds reassessed by wound RN.  Heparin drip maintained in case of need for future surgical procedure or wound debridement.  Family meeting with MDs including palliative MD and podiatrist to discuss goals of care moving forward.  Frequent repositioning and air mattress maintained.  Up to chair with lift this afternoon/evening.  Comfort measures encouraged to promote restful environment.     Problem: Patient Centered Care  Goal: Patient preferences are identified and integrated in the patient's plan of care  Description: Interventions:  - What would you like us to know as we care for you? From home with wife  - Provide timely, complete, and accurate information to patient/family  - Incorporate patient and family knowledge, values, beliefs, and cultural backgrounds into the planning and delivery of care  - Encourage patient/family to participate in care and decision-making at the level they choose  - Honor patient and family perspectives and choices  Outcome: Progressing     Problem: SKIN/TISSUE INTEGRITY - ADULT  Goal: Incision(s), wounds(s) or drain site(s) healing without S/S of infection  Description: INTERVENTIONS:  - Assess and document risk factors for pressure ulcer development  - Assess and document skin integrity  - Assess and document dressing/incision, wound bed, drain sites and surrounding tissue  - Implement wound care per orders  - Initiate isolation precautions as appropriate  - Initiate Pressure Ulcer prevention bundle as indicated  Outcome: Progressing     Problem: NEUROLOGICAL - ADULT  Goal: Absence of seizures  Description: INTERVENTIONS  - Monitor for seizure activity  - Administer anti-seizure medications as ordered  - Monitor neurological status  Outcome: Progressing     Problem:  CARDIOVASCULAR - ADULT  Goal: Maintains optimal cardiac output and hemodynamic stability  Description: INTERVENTIONS:  - Monitor vital signs, rhythm, and trends  - Monitor for bleeding, hypotension and signs of decreased cardiac output  - Evaluate effectiveness of vasoactive medications to optimize hemodynamic stability  - Monitor arterial and/or venous puncture sites for bleeding and/or hematoma  - Assess quality of pulses, skin color and temperature  - Assess for signs of decreased coronary artery perfusion - ex. Angina  - Evaluate fluid balance, assess for edema, trend weights  Outcome: Progressing  Goal: Absence of cardiac arrhythmias or at baseline  Description: INTERVENTIONS:  - Continuous cardiac monitoring, monitor vital signs, obtain 12 lead EKG if indicated  - Evaluate effectiveness of antiarrhythmic and heart rate control medications as ordered  - Initiate emergency measures for life threatening arrhythmias  - Monitor electrolytes and administer replacement therapy as ordered  Outcome: Progressing     Problem: Impaired Swallowing  Goal: Minimize aspiration risk  Description: Interventions:  - Patient should be alert and upright for all feedings (90 degrees preferred)  - Offer food and liquids at a slow rate  - No straws  - Encourage small bites of food and small sips of liquid  - Offer pills one at a time, crush or deliver with applesauce as needed  - Discontinue feeding and notify MD (or speech pathologist) if coughing or persistent throat clearing or wet/gurgly vocal quality is noted  Outcome: Progressing     Problem: Impaired Communication  Goal: Patient will achieve maximal communication potential  Description: Interventions:  Problem: PAIN - ADULT  Goal: Verbalizes/displays adequate comfort level or patient's stated pain goal  Description: INTERVENTIONS:  - Encourage pt to monitor pain and request assistance  - Assess pain using appropriate pain scale  - Administer analgesics based on type and  severity of pain and evaluate response  - Implement non-pharmacological measures as appropriate and evaluate response  - Consider cultural and social influences on pain and pain management  - Manage/alleviate anxiety  - Utilize distraction and/or relaxation techniques  - Monitor for opioid side effects  - Notify MD/LIP if interventions unsuccessful or patient reports new pain  - Anticipate increased pain with activity and pre-medicate as appropriate  Outcome: Progressing     Problem: RISK FOR INFECTION - ADULT  Goal: Absence of fever/infection during anticipated neutropenic period  Description: INTERVENTIONS  - Monitor WBC  - Administer growth factors as ordered  - Implement neutropenic guidelines  Outcome: Progressing     Problem: SAFETY ADULT - FALL  Goal: Free from fall injury  Description: INTERVENTIONS:  - Assess pt frequently for physical needs  - Identify cognitive and physical deficits and behaviors that affect risk of falls.  - [place] fall precautions as indicated by assessment.  - Educate pt/family on patient safety including physical limitations  - Instruct pt to call for assistance with activity based on assessment  - Modify environment to reduce risk of injury  - Provide assistive devices as appropriate  - Consider OT/PT consult to assist with strengthening/mobility  - Encourage toileting schedule  Outcome: Progressing     Problem: DISCHARGE PLANNING  Goal: Discharge to home or other facility with appropriate resources  Description: INTERVENTIONS:  - Identify barriers to discharge w/pt and caregiver  - Include patient/family/discharge partner in discharge planning  - Arrange for needed discharge resources and transportation as appropriate  - Identify discharge learning needs (meds, wound care, etc)  - Arrange for interpreters to assist at discharge as needed  - Consider post-discharge preferences of patient/family/discharge partner  - Complete POLST form as appropriate  - Assess patient's ability to  be responsible for managing their own health  - Refer to Case Management Department for coordinating discharge planning if the patient needs post-hospital services based on physician/LIP order or complex needs related to functional status, cognitive ability or social support system  Outcome: Progressing     Problem: DECISION MAKING  Goal: Pt/Family able to effectively weigh alternatives and participate in decision making related to treatment and care  Description: INTERVENTIONS:  - Determine when there are differences between patient's view, family's view, and healthcare provider's view of condition  - Facilitate patient and family articulation of goals for care  - Help patient and family identify pros/cons of alternative solutions  - Provide information as requested by patient/family  - Respect patient/family right to receive or not to receive information  - Serve as a liaison between patient and family and health care team  - Initiate Consults from Ethics, Palliative Care or initiate Family Care Conference as is appropriate  Outcome: Progressing     Problem: Patient/Family Goals  Goal: Patient/Family Long Term Goal  Description: Patient's Long Term Goal: Discharge from hospital    Interventions:  - Remain free of fevers  - Determine appropriate antibiotic plan for discharge  - Remain free of complication from infection  - See additional Care Plan goals for specific interventions  Outcome: Progressing  Goal: Patient/Family Short Term Goal  Description: Patient's Short Term Goal: Decrease infection related to wounds    Interventions:   - Maintain IV antibiotics  - Wound care and debridement as indicated  - Maintain diligent turning schedule  - See additional Care Plan goals for specific interventions  Outcome: Progressing     All efforts maintained to promote infection prevention during my assessment and care for this patient.  Stethoscope cleansed and hand hygiene strictly maintained.   Outcome: Progressing

## 2024-07-16 NOTE — PROGRESS NOTES
INFECTIOUS DISEASE PROGRESS NOTE  [facility]  part of [facility] ID PROGRESS NOTE    Yoseph Cordero Patient Status:  Inpatient    1950 MRN [id number]   Location [facility] 5SW/SE Attending Uvaldo Echevarria MD   Hosp Day # 7 PCP Irving Sheets,      Subjective:  No acute events. Plans for podiatry and wound care followup today.    ASSESSMENT:    Antibiotics: Zosyn  Vancomycin, meropenem     # Acute Proteus mirabilis bacteremia from likely wound source with sacral, ischial and R heel wound   -Wound cx Proteus, Enterococcus, Strep constellatus  # R heel ulcer with necrotic tissue and exposed bone s/p bedside I&D 24 with +PTB   - unlikely to heal with abx IV or PO. Amputation has been recommended already. Currently no plans for amputation per family preference    # Leukocytosis  # CVA, bedbound  # H/o PE     PLAN:  -  Continue IV zosyn. Awaiting plan regarding surgery.  -  Likely will need midline and at least 4 weeks of IV abx on discharge.  -  Follow fever curve, wbc.  -  Reviewed labs, micro, imaging reports.  -  Case d/w primary, family, RN.     History of Present Illness:  Yoseph Cordero is a 74 year old male with a history of CVA, PE, dementia, mostly bedbound, who presented to [facility] ED on  after being seen by PCP with concerns for worsening wounds. Patient did have a XR of R foot showing osteomyelitis on . On arrival, Tmax 101.7, wbc 22.3, lactate 2.8, wound swab obtained, started on vancomycin and zosyn. Blood cx with Proteus. Plans to be seen by podiatry and general surgery. ID consulted.    Physical Exam:  /54 (BP Location: Right arm)   Pulse 111   Temp 99.2 °F (37.3 °C) (Axillary)   Resp 16   Ht 5' 7\" (1.702 m)   Wt 147 lb 3.2 oz (66.8 kg)   SpO2 95%   BMI 23.05 kg/m²     Gen:   Awake, in bed  HEENT:  EOMI, neck supple  Abdom:  Soft, no TTP  Skin/extrem:  No rashes, R heel wrapped  :   Purewick draining yellow  urine  Lines:  PIV+    Laboratory Data: Reviewed    Microbiology: Reviewed    Radiology: Reviewed      [facility]  [phone]

## 2024-07-16 NOTE — PROGRESS NOTES
Palliative Care Progress Note    Yoseph Pinluis Patient Status:  Inpatient    1950 MRN [id number]   Location [facility] 5SW/SE Attending Uvaldo Echevarria MD   Hosp Day # 7 PCP Irving Sheets DO     Date of Consult: 7/15/2024  Patient seen at: [facility]    Reason for Consultation: Consult requested for goals of care and care coordination.    Subjective     S: No acute events. Wounds improved per podiatry.     Review of Systems: Palliative Care symptom needs assessed:     Unable to assess     Palliative Care Social History:   Marital Status:   Children: Yes  Living Situation Prior to Admit: Home  Occupational History: Retired  Personal: From [place] as a young man. 3 children. Lives at home with wife. Non-verbal.    Spiritual  Yoseph ACOSTA     requested:  NA    Medical History: obtained from [facility]  Past Medical History:    Anxiety state, unspecified    CVA (cerebral infarction)    Dementia (HCC)    Depression    Heterozygous factor V Leiden mutation (HCC)    Other and unspecified hyperlipidemia    Other ill-defined conditions(799.89)    Cardiomegaly Pleural effusion w/idopathic pleuropuricarditis    Pulmonary embolism (HCC)    Stroke (HCC)    Unspecified sleep apnea     Past Surgical History:   Procedure Laterality Date    Colonoscopy      Electrocardiogram, complete  1914    Scanned to Media Tab       Family History: obtained from [facility]  Family History   Problem Relation Age of Onset    Cancer Father         Lung - Smoker  Cause of death    Other (Other) Mother         during     Diabetes Neg     Glaucoma Neg        Allergies:  No Known Allergies    Medications:     Current Facility-Administered Medications:     metoprolol tartrate (Lopressor) partial tab 12.5 mg, 12.5 mg, Oral, 2x Daily(Beta Blocker)    heparin (Porcine) 29975 units/250mL infusion PE/DVT/THROMBUS CONTINUOUS, 200-3,000 Units/hr, Intravenous, Continuous     HYDROcodone-acetaminophen (Norco) 5-325 MG per tab 1 tablet, 1 tablet, Oral, Q6H PRN    levETIRAcetam (Keppra) 100 MG/ML oral solution 500 mg, 500 mg, Oral, Nightly    levETIRAcetam (Keppra) 100 MG/ML oral solution 250 mg, 250 mg, Oral, QAM    ferrous sulfate DR tab 325 mg, 325 mg, Oral, Daily with breakfast    acetaminophen (Tylenol Extra Strength) tab 500 mg, 500 mg, Oral, Q8H    piperacillin-tazobactam (Zosyn) 3.375 g in dextrose 5% 100 mL IVPB-ADDV, 3.375 g, Intravenous, Q8H    [Held by provider] rivaroxaban (Xarelto) tab 20 mg, 20 mg, Oral, Daily with food    sodium hypochlorite (Dakin's) 0.125 % external solution, , Topical, BID    ondansetron (Zofran) 4 MG/2ML injection 4 mg, 4 mg, Intravenous, Q6H PRN    OLANZapine (Zyprexa) 5 mg in sterile water for injection (PF) IM injection, 5 mg, Intramuscular, Q12H PRN  No current outpatient medications on file.         Palliative Performance Scale: 40 %  % Ambulation Activity Level Self-Care Intake Consciousness   100 Full  Normal  No Disease Full Normal Full   90 Full  Normal  Some Disease Full Normal Full   80 Full  Normal w/effort  Some Disease Full Normal or reduced Full   70 Reduced  Can't Perform Job  Some Disease Full Normal or reduced Full   60 Reduced  Can't Perform Hobby   Significant Disease Occ Assist Normal or reduced Full or confused   50 Mainly sit/lie Can't do any work  Extensive Disease Partial Assist Normal or reduced Full or confused   40 Mainly in bed Can't do any work  Extensive Disease Mainly Assist Normal or reduced Full or confused   30 Bed Bound Can't do any work  Extensive Disease Max Assist  Total Care Reduced  Drowsy/confused   20 Bed Bound Can't do any work  Extensive Disease Max Assist  Total Care Minimal  Drowsy/confused   10 Bed Bound Can't do any work  Extensive Disease Max Assist  Total Care Mouth Care  Drowsy/confused   0 Death        Objective      Vital Signs:  Blood pressure 111/54, pulse 111, temperature 99.2 °F (37.3 °C),  temperature source Axillary, resp. rate 16, height 5' 7\" (1.702 m), weight 147 lb 3.2 oz (66.8 kg), SpO2 95%.  Body mass index is 23.05 kg/m².  Non-verbal signs of pain present: No    Physical Exam:  General: Alert, awake and in no  apparent respiratory distress.  HEENT: AT/NC.   Cardiac: RRR  Lungs: Normal effort on RA  Abdomen: Soft, non-tender, non-distended, normal bowel sounds X 4 quadrants   Extremities: contractures, wounds as noted  Skin: Warm and dry.    Hematology:  Lab Results   Component Value Date    WBC 16.2 (H) 07/16/2024    HGB 8.9 (L) 07/16/2024    HCT 28.2 (L) 07/16/2024    .0 (H) 07/16/2024    .0 (H) 07/16/2024       Coags:  Lab Results   Component Value Date    INR 1.22 (H) 07/12/2024    PTT 91.5 (H) 07/16/2024       Chemistry:  Lab Results   Component Value Date    CREATSERUM 0.67 (L) 07/16/2024    BUN 28 (H) 07/16/2024     07/16/2024    K 4.1 07/16/2024     07/16/2024    CO2 25.0 07/16/2024     (H) 07/16/2024    CA 9.4 07/16/2024    ALB 3.6 07/15/2024    ALKPHO 81 07/15/2024    BILT 0.3 07/15/2024    TP 7.0 07/15/2024    AST 38 (H) 07/15/2024    ALT 49 07/15/2024    PSA 16.20 (H) 03/11/2019    DDIMER 1.87 (H) 07/14/2024    MG 1.9 07/13/2024       Imaging:  No results found.    Assessment and Recommendations        Infected ulcer of skin, unspecified ulcer stage (HCC)    Infected decubitus ulcer    Transient neurological symptoms    Symptom Management      Pain:  -prescribed tramadol by his PCP  -I would prefer Norco  -start 5mg q6hr prn for wound pain     Prevention of Constipation:    - Recommend Senna-S 8.6mg (2) tabs BID Scheduled   - Recommend Miralax 17g daily Scheduled   - Recommend Dulcolax suppository daily PRN    2.     Serious Illness Conversation:   Code Status: Full  POA: NA, surrogate would be his wife Cassi  7/15 I met Yoseph and spoke with Tyra his daughter. Her  Dwaine was there with her on speaker phone.   She is the main point  person/care taker etc. She said they make decisions all together as a family.   He has been struggling with mobility since May when he had an ankle fracture after slipping off the bed.   They see the wounds he has as preventable and are having a very difficult time with anger and frustration toward the home health company they feel as inadequately treating/preventing them.   Tyra was surprised when I mentioned the recommendation of amputation. She asked 'isn't that the worst case scenario?' She is under the impression that the blood supply was being assessed and trial of debridement on offer prior to having to resort to amputation.  She said \"If they have to do that, I don't think he'll ever be mobile again and then he will just waste away.\"  I mentioned that unfortunately sometimes wounds are never able to be healed and that these complex processes that are happening COULD be a transition to a terminal illness.   I think the our approach should continue to be try/hope for the best with continued education for the family on what we can expect. I will follow along with them.  I met with family along with podiatrist. She reported good wound healing which was promising so as to avoid amputation. Family would want to avoid that and may even decide that comfort care would be preferable.   We discussed nuances of care at home and what 'comfort care/hospice' might look like in the future if he decompensates further. Based on FAST scale, he technically meets hospice criteria now, but family would like to continue palliative/supportive treatments for now.  Appreciate social work input: they filed a complaint against [facility] and so will need an alternative provider for home care/wound care.   Most importantly, Tyra expressed that 'they want everything possible to save him, but they don't want him to suffer.' I commend this, as I think they are thinking clearly about setting limits if needed to what they  would be willing to put him through.  I will follow along.      Palliative Care Follow Up: Palliative care team will Continue to follow while inpatient    Thank you for allowing Palliative Care services to participate in the care of Yoseph Cordero.    A total of 65 minutes were spent on this visit, which included all of the following: direct face to face contact, history taking, physical examination, and >50% was spent counseling and coordinating care.    Pj Rehman MD  Palliative Care Services  [facility] [phone]    Note to patient:  The 21st Century Cures Act makes medical notes like these available to patients in the interest of transparency. However, be advised this is a medical document. It is intended as peer to peer communication. It is written in medical language and may contain abbreviations or verbiage that are unfamiliar. It may appear blunt or direct. Medical documents are intended to carry relevant information, facts as evident, and the clinical opinion of the practitioner.

## 2024-07-16 NOTE — PLAN OF CARE
Patient alert, nonverbal. 1 to 1 feed. No evidence of pain. IV antibiotics infused. Q2 turn. Family updated on POC. Dressing changed per orders. Bm overnight. Primofit in place. Incontinent care provided.  Hep drip at 17, therapeutic this morning. Ge am draw. Safety precautions in place.     Problem: Patient Centered Care  Goal: Patient preferences are identified and integrated in the patient's plan of care  Description: Interventions:  - What would you like us to know as we care for you? From home with wife  - Provide timely, complete, and accurate information to patient/family  - Incorporate patient and family knowledge, values, beliefs, and cultural backgrounds into the planning and delivery of care  - Encourage patient/family to participate in care and decision-making at the level they choose  - Honor patient and family perspectives and choices  Outcome: Progressing

## 2024-07-16 NOTE — PROGRESS NOTES
INFECTIOUS DISEASE PROGRESS NOTE  [facility]  part of [facility] ID PROGRESS NOTE    Yoseph Cordero Patient Status:  Inpatient    1950 MRN [id number]   Location [facility] 5SW/SE Attending Uvaldo Echevarria MD   Hosp Day # 7 PCP Irving Sheets,      Subjective:  No acute events. Tolerating abx. Family at bedside.    ASSESSMENT:    Antibiotics: Zosyn  Vancomycin, meropenem     # Acute Proteus mirabilis bacteremia from likely wound source with sacral, ischial and R heel wound   -Wound cx Proteus, Enterococcus, Strep constellatus  # R heel ulcer with necrotic tissue and exposed bone s/p bedside I&D 24 with +PTB   - unlikely to heal with abx IV or PO. Amputation has been recommended already. Currently no plans for amputation per family preference    # Leukocytosis  # CVA, bedbound  # H/o PE     PLAN:    -  continue IV zosyn  -  plan for midline and at least 4 weeks of IV abx on discharge  -  further discussions ongoing regarding amputation  -  discussed with family that wounds unlikely to heal with antibiotics  -  Follow fever curve, wbc.  -  Reviewed labs, micro, imaging reports  -  Case d/w staff, family at bedside     History of Present Illness:  Yoseph Cordero is a 74 year old male with a history of CVA, PE, dementia, mostly bedbound, who presented to [facility] ED on  after being seen by PCP with concerns for worsening wounds. Patient did have a XR of R foot showing osteomyelitis on . On arrival, Tmax 101.7, wbc 22.3, lactate 2.8, wound swab obtained, started on vancomycin and zosyn. Blood cx with Proteus. Plans to be seen by podiatry and general surgery. ID consulted.    Physical Exam:  /57 (BP Location: Right arm)   Pulse 111   Temp 98.7 °F (37.1 °C) (Axillary)   Resp 18   Ht 170.2 cm (5' 7\")   Wt 147 lb 3.2 oz (66.8 kg)   SpO2 98%   BMI 23.05 kg/m²     Gen:   Awake, in bed  HEENT:  EOMI, neck supple  Abdom:  Soft, no TTP  Skin/extrem:  No  tho, R heel wrapped, wound pictures reviewed  :   Purewick draining yellow urine  Lines:  PIV+    Laboratory Data: Reviewed    Microbiology: Reviewed    Radiology: Reviewed      Vijay Hernandez MD  [facility]  [phone]  7/12/2024

## 2024-07-16 NOTE — CONGREGATE LIVING REVIEW
Congregate Living Authorization    [facility] ([facility] has reviewed this case and the committee DOES NOT RECOMMEND discharge to a skilled nursing facility under skilled care.    The CLRC recommends:  Home with home health and any other appropriate caregiver assistance or medical equipment as needed vs respite in SNF.     Does not appear to have skilled services for URIAH, but additional home support appears to be needed.    For questions regarding CLRC approval process, please contact the CM assigned to the case.  For questions regarding RN discharge workflow, please contact the unit Clinical Leader.

## 2024-07-16 NOTE — PROGRESS NOTES
07/16/24 1441   Wound 07/09/24 Heel Right   Date First Assessed/Time First Assessed: 07/09/24 2026   Present on Original Admission: Yes  Primary Wound Type: Pressure Injury  Location: Heel  Wound Location Orientation: Right  Wound Description (Comments): odor, necrotic tissue   Wound Image    Site Assessment Moist;Fragile;Bleeding;Painful;Pink;Red;Yellow   Drainage Amount Small   Drainage Description Sanguineous   Treatments Cleansed;Saline;Site Care   Dressing 4x4s;Aquacel Foam;Gauze;Kerlix roll;Tape  (damp 1/4 Dakins om gauze over heel, dry gauze heel cup)   Dressing Changed Changed   Dressing Status Clean;Dry;Intact;Dressing Changed   Flori-wound Assessment Fragile;Pink;Maceration;White   Wound Granulation Tissue Red;Pink   Wound Bed Granulation (%) 90 %   Wound Bed Slough (%) 10 %   State of Healing Early/partial granulation   Wound Odor None   Exposed Structure Bone   Pressure Injury Stage 4  (debrided unstageable)   Wound 07/09/24 Hip Left   Date First Assessed/Time First Assessed: 07/09/24 2028   Present on Original Admission: Yes  Primary Wound Type: Pressure Injury  Location: Hip  Wound Location Orientation: Left  Wound Description (Comments): odor eschar   Wound Image    Site Assessment Dry;Fragile;Painful;Pale;Pink;Tan;Yellow;Black   Drainage Amount None   Treatments Cleansed;Saline;Site Care;Topical (Barrier/Moisturizer/Ointment)  (zinc to the flori wound)   Dressing 2x2s;Aquacel Foam;Gauze  (damp 1/4 St Dakins on gauze, over hip, dry gauze)   Dressing Changed Changed   Dressing Status Clean;Dry;Intact;Dressing Changed   Wound Depth (cm)   (necrotic tissue)   Flori-wound Assessment Dry;Intact;Fragile;Pink   Wound Bed Slough (%) 100 %   State of Healing Non-healing   Wound Odor None   Pressure Injury Stage U   Wound 07/09/24 Sacrum   Date First Assessed/Time First Assessed: 07/09/24 2029   Present on Original Admission: Yes  Primary Wound Type: Pressure Injury  Location: Sacrum  Wound Description  (Comments): odor unstageable   Wound Image    Site Assessment Black;Fragile;Moist;Pink;Red;Painful;Tan  (hole in the eschar with tan exudate)   Drainage Amount Moderate   Drainage Description Tan;Serosanguineous;Odor   Treatments Cleansed;Saline;Site Care;Topical (Barrier/Moisturizer/Ointment)  (zinc to the flori wound)   Dressing 4x4s;Aquacel Foam  (damp 1/4 St Dakins on gauze, over the sacrum, dry gauze)   Dressing Changed Changed   Dressing Status Clean;Dry;Intact;Dressing Changed   Wound Depth (cm)   (necrotic tissue)   Flori-wound Assessment Pink;Purple;Fragile   Wound Granulation Tissue Red   Wound Bed Granulation (%) 10 %   Wound Bed Eschar (%) 90 %   State of Healing Non-healing   Wound Odor Mild   Pressure Injury Stage U   Wound 07/10/24 Ankle Anterior;Right   Date First Assessed/Time First Assessed: 07/10/24 0843   Present on Original Admission: Yes  Primary Wound Type: Pressure Injury  Location: Ankle  Wound Location Orientation: Anterior;Right  Wound Description (Comments): yellow slough   Wound Image    Site Assessment Fragile;Moist;Yellow   Drainage Amount None   Treatments Cleansed;Santyl;Pharmaceutical agent  (Medihoney on dry gauze 2x2)   Dressing 2x2s;Aquacel Foam   Dressing Changed Changed   Dressing Status Clean;Dry;Intact;Dressing Changed   Wound Depth (cm)   (slough tissue)   Flori-wound Assessment Fragile;Pink;Dry   Wound Bed Slough (%) 100 %   State of Healing Non-healing   Pressure Injury Stage U   Wound Follow Up   Follow up needed Yes     Wound Care Services  Follow up on the pt. the nurse medicated the pt. with Tylenol, his daughter Tyra is at the bedside. Asked Dr. Dao if I could change the right heel dressing and she is in agreement. The nurse is at the bedside assisting, See the wound assessments, all wounds were cleansed and re-dressed Per the nurse, Dr. Dao wants the right heel dressings done daily. I recommended the pt. have an air mattress at home as he has pressure injuries on  multiple turning surfaces. Recommend to continue the 1/4 Dakins gauze to the Left hip and sacrum Q 12 hrs and prn if soiled. Right heel daily. Messages Dr. Echevarria and Taco KULKARNI my recommendations for possible deeper debridement of the left hip and sacrum as the left hip is dry and the sacral ulcer has a hole in the eschar with tan exudate draining. Spoke with the nurse and the pt. was repositioned in the bed, his daughter is feeding her dad and also trying to have him feed himself.  Call light in reach. Wound Care will continue to follow. Answered the daughters questions to the best of my ability.

## 2024-07-16 NOTE — PROGRESS NOTES
[facility], [place]  part of [facility]    Progress Note    Yoseph Cordero Patient Status:  Inpatient    1950 MRN [id number]   Location [facility] 5SW/SE Attending Uvaldo Echevarria MD   Hosp Day # 7 PCP Irving Sheets DO     Chief Complaint:   Chief Complaint   Patient presents with    Fever   Infected R heel pressure ulcer    Subjective:   Yoseph Cordero is nonverbal. No further fevers. Still grimacing at times.   Objective:   Objective:    Blood pressure 111/57, pulse 111, temperature 98.7 °F (37.1 °C), temperature source Axillary, resp. rate 18, height 5' 7\" (1.702 m), weight 147 lb 3.2 oz (66.8 kg), SpO2 98%.    Physical Exam:    General:  Nonverbal. R hemiparesis chronic   Respiratory: Clear to auscultation bilaterally. No wheezes. No rhonchi.  Cardiovascular: S1, S2. Regular rate and rhythm. No murmurs, rubs or gallops.   Abdomen: Soft, nontender, nondistended.  Positive bowel sounds. No rebound or guarding.  Neurologic: R hemiparesis. Rigid LUE  Extremities: R foot wrapped + R foot edema.       Results:   Results:    Labs:  Recent Labs   Lab 24  0824  0539 24  0902 24  0513 07/15/24  0933 24  0600   WBC  --    < > 12.1* 15.1* 14.6* 19.0* 17.6* 16.2*   HGB  --    < > 7.9* 8.6* 8.4* 8.3* 9.2* 8.9*   MCV  --    < > 84.8 84.0 83.8 82.8 86.2 83.9   PLT  --    < > 380.0 451.0* 445.0 427.0  427.0 433.0 503.0*  503.0*   INR 1.36*  --  1.22*  --   --   --   --   --     < > = values in this interval not displayed.       Recent Labs   Lab 24  0539 24  0902 07/15/24  0933 24  0603 24  0629   * 115*   < > 99 113* 114*   BUN 15 16   < > 18 27* 28*   CREATSERUM 0.52* 0.57*   < > 0.61* 0.67* 0.67*   CA 8.6* 8.6*   < > 9.6 9.2 9.4   ALB 3.1* 3.3  --  3.6  --   --     142   < > 142 138 139   K 3.2* 4.0  4.0   < > 3.9 4.1 4.1    110   < > 107 105 105   CO2 28.0 26.0   < >  27.0 25.0 25.0   ALKPHO 59 67  --  81  --   --    AST 21 37*  --  38*  --   --    ALT 23 36  --  49  --   --    BILT 0.2 0.2  --  0.3  --   --    TP 5.9 6.2  --  7.0  --   --     < > = values in this interval not displayed.       Estimated Creatinine Clearance: 91.8 mL/min (A) (based on SCr of 0.67 mg/dL (L)).    Recent Labs   Lab 07/11/24  0822 07/12/24  0924   PTP 17.6* 16.2*   INR 1.36* 1.22*            Culture:  Hospital Encounter on 07/09/24   1. Aerobic Bacterial Culture     Status: None (Preliminary result)    Collection Time: 07/14/24  1:57 PM    Specimen: Foot; Other   Result Value Ref Range    Aerobic Culture Result Pending N/A    Aerobic Smear 4+ WBCs seen N/A    Aerobic Smear 3+ Gram Positive Rods N/A    Aerobic Smear 2+ Gram positive cocci in pairs N/A   2. Blood Culture     Status: None (Preliminary result)    Collection Time: 07/11/24 11:18 PM    Specimen: Blood,peripheral   Result Value Ref Range    Blood Culture Result No Growth 4 Days N/A       Cardiac  No results for input(s): \"TROP\", \"PBNP\" in the last 168 hours.      Imaging: Imaging data reviewed in Epic.  No results found.    Medications:    metoprolol tartrate  25 mg Oral 2x Daily(Beta Blocker)    levETIRAcetam  500 mg Oral Nightly    levETIRAcetam  250 mg Oral QAM    ferrous sulfate  325 mg Oral Daily with breakfast    acetaminophen  500 mg Oral Q8H    piperacillin-tazobactam  3.375 g Intravenous Q8H    [Held by provider] rivaroxaban  20 mg Oral Daily with food    sodium hypochlorite   Topical BID         Assessment and Plan:   Assessment & Plan:      Acute proteus bacteremia/ severe sepsis   Sacral decubitus ulcer POA  L hip ulcer. - POA  IV zosyn   ID, general surgery on consult.   Wound care   PT/OT - [facility]  Blood cx 2/2 proteus mirabilis. Rpt blood cx neg x 4   Wound culture proteus mirabilis + enterococcus feacalis   Lactic acid nl now. Afebrile now.   S/p bedside sharp excisional debridement of infected L hip decubitus ulcer and sacral  decubitus ulcer 7/11   Episode of unresponsiveness - resolved.   H/o CVA with chronic R hemiparesis   H/o seizure d/o   Dementia - essentially nonverbal   Hold xarelto for surgery. On heparin gtt.    PT/OT - [facility].   Keppra 500mg BID.   CT head no acute abnormality old large L MCA CVA. Volume loss   EEG no seizure activity.   Episode of unresponsiveness likely from sepsis   Acute Chronic Sinus tachycardia  Likely secondary to infection/low grade temps. Tachycardia coincides with fevers  Lactic acid normal.   Recent ECHO from May 24' LVEF 60-65%  Prev admit -110. Was seen by cards  Metoprolol 12.5mg BID  hold for sBP < 100 or HR < 60   H/o VTE  Factor V leiden   Resume xarelto. Stop heparin gtt.    Recent PE May 24'   D dimer downtrending 11 ---> 1   Infected R heel stage IV pressure ulcer POA with   Podiatry and vascular surgery on consult.   MRI reviewed Stage IV heel ulcer with osteomyelitis of dorsal calcaneus. Possible intra tendinous abscess.   S/p bedside debridement 7/14 probe to calcaneus bone. Necrotic rim excised, fibrous tissue excised, fat and achilles tendon visible. Sharp debridement to level of bone.   Family not ready for BKA. He has dementia and has started with contractures, sacral wounds as well.  I think he would be better served with a knee level amputation.  It is not as likely that he will get back walking.  Son wants to exhaust all options to save the leg. Cx pending   IV zosyn  Arterial MORE - abnormal.   Acute anemia   No overt bleeding seen.   Combination of ACD and iron def.   PO ferrous sulfate BID.   FOB pending   Baseline Hb 12. Came in at 10.7   Other medical problems  HTN  HOLLI    Dispo: palliative care consult for ongoing GOC. If no BKA planned. He will need IV abx, ongoing wound care and palliative care on discharge.       >55min spent, >50% spent counseling and coordinating care in the form of educating pt/family and d/w consultants and staff. Most of the time spent discussing  the above plan.        Plan of care discussed with patient or family at bedside.   Uvaldo Echevarria MD  Hospitalist          Supplementary Documentation:     Quality:  DVT Prophylaxis: xarelto   CODE status: Full  Dispo: per clinical course           Estimated date of discharge: TBD  Discharge is dependent on: clinical stability  At this point Mr. Cordero is expected to be discharge to: home

## 2024-07-16 NOTE — SPIRITUAL CARE NOTE
Spiritual Care Visit Note    Patient Name: Yoseph Cordero Date of Spiritual Care Visit: 24   : 1950 Primary Dx: Infected ulcer of skin, unspecified ulcer stage (HCC)       Referred By: Referral From: AMADO Cheney    Summary:  [facility] Writer attempted to visit but patient was busy with staff.      - Spiritual Care:  remains available as needed for follow up.    Spiritual Care support can be requested via an [facility] consult.   For urgent/immediate needs, please contact the On Call  at: [place]: ext [phone]    Chaplain Faiza.

## 2024-07-17 ENCOUNTER — APPOINTMENT (OUTPATIENT)
Dept: WOUND CARE | Facility: HOSPITAL | Age: 74
End: 2024-07-17
Attending: NURSE PRACTITIONER
Payer: MEDICARE

## 2024-07-17 ENCOUNTER — TELEPHONE (OUTPATIENT)
Dept: FAMILY MEDICINE CLINIC | Facility: CLINIC | Age: 74
End: 2024-07-17

## 2024-07-17 LAB
ANION GAP SERPL CALC-SCNC: 4 MMOL/L (ref 0–18)
ANION GAP SERPL CALC-SCNC: 8 MMOL/L (ref 0–18)
APTT PPP: 58.7 SECONDS (ref 23–36)
APTT PPP: 74 SECONDS (ref 23–36)
APTT PPP: 79.1 SECONDS (ref 23–36)
BUN BLD-MCNC: 22 MG/DL (ref 9–23)
BUN BLD-MCNC: 26 MG/DL (ref 9–23)
BUN/CREAT SERPL: 29.7 (ref 10–20)
BUN/CREAT SERPL: 35.6 (ref 10–20)
CALCIUM BLD-MCNC: 9.1 MG/DL (ref 8.7–10.4)
CALCIUM BLD-MCNC: 9.5 MG/DL (ref 8.7–10.4)
CHLORIDE SERPL-SCNC: 105 MMOL/L (ref 98–112)
CHLORIDE SERPL-SCNC: 106 MMOL/L (ref 98–112)
CO2 SERPL-SCNC: 26 MMOL/L (ref 21–32)
CO2 SERPL-SCNC: 29 MMOL/L (ref 21–32)
CREAT BLD-MCNC: 0.73 MG/DL
CREAT BLD-MCNC: 0.74 MG/DL
DEPRECATED RDW RBC AUTO: 46.2 FL (ref 35.1–46.3)
DEPRECATED RDW RBC AUTO: 47.1 FL (ref 35.1–46.3)
EGFRCR SERPLBLD CKD-EPI 2021: 96 ML/MIN/1.73M2 (ref 60–?)
EGFRCR SERPLBLD CKD-EPI 2021: 96 ML/MIN/1.73M2 (ref 60–?)
ERYTHROCYTE [DISTWIDTH] IN BLOOD BY AUTOMATED COUNT: 15.5 % (ref 11–15)
ERYTHROCYTE [DISTWIDTH] IN BLOOD BY AUTOMATED COUNT: 15.8 % (ref 11–15)
GLUCOSE BLD-MCNC: 136 MG/DL (ref 70–99)
GLUCOSE BLD-MCNC: 138 MG/DL (ref 70–99)
HCT VFR BLD AUTO: 27.8 %
HCT VFR BLD AUTO: 29.1 %
HEMOCCULT STL QL: NEGATIVE
HGB BLD-MCNC: 8.9 G/DL
HGB BLD-MCNC: 9.5 G/DL
MCH RBC QN AUTO: 26.6 PG (ref 26–34)
MCH RBC QN AUTO: 27.5 PG (ref 26–34)
MCHC RBC AUTO-ENTMCNC: 32 G/DL (ref 31–37)
MCHC RBC AUTO-ENTMCNC: 32.6 G/DL (ref 31–37)
MCV RBC AUTO: 83 FL
MCV RBC AUTO: 84.3 FL
OSMOLALITY SERPL CALC.SUM OF ELEC: 293 MOSM/KG (ref 275–295)
OSMOLALITY SERPL CALC.SUM OF ELEC: 296 MOSM/KG (ref 275–295)
PLATELET # BLD AUTO: 492 10(3)UL (ref 150–450)
PLATELET # BLD AUTO: 529 10(3)UL (ref 150–450)
POTASSIUM SERPL-SCNC: 4 MMOL/L (ref 3.5–5.1)
POTASSIUM SERPL-SCNC: 4 MMOL/L (ref 3.5–5.1)
RBC # BLD AUTO: 3.35 X10(6)UL
RBC # BLD AUTO: 3.45 X10(6)UL
SODIUM SERPL-SCNC: 138 MMOL/L (ref 136–145)
SODIUM SERPL-SCNC: 140 MMOL/L (ref 136–145)
WBC # BLD AUTO: 17.2 X10(3) UL (ref 4–11)
WBC # BLD AUTO: 19.8 X10(3) UL (ref 4–11)

## 2024-07-17 PROCEDURE — 99233 SBSQ HOSP IP/OBS HIGH 50: CPT | Performed by: HOSPITALIST

## 2024-07-17 PROCEDURE — 99222 1ST HOSP IP/OBS MODERATE 55: CPT | Performed by: INTERNAL MEDICINE

## 2024-07-17 RX ORDER — SODIUM HYPOCHLORITE 1.25 MG/ML
SOLUTION TOPICAL DAILY
Status: DISCONTINUED | OUTPATIENT
Start: 2024-07-18 | End: 2024-07-19

## 2024-07-17 RX ORDER — HEPARIN SODIUM AND DEXTROSE 10000; 5 [USP'U]/100ML; G/100ML
INJECTION INTRAVENOUS CONTINUOUS
Status: DISCONTINUED | OUTPATIENT
Start: 2024-07-17 | End: 2024-07-19

## 2024-07-17 RX ORDER — FERROUS SULFATE 300 MG/5ML
300 LIQUID (ML) ORAL DAILY
Status: DISCONTINUED | OUTPATIENT
Start: 2024-07-17 | End: 2024-07-19

## 2024-07-17 NOTE — PROGRESS NOTES
[facility]  part of [facility]    PODIATRY PROGRESS NOTE    Yoseph Cordero Patient Status:  Inpatient    1950 MRN [id number]   Location [facility] 5SW/SE Attending Uvaldo Echevarria MD   Hosp Day # 8 PCP Irving Sheets,      Date of Admission:  2024    History of Present Illness:  Yoseph Cordero is a a(n) 74 year old male that is nonverbal with family at bedside. Pt is s/p right heel ulcer bedside debridement.     History:  Past Medical History:    Anxiety state, unspecified    CVA (cerebral infarction)    Dementia (HCC)    Depression    Heterozygous factor V Leiden mutation (HCC)    Other and unspecified hyperlipidemia    Other ill-defined conditions(799.89)    Cardiomegaly Pleural effusion w/idopathic pleuropuricarditis    Pulmonary embolism (HCC)    Stroke (HCC)    Unspecified sleep apnea     Past Surgical History:   Procedure Laterality Date    Colonoscopy      Electrocardiogram, complete  1914    Scanned to Media Tab     Family History   Problem Relation Age of Onset    Cancer Father         Lung - Smoker  Cause of death    Other (Other) Mother         during     Diabetes Neg     Glaucoma Neg       reports that he has quit smoking. He has never used smokeless tobacco. He reports that he does not drink alcohol and does not use drugs.    Allergies:  No Known Allergies    Medications:    Current Facility-Administered Medications:     ferrous sulfate 300 (60 Fe) MG/5ML oral syrup 300 mg, 300 mg, Oral, Daily    [START ON 2024] sodium hypochlorite (Dakin's) 0.125 % external solution, , Topical, Daily    collagenase (Santyl) 250 UNIT/GM ointment, , Topical, BID    metoprolol tartrate (Lopressor) partial tab 12.5 mg, 12.5 mg, Oral, 2x Daily(Beta Blocker)    HYDROcodone-acetaminophen (Norco) 5-325 MG per tab 1 tablet, 1 tablet, Oral, Q6H PRN    levETIRAcetam (Keppra) 100 MG/ML oral solution 500 mg, 500 mg, Oral, Nightly    levETIRAcetam (Keppra) 100 MG/ML  oral solution 250 mg, 250 mg, Oral, QAM    acetaminophen (Tylenol Extra Strength) tab 500 mg, 500 mg, Oral, Q8H    piperacillin-tazobactam (Zosyn) 3.375 g in dextrose 5% 100 mL IVPB-ADDV, 3.375 g, Intravenous, Q8H    rivaroxaban (Xarelto) tab 20 mg, 20 mg, Oral, Daily with food    ondansetron (Zofran) 4 MG/2ML injection 4 mg, 4 mg, Intravenous, Q6H PRN    OLANZapine (Zyprexa) 5 mg in sterile water for injection (PF) IM injection, 5 mg, Intramuscular, Q12H PRN    Review of Systems:      Physical Exam:    Derm: Right heel ulcer with fibrogranular wound bed, healthy bleeding and exposed calcaneal bone.  No wounds or ulcerations to entire left foot.     Vasc: nonpalpable pedal pulses. CFT  brisk to digits    Neuro: absent protective sensation to digits    MSK: no pain with palpation to RLE.       Imaging:  No results found.     Laboratory Data:  Recent Labs   Lab 07/15/24  0933 07/16/24  0600 07/17/24  0450   RBC 3.47*   < > 3.35*   HGB 9.2*   < > 8.9*   HCT 29.9*   < > 27.8*   MCV 86.2   < > 83.0   MCH 26.5   < > 26.6   MCHC 30.8*   < > 32.0   RDW 15.3*   < > 15.5*   NEPRELIM 12.89*  --   --    WBC 17.6*   < > 17.2*   .0   < > 492.0*    < > = values in this interval not displayed.       Impression and Plan:  Patient Active Problem List   Diagnosis    Hemiplegia affecting right dominant side (HCC)    Chronic obstructive pulmonary disease (HCC)    Combined forms of age-related cataract of both eyes    Esophageal reflux    Late onset Alzheimer's disease with behavioral disturbance (HCC)    Alcohol abuse    Calcification of aorta (HCC)    Alcoholism (HCC)    History of stroke    Dysphagia    Hyperglycemia    Weakness    Gait disturbance    Abnormal involuntary movement    Acute pulmonary embolism without acute cor pulmonale, unspecified pulmonary embolism type (HCC)    Factor V Leiden (HCC)    Leukocytosis    Tachycardia    Altered mental status, unspecified altered mental status type    Seizures (HCC)    Infected  ulcer of skin, unspecified ulcer stage (HCC)    Infected decubitus ulcer    Transient neurological symptoms    Palliative care by specialist     Pt seen and examined. Findings discussed with family members at bedside.   Discussed both options of continuing palliative care and possible BKA/AKA. Informed family that the wound post debridement is healthy and wound recommend IV abx and palliative care. Pt's family does not want BKA/AKA at this time.   Recommend daily dressing changes with Dakins solution.  No surgical intervention per podiatry.   Ok to discharge per podiatry and follow up at [facility] [facility].       Sury Blake DPM   7/17/2024  10:46 AM     Oculoplastic Surgeon Procedure Text (B): After obtaining clear surgical margins the patient was sent to oculoplastics for surgical repair.  The patient understands they will receive post-surgical care and follow-up from the referring physician's office.

## 2024-07-17 NOTE — PROGRESS NOTES
[facility]  part of [facility]    Progress Note    Yoseph Cordero Patient Status:  Inpatient    1950 MRN [id number]   Location [facility] 5SW/SE Attending Uvaldo Echevarria MD   Hosp Day # 8 PCP Irving Sheets DO     Chief Complaint:   Chief Complaint   Patient presents with    Fever   Infected R heel pressure ulcer    Subjective:   Yoseph Cordero is nonverbal.no complaints. He wakes up and smiles. On RA.   Per night RN no events overnight.   No family at bedside   Objective:   Objective:    Blood pressure 132/64, pulse 106, temperature 99.6 °F (37.6 °C), temperature source Axillary, resp. rate 18, height 5' 7\" (1.702 m), weight 133 lb 12.8 oz (60.7 kg), SpO2 97%.    Physical Exam:    General:  Nonverbal. R hemiparesis chronic   Respiratory: Clear to auscultation bilaterally. No wheezes. No rhonchi.  Cardiovascular: S1, S2. Regular rate and rhythm. No murmurs, rubs or gallops.   Abdomen: Soft, nontender, nondistended.  Positive bowel sounds. No rebound or guarding.  Neurologic: R hemiparesis. Rigid LUE  Extremities: R foot wrapped + R foot edema.       Results:   Results:    Labs:  Recent Labs   Lab 24  0822 24  0625 24  0924 24  0539 24  0902 24  0513 07/15/24  0933 24  0600 24  0450   WBC  --    < > 12.1*   < > 14.6* 19.0* 17.6* 16.2* 17.2*   HGB  --    < > 7.9*   < > 8.4* 8.3* 9.2* 8.9* 8.9*   MCV  --    < > 84.8   < > 83.8 82.8 86.2 83.9 83.0   PLT  --    < > 380.0   < > 445.0 427.0  427.0 433.0 503.0*  503.0* 492.0*   INR 1.36*  --  1.22*  --   --   --   --   --   --     < > = values in this interval not displayed.       Recent Labs   Lab 24  0625 24  0539 24  0902 07/15/24  0933 24  0603 24  0629 24  0450   * 115*   < > 99 113* 114* 136*   BUN 15 16   < > 18 27* 28* 26*   CREATSERUM 0.52* 0.57*   < > 0.61* 0.67* 0.67* 0.73   CA 8.6* 8.6*   < > 9.6 9.2 9.4 9.1   ALB 3.1* 3.3  --   3.6  --   --   --     142   < > 142 138 139 138   K 3.2* 4.0  4.0   < > 3.9 4.1 4.1 4.0    110   < > 107 105 105 105   CO2 28.0 26.0   < > 27.0 25.0 25.0 29.0   ALKPHO 59 67  --  81  --   --   --    AST 21 37*  --  38*  --   --   --    ALT 23 36  --  49  --   --   --    BILT 0.2 0.2  --  0.3  --   --   --    TP 5.9 6.2  --  7.0  --   --   --     < > = values in this interval not displayed.       Estimated Creatinine Clearance: 77.4 mL/min (based on SCr of 0.73 mg/dL).    Recent Labs   Lab 07/11/24  0822 07/12/24  0924   PTP 17.6* 16.2*   INR 1.36* 1.22*            Culture:  Hospital Encounter on 07/09/24   1. Aerobic Bacterial Culture     Status: None    Collection Time: 07/14/24  1:57 PM    Specimen: Foot; Other   Result Value Ref Range    Aerobic Culture Result  N/A     Mixture of organisms suggestive of normal skin criselda    Aerobic Culture Result  N/A     No Staph aureus, Beta Strep or Pseudo aeruginosa isolated    Aerobic Smear 4+ WBCs seen N/A    Aerobic Smear 3+ Gram Positive Rods N/A    Aerobic Smear 2+ Gram positive cocci in pairs N/A   2. Blood Culture     Status: None    Collection Time: 07/11/24 11:18 PM    Specimen: Blood,peripheral   Result Value Ref Range    Blood Culture Result No Growth 5 Days N/A       Cardiac  No results for input(s): \"TROP\", \"PBNP\" in the last 168 hours.      Imaging: Imaging data reviewed in Epic.  No results found.    Medications:    metoprolol tartrate  12.5 mg Oral 2x Daily(Beta Blocker)    levETIRAcetam  500 mg Oral Nightly    levETIRAcetam  250 mg Oral QAM    ferrous sulfate  325 mg Oral Daily with breakfast    acetaminophen  500 mg Oral Q8H    piperacillin-tazobactam  3.375 g Intravenous Q8H    [Held by provider] rivaroxaban  20 mg Oral Daily with food    sodium hypochlorite   Topical BID         Assessment and Plan:   Assessment & Plan:      Acute proteus bacteremia/ severe sepsis   Sacral decubitus ulcer POA  L hip ulcer. - POA  IV zosyn. Will need midline  and 4 weeks IV abx on discharge.   ID, general surgery on consult.   Wound care   PT/OT - [facility]  Blood cx 2/2 proteus mirabilis. Rpt blood cx neg x 4   Wound culture proteus mirabilis + enterococcus feacalis   Lactic acid nl now. Afebrile now.   S/p bedside sharp excisional debridement of infected L hip decubitus ulcer and sacral decubitus ulcer 7/11   Will need santyl and PRN light debridement at bedside per surgeon  Episode of unresponsiveness - resolved.   H/o CVA with chronic R hemiparesis   H/o seizure d/o   Dementia - essentially nonverbal   Resume xarelto as no surgical plans  PT/OT - [facility]AH.   Keppra 500mg BID.   CT head no acute abnormality old large L MCA CVA. Volume loss   EEG no seizure activity.   Episode of unresponsiveness likely from sepsis   Acute Chronic Sinus tachycardia  Likely secondary to infection/low grade temps. Tachycardia coincides with fevers  Lactic acid normal.   Recent ECHO from May 24' LVEF 60-65%  Prev admit -110. Was seen by cards  Metoprolol 12.5mg BID  hold for sBP < 100 or HR < 60   H/o VTE  Factor V leiden   Resume xarelto. Stop heparin gtt.    Recent PE May 24'   D dimer downtrending 11 ---> 1   Infected R heel stage IV pressure ulcer POA with   Podiatry and vascular surgery on consult.   MRI reviewed Stage IV heel ulcer with osteomyelitis of dorsal calcaneus. Possible intra tendinous abscess.   S/p bedside debridement 7/14 probe to calcaneus bone. Necrotic rim excised, fibrous tissue excised, fat and achilles tendon visible. Sharp debridement to level of bone.   Family not ready for BKA. He has dementia and has started with contractures, sacral wounds as well.  I think he would be better served with a knee level amputation.  It is not as likely that he will get back walking.  Son wants to exhaust all options to save the leg. Cx mixed nl skin criselda.   IV zosyn. Plans for midline and 4 weeks IV abx on discharge.   Arterial MORE - abnormal.   Family meeting with podiatry,  palliative and me 7/16. Family does not want BKA. Plan is IV abx x 4 weeks, wound care, palliative care on discharge.   Acute anemia   No overt bleeding seen.   Combination of ACD and iron def.   PO ferrous sulfate BID.   FOB negative.   Baseline Hb 12. Came in at 10.7   Other medical problems  HTN  HOLLI    Dispo: Await surgery recs today for possible repeat bedside debridement    Yoseph has a history of Infected ulcer of skin, unspecified ulcer stage (HCC), Infected decubitus ulcer  Chronic obstructive pulmonary disease (HCC), Dysphagia  which would require Yoseph to elevate the head of the bed to alleviate pain, promote good body alignment, prevent contractures, and prevent respiratory infections.  Patient is bed bound and is not able to reposition himself and needs the bed to alleviate pain in his  upper and lower extremities  Yoseph also requires frequent and immediate changes in body position in ways that's not feasible in ordinary bed  It is in my opinion that a semi-electric hospital bed is reasonable and necessary.       Yoseph would be  bed confined without the use of a lift for transfers between bed, a chair, wheelchair      >55min spent, >50% spent counseling and coordinating care in the form of educating pt/family and d/w consultants and staff. Most of the time spent discussing the above plan. Await surgery plans. He will need midline. Ordered AM labs. Will call Dr Rehman and Dr Qureshi to discuss        Plan of care discussed with patient or family at bedside.   Uvaldo Echevarria MD  Hospitalist          Supplementary Documentation:     Quality:  DVT Prophylaxis: xarelto   CODE status: Full  Dispo: per clinical course           Estimated date of discharge: TBD  Discharge is dependent on: clinical stability  At this point Mr. Cordero is expected to be discharge to: home

## 2024-07-17 NOTE — TELEPHONE ENCOUNTER
Arline from [facility] is calling to advise she will be faxing PCP orders for patient.     It is mentioned the faxes failed to confirm on 7/16/24.    Please advise.

## 2024-07-17 NOTE — PROGRESS NOTES
Palliative Care Progress Note    Yoseph Gil Patient Status:  Inpatient    1950 MRN [id number]   Location [facility] 5SW/SE Attending Uvaldo Echevarria MD   Hosp Day # 8 PCP Irving Sheets DO     Date of Consult: 7/15/2024  Patient seen at: [facility] Inpatient    Reason for Consultation: Consult requested for goals of care and care coordination.    Subjective     S: No acute events. Wounds improved per podiatry. Sitting in chair.     Review of Systems: Palliative Care symptom needs assessed:     Unable to assess     Palliative Care Social History:   Marital Status:   Children: Yes  Living Situation Prior to Admit: Home  Occupational History: Retired  Personal: From [place] as a young man. 3 children. Lives at home with wife. Non-verbal.    Spiritual  Yoseph ACOSTA     requested:  NA    Medical History: obtained from [facility]  Past Medical History:    Anxiety state, unspecified    CVA (cerebral infarction)    Dementia (HCC)    Depression    Heterozygous factor V Leiden mutation (HCC)    Other and unspecified hyperlipidemia    Other ill-defined conditions(799.89)    Cardiomegaly Pleural effusion w/idopathic pleuropuricarditis    Pulmonary embolism (HCC)    Stroke (HCC)    Unspecified sleep apnea     Past Surgical History:   Procedure Laterality Date    Colonoscopy      Electrocardiogram, complete  1914    Scanned to Media Tab       Family History: obtained from [facility]  Family History   Problem Relation Age of Onset    Cancer Father         Lung - Smoker  Cause of death    Other (Other) Mother         during     Diabetes Neg     Glaucoma Neg        Allergies:  No Known Allergies    Medications:     Current Facility-Administered Medications:     ferrous sulfate 300 (60 Fe) MG/5ML oral syrup 300 mg, 300 mg, Oral, Daily    [START ON 2024] sodium hypochlorite (Dakin's) 0.125 % external solution, , Topical, Daily    collagenase (Santyl) 250 UNIT/GM ointment, ,  Topical, BID    metoprolol tartrate (Lopressor) partial tab 12.5 mg, 12.5 mg, Oral, 2x Daily(Beta Blocker)    HYDROcodone-acetaminophen (Norco) 5-325 MG per tab 1 tablet, 1 tablet, Oral, Q6H PRN    levETIRAcetam (Keppra) 100 MG/ML oral solution 500 mg, 500 mg, Oral, Nightly    levETIRAcetam (Keppra) 100 MG/ML oral solution 250 mg, 250 mg, Oral, QAM    acetaminophen (Tylenol Extra Strength) tab 500 mg, 500 mg, Oral, Q8H    piperacillin-tazobactam (Zosyn) 3.375 g in dextrose 5% 100 mL IVPB-ADDV, 3.375 g, Intravenous, Q8H    ondansetron (Zofran) 4 MG/2ML injection 4 mg, 4 mg, Intravenous, Q6H PRN    OLANZapine (Zyprexa) 5 mg in sterile water for injection (PF) IM injection, 5 mg, Intramuscular, Q12H PRN  No current outpatient medications on file.         Palliative Performance Scale: 40 %  % Ambulation Activity Level Self-Care Intake Consciousness   100 Full  Normal  No Disease Full Normal Full   90 Full  Normal  Some Disease Full Normal Full   80 Full  Normal w/effort  Some Disease Full Normal or reduced Full   70 Reduced  Can't Perform Job  Some Disease Full Normal or reduced Full   60 Reduced  Can't Perform Hobby   Significant Disease Occ Assist Normal or reduced Full or confused   50 Mainly sit/lie Can't do any work  Extensive Disease Partial Assist Normal or reduced Full or confused   40 Mainly in bed Can't do any work  Extensive Disease Mainly Assist Normal or reduced Full or confused   30 Bed Bound Can't do any work  Extensive Disease Max Assist  Total Care Reduced  Drowsy/confused   20 Bed Bound Can't do any work  Extensive Disease Max Assist  Total Care Minimal  Drowsy/confused   10 Bed Bound Can't do any work  Extensive Disease Max Assist  Total Care Mouth Care  Drowsy/confused   0 Death        Objective      Vital Signs:  Blood pressure 119/63, pulse 106, temperature 99.7 °F (37.6 °C), temperature source Axillary, resp. rate 16, height 5' 7\" (1.702 m), weight 133 lb 12.8 oz (60.7 kg), SpO2 95%.  Body mass  index is 20.96 kg/m².  Non-verbal signs of pain present: No    Physical Exam:  General: Alert, awake and in no  apparent respiratory distress.  HEENT: AT/NC.   Cardiac: RRR  Lungs: Normal effort on RA  Abdomen: Soft, non-tender, non-distended, normal bowel sounds X 4 quadrants   Extremities: contractures, wounds as noted  Skin: Warm and dry.    Hematology:  Lab Results   Component Value Date    WBC 17.2 (H) 07/17/2024    HGB 8.9 (L) 07/17/2024    HCT 27.8 (L) 07/17/2024    .0 (H) 07/17/2024       Coags:  Lab Results   Component Value Date    INR 1.22 (H) 07/12/2024    PTT 74.0 (H) 07/17/2024       Chemistry:  Lab Results   Component Value Date    CREATSERUM 0.73 07/17/2024    BUN 26 (H) 07/17/2024     07/17/2024    K 4.0 07/17/2024     07/17/2024    CO2 29.0 07/17/2024     (H) 07/17/2024    CA 9.1 07/17/2024    ALB 3.6 07/15/2024    ALKPHO 81 07/15/2024    BILT 0.3 07/15/2024    TP 7.0 07/15/2024    AST 38 (H) 07/15/2024    ALT 49 07/15/2024    PSA 16.20 (H) 03/11/2019    DDIMER 1.87 (H) 07/14/2024    MG 1.9 07/13/2024       Imaging:  No results found.    Assessment and Recommendations        Infected ulcer of skin, unspecified ulcer stage (HCC)    Infected decubitus ulcer    Transient neurological symptoms    Symptom Management      Pain:  -prescribed tramadol by his PCP  -I would prefer Norco  -start 5mg q6hr prn for wound pain     Prevention of Constipation:    - Recommend Senna-S 8.6mg (2) tabs BID Scheduled   - Recommend Miralax 17g daily Scheduled   - Recommend Dulcolax suppository daily PRN    2.     Serious Illness Conversation:   Code Status: Full  POA: NA, surrogate would be his wife Cassi  7/15 I met Yoseph and spoke with Tyra his daughter. Her  Dwaine was there with her on speaker phone.   She is the main point person/care taker etc. She said they make decisions all together as a family.   He has been struggling with mobility since May when he had an ankle fracture  after slipping off the bed.   They see the wounds he has as preventable and are having a very difficult time with anger and frustration toward the home health company they feel as inadequately treating/preventing them.   Tyra was surprised when I mentioned the recommendation of amputation. She asked 'isn't that the worst case scenario?' She is under the impression that the blood supply was being assessed and trial of debridement on offer prior to having to resort to amputation.  She said \"If they have to do that, I don't think he'll ever be mobile again and then he will just waste away.\"  I mentioned that unfortunately sometimes wounds are never able to be healed and that these complex processes that are happening COULD be a transition to a terminal illness.   I think the our approach should continue to be try/hope for the best with continued education for the family on what we can expect. I will follow along with them.  I met with family along with podiatrist. She reported good wound healing which was promising so as to avoid amputation. Family would want to avoid that and may even decide that comfort care would be preferable.   We discussed nuances of care at home and what 'comfort care/hospice' might look like in the future if he decompensates further. Based on FAST scale, he technically meets hospice criteria now, but family would like to continue palliative/supportive treatments for now.  Appreciate social work input: they filed a complaint against [facility] and so will need an alternative provider for home care/wound care.   Most importantly, Tyra expressed that 'they want everything possible to save him, but they don't want him to suffer.' I commend this, as I think they are thinking clearly about setting limits if needed to what they would be willing to put him through.  Long discussion with Tyra about confusion/mixed messages. Along with Dr. Hernandez we explained that it is not likely that  his foot will heal, abx or not. Tyra understood this and said that frankly amputation (would be an AKA) is not something they want to put their dad through.   She does understand that he will PROBABLY have chronic osteomyelitis and have non-healing wounds for the rest of his life. She is hoping for the best and is not at a place where she feels that she can 'give up' and just 'let him die.'  I think this is very understandable, especially with how interactive he was with her today and how he was feeding himself while sitting in the chair.   I think outpatient palliative follow up would be appropriate.      Palliative Care Follow Up: Palliative care team will Continue to follow while inpatient    Thank you for allowing Palliative Care services to participate in the care of Yoseph Cordero.    A total of 60 minutes were spent on this visit, which included all of the following: direct face to face contact, history taking, physical examination, and >50% was spent counseling and coordinating care.    Pj Rehman MD  Palliative Care Services  [facility] [phone]    Note to patient:  The 21st Century Cures Act makes medical notes like these available to patients in the interest of transparency. However, be advised this is a medical document. It is intended as peer to peer communication. It is written in medical language and may contain abbreviations or verbiage that are unfamiliar. It may appear blunt or direct. Medical documents are intended to carry relevant information, facts as evident, and the clinical opinion of the practitioner.

## 2024-07-17 NOTE — DISCHARGE INSTRUCTIONS
[employer] [facility] [employer]).  P: [phone]    [facility]:  [facility], [address] [address]  Phone: [phone]  Fax: [phone]

## 2024-07-17 NOTE — PLAN OF CARE
Patient VSS, no acute changes during shift. Updated family on plan of care. Frequent rounding, no needs at this time. Call light always in reach and safety precautions in place.     Problem: Patient Centered Care  Goal: Patient preferences are identified and integrated in the patient's plan of care  Description: Interventions:  - What would you like us to know as we care for you? From home with wife  - Provide timely, complete, and accurate information to patient/family  - Incorporate patient and family knowledge, values, beliefs, and cultural backgrounds into the planning and delivery of care  - Encourage patient/family to participate in care and decision-making at the level they choose  - Honor patient and family perspectives and choices  Outcome: Progressing     Problem: SKIN/TISSUE INTEGRITY - ADULT  Goal: Incision(s), wounds(s) or drain site(s) healing without S/S of infection  Description: INTERVENTIONS:  - Assess and document risk factors for pressure ulcer development  - Assess and document skin integrity  - Assess and document dressing/incision, wound bed, drain sites and surrounding tissue  - Implement wound care per orders  - Initiate isolation precautions as appropriate  - Initiate Pressure Ulcer prevention bundle as indicated  Outcome: Progressing     Problem: PAIN - ADULT  Goal: Verbalizes/displays adequate comfort level or patient's stated pain goal  Description: INTERVENTIONS:  - Encourage pt to monitor pain and request assistance  - Assess pain using appropriate pain scale  - Administer analgesics based on type and severity of pain and evaluate response  - Implement non-pharmacological measures as appropriate and evaluate response  - Consider cultural and social influences on pain and pain management  - Manage/alleviate anxiety  - Utilize distraction and/or relaxation techniques  - Monitor for opioid side effects  - Notify MD/LIP if interventions unsuccessful or patient reports new pain  -  Anticipate increased pain with activity and pre-medicate as appropriate  Outcome: Progressing     Problem: RISK FOR INFECTION - ADULT  Goal: Absence of fever/infection during anticipated neutropenic period  Description: INTERVENTIONS  - Monitor WBC  - Administer growth factors as ordered  - Implement neutropenic guidelines  Outcome: Progressing     Problem: SAFETY ADULT - FALL  Goal: Free from fall injury  Description: INTERVENTIONS:  - Assess pt frequently for physical needs  - Identify cognitive and physical deficits and behaviors that affect risk of falls.  - [place] fall precautions as indicated by assessment.  - Educate pt/family on patient safety including physical limitations  - Instruct pt to call for assistance with activity based on assessment  - Modify environment to reduce risk of injury  - Provide assistive devices as appropriate  - Consider OT/PT consult to assist with strengthening/mobility  - Encourage toileting schedule  Outcome: Progressing     Problem: DISCHARGE PLANNING  Goal: Discharge to home or other facility with appropriate resources  Description: INTERVENTIONS:  - Identify barriers to discharge w/pt and caregiver  - Include patient/family/discharge partner in discharge planning  - Arrange for needed discharge resources and transportation as appropriate  - Identify discharge learning needs (meds, wound care, etc)  - Arrange for interpreters to assist at discharge as needed  - Consider post-discharge preferences of patient/family/discharge partner  - Complete POLST form as appropriate  - Assess patient's ability to be responsible for managing their own health  - Refer to Case Management Department for coordinating discharge planning if the patient needs post-hospital services based on physician/LIP order or complex needs related to functional status, cognitive ability or social support system  Outcome: Progressing     Problem: NEUROLOGICAL - ADULT  Goal: Absence of seizures  Description:  INTERVENTIONS  - Monitor for seizure activity  - Administer anti-seizure medications as ordered  - Monitor neurological status  Outcome: Progressing     Problem: DECISION MAKING  Goal: Pt/Family able to effectively weigh alternatives and participate in decision making related to treatment and care  Description: INTERVENTIONS:  - Determine when there are differences between patient's view, family's view, and healthcare provider's view of condition  - Facilitate patient and family articulation of goals for care  - Help patient and family identify pros/cons of alternative solutions  - Provide information as requested by patient/family  - Respect patient/family right to receive or not to receive information  - Serve as a liaison between patient and family and health care team  - Initiate Consults from Ethics, Palliative Care or initiate Family Care Conference as is appropriate  Outcome: Progressing     Problem: CARDIOVASCULAR - ADULT  Goal: Maintains optimal cardiac output and hemodynamic stability  Description: INTERVENTIONS:  - Monitor vital signs, rhythm, and trends  - Monitor for bleeding, hypotension and signs of decreased cardiac output  - Evaluate effectiveness of vasoactive medications to optimize hemodynamic stability  - Monitor arterial and/or venous puncture sites for bleeding and/or hematoma  - Assess quality of pulses, skin color and temperature  - Assess for signs of decreased coronary artery perfusion - ex. Angina  - Evaluate fluid balance, assess for edema, trend weights  Outcome: Progressing  Goal: Absence of cardiac arrhythmias or at baseline  Description: INTERVENTIONS:  - Continuous cardiac monitoring, monitor vital signs, obtain 12 lead EKG if indicated  - Evaluate effectiveness of antiarrhythmic and heart rate control medications as ordered  - Initiate emergency measures for life threatening arrhythmias  - Monitor electrolytes and administer replacement therapy as ordered  Outcome: Progressing      Problem: Impaired Swallowing  Goal: Minimize aspiration risk  Description: Interventions:  - Patient should be alert and upright for all feedings (90 degrees preferred)  - Offer food and liquids at a slow rate  - No straws  - Encourage small bites of food and small sips of liquid  - Offer pills one at a time, crush or deliver with applesauce as needed  - Discontinue feeding and notify MD (or speech pathologist) if coughing or persistent throat clearing or wet/gurgly vocal quality is noted  Outcome: Progressing     Problem: Impaired Communication  Goal: Patient will achieve maximal communication potential  Description: Interventions:  - Allow additional time for processing after asking questions or providing instructions  Outcome: Progressing     Problem: Patient/Family Goals  Goal: Patient/Family Long Term Goal  Description: Patient's Long Term Goal: Discharge from hospital    Interventions:  - Remain free of fevers  - Determine appropriate antibiotic plan for discharge  - Remain free of complication from infection  - See additional Care Plan goals for specific interventions  Outcome: Progressing  Goal: Patient/Family Short Term Goal  Description: Patient's Short Term Goal: Decrease infection related to wounds    Interventions:   - Maintain IV antibiotics  - Wound care and debridement as indicated  - Maintain diligent turning schedule  - See additional Care Plan goals for specific interventions  Outcome: Progressing

## 2024-07-17 NOTE — PLAN OF CARE
Family at the bedside,vitals as documented.Continues on heparin drip per MD order. Main nurse changed wound dressings per order.Patient sitting in a chair  eating dinner.   Problem: Patient Centered Care  Goal: Patient preferences are identified and integrated in the patient's plan of care  Description: Interventions:  - What would you like us to know as we care for you? From home with wife  - Provide timely, complete, and accurate information to patient/family  - Incorporate patient and family knowledge, values, beliefs, and cultural backgrounds into the planning and delivery of care  - Encourage patient/family to participate in care and decision-making at the level they choose  - Honor patient and family perspectives and choices  7/17/2024 1742 by Annabel Muhammad RN  Outcome: Progressing  7/17/2024 1010 by Annabel Muhammad RN  Outcome: Progressing     Problem: SKIN/TISSUE INTEGRITY - ADULT  Goal: Incision(s), wounds(s) or drain site(s) healing without S/S of infection  Description: INTERVENTIONS:  - Assess and document risk factors for pressure ulcer development  - Assess and document skin integrity  - Assess and document dressing/incision, wound bed, drain sites and surrounding tissue  - Implement wound care per orders  - Initiate isolation precautions as appropriate  - Initiate Pressure Ulcer prevention bundle as indicated  7/17/2024 1742 by Annabel Muhammad RN  Outcome: Progressing  7/17/2024 1010 by Annabel Muhammad RN  Outcome: Progressing     Problem: PAIN - ADULT  Goal: Verbalizes/displays adequate comfort level or patient's stated pain goal  Description: INTERVENTIONS:  - Encourage pt to monitor pain and request assistance  - Assess pain using appropriate pain scale  - Administer analgesics based on type and severity of pain and evaluate response  - Implement non-pharmacological measures as appropriate and evaluate response  - Consider cultural and social influences on pain and pain management  -  Manage/alleviate anxiety  - Utilize distraction and/or relaxation techniques  - Monitor for opioid side effects  - Notify MD/LIP if interventions unsuccessful or patient reports new pain  - Anticipate increased pain with activity and pre-medicate as appropriate  7/17/2024 1742 by Annabel Muhammad RN  Outcome: Progressing  7/17/2024 1010 by Annabel Muhmamad RN  Outcome: Progressing     Problem: RISK FOR INFECTION - ADULT  Goal: Absence of fever/infection during anticipated neutropenic period  Description: INTERVENTIONS  - Monitor WBC  - Administer growth factors as ordered  - Implement neutropenic guidelines  7/17/2024 1742 by Annabel Muhammad RN  Outcome: Progressing  7/17/2024 1010 by Annabel Muhammad RN  Outcome: Progressing     Problem: SAFETY ADULT - FALL  Goal: Free from fall injury  Description: INTERVENTIONS:  - Assess pt frequently for physical needs  - Identify cognitive and physical deficits and behaviors that affect risk of falls.  - [place] fall precautions as indicated by assessment.  - Educate pt/family on patient safety including physical limitations  - Instruct pt to call for assistance with activity based on assessment  - Modify environment to reduce risk of injury  - Provide assistive devices as appropriate  - Consider OT/PT consult to assist with strengthening/mobility  - Encourage toileting schedule  7/17/2024 1742 by Annabel Muhammad RN  Outcome: Progressing  7/17/2024 1010 by Annabel Muhammad RN  Outcome: Progressing     Problem: DISCHARGE PLANNING  Goal: Discharge to home or other facility with appropriate resources  Description: INTERVENTIONS:  - Identify barriers to discharge w/pt and caregiver  - Include patient/family/discharge partner in discharge planning  - Arrange for needed discharge resources and transportation as appropriate  - Identify discharge learning needs (meds, wound care, etc)  - Arrange for interpreters to assist at discharge as needed  - Consider post-discharge preferences of  patient/family/discharge partner  - Complete POLST form as appropriate  - Assess patient's ability to be responsible for managing their own health  - Refer to Case Management Department for coordinating discharge planning if the patient needs post-hospital services based on physician/LIP order or complex needs related to functional status, cognitive ability or social support system  7/17/2024 1742 by Annabel Muhammad RN  Outcome: Progressing  7/17/2024 1010 by Annabel Muhammad RN  Outcome: Progressing     Problem: NEUROLOGICAL - ADULT  Goal: Absence of seizures  Description: INTERVENTIONS  - Monitor for seizure activity  - Administer anti-seizure medications as ordered  - Monitor neurological status  7/17/2024 1742 by Annabel Muhammad RN  Outcome: Progressing  7/17/2024 1010 by Annabel Muhammad RN  Outcome: Progressing     Problem: DECISION MAKING  Goal: Pt/Family able to effectively weigh alternatives and participate in decision making related to treatment and care  Description: INTERVENTIONS:  - Determine when there are differences between patient's view, family's view, and healthcare provider's view of condition  - Facilitate patient and family articulation of goals for care  - Help patient and family identify pros/cons of alternative solutions  - Provide information as requested by patient/family  - Respect patient/family right to receive or not to receive information  - Serve as a liaison between patient and family and health care team  - Initiate Consults from Ethics, Palliative Care or initiate Family Care Conference as is appropriate  7/17/2024 1742 by Annabel Muhammad RN  Outcome: Progressing  7/17/2024 1010 by Annabel Muhammad RN  Outcome: Progressing     Problem: CARDIOVASCULAR - ADULT  Goal: Maintains optimal cardiac output and hemodynamic stability  Description: INTERVENTIONS:  - Monitor vital signs, rhythm, and trends  - Monitor for bleeding, hypotension and signs of decreased cardiac output  - Evaluate  effectiveness of vasoactive medications to optimize hemodynamic stability  - Monitor arterial and/or venous puncture sites for bleeding and/or hematoma  - Assess quality of pulses, skin color and temperature  - Assess for signs of decreased coronary artery perfusion - ex. Angina  - Evaluate fluid balance, assess for edema, trend weights  7/17/2024 1742 by Annabel Muhammad RN  Outcome: Progressing  7/17/2024 1010 by Annabel Muhammad RN  Outcome: Progressing  Goal: Absence of cardiac arrhythmias or at baseline  Description: INTERVENTIONS:  - Continuous cardiac monitoring, monitor vital signs, obtain 12 lead EKG if indicated  - Evaluate effectiveness of antiarrhythmic and heart rate control medications as ordered  - Initiate emergency measures for life threatening arrhythmias  - Monitor electrolytes and administer replacement therapy as ordered  7/17/2024 1742 by Annabel Muhammad RN  Outcome: Progressing  7/17/2024 1010 by Annabel Muhammad RN  Outcome: Progressing     Problem: Impaired Swallowing  Goal: Minimize aspiration risk  Description: Interventions:  - Patient should be alert and upright for all feedings (90 degrees preferred)  - Offer food and liquids at a slow rate  - No straws  - Encourage small bites of food and small sips of liquid  - Offer pills one at a time, crush or deliver with applesauce as needed  - Discontinue feeding and notify MD (or speech pathologist) if coughing or persistent throat clearing or wet/gurgly vocal quality is noted  7/17/2024 1742 by Annabel Muhammad RN  Outcome: Progressing  7/17/2024 1010 by Annabel Muhammad RN  Outcome: Progressing     Problem: Impaired Communication  Goal: Patient will achieve maximal communication potential     Problem: Patient/Family Goals  Goal: Patient/Family Long Term Goal  Description: Patient's Long Term Goal: Discharge from hospital    Interventions:  - Remain free of fevers  - Determine appropriate antibiotic plan for discharge  - Remain free of complication  from infection  - See additional Care Plan goals for specific interventions  7/17/2024 1742 by Annabel Muhammad, RN  Outcome: Progressing  7/17/2024 1010 by Annabel Muhammad RN  Outcome: Progressing  Goal: Patient/Family Short Term Goal  Description: Patient's Short Term Goal: Decrease infection related to wounds    Interventions:   - Maintain IV antibiotics  - Wound care and debridement as indicated  - Maintain diligent turning schedule  - See additional Care Plan goals for specific interventions  7/17/2024 1742 by Annabel Muhammad RN  Outcome: Progressing  7/17/2024 1010 by Annabel Muhammad, RN  Outcome: Progressing

## 2024-07-17 NOTE — CONGREGATE LIVING REVIEW
[facility] Living Authorization    The [facility] Review Committee has reviewed this case and the patient IS APPROVED for discharge to a facility for Short Term Skilled once the following procedure is followed:     - The physician discharge instructions (contained within the [place] note for SNF) must inlcude the below appropriate and approved COVID instructions to the facility    For questions regarding CLRC approval process, please contact the CM assigned to the case.  For questions regarding RN discharge workflow, please contact the unit Clinical Leader.

## 2024-07-17 NOTE — PLAN OF CARE
Patient sitting in a chair and feeding himself. Patient is calm and cooperative. Vital signs are stable  Problem: Patient Centered Care  Goal: Patient preferences are identified and integrated in the patient's plan of care  Description: Interventions:  - What would you like us to know as we care for you? From home with wife  - Provide timely, complete, and accurate information to patient/family  - Incorporate patient and family knowledge, values, beliefs, and cultural backgrounds into the planning and delivery of care  - Encourage patient/family to participate in care and decision-making at the level they choose  - Honor patient and family perspectives and choices  Outcome: Progressing     Problem: SKIN/TISSUE INTEGRITY - ADULT  Goal: Incision(s), wounds(s) or drain site(s) healing without S/S of infection  Description: INTERVENTIONS:  - Assess and document risk factors for pressure ulcer development  - Assess and document skin integrity  - Assess and document dressing/incision, wound bed, drain sites and surrounding tissue  - Implement wound care per orders  - Initiate isolation precautions as appropriate  - Initiate Pressure Ulcer prevention bundle as indicated  Outcome: Progressing     Problem: PAIN - ADULT  Goal: Verbalizes/displays adequate comfort level or patient's stated pain goal  Description: INTERVENTIONS:  - Encourage pt to monitor pain and request assistance  - Assess pain using appropriate pain scale  - Administer analgesics based on type and severity of pain and evaluate response  - Implement non-pharmacological measures as appropriate and evaluate response  - Consider cultural and social influences on pain and pain management  - Manage/alleviate anxiety  - Utilize distraction and/or relaxation techniques  - Monitor for opioid side effects  - Notify MD/LIP if interventions unsuccessful or patient reports new pain  - Anticipate increased pain with activity and pre-medicate as appropriate  Outcome:  Progressing     Problem: RISK FOR INFECTION - ADULT  Goal: Absence of fever/infection during anticipated neutropenic period  Description: INTERVENTIONS  - Monitor WBC  - Administer growth factors as ordered  - Implement neutropenic guidelines  Outcome: Progressing     Problem: SAFETY ADULT - FALL  Goal: Free from fall injury  Description: INTERVENTIONS:  - Assess pt frequently for physical needs  - Identify cognitive and physical deficits and behaviors that affect risk of falls.  - [place] fall precautions as indicated by assessment.  - Educate pt/family on patient safety including physical limitations  - Instruct pt to call for assistance with activity based on assessment  - Modify environment to reduce risk of injury  - Provide assistive devices as appropriate  - Consider OT/PT consult to assist with strengthening/mobility  - Encourage toileting schedule  Outcome: Progressing     Problem: DISCHARGE PLANNING  Goal: Discharge to home or other facility with appropriate resources  Description: INTERVENTIONS:  - Identify barriers to discharge w/pt and caregiver  - Include patient/family/discharge partner in discharge planning  - Arrange for needed discharge resources and transportation as appropriate  - Identify discharge learning needs (meds, wound care, etc)  - Arrange for interpreters to assist at discharge as needed  - Consider post-discharge preferences of patient/family/discharge partner  - Complete POLST form as appropriate  - Assess patient's ability to be responsible for managing their own health  - Refer to Case Management Department for coordinating discharge planning if the patient needs post-hospital services based on physician/LIP order or complex needs related to functional status, cognitive ability or social support system  Outcome: Progressing     Problem: NEUROLOGICAL - ADULT  Goal: Absence of seizures  Description: INTERVENTIONS  - Monitor for seizure activity  - Administer anti-seizure medications  as ordered  - Monitor neurological status  Outcome: Progressing     Problem: DECISION MAKING  Goal: Pt/Family able to effectively weigh alternatives and participate in decision making related to treatment and care  Description: INTERVENTIONS:  - Determine when there are differences between patient's view, family's view, and healthcare provider's view of condition  - Facilitate patient and family articulation of goals for care  - Help patient and family identify pros/cons of alternative solutions  - Provide information as requested by patient/family  - Respect patient/family right to receive or not to receive information  - Serve as a liaison between patient and family and health care team  - Initiate Consults from Ethics, Palliative Care or initiate Family Care Conference as is appropriate  Outcome: Progressing     Problem: CARDIOVASCULAR - ADULT  Goal: Maintains optimal cardiac output and hemodynamic stability  Description: INTERVENTIONS:  - Monitor vital signs, rhythm, and trends  - Monitor for bleeding, hypotension and signs of decreased cardiac output  - Evaluate effectiveness of vasoactive medications to optimize hemodynamic stability  - Monitor arterial and/or venous puncture sites for bleeding and/or hematoma  - Assess quality of pulses, skin color and temperature  - Assess for signs of decreased coronary artery perfusion - ex. Angina  - Evaluate fluid balance, assess for edema, trend weights  Outcome: Progressing  Goal: Absence of cardiac arrhythmias or at baseline  Description: INTERVENTIONS:  - Continuous cardiac monitoring, monitor vital signs, obtain 12 lead EKG if indicated  - Evaluate effectiveness of antiarrhythmic and heart rate control medications as ordered  - Initiate emergency measures for life threatening arrhythmias  - Monitor electrolytes and administer replacement therapy as ordered  Outcome: Progressing     Problem: Impaired Swallowing  Goal: Minimize aspiration risk  Description:  Interventions:  - Patient should be alert and upright for all feedings (90 degrees preferred)  - Offer food and liquids at a slow rate  - No straws  - Encourage small bites of food and small sips of liquid  - Offer pills one at a time, crush or deliver with applesauce as needed  - Discontinue feeding and notify MD (or speech pathologist) if coughing or persistent throat clearing or wet/gurgly vocal quality is noted  Outcome: Progressing     Problem: Impaired Communication  Goal: Patient will achieve maximal communication potential   Patient sitting in a chair and eating by himself. Vital sings are stable see flowsheets for more info.  Problem: Patient/Family Goals  Goal: Patient/Family Long Term Goal  Description: Patient's Long Term Goal: Discharge from hospital    Interventions:  - Remain free of fevers  - Determine appropriate antibiotic plan for discharge  - Remain free of complication from infection  - See additional Care Plan goals for specific interventions  Outcome: Progressing  Goal: Patient/Family Short Term Goal  Description: Patient's Short Term Goal: Decrease infection related to wounds    Interventions:   - Maintain IV antibiotics  - Wound care and debridement as indicated  - Maintain diligent turning schedule  - See additional Care Plan goals for specific interventions  Outcome: Progressing

## 2024-07-17 NOTE — DISCHARGE PLANNING
Pt discussed in RN DC rounds. SW followed up with daughter in regards to determine discharge plan- [facility] vs Home.     Daughter had questions in regards to insurance and [facility]. Sw explained it is up to insurance to approve for [facility], there it is insurance hands. Daughter was explained that insurance will determine length of stay from insurance standpoint while at [facility]     Daughter inquired about new HH company. Daughter wishes to have other [facility] such [facility] and [facility] added on the referral. Daughter does not want [facility]. Daughter informed SW that when patient was at home [facility] was in the process of order hospital bed and other equipment. SW reached out [facility] liaison who informed that Tilt wheelchair and Hospital bed was order from [facility]. SW was given the number of  now called [facility] ([employer] [facility]). SW called [facility] ([employer] [facility]), who confirmed that patient has Hospital bed with Gel Overlay mattress and with Tilt back wheelchair. SW informed that patient will need akua lift and air mattress. Sw was informed [facility] ([employer] [facility]) will need updated orders and information. Daughter inquired about cost for semi electric and upgrade to electric. SW was informed that insurance would cover semi electric, but if patient and family wishes to have full electric upgrade is $450.00. ALMA ROSA notified MD of verbiage and signed order for patient. ALMA ROSA canceled HME referral in aidin and referred to [facility] via aidin.     Amy Hameed has a history of Infected ulcer of skin, unspecified ulcer stage (HCC), Infected decubitus ulcer Chronic obstructive pulmonary disease (HCC), Dysphagia which would require Yoseph to elevate the head of the bed to alleviate pain, promote good body alignment, prevent contractures, and prevent respiratory infections. Patient is bed bound and is not able to reposition himself and needs the  bed to alleviate pain in his upper and lower extremities Yoseph also requires frequent and immediate changes in body position in ways that's not feasible in ordinary bed It is in my opinion that a semi-electric hospital bed is reasonable and necessary.    Yoseph would be bed confined without the use of a lift for transfers between bed, a chair, wheelchair     212pm- Veribage is uploaded into aidin. DME order is not signed at this time.     230pm- ALMA ROSA received signed order, and sent via aidin to [facility]. ALMA ROSA called [facility], inquired about the cost of the bed per daughters request. ALMA ROSA was informed for the Semi Electric bed, patient's [employer] will be billed, and cover 80%, then once [employer] pays their portion of the bed, [facility] will send payment to family if patient has not meet his 20% deductible or out of pocket. ALMA ROSA provided family updated HH list at bedside.     4453pm- ALMA ROSA provided HH list to son at bedside with [facility] breakdown of cost that was told to ALMA ROSA.       Plan: Pending medical clearance, DC to [facility], NATALIE, CLRC, *PT OT, *choice, *auth VS home with HH**choice DME- [facility]      SW/CM to remain available for support and/or discharge planning.     Samantha Olivares, MSW, LSW   x [phone]

## 2024-07-17 NOTE — DIETARY NOTE
ADULT NUTRITION REASSESSMENT      RECOMMENDATIONS TO MD: See Nutrition Intervention for Oral Nutrition Supplement ( ONS)     Pt is at moderate nutrition risk.  Malnutrition criteria pending completion of Nutrition Focus Physical Exam ( NFPE).      ADMITTING DIAGNOSIS:  Infected ulcer of skin, unspecified ulcer stage (HCC) [L98.499, L08.9]     PERTINENT PAST MEDICAL HISTORY:    Past Medical History:    Anxiety state, unspecified    CVA (cerebral infarction)    Dementia (HCC)    Depression    Heterozygous factor V Leiden mutation (HCC)    Other and unspecified hyperlipidemia    Other ill-defined conditions(799.89)    Cardiomegaly Pleural effusion w/idopathic pleuropuricarditis    Pulmonary embolism (HCC)    Stroke (HCC)    Unspecified sleep apnea    has a past surgical history that includes electrocardiogram, complete (01/08/1914) and colonoscopy.     PATIENT STATUS: Initial 07/10/24: Patient (pt) identified at Nutrition risk due to pressure injuries after the screening process.  Medical findings:  Infected right heel pressure ulcer with acute sepsis, leukocytosis. Plan surgery per Surgery services.   Upon visit, pt is bound to bed,  non verbal, right hemiparesis from prior CVA. Muscle contractures noted right upper and lower extremities. But smiling during interview with family. Family feeding pt lunch meal. Pt eats well with good appetite. Ate 90% this am, consistent with RN report. Diet hx/eval:    Family reports pt eating well  at home. Wt eval:    stable at 131-135# , no recent wt changes noted. Nutrition findings:   deferred NFPE d/t MD walked in twice to eval Wounds with RN. Discussed with family provision to initiate ONS and protein & vit/min modular (Stevan) to aid in wound healing, family agreeable.     Update 07/17/24: Reassessment per protocol. Chart reviewed. Visited pt, soundly asleep, family in the room. Daughter reported improved po, \"he eats everything we give him\", she stated. Daughter stated pt  consumes ONS as given: Bfast: Ensure Enlive vanilla mixed with Stevan orange flavor, and Dinner: Orange flavor Stevan mixed in 8 oz water. Daughter stated however, Stevan seems to not be mixed well. RD to communicate with kitchen to ensure ONS mixed well. Daughter also expressed limited food choices on soft easy to chew menu, reported pt east all foods at home, missing teeth status is not an issue for pt, per daughter report. Noted no SLP evaluation, SEC diet er MD. Will discuss upgrade with care team.       FOOD/NUTRITION INTAKE ANALYSIS:    Current Appetite: Good , improved per daughter  Current Intake: % of most meals since last assessment  Current Intake Meeting Needs: Yes, and oral nutrition supplements (ONS) to maximize  Percent Meals Eaten (last 6 days)       Date/Time Percent Meals Eaten (%)    07/11/24 1100 100 %    07/12/24 1048 0 %    07/12/24 1300 95 %    07/15/24 1313 100 %    07/15/24 1627 60 %    07/16/24 1100 100 %    07/16/24 1520 70 %           Food Allergies: No Known Food Allergies (NKFA)  Cultural/Ethnic/Sabianism Preferences: None    GASTROINTESTINAL: +BM 7/16 soft brown formed large stool x 1 and On modified diet likely r/t prior CVA      MEDICATIONS: reviewed    continuous dose heparin 1,700 Units/hr (07/17/24 1234)      ferrous sulfate  300 mg Oral Daily    [START ON 7/18/2024] sodium hypochlorite   Topical Daily    collagenase   Topical BID    metoprolol tartrate  25 mg Oral 2x Daily(Beta Blocker)    metoprolol tartrate  12.5 mg Oral Once    levETIRAcetam  500 mg Oral Nightly    levETIRAcetam  250 mg Oral QAM    acetaminophen  500 mg Oral Q8H    piperacillin-tazobactam  3.375 g Intravenous Q8H       LABS: reviewed giving KCL replacement.   Recent Labs     07/16/24  0629 07/17/24  0450 07/17/24  1338   * 136* 138*   BUN 28* 26* 22   CREATSERUM 0.67* 0.73 0.74   CA 9.4 9.1 9.5    138 140   K 4.1 4.0 4.0    105 106   CO2 25.0 29.0 26.0   OSMOCALC 294 293 296*        NUTRITION RELATED PHYSICAL FINDINGS:  - Nutrition Focused Physical Exam (NFPE):  deferred, MD walked in twice to evaluate Wounds. Pt soundly asleep on today's visit 7/17.      - Fluid Accumulation: non-pitting Left Foot  see RN documentation for details  - Skin Integrity:  Mulitple unstageable pressure injuries  see RN documentation for details    ANTHROPOMETRICS:  HT: 170.2 cm (5' 7\")  WT: 60.7 kg (133 lb 12.8 oz)   BMI: Body mass index is 20.96 kg/m².  BMI CLASSIFICATION: <22 considered underweight for advanced age  IBW: 148 lbs        91% IBW  Usual Body Wt: 130-135 lbs       100% UBW  WEIGHT HISTORY:    Patient Weight(s) for the past 336 hrs:   Weight   07/17/24 0557 60.7 kg (133 lb 12.8 oz)   07/13/24 0550 66.8 kg (147 lb 3.2 oz)   07/12/24 0732 66.9 kg (147 lb 6.4 oz)   07/09/24 2216 61.6 kg (135 lb 11.2 oz)     Wt Readings from Last 10 Encounters:   07/17/24 60.7 kg (133 lb 12.8 oz)   07/09/24 61.6 kg (135 lb 11.2 oz)   06/03/24 61.7 kg (136 lb)   05/10/24 61.9 kg (136 lb 8 oz)   05/07/24 59.4 kg (131 lb)   05/05/24 59.5 kg (131 lb 3.2 oz)   05/02/24 59 kg (130 lb)   01/04/24 59.9 kg (132 lb)   07/26/23 54.4 kg (120 lb)   01/11/23 50.8 kg (112 lb)       NUTRITION DIAGNOSIS/PROBLEM:    Increased nutrient needs related to Increased demand for nutrient  for wound healing as evidenced by multiple pressure injuries     NUTRITION DIAGNOSIS PROGRESS:  Some Improvement (continue) - improved po, and good ONS intake      NUTRITION INTERVENTION:     NUTRITION PRESCRIPTION:   Estimated Nutrition needs: Dosing wt of 61.6 kg --wt taken on 7/9 .  Calories: 3307-7510 calories/day (30-35 calories per kg Dosing wt)  Protein:  g protein/day (1.5-1.7 g protein/kg  Dosing wt)    - Diet:       Procedures    Regular/General diet Texture Consistency: Soft / Easy to Chew ; Is Patient on Accuchecks? No      - RD  Care Plan: Initiated ONS (oral nutritional supplements)  - Meals and snacks: Arranged snacks and continue good  po intake.   - Medical Food Supplements- Ensure Enlive (350 calories/ 20 g protein each) Daily Vanilla mixed with Stevan at bfast, And another Stevan mixed in water at Dinner-added by RD,  Rational/Benefits discussed.  - Vitamin and mineral supplements: none/ Stevan provides Vit C, E, B12, Zinc, Calcium, Collagen and HMB ( with Arginine & Glutamine Proteins)   - Feeding assistance: feed by staff, family assisting with feeding as well.  - Nutrition education: Discussed importance of increased protein/calories and vit & min and adequate fluid   for wound healing process.   - Coordination of nutrition care: collaboration with other providers  - Discharge and transfer of nutrition care to new setting or provider: monitor plans      MONITOR AND EVALUATE/NUTRITION GOALS:  - Food and Nutrient Intake:    Monitor: adequacy of PO intake and adequacy of supplement intake  - Food and Nutrient Administration:    Monitor: N/A  - Anthropometric Measurement:    Monitor weight  - Nutrition Goals:    maintain wt within 5%, intake to meet needs, good supplement intake, labs within acceptable limits, support wound healing, and improved GI status      RD will follow up.    Vy Martines RD, LDN  Clinical Dietitian  Office: [phone]

## 2024-07-18 ENCOUNTER — APPOINTMENT (OUTPATIENT)
Dept: GENERAL RADIOLOGY | Facility: HOSPITAL | Age: 74
End: 2024-07-18
Attending: HOSPITALIST
Payer: MEDICARE

## 2024-07-18 LAB
ANION GAP SERPL CALC-SCNC: 6 MMOL/L (ref 0–18)
APTT PPP: 108 SECONDS (ref 23–36)
APTT PPP: 76.2 SECONDS (ref 23–36)
BUN BLD-MCNC: 24 MG/DL (ref 9–23)
BUN/CREAT SERPL: 35.3 (ref 10–20)
CALCIUM BLD-MCNC: 9.5 MG/DL (ref 8.7–10.4)
CHLORIDE SERPL-SCNC: 105 MMOL/L (ref 98–112)
CO2 SERPL-SCNC: 27 MMOL/L (ref 21–32)
CREAT BLD-MCNC: 0.68 MG/DL
DEPRECATED RDW RBC AUTO: 47.1 FL (ref 35.1–46.3)
EGFRCR SERPLBLD CKD-EPI 2021: 98 ML/MIN/1.73M2 (ref 60–?)
ERYTHROCYTE [DISTWIDTH] IN BLOOD BY AUTOMATED COUNT: 16 % (ref 11–15)
GLUCOSE BLD-MCNC: 126 MG/DL (ref 70–99)
HCT VFR BLD AUTO: 27.7 %
HGB BLD-MCNC: 9 G/DL
MCH RBC QN AUTO: 27.3 PG (ref 26–34)
MCHC RBC AUTO-ENTMCNC: 32.5 G/DL (ref 31–37)
MCV RBC AUTO: 83.9 FL
OSMOLALITY SERPL CALC.SUM OF ELEC: 292 MOSM/KG (ref 275–295)
PLATELET # BLD AUTO: 482 10(3)UL (ref 150–450)
PLATELET # BLD AUTO: 482 10(3)UL (ref 150–450)
POTASSIUM SERPL-SCNC: 3.9 MMOL/L (ref 3.5–5.1)
RBC # BLD AUTO: 3.3 X10(6)UL
SODIUM SERPL-SCNC: 138 MMOL/L (ref 136–145)
WBC # BLD AUTO: 20.5 X10(3) UL (ref 4–11)

## 2024-07-18 PROCEDURE — 99233 SBSQ HOSP IP/OBS HIGH 50: CPT | Performed by: HOSPITALIST

## 2024-07-18 PROCEDURE — 11044 DBRDMT BONE 1ST 20 SQ CM/<: CPT

## 2024-07-18 PROCEDURE — 0QB10ZZ EXCISION OF SACRUM, OPEN APPROACH: ICD-10-PCS

## 2024-07-18 PROCEDURE — 71045 X-RAY EXAM CHEST 1 VIEW: CPT | Performed by: HOSPITALIST

## 2024-07-18 PROCEDURE — 0JBM0ZZ EXCISION OF LEFT UPPER LEG SUBCUTANEOUS TISSUE AND FASCIA, OPEN APPROACH: ICD-10-PCS

## 2024-07-18 PROCEDURE — 11047 DBRDMT BONE EACH ADDL: CPT

## 2024-07-18 PROCEDURE — 11042 DBRDMT SUBQ TIS 1ST 20SQCM/<: CPT

## 2024-07-18 RX ORDER — PIPERACILLIN SODIUM, TAZOBACTAM SODIUM 3; .375 G/15ML; G/15ML
4.5 INJECTION, POWDER, LYOPHILIZED, FOR SOLUTION INTRAVENOUS EVERY 8 HOURS
Qty: 324 G | Refills: 0 | Status: SHIPPED | OUTPATIENT
Start: 2024-07-18 | End: 2024-08-11

## 2024-07-18 NOTE — PROGRESS NOTES
INFECTIOUS DISEASE PROGRESS NOTE  [facility]  part of [facility] ID PROGRESS NOTE    Yoseph Cordero Patient Status:  Inpatient    1950 MRN [id number]   Location [facility] 5SW/SE Attending Uvaldo Echevarria MD   Hosp Day # 8 PCP Irving Sheets,      Subjective:  No acute events. Family at bedside.    ASSESSMENT:    Antibiotics: Zosyn  Vancomycin, meropenem     # Acute Proteus mirabilis bacteremia from likely wound source with sacral, ischial and R heel wound   -Wound cx Proteus, Enterococcus, Strep constellatus  # R heel ulcer with necrotic tissue and exposed bone s/p bedside I&D 24 with +PTB   - unlikely to heal with abx IV or PO. Amputation has been recommended already. Currently no plans for amputation per family preference    # Leukocytosis  # CVA, bedbound  # H/o PE     PLAN:    -  Continue IV zosyn x4 weeks - EOT 24  -  Follow fever curve, wbc.  -  Reviewed labs, micro, imaging reports.  -  Case d/w primary, family, Dr. Rehman, Dr. Echevarria     History of Present Illness:  Yoseph Cordero is a 74 year old male with a history of CVA, PE, dementia, mostly bedbound, who presented to [facility] ED on  after being seen by PCP with concerns for worsening wounds. Patient did have a XR of R foot showing osteomyelitis on . On arrival, Tmax 101.7, wbc 22.3, lactate 2.8, wound swab obtained, started on vancomycin and zosyn. Blood cx with Proteus. Plans to be seen by podiatry and general surgery. ID consulted.    Physical Exam:  /61 (BP Location: Right arm)   Pulse 110   Temp 100 °F (37.8 °C) (Axillary)   Resp 18   Ht 170.2 cm (5' 7\")   Wt 133 lb 12.8 oz (60.7 kg)   SpO2 97%   BMI 20.96 kg/m²     Gen:   In chair, eating  HEENT:  EOMI, neck supple  Abdom:  Soft, no TTP  Skin/extrem:  No rashes, R heel wrapped  :   Purewick draining yellow urine  Lines:  PIV+    Laboratory Data: Reviewed    Microbiology: Reviewed    Radiology: Reviewed      Vijay  MD [facility]  [phone]

## 2024-07-18 NOTE — CM/SW NOTE
Pt discussed in RN DC rounds. SW was informed that [facility] [place] wound measurements on patient's sacrum. SW informed wound care team of the request. SW was informed that wound care added the measurements on 7/18 note. SW sent wound care note to [facility] via aidin and informed [facility] of updated wound care note.     SW will follow up with family in regards to determine final plan [facility] vs Home with DME, HH, and C[facility].     1030am-  Sw initiated [facility] referral providers via aidin. SW will follow up with list and choice.     1136am-  SW received final ID RX off the printer. SW will initiate infusion providers.  SW called [facility] to see if patient is approved for any services. Patient final ID RX is q8 for 24 days. SW was informed that patient is eligible for home with another provider at this time. SW sent infusion referrals via aidin     1147am- SW sent tentative referrals via aidin to see if patient would qualify for LTACH     131pm- SW asked for CM to assist with planning.CM requested SW to send additional [facility] referrals. ALMA ROSA sent referral out. ALMA ROSA reached out to [facility] to see who start auth - hospital or [facility] choice.     224pm- SW was informed by Erum JURADO that patient wouldn't qualify for LTACH due patient's insurance requiring 2 midnights in ICU/CCU. Patient reviewed event management. SW canceled LTACH referrals. PT saw patient, and will place note. DSC will initiate auth for patient today. MD informed ALMA ROSA ad CM that DC is tomorrow pending auth    249pm- SW followed up with son at bedside for [facility] choice. Son informed ALMA ROSA that he is considering [facility], however needs to speak w his family first. SW uploaded PT note into [facility] referral. [facility] started auth. At this time Auth is pending. SW uploaded final IV ABX RX into aidin.    425pm- ALMA ROSA followed up with family in regards to [facility] choice. Family reported that they are thinking about [facility], but wishes to have assigned SW/CM follow up tomorrow for final choice. Family had questions about  PICC Line, SW is unable to answer all questions at this time. SW did explained patients who are needing LTC IV ABX does need a PICC/Mid line. Family is aware of insurance auth started today and aware of DC  tomorrow or over the weekend pending medical clearance and insurance auth      Plan: Pending medical clearance, DC to TBD -[facility], [facility], [facility],*choice, *auth started VS home with HH, f2f placed *choice, SOC for [employer], [employer]  - [employer]ua lift/hospital bed, ordered, *pending auth  *infusion provider *line info/cost, [facility]- *list/choice    SW/CM to remain available for support and/or discharge planning.     Samantha Olivares, MSW, LSW   x [phone]

## 2024-07-18 NOTE — PROGRESS NOTES
[facility]  part of [facility]    Progress Note    Yoseph Cordero Patient Status:  Inpatient    1950 MRN [id number]   Location [facility] 5SW/SE Attending Uvaldo Echevarria MD   Hosp Day # 9 PCP Irving Sheets DO     Chief Complaint:   Chief Complaint   Patient presents with    Fever   Infected R heel pressure ulcer    Subjective:   Yoseph Cordero is nonverbal.no complaints. Resting comfortable in bed.   Per night RN no events overnight.   No family at bedside   Objective:   Objective:    Blood pressure 125/64, pulse 115, temperature 99.7 °F (37.6 °C), temperature source Axillary, resp. rate 16, height 5' 7\" (1.702 m), weight 133 lb 12.8 oz (60.7 kg), SpO2 97%.    Physical Exam:    General:  Nonverbal. R hemiparesis chronic   Respiratory: Clear to auscultation bilaterally. No wheezes. No rhonchi.  Cardiovascular: S1, S2. Regular rate and rhythm.  Abdomen: Soft, nontender, nondistended.  Positive bowel sounds. No rebound or guarding.  Neurologic: R hemiparesis. Rigid LUE  Extremities: R foot wrapped + R foot edema.       Results:   Results:    Labs:  Recent Labs   Lab 24  0924 24  0539 07/15/24  0933 24  0600 24  0450 24  1400 24  0638   WBC 12.1*   < > 17.6* 16.2* 17.2* 19.8* 20.5*   HGB 7.9*   < > 9.2* 8.9* 8.9* 9.5* 9.0*   MCV 84.8   < > 86.2 83.9 83.0 84.3 83.9   .0   < > 433.0 503.0*  503.0* 492.0* 529.0* 482.0*  482.0*   INR 1.22*  --   --   --   --   --   --     < > = values in this interval not displayed.       Recent Labs   Lab 24  0625 24  0539 24  0902 07/15/24  0933 24  0603 24  0450 24  1338 24  0638   * 115*   < > 99   < > 136* 138* 126*   BUN 15 16   < > 18   < > 26* 22 24*   CREATSERUM 0.52* 0.57*   < > 0.61*   < > 0.73 0.74 0.68*   CA 8.6* 8.6*   < > 9.6   < > 9.1 9.5 9.5   ALB 3.1* 3.3  --  3.6  --   --   --   --     142   < > 142   < > 138 140 138   K 3.2*  4.0  4.0   < > 3.9   < > 4.0 4.0 3.9    110   < > 107   < > 105 106 105   CO2 28.0 26.0   < > 27.0   < > 29.0 26.0 27.0   ALKPHO 59 67  --  81  --   --   --   --    AST 21 37*  --  38*  --   --   --   --    ALT 23 36  --  49  --   --   --   --    BILT 0.2 0.2  --  0.3  --   --   --   --    TP 5.9 6.2  --  7.0  --   --   --   --     < > = values in this interval not displayed.       Estimated Creatinine Clearance: 83.1 mL/min (A) (based on SCr of 0.68 mg/dL (L)).    Recent Labs   Lab 07/12/24 0924   PTP 16.2*   INR 1.22*            Culture:  Hospital Encounter on 07/09/24   1. Aerobic Bacterial Culture     Status: None    Collection Time: 07/14/24  1:57 PM    Specimen: Foot; Other   Result Value Ref Range    Aerobic Culture Result  N/A     Mixture of organisms suggestive of normal skin criselda    Aerobic Culture Result  N/A     No Staph aureus, Beta Strep or Pseudo aeruginosa isolated    Aerobic Smear 4+ WBCs seen N/A    Aerobic Smear 3+ Gram Positive Rods N/A    Aerobic Smear 2+ Gram positive cocci in pairs N/A   2. Blood Culture     Status: None    Collection Time: 07/11/24 11:18 PM    Specimen: Blood,peripheral   Result Value Ref Range    Blood Culture Result No Growth 5 Days N/A       Cardiac  No results for input(s): \"TROP\", \"PBNP\" in the last 168 hours.      Imaging: Imaging data reviewed in Epic.  No results found.    Medications:    ferrous sulfate  300 mg Oral Daily    sodium hypochlorite   Topical Daily    collagenase   Topical BID    metoprolol tartrate  12.5 mg Oral Once    metoprolol tartrate  12.5 mg Oral 2x Daily(Beta Blocker)    levETIRAcetam  500 mg Oral Nightly    levETIRAcetam  250 mg Oral QAM    acetaminophen  500 mg Oral Q8H    piperacillin-tazobactam  3.375 g Intravenous Q8H         Assessment and Plan:   Assessment & Plan:      Acute proteus bacteremia/ severe sepsis   Sacral decubitus ulcer POA  L hip ulcer. - POA  IV zosyn. Will need midline and 4 weeks IV abx on discharge.   ID,  general surgery on consult.   Wound care   PT/OT - [facility]  Blood cx 2/2 proteus mirabilis. Rpt blood cx neg x 4   Wound culture proteus mirabilis + enterococcus feacalis   Lactic acid nl now. Afebrile now.   S/p bedside sharp excisional debridement of infected L hip decubitus ulcer and sacral decubitus ulcer 7/11   Will need santyl and PRN light debridement at bedside per surgeon    Episode of unresponsiveness - resolved.   H/o CVA with chronic R hemiparesis   H/o seizure d/o   Dementia - essentially nonverbal   Resume xarelto as no surgical plans  PT/OT - [facility].   Keppra 500mg BID.   CT head no acute abnormality old large L MCA CVA. Volume loss   EEG no seizure activity.   Episode of unresponsiveness likely from sepsis     Acute Chronic Sinus tachycardia  Likely secondary to infection/low grade temps. Tachycardia coincides with fevers  Lactic acid normal.   Recent ECHO from May 24' LVEF 60-65%  Prev admit -110. Was seen by cards  Metoprolol 12.5mg BID  hold for sBP < 100 or HR < 60     H/o VTE  Factor V leiden   Resume xarelto. Stop heparin gtt.    Recent PE May 24'   D dimer downtrending 11 ---> 1     Infected R heel stage IV pressure ulcer POA with OM  Podiatry and vascular surgery on consult.   MRI reviewed Stage IV heel ulcer with osteomyelitis of dorsal calcaneus. Possible intra tendinous abscess.   S/p bedside debridement 7/14 probe to calcaneus bone. Necrotic rim excised, fibrous tissue excised, fat and achilles tendon visible. Sharp debridement to level of bone.   Family not ready for BKA. He has dementia and has started with contractures, sacral wounds as well.  I think he would be better served with a knee level amputation.  It is not as likely that he will get back walking.  Son wants to exhaust all options to save the leg. Cx mixed nl skin criselda.   IV zosyn. Plans for midline and 4 weeks IV abx on discharge.   Arterial MORE - abnormal.   Family meeting with podiatry, palliative and me 7/16. Family does  not want BKA. Plan is IV abx x 4 weeks, wound care, palliative care on discharge.     Acute anemia   No overt bleeding seen.   Combination of ACD and iron def.   PO ferrous sulfate BID.   FOB negative.   Baseline Hb 12. Came in at 10.7     Other medical problems  HTN  HOLLI    Dispo: Await surgery recs for possible repeat bedside debridement    Yoseph has a history of Infected ulcer of skin, unspecified ulcer stage (HCC), Infected decubitus ulcer  Chronic obstructive pulmonary disease (HCC), Dysphagia  which would require Yoseph to elevate the head of the bed to alleviate pain, promote good body alignment, prevent contractures, and prevent respiratory infections.  Patient is bed bound and is not able to reposition himself and needs the bed to alleviate pain in his  upper and lower extremities  Yoseph also requires frequent and immediate changes in body position in ways that's not feasible in ordinary bed  It is in my opinion that a semi-electric hospital bed is reasonable and necessary.       Yoseph would be  bed confined without the use of a lift for transfers between bed, a chair, wheelchair      >55min spent, >50% spent counseling and coordinating care in the form of educating pt/family and d/w consultants and staff. Most of the time spent discussing the above plan. Await surgery plans. He will need midline. Ordered AM labs.              Supplementary Documentation:     Quality:  DVT Prophylaxis: xarelto   CODE status: Full  Dispo: per clinical course           Estimated date of discharge: TBD  Discharge is dependent on: clinical stability  At this point Mr. Cordero is expected to be discharge to: home

## 2024-07-18 NOTE — TELEPHONE ENCOUNTER
Dr. Sheets, daughter reports patient is still in hospital. Would like to know if subacute rehab or home health would be better for patient. Would appreciate review of hospital notes and call if possible. Thank you.    Contacted patient's daughter, ok per verbal release form (name and  of patient verified).   Not comfortable with [facility]. He had wounds evaluated by home health that worsened, patient developed sepsis has been inpatient for 10 days. If it is simply for orders for wheelchair and bed, that is fine, but not for home health services. Unsure if subacute rehab is appropriate. Patient has dementia and needs a lot of support with bathing, dressing, feeding, etc. Advised daughter to discuss concerns with patient's care team (, ) to see what is the safest option/best fit for him, verbalizes understanding. Daughter would like Dr. Sheets's review and recommendations if possible.

## 2024-07-18 NOTE — PROGRESS NOTES
07/16/24 1441   Wound 07/09/24 Heel Right   Date First Assessed/Time First Assessed: 07/09/24 2026   Present on Original Admission: Yes  Primary Wound Type: Pressure Injury  Location: Heel  Wound Location Orientation: Right  Wound Description (Comments): odor, necrotic tissue   Wound Image    Site Assessment Moist;Fragile;Bleeding;Painful;Pink;Red;Yellow   Drainage Amount Small   Drainage Description Sanguineous   Treatments Cleansed;Saline;Site Care   Dressing 4x4s;Aquacel Foam;Gauze;Kerlix roll;Tape  (damp 1/4 Dakins om gauze over heel, dry gauze heel cup)   Dressing Changed Changed   Dressing Status Clean;Dry;Intact;Dressing Changed   Wound Length (cm) 5.5 cm   Wound Width (cm) 6 cm   Wound Surface Area (cm^2) 33 cm^2   Wound Depth (cm) 3 cm  (bloody)   Wound Volume (cm^3) 99 cm^3   Margins Not attached   Flori-wound Assessment Fragile;Pink;Maceration;White   Wound Granulation Tissue Red;Pink   Wound Bed Granulation (%) 90 %   Wound Bed Slough (%) 10 %   State of Healing Early/partial granulation   Wound Odor None   Exposed Structure Bone   Pressure Injury Stage 4  (debrided unstageable)   Wound 07/09/24 Hip Left   Date First Assessed/Time First Assessed: 07/09/24 2028   Present on Original Admission: Yes  Primary Wound Type: Pressure Injury  Location: Hip  Wound Location Orientation: Left  Wound Description (Comments): odor eschar   Wound Image    Site Assessment Dry;Fragile;Painful;Pale;Pink;Tan;Yellow;Black   Drainage Amount None   Treatments Cleansed;Saline;Site Care;Topical (Barrier/Moisturizer/Ointment)  (zinc to the flori wound)   Dressing 2x2s;Aquacel Foam;Gauze  (damp 1/4 St Dakins on gauze, over hip, dry gauze)   Dressing Changed Changed   Dressing Status Clean;Dry;Intact;Dressing Changed   Wound Length (cm) 5.5 cm   Wound Width (cm) 3.8 cm   Wound Surface Area (cm^2) 20.9 cm^2   Wound Depth (cm)   (necrotic tissue)   Flori-wound Assessment Dry;Intact;Fragile;Pink   Wound Bed Slough (%) 100 %   State of  Healing Non-healing   Wound Odor None   Pressure Injury Stage U   Wound 07/09/24 Sacrum   Date First Assessed/Time First Assessed: 07/09/24 2029   Present on Original Admission: Yes  Primary Wound Type: Pressure Injury  Location: Sacrum  Wound Description (Comments): odor unstagable   Wound Image    Site Assessment Black;Fragile;Moist;Pink;Red;Painful;Tan  (hole in the eschar with tan exudate)   Drainage Amount Moderate   Drainage Description Tan;Serosanguineous;Odor   Treatments Cleansed;Saline;Site Care;Topical (Barrier/Moisturizer/Ointment)  (zinc to the flori wound)   Dressing 4x4s;Aquacel Foam  (damp 1/4 St Dakins on gauze, over thesacrum, dry gauze)   Dressing Changed Changed   Dressing Status Clean;Dry;Intact;Dressing Changed   Wound Length (cm) 11.2 cm   Wound Width (cm) 12 cm   Wound Surface Area (cm^2) 134.4 cm^2   Wound Depth (cm)   (necrotic tissue)   Margins Poorly defined   Flori-wound Assessment Pink;Purple;Fragile   Wound Granulation Tissue Red   Wound Bed Granulation (%) 10 %   Wound Bed Eschar (%) 90 %   State of Healing Non-healing   Wound Odor Mild   Pressure Injury Stage U   Wound 07/10/24 Ankle Anterior;Right   Date First Assessed/Time First Assessed: 07/10/24 0843   Present on Original Admission: Yes  Primary Wound Type: Pressure Injury  Location: Ankle  Wound Location Orientation: Anterior;Right  Wound Description (Comments): yellow slough   Wound Image    Site Assessment Fragile;Moist;Yellow   Drainage Amount None   Treatments Cleansed;Santyl;Pharmaceutical agent  (Medihoney on dry gauze 2x2)   Dressing 2x2s;Aquacel Foam   Dressing Changed Changed   Dressing Status Clean;Dry;Intact;Dressing Changed   Wound Depth (cm)   (slough tissue)   Flori-wound Assessment Fragile;Pink;Dry   Wound Bed Slough (%) 100 %   State of Healing Non-healing   Pressure Injury Stage U   Wound Follow Up   Follow up needed Yes    Wound Care  Wound measurements added.

## 2024-07-18 NOTE — VASCULAR ACCESS
Vascular access consult for PICC insertion for long term IV abx.    Was informed by SAMANTHA Grewal, patient's son not able to give consent until he has spoken to rest of family about PICC placement. Not for discharge today. Will follow up in am.   Please call VAT RN if family needing more information re PICC placement.

## 2024-07-18 NOTE — PROGRESS NOTES
07/18/24 1132   Wound 07/09/24 Heel Right   Date First Assessed/Time First Assessed: 07/09/24 2026   Present on Original Admission: Yes  Primary Wound Type: Pressure Injury  Location: Heel  Wound Location Orientation: Right  Wound Description (Comments): odor, necrotic tissue   Dressing Status Dry;Intact   Wound 07/09/24 Hip Left   Date First Assessed/Time First Assessed: 07/09/24 2028   Present on Original Admission: Yes  Primary Wound Type: Pressure Injury  Location: Hip  Wound Location Orientation: Left  Wound Description (Comments): odor eschar   Wound Image    Site Assessment Moist;Pale;Pink;Fragile;Painful;Tan   Drainage Amount Scant   Drainage Description Serosanguineous;Yellow   Treatments Cleansed;Saline;Site Care;Santyl   Dressing 4x4s;Aquacel Foam;Gauze  (Santyl on gauze, damp saline 2x2 gauze applied, border foam)   Dressing Changed Changed   Dressing Status Clean;Dry;Intact;Dressing Changed   Wound Depth (cm)   (necrotic tissue)   Madhuri-wound Assessment Dry;Intact;Fragile;Pink   Wound Granulation Tissue Pink   Wound Bed Granulation (%) 5 %   Wound Bed Slough (%) 95 %   State of Healing Non-healing   Wound Odor None   Pressure Injury Stage U   Wound 07/09/24 Sacrum   Date First Assessed/Time First Assessed: 07/09/24 2029   Present on Original Admission: Yes  Primary Wound Type: Pressure Injury  Location: Sacrum  Wound Description (Comments): odor unstagable   Wound Image    Site Assessment Moist;Painful;Pink;Fragile;Red;Yellow;Black  (s/p bedside debridement by Taco KULKARNI)   Drainage Amount Moderate   Drainage Description Tan;Odor;Serosanguineous   Treatments Cleansed;Packings;Saline;Site Care;Dakins   Dressing 4x4s;Aquacel Foam   Dressing Changed Changed   Dressing Status Clean;Dry;Intact;Dressing Changed   Wound Depth (cm)   (exposed soft bone tissue)   Madhuri-wound Assessment Dry;Fragile;Painful;Pink;Purple   Wound Granulation Tissue Red;Pink   Wound Bed Granulation (%) 70 %   Wound Bed Slough (%)  30 %   State of Healing Non-healing   Wound Odor Strong   Exposed Structure Muscle;Bone  (bone is soft)   Pressure Injury Stage 4   Wound 07/10/24 Ankle Anterior;Right   Date First Assessed/Time First Assessed: 07/10/24 0843   Present on Original Admission: Yes  Primary Wound Type: Pressure Injury  Location: Ankle  Wound Location Orientation: Anterior;Right  Wound Description (Comments): yellow slough   Dressing Status Clean;Dry;Intact   Wound Follow Up   Follow up needed Yes   Comfort and Environment Interventions   Specialty Bed/Mattress   (air pump is on the Isotour gel mattress)     Wound Care Services  Follow up on the pt. spoke with Taco KULKARNI and he will bedside debride the left heel and sacrum ulcers again and the pt. was started on Santyl last night to the left hip and sacrum. No family here at this time. The pt. was assisted back to bed with the use of the ceiling lift. He was repositioned in bed.  Taco KULKARNI is at the bedside he debrided the left hip and sacral injury. The pt. tolerated it well, sacrum now a debrided stage 4 with palpable bone that is soft. Dressings changed with Santyl and damp saline gauze dressing. He was repositioned in be. Call light in reach. Spoke with the nurse.

## 2024-07-18 NOTE — PROCEDURES
Procedure Note  The risks, benefits, alternatives and expected recovery were explained.  The pts family expressed understanding and wished to proceed with the procedure.      Preop:  Infected sacral decubitus ulcer  Postop:  Same  Procedure: Sharp excisional debridement of left hip decubitus ulcer measuring 5cm x 4cm and infected sacral decubitus ulcer measuring 11 x 12 cm including skin and subcutaneous tissue, down to the sacral bone  EBL:  Minimal  Description:  The skin was prepped and draped in sterile fashion. The left hip ulcer was sharply debrided using a #10 blade to remove necrotic tissue to reveal healthy adipose tissue. Then attention was turned to the sacral wound, which was again sharply debrided using a #10 blade. Necrotic tissue was debrided down to the sacral bone. Blood loss was minimal. The wounds were both cleaned and dressed per wound care.    Taco Pearson PA-C

## 2024-07-18 NOTE — OCCUPATIONAL THERAPY NOTE
OCCUPATIONAL THERAPY RE-EVALUATION - INPATIENT     Room Number: 560/560-A  Evaluation Date: 7/18/2024  Type of Evaluation: Re-evaluation 2/2 family requesting GR       Physician Order: IP Consult to Occupational Therapy  Reason for Therapy: ADL/IADL Dysfunction and Discharge Planning    OCCUPATIONAL THERAPY ASSESSMENT   Patient is a 74 year old male admitted 7/9/2024 for infected ulcer of the skin.  Prior to admission, patient's baseline is dependent for ADLs.  Patient is currently functioning at baseline with toileting, bathing, upper body dressing, lower body dressing, grooming, eating, bed mobility, transfers, static sitting balance, dynamic sitting balance, static standing balance, and dynamic standing balance. Patient has no unmet OT needs; he was total A for bed mobility; Max A for sitting balance; assist for all ADLs and IADLs; pt following commands <10% of the time; non-verbal- smiling at therapist throughout. Patient has limited rehab goals at [facility] level 2/2 severity of deficits, inability to follow commands, and decreased learning retention; would benefit from return home with HH and support as before. No further OT contact planned.     PLAN  Patient has been evaluated and presents with no skilled Occupational Therapy needs  at this time.  Patient will be discharged from Occupational Therapy services. Please re-order if a new functional limitation presents during this admission.         OCCUPATIONAL THERAPY MEDICAL/SOCIAL HISTORY   Problem List  Principal Problem:    Infected ulcer of skin, unspecified ulcer stage (HCC)  Active Problems:    Infected decubitus ulcer    Transient neurological symptoms    Palliative care by specialist    HOME SITUATION  Type of Home: House  Lives With: Spouse (dtr, KIMBERLY and grandkids)  Drives: No    SUBJECTIVE  Patient smiling at therapist     OCCUPATIONAL THERAPY EXAMINATION    OBJECTIVE  Precautions: Bed/chair alarm (non-verbal)  Fall Risk: High fall risk    WEIGHT BEARING  RESTRICTION     PAIN ASSESSMENT  Rating: Non-verbal signs of pain/discomfort observed  Location: R foot during bed mobility    ACTIVITY TOLERANCE  Pulse: 100        BP: 107/56             O2 SATURATIONS  Oxygen Therapy  SPO2% on Room Air at Rest: 97    COGNITION  Overall Cognitive Status:  Impaired  Communication: Impaired    Behavioral/Emotional/Social: Pleasant, cooperative    ACTIVITIES OF DAILY LIVING ASSESSMENT  AM-PAC ‘6-Clicks’ Inpatient Daily Activity Short Form  How much help from another person does the patient currently need…  -   Putting on and taking off regular lower body clothing?: Total  -   Bathing (including washing, rinsing, drying)?: Total  -   Toileting, which includes using toilet, bedpan or urinal? : Total  -   Putting on and taking off regular upper body clothing?: Total  -   Taking care of personal grooming such as brushing teeth?: Total  -   Eating meals?: A Lot    AM-PAC Score:  Score: 7  Approx Degree of Impairment: 92.44%  Standardized Score (AM-PAC Scale): 20.13  CMS Modifier (G-Code): CM    FUNCTIONAL TRANSFER ASSESSMENT     BED MOBILITY  Supine to Sit : Dependent  Sit to Supine (OT): Dependent    BALANCE ASSESSMENT-Max a for sitting balance     FUNCTIONAL ADL ASSESSMENT-Dependent for all ADLs including self feed      THERAPEUTIC EXERCISE     Skilled Therapy Provided: Bed mobility completed with total A; Max A for sitting balance; pt unable to follow commands for simple grooming/hand squeezing; multiple LOB, returned back to supine; positioned for comfort and left with all needs in reach.     EDUCATION PROVIDED     The patient's Approx Degree of Impairment: 92.44% has been calculated based on documentation in the [facility] '6 clicks' Inpatient Daily Activity Short Form.  Research supports that patients with this level of impairment may benefit from LTC/custoidal level of care care.  Final disposition will be made by interdisciplinary medical team.         Patient was able to achieve the  following ...   Patient able to toilet transfer  unable to perform before admission    Patient able to dress lower extremities  unable to perform before admission    Patient/Caregiver able to demonstrate safety with ADLS  unable to perform before admission     Patient Evaluation Complexity Level:   Occupational Profile/Medical History LOW - Brief history including review of medical or therapy records    Specific performance deficits impacting engagement in ADL/IADL LOW  1 - 3 performance deficits    Client Assessment/Performance Deficits LOW - No comorbidities nor modifications of tasks    Clinical Decision Making LOW - Analysis of occupational profile, problem-focused assessments, limited treatment options    Overall Complexity LOW     OT Session Time: 10 minutes  Self-Care Home Management: 0 minutes  Therapeutic Activity: 10 minutes      Que Rose Occupational Therapist, OTR/L ext [phone]

## 2024-07-18 NOTE — CM/SW NOTE
Submitted clinical via [email or web address] portal.  [email or web address] Case/Auth ID is [id number].  Final insurance authorization is pending at this time.      SW/CM assigned to the case will continue to follow auth status.      Nati Mera, DSC

## 2024-07-18 NOTE — CM/SW NOTE
MD met w/patient's son at bedside for dc planning discussion. Pt's son notified CM that family is considering taking patient home w/HH and [facility]. CM provided benefits of [facility] placement as patient has severe sacral wound that will require BID dressing changes which will require frequent off loading and turns q 2 hrs. Pt also has left hip ulcer and right heel ulcer which also require daily and PRN dressing changes. Pt will also have order for IV Zosyn TID w/end date of 8/21. SW has already provided patient's daughter with list of accepting facilities and this CM provided son in room with the same list. SW will follow up on choice if family is agreeable to [facility] at dc.    1705: Pt's family confirmed that they are agreeable to [facility] and that [facility]. DERICK left message for CHASE Dempseya to add [facility] to [employer] portal as she already submitted for ins auth earlier today.     Plan: [facility] for [facility] pending ins auth and medical clearance.    GARCÍA Humphrey    [phone]

## 2024-07-18 NOTE — TELEPHONE ENCOUNTER
Contacted patient's daughter, ok per verbal release form (name and  of patient verified). Dr. Sheets's message reviewed, verbalizes understanding. Will keep Dr. Sheets updated on care plan

## 2024-07-18 NOTE — PHYSICAL THERAPY NOTE
Physical Therapy Contact Note    Orders received and chart reviewed. Attempted to see patient for Physical Therapy services. Patient not available secondary to patient undergoing bedside wound cares.      07/18/24 1140   VISIT TYPE   PT Inpatient Visit Type (Documentation Required) Attempted Treatment  (bedside wound debridement)     Will re-attempt pending patient availability/appropriateness as schedule allows, otherwise will re-schedule visit.    PHYSICAL THERAPY TREATMENT NOTE - INPATIENT     Room Number: 560/560-A       Presenting Problem: Infected right heel pressure ulcer with acute sepsis.  Co-Morbidities : chronic right hemiparesis from prior cerebrovascular accident and chronic right heel, left ischial, and sacral decubitus ulcer, dementia. Right medial malleolus fracture, non-displaced 5/2024    Problem List  Principal Problem:    Infected ulcer of skin, unspecified ulcer stage (HCC)  Active Problems:    Infected decubitus ulcer    Transient neurological symptoms    Palliative care by specialist      PHYSICAL THERAPY ASSESSMENT   Patient demonstrates limited progress this session, goals  updated to reflect patient performance.      Patient is requiring dependent assistance x2 people/mechanical lift as a result of the following impairments: decreased functional strength, decreased endurance/aerobic capacity, impaired seated balance, impaired coordination, impaired motor planning, cognitive deficits (unable to follow commands, nonverbal), and limited active ROM.     Patient continues to function near baseline with bed mobility and transfers. Next session anticipate patient to progress bed mobility, maintaining seated position, and family education .  Physical Therapy will continue to follow patient for duration of hospitalization.    Patient continues to benefit from continued skilled PT services: for duration of hospitalization, however demonstrates limited rehab potential given inability to follow  commands, has required dependent assist for ~3 months, inability to participate with therapist. Pt demonstrates significant [facility] and wound care needs. Pt requiring the following equipment: hospital bed, mechanical lift. If pt returns home may benefit from home-health PT to minimize caregiver burden.    PLAN  PT Treatment Plan: Bed mobility;Endurance;Energy conservation;Patient education;Family education;Strengthening;Range of motion;Transfer training;Balance training  Frequency (Obs): 3x/week    SUBJECTIVE  Pt nonverbal, family member present reporting pt unable to understand or reply.     OBJECTIVE  Precautions: Bed/chair alarm (non-verbal)    WEIGHT BEARING RESTRICTION  none    PAIN ASSESSMENT   Rating: Unable to rate  Location: no visible signs of distress       BALANCE  Static Sitting: Dependent  Dynamic Sitting: Not tested  Static Standing: Not tested  Dynamic Standing: Not tested    ACTIVITY TOLERANCE  Pulse: 96                       O2 WALK  Oxygen Therapy  SPO2% on Room Air at Rest: 97    AM-PAC '6-Clicks' INPATIENT SHORT FORM - BASIC MOBILITY  How much difficulty does the patient currently have...  Patient Difficulty: Turning over in bed (including adjusting bedclothes, sheets and blankets)?: Unable   Patient Difficulty: Sitting down on and standing up from a chair with arms (e.g., wheelchair, bedside commode, etc.): Unable   Patient Difficulty: Moving from lying on back to sitting on the side of the bed?: Unable   How much help from another person does the patient currently need...   Help from Another: Moving to and from a bed to a chair (including a wheelchair)?: Total   Help from Another: Need to walk in hospital room?: Total   Help from Another: Climbing 3-5 steps with a railing?: Total     AM-PAC Score:  Raw Score: 6   Approx Degree of Impairment: 100%   Standardized Score (AM-PAC Scale): 23.55   CMS Modifier (G-Code): CN    FUNCTIONAL ABILITY STATUS  Functional Mobility/Gait Assessment  Gait  Assistance: Not tested  Boosting in chair: dependent maxAx2  Pt lifted to chair via overhead mechanical lift by nursing staff    Skilled Therapy Provided: Patient demonstrated limited progress this session, unable to follow commands or answer questions. Therapist performing dependent PROM on all 4 limbs, demonstrates increased tone on RUE and RLE from previous stroke, clonus in L ankle, LUE ROM adequate to feed self which pt has been intermittently doing over past week with L hand, pt able to pick nose with L hand during session. Pt's family member assisting therapist in boosting/repositioning in chair 2/2 R lateral lean.     Patient received seated in bedside chair, family member present agreeable to physical therapy.Patient with poor carryover during session. Anticipated therapy needs remain appropriate based on the patient's performance, personal factors, and remaining functional impairments.      The patient's Approx Degree of Impairment: 100% has been calculated based on documentation in the [facility] '6 clicks' Inpatient Daily Activity Short Form.  Research supports that patients with this level of impairment may benefit from long-term care.  Final disposition will be made by interdisciplinary medical team.      Patient End of Session: Up in chair;Needs met;Call light within reach;RN aware of session/findings;All patient questions and concerns addressed;Family present    CURRENT GOALS   Goals to be met by: 8/15/24  Patient Goal Patient's self-stated goal is: maximize functional independence and safety   Goal #1 Patient is able to demonstrate rolling in bed @ level: dependent assistance x1 family member      Goal #1   Current Status  NOT MET/IN PROGRESS    Goal #2 Patient is able to demonstrate transfers EOB to/from Chair/Wheelchair at assistance level: dependent assistance via overhead lift with assistance x2 family members      Goal #2  Current Status  NOT MET/IN PROGRESS    Goal #3 Patient is able to tolerate 1  minutes of static sitting with UE support on bedrail requiring ModA x 1 to improve tolerance/participation during functional tasks.    Goal #3   Current Status  NOT MET/IN PROGRESS    Goal #4 Patient's family member to demonstrate independence with assisting patient in home exercise instructions provided to patient in preparation for discharge.   Goal #4   Current Status  NOT MET/IN PROGRESS        Therapeutic Activity: 10 minutes        Mandy Choi, PT, DPT  [facility] [place]  Rehab Services - Physical Therapy  [phone]

## 2024-07-18 NOTE — WOUND PROGRESS NOTE
Wound Care Services  Asked by the pt's nurse to talk with the pt's son as he has questions. Met the pt.s son at the bedside and discussed the pt's right heel, left hip and sacral pressure injuries. Also educated him on the bedside debridement of the sacrum and left hip by Taco KULKARNI. notified the pt's son that we felt exposed bone in the sacral wound and the bone is soft. All questions answered. He will discuss with his family and call again if they have further questions.  and  are working with the family to develop the discharge plan of care. Spoke with the nurse.

## 2024-07-19 ENCOUNTER — APPOINTMENT (OUTPATIENT)
Dept: PICC SERVICES | Facility: HOSPITAL | Age: 74
End: 2024-07-19
Attending: PHYSICIAN ASSISTANT
Payer: MEDICARE

## 2024-07-19 ENCOUNTER — TELEPHONE (OUTPATIENT)
Dept: CASE MANAGEMENT | Facility: HOSPITAL | Age: 74
End: 2024-07-19

## 2024-07-19 ENCOUNTER — TELEPHONE (OUTPATIENT)
Dept: MEDSURG UNIT | Facility: HOSPITAL | Age: 74
End: 2024-07-19

## 2024-07-19 VITALS
HEART RATE: 116 BPM | HEIGHT: 67 IN | SYSTOLIC BLOOD PRESSURE: 138 MMHG | TEMPERATURE: 99 F | BODY MASS INDEX: 21 KG/M2 | RESPIRATION RATE: 18 BRPM | OXYGEN SATURATION: 97 % | WEIGHT: 133.81 LBS | DIASTOLIC BLOOD PRESSURE: 65 MMHG

## 2024-07-19 LAB
APTT PPP: 81.8 SECONDS (ref 23–36)
PLATELET # BLD AUTO: 561 10(3)UL (ref 150–450)

## 2024-07-19 PROCEDURE — 02HV33Z INSERTION OF INFUSION DEVICE INTO SUPERIOR VENA CAVA, PERCUTANEOUS APPROACH: ICD-10-PCS | Performed by: INTERNAL MEDICINE

## 2024-07-19 PROCEDURE — 99233 SBSQ HOSP IP/OBS HIGH 50: CPT | Performed by: INTERNAL MEDICINE

## 2024-07-19 RX ORDER — LIDOCAINE HYDROCHLORIDE 10 MG/ML
5 INJECTION, SOLUTION EPIDURAL; INFILTRATION; INTRACAUDAL; PERINEURAL
Status: COMPLETED | OUTPATIENT
Start: 2024-07-19 | End: 2024-07-19

## 2024-07-19 RX ORDER — ONDANSETRON 2 MG/ML
4 INJECTION INTRAMUSCULAR; INTRAVENOUS EVERY 6 HOURS PRN
Status: SHIPPED | COMMUNITY
Start: 2024-07-19

## 2024-07-19 RX ORDER — SODIUM HYPOCHLORITE 1.25 MG/ML
SOLUTION TOPICAL
Status: SHIPPED | COMMUNITY
Start: 2024-07-20

## 2024-07-19 RX ORDER — LEVETIRACETAM 100 MG/ML
250 SOLUTION ORAL EVERY MORNING
Status: SHIPPED | COMMUNITY
Start: 2024-07-20

## 2024-07-19 RX ORDER — FERROUS SULFATE 300 MG/5ML
300 LIQUID (ML) ORAL DAILY
Status: SHIPPED | COMMUNITY
Start: 2024-07-20

## 2024-07-19 RX ORDER — LEVETIRACETAM 100 MG/ML
500 SOLUTION ORAL NIGHTLY
Status: SHIPPED | COMMUNITY
Start: 2024-07-19

## 2024-07-19 RX ORDER — ACETAMINOPHEN 500 MG
500 TABLET ORAL EVERY 8 HOURS
Status: SHIPPED | COMMUNITY
Start: 2024-07-19

## 2024-07-19 NOTE — CM/SW NOTE
Carmina SINGH informed  that daughter has decided on [facility].  [facility] notified and next available pick-up time will be 7:15pm.    Carmina to notify daughter of this time.  Report number is [phone].    Dr. Medrano has been notified that Yose[facility] will be leaving this evening.    Liaison for [facility] notified of pick-up time.    Paula Jacobo MBA BSN RN CRRN   RN Case Manager  [phone]

## 2024-07-19 NOTE — DISCHARGE SUMMARY
[facility]  part of [facility]    Discharge Summary    Yoseph Cordero Patient Status:  Inpatient    1950 MRN [id number]   Location [facility] 5SW/SE Attending Chris Bass MD   Hosp Day # 10 PCP Irving Sheets DO     Date of Admission: 2024 Disposition: Home Health Care Services     Date of Discharge: 2024    Admitting Diagnosis: Infected ulcer of skin, unspecified ulcer stage (HCC) [L98.499, L08.9]    Hospital Discharge Diagnoses:   Acute proteus bacteremia/ severe sepsis   Sacral decubitus ulcer POA  L hip ulcer.   Episode of unresponsiveness - resolved.   H/o CVA with chronic R hemiparesis   H/o seizure d/o   Dementia   Acute Chronic Sinus tachycardia  Factor V leiden   Infected R heel stage IV pressure ulcer POA with OM  Acute anemia   HTN  HOLLI       Lace+ Score: 86  59-90 High Risk  29-58 Medium Risk  0-28   Low Risk.    TCM Follow-Up Recommendation:  LACE > 58: High Risk of readmission after discharge from the hospital.      Problem List:   Patient Active Problem List   Diagnosis    Hemiplegia affecting right dominant side (HCC)    Chronic obstructive pulmonary disease (HCC)    Combined forms of age-related cataract of both eyes    Esophageal reflux    Late onset Alzheimer's disease with behavioral disturbance (HCC)    Alcohol abuse    Calcification of aorta (HCC)    Alcoholism (HCC)    History of stroke    Dysphagia    Hyperglycemia    Weakness    Gait disturbance    Abnormal involuntary movement    Acute pulmonary embolism without acute cor pulmonale, unspecified pulmonary embolism type (HCC)    Factor V Leiden (HCC)    Leukocytosis    Tachycardia    Altered mental status, unspecified altered mental status type    Seizures (HCC)    Infected ulcer of skin, unspecified ulcer stage (HCC)    Infected decubitus ulcer    Transient neurological symptoms    Palliative care by specialist       Reason for Admission: infected right heel    Physical Exam:    General appearance: alert, appears stated age and cooperative  Pulmonary:  clear to auscultation bilaterally  Cardiovascular: S1, S2 normal, no murmur, click, rub or gallop, regular rate and rhythm  Abdominal: soft, non-tender; bowel sounds normal; no masses,  no organomegaly  Extremities: extremities normal, atraumatic, no cyanosis or edema  Psychiatric: calm      History of Present Illness: The patient is a 74-year-old  male with chronic right hemiparesis from prior cerebrovascular accident and chronic right heel, left ischial, and sacral decubitus ulcers. He has been developing necrotic changes in his right heel lately, and he has been having tachycardia and generalized fatigue and weakness. Today, he was sent to the emergency department for evaluation. Reviewing the records, he had cultures from his right heel in June 2024 which grew Proteus mirabilis and prevotella species. Today, in the emergency room, white blood cell count of 22.3 with left shift, platelet count 565. X-ray of the right heel still pending. CT scan of the pelvis also still pending. Complete metabolic panel was obtained. Blood cultures were obtained. Lactic acid 2.8. Started on IV fluids, sepsis bolus. Aerobic cultures obtained from the right heel. Patient was started empirically on IV vancomycin and Zosyn, and he will be admitted to the hospital for further management.     Hospital Course: Acute proteus bacteremia/ severe sepsis   Sacral decubitus ulcer POA  L hip ulcer. - POA  IV zosyn. Will need midline and 4 weeks IV abx on discharge.   ID, general surgery on consult.   Wound care   PT/OT - [facility]  Blood cx 2/2 proteus mirabilis. Rpt blood cx neg x 4   Wound culture proteus mirabilis + enterococcus feacalis   Lactic acid nl now. Afebrile now.   S/p bedside sharp excisional debridement of infected L hip decubitus ulcer and sacral decubitus ulcer 7/11   Will need santyl and PRN light debridement at bedside per surgeon     Episode of  unresponsiveness - resolved.   H/o CVA with chronic R hemiparesis   H/o seizure d/o   Dementia - essentially nonverbal   Resume xarelto as no surgical plans  PT/OT - [facility].   Keppra 500mg BID.   CT head no acute abnormality old large L MCA CVA. Volume loss   EEG no seizure activity.   Episode of unresponsiveness likely from sepsis      Acute Chronic Sinus tachycardia  Likely secondary to infection/low grade temps. Tachycardia coincides with fevers  Lactic acid normal.   Recent ECHO from May 24' LVEF 60-65%  Prev admit -110. Was seen by cards  Metoprolol 12.5mg BID  hold for sBP < 100 or HR < 60      H/o VTE  Factor V leiden   Resume xarelto. Stop heparin gtt.    Recent PE May 24'   D dimer downtrending 11 ---> 1      Infected R heel stage IV pressure ulcer POA with   Podiatry and vascular surgery on consult.   MRI reviewed Stage IV heel ulcer with osteomyelitis of dorsal calcaneus. Possible intra tendinous abscess.   S/p bedside debridement 7/14 probe to calcaneus bone. Necrotic rim excised, fibrous tissue excised, fat and achilles tendon visible. Sharp debridement to level of bone.   Family not ready for BKA. He has dementia and has started with contractures, sacral wounds as well.  I think he would be better served with a knee level amputation.  It is not as likely that he will get back walking.  Son wants to exhaust all options to save the leg. Cx mixed nl skin criselda.   IV zosyn. Plans for midline and 4 weeks IV abx on discharge.   Arterial MORE - abnormal.   Family meeting with podiatry, palliative and me 7/16. Family does not want BKA. Plan is IV abx x 4 weeks, wound care, palliative care on discharge.      Acute anemia   No overt bleeding seen.   Combination of ACD and iron def.   PO ferrous sulfate BID.   FOB negative.   Baseline Hb 12. Came in at 10.7      Other medical problems  HTN  HOLLI       Consultations: Surgery, Podiatry, ID    Procedures: Surgical I&D x 2    Complications: none    Discharge  Condition: Good    Discharge Medications:      Discharge Medications        START taking these medications        Instructions Prescription details   acetaminophen 500 MG Tabs  Commonly known as: Tylenol Extra Strength      Take 1 tablet (500 mg total) by mouth every 8 (eight) hours.   Refills: 0     collagenase 250 UNIT/GM Oint  Commonly known as: Santyl      Apply topically 2 (two) times a day.   Refills: 0     ferrous sulfate 300 (60 Fe) MG/5ML Soln  Start taking on: July 20, 2024      Take 5 mL (300 mg total) by mouth daily.   Refills: 0     levETIRAcetam 100 MG/ML Soln  Commonly known as: Keppra      Take 5 mL (500 mg total) by mouth at bedtime.   Refills: 0     levETIRAcetam 100 MG/ML Soln  Commonly known as: Keppra  Start taking on: July 20, 2024  Replaces: levETIRAcetam 500 MG Tabs      Take 2.5 mL (250 mg total) by mouth every morning.   Refills: 0     metoprolol tartrate 25 MG Tabs  Commonly known as: Lopressor      Take 0.5 tablets (12.5 mg total) by mouth 2x Daily(Beta Blocker).   Refills: 0     OLANZapine 100 mg/21 mL in sterile water for injection (PF)      Inject 1.05 mL (5 mg total) into the muscle every 12 (twelve) hours as needed.   Refills: 0     ondansetron 4 MG/2ML Soln  Commonly known as: Zofran      Inject 2 mL (4 mg total) into the vein every 6 (six) hours as needed.   Refills: 0     piperacillin-tazobactam 3.375 (3-0.375) g Solr  Commonly known as: Zosyn      Inject 4,500 mg (4.5 g total) into the vein every 8 (eight) hours for 24 days. M86.171 Weekly CBC/diff, CMP, ESR, CRP fax labs to Dr. [facility]   Stop taking on: August 11, 2024  Quantity: 324 g  Refills: 0     sodium hypochlorite 0.125 % Soln  Commonly known as: Dakin's  Start taking on: July 20, 2024       Refills: 0            CONTINUE taking these medications        Instructions Prescription details   rivaroxaban 20 MG Tabs  Commonly known as: Xarelto      Take 1 tablet (20 mg total) by mouth daily with food.   Quantity: 90  tablet  Refills: 0            STOP taking these medications      cephalexin 500 MG Caps  Commonly known as: Keflex        levETIRAcetam 500 MG Tabs  Commonly known as: Keppra  Replaced by: levETIRAcetam 100 MG/ML Soln        LORazepam 0.5 MG Tabs  Commonly known as: Ativan        miconazole 2 % Powd        traMADol 50 MG Tabs  Commonly known as: Ultram                  Where to Get Your Medications        Please  your prescriptions at the location directed by your doctor or nurse    Bring a paper prescription for each of these medications  piperacillin-tazobactam 3.375 (3-0.375) g Solr         Follow up Visits: Follow-up with  in 1 week      Chris Bass MD  7/19/2024  5:34 PM    > 35 min

## 2024-07-19 NOTE — CM/SW NOTE
Department  notified care team of [facility] approval, see below.    Assigned SW/CM to follow up with patient/family on discharge plan.       7/19 -- hospitals ID [id number] approved 7/19 to 7/23.     Nati Mera, DSC

## 2024-07-19 NOTE — CM/SW NOTE
07/19/24 1700   Discharge disposition   Expected discharge disposition subacute   Post Acute Care Provider [facility]   Discharge transportation [facility]     .ana

## 2024-07-19 NOTE — PLAN OF CARE
Problem: Patient Centered Care  Goal: Patient preferences are identified and integrated in the patient's plan of care  Description: Interventions:  - What would you like us to know as we care for you? From home with wife  - Provide timely, complete, and accurate information to patient/family  - Incorporate patient and family knowledge, values, beliefs, and cultural backgrounds into the planning and delivery of care  - Encourage patient/family to participate in care and decision-making at the level they choose  - Honor patient and family perspectives and choices  Outcome: Progressing     Problem: SKIN/TISSUE INTEGRITY - ADULT  Goal: Incision(s), wounds(s) or drain site(s) healing without S/S of infection  Description: INTERVENTIONS:  - Assess and document risk factors for pressure ulcer development  - Assess and document skin integrity  - Assess and document dressing/incision, wound bed, drain sites and surrounding tissue  - Implement wound care per orders  - Initiate isolation precautions as appropriate  - Initiate Pressure Ulcer prevention bundle as indicated  Outcome: Progressing     Problem: PAIN - ADULT  Goal: Verbalizes/displays adequate comfort level or patient's stated pain goal  Description: INTERVENTIONS:  - Encourage pt to monitor pain and request assistance  - Assess pain using appropriate pain scale  - Administer analgesics based on type and severity of pain and evaluate response  - Implement non-pharmacological measures as appropriate and evaluate response  - Consider cultural and social influences on pain and pain management  - Manage/alleviate anxiety  - Utilize distraction and/or relaxation techniques  - Monitor for opioid side effects  - Notify MD/LIP if interventions unsuccessful or patient reports new pain  - Anticipate increased pain with activity and pre-medicate as appropriate  Outcome: Progressing     Problem: RISK FOR INFECTION - ADULT  Goal: Absence of fever/infection during anticipated  neutropenic period  Description: INTERVENTIONS  - Monitor WBC  - Administer growth factors as ordered  - Implement neutropenic guidelines  Outcome: Progressing     Problem: SAFETY ADULT - FALL  Goal: Free from fall injury  Description: INTERVENTIONS:  - Assess pt frequently for physical needs  - Identify cognitive and physical deficits and behaviors that affect risk of falls.  - [place] fall precautions as indicated by assessment.  - Educate pt/family on patient safety including physical limitations  - Instruct pt to call for assistance with activity based on assessment  - Modify environment to reduce risk of injury  - Provide assistive devices as appropriate  - Consider OT/PT consult to assist with strengthening/mobility  - Encourage toileting schedule  Outcome: Progressing     Problem: DISCHARGE PLANNING  Goal: Discharge to home or other facility with appropriate resources  Description: INTERVENTIONS:  - Identify barriers to discharge w/pt and caregiver  - Include patient/family/discharge partner in discharge planning  - Arrange for needed discharge resources and transportation as appropriate  - Identify discharge learning needs (meds, wound care, etc)  - Arrange for interpreters to assist at discharge as needed  - Consider post-discharge preferences of patient/family/discharge partner  - Complete POLST form as appropriate  - Assess patient's ability to be responsible for managing their own health  - Refer to Case Management Department for coordinating discharge planning if the patient needs post-hospital services based on physician/LIP order or complex needs related to functional status, cognitive ability or social support system  Outcome: Progressing     Problem: NEUROLOGICAL - ADULT  Goal: Absence of seizures  Description: INTERVENTIONS  - Monitor for seizure activity  - Administer anti-seizure medications as ordered  - Monitor neurological status  Outcome: Progressing     Problem: DECISION MAKING  Goal:  Pt/Family able to effectively weigh alternatives and participate in decision making related to treatment and care  Description: INTERVENTIONS:  - Determine when there are differences between patient's view, family's view, and healthcare provider's view of condition  - Facilitate patient and family articulation of goals for care  - Help patient and family identify pros/cons of alternative solutions  - Provide information as requested by patient/family  - Respect patient/family right to receive or not to receive information  - Serve as a liaison between patient and family and health care team  - Initiate Consults from Ethics, Palliative Care or initiate Family Care Conference as is appropriate  Outcome: Progressing

## 2024-07-19 NOTE — PLAN OF CARE
Problem: Patient Centered Care  Goal: Patient preferences are identified and integrated in the patient's plan of care  Description: Interventions:  - What would you like us to know as we care for you? From home with wife  - Provide timely, complete, and accurate information to patient/family  - Incorporate patient and family knowledge, values, beliefs, and cultural backgrounds into the planning and delivery of care  - Encourage patient/family to participate in care and decision-making at the level they choose  - Honor patient and family perspectives and choices  Outcome: Progressing     Problem: SKIN/TISSUE INTEGRITY - ADULT  Goal: Incision(s), wounds(s) or drain site(s) healing without S/S of infection  Description: INTERVENTIONS:  - Assess and document risk factors for pressure ulcer development  - Assess and document skin integrity  - Assess and document dressing/incision, wound bed, drain sites and surrounding tissue  - Implement wound care per orders  - Initiate isolation precautions as appropriate  - Initiate Pressure Ulcer prevention bundle as indicated  Outcome: Progressing     Problem: NEUROLOGICAL - ADULT  Goal: Absence of seizures  Description: INTERVENTIONS  - Monitor for seizure activity  - Administer anti-seizure medications as ordered  - Monitor neurological status  Outcome: Progressing     Problem: CARDIOVASCULAR - ADULT  Goal: Maintains optimal cardiac output and hemodynamic stability  Description: INTERVENTIONS:  - Monitor vital signs, rhythm, and trends  - Monitor for bleeding, hypotension and signs of decreased cardiac output  - Evaluate effectiveness of vasoactive medications to optimize hemodynamic stability  - Monitor arterial and/or venous puncture sites for bleeding and/or hematoma  - Assess quality of pulses, skin color and temperature  - Assess for signs of decreased coronary artery perfusion - ex. Angina  - Evaluate fluid balance, assess for edema, trend weights  Outcome:  Progressing  Goal: Absence of cardiac arrhythmias or at baseline  Description: INTERVENTIONS:  - Continuous cardiac monitoring, monitor vital signs, obtain 12 lead EKG if indicated  - Evaluate effectiveness of antiarrhythmic and heart rate control medications as ordered  - Initiate emergency measures for life threatening arrhythmias  - Monitor electrolytes and administer replacement therapy as ordered  Outcome: Progressing     Problem: Impaired Swallowing  Goal: Minimize aspiration risk  Description: Interventions:  - Patient should be alert and upright for all feedings (90 degrees preferred)  - Offer food and liquids at a slow rate  - No straws  - Encourage small bites of food and small sips of liquid  - Offer pills one at a time, crush or deliver with applesauce as needed  - Discontinue feeding and notify MD (or speech pathologist) if coughing or persistent throat clearing or wet/gurgly vocal quality is noted  Outcome: Progressing     Problem: Impaired Communication  Goal: Patient will achieve maximal communication potential  Description: Interventions:  - Ask \"yes/no\" questions to facilitate patient's ability to communicate wants and needs  Outcome: Progressing

## 2024-07-19 NOTE — PLAN OF CARE
Problem: Patient Centered Care  Goal: Patient preferences are identified and integrated in the patient's plan of care  Description: Interventions:  - What would you like us to know as we care for you? From home with wife  - Provide timely, complete, and accurate information to patient/family  - Incorporate patient and family knowledge, values, beliefs, and cultural backgrounds into the planning and delivery of care  - Encourage patient/family to participate in care and decision-making at the level they choose  - Honor patient and family perspectives and choices  Outcome: Adequate for Discharge     Problem: SKIN/TISSUE INTEGRITY - ADULT  Goal: Incision(s), wounds(s) or drain site(s) healing without S/S of infection  Description: INTERVENTIONS:  - Assess and document risk factors for pressure ulcer development  - Assess and document skin integrity  - Assess and document dressing/incision, wound bed, drain sites and surrounding tissue  - Implement wound care per orders  - Initiate isolation precautions as appropriate  - Initiate Pressure Ulcer prevention bundle as indicated  Outcome: Adequate for Discharge     Problem: NEUROLOGICAL - ADULT  Goal: Absence of seizures  Description: INTERVENTIONS  - Monitor for seizure activity  - Administer anti-seizure medications as ordered  - Monitor neurological status  Outcome: Adequate for Discharge     Problem: CARDIOVASCULAR - ADULT  Goal: Maintains optimal cardiac output and hemodynamic stability  Description: INTERVENTIONS:  - Monitor vital signs, rhythm, and trends  - Monitor for bleeding, hypotension and signs of decreased cardiac output  - Evaluate effectiveness of vasoactive medications to optimize hemodynamic stability  - Monitor arterial and/or venous puncture sites for bleeding and/or hematoma  - Assess quality of pulses, skin color and temperature  - Assess for signs of decreased coronary artery perfusion - ex. Angina  - Evaluate fluid balance, assess for edema, trend  weights  Outcome: Adequate for Discharge  Goal: Absence of cardiac arrhythmias or at baseline  Description: INTERVENTIONS:  - Continuous cardiac monitoring, monitor vital signs, obtain 12 lead EKG if indicated  - Evaluate effectiveness of antiarrhythmic and heart rate control medications as ordered  - Initiate emergency measures for life threatening arrhythmias  - Monitor electrolytes and administer replacement therapy as ordered  Outcome: Adequate for Discharge     Problem: Impaired Swallowing  Goal: Minimize aspiration risk  Description: Interventions:  - Patient should be alert and upright for all feedings (90 degrees preferred)  - Offer food and liquids at a slow rate  - No straws  - Encourage small bites of food and small sips of liquid  - Offer pills one at a time, crush or deliver with applesauce as needed  - Discontinue feeding and notify MD (or speech pathologist) if coughing or persistent throat clearing or wet/gurgly vocal quality is noted  Outcome: Adequate for Discharge     Problem: PAIN - ADULT  Goal: Verbalizes/displays adequate comfort level or patient's stated pain goal  Description: INTERVENTIONS:  - Encourage pt to monitor pain and request assistance  - Assess pain using appropriate pain scale  - Administer analgesics based on type and severity of pain and evaluate response  - Implement non-pharmacological measures as appropriate and evaluate response  - Consider cultural and social influences on pain and pain management  - Manage/alleviate anxiety  - Utilize distraction and/or relaxation techniques  - Monitor for opioid side effects  - Notify MD/LIP if interventions unsuccessful or patient reports new pain  - Anticipate increased pain with activity and pre-medicate as appropriate  Outcome: Adequate for Discharge     Problem: RISK FOR INFECTION - ADULT  Goal: Absence of fever/infection during anticipated neutropenic period  Description: INTERVENTIONS  - Monitor WBC  - Administer growth factors as  ordered  - Implement neutropenic guidelines  Outcome: Adequate for Discharge     Problem: SAFETY ADULT - FALL  Goal: Free from fall injury  Description: INTERVENTIONS:  - Assess pt frequently for physical needs  - Identify cognitive and physical deficits and behaviors that affect risk of falls.  - [place] fall precautions as indicated by assessment.  - Educate pt/family on patient safety including physical limitations  - Instruct pt to call for assistance with activity based on assessment  - Modify environment to reduce risk of injury  - Provide assistive devices as appropriate  - Consider OT/PT consult to assist with strengthening/mobility  - Encourage toileting schedule  Outcome: Adequate for Discharge     Problem: DISCHARGE PLANNING  Goal: Discharge to home or other facility with appropriate resources  Description: INTERVENTIONS:  - Identify barriers to discharge w/pt and caregiver  - Include patient/family/discharge partner in discharge planning  - Arrange for needed discharge resources and transportation as appropriate  - Identify discharge learning needs (meds, wound care, etc)  - Arrange for interpreters to assist at discharge as needed  - Consider post-discharge preferences of patient/family/discharge partner  - Complete POLST form as appropriate  - Assess patient's ability to be responsible for managing their own health  - Refer to Case Management Department for coordinating discharge planning if the patient needs post-hospital services based on physician/LIP order or complex needs related to functional status, cognitive ability or social support system  Outcome: Adequate for Discharge      Daughter at bedside, aware of plan to discharge to [facility]. PICC line in place, 2 other PIVs removed. Tele removed. Wound dressing changed prior to discharge per gen surg. Discharge paperwork sent with [facility], daughter brought other belongings. Report given to Hamida Spann at facility.

## 2024-07-19 NOTE — PROGRESS NOTES
Called facility twice at the phone number given by social work and what I found on their website- no answer.

## 2024-07-19 NOTE — PROGRESS NOTES
INFECTIOUS DISEASE PROGRESS NOTE  [facility]  part of [facility] ID PROGRESS NOTE    Yoseph Cordero Patient Status:  Inpatient    1950 MRN [id number]   Location [facility] 5SW/SE Attending Uvaldo Echevarria MD   Hosp Day # 10 PCP Irving Sheets,      Subjective:  ROS limited. Family at bedside. Eating lunch    ASSESSMENT:    Antibiotics: Zosyn  Vancomycin, meropenem     # Acute Proteus mirabilis bacteremia from likely wound source with sacral, ischial and R heel wound   -Wound cx Proteus, Enterococcus, Strep constellatus   -S/p bedside debridements x 2, down to bone at sacrum  # R heel ulcer with necrotic tissue and exposed bone s/p bedside I&D 24 with +PTB   - unlikely to heal with abx IV or PO. Amputation has been recommended already    # Leukocytosis  # CVA, bedbound  # H/o PE     PLAN:  -  Continue IV zosyn x4 weeks - EOT 24 with FU with Dr. Hernandez in [facility] clinic.  -  Follow fever curve, wbc.  -  Reviewed labs, micro, imaging reports.  -  Case d/w family.     History of Present Illness:  Yoseph Cordero is a 74 year old male with a history of CVA, PE, dementia, mostly bedbound, who presented to [facility] ED on  after being seen by PCP with concerns for worsening wounds. Patient did have a XR of R foot showing osteomyelitis on . On arrival, Tmax 101.7, wbc 22.3, lactate 2.8, wound swab obtained, started on vancomycin and zosyn. Blood cx with Proteus. Plans to be seen by podiatry and general surgery. ID consulted.    Physical Exam:  /78 (BP Location: Right arm)   Pulse 90   Temp 99 °F (37.2 °C) (Axillary)   Resp 18   Ht 5' 7\" (1.702 m)   Wt 133 lb 12.8 oz (60.7 kg)   SpO2 97%   BMI 20.96 kg/m²     Gen:   In chair, eating  HEENT:  EOMI, neck supple  Abdom:  Soft, no TTP  Skin/extrem:  No rashes, wound pictures reviewed from   :   Primofit draining yellow urine  Lines:  PICC+    Laboratory Data: Reviewed    Microbiology:  Reviewed    Radiology: Reviewed      [facility]  [phone]

## 2024-07-20 ENCOUNTER — TELEPHONE (OUTPATIENT)
Dept: MEDSURG UNIT | Facility: HOSPITAL | Age: 74
End: 2024-07-20

## 2024-07-20 ENCOUNTER — HOSPITAL ENCOUNTER (EMERGENCY)
Facility: HOSPITAL | Age: 74
Discharge: NURSING, REHAB OR LONG-TERM CARE | End: 2024-07-20
Attending: STUDENT IN AN ORGANIZED HEALTH CARE EDUCATION/TRAINING PROGRAM
Payer: MEDICARE

## 2024-07-20 VITALS
TEMPERATURE: 98 F | RESPIRATION RATE: 26 BRPM | OXYGEN SATURATION: 97 % | DIASTOLIC BLOOD PRESSURE: 59 MMHG | HEART RATE: 113 BPM | SYSTOLIC BLOOD PRESSURE: 124 MMHG

## 2024-07-20 DIAGNOSIS — Z13.9 ENCOUNTER FOR MEDICAL SCREENING EXAMINATION: Primary | ICD-10-CM

## 2024-07-20 PROCEDURE — 96365 THER/PROPH/DIAG IV INF INIT: CPT

## 2024-07-20 PROCEDURE — 96366 THER/PROPH/DIAG IV INF ADDON: CPT

## 2024-07-20 PROCEDURE — 99285 EMERGENCY DEPT VISIT HI MDM: CPT

## 2024-07-20 PROCEDURE — 99284 EMERGENCY DEPT VISIT MOD MDM: CPT

## 2024-07-20 NOTE — ED QUICK NOTES
Patient cleaned and dry prior to transport, report called to [facility] facility RN aware of medications given during visit and next dose, this was additionally written down with a copy sent with patient and given to family.

## 2024-07-20 NOTE — ED QUICK NOTES
[facility] at bedside, report given to medics.   Family at bedside aware of patient return, verbalizes understanding.   [facility] aware and prepared for patient return.

## 2024-07-20 NOTE — CM/SW NOTE
2135:  This ERCM received call from [facility] 5th floor Charge RN stating patient's Daughter Tyra on the phone expressing how upset she is because she feels \"pt was rushed out today\" and stating \"[facility] was not prepared for him because they do not have any of the medications he needs\" and that she \"is going to bring him back to the ER.\" [facility] Pelletier RN is requesting this ERCM to speak with pt's Daughter regarding the above. [facility] attempted to transfer call to this ERCM 3 times and was unsuccessful. This ERCM asked [facility]let[facility] RN for Tyra pt's daughter's contact number and informed [facility] RN this ERC will call pt's Daughter Tyra directly regarding the above. [facility] RN provided this ERCM with Daughter Tyra's contact# [phone].    2138:  This ERCM called pt's Daughter Tyra directly at above number. Daughter Tyra upset stating \"why was he rushed out, this place (referring to [facility]) is not ready for him to come, they can't get any of his medicine tonight and he has medications he needs tonight.\" Daughter Tyra states \"I can't even get on mychart to see what medications he needs but when I was caring for him at home he took a blood thinner at night and Keppra.\"     Daughter Tyra informed [facility] should of been provided with med list, so this ERCM assured Daughter Tyra it is not her responsibility to look on my chart for med list as they will know what medications patient needs tonight.     This ERCM reviewed pt's AVS to determine which medications pt still needs tonight which are the following:    ES Tylenol 500mg 1 tablet by mouth every 8 hours    Keppra 100MG/ML - 5 ML by mouth at bedtime    Zosyn 3.375GM - inject 4500mg IV every 8 hours    This ERCM called [facility] 5th floor Charge RN (as this ERCM does not have access to pt's INPT emar) to inquire about the time pt received the last dose of the above medications.  ____________________________________  Per  [facility] Pelletier RN:    Pt's last does of ES Tylenol was 7/19 @ 1700  (NEXT DOSE DUE 7/20 at 0100)    Pt's last dose of Keppra 5 ML po at bedtime was 7/18 @ 1955  (NEXT DOSE DUE 7/19 bedtime- which is NOW)    Pt's last dose of Zosyn 3.375GM IV was 7/19 @ 1146  (NEXT DOSE DUE 7/19 at 2000 - which is NOW)    Xarelto 20MG once daily - last dose 7/19 @ 1146 - so next dose not due until 7/20 AM    Lopressor 12.5mg po daily - last dose 7/19 @ 1800 - so next dose not due until 7/20 AM  _________________________________________    This ERCM informed Daughter Tyra this ERCM will speak with [facility] RN to ensure they get patient's Keppra and Zosyn doses patient is due for now. Pt's Daughter Tyra states \"they told me they wouldn't be able to get his medications until the morning, so I am just going to bring him back to the hospital.\" Daughter Tyra can be heard speaking with [facility] RN - this ERCM informed Daughter Tyra to please give her phone to [facility] RN.    This ERCM spoke with SAMANTHA Lamar at [facility] - obtained direct contact# for SAMANTHA Melo.     4659:  This ERCM called SAMANTHA Lamar directly at [phone]. This ERCM informed SAMANTHA Lamar to please inform pt's daughter Tyra this ERCM will call her back after speaking with SAMANTHA Melo. SMAANTHA Lamar informed Daughter Tyra of this.     Per SAMANTHA Lamar she has submitted pt's medications orders to the pharmacy and she is expecting delivery of pt's medications between 4713-4045 tonight. SAMANTHA Lamar unable to confirm if pharmacy delivering pt's Zosyn and Keppra. This ERCM informed SAMANTHA Lamar to please call Pharmacy to obtain exactly what medications are being delivered to [facility] between 2792-3028 and call this ERCM back after she speaks with Pharmacy. SAMANTHA Lamar v/u.    2227:  This ERCM has not yet heard back from SAMANTHA Lamar.     This ERCM called Daughter Tyra back and informed her of this and that this ERCM will call her back after  update received from SAMANTHA Lamar    2229:  SAMANTHA Lamar calling stating she tried calling the pharmacy to verify which medications they are delivering multiple times, but no one was picking up. This ERCM also informed SAMANTHA Lamar to please reach out to her DON to see if they have another contact number to reach pharmacy at. Per SAMANTHA Lamar she has been in contact with her DON regarding patient. SAMANTHA Lamar will reach out to her DON again regarding pharmacy and call ERCM back.    2233:  Eddie Mary updated on the above.    2345:  Received call from pt's Daughter Tyra stating \"they just told me the pharmacy told them his medications will not be delivered until the morning because all the pharmacy's delivery drivers have gone home, I even said I would go  the medications at the pharmacy but they would not let me because it was IV. so I am bringing him back to the hospital, I am not ok with this.\"    This ERCM informed pt's Daughter Tyra bringing the patient back to the hospital is not necessary as this ERCM has been diligently working for past 2 hours to ensure patient gets the Zosyn and Keppra doses he is due for at this time - and Daughter Tyra informed this ERCM will continue to work diligently in collaboration with [facility] RN to ensure pt gets his due doses of Zosyn and Keppra.    2346:  SAMANTHA Lamar called this ERCM and confirmed pharmacy informed her they are unable to deliver pt's Zosyn tonight because all their drivers have gone home. SAMANTHA Lamar said she is ok with Eddie Mary bring in pt's Keppra from home and she will allow patient to take home Keppra. This ERCM informed SAMANTHA Lamar to please contact her DON to see if he/she is able to go  pt's Zosyn at the pharmacy. Per SAMANTHA Lamar \"our DON said well just send him back to the hospital.\" This ERCM informed SAMANTHA Lamar that is not such an inappropriate comment for a DON to make as that is not a reason to send patient back to ER/hospital.     SAMANTHA Lamar  states she will reach out to her DON again to see if DON will consider going to  pt's Zosyn dose at pharmacy.    2350:  Pt's Daughter Tyra updated on the above and informed this ERCM will call her back with update once SAMANTHA Lamar calls this ERCM with update.    This ERCM also informed Daughter Tyra this ERCM will inform DANNIE Tubbs SW/CM Manager of her initial complaints regarding pt's discharge and have her contact Daughter Tyra - Daughter Tyra informed Reshma back in office on Monday 7/22 so she will likely hear from her on Monday. Eddie Mary v/u.    7/20/24 @ 0012:  This ERCM updated [facility] 5th floor Charge RN on all of the above and informed her the above issue with medications could of likely been prevented had the Floor RN that was discharging patient given thought to give patient his dose of Keppra and Zosyn that were due very near to time patient being discharged. This ERCM informed [facility] 5th floor Charge RN this ERCM will send an email regarding this to the 5th  to educate nursing staff about this to prevent it from re-occurring in the future. [facility] 5th floor Charge RN v/u.    0124:  No return call received from SAMANTHA Lamar at [facility]. This ERCM called SAMANTHA Lamar for an update. Per SAMANTHA Lamar she called her  and DON and they figured it out and the Pharmacy is delivering the medication at 0215. Per SAMANTHA Lamar the  and DON also have spoke with pt's daughter Tyra regarding the above. This ERCM inquired with SAMANTHA Lamar if both Zosyn and Keppra are being delivered at 0215. SAMANTHA Lamar unsure - this ERCM informed to please confirm this and call ERCM back.    0126:  Per SAMANTHA Lamar she confirmed Zosyn dose and Keppra dose both being delivered at 0215.    0127:  This ERCM called Eddie Mary and confirmed she was updated on all of the above by [facility] DON/. Eddie Mary confirmed she is aware pt's due Zosyn and Keppra doses  are going to be delivered at 0215. This [facility] also re-informed Daughter Tyra again to expect call from Reshma Alexandre INPT CM/SW Manager on Monday. Daughter Dorene/u and ok with patient staying at [facility] now that medication issue rectified.

## 2024-07-20 NOTE — ED INITIAL ASSESSMENT (HPI)
Patient to ed, at baseline mental status, reported was accepted at [facility] but [facility] transferred patient to ed due to bed not ready for patient.

## 2024-07-20 NOTE — ED PROVIDER NOTES
[place] Emergency Department Note  Patient: Yoseph Cordero Age: 74 year old Sex: male      MRN: [id number]  : 1950    Patient Seen in: [facility] Emergency Department    History     Chief Complaint   Patient presents with    Other     Stated Complaint: placement    History obtained from: patient, EMR review    Patient is a 74-year-old male with a past medical history of dementia nonverbal at baseline, chronic right hemiparesis from prior CVA, seizure disorder on Keppra, VTE with factor V Leiden on Xarelto, anemia, chronic right heel, left ischial and sacral decubitus ulcer with recent hospitalization for severe sepsis secondary to Proteus bacteremia treated with IV Zosyn discharged yesterday with midline with plan for 4 weeks of IV antibiotics brought in by superior from [facility] for placement.  Per EMS report, the patient was discharged to [facility] yesterday.  Upon arriving to the facility, was noted by staff that the facility does not have proper equipment to take care of patient.  For reasons unclear to me, spent the night at [facility] and was brought to the emergency department for new placement this afternoon.     Review of Systems:  Review of Systems  Positive for stated complaint: placement. Constitutional and vital signs reviewed. All other systems reviewed and negative except as noted above.    Patient History:  Past Medical History:    Anxiety state, unspecified    CVA (cerebral infarction)    Dementia (HCC)    Depression    Heterozygous factor V Leiden mutation (HCC)    Other and unspecified hyperlipidemia    Other ill-defined conditions(799.89)    Cardiomegaly Pleural effusion w/idopathic pleuropuricarditis    Pulmonary embolism (HCC)    Stroke (HCC)    Unspecified sleep apnea       Past Surgical History:   Procedure Laterality Date    Colonoscopy      Electrocardiogram, complete  1914    Scanned to Media Tab        Family History   Problem  Relation Age of Onset    Cancer Father         Lung - Smoker  Cause of death    Other (Other) Mother         during     Diabetes Neg     Glaucoma Neg        Specific Social Determinants of Health:   Social History     Socioeconomic History    Marital status:    Tobacco Use    Smoking status: Former    Smokeless tobacco: Never   Vaping Use    Vaping status: Never Used   Substance and Sexual Activity    Alcohol use: No     Alcohol/week: 0.0 standard drinks of alcohol     Comment: none recently    Drug use: No   Other Topics Concern    Caffeine Concern Yes     Comment: 1 cups coffee daily    Exercise No   Social History Narrative    The patient does not use an assistive device..      The patient does live in a home with stairs.     Social Determinants of Health     Financial Resource Strain: Low Risk  (2024)    Financial Resource Strain     Difficulty of Paying Living Expenses: Not very hard     Med Affordability: No   Food Insecurity: No Food Insecurity (7/10/2024)    Food Insecurity     Food Insecurity: Never true   Transportation Needs: No Transportation Needs (7/10/2024)    Transportation Needs     Lack of Transportation: No   Housing Stability: Low Risk  (7/10/2024)    Housing Stability     Housing Instability: No           PSFH elements reviewed from today and agreed except as otherwise stated in HPI.    Physical Exam     ED Triage Vitals [24 1225]   /52   Pulse 117   Resp 16   Temp 98.2 °F (36.8 °C)   Temp src Temporal   SpO2 95 %   O2 Device None (Room air)       Current:/60   Pulse 113   Temp 98.2 °F (36.8 °C) (Temporal)   Resp 26   SpO2 96%         Physical Exam  Constitutional:       Appearance: He is well-developed.   HENT:      Head: Normocephalic and atraumatic.      Right Ear: External ear normal.      Left Ear: External ear normal.      Nose: Nose normal.   Eyes:      Conjunctiva/sclera: Conjunctivae normal.      Pupils: Pupils are equal, round, and reactive to  light.   Cardiovascular:      Rate and Rhythm: Normal rate and regular rhythm.      Heart sounds: Normal heart sounds.   Pulmonary:      Effort: Pulmonary effort is normal.      Breath sounds: Normal breath sounds.   Abdominal:      General: Bowel sounds are normal.      Palpations: Abdomen is soft.      Tenderness: There is no abdominal tenderness.   Musculoskeletal:         General: Normal range of motion.      Cervical back: Normal range of motion and neck supple.   Skin:     General: Skin is warm and dry.      Findings: No rash.   Neurological:      Mental Status: He is alert. Mental status is at baseline.      Deep Tendon Reflexes: Reflexes are normal and symmetric.   Psychiatric:         Mood and Affect: Mood normal.         Behavior: Behavior normal.         ED Course   Labs:   Labs Reviewed - No data to display  Radiology findings:  I personally reviewed the images.   No results found.      Cardiac Monitor: Interpreted by me.   Pulse Readings from Last 1 Encounters:   07/20/24 113   , sinus,     External non-ED records reviewed independently by me: Discharge summary from yesterday confirms patient was treated with IV Zosyn with plans for 4 weeks of IV antibiotics through midline.    MDM   74-year-old male with a past medical history as above presenting by EMS from [facility] for concerns for access to care equipment at the nursing facility.  Patient without change in clinical status at this time.  I discussed the patient's case with the care manager was confirmed that proper equipment is now available at the [facility].  Has wound care seeing the patient in 2 days.  Stable for discharge back to nursing facility.      Disposition and Plan     Clinical Impression:  1. Encounter for medical screening examination        Disposition:  Discharge    Follow-up:  Irving Sheets, DO  [address] [place] [address]  [phone]    Schedule an appointment as soon as possible for  a visit in 2 day(s)  As needed, If symptoms worsen      Medications Prescribed:  Current Discharge Medication List            This note may have been created using voice dictation technology and may include inadvertent errors.      Erin Spring MD  Emergency Medicine

## 2024-07-20 NOTE — ED QUICK NOTES
Called [facility] for report, there still seemed to be confusion on if proper equipment is available, despite multiple previous conversations.   Discussed additionally with ,  to attempt to contact again.   Will wait for confirmation, prior to arranging transport.

## 2024-07-20 NOTE — ED QUICK NOTES
spoke with [facility] again, and they do have all appropriate equipment to meet patients needs.    discussed with Shar[facility] and requested she reach out to the family as well to reassure and settle all confusion.   Will arrange for transport at this time.

## 2024-07-20 NOTE — ED QUICK NOTES
Family at bedside.   Family notes frustration, understandably so, unsure of the confusion from patient leaving our facility to arriving at accepting facility [facility].   Family told there was no wound care available until Tuesday and no air mattress available.   Patient has wounds to sacrum, left hip and right heel.

## 2024-07-20 NOTE — ED QUICK NOTES
Patient changed, and cleaned. Sacral dressing changed.   Placed on external cath for time being in ER.   Medications administered. See Mar.   Son back at bedside; case management aware.

## 2024-07-20 NOTE — CM/SW NOTE
Pt was discharged to [facility] last night. Pt does not have an air mattress and was advised that wound care would not be there till Tuesday. Patient was returned to the ER for this.    I called Cesar over at [facility] she is calling the DON now, and will call me back shortly     1245- I called cesar back and she states that the mattress is available today. She also states that wound care will come and see patient Monday morning. That they are able to take the pt back.     1253- reviewing the chart I see there was an issue with missing medications at [facility] last night. I called Cesar back to please verify if these medications are available. I will call her back shortly.     1304- spoke to cesar she is still waiting to hear back about the medications.     1314- Spoke to Cesar they do have the mattress available, all meds were delivered overnight, and they will have wound see patient on Monday. I will speak to family.     1320- Spoke to Son and explained that they do have all the resources available for the pt at [facility]. Pt is able to return to [facility]. Son was requesting for  a rectal tube and the urinary catheter to suction. I explained to him that those resources are not available in a rehab facility. Those are resources limited to the hospital. Son states he needed to make calls. I told him I could talk to him again once he returned.     1420- Went in to speak to Son. No family at bedside.    1440- Went back to see if family had questions. No family at bedside.     1500- Family requesting that patient be admitted. I explained that patient has been medically cleared. Resources are available at [facility]. Provided family with [facility] number and Numbers requested by family. MD and primary nurse updated.      1540 received call from primary nurse that family called the facility, and they were told mattress is not  available.     1550 - I called Dense at [facility] and explained what the family was told. She keeps saying that the mattress is available and patient can return. I asked if they could  call the family to help ease their concerns. Sharmin stated she would call the sons at this time. Primary nurse updated.     1600- received call from Sharmin that she spoke to family and they are had other concerns such as a rectal tube, and the catheter. She explained to the family that they do not provide a rectal tube and or suction for the catheter. Son asked me about the same earlier and I explained that they do not provide rectal tubes in rehab or suction for a urine catheter.

## 2024-07-22 ENCOUNTER — INITIAL APN SNF VISIT (OUTPATIENT)
Dept: INTERNAL MEDICINE CLINIC | Facility: SKILLED NURSING FACILITY | Age: 74
End: 2024-07-22

## 2024-07-22 DIAGNOSIS — L89.90 INFECTED DECUBITUS ULCER, UNSPECIFIED ULCER STAGE: Primary | ICD-10-CM

## 2024-07-22 DIAGNOSIS — L89.614 PRESSURE INJURY OF RIGHT HEEL, STAGE 4 (HCC): ICD-10-CM

## 2024-07-22 DIAGNOSIS — L08.9 INFECTED DECUBITUS ULCER, UNSPECIFIED ULCER STAGE: Primary | ICD-10-CM

## 2024-07-22 DIAGNOSIS — F03.918 DEMENTIA WITH OTHER BEHAVIORAL DISTURBANCE, UNSPECIFIED DEMENTIA SEVERITY, UNSPECIFIED DEMENTIA TYPE (HCC): ICD-10-CM

## 2024-07-22 DIAGNOSIS — R00.0 SINUS TACHYCARDIA: ICD-10-CM

## 2024-07-22 DIAGNOSIS — R78.81 BACTEREMIA: ICD-10-CM

## 2024-07-22 DIAGNOSIS — L89.154 PRESSURE INJURY OF SACRAL REGION, STAGE 4 (HCC): ICD-10-CM

## 2024-07-22 DIAGNOSIS — Z79.899 ENCOUNTER FOR MEDICATION REVIEW: ICD-10-CM

## 2024-07-22 DIAGNOSIS — R53.81 PHYSICAL DECONDITIONING: ICD-10-CM

## 2024-07-22 PROCEDURE — 99306 1ST NF CARE HIGH MDM 50: CPT | Performed by: CLINICAL NURSE SPECIALIST

## 2024-07-22 NOTE — PROGRESS NOTES
HPI: Yoseph Cordero  : 1950  Age 74 year old  male patient is admitted to [facility] for URIAH    Reason for visit: Initial APRN assessment and f/u acute proteus bacteremia, sacral decubitus ulcer, L hip ulcer, infected R heel stage IV ulcer, sinus tachycardia, HTN    EM: -  [facility] ED:    EECC: -present     This is a 75 yo male w/ past medical hx significant for anxiety, CVA w/ R sided hemiparesis, dementia, PE on Xarelto; who presented to [facility] for evaluation of drainage and foul smelling wounds.  Pt was admitted for further eval and treatment. WBC was elevated at 22.3 and febrile at 101.7.  Blood Cx were obtained and positive for proteus mirabillis . R heel cx obtained and + proteus and enterococcus.  Pt was seen by ID, Dr. Hernandez, started on IV abx and received IVF. Pt was seen by general surgery and had bedside debridement of sacral and L hip hip wounds. Pt seen by podiatry, Dr. Blake. MRI positive for R heel OM. Pt was seen by vascular surgery and recs for BKA but family not agreeable. Pt was seen by neurology due to episode of unresponsiveness, which was deemed an alteration of alertness due to underlying sepsis. MRI brain and EEg were done. Bedside debridement of R heel were done by podiatry on .  Pt was seen by Palliative Care team.  Family wishing to continue wound care and focus on healing w/ goal for patient to be more mobile again.  Per record pt has been bed bound since May after suffering ankle fx post fall.  Pt was cleared for discharge w/ ongoing wound care and IV abx and subsequently transferred to [facility] on .  Per record pt was admitted to [facility] around 8PM on  and family was dissatisfied that IV abx were not delivered until 2 AM and air loss mattress was no ready.  Per family request pt was transferred back to [facility] ED on .   was involved and verified w/ [facility] that all medications and equipment were available before  pt was sent back to the facility later that day.       Pt seen today in bed, in no acute distress.  He is awake but nonverbal and does not follow commands.  Noted to be moving LUE spontaneously.  RUE contracted.  Unable to determine movement of LEs.  Per nurse pt ate his meals well today.  He is having soft BMs.  Wound care orders in place and pt will be going to [facility] tomorrow w/ daughter for appointment.      Past Medical History:    Anxiety state, unspecified    CVA (cerebral infarction)    Dementia (HCC)    Depression    Heterozygous factor V Leiden mutation (HCC)    Other and unspecified hyperlipidemia    Other ill-defined conditions(799.89)    Cardiomegaly Pleural effusion w/idopathic pleuropuricarditis    Pulmonary embolism (HCC)    Stroke (HCC)    Unspecified sleep apnea     Past Surgical History:   Procedure Laterality Date    Colonoscopy      Electrocardiogram, complete  1914    Scanned to Media Tab     Family History   Problem Relation Age of Onset    Cancer Father         Lung - Smoker  Cause of death    Other (Other) Mother         during     Diabetes Neg     Glaucoma Neg      Social History     Socioeconomic History    Marital status:    Tobacco Use    Smoking status: Former    Smokeless tobacco: Never   Vaping Use    Vaping status: Never Used   Substance and Sexual Activity    Alcohol use: No     Alcohol/week: 0.0 standard drinks of alcohol     Comment: none recently    Drug use: No   Other Topics Concern    Caffeine Concern Yes     Comment: 1 cups coffee daily    Exercise No   Social History Narrative    The patient does not use an assistive device..      The patient does live in a home with stairs.     Social Determinants of Health     Financial Resource Strain: Low Risk  (2024)    Financial Resource Strain     Difficulty of Paying Living Expenses: Not very hard     Med Affordability: No   Food Insecurity: No Food Insecurity (7/10/2024)    Food  Insecurity     Food Insecurity: Never true   Transportation Needs: No Transportation Needs (7/10/2024)    Transportation Needs     Lack of Transportation: No   Housing Stability: Low Risk  (7/10/2024)    Housing Stability     Housing Instability: No       ALLERGIES:  No Known Allergies    CODE STATUS:  Full Code    CURRENT MEDICATIONS: Reviewed on SNF EMR     SUBJECTIVE/ROS:  UTO ROS.  Pt nonverbal, does not follow commands    OBJECTIVE:  VITALS: Reviewed   LABS/Imaging: Reviewed. Weekly CBC, CMP, CRP and ESR to be faxed to ID   PHYSICAL EXAM:  GENERAL HEALTH: Awake, does not follow commands, nonverbal   HEENT: atraumatic/normocephalic, . Poor dentition   RESPIRATORY:diminished   CARDIOVASCULAR: regular   ABDOMEN:  normal active BS+, soft, non distended, nontender. Abd rounded   LYMPHATIC:no lymphedema  MUSCULOSKELETAL: RUE contracture   EXTREMITIES/VASCULAR:no edema   LINES, TUBES, DRAINS:  LUE PICC  SKIN: dry, warm   WOUND: Sacrum, L hip and R heel w/ dressings in place.  Defer to alec perez notes for detailed description  NEUROLOGIC: does not follow complaints   PSYCHIATRIC: Awake, HANK orientation, calm           ASSESSMENT/PLAN    Infected R heel wound, OM/ Sacral decubitus, L hip ulcer   - R heel wound cx + proteus and enterococcus   - s/p bedside debridement R heel 7/14 by podiatry    - s/p bedside debridement sacrum and L hip by general sx on 7/11 and 7/18   - seen by ID Dr. Hernandez: unlikely R heel wound will improve despite IV/oral abx   - seen by vascular sx: recs for BKA: family declining    - cont wound care   - f/u [facility] 7/23   - in house wound MD consult     - frequent repositioning, minimize pressure, offload heels   Proteus bacteremia/sepsis   - Blood cx + x2   - Repeat Bcx negative    - cont Zosyn (EOT 8/13)   - weekly labs CBC, CMP, CRP, ESR to be faxed to Dr. Hernandez   - f/u w/ Dr. Hernandez    - PICC care per nursing   Dementia   - pt nonverbal   - cont therapies    - Olanzapine  PRN   Episode of unresponsiveness    - seen by neurology, Dr. Moreland   - MRI and EEG done   - attributed to sepsis   - cont Keppra   Sinus Tachycardia    - seen by cardiology in the past    - cont metoprolol   - in house cardiology consult   Hx CVA w/ R hemiparesis   - on Xarelto   - cont therapies  Post stroke epilepsy   - cont Keppra   - EEG repeated 7/12  Hx PE/Factor V Leiden    - cont Xarelto   GOC   - Full code   - seen by Palliative Care at the hospital   - Lives w/ daughter, goal to return home post URIAH        Ana Lilia Frazier, APRN    60 minutes spent w/ patient and staff, including but not limited to/ reviewing present status, needs, abilities with disciplines, reviewing medical records, vital signs, labs, completing medication reconciliation and entering orders for continued care in [facility].    07/22/24   3:46 PM

## 2024-07-23 ENCOUNTER — OFFICE VISIT (OUTPATIENT)
Dept: WOUND CARE | Facility: HOSPITAL | Age: 74
End: 2024-07-23
Attending: NURSE PRACTITIONER
Payer: MEDICARE

## 2024-07-23 ENCOUNTER — OFFICE VISIT (OUTPATIENT)
Dept: WOUND CARE | Facility: HOSPITAL | Age: 74
End: 2024-07-23
Attending: STUDENT IN AN ORGANIZED HEALTH CARE EDUCATION/TRAINING PROGRAM
Payer: MEDICARE

## 2024-07-23 VITALS
HEIGHT: 67 IN | RESPIRATION RATE: 18 BRPM | DIASTOLIC BLOOD PRESSURE: 71 MMHG | WEIGHT: 135 LBS | HEART RATE: 103 BPM | TEMPERATURE: 98 F | OXYGEN SATURATION: 96 % | SYSTOLIC BLOOD PRESSURE: 126 MMHG | BODY MASS INDEX: 21.19 KG/M2

## 2024-07-23 DIAGNOSIS — L89.614 PRESSURE INJURY OF RIGHT HEEL, STAGE 4 (HCC): ICD-10-CM

## 2024-07-23 DIAGNOSIS — R26.9 GAIT DISTURBANCE: ICD-10-CM

## 2024-07-23 DIAGNOSIS — L89.213 PRESSURE INJURY OF RIGHT HIP, STAGE 3 (HCC): ICD-10-CM

## 2024-07-23 DIAGNOSIS — S91.301A NON-HEALING OPEN WOUND OF RIGHT HEEL: ICD-10-CM

## 2024-07-23 DIAGNOSIS — S71.002A COMPLICATED OPEN WOUND OF LEFT HIP, INITIAL ENCOUNTER: ICD-10-CM

## 2024-07-23 DIAGNOSIS — L89.512 PRESSURE INJURY OF RIGHT ANKLE, STAGE 2 (HCC): ICD-10-CM

## 2024-07-23 DIAGNOSIS — L89.154 PRESSURE INJURY OF SACRAL REGION, STAGE 4 (HCC): Primary | ICD-10-CM

## 2024-07-23 DIAGNOSIS — L89.004 PRESSURE INJURY OF ELBOW, STAGE 4, UNSPECIFIED LATERALITY (HCC): ICD-10-CM

## 2024-07-23 PROCEDURE — 11045 DBRDMT SUBQ TISS EACH ADDL: CPT | Performed by: STUDENT IN AN ORGANIZED HEALTH CARE EDUCATION/TRAINING PROGRAM

## 2024-07-23 PROCEDURE — 99214 OFFICE O/P EST MOD 30 MIN: CPT | Performed by: NURSE PRACTITIONER

## 2024-07-23 PROCEDURE — 11042 DBRDMT SUBQ TIS 1ST 20SQCM/<: CPT | Performed by: STUDENT IN AN ORGANIZED HEALTH CARE EDUCATION/TRAINING PROGRAM

## 2024-07-23 NOTE — PROGRESS NOTES
I was asked to join Dr. Kaye Blake DPSTEFANO to evaluate patient's sacral and hip wounds.   I am unable to complete my note due to IT issue, will complete after the wound pictures and measurements transfers to note in two days.  In brief, Patient non verbal, unable to move right side, unable to walk, accompanied with son and daughter. Patient currently in addition to heel and ankle wounds, he have stage IV pressure injury on sacrum with soft broken bone which some necrotic tissue and piece of loose tail bone was removed and hip bone stage IV pressure injury.   I tried to discussed the goal of care with family, at this time they want agressive  wound care treatment, they want to try wound vac on all wounds (sacral, hip and heel).   I did discussed the heal-ability of wounds and factors and medical conditions that hinders wound healing, including immobility, pressure in the wounds, infection/bioburden, hyperglycemia, protein deficit, poor oxygenation in tissue, anemia, advanced disease process.  Patient's daugher stated she is thinking of bringing his father home and take care of him at home with home health.  I called [facility] spoke with Allison Billy, MSN, RN, Director of Nursing, she stated the plan has been for patient to see wound care provider at the SNF. They will discuss the wounds an goal of care tomorrow and let us know.  At this time patient's SNF has comprehensive plan to care for his wounds, he is on IV antibiotics. I will sign off unless patient is no longer at SNF and needs wound care provider.

## 2024-07-23 NOTE — PROGRESS NOTES
Patient ID: Yoseph Cordero is a 74 year old male.    Debridement Pressure Injury Right Heel   Wound 07/23/24 1 Heel Right    Performed by: Sury Blake DPM  Authorized by: Sury Blake DPM      Consent   Consent obtained? verbal  Consent given by: patient  Risks discussed? procedural risks not discussed  Time out called at 7/23/2024 10:16 AM    Debridement Details  Performed by: NP  Debridement type: surgical  Level of debridement: subcutaneous tissue  Pain control: none    Pre-debridement measurements  Length (cm): 6.5  Width (cm): 4.4  Depth (cm): 1  Surface Area (cm^2): 28.6    Post-debridement measurements  Length (cm): 6.5  Width (cm): 4.4  Depth (cm): 1  Percent debrided: 100%  Surface Area (cm^2): 28.6  Area Debrided (cm^2): 28.6  Volume (cm^3): 28.6    Tissue and other material debrided: adipose and subcutaneous tissue  Devitalized tissue debrided: callus, necrotic debris and slough  Instrument(s) utilized: blade and forceps  Bleeding: small  Hemostasis obtained with: pressure  Procedural pain (0-10): 1  Post-procedural pain: 1   Response to treatment: procedure was tolerated well

## 2024-07-23 NOTE — PAYOR COMM NOTE
--------------  DISCHARGE REVIEW    Payor: [employer] [facility] [place]  Subscriber #:  [id number]  Authorization Number: [id number]    Admit date: 24  Admit time:  10:06 PM  Discharge Date: 2024  7:50 PM     Admitting Physician: Sonia Damon MD  Attending Physician:  No att. providers found  Primary Care Physician: Irving Sheets DO         [facility]  part of [facility]    Discharge Summary    Yoseph Cordero Patient Status:  Inpatient    1950 MRN [id number]   Location [facility] 5SW/SE Attending Chris Bass MD   Hosp Day # 10 PCP Irving Sheets DO     Date of Admission: 2024 Disposition: Home Health Care Services     Date of Discharge: 2024    Admitting Diagnosis: Infected ulcer of skin, unspecified ulcer stage ([facility]) [L98.499, L08.9]    Hospital Discharge Diagnoses:   Acute proteus bacteremia/ severe sepsis   Sacral decubitus ulcer POA  L hip ulcer.   Episode of unresponsiveness - resolved.   H/o CVA with chronic R hemiparesis   H/o seizure d/o   Dementia   Acute Chronic Sinus tachycardia  Factor V leiden   Infected R heel stage IV pressure ulcer POA with OM  Acute anemia   HTN  HOLLI       Lace+ Score: 86  59-90 High Risk  29-58 Medium Risk  0-28   Low Risk.    TCM Follow-Up Recommendation:  LACE > 58: High Risk of readmission after discharge from the hospital.      Problem List:   Patient Active Problem List   Diagnosis    Hemiplegia affecting right dominant side (HCC)    Chronic obstructive pulmonary disease (HCC)    Combined forms of age-related cataract of both eyes    Esophageal reflux    Late onset Alzheimer's disease with behavioral disturbance (HCC)    Alcohol abuse    Calcification of aorta (HCC)    Alcoholism (HCC)    History of stroke    Dysphagia    Hyperglycemia    Weakness    Gait disturbance    Abnormal involuntary movement    Acute pulmonary embolism without acute cor pulmonale, unspecified pulmonary embolism type (HCC)    Factor V  Leiden (HCC)    Leukocytosis    Tachycardia    Altered mental status, unspecified altered mental status type    Seizures (HCC)    Infected ulcer of skin, unspecified ulcer stage (HCC)    Infected decubitus ulcer    Transient neurological symptoms    Palliative care by specialist       Reason for Admission: infected right heel    Physical Exam:   General appearance: alert, appears stated age and cooperative  Pulmonary:  clear to auscultation bilaterally  Cardiovascular: S1, S2 normal, no murmur, click, rub or gallop, regular rate and rhythm  Abdominal: soft, non-tender; bowel sounds normal; no masses,  no organomegaly  Extremities: extremities normal, atraumatic, no cyanosis or edema  Psychiatric: calm      History of Present Illness: The patient is a 74-year-old  male with chronic right hemiparesis from prior cerebrovascular accident and chronic right heel, left ischial, and sacral decubitus ulcers. He has been developing necrotic changes in his right heel lately, and he has been having tachycardia and generalized fatigue and weakness. Today, he was sent to the emergency department for evaluation. Reviewing the records, he had cultures from his right heel in June 2024 which grew Proteus mirabilis and prevotella species. Today, in the emergency room, white blood cell count of 22.3 with left shift, platelet count 565. X-ray of the right heel still pending. CT scan of the pelvis also still pending. Complete metabolic panel was obtained. Blood cultures were obtained. Lactic acid 2.8. Started on IV fluids, sepsis bolus. Aerobic cultures obtained from the right heel. Patient was started empirically on IV vancomycin and Zosyn, and he will be admitted to the hospital for further management.     Hospital Course: Acute proteus bacteremia/ severe sepsis   Sacral decubitus ulcer POA  L hip ulcer. - POA  IV zosyn. Will need midline and 4 weeks IV abx on discharge.   ID, general surgery on consult.   Wound care   PT/OT -  URIAH  Blood cx 2/2 proteus mirabilis. Rpt blood cx neg x 4   Wound culture proteus mirabilis + enterococcus feacalis   Lactic acid nl now. Afebrile now.   S/p bedside sharp excisional debridement of infected L hip decubitus ulcer and sacral decubitus ulcer 7/11   Will need santyl and PRN light debridement at bedside per surgeon     Episode of unresponsiveness - resolved.   H/o CVA with chronic R hemiparesis   H/o seizure d/o   Dementia - essentially nonverbal   Resume xarelto as no surgical plans  PT/OT - URIAH.   Keppra 500mg BID.   CT head no acute abnormality old large L MCA CVA. Volume loss   EEG no seizure activity.   Episode of unresponsiveness likely from sepsis      Acute Chronic Sinus tachycardia  Likely secondary to infection/low grade temps. Tachycardia coincides with fevers  Lactic acid normal.   Recent ECHO from May 24' LVEF 60-65%  Prev admit -110. Was seen by cards  Metoprolol 12.5mg BID  hold for sBP < 100 or HR < 60      H/o VTE  Factor V leiden   Resume xarelto. Stop heparin gtt.    Recent PE May 24'   D dimer downtrending 11 ---> 1      Infected R heel stage IV pressure ulcer POA with   Podiatry and vascular surgery on consult.   MRI reviewed Stage IV heel ulcer with osteomyelitis of dorsal calcaneus. Possible intra tendinous abscess.   S/p bedside debridement 7/14 probe to calcaneus bone. Necrotic rim excised, fibrous tissue excised, fat and achilles tendon visible. Sharp debridement to level of bone.   Family not ready for BKA. He has dementia and has started with contractures, sacral wounds as well.  I think he would be better served with a knee level amputation.  It is not as likely that he will get back walking.  Son wants to exhaust all options to save the leg. Cx mixed nl skin criselda.   IV zosyn. Plans for midline and 4 weeks IV abx on discharge.   Arterial MORE - abnormal.   Family meeting with podiatry, palliative and me 7/16. Family does not want BKA. Plan is IV abx x 4 weeks, wound  care, palliative care on discharge.      Acute anemia   No overt bleeding seen.   Combination of ACD and iron def.   PO ferrous sulfate BID.   FOB negative.   Baseline Hb 12. Came in at 10.7      Other medical problems  HTN  HOLLI       Consultations: Surgery, Podiatry, ID    Procedures: Surgical I&D x 2    Complications: none    Discharge Condition: Good    Discharge Medications:      Discharge Medications        START taking these medications        Instructions Prescription details   acetaminophen 500 MG Tabs  Commonly known as: Tylenol Extra Strength      Take 1 tablet (500 mg total) by mouth every 8 (eight) hours.   Refills: 0     collagenase 250 UNIT/GM Oint  Commonly known as: Santyl      Apply topically 2 (two) times a day.   Refills: 0     ferrous sulfate 300 (60 Fe) MG/5ML Soln  Start taking on: July 20, 2024      Take 5 mL (300 mg total) by mouth daily.   Refills: 0     levETIRAcetam 100 MG/ML Soln  Commonly known as: Keppra      Take 5 mL (500 mg total) by mouth at bedtime.   Refills: 0     levETIRAcetam 100 MG/ML Soln  Commonly known as: Keppra  Start taking on: July 20, 2024  Replaces: levETIRAcetam 500 MG Tabs      Take 2.5 mL (250 mg total) by mouth every morning.   Refills: 0     metoprolol tartrate 25 MG Tabs  Commonly known as: Lopressor      Take 0.5 tablets (12.5 mg total) by mouth 2x Daily(Beta Blocker).   Refills: 0     OLANZapine 100 mg/21 mL in sterile water for injection (PF)      Inject 1.05 mL (5 mg total) into the muscle every 12 (twelve) hours as needed.   Refills: 0     ondansetron 4 MG/2ML Soln  Commonly known as: Zofran      Inject 2 mL (4 mg total) into the vein every 6 (six) hours as needed.   Refills: 0     piperacillin-tazobactam 3.375 (3-0.375) g Solr  Commonly known as: Zosyn      Inject 4,500 mg (4.5 g total) into the vein every 8 (eight) hours for 24 days. M86.171 Weekly CBC/diff, CMP, ESR, CRP fax labs to Dr. Hernandez [facility]   Stop taking on: August 11, 2024  Quantity: 324  g  Refills: 0     sodium hypochlorite 0.125 % Soln  Commonly known as: Dakin's  Start taking on: July 20, 2024       Refills: 0            CONTINUE taking these medications        Instructions Prescription details   rivaroxaban 20 MG Tabs  Commonly known as: Xarelto      Take 1 tablet (20 mg total) by mouth daily with food.   Quantity: 90 tablet  Refills: 0            STOP taking these medications      cephalexin 500 MG Caps  Commonly known as: Keflex        levETIRAcetam 500 MG Tabs  Commonly known as: Keppra  Replaced by: levETIRAcetam 100 MG/ML Soln        LORazepam 0.5 MG Tabs  Commonly known as: Ativan        miconazole 2 % Powd        traMADol 50 MG Tabs  Commonly known as: Ultram                  Where to Get Your Medications        Please  your prescriptions at the location directed by your doctor or nurse    Bring a paper prescription for each of these medications  piperacillin-tazobactam 3.375 (3-0.375) g Solr         Follow up Visits: Follow-up with  in 1 week      Chris Bass MD  7/19/2024  5:34 PM    REVIEWER COMMENTS    ID 7/19    ASSESSMENT:     Antibiotics: Zosyn  Vancomycin, meropenem     # Acute Proteus mirabilis bacteremia from likely wound source with sacral, ischial and R heel wound               -Wound cx Proteus, Enterococcus, Strep constellatus               -S/p bedside debridements x 2, down to bone at sacrum  # R heel ulcer with necrotic tissue and exposed bone s/p bedside I&D 7/14/24 with +PTB               - unlikely to heal with abx IV or PO. Amputation has been recommended already     # Leukocytosis  # CVA, bedbound  # H/o PE     PLAN:  -  Continue IV zosyn x4 weeks - EOT 8/11/24 with FU with Dr. Hernandez in [facility] clinic.  -  Follow fever curve, wbc.  -  Reviewed labs, micro, imaging reports.  -  Case d/w family.     History of Present Illness:  Yoseph Cordero is a 74 year old male with a history of CVA, PE, dementia, mostly bedbound, who presented to [facility] ED on 7/9  after being seen by PCP with concerns for worsening wounds. Patient did have a XR of R foot showing osteomyelitis on 6/13. On arrival, Tmax 101.7, wbc 22.3, lactate 2.8, wound swab obtained, started on vancomycin and zosyn. Blood cx with Proteus. Plans to be seen by podiatry and general surgery. ID consulted.     Physical Exam:  /78 (BP Location: Right arm)   Pulse 90   Temp 99 °F (37.2 °C) (Axillary)   Resp 18   Ht 5' 7\" (1.702 m)   Wt 133 lb 12.8 oz (60.7 kg)   SpO2 97%   BMI 20.96 kg/m²      Gen:                   In chair, eating  HEENT:             EOMI, neck supple  Abdom:              Soft, no TTP  Skin/extrem:      No rashes, wound pictures reviewed from 7/18  :                    Primofit draining yellow urine  Lines:                 PICC+     Laboratory Data: Reviewed     Microbiology: Reviewed     Radiology: Reviewed        Renate Feliz PA-C  [facility]  [phone]           Attestation signed by Vijay Hernandez MD at 7/19/2024  5:22 PM     ID Attending:     Notes, labs, chart reviewed.  Agree with assessment and plan as discussed with SAILAJA and documented in SAILAJA note.  LUE PICC in place. D/w son at bedside.  More than 50% of clinical time and 100% of MDM was performed by me.  D/w staff  Will follow     Vijay Hernandez MD                  Nursing  7/19     plan to discharge to [facility] care. PICC line in place, 2 other PIVs removed. Tele removed. Wound dressing changed prior to discharge per gen surg. Discharge paperwork sent with [place],

## 2024-07-23 NOTE — PROGRESS NOTES
Patient ID: Yoseph Cordero is a 74 year old male.    Debridement Pressure Injury Sacrum   Wound 07/23/24 3 Sacrum    Performed by: George Sampson APRN  Authorized by: George Sampson APRN      Consent   Consent obtained? verbal  Consent given by: patient and power of   Risks discussed? procedural risks discussed  Time out called at 7/23/2024 9:15 AM  Immediately prior to the procedure a time out was called and the performing provider verified the correct patient, procedure, equipment, support staff, and site/side marked as required.    Debridement Details  Performed by: NP  Debridement type: conservative sharp    Pre-debridement measurements  Length (cm): 7  Width (cm): 7  Depth (cm): 2.6  Surface Area (cm^2): 49    Post-debridement measurements  Length (cm): 7  Width (cm): 7  Depth (cm): 2.6  Percent debrided: 40%  Surface Area (cm^2): 49  Area Debrided (cm^2): 19.6  Volume (cm^3): 127.4    Devitalized tissue debrided: necrotic debris and slough  Instrument(s) utilized: forceps and blade  Bleeding: none  Hemostasis obtained with: not applicable  Procedural pain (0-10): 0  Post-procedural pain: 0   Response to treatment: procedure was tolerated well

## 2024-07-24 ENCOUNTER — TELEPHONE (OUTPATIENT)
Dept: FAMILY MEDICINE CLINIC | Facility: CLINIC | Age: 74
End: 2024-07-24

## 2024-07-24 NOTE — TELEPHONE ENCOUNTER
Returned message from Tyra who is requesting the note from the visit on 7/22/24.  Instructed Tyra that note was not complete at this time and she should contact Medical Records to request information from that visit.  Tyra did not want the phone number of Medical Records.

## 2024-07-24 NOTE — TELEPHONE ENCOUNTER
Patient's daughter concern for Pt , now in rehab, has wounds, requesting Stevan -supplement -collagen to help heal, advised need Video visit, made aware  out of office , appt made , stated Dewayne have spoken to her before

## 2024-07-25 ENCOUNTER — TELEMEDICINE (OUTPATIENT)
Dept: FAMILY MEDICINE CLINIC | Facility: CLINIC | Age: 74
End: 2024-07-25
Payer: MEDICARE

## 2024-07-25 DIAGNOSIS — L89.154 PRESSURE INJURY OF SACRAL REGION, STAGE 4 (HCC): Primary | ICD-10-CM

## 2024-07-25 NOTE — PROGRESS NOTES
Subjective   Yoseph Cordero is a 74 year old male.    Chief Complaint   Patient presents with    Wound Care     HPI  7/25/2024: Patient is a 74-year-old male with a past medical history of dementia nonverbal at baseline, chronic right hemiparesis from prior CVA, seizure disorder on Keppra, VTE with factor V Leiden on Xarelto, anemia, chronic right heel stage IV, left ischial stage IV and sacral decubitus ulcer stage IV with recent hospitalization on 7/9-7/19 for severe sepsis secondary to Proteus bacteremia treated with IV Zosyn discharged on 7/19/2024 with midline with plan for 4 weeks of IV antibiotics tp [place] extended-care SNF for placement.   Per SNF, patient had acquired the pressure injuries at his right heel, left ischial/hip and sacral at home while he had home health services.  Per patient's daughter patient eats well, has multiple stool a day, incontinent. Patient's daughter is not happy with care her father has been getting.   Patient was here today 7/23/2024 to see Dr. Sury WALDROP (Podiatrist) for heel wounds, I was asked us to see this patients today for multiple wounds that wound clinic was not aware he had at time of masha.    Review of Systems   Constitutional:  Negative for activity change.   Respiratory:  Negative for cough and shortness of breath.    Cardiovascular:  Negative for leg swelling.   Gastrointestinal:  Negative for abdominal distention.   Skin:  Positive for wound.        Multiple wounds   Neurological:  Positive for weakness.   Psychiatric/Behavioral:  Negative for agitation.      Objective   Physical Exam  Vitals reviewed.   Constitutional:       General: He is awake.      Appearance: He is underweight. He is ill-appearing.      Comments: Non-verbal, unable to follow commends    Cardiovascular:      Rate and Rhythm: Normal rate and regular rhythm.      Pulses: Normal pulses.   Pulmonary:      Effort: Pulmonary effort is normal.   Abdominal:      General: Bowel sounds are normal.       Palpations: Abdomen is soft.   Musculoskeletal:      Right lower leg: No edema.      Left lower leg: No edema.   Skin:     General: Skin is warm.      Findings: Wound present. No erythema or rash.      Nails: There is no clubbing.          Neurological:      Mental Status: Mental status is at baseline.     Wound Assessment             The wound assessment has been completed by wound care RN, it will be in separate note.   There were no vitals filed for this visit.    Allergies  No Known Allergies    Assessment   Bilateral heels and ankle wounds were assessed by Dr. Sury AGARWAL. Please see her note.      Sacral wound, stage IV pressure injury  -necrotic tissue with black eschar, broken tail bone in the wound bed which was removed.   -Proximal with no granulation or viable tissue, distal wound with granulation  -deep tunneling noted at 10 o'clock about 2-3 cm (this is soft tissue and measurements slightly varies due to position of patient and pulling of the buttocks skin.   -no erythema, patient is on IV antibiotics      Left hip wound, stage IV pressure injury:  -slough covering the connective tissue over the hip bone  -no erythema on the soft tissue surrounding the wound  -no foul odor    Reviewed note and labs/imaging at [facility] where.  Recent labs: 7/18/2024: BUN 24, Creatinine 0.68, eGFR 98, H&H 9.0 & 27.7  7/15/2024: Albumin 3.6, Globulin 3.4, total protein 7.0    Encounter Diagnosis  1. Pressure injury of sacral region, stage 4 (HCC)    2. Pressure injury of right hip, stage 3 (HCC)    3. Gait disturbance    4. Pressure injury of right heel, stage 4 (HCC)    5. Pressure injury of right ankle, stage 2 (HCC)      Problem List  Patient Active Problem List   Diagnosis    Hemiplegia affecting right dominant side (HCC)    Chronic obstructive pulmonary disease (HCC)    Combined forms of age-related cataract of both eyes    Esophageal reflux    Late onset Alzheimer's disease with behavioral disturbance  (HCC)    Alcohol abuse    Calcification of aorta (HCC)    Alcoholism (HCC)    History of stroke    Dysphagia    Hyperglycemia    Weakness    Gait disturbance    Abnormal involuntary movement    Acute pulmonary embolism without acute cor pulmonale, unspecified pulmonary embolism type (HCC)    Factor V Leiden (HCC)    Leukocytosis    Tachycardia    Altered mental status, unspecified altered mental status type    Seizures (HCC)    Infected ulcer of skin, unspecified ulcer stage (HCC)    Infected decubitus ulcer    Transient neurological symptoms    Palliative care by specialist    Pressure injury of sacral region, stage 4 (HCC)    Pressure injury of right hip, stage 3 (HCC)    Pressure injury of right heel, stage 4 (HCC)    Pressure injury of right ankle, stage 2 (HCC)     Plan  Orders  Since this visit, I had multiple phone calls. One phone call to [facility] which clarified that their wound care provider at that facility will be taking over the care of wounds (hip and sacrum). I also had multiple phone call with Tyra, patient's daughter. Summery of conversations:  #Healability:  One of the principals of wound care plan is to determine whether it is the wound is going to heal or not.   ? Healable: Have sufficient vascular supply, underlying cause can be corrected, & health can be optimized.  ? Maintenance: have healing potential, but various patient factors are compromising wound healing at this time.  ? Non-healable/Palliative wound: has no ability to heal due to untreatable causes such as terminal disease or end-of-life.  #Goal of care:  To prevent situations of misunderstanding, with different assumptions about the healability of the wound and the goals of care, it is important that there be discussion, and optimally, mutual agreement between the providers, the nursing facility team, and the patient/his family regarding setting goals about the “healability” of the wound.   Recommended  that Tyra speak to her father's PCP who knows the patient and the family trust on the goal of care.  At this time, based on the patient's medical condition, and worsening of the wounds in the past few weeks, the prognosis for sacral wound and hip wound is poor.     Palliative wounds are considered and non-healing level in light of poor health condition and the demands of treatments such as wound vac (if his insurance will cover the cost) outweigh the potential benefit, obtaining seal of wound vac on sacral wound is almost impossible without colostomy bag.  Underlying medical condition changes or lower tissue perfusion, compromises cutaneous oxygen tension, skin failure at end stage disease processes, delivery of vital nutrients and removal of metallic based.    I have explained that pressure wounds are seen most often in elderly and terminally ill patients as a result skin failure, which is a naturally occurring process commonly associated with terminal illness wherein the skin begins to break down and die. At the end of life multiple risk factors (listed above) can lead to pressure ulcers.  At this time patient is Palliative states, which means the goal of healing wound is not realistic.   Non-healable/Palliative wound:  In palliative wound care, the goal of care is to reduce the risk for infection, slow the course of deterioration and promote client comfort and function.  -wound vac, negative pressure wound therapy is considered aggressive wound care intervention, indicated on stage four pressure injuies with bone infection after treatment started (this is patient case), it can be done on the hip, however the sacral wound edge is less than 2 cm away from anus with patient being incontinent of stool, the seal of the wound vac will not be atainable. The most approprate dressing on this wound is Dakin's 25% moist gauze pack in the sacral wound twice daily supported with gauze or absorptive foam/ABD.   In my clinical  opinion, patient may not be a candidate for surgery with general anesthesia for elective colostomy bag to re-rough the stool from his wound bed, there fore contamination of sacral wound with stool is factor that increase risk of infection.  Left hip wound, may have NPWT (wound vac), steal is obtainable, not sure if it is covered under his insurance, need to send authorization.  Left hip can have advanced dressing, such as Santyl, enzymatic detriment, daily with moist gauze. Santyl may be a costly treatment due to size of the wound and lack of cost coverage of this medication.   Another option is Triad cream with gauze in and around the hip wound, three times a week. If the wound become granular, then may add collagen and silver alginate or other advanced dressing 2-3 times a week.   ? Moisture retentive dressings may not be appropriate.  ? Referral to specialist may be indicated to choose treatment plan that will stabilize the wound status if possible.   Patient needs air mattress or any other equal quality pressure relieving surface for bed and w/chair, with frequent repositioning and optimum skin care to prevent worsening wounds.   At this current time, based on the conversation taken place after the visit, wound care plan has been transferred to provider at [facility]. Above recommendation are possible treatment plan, the provider in the facility will be drafting treatment plan which may not be similar to my recommendations.   I have tried to communicate above finding and recommendation to patient's daughter during the visit and in the frequent phone calls that had made to clinic to talk to me, based on my clinical judgement and expertise in the extend of my training.   Note to patient:The 21st Century Cures Act makes medical notes like these available to patients in the interest of transparency. Please be advised this is a medical document. Medical documents are intended to carry relevant information, facts as evident,  and the clinical opinion of the practitioner. The medical note is intended as peer to peer communication and may appear blunt or direct. It is written in medical language and may contain abbreviations or verbiage that are unfamiliar.   Total face to face time was 60 min, more than 50% of the time was spent in counseling and/or coordination of care related to sacral and left hip wounds.   Follow-Up  Return for Transfer of care to  wound care speicalist. I am signing off. .

## 2024-07-26 NOTE — PROGRESS NOTES
I spoke to Yoseph Cordero's daughter via video call, verified date of birth, and discussed current concerns.    The patient's daughter states that the patient is in rehab now. The patient's daughter requests [facility] to help with wound healing. She is not happy with the treatment in the rehab. The patient was seen by George maynard Wound care NP 2 days ago.   Advise the patient's daughter to contact the Wound Clinic to discuss further treatment.

## 2024-07-29 ENCOUNTER — TELEPHONE (OUTPATIENT)
Dept: FAMILY MEDICINE CLINIC | Facility: CLINIC | Age: 74
End: 2024-07-29

## 2024-07-29 DIAGNOSIS — L89.154 PRESSURE INJURY OF SACRAL REGION, STAGE 4 (HCC): Primary | ICD-10-CM

## 2024-07-29 DIAGNOSIS — L89.304 PRESSURE INJURY OF BUTTOCK, STAGE 4, UNSPECIFIED LATERALITY (HCC): ICD-10-CM

## 2024-07-29 DIAGNOSIS — L89.90 PRESSURE INJURY OF SKIN, UNSPECIFIED INJURY STAGE, UNSPECIFIED LOCATION: ICD-10-CM

## 2024-07-29 DIAGNOSIS — R56.9 SEIZURES (HCC): ICD-10-CM

## 2024-07-29 DIAGNOSIS — G30.1 LATE ONSET ALZHEIMER'S DISEASE WITH BEHAVIORAL DISTURBANCE (HCC): ICD-10-CM

## 2024-07-29 DIAGNOSIS — F02.818 LATE ONSET ALZHEIMER'S DISEASE WITH BEHAVIORAL DISTURBANCE (HCC): ICD-10-CM

## 2024-07-29 DIAGNOSIS — L89.224 PRESSURE INJURY OF LEFT HIP, STAGE 4 (HCC): ICD-10-CM

## 2024-07-29 DIAGNOSIS — L89.614 PRESSURE INJURY OF RIGHT HEEL, STAGE 4 (HCC): ICD-10-CM

## 2024-07-29 NOTE — TELEPHONE ENCOUNTER
Alex from [facility] called in regards to order of rental DME, said information is still needed that's why it is still pending.   Needs to speak to someone in the office to provide information.   Can call patient so patient aware of the status of authorization as well.       Phone number: [phone]

## 2024-07-29 NOTE — TELEPHONE ENCOUNTER
Verified name and  of patient    Tyra, daughter of patient, (on release of information) calling to follow up message below.    She is requesting a phone call once an update is available.

## 2024-07-30 ENCOUNTER — TELEPHONE (OUTPATIENT)
Dept: FAMILY MEDICINE CLINIC | Facility: CLINIC | Age: 74
End: 2024-07-30

## 2024-07-30 NOTE — TELEPHONE ENCOUNTER
Daughter called back on this.   Inquires on status of DURABLE MEDICAL EQUIPMENT request.  Wants to take father out of care facility and would like to expedite home care set up.  Routed to triage support for follow up.

## 2024-07-30 NOTE — TELEPHONE ENCOUNTER
Calling Tyra, daughter.    She states its a medical bed-    Full electric bed  Continuous low air mattress  Rosaura lift   Tilted wheelchair with pressure wound cushion      [employer]  [address] [address] [address] [place] [address]  [phone]    Lakia [facility] [phone]    Called and left message for Lakia to call back and inform us what is still needed for this patients order.

## 2024-07-30 NOTE — TELEPHONE ENCOUNTER
Daughter contacts clinic regarding taking her father out of rehab facility [facility].  He is not getting the care he needs.  See separate DURABLE MEDICAL EQUIPMENT request.  Would like to bring him home with home health.  She is overwhelmed and would like to speak with Dr. Sheets.  Patient had virtual visit with [facility] on 07/25.

## 2024-07-30 NOTE — TELEPHONE ENCOUNTER
Carrol, liaison from [facility] returning call.  Verified  patient name and date of birth.  Reviewed DME supply list from  7/30/24 note below.  List is complete per Carrol.  She advised  that when order is signed by provider it can be faxed to [employer] at [phone].      Also see 7/30/24 encounter forwarded to Dr Sheets.     Triage Support please assist.

## 2024-07-30 NOTE — TELEPHONE ENCOUNTER
Call ref#[id number] called [employer] back and they did not know what the DME was in reference to.

## 2024-07-31 ENCOUNTER — TELEPHONE (OUTPATIENT)
Dept: FAMILY MEDICINE CLINIC | Facility: CLINIC | Age: 74
End: 2024-07-31

## 2024-07-31 NOTE — TELEPHONE ENCOUNTER
Patient's daughter is calling to advise PCP the vendor requires a prior authorization for the medical supply ordered    Air mattress    [employer]   Please see closed telephone encounter 7/29/24.    Patient's daughter is requesting a call back to confirm.

## 2024-07-31 NOTE — TELEPHONE ENCOUNTER
Patient's daughter Tyra called (on Release of Information), verified patient's Name and . States she called the vendor and was told that request is needing authorization and that provider has to call Clinical Intake Department for approval. Call transferred to Triage Support for assistance.

## 2024-07-31 NOTE — TELEPHONE ENCOUNTER
Per daughter Tyra patient was supposed to have been discharged on 7/27/24,but since not medical equipment is not set up at home yet he is still at [facility].  Tyra was told to get the Air matress expedited, provider needs to call [facility] Care Intake Dept at [phone], Reference # [id number].  No one else can call only provider.    [facility] Intake Dept at [phone], Reference # [id number].     Please advise.

## 2024-08-01 ENCOUNTER — TELEPHONE (OUTPATIENT)
Dept: INTERNAL MEDICINE UNIT | Facility: HOSPITAL | Age: 74
End: 2024-08-01

## 2024-08-01 NOTE — TELEPHONE ENCOUNTER
[employer] [facility] [employer]   [address], [address]  [place] [address]     Phone# [phone]  Fax# [phone]     -she advised that in order to expedite this review they need to speak to Dr rosario

## 2024-08-01 NOTE — TELEPHONE ENCOUNTER
Good Afternoon,      JORY - see below ( I have spent over 1/ 1/2  hours on this referral today)     1.received conference call from PCP office with Pt daughter, Tyra and [employer] Jessica Byrd    -asked if our office expedited DME order for mattress, tilted wheelchair w/ cushion and akua lift    - advised that we have been working on this PA for over 5 days so patient can get released from Extended Care facility to go home for care    - she did not seem to understand what we were asking for so asked to speak to supervisor/ was trans to Negro and call disconnected    -2.called [employer] [facility]  & spoke to Yanni acct manager    -states she also has been working with [employer] all week to obtain PA for medical supplies    -she asked if I can please reach out to  [employer] Dept     3. Called [employer] Care Intake Dept at [phone] & spoke to Samantha    Pending Auth#  [id number]    [employer] [facility]   [address] [place] [address], [address]  [place] [address]    Phone# [phone]  Fax# [phone]    -she advised that in order to expedite this review they need to speak to Dr directly    CPT / bed/ pending clinical review  CPT  / mattress/ pending clinical review  CPT / akua lift/ pending clinical review    CPT / commode chair/ approved  CPT / gel pad for mattress/ approved  CPT / Hospital bed/ approved  CPT / bed side rails  CPT / mattress/ inner spring/ approved    I will reach out to daughter tomorrow to advise the information I have from today.    Thank you    Jennie  [facility]

## 2024-08-01 NOTE — TELEPHONE ENCOUNTER
Spoke to patient's daughter monica and informed of all messages below.  States will contact supplier.

## 2024-08-01 NOTE — TELEPHONE ENCOUNTER
Received call from James SINGH Supervisor [employer] in regards to prior authorizations. The prior authorizations have been expedited with a possible decision made tomorrow.

## 2024-08-01 NOTE — TELEPHONE ENCOUNTER
Good Afternoon    I reached out to Lakia at phone# [phone].  She is with [facility] not the DME company.  She states they are all good to go and just waiting for DME providor to supply the infusion, mattress, bed and wheelchair.    She advised to reach out to Yanni at [employer] at phone# [phone] for assistance.,    She advised that you can also reach out to Love at [facility] to assist in discharge.  She did not have phone number for [employer].    Please call the DME klaudia, [employer] at [phone] to see what else they need to start working on the prior auth for the medical supplies needed.    Thank you for your assistance.    Jennie  [facility]

## 2024-08-01 NOTE — TELEPHONE ENCOUNTER
I called and requested expedited equipment delivery was told they could not help me reference number for phone call given to me was [id number].

## 2024-08-01 NOTE — TELEPHONE ENCOUNTER
Patient's daughter stated she spoke to the Vendor   Was advised Provider need to call [facility] Intake department , to expedite this matter   [phone] CIT (Care Intake )  She's trying to get pt out today

## 2024-08-01 NOTE — TELEPHONE ENCOUNTER
Good Afternoon     does not obtain PA for DME     DME vendor is responsible to obtain PA     Order only      I will fax order over to [employer] [facility] [employer].  They are responsible to obtain prior auth for medical supplies.     I will also reach out to Lakia at [facility] at phone# [phone] to expedite order and assist patient.     Thank you     Jennie   [facility]

## 2024-08-02 NOTE — TELEPHONE ENCOUNTER
Good Afternoon Dr Sheets and staff,     JORY Lopez from [facility] clinical review team called regarding DME PA.    CPT [id number]   - air mattress has been denied  - per Medicare criteria mattress is not medically necessary    CPT codes approved were:      - commode chair    - semi electric hospital bed w/ mattress     - gel like pressure pad for mattress     - bedside rails     - inner spring mattress      - semi electric bed w/ no mattress    - akua lift    Auth# [id number]    Valid dates: 7/30/24 thru 8/29/25    Will be available on Availity to see PA    I will send mess thru [employer] to patient with all info.    All items were delivered yesterday in error by [employer] supply company.  There was no PA obtained thru [facility] yesterday.    Thank you for all ur help Dr and staff.    Jennie   [facility]

## 2024-08-03 ENCOUNTER — TELEPHONE (OUTPATIENT)
Dept: FAMILY MEDICINE CLINIC | Facility: CLINIC | Age: 74
End: 2024-08-03

## 2024-08-03 NOTE — TELEPHONE ENCOUNTER
On call note: Was called on 8/3/24 by home health that pt was send home with recommendation to be on metoprolol from [facility] but do not have script. Discussed with home health nurse and will send script for #30 for now. Can follow up with Dr Sheets for future refills.

## 2024-08-06 ENCOUNTER — TELEPHONE (OUTPATIENT)
Dept: FAMILY MEDICINE CLINIC | Facility: CLINIC | Age: 74
End: 2024-08-06

## 2024-08-06 NOTE — TELEPHONE ENCOUNTER
Left detailed message informing last orders came in with a return mail envelope.  Signed orders were mailed back, if they wish for a fax, we can fax the copy we kept for our records.  Informed to call us back if they wish for a fax.

## 2024-08-06 NOTE — TELEPHONE ENCOUNTER
[facility]  orders received, signed by provider and mailed back in envelope provided by HH.    Copy made and placed in HH folder with scan sheet.

## 2024-08-06 NOTE — TELEPHONE ENCOUNTER
Arline, is calling for status of the orders that she has faxed the office. Please, sign and fax back.

## 2024-08-08 ENCOUNTER — LAB REQUISITION (OUTPATIENT)
Dept: LAB | Facility: HOSPITAL | Age: 74
End: 2024-08-08
Payer: MEDICARE

## 2024-08-08 DIAGNOSIS — M86.171 OTHER ACUTE OSTEOMYELITIS, RIGHT ANKLE AND FOOT (HCC): ICD-10-CM

## 2024-08-08 LAB
ALBUMIN SERPL-MCNC: 4 G/DL (ref 3.2–4.8)
ALBUMIN/GLOB SERPL: 1.3 {RATIO} (ref 1–2)
ALP LIVER SERPL-CCNC: 63 U/L
ALT SERPL-CCNC: 12 U/L
ANION GAP SERPL CALC-SCNC: 5 MMOL/L (ref 0–18)
AST SERPL-CCNC: 16 U/L (ref ?–34)
BASOPHILS # BLD AUTO: 0.05 X10(3) UL (ref 0–0.2)
BASOPHILS NFR BLD AUTO: 0.9 %
BILIRUB SERPL-MCNC: 0.2 MG/DL (ref 0.2–1.1)
BUN BLD-MCNC: 27 MG/DL (ref 9–23)
BUN/CREAT SERPL: 40.3 (ref 10–20)
CALCIUM BLD-MCNC: 10.1 MG/DL (ref 8.7–10.4)
CHLORIDE SERPL-SCNC: 111 MMOL/L (ref 98–112)
CO2 SERPL-SCNC: 28 MMOL/L (ref 21–32)
CREAT BLD-MCNC: 0.67 MG/DL
CRP SERPL-MCNC: 4.4 MG/DL (ref ?–1)
DEPRECATED RDW RBC AUTO: 58.4 FL (ref 35.1–46.3)
EGFRCR SERPLBLD CKD-EPI 2021: 99 ML/MIN/1.73M2 (ref 60–?)
EOSINOPHIL # BLD AUTO: 0.53 X10(3) UL (ref 0–0.7)
EOSINOPHIL NFR BLD AUTO: 9.8 %
ERYTHROCYTE [DISTWIDTH] IN BLOOD BY AUTOMATED COUNT: 18.2 % (ref 11–15)
ERYTHROCYTE [SEDIMENTATION RATE] IN BLOOD: 105 MM/HR
FASTING STATUS PATIENT QL REPORTED: NO
GLOBULIN PLAS-MCNC: 3.2 G/DL (ref 2–3.5)
GLUCOSE BLD-MCNC: 119 MG/DL (ref 70–99)
HCT VFR BLD AUTO: 34.6 %
HGB BLD-MCNC: 10.7 G/DL
IMM GRANULOCYTES # BLD AUTO: 0.04 X10(3) UL (ref 0–1)
IMM GRANULOCYTES NFR BLD: 0.7 %
LYMPHOCYTES # BLD AUTO: 1.32 X10(3) UL (ref 1–4)
LYMPHOCYTES NFR BLD AUTO: 24.4 %
MCH RBC QN AUTO: 27.1 PG (ref 26–34)
MCHC RBC AUTO-ENTMCNC: 30.9 G/DL (ref 31–37)
MCV RBC AUTO: 87.6 FL
MONOCYTES # BLD AUTO: 1.54 X10(3) UL (ref 0.1–1)
MONOCYTES NFR BLD AUTO: 28.5 %
NEUTROPHILS # BLD AUTO: 1.93 X10 (3) UL (ref 1.5–7.7)
NEUTROPHILS # BLD AUTO: 1.93 X10(3) UL (ref 1.5–7.7)
NEUTROPHILS NFR BLD AUTO: 35.7 %
OSMOLALITY SERPL CALC.SUM OF ELEC: 304 MOSM/KG (ref 275–295)
PLATELET # BLD AUTO: 382 10(3)UL (ref 150–450)
POTASSIUM SERPL-SCNC: 3.4 MMOL/L (ref 3.5–5.1)
PROT SERPL-MCNC: 7.2 G/DL (ref 5.7–8.2)
RBC # BLD AUTO: 3.95 X10(6)UL
SODIUM SERPL-SCNC: 144 MMOL/L (ref 136–145)
WBC # BLD AUTO: 5.4 X10(3) UL (ref 4–11)

## 2024-08-08 PROCEDURE — 80053 COMPREHEN METABOLIC PANEL: CPT | Performed by: FAMILY MEDICINE

## 2024-08-08 PROCEDURE — 85025 COMPLETE CBC W/AUTO DIFF WBC: CPT | Performed by: FAMILY MEDICINE

## 2024-08-08 PROCEDURE — 86140 C-REACTIVE PROTEIN: CPT | Performed by: FAMILY MEDICINE

## 2024-08-08 PROCEDURE — 85652 RBC SED RATE AUTOMATED: CPT | Performed by: FAMILY MEDICINE

## 2024-08-09 ENCOUNTER — TELEPHONE (OUTPATIENT)
Dept: FAMILY MEDICINE CLINIC | Facility: CLINIC | Age: 74
End: 2024-08-09

## 2024-08-09 ENCOUNTER — OFFICE VISIT (OUTPATIENT)
Dept: WOUND CARE | Facility: HOSPITAL | Age: 74
End: 2024-08-09
Attending: NURSE PRACTITIONER
Payer: MEDICARE

## 2024-08-09 VITALS
RESPIRATION RATE: 16 BRPM | OXYGEN SATURATION: 97 % | DIASTOLIC BLOOD PRESSURE: 73 MMHG | SYSTOLIC BLOOD PRESSURE: 129 MMHG | TEMPERATURE: 99 F | HEART RATE: 104 BPM

## 2024-08-09 DIAGNOSIS — S71.002D COMPLICATED OPEN WOUND OF LEFT HIP, SUBSEQUENT ENCOUNTER: ICD-10-CM

## 2024-08-09 DIAGNOSIS — L89.154 PRESSURE INJURY OF SACRAL REGION, STAGE 4 (HCC): Primary | ICD-10-CM

## 2024-08-09 DIAGNOSIS — L89.213 PRESSURE INJURY OF RIGHT HIP, STAGE 3 (HCC): ICD-10-CM

## 2024-08-09 DIAGNOSIS — L89.614 PRESSURE INJURY OF RIGHT HEEL, STAGE 4 (HCC): ICD-10-CM

## 2024-08-09 DIAGNOSIS — L89.223 STAGE III PRESSURE ULCER OF LEFT HIP (HCC): ICD-10-CM

## 2024-08-09 DIAGNOSIS — R26.9 GAIT DISTURBANCE: ICD-10-CM

## 2024-08-09 DIAGNOSIS — L89.512 PRESSURE INJURY OF RIGHT ANKLE, STAGE 2 (HCC): ICD-10-CM

## 2024-08-09 DIAGNOSIS — S91.301A NON-HEALING OPEN WOUND OF RIGHT HEEL: ICD-10-CM

## 2024-08-09 PROBLEM — S71.002A COMPLICATED OPEN WOUND OF LEFT HIP: Status: ACTIVE | Noted: 2024-08-09

## 2024-08-09 PROCEDURE — 99214 OFFICE O/P EST MOD 30 MIN: CPT | Performed by: NURSE PRACTITIONER

## 2024-08-09 NOTE — PATIENT INSTRUCTIONS
Orders and Instructions:     Remove the old dressing and discard  Clean the wound with  Normal saline or wound cleanser  1/4 strength acetic acid (vinegar and water solution )  Clean the wound with soaked gauze with vinegar water and allow this to clean and lossen up the dead tissue in the wounds then remove them with gauze  3. Apply the thin layer of Medi-honey (or Tirad Cream) on the alginate or gauze, cover with dry gauze is the wound is deep.  Apply zinc oxide to flori-wound to protect the skin.  Apply antifungal powder to the rash on the skin (over the counter fluconazole) or Nystatin powder.    4. Secure with bordered foam or gentle tape  5.  Change the dressing three times a week.

## 2024-08-09 NOTE — TELEPHONE ENCOUNTER
Patient  daughter Tyra leong ( name and date of birth of patient verified ) on Release of Information     Asking about recent test results, explained the following:    Test results now post immediately to your MyChart account.  However, your doctor may not have the chance to review or provide comments on them before the results reach you.    Our providers review and comment on all test results, normal or otherwise.   If you have not heard from our office with comments on your test results within the next 3-4 days, please contact us again on this message string so we can assist you.   Asking for ID who saw patient in the hospital ,  provided info for  Renate Feliz [phone]  Also had questions about DME , received a MyChart from Jennie Pham call transferred to [phone]  for Jennie

## 2024-08-09 NOTE — PROGRESS NOTES
Subjective   Yoseph Cordero is a 74 year old male.    Chief Complaint   Patient presents with    Wound Care     Multiple wounds on bilateral feet, hip and sacral wounds     HPI  8/9/2024: Patient is here with her daughter who is providing the information, patient is non-verbal. Patient's daughter has taken patient home, home health nurse has not started as of now, she is doing all wounds the dressing changes. Patient has hospital bed with air mattress with lift.     7/25/2024: Patient is a 74-year-old male with a past medical history of dementia nonverbal at baseline, chronic right hemiparesis from prior CVA, seizure disorder on Keppra, VTE with factor V Leiden on Xarelto, anemia, chronic right heel stage IV, left ischial stage IV and sacral decubitus ulcer stage IV with recent hospitalization on 7/9-7/19 for severe sepsis secondary to Proteus bacteremia treated with IV Zosyn discharged on 7/19/2024 with midline with plan for 4 weeks of IV antibiotics tp [place] [facility]-care [facility]F for placement.   Per SNF, patient had acquired the pressure injuries at his right heel, left ischial/hip and sacral at home while he had home health services.  Per patient's daughter patient eats well, has multiple stool a day, incontinent. Patient's daughter is not happy with care her father has been getting.   Patient was here today 7/23/2024 to see Dr. Sury WALDROP (Podiatrist) for heel wounds, I was asked us to see this patients today for multiple wounds that wound clinic was not aware he had at time of masha.     Review of Systems   Constitutional:  Negative for activity change.   Respiratory:  Negative for cough and shortness of breath.    Cardiovascular:  Negative for leg swelling.   Gastrointestinal:  Negative for abdominal distention.   Skin:  Positive for wound.        Multiple wounds   Neurological:  Positive for weakness.   Psychiatric/Behavioral:  Negative for agitation.       Objective  Physical Exam  Vitals reviewed.    Constitutional:       General: He is awake.      Appearance: He is underweight. He is ill-appearing.      Comments: Non-verbal, unable to follow commends    Cardiovascular:      Rate and Rhythm: Normal rate and regular rhythm.      Pulses: Normal pulses.   Pulmonary:      Effort: Pulmonary effort is normal.   Abdominal:      General: Bowel sounds are normal.      Palpations: Abdomen is soft.   Musculoskeletal:      Right lower leg: No edema.      Left lower leg: No edema.   Skin:     General: Skin is warm.      Findings: Wound present. No erythema or rash.      Nails: There is no clubbing.           Neurological:      Mental Status: Mental status is at baseline.   Wound Assessment  Wound 07/23/24 1 Heel Right (Active)   Date First Assessed: 07/23/24    Wound Number (Wound Clinic Only): 1  Primary Wound Type: Pressure Injury  Location: Heel  Wound Location Orientation: Right      Assessments 8/9/2024 10:25 AM   Wound Image     Site Assessment Moist;Red;Yellow   Closure Not approximated   Drainage Amount Moderate   Drainage Description Serosanguineous   Treatments Cleansed;Wound ;Topical (Barrier/Moisturizer/Ointment);Honey Gel   Dressing Aquacel;Aquacel Foam   Dressing Changed Changed   Dressing Status Clean;Dry;Intact   Wound Length (cm) 5 cm   Wound Width (cm) 2.6 cm   Wound Surface Area (cm^2) 13 cm^2   Wound Depth (cm) 0.7 cm   Wound Volume (cm^3) 9.1 cm^3   Wound Healing % 68   Margins Well-defined edges;Not attached   Non-staged Wound Description Full thickness   Madhuri-wound Assessment Dry;Intact   Wound Granulation Tissue Red   Wound Bed Granulation (%) 95 %   Wound Bed Epithelium (%) 0 %   Wound Bed Slough (%) 5 %   Wound Bed Eschar (%) 0 %   Wound Bed Fibrin (%) 0 %   State of Healing Early/partial granulation   Wound Odor None   Exposed Structure Bone   Tunneling? Yes   Number of Tunnels 1   Tunnel 1 Location 12   Tunnel 1 Depth 2   Pressure Injury Stage Stage 4       Active Orders   Date Order  Priority Status Authorizing Provider   07/25/24 1711 OP Wound Dressing Routine Active George Sampson APRN     - Dressing:    Collagen     - Dressing:    Hydrofera transfer     - Dressing:    ABD pad     - Dressing:    Kerlix     - Cleansing:    Cleanse with normal saline or wound cleanser     - Frequency:    Change dressing three times a day   07/23/24 1016 Debridement Pressure Injury Right Heel Routine Active Sury Blake DPM       Wound 07/23/24 2 Hip Left (Active)   Date First Assessed: 07/23/24    Wound Number (Wound Clinic Only): 2  Primary Wound Type: Pressure Injury  Location: Hip  Wound Location Orientation: Left      Assessments 8/9/2024 10:25 AM   Wound Image     Site Assessment Moist;Yellow;Red   Closure Not approximated   Drainage Amount Large   Drainage Description Serosanguineous   Treatments Cleansed;Wound ;Topical (Barrier/Moisturizer/Ointment);Honey Gel   Dressing Aquacel;Aquacel Foam   Dressing Changed Changed   Dressing Status Clean;Dry;Intact   Wound Length (cm) 3.6 cm   Wound Width (cm) 4.3 cm   Wound Surface Area (cm^2) 15.48 cm^2   Wound Depth (cm) 1 cm   Wound Volume (cm^3) 15.48 cm^3   Wound Healing % 79   Margins Well-defined edges;Epibole (Rolled edges)   Non-staged Wound Description Full thickness   Madhuri-wound Assessment Dry;Blanchable erythema   Wound Granulation Tissue Red   Wound Bed Granulation (%) 30 %   Wound Bed Epithelium (%) 0 %   Wound Bed Slough (%) 70 %   Wound Bed Eschar (%) 0 %   Wound Bed Fibrin (%) 0 %   State of Healing Early/partial granulation   Wound Odor None   Pressure Injury Stage Stage 3       Active Orders   Date Order Priority Status Authorizing Provider   07/25/24 1711 OP Wound Dressing Routine Active George Sampson APRN     - Dressing:    Dry gauze     - Dressing:    ABD pad     - Additional Wound Dressing Information:    traid     - Cleansing:    Cleanse with normal saline or wound cleanser     - Frequency:    Change dressing daily and PRN    07/25/24 1711 OP Wound Dressing Routine Active George Sampson APRN       Wound 07/23/24 3 Sacrum (Active)   Date First Assessed: 07/23/24    Wound Number (Wound Clinic Only): 3  Primary Wound Type: Pressure Injury  Location: Sacrum      Assessments 8/9/2024 10:25 AM   Wound Image     Site Assessment Moist;Red;Yellow;Tan   Closure Not approximated   Drainage Amount Large   Drainage Description Yellow;Brown   Treatments Cleansed;Wound ;Topical (Barrier/Moisturizer/Ointment);Honey Gel   Dressing Aquacel;Aquacel Foam   Dressing Changed Changed   Dressing Status Clean;Dry;Intact   Wound Length (cm) 7 cm   Wound Width (cm) 5.5 cm   Wound Surface Area (cm^2) 38.5 cm^2   Wound Depth (cm) 3 cm   Wound Volume (cm^3) 115.5 cm^3   Wound Healing % 9   Margins Well-defined edges;Not attached   Non-staged Wound Description Full thickness   Madhuri-wound Assessment Excoriated;Pink   Wound Granulation Tissue Red   Wound Bed Granulation (%) 70 %   Wound Bed Epithelium (%) 0 %   Wound Bed Slough (%) 30 %   Wound Bed Eschar (%) 0 %   Wound Bed Fibrin (%) 0 %   State of Healing Early/partial granulation   Wound Odor Mild   Exposed Structure Bone   Undermining? Yes   Number of Undermines 1   Undermine 1 Start Position 7   Undermine 1 End Position 12   Pressure Injury Stage Stage 4       Active Orders   Date Order Priority Status Authorizing Provider   07/25/24 1711 OP Wound Dressing Routine Active George Sampson APRN     - Dressing:    Dry gauze     - Dressing:    ABD pad     - Additional Wound Dressing Information:    dakin soaked     - Cleansing:    Cleanse with Dakins     - Frequency:    Change dressing two times a day   07/25/24 1711 OP Wound Dressing Routine Active George Sampson APRN   07/23/24 1011 Debridement Pressure Injury Sacrum Routine Active George Sampson APRN       Wound 07/23/24 4 Ankle Right;Medial (Active)   Date First Assessed: 07/23/24    Wound Number (Wound Clinic Only): 4  Primary Wound Type: Pressure Injury   Location: Ankle  Wound Location Orientation: Right;Medial      Assessments 8/9/2024 10:25 AM   Wound Image     Site Assessment Moist;Red;Yellow   Closure Not approximated   Drainage Amount Moderate   Drainage Description Serosanguineous   Treatments Cleansed;Wound ;Topical (Barrier/Moisturizer/Ointment);Honey Gel   Dressing Aquacel;Aquacel Foam   Dressing Changed Changed   Dressing Status Clean;Dry;Intact   Wound Length (cm) 0.8 cm   Wound Width (cm) 0.9 cm   Wound Surface Area (cm^2) 0.72 cm^2   Wound Depth (cm) 0.2 cm   Wound Volume (cm^3) 0.144 cm^3   Margins Well-defined edges   Non-staged Wound Description Full thickness   Madhuri-wound Assessment Dry;Intact   Wound Granulation Tissue Red   Wound Bed Granulation (%) 10 %   Wound Bed Epithelium (%) 0 %   Wound Bed Slough (%) 0 %   Wound Bed Eschar (%) 90 %   Wound Bed Fibrin (%) 0 %   State of Healing Early/partial granulation   Wound Odor None   Pressure Injury Stage Unstageable       Inactive Orders   Date Order Priority Status Authorizing Provider   07/25/24 1711 OP Wound Dressing Routine Completed George Sampson APRN     - Dressing:    Kerlix     - Dressing:    Dry gauze     - Dressing:    Honey gel     - Cleansing:    Cleanse with normal saline or wound cleanser     - Frequency:    Change dressing daily and PRN       Wound 07/23/24 5 Ankle Left;Lateral (Active)   Date First Assessed: 07/23/24    Wound Number (Wound Clinic Only): 5  Location: Ankle  Wound Location Orientation: Left;Lateral      Assessments 8/9/2024 10:25 AM   Wound Image     Site Assessment Dry;Brown   Closure Approximated   Drainage Amount None   Treatments Cleansed;Wound    Dressing Open to air   Dressing Status Removed   Wound Length (cm) 0 cm   Wound Width (cm) 0 cm   Wound Surface Area (cm^2) 0 cm^2   Wound Depth (cm) 0 cm   Wound Volume (cm^3) 0 cm^3   Margins Flat and Intact   Non-staged Wound Description Not applicable   Madhuri-wound Assessment Clean;Dry;Intact   Wound Bed  Granulation (%) 0 %   Wound Bed Epithelium (%) 100 %   Wound Bed Slough (%) 0 %   Wound Bed Eschar (%) 0 %   Wound Bed Fibrin (%) 0 %   State of Healing Epithelialized   Wound Odor None       Inactive Orders   Date Order Priority Status Authorizing Provider   07/25/24 1711 OP Wound Dressing Routine Completed George Sampson APRN     - Cleansing:    Cleanse with normal saline or wound cleanser     - Frequency:    Change dressing daily and PRN       Wound 07/23/24 6 Heel Left (Active)   Date First Assessed: 07/23/24    Wound Number (Wound Clinic Only): 6  Primary Wound Type: Pressure Injury  Location: Heel  Wound Location Orientation: Left      Assessments 8/9/2024 10:25 AM   Wound Image     Site Assessment Dry;Brown   Closure Approximated   Drainage Amount None   Treatments Cleansed;Wound    Dressing Open to air   Dressing Status Removed   Wound Length (cm) 0 cm   Wound Width (cm) 0 cm   Wound Surface Area (cm^2) 0 cm^2   Wound Depth (cm) 0 cm   Wound Volume (cm^3) 0 cm^3   Wound Healing % 100   Margins Flat and Intact   Non-staged Wound Description Not applicable   Madhuri-wound Assessment Clean;Dry;Intact   Wound Bed Granulation (%) 0 %   Wound Bed Epithelium (%) 100 %   Wound Bed Slough (%) 0 %   Wound Bed Eschar (%) 0 %   Wound Bed Fibrin (%) 0 %   State of Healing Epithelialized   Wound Odor None       Inactive Orders   Date Order Priority Status Authorizing Provider   07/25/24 1711 OP Wound Dressing Routine Completed George Sampson APRN     - Cleansing:    Cleanse with normal saline or wound cleanser     - Frequency:    Change dressing daily and PRN     Vital Signs    08/09/24 1010   BP: 129/73   Pulse: 104   Resp: 16   Temp: 98.8 °F (37.1 °C)   Allergies  No Known Allergies    Assessment   Left hip wound, stage IV pressure injury:  -one third granula and adipose tissue/ connective tissue covering the hip bone  -no erythema, discoloration on the soft tissue surrounding the wound due to chronic inflammation  of chronic wound, patient is on IV antibiotic via BASSAM  -no foul odor     Sacral wound, stage IV pressure injury:  -granular with some slough and necrotic tissue  -no visible bone  -no foul odor, no erythema  -scattered small pustule most likely fungal rash, due to persistent moisture in the skin    Right heel wound, stage III:  -granular tissue, heeling from un- stage-able  -no erythema or other sign of infection    Right ankle wound,stage III  -some granulation and exposed connective tissue  -no erythema of sign of infection    Left lateral ankle and left heel wounds, closed.    Reviewed note and labs/imaging at [facility] and Care Every where.  Recent labs: 7/18/2024: BUN 24, Creatinine 0.68, eGFR 98, H&H 9.0 & 27.7  7/15/2024: Albumin 3.6, Globulin 3.4, total protein 7.0    Encounter Diagnosis  1. Pressure injury of sacral region, stage 4 (HCC)    2. Stage III pressure ulcer of left hip (HCC)    3. Pressure injury of right heel, stage 4 (HCC)    4. Pressure injury of right ankle, stage 2 (HCC)    5. Non-healing open wound of right heel    6. Complicated open wound of left hip, subsequent encounter    7. Gait disturbance      Problem List  Patient Active Problem List   Diagnosis    Hemiplegia affecting right dominant side (HCC)    Chronic obstructive pulmonary disease (HCC)    Combined forms of age-related cataract of both eyes    Esophageal reflux    Late onset Alzheimer's disease with behavioral disturbance (HCC)    Alcohol abuse    Calcification of aorta (HCC)    Alcoholism ([facility])    History of stroke    Dysphagia    Hyperglycemia    Weakness    Gait disturbance    Abnormal involuntary movement    Acute pulmonary embolism without acute cor pulmonale, unspecified pulmonary embolism type (HCC)    Factor V Leiden (HCC)    Leukocytosis    Tachycardia    Altered mental status, unspecified altered mental status type    Seizures (HCC)    Infected ulcer of skin, unspecified ulcer stage (HCC)    Infected decubitus ulcer     Transient neurological symptoms    Palliative care by specialist    Pressure injury of sacral region, stage 4 (HCC)    Pressure injury of right hip, stage 3 (HCC)    Pressure injury of right heel, stage 4 (HCC)    Pressure injury of right ankle, stage 2 (HCC)    Complicated open wound of left hip    Non-healing open wound of right heel     Plan  Orders  Orders Placed This Encounter   Procedures    OP Wound Dressing   Goal of care: Maintenance wound care: have healing potential, but various patient factors are compromising wound healing at this time.     Palliative wound care: the goal of wound care is to reduce the risk for infection, slow the course of deterioration and promote client comfort and function. If the wounds become granular and shows tissue improvement, may processed to advanced dressing and advanced modality including negative pressure wound therapy only on left hip. I had re-educated patient's daughter due to constant contamination of the wound with stool, it is impossible to keep NPWT working and seal without colostomy intervention (diverting stool from rectum to surface of the abdomen).     Simplified dressings: detail instruction give to patient's daughter, use acetic acid 25% to clean the wounds, then apply thin layer of medi-honey on alginate or gauze and apply to the wound bed, protect the intact skin by zinc oxide cream and antifungal powder every other day and as needed. The sacral wound may need daily or more dressing changes due to contamination with stool.     Patient is allowed to walk per wound, since the heel wound is not in the plantar surface.  Continue to re-position to reduce pressure on hip and sacral wound.  Continue using heel protector/heel boots to reduce pressure in the wound bed.  Monitor for sign of infection, redness, foul odor, warmth and increased drainage.    Follow-Up  Return 2-4 weeks, for APN visit 30 min.

## 2024-08-12 ENCOUNTER — LAB REQUISITION (OUTPATIENT)
Dept: LAB | Facility: HOSPITAL | Age: 74
End: 2024-08-12
Payer: MEDICARE

## 2024-08-12 DIAGNOSIS — M86.171 OTHER ACUTE OSTEOMYELITIS, RIGHT ANKLE AND FOOT (HCC): ICD-10-CM

## 2024-08-12 DIAGNOSIS — M46.28 OSTEOMYELITIS OF VERTEBRA, SACRAL AND SACROCOCCYGEAL REGION (HCC): ICD-10-CM

## 2024-08-12 LAB
ALBUMIN SERPL-MCNC: 3.7 G/DL (ref 3.2–4.8)
ALBUMIN/GLOB SERPL: 1.4 {RATIO} (ref 1–2)
ALP LIVER SERPL-CCNC: 67 U/L
ALT SERPL-CCNC: 15 U/L
ANION GAP SERPL CALC-SCNC: 8 MMOL/L (ref 0–18)
AST SERPL-CCNC: 13 U/L (ref ?–34)
BASOPHILS # BLD AUTO: 0.03 X10(3) UL (ref 0–0.2)
BASOPHILS NFR BLD AUTO: 0.6 %
BILIRUB SERPL-MCNC: 0.4 MG/DL (ref 0.2–1.1)
BUN BLD-MCNC: 20 MG/DL (ref 9–23)
BUN/CREAT SERPL: 30.8 (ref 10–20)
CALCIUM BLD-MCNC: 9.4 MG/DL (ref 8.7–10.4)
CHLORIDE SERPL-SCNC: 106 MMOL/L (ref 98–112)
CO2 SERPL-SCNC: 28 MMOL/L (ref 21–32)
CREAT BLD-MCNC: 0.65 MG/DL
CRP SERPL-MCNC: 7.8 MG/DL (ref ?–1)
DEPRECATED RDW RBC AUTO: 56.3 FL (ref 35.1–46.3)
EGFRCR SERPLBLD CKD-EPI 2021: 99 ML/MIN/1.73M2 (ref 60–?)
EOSINOPHIL # BLD AUTO: 0.66 X10(3) UL (ref 0–0.7)
EOSINOPHIL NFR BLD AUTO: 13.2 %
ERYTHROCYTE [DISTWIDTH] IN BLOOD BY AUTOMATED COUNT: 17.6 % (ref 11–15)
ERYTHROCYTE [SEDIMENTATION RATE] IN BLOOD: 109 MM/HR
FASTING STATUS PATIENT QL REPORTED: YES
GLOBULIN PLAS-MCNC: 2.7 G/DL (ref 2–3.5)
GLUCOSE BLD-MCNC: 125 MG/DL (ref 70–99)
HCT VFR BLD AUTO: 34.8 %
HGB BLD-MCNC: 10.8 G/DL
IMM GRANULOCYTES # BLD AUTO: 0.03 X10(3) UL (ref 0–1)
IMM GRANULOCYTES NFR BLD: 0.6 %
LYMPHOCYTES # BLD AUTO: 1.16 X10(3) UL (ref 1–4)
LYMPHOCYTES NFR BLD AUTO: 23.2 %
MCH RBC QN AUTO: 26.9 PG (ref 26–34)
MCHC RBC AUTO-ENTMCNC: 31 G/DL (ref 31–37)
MCV RBC AUTO: 86.8 FL
MONOCYTES # BLD AUTO: 1.09 X10(3) UL (ref 0.1–1)
MONOCYTES NFR BLD AUTO: 21.8 %
NEUTROPHILS # BLD AUTO: 2.04 X10 (3) UL (ref 1.5–7.7)
NEUTROPHILS # BLD AUTO: 2.04 X10(3) UL (ref 1.5–7.7)
NEUTROPHILS NFR BLD AUTO: 40.6 %
OSMOLALITY SERPL CALC.SUM OF ELEC: 298 MOSM/KG (ref 275–295)
PLATELET # BLD AUTO: 383 10(3)UL (ref 150–450)
POTASSIUM SERPL-SCNC: 3.7 MMOL/L (ref 3.5–5.1)
PROT SERPL-MCNC: 6.4 G/DL (ref 5.7–8.2)
RBC # BLD AUTO: 4.01 X10(6)UL
SODIUM SERPL-SCNC: 142 MMOL/L (ref 136–145)
WBC # BLD AUTO: 5 X10(3) UL (ref 4–11)

## 2024-08-12 PROCEDURE — 80053 COMPREHEN METABOLIC PANEL: CPT | Performed by: INTERNAL MEDICINE

## 2024-08-12 PROCEDURE — 85652 RBC SED RATE AUTOMATED: CPT | Performed by: INTERNAL MEDICINE

## 2024-08-12 PROCEDURE — 85025 COMPLETE CBC W/AUTO DIFF WBC: CPT | Performed by: INTERNAL MEDICINE

## 2024-08-12 PROCEDURE — 86140 C-REACTIVE PROTEIN: CPT | Performed by: INTERNAL MEDICINE

## 2024-08-14 ENCOUNTER — TELEPHONE (OUTPATIENT)
Dept: FAMILY MEDICINE CLINIC | Facility: CLINIC | Age: 74
End: 2024-08-14

## 2024-08-14 NOTE — TELEPHONE ENCOUNTER
Patient's daughter, Tyra calling to review patient's medication record as he was in a rehab facility as was discharged with minimal information and no medication. Did inform Tyra that patient was previously taking Ferrous Sulfate.   Patient had labs drawn on 8/8 per Dr. Sheets and 8/12 per Dr. Hernandez and has not heard back on results as patient is scheduled to have PICC line removed today per home health. Did provide an alternate phone number for Dr. Hernandez.  Scheduled a video visit to further discuss medications and questions with Dr. Sheets for tomorrow  Future Appointments   Date Time Provider Department Center   8/15/2024  2:50 PM Irving Sheets DO [facility] [place]   8/21/2024  2:00 PM Nikki Sabiloln MD [facility] [place] [facility]   8/23/2024 11:00 AM George Sampson APRN [facility] [place]     On patient's medication list was:    Zinc sulfate 220 mg once daily  Vitamin C   Ferrous Sulfate

## 2024-08-15 ENCOUNTER — TELEPHONE (OUTPATIENT)
Dept: FAMILY MEDICINE CLINIC | Facility: CLINIC | Age: 74
End: 2024-08-15

## 2024-08-15 ENCOUNTER — TELEPHONE (OUTPATIENT)
Dept: WOUND CARE | Facility: HOSPITAL | Age: 74
End: 2024-08-15

## 2024-08-15 ENCOUNTER — TELEMEDICINE (OUTPATIENT)
Dept: FAMILY MEDICINE CLINIC | Facility: CLINIC | Age: 74
End: 2024-08-15
Payer: MEDICARE

## 2024-08-15 DIAGNOSIS — L89.304 PRESSURE INJURY OF BUTTOCK, STAGE 4, UNSPECIFIED LATERALITY (HCC): ICD-10-CM

## 2024-08-15 DIAGNOSIS — L89.614 PRESSURE INJURY OF RIGHT HEEL, STAGE 4 (HCC): ICD-10-CM

## 2024-08-15 DIAGNOSIS — L89.213 PRESSURE INJURY OF RIGHT HIP, STAGE 3 (HCC): ICD-10-CM

## 2024-08-15 DIAGNOSIS — G30.1 LATE ONSET ALZHEIMER'S DISEASE WITH BEHAVIORAL DISTURBANCE (HCC): ICD-10-CM

## 2024-08-15 DIAGNOSIS — L89.154 PRESSURE INJURY OF SACRAL REGION, STAGE 4 (HCC): Primary | ICD-10-CM

## 2024-08-15 DIAGNOSIS — G81.91 HEMIPLEGIA AFFECTING RIGHT DOMINANT SIDE, UNSPECIFIED ETIOLOGY, UNSPECIFIED HEMIPLEGIA TYPE (HCC): ICD-10-CM

## 2024-08-15 DIAGNOSIS — Z86.73 HISTORY OF STROKE: ICD-10-CM

## 2024-08-15 DIAGNOSIS — F02.818 LATE ONSET ALZHEIMER'S DISEASE WITH BEHAVIORAL DISTURBANCE (HCC): ICD-10-CM

## 2024-08-15 PROCEDURE — 1160F RVW MEDS BY RX/DR IN RCRD: CPT | Performed by: FAMILY MEDICINE

## 2024-08-15 PROCEDURE — 99213 OFFICE O/P EST LOW 20 MIN: CPT | Performed by: FAMILY MEDICINE

## 2024-08-15 PROCEDURE — 1159F MED LIST DOCD IN RCRD: CPT | Performed by: FAMILY MEDICINE

## 2024-08-15 PROCEDURE — 1111F DSCHRG MED/CURRENT MED MERGE: CPT | Performed by: FAMILY MEDICINE

## 2024-08-15 RX ORDER — PNV NO.95/FERROUS FUM/FOLIC AC 28MG-0.8MG
1 TABLET ORAL DAILY
Qty: 30 TABLET | Refills: 1 | Status: SHIPPED | OUTPATIENT
Start: 2024-08-15 | End: 2024-08-15 | Stop reason: CLARIF

## 2024-08-15 RX ORDER — FERROUS SULFATE 300 MG/5ML
300 LIQUID (ML) ORAL DAILY
Qty: 1 EACH | Refills: 0 | Status: SHIPPED | OUTPATIENT
Start: 2024-08-15

## 2024-08-15 NOTE — PROGRESS NOTES
VIDEO VISIT    Daughter contacted me regarding home health care there is been some confusion as to who will be handling the patient's home health care.  She reports she has taken into [facility] [facility] but is difficult and she would like for someone to come to the house.  They currently have an listed care through [facility].  She reports he gives the patient protein shakes and he eats a lot.  He does not seem uncomfortable at this time.    Objective patient is pleasant but confused    Assessment multiple decubitus skin ulcers with history of a dementia and anemia    Plan iron prescription refilled    [facility] of [facility]    Message sent where home health care [facility] will be contacted so that orders will be written for the patient to be seen every 2 to 3 days to have wounds checked.  I advised daughter to follow-up with me in the next 4 to 5 days to make sure the patient's needs are met.

## 2024-08-15 NOTE — TELEPHONE ENCOUNTER
Iron solution only comes in 220/5ML - please send new RX with what dose is appropriate for the patient and how many ML should be taken at a time.   Bottle comes in   And 473ml bottle - need quantity clarification on new RX please .    [facility] [place] calling     Also to note: Pharmacy received tabs and solution - I see tabs were discontinued on our side, pharmacy did not received cancel for this so I verbally told them to cancel and remove from patient profile which [facility] has done.

## 2024-08-15 NOTE — TELEPHONE ENCOUNTER
Please contact [facility] and advised the need a wound care professional to come to the residence every 2 to 3 days to check on skin ulcers.  Please advise if they require additional orders.   normal... Well appearing, awake, alert, oriented to person, place, time/situation and in no apparent distress.

## 2024-08-15 NOTE — TELEPHONE ENCOUNTER
Spoke to [facility] and requested information below.  Provided with office secure fax number.    Was informed it would be faxed over today.

## 2024-08-15 NOTE — TELEPHONE ENCOUNTER
Per Audrey [facility]  liaison, an order with below specifications needs to be placed in [facility], so that the nurses can receive it.    All nurses are trained in wound care and will need order placed in [facility].

## 2024-08-15 NOTE — TELEPHONE ENCOUNTER
Please inquire with [facility].  I would like to see a list of patient's discharge medication that was printed upon discharge from [facility].

## 2024-08-16 ENCOUNTER — TELEPHONE (OUTPATIENT)
Dept: WOUND CARE | Facility: HOSPITAL | Age: 74
End: 2024-08-16

## 2024-08-16 ENCOUNTER — TELEPHONE (OUTPATIENT)
Dept: FAMILY MEDICINE CLINIC | Facility: CLINIC | Age: 74
End: 2024-08-16

## 2024-08-16 RX ORDER — MIDAZOLAM HYDROCHLORIDE 2 MG/ML
220 SYRUP ORAL DAILY
Qty: 473 ML | Refills: 0 | Status: SHIPPED | OUTPATIENT
Start: 2024-08-16

## 2024-08-16 NOTE — TELEPHONE ENCOUNTER
Daughter Tyra called regarding concern about necrotic tissue around the sacral wound.  Would like a call back.

## 2024-08-16 NOTE — TELEPHONE ENCOUNTER
[facility]  orders received  Dated:  8/2/24- Plan of care  8/8/24- Order  8/7/24- Order  signed by provider and faxed back successfully.    Placed in HH folder with scan sheet.

## 2024-08-16 NOTE — TELEPHONE ENCOUNTER
Call from [facility] pharmacist.  Needs new script for iron that was sent yesterday.  Iron sulfate solution does not come in 300 mg/5 mg, would need to be reordered as 220 mg/5 ml, and quantity needs to be specified.  Dr Sheets, please advise on how to order.                    Disp Refills Start End     ferrous sulfate 300 (60 Fe) MG/5ML Oral Solution 1 each 0 8/15/2024 --    Sig - Route: Take 5 mL (300 mg total) by mouth daily. - Oral    Sent to pharmacy as: Ferrous Sulfate 300 (60 Fe) MG/5ML Oral Solution    E-Prescribing Status: Receipt confirmed by pharmacy (8/15/2024  3:17 PM CDT)

## 2024-08-22 ENCOUNTER — TELEPHONE (OUTPATIENT)
Dept: FAMILY MEDICINE CLINIC | Facility: CLINIC | Age: 74
End: 2024-08-22

## 2024-08-23 ENCOUNTER — LAB ENCOUNTER (OUTPATIENT)
Dept: LAB | Facility: HOSPITAL | Age: 74
End: 2024-08-23
Attending: NURSE PRACTITIONER
Payer: MEDICARE

## 2024-08-23 ENCOUNTER — HOSPITAL ENCOUNTER (INPATIENT)
Facility: HOSPITAL | Age: 74
LOS: 6 days | Discharge: HOME HEALTH | End: 2024-08-30
Attending: EMERGENCY MEDICINE
Payer: MEDICARE

## 2024-08-23 ENCOUNTER — OFFICE VISIT (OUTPATIENT)
Dept: WOUND CARE | Facility: HOSPITAL | Age: 74
End: 2024-08-23
Attending: NURSE PRACTITIONER
Payer: MEDICARE

## 2024-08-23 VITALS
TEMPERATURE: 98 F | RESPIRATION RATE: 18 BRPM | DIASTOLIC BLOOD PRESSURE: 75 MMHG | SYSTOLIC BLOOD PRESSURE: 118 MMHG | OXYGEN SATURATION: 98 % | HEART RATE: 115 BPM

## 2024-08-23 DIAGNOSIS — L89.614 PRESSURE INJURY OF RIGHT HEEL, STAGE 4 (HCC): ICD-10-CM

## 2024-08-23 DIAGNOSIS — L89.154 SACRAL DECUBITUS ULCER, STAGE IV (HCC): ICD-10-CM

## 2024-08-23 DIAGNOSIS — L89.154 PRESSURE INJURY OF SACRAL REGION, STAGE 4 (HCC): Primary | ICD-10-CM

## 2024-08-23 DIAGNOSIS — D72.829 LEUKOCYTOSIS, UNSPECIFIED TYPE: Primary | ICD-10-CM

## 2024-08-23 DIAGNOSIS — S71.002D COMPLICATED OPEN WOUND OF LEFT HIP, SUBSEQUENT ENCOUNTER: ICD-10-CM

## 2024-08-23 DIAGNOSIS — L89.223 STAGE III PRESSURE ULCER OF LEFT HIP (HCC): ICD-10-CM

## 2024-08-23 DIAGNOSIS — L89.154 PRESSURE INJURY OF SACRAL REGION, STAGE 4 (HCC): ICD-10-CM

## 2024-08-23 LAB
ALBUMIN SERPL-MCNC: 4.2 G/DL (ref 3.2–4.8)
ALBUMIN/GLOB SERPL: 1.2 {RATIO} (ref 1–2)
ALP LIVER SERPL-CCNC: 72 U/L
ALT SERPL-CCNC: 26 U/L
ANION GAP SERPL CALC-SCNC: 10 MMOL/L (ref 0–18)
ANION GAP SERPL CALC-SCNC: 8 MMOL/L (ref 0–18)
AST SERPL-CCNC: 25 U/L (ref ?–34)
BASOPHILS # BLD AUTO: 0.05 X10(3) UL (ref 0–0.2)
BASOPHILS # BLD AUTO: 0.05 X10(3) UL (ref 0–0.2)
BASOPHILS NFR BLD AUTO: 0.3 %
BASOPHILS NFR BLD AUTO: 0.3 %
BILIRUB SERPL-MCNC: 0.2 MG/DL (ref 0.2–1.1)
BILIRUB UR QL: NEGATIVE
BUN BLD-MCNC: 20 MG/DL (ref 9–23)
BUN BLD-MCNC: 22 MG/DL (ref 9–23)
BUN/CREAT SERPL: 29.4 (ref 10–20)
BUN/CREAT SERPL: 32.8 (ref 10–20)
CALCIUM BLD-MCNC: 10 MG/DL (ref 8.7–10.4)
CALCIUM BLD-MCNC: 10 MG/DL (ref 8.7–10.4)
CHLORIDE SERPL-SCNC: 100 MMOL/L (ref 98–112)
CHLORIDE SERPL-SCNC: 101 MMOL/L (ref 98–112)
CLARITY UR: CLEAR
CO2 SERPL-SCNC: 26 MMOL/L (ref 21–32)
CO2 SERPL-SCNC: 29 MMOL/L (ref 21–32)
CREAT BLD-MCNC: 0.67 MG/DL
CREAT BLD-MCNC: 0.68 MG/DL
CRP SERPL-MCNC: 7.6 MG/DL (ref ?–1)
DEPRECATED RDW RBC AUTO: 51.1 FL (ref 35.1–46.3)
DEPRECATED RDW RBC AUTO: 53.5 FL (ref 35.1–46.3)
EGFRCR SERPLBLD CKD-EPI 2021: 98 ML/MIN/1.73M2 (ref 60–?)
EGFRCR SERPLBLD CKD-EPI 2021: 99 ML/MIN/1.73M2 (ref 60–?)
EOSINOPHIL # BLD AUTO: 0.29 X10(3) UL (ref 0–0.7)
EOSINOPHIL # BLD AUTO: 0.35 X10(3) UL (ref 0–0.7)
EOSINOPHIL NFR BLD AUTO: 2 %
EOSINOPHIL NFR BLD AUTO: 2.2 %
ERYTHROCYTE [DISTWIDTH] IN BLOOD BY AUTOMATED COUNT: 16.7 % (ref 11–15)
ERYTHROCYTE [DISTWIDTH] IN BLOOD BY AUTOMATED COUNT: 16.9 % (ref 11–15)
ERYTHROCYTE [SEDIMENTATION RATE] IN BLOOD: 54 MM/HR
FASTING STATUS PATIENT QL REPORTED: YES
GLOBULIN PLAS-MCNC: 3.5 G/DL (ref 2–3.5)
GLUCOSE BLD-MCNC: 132 MG/DL (ref 70–99)
GLUCOSE BLD-MCNC: 95 MG/DL (ref 70–99)
GLUCOSE UR-MCNC: NORMAL MG/DL
HCT VFR BLD AUTO: 38.6 %
HCT VFR BLD AUTO: 39 %
HGB BLD-MCNC: 12 G/DL
HGB BLD-MCNC: 12.2 G/DL
IMM GRANULOCYTES # BLD AUTO: 0.15 X10(3) UL (ref 0–1)
IMM GRANULOCYTES # BLD AUTO: 0.17 X10(3) UL (ref 0–1)
IMM GRANULOCYTES NFR BLD: 1 %
IMM GRANULOCYTES NFR BLD: 1.1 %
KETONES UR-MCNC: NEGATIVE MG/DL
LACTATE SERPL-SCNC: 1.6 MMOL/L (ref 0.5–2)
LEUKOCYTE ESTERASE UR QL STRIP.AUTO: NEGATIVE
LYMPHOCYTES # BLD AUTO: 1.55 X10(3) UL (ref 1–4)
LYMPHOCYTES # BLD AUTO: 2.14 X10(3) UL (ref 1–4)
LYMPHOCYTES NFR BLD AUTO: 14.4 %
LYMPHOCYTES NFR BLD AUTO: 9.8 %
MCH RBC QN AUTO: 26.2 PG (ref 26–34)
MCH RBC QN AUTO: 26.7 PG (ref 26–34)
MCHC RBC AUTO-ENTMCNC: 31.1 G/DL (ref 31–37)
MCHC RBC AUTO-ENTMCNC: 31.3 G/DL (ref 31–37)
MCV RBC AUTO: 83.7 FL
MCV RBC AUTO: 86 FL
MONOCYTES # BLD AUTO: 1.21 X10(3) UL (ref 0.1–1)
MONOCYTES # BLD AUTO: 1.59 X10(3) UL (ref 0.1–1)
MONOCYTES NFR BLD AUTO: 10.1 %
MONOCYTES NFR BLD AUTO: 8.1 %
NEUTROPHILS # BLD AUTO: 11.03 X10 (3) UL (ref 1.5–7.7)
NEUTROPHILS # BLD AUTO: 11.03 X10(3) UL (ref 1.5–7.7)
NEUTROPHILS # BLD AUTO: 12.1 X10 (3) UL (ref 1.5–7.7)
NEUTROPHILS # BLD AUTO: 12.1 X10(3) UL (ref 1.5–7.7)
NEUTROPHILS NFR BLD AUTO: 74.2 %
NEUTROPHILS NFR BLD AUTO: 76.5 %
NITRITE UR QL STRIP.AUTO: NEGATIVE
OSMOLALITY SERPL CALC.SUM OF ELEC: 286 MOSM/KG (ref 275–295)
OSMOLALITY SERPL CALC.SUM OF ELEC: 289 MOSM/KG (ref 275–295)
PH UR: 6 [PH] (ref 5–8)
PLATELET # BLD AUTO: 497 10(3)UL (ref 150–450)
PLATELET # BLD AUTO: 499 10(3)UL (ref 150–450)
POTASSIUM SERPL-SCNC: 3.4 MMOL/L (ref 3.5–5.1)
POTASSIUM SERPL-SCNC: 3.6 MMOL/L (ref 3.5–5.1)
PROCALCITONIN SERPL-MCNC: 0.1 NG/ML (ref ?–0.05)
PROT SERPL-MCNC: 7.7 G/DL (ref 5.7–8.2)
PROT UR-MCNC: NEGATIVE MG/DL
RBC # BLD AUTO: 4.49 X10(6)UL
RBC # BLD AUTO: 4.66 X10(6)UL
RBC #/AREA URNS AUTO: >10 /HPF
SODIUM SERPL-SCNC: 137 MMOL/L (ref 136–145)
SODIUM SERPL-SCNC: 137 MMOL/L (ref 136–145)
SP GR UR STRIP: 1.02 (ref 1–1.03)
UROBILINOGEN UR STRIP-ACNC: NORMAL
WBC # BLD AUTO: 14.9 X10(3) UL (ref 4–11)
WBC # BLD AUTO: 15.8 X10(3) UL (ref 4–11)

## 2024-08-23 PROCEDURE — 80053 COMPREHEN METABOLIC PANEL: CPT

## 2024-08-23 PROCEDURE — 85652 RBC SED RATE AUTOMATED: CPT

## 2024-08-23 PROCEDURE — 36415 COLL VENOUS BLD VENIPUNCTURE: CPT

## 2024-08-23 PROCEDURE — 0JB70ZZ EXCISION OF BACK SUBCUTANEOUS TISSUE AND FASCIA, OPEN APPROACH: ICD-10-PCS | Performed by: FAMILY MEDICINE

## 2024-08-23 PROCEDURE — 11042 DBRDMT SUBQ TIS 1ST 20SQCM/<: CPT | Performed by: NURSE PRACTITIONER

## 2024-08-23 PROCEDURE — 85025 COMPLETE CBC W/AUTO DIFF WBC: CPT

## 2024-08-23 PROCEDURE — 86140 C-REACTIVE PROTEIN: CPT

## 2024-08-23 RX ORDER — LEVETIRACETAM 500 MG/5ML
500 INJECTION, SOLUTION, CONCENTRATE INTRAVENOUS ONCE
Status: COMPLETED | OUTPATIENT
Start: 2024-08-23 | End: 2024-08-23

## 2024-08-23 RX ORDER — METOPROLOL TARTRATE 25 MG/1
25 TABLET, FILM COATED ORAL ONCE
Status: COMPLETED | OUTPATIENT
Start: 2024-08-23 | End: 2024-08-23

## 2024-08-23 NOTE — PROGRESS NOTES
Subjective   Yoseph Cordero is a 74 year old male.    Chief Complaint   Patient presents with    Wound Care     Sacrum, right heel and ankle, left hip     HPI  8/23/2024: Patient is here with his two sons and wife. He has wound vac on left hip. His son reporting the sacral wound has foul smell with back edges, no fever at time of this visit.    8/9/2024: Patient is here with her daughter who is providing the information, patient is non-verbal. Patient's daughter has taken patient home, home health nurse has not started as of now, she is doing all wounds the dressing changes. Patient has hospital bed with air mattress with lift.      7/25/2024: Patient is a 74-year-old male with a past medical history of dementia nonverbal at baseline, chronic right hemiparesis from prior CVA, seizure disorder on Keppra, VTE with factor V Leiden on Xarelto, anemia, chronic right heel stage IV, left ischial stage IV and sacral decubitus ulcer stage IV with recent hospitalization on 7/9-7/19 for severe sepsis secondary to Proteus bacteremia treated with IV Zosyn discharged on 7/19/2024 with midline with plan for 4 weeks of IV antibiotics tp [facility] for placement.   Per SNF, patient had acquired the pressure injuries at his right heel, left ischial/hip and sacral at home while he had home health services.  Per patient's daughter patient eats well, has multiple stool a day, incontinent. Patient's daughter is not happy with care her father has been getting.   Patient was here today 7/23/2024 to see Dr. Sury WALDROP (Podiatrist) for heel wounds, I was asked us to see this patients today for multiple wounds that wound clinic was not aware he had at time of masha.     Review of Systems   Constitutional:  Negative for activity change.   Respiratory:  Negative for cough and shortness of breath.    Cardiovascular:  Negative for leg swelling.   Gastrointestinal:  Negative for abdominal distention.   Skin:  Positive for wound.         Multiple wounds   Neurological:  Positive for weakness.   Psychiatric/Behavioral:  Negative for agitation.       Objective  Physical Exam  Vitals reviewed.   Constitutional:       General: He is awake.      Appearance: He is underweight. He is ill-appearing.      Comments: Non-verbal, unable to follow commends    Cardiovascular:      Rate and Rhythm: Normal rate and regular rhythm.      Pulses: Normal pulses.   Pulmonary:      Effort: Pulmonary effort is normal.   Abdominal:      General: Bowel sounds are normal.      Palpations: Abdomen is soft.   Musculoskeletal:      Right lower leg: No edema.      Left lower leg: No edema.   Skin:     General: Skin is warm.      Findings: Wound present. No erythema or rash.      Nails: There is no clubbing.           Neurological:      Mental Status: Mental status is at baseline.   Wound Assessment  Wound 07/23/24 1 Heel Right (Active)   Date First Assessed: 07/23/24    Wound Number (Wound Clinic Only): 1  Primary Wound Type: Pressure Injury  Location: Heel  Wound Location Orientation: Right      Assessments 8/23/2024 11:17 AM   Wound Image     Site Assessment Moist;Red;Pink;Yellow   Closure Not approximated   Drainage Amount Moderate   Drainage Description Serosanguineous   Treatments Cleansed;Wound ;Topical (Barrier/Moisturizer/Ointment)   Dressing ABD Pad;Kerlix roll;Tape   Dressing Changed Changed   Dressing Status Clean;Dry;Intact   Wound Length (cm) 3.8 cm   Wound Width (cm) 1.5 cm   Wound Surface Area (cm^2) 5.7 cm^2   Wound Depth (cm) 0.1 cm   Wound Volume (cm^3) 0.57 cm^3   Wound Healing % 98   Margins Well-defined edges   Non-staged Wound Description Full thickness   Madhuri-wound Assessment Dry;Intact;Pink   Wound Granulation Tissue Red;Pink   Wound Bed Granulation (%) 95 %   Wound Bed Epithelium (%) 0 %   Wound Bed Slough (%) 5 %   Wound Bed Eschar (%) 0 %   Wound Bed Fibrin (%) 0 %   State of Healing Early/partial granulation   Wound Odor None   Exposed  Structure Bone   Tunneling? No   Pressure Injury Stage Stage 4       Active Orders   Date Order Priority Status Authorizing Provider   07/25/24 1711 OP Wound Dressing Routine Active George Sampson APRN     - Dressing:    Collagen     - Dressing:    Hydrofera transfer     - Dressing:    ABD pad     - Dressing:    Kerlix     - Cleansing:    Cleanse with normal saline or wound cleanser     - Frequency:    Change dressing three times a day   07/23/24 1016 Debridement Pressure Injury Right Heel Routine Active Sury Blake DPM       Wound 07/23/24 2 Hip Left (Active)   Date First Assessed: 07/23/24    Wound Number (Wound Clinic Only): 2  Primary Wound Type: Pressure Injury  Location: Hip  Wound Location Orientation: Left      Assessments 8/23/2024 11:17 AM   Wound Image     Site Assessment Moist;Yellow;Red   Closure Not approximated   Drainage Amount Moderate   Drainage Description Serosanguineous   Treatments Cleansed;Wound ;Wound Vac - Neg Pressure   Wound Vac Brand Medela   Dressing Wound vac sponge;Tape   Dressing Changed Changed   Dressing Status Clean;Dry;Intact   Wound Length (cm) 4 cm   Wound Width (cm) 4 cm   Wound Surface Area (cm^2) 16 cm^2   Wound Depth (cm) 0.8 cm   Wound Volume (cm^3) 12.8 cm^3   Wound Healing % 83   Margins Well-defined edges;Epibole (Rolled edges)   Non-staged Wound Description Full thickness   Madhuri-wound Assessment Dry;Pink   Wound Granulation Tissue Red   Wound Bed Granulation (%) 70 %   Wound Bed Epithelium (%) 0 %   Wound Bed Slough (%) 30 %   Wound Bed Eschar (%) 0 %   Wound Bed Fibrin (%) 0 %   State of Healing Early/partial granulation   Wound Odor None   Undermining? Yes   Number of Undermines 1   Undermine 1 Start Position 12   Undermine 1 End Position 2   Pressure Injury Stage Stage 3       Active Orders   Date Order Priority Status Authorizing Provider   07/25/24 1711 OP Wound Dressing Routine Active George Sampson APRN     - Dressing:    Dry gauze     -  Dressing:    ABD pad     - Additional Wound Dressing Information:    traid     - Cleansing:    Cleanse with normal saline or wound cleanser     - Frequency:    Change dressing daily and PRN   07/25/24 1711 OP Wound Dressing Routine Active George Sampson APRN       Wound 07/23/24 3 Sacrum (Active)   Date First Assessed: 07/23/24    Wound Number (Wound Clinic Only): 3  Primary Wound Type: Pressure Injury  Location: Sacrum      Assessments 8/23/2024 11:17 AM   Wound Image     Site Assessment Moist;Black;Yellow;Red   Closure Not approximated   Drainage Amount Large   Drainage Description Serosanguineous   Treatments Cleansed;Wound ;Topical (Barrier/Moisturizer/Ointment);Dakins   Dressing Gauze;ABD Pad;Tape   Dressing Changed Changed   Dressing Status Clean;Dry;Intact   Wound Length (cm) 9 cm   Wound Width (cm) 6 cm   Wound Surface Area (cm^2) 54 cm^2   Wound Depth (cm) 1.3 cm   Wound Volume (cm^3) 70.2 cm^3   Wound Healing % 45   Margins Well-defined edges;Not attached   Non-staged Wound Description Full thickness   Madhuri-wound Assessment Excoriated;Pink;Blanchable erythema   Wound Granulation Tissue Red   Wound Bed Granulation (%) 60 %   Wound Bed Epithelium (%) 0 %   Wound Bed Slough (%) 10 %   Wound Bed Eschar (%) 20 %   Wound Bed Fibrin (%) 0 %   State of Healing Early/partial granulation   Wound Odor Mild   Exposed Structure Bone   Undermining? Yes   Number of Undermines 1   Undermine 1 Start Position 7   Undermine 1 End Position 12   Pressure Injury Stage Stage 4       Active Orders   Date Order Priority Status Authorizing Provider   07/25/24 1711 OP Wound Dressing Routine Active George Sampson APRN     - Dressing:    Dry gauze     - Dressing:    ABD pad     - Additional Wound Dressing Information:    dakin soaked     - Cleansing:    Cleanse with Dakins     - Frequency:    Change dressing two times a day   07/25/24 1711 OP Wound Dressing Routine Active George Sampson APRN   07/23/24 1011 Debridement Pressure  Injury Sacrum Routine Active George Sampson APRN       Wound 07/23/24 4 Ankle Right;Medial (Active)   Date First Assessed: 07/23/24    Wound Number (Wound Clinic Only): 4  Primary Wound Type: Pressure Injury  Location: Ankle  Wound Location Orientation: Right;Medial      Assessments 8/23/2024 11:17 AM   Wound Image     Site Assessment Dry;Black   Closure Not approximated   Drainage Amount Scant   Drainage Description Serosanguineous   Treatments Cleansed;Wound ;Topical (Barrier/Moisturizer/Ointment);Honey Gel   Dressing ABD Pad;Kerlix roll;Tape   Dressing Changed Changed   Dressing Status Clean;Dry;Intact   Wound Length (cm) 1 cm   Wound Width (cm) 1.2 cm   Wound Surface Area (cm^2) 1.2 cm^2   Wound Depth (cm) 0.2 cm   Wound Volume (cm^3) 0.24 cm^3   Margins Poorly defined   Non-staged Wound Description Not applicable   Madhuri-wound Assessment Denuded;Red   Wound Bed Granulation (%) 0 %   Wound Bed Epithelium (%) 0 %   Wound Bed Slough (%) 0 %   Wound Bed Eschar (%) 100 %   Wound Bed Fibrin (%) 0 %   State of Healing Non-healing;Eschar   Wound Odor None   Undermining? Yes   Number of Undermines 1   Undermine 1 Start Position 9   Undermine 1 End Position 2   Pressure Injury Stage Unstageable       Inactive Orders   Date Order Priority Status Authorizing Provider   07/25/24 1711 OP Wound Dressing Routine Completed George Sampson APRN     - Dressing:    Kerlix     - Dressing:    Dry gauze     - Dressing:    Honey gel     - Cleansing:    Cleanse with normal saline or wound cleanser     - Frequency:    Change dressing daily and PRN     Vital Signs    08/23/24 1220   BP: 118/75   Pulse: 115   Resp: 18   Temp: 97.5 °F (36.4 °C)   Allergies  No Known Allergies  Assessment   Left hip wound, stage IV pressure injury:  -becoming more granular and some adipose tissue/ connective tissue covering the hip bone  -no erythema, discoloration on the soft tissue surrounding the wound due to chronic inflammation of chronic wound,  patient is on IV antibiotic via BASSAM  -no foul odor     Sacral wound, stage IV pressure injury:  -granular with necrotic tissue in the proximal edge  -no visible bone  -mild foul odor, no erythema  -scattered small pustule most likely fungal rash, due to persistent moisture in the skin     Right heel wound, stage III:  -hypergranular tissue, with small pieces of bone expelling from wound bed, slightly smaller  -no erythema or other sign of infection     Right ankle wound,stage III pressure injury  -necrotic tissue, larger with flori wound redness  -no erythema of sign of infection     Left lateral ankle and left heel wounds, closed.     Reviewed note and labs/imaging at [facility] and Care Every where.  Recent labs: 7/18/2024: BUN 24, Creatinine 0.68, eGFR 98, H&H 9.0 & 27.7  7/15/2024: Albumin 3.6, Globulin 3.4, total protein 7.0    Encounter Diagnosis  1. Pressure injury of sacral region, stage 4 (HCC)    2. Stage III pressure ulcer of left hip ([facility])    3. Pressure injury of right heel, stage 4 (HCC)      Problem List  Patient Active Problem List   Diagnosis    Hemiplegia affecting right dominant side (HCC)    Chronic obstructive pulmonary disease (HCC)    Combined forms of age-related cataract of both eyes    Esophageal reflux    Late onset Alzheimer's disease with behavioral disturbance (HCC)    Alcohol abuse    Calcification of aorta (HCC)    Alcoholism (HCC)    History of stroke    Dysphagia    Hyperglycemia    Weakness    Gait disturbance    Abnormal involuntary movement    Acute pulmonary embolism without acute cor pulmonale, unspecified pulmonary embolism type (HCC)    Factor V Leiden (HCC)    Leukocytosis    Tachycardia    Altered mental status, unspecified altered mental status type    Seizures (HCC)    Infected ulcer of skin, unspecified ulcer stage (HCC)    Infected decubitus ulcer    Transient neurological symptoms    Palliative care by specialist    Pressure injury of sacral region, stage 4 (HCC)    Pressure  injury of right hip, stage 3 (HCC)    Pressure injury of right heel, stage 4 (HCC)    Pressure injury of right ankle, stage 2 (HCC)    Complicated open wound of left hip    Non-healing open wound of right heel     Plan  Orders  Orders Placed This Encounter   Procedures    C-Reactive Protein    Comp Metabolic Panel (14)    CBC With Differential With Platelet    Sed Rate, Westergren (Automated)    Aerobic Bacterial Culture    Anaerobic Culture   I had another long discussion with patient's sons regarding goal of care and ability to heal these wound by patient due to his co-morbidities  I explained the goal of maintenance wound care: have healing potential, but various patient factors are compromising wound healing at this time.      Palliative wound care: the goal of wound care is to reduce the risk for infection, slow the course of deterioration and promote client comfort and function. If the wounds become granular and shows tissue improvement, may processed to advanced dressing and advanced modality including negative pressure wound therapy only on left hip. We added collagen to left hip wound to enhance tissue building.     I had re-educated patient's sons and wife due to constant contamination of the wound with stool, it is impossible to keep NPWT working and seal without colostomy intervention (diverting stool from rectum to surface of the abdomen). Also, as he is siting he is putting his torso's weight on the wound area, reducing oxygen and blood flow to the wound in addition to lack of nutrient, adds to skin failure and enlargement of the sacral wound.     Simplified dressings: use acetic acid 25% to clean the wounds, then apply thin layer of medi-honey on alginate or gauze and apply to the wound bed, protect the intact skin by zinc oxide cream and antifungal powder every other day and as needed. The sacral wound may need daily or more dressing changes due to contamination with stool.     Right ankle wound is  worsening, needs pressure reduction, added medi honey to advanced dressing for debridement of this wound located on the bone.    Right heel, hypergranular, treated with silver nitrate and broken small pieces of bone removed from the hypergranular tissue with forceps. Added Hydrofera Blue, advanced dressing to assit with tissue building and reduce bioburden.      Patient is allowed to walk if able with PT, since the heel wound is not in the plantar surface.  Continue to re-position to reduce pressure on hip and sacral wound at least every two hours with pressure reduction mattress and seat cushion.  Continue using heel protector/heel boots to reduce pressure in the wound bed.  Monitor for sign of infection, redness, foul odor, warmth and increased drainage.  Discussed treatment plan with sons in detail, ordered labs to assess inflammatory and obtained wound culture after debridement and washing the wound veinously with saline, patient has infectious disease follow up in coming weeks, all questions answered.    Note to patient:The 21st Century Cures Act makes medical notes like these available to patients in the interest of transparency. Please be advised this is a medical document. Medical documents are intended to carry relevant information, facts as evident, and the clinical opinion of the practitioner. The medical note is intended as peer to peer communication and may appear blunt or direct. It is written in medical language and may contain abbreviations or verbiage that are unfamiliar.   Total face to face time was 60 min, more than 50% of the time was spent in counseling and/or coordination of care related to sacral and left hip wounds.  Follow-Up  Return 2-3 weeks, for APN x 30 minutes.

## 2024-08-23 NOTE — PROGRESS NOTES
8/23/2024: 2:49 pm, called patient's daughter, discussed the result of blood work with elevated WBC, indicating infection. Offered to treat conservatively/palliative care with oral antibiotic. Patient's family would like to be treated with IV antibiotic. Recommended to go to [facility] ED. Called Triage and report that patient is coming. Message was sent to his PCP and ID as well.

## 2024-08-23 NOTE — PROGRESS NOTES
Debridement Pressure Injury Sacrum   Wound 07/23/24 3 Sacrum    Performed by: George Sampson APRN  Authorized by: George Sampson APRN      Consent   Consent obtained? verbal  Consent given by: patient  Risks discussed? procedural risks discussed  Time out called at 8/23/2024 11:40 AM    Debridement Details  Performed by: NP  Debridement type: surgical  Level of debridement: subcutaneous tissue  Pain control administration type: topical    Pre-debridement measurements  Length (cm): 9  Width (cm): 6  Depth (cm): 1.3  Surface Area (cm^2): 54    Post-debridement measurements  Length (cm): 9  Width (cm): 6  Depth (cm): 1.3  Percent debrided: 20%  Surface Area (cm^2): 54  Area Debrided (cm^2): 10.8  Volume (cm^3): 70.2    Tissue and other material debrided: subcutaneous tissue  Devitalized tissue debrided: biofilm, fibrin, necrotic debris and slough  Instrument(s) utilized: blade  Bleeding: small  Hemostasis obtained with: pressure and silver nitrate  Procedural pain (0-10): 0  Post-procedural pain: 0

## 2024-08-23 NOTE — PATIENT INSTRUCTIONS
Orders and Instructions:   Sacral wound:   Remove the old dressing and discard  Clean the wound with  Normal saline or wound cleanser  1/4 strength acetic acid (vinegar and water solution )  Clean the wound with soaked gauze with vinegar water and allow this to clean and lossen up the dead tissue in the wounds then remove them with gauze  3. Apply the thin layer of Medi-honey (or Tirad Cream) on the alginate or gauze, cover with dry gauze is the wound is deep.  Apply zinc oxide to flori-wound to protect the skin.  Apply antifungal powder to the rash on the skin (over the counter fluconazole) or Nystatin powder.     4. Secure with bordered foam or gentle tape  5.  Change the dressing daily.    Right ankle:   1. Remove the old dressing and discard  2.Clean the wound with  Normal saline or wound cleanser  1/4 strength acetic acid (vinegar and water solution )  Clean the wound with soaked gauze with vinegar water and allow this to clean and lossen up the dead tissue in the wounds then remove them with gauze  3. Apply the thin layer of Medi-honey (or Tirad Cream) on Hydrofera Blue foam dressing or gauze, cover with dry gauze  Apply zinc oxide to flori-wound to protect the skin.  Apply antifungal powder to the rash on the skin (over the counter fluconazole) or Nystatin powder.     4. Secure with gentle tape  5.  Change the dressing three times a week.    Right heel:  1. Remove the old dressing and discard  2.Clean the wound with  Normal saline or wound cleanser  1/4 strength acetic acid (vinegar and water solution )  Clean the wound with soaked gauze with vinegar water and allow this to clean and lossen up the dead tissue in the wounds then remove them with gauze  3. Apply the Hydrofera blue  Apply zinc oxide to flori-wound to protect the skin.  Apply antifungal powder to the rash on the skin (over the counter fluconazole) or Nystatin powder.     4. Secure with  gentle tape  5.  Change the dressing three times a week.  Left  hip  Wound vac dressing change with collagen three times a week by home health nurse    Call if you have question or concern.  George Sampson, MARIANELA, APRN, FNP-BC, CWS  Nurse Practitioner  [facility]   [address] [facility] [address] [facility] [place] [id number] [phone]  [email or web address]

## 2024-08-24 PROBLEM — L89.154 SACRAL DECUBITUS ULCER, STAGE IV (HCC): Status: ACTIVE | Noted: 2024-08-24

## 2024-08-24 PROBLEM — D72.829 LEUKOCYTOSIS, UNSPECIFIED TYPE: Status: ACTIVE | Noted: 2024-08-24

## 2024-08-24 LAB
ALBUMIN SERPL-MCNC: 3.6 G/DL (ref 3.2–4.8)
ALBUMIN/GLOB SERPL: 1.2 {RATIO} (ref 1–2)
ALP LIVER SERPL-CCNC: 67 U/L
ALT SERPL-CCNC: 20 U/L
ANION GAP SERPL CALC-SCNC: 8 MMOL/L (ref 0–18)
APTT PPP: 31.5 SECONDS (ref 23–36)
AST SERPL-CCNC: 20 U/L (ref ?–34)
BILIRUB SERPL-MCNC: 0.4 MG/DL (ref 0.2–1.1)
BILIRUB UR QL: NEGATIVE
BUN BLD-MCNC: 16 MG/DL (ref 9–23)
BUN/CREAT SERPL: 20.8 (ref 10–20)
CALCIUM BLD-MCNC: 9.5 MG/DL (ref 8.7–10.4)
CHLORIDE SERPL-SCNC: 103 MMOL/L (ref 98–112)
CLARITY UR: CLEAR
CO2 SERPL-SCNC: 22 MMOL/L (ref 21–32)
CREAT BLD-MCNC: 0.77 MG/DL
EGFRCR SERPLBLD CKD-EPI 2021: 95 ML/MIN/1.73M2 (ref 60–?)
GLOBULIN PLAS-MCNC: 3 G/DL (ref 2–3.5)
GLUCOSE BLD-MCNC: 176 MG/DL (ref 70–99)
GLUCOSE UR-MCNC: NORMAL MG/DL
INR BLD: 1.35 (ref 0.8–1.2)
KETONES UR-MCNC: NEGATIVE MG/DL
LACTATE SERPL-SCNC: 2.3 MMOL/L (ref 0.5–2)
LACTATE SERPL-SCNC: 2.7 MMOL/L (ref 0.5–2)
LACTATE SERPL-SCNC: 2.7 MMOL/L (ref 0.5–2)
LEUKOCYTE ESTERASE UR QL STRIP.AUTO: NEGATIVE
NITRITE UR QL STRIP.AUTO: NEGATIVE
OSMOLALITY SERPL CALC.SUM OF ELEC: 281 MOSM/KG (ref 275–295)
PH UR: 5.5 [PH] (ref 5–8)
POTASSIUM SERPL-SCNC: 3.8 MMOL/L (ref 3.5–5.1)
PROT SERPL-MCNC: 6.6 G/DL (ref 5.7–8.2)
PROT UR-MCNC: NEGATIVE MG/DL
PROTHROMBIN TIME: 17.5 SECONDS (ref 11.6–14.8)
RBC #/AREA URNS AUTO: >10 /HPF
SODIUM SERPL-SCNC: 133 MMOL/L (ref 136–145)
SP GR UR STRIP: 1.02 (ref 1–1.03)
UROBILINOGEN UR STRIP-ACNC: NORMAL

## 2024-08-24 PROCEDURE — 99223 1ST HOSP IP/OBS HIGH 75: CPT | Performed by: FAMILY MEDICINE

## 2024-08-24 RX ORDER — SODIUM CHLORIDE 9 MG/ML
75 INJECTION, SOLUTION INTRAVENOUS CONTINUOUS
Status: DISCONTINUED | OUTPATIENT
Start: 2024-08-24 | End: 2024-08-28

## 2024-08-24 RX ORDER — BENZONATATE 200 MG/1
200 CAPSULE ORAL 3 TIMES DAILY PRN
Status: DISCONTINUED | OUTPATIENT
Start: 2024-08-24 | End: 2024-08-30

## 2024-08-24 RX ORDER — ACETAMINOPHEN 500 MG
500 TABLET ORAL EVERY 4 HOURS PRN
Status: DISCONTINUED | OUTPATIENT
Start: 2024-08-24 | End: 2024-08-24

## 2024-08-24 RX ORDER — MORPHINE SULFATE 4 MG/ML
4 INJECTION, SOLUTION INTRAMUSCULAR; INTRAVENOUS EVERY 2 HOUR PRN
Status: DISCONTINUED | OUTPATIENT
Start: 2024-08-24 | End: 2024-08-30

## 2024-08-24 RX ORDER — LEVETIRACETAM 100 MG/ML
500 SOLUTION ORAL NIGHTLY
Status: DISCONTINUED | OUTPATIENT
Start: 2024-08-24 | End: 2024-08-30

## 2024-08-24 RX ORDER — SENNOSIDES 8.6 MG
17.2 TABLET ORAL NIGHTLY PRN
Status: DISCONTINUED | OUTPATIENT
Start: 2024-08-24 | End: 2024-08-27

## 2024-08-24 RX ORDER — HYDROCODONE BITARTRATE AND ACETAMINOPHEN 5; 325 MG/1; MG/1
1 TABLET ORAL EVERY 6 HOURS PRN
Status: DISCONTINUED | OUTPATIENT
Start: 2024-08-24 | End: 2024-08-30

## 2024-08-24 RX ORDER — MORPHINE SULFATE 2 MG/ML
1 INJECTION, SOLUTION INTRAMUSCULAR; INTRAVENOUS EVERY 2 HOUR PRN
Status: DISCONTINUED | OUTPATIENT
Start: 2024-08-24 | End: 2024-08-30

## 2024-08-24 RX ORDER — LEVETIRACETAM 100 MG/ML
250 SOLUTION ORAL EVERY MORNING
Status: DISCONTINUED | OUTPATIENT
Start: 2024-08-24 | End: 2024-08-30

## 2024-08-24 RX ORDER — POTASSIUM CHLORIDE 1.5 G/1.58G
40 POWDER, FOR SOLUTION ORAL ONCE
Status: COMPLETED | OUTPATIENT
Start: 2024-08-24 | End: 2024-08-24

## 2024-08-24 RX ORDER — ACETAMINOPHEN 500 MG
500 TABLET ORAL EVERY 4 HOURS PRN
Status: DISCONTINUED | OUTPATIENT
Start: 2024-08-24 | End: 2024-08-30

## 2024-08-24 RX ORDER — POLYETHYLENE GLYCOL 3350 17 G/17G
17 POWDER, FOR SOLUTION ORAL DAILY PRN
Status: DISCONTINUED | OUTPATIENT
Start: 2024-08-24 | End: 2024-08-30

## 2024-08-24 RX ORDER — FERROUS SULFATE 300 MG/5ML
300 LIQUID (ML) ORAL DAILY
Status: DISCONTINUED | OUTPATIENT
Start: 2024-08-24 | End: 2024-08-30

## 2024-08-24 RX ORDER — MORPHINE SULFATE 2 MG/ML
2 INJECTION, SOLUTION INTRAMUSCULAR; INTRAVENOUS EVERY 2 HOUR PRN
Status: DISCONTINUED | OUTPATIENT
Start: 2024-08-24 | End: 2024-08-30

## 2024-08-24 NOTE — ED PROVIDER NOTES
Patient Seen in: [facility] Emergency Department    History     Chief Complaint   Patient presents with    Abnormal Labs       HPI    74-year-old male with a past medical history significant for CVA, dementia, anxiety, depression, factor V Leiden, high cholesterol, PE, CVA, sleep apnea, chronic decubitus ulcers, presents to the ED for possible infection of decubitus ulcer.  Patient was seen by nurse practitioner earlier today at wound care center according to patient's son was at bedside.  Lab work that was done resulted with an elevated white blood cell count prompting nurse practitioner to call patient's family and let them know that patient need to be started on antibiotics.  They opted to bring patient to the ED for IV antibiotics.  Patient has not had any fevers, sweats, coughing, diarrhea, according to patient's son    History from Independent Source: Patient's son gave history as stated in HPI.  Patient's son also stated that patient has had tachycardia for the past couple months since previously being hospitalized in early July.  He has had a heart rates that typically run anywhere from 110 beats a minute and into the 120s.    External Records Reviewed: Reviewed notes by wound care nurse practitioner dated today.  Patient has a large stage IV decubitus ulcer in the sacral area without significant surrounding erythema.  There is some foul odor.  Patient has a left hip stage IV decubitus ulcer that is healing well and has a wound VAC.  Patient has a right medial ankle stage III ulceration without any surrounding erythema that appears to be healing well.  He also has a right heel stage III ulcer that appears to be healing well.  Lab work does not as an outpatient showed a white blood cell count of 15,000.  Previous labs showed a white blood cell count of 5000    History reviewed.   Past Medical History:    Anxiety state, unspecified    CVA (cerebral infarction)    Dementia (HCC)    Depression     Heterozygous factor V Leiden mutation (HCC)    Other and unspecified hyperlipidemia    Other ill-defined conditions(799.89)    Cardiomegaly Pleural effusion w/idopathic pleuropuricarditis    Pulmonary embolism (HCC)    Stroke (HCC)    Unspecified sleep apnea       History reviewed.   Past Surgical History:   Procedure Laterality Date    Colonoscopy      Electrocardiogram, complete  1914    Scanned to Media Tab         Medications :  (Not in a hospital admission)       Family History   Problem Relation Age of Onset    Cancer Father         Lung - Smoker  Cause of death    Other (Other) Mother         during     Diabetes Neg     Glaucoma Neg        Smoking Status:   Social History     Socioeconomic History    Marital status:    Tobacco Use    Smoking status: Former    Smokeless tobacco: Never   Vaping Use    Vaping status: Never Used   Substance and Sexual Activity    Alcohol use: No     Alcohol/week: 0.0 standard drinks of alcohol     Comment: none recently    Drug use: No   Other Topics Concern    Caffeine Concern Yes     Comment: 1 cups coffee daily    Exercise No       Constitutional and vital signs reviewed.      Social History and Family History elements reviewed from today, pertinent positives to the presenting problem noted.    Physical Exam     ED Triage Vitals [24]   /74   Pulse 112   Resp 20   Temp 98.9 °F (37.2 °C)   Temp src    SpO2 96 %   O2 Device None (Room air)       Physical Exam   Constitutional: Alert, well nourished, NAD  HEENT: Normocephalic, PERRLA, MMM  CV: s1s2+, RRR, no m/r/g, normal distal pulses  Pulmonary/Chest: CTA b/l with no rales, wheezes.  No chest wall tenderness  Abdominal: Nontender.  Nondistended. Soft. Bowel sounds are normal.   Neck/Back:   : Stage IV decubitus ulcer in the sacrum with very mild surrounding erythema.  No significant drainage  Musculoskeletal: Left hip wound VAC overlying decubitus ulcer.  Normal range of motion. No  deformity.  Right medial ankle and heel with stage III ulcers, no drainage, no significant surrounding erythema  Neurological: Awake, alert. Normal reflexes. No cranial nerve deficit.    Skin: Skin is warm and dry. No rash noted. No erythema.   Psychiatric:      All measures to prevent infection transmission during my interaction with the patient were taken. The patient was already wearing a droplet mask on my arrival to the room. Personal protective equipment was worn throughout the duration of the exam.      ED Course        Labs Reviewed   CBC WITH DIFFERENTIAL WITH PLATELET - Abnormal; Notable for the following components:       Result Value    WBC 14.9 (*)     HGB 12.2 (*)     RDW-SD 51.1 (*)     RDW 16.7 (*)     .0 (*)     Neutrophil Absolute Prelim 11.03 (*)     Neutrophil Absolute 11.03 (*)     Monocyte Absolute 1.21 (*)     All other components within normal limits   BASIC METABOLIC PANEL (8) - Abnormal; Notable for the following components:    Glucose 132 (*)     Potassium 3.4 (*)     Creatinine 0.67 (*)     BUN/CREA Ratio 32.8 (*)     All other components within normal limits   PROCALCITONIN - Abnormal; Notable for the following components:    Procalcitonin 0.10 (*)     All other components within normal limits   URINALYSIS WITH CULTURE REFLEX - Abnormal; Notable for the following components:    Blood Urine 2+ (*)     RBC Urine >10 (*)     All other components within normal limits   LACTIC ACID, PLASMA - Normal   RAINBOW DRAW BLUE   RAINBOW DRAW GOLD   BLOOD CULTURE   BLOOD CULTURE     My Independent Interpretation of EKG (if performed):     Monitor Interpretation:   sinus tachycardia as interpreted by me.      Imaging Results Available and Reviewed while in ED: No results found.  ED Medications Administered:   Medications   piperacillin-tazobactam (Zosyn) 4.5 g in dextrose 5% 100 mL IVPB-ADDV (has no administration in time range)   piperacillin-tazobactam (Zosyn) 3.375 g in dextrose 5% 100 mL  IVPB-ADDV (has no administration in time range)   metoprolol tartrate (Lopressor) tab 25 mg (25 mg Oral Given 8/23/24 2232)   levETIRAcetam (Keppra) 500 mg/5mL injection 500 mg (500 mg Intravenous Given 8/23/24 2228)   sodium chloride 0.9 % IV bolus 1,000 mL (1,000 mL Intravenous New Bag 8/23/24 2228)             MDM     Vitals:    08/23/24 2049 08/23/24 2130 08/23/24 2300   BP: 122/74 142/77 138/87   Pulse: 112 111 109   Resp: 20 22 18   Temp: 98.9 °F (37.2 °C)     SpO2: 96% 99% 100%     *I personally reviewed and interpreted all ED vitals.    Independent Interpretation of Studies:     Social Determinants of Health:     Procedures:      Differential/MDM/Shared Decision Making: Differential Diagnosis includes infected decubitus ulcer, sepsis,UTI, MARLIN, cellulitis, others.      The patient already has recent admission for infected ulcer, CVA, dementia, anxiety, depression, factor V Leiden, high cholesterol, PE, CVA, sleep apnea, to contribute to the complexity of this ED evaluation.           Patient has an elevated white blood cell count however he does not have any other significant lab abnormalities.  He is normotensive and does not have an elevated lactic acid or procalcitonin.  Discussed case with infectious disease and they would like patient started on IV Zosyn.  Blood cultures have been drawn.  [facility] who is excepted patient for admission.  Patient's son has been updated and has no further questions at this time.    Critical care time exclusive of procedure time spent on this patient was 31 min for taking history from patient examining patient, medical decision-making, reviewing lab work and radiology studies, explaining results to patient and family, discussing with consultants/admitting physician, and documenting in patient's chart.      Condition upon leaving the department: Stable    Disposition and Plan     Clinical Impression:  1. Leukocytosis, unspecified type    2. Sacral  decubitus ulcer, stage IV (HCC)        Disposition:  Admit    Follow-up:  No follow-up provider specified.    Medications Prescribed:  Current Discharge Medication List          Hospital Problems       Present on Admission  Date Reviewed: 8/23/2024            ICD-10-CM Noted POA    * (Principal) Leukocytosis, unspecified type D72.829 8/24/2024 Unknown

## 2024-08-24 NOTE — OCCUPATIONAL THERAPY NOTE
Pt dependent in ADLs at baseline with akua lift at home. No acute OT skilled services recommended. Will complete orders.      Elicia Leon OT  [facility]  Inpatient Rehabilitation  Occupational Therapy  [phone]

## 2024-08-24 NOTE — CONSULTS
[facility]  part of [facility] ID CONSULT NOTE    Yoseph Cordero Patient Status:  Inpatient    1950 MRN [id number]   Location [facility]5W Attending Herman Brady MD   Hosp Day # 0 PCP Irving Sheets DO       Reason for Consultation:  Sacral wound infx    ASSESSMENT:    Antibiotics: Zosyn    # Sacral wound infection   # Leukocytosis, r/o bacteremia  # Sacral/L hip/BLE ankle/heel wounds    -  S/p L hip/sacral bedside debridement x2 2024   -  S/p R heel beside I&D  w/probe to bone, amputation refused by family    # Hx R heel OM  # Hx Proteus BSI 2/2 infected wounds, s/p 4wk zosyn, dc 24  # Dementia, CVA- mostly bedbound  # Hx PE    PLAN:    -  Continue empiric zosyn.  -  FU cx.  -  Local wound care. May benefit from surgical eval.  -  Follow fever curve, wbc  -  Reviewed labs, micro, imaging reports, available old records  -  D/w patient, family, staff    History of Present Illness:  Yoseph Cordero is a a(n) 74 year old male with a history of CVA, PE, Factor V Leiden, dementia, mostly bedbound     Pt was recently admitted here at [facility] in July with worsening wounds. Previous R foot XR on 24 with R foot OM. Found with Proteus BSI from infected wounds. Sacrum debrided x2 at bedside down to bone. R heel with exposed bone s/p I&D 24. Amp recommended. DC on 4wk iv zosyn through 24.Last seen by wound care APN on  with reports of foul smell of sacral wound. Wound orders adjusted. Sacral wound cx sent. Labs sent with SED 54, CRP 7.6. WBC 15.8 and came to ED for further eval. On admit, Tm 99.4. wbc 14.9. LA 1.6. PCT 0.10. Bcx sent. Started on zosyn. ID consulted.     History:  Past Medical History:    Anxiety state, unspecified    CVA (cerebral infarction)    Dementia (HCC)    Depression    Heterozygous factor V Leiden mutation (HCC)    Other and unspecified hyperlipidemia    Other ill-defined conditions(799.89)    Cardiomegaly Pleural  effusion w/idopathic pleuropuricarditis    Pulmonary embolism (HCC)    Stroke (HCC)    Unspecified sleep apnea     Past Surgical History:   Procedure Laterality Date    Colonoscopy      Electrocardiogram, complete  1914    Scanned to Media Tab     Family History   Problem Relation Age of Onset    Cancer Father         Lung - Smoker  Cause of death    Other (Other) Mother         during     Diabetes Neg     Glaucoma Neg       reports that he has quit smoking. He has never used smokeless tobacco. He reports that he does not drink alcohol and does not use drugs.    Allergies:  No Known Allergies    Medications:    Current Facility-Administered Medications:     piperacillin-tazobactam (Zosyn) 3.375 g in dextrose 5% 100 mL IVPB-ADDV, 3.375 g, Intravenous, Q8H    levETIRAcetam (Keppra) 100 MG/ML oral solution 250 mg, 250 mg, Oral, QAM    levETIRAcetam (Keppra) 100 MG/ML oral solution 500 mg, 500 mg, Oral, Nightly    metoprolol tartrate (Lopressor) partial tab 12.5 mg, 12.5 mg, Oral, 2x Daily(Beta Blocker)    rivaroxaban (Xarelto) tab 20 mg, 20 mg, Oral, Daily with food    sodium chloride 0.9% infusion, 75 mL/hr, Intravenous, Continuous    polyethylene glycol (PEG 3350) (Miralax) 17 g oral packet 17 g, 17 g, Oral, Daily PRN    sennosides (Senokot) tab 17.2 mg, 17.2 mg, Oral, Nightly PRN    benzonatate (Tessalon) cap 200 mg, 200 mg, Oral, TID PRN    acetaminophen (Tylenol Extra Strength) tab 500 mg, 500 mg, Oral, Q4H PRN    Review of Systems:  Unable to obtain d/t mental status    Physical Exam:  Vital signs: Blood pressure 106/63, pulse 94, temperature 97.7 °F (36.5 °C), temperature source Axillary, resp. rate 18, height 5' 7\" (1.702 m), weight 127 lb 11.2 oz (57.9 kg), SpO2 99%.    General: NAD, does not interact, grimacing  HEENT: NCAT  Neck: No lymphadenopathy.  Supple.  Cardiovascular: RRR  Respiratory: Symmetric expansion  Abdomen: Soft, nontender, nondistended.   Musculoskeletal: No edema  noted  Integument: No diffuse rash, sacral wound w/necrotic tissue at superior aspect, no odor or surrounding erythema, R ankle small ulcer w/surrounding pink skin and necrotic base, L heel with hypergranulated base, L hip wound clean w/fibrous slough layer on base  : Purewick+    Laboratory Data: Reviewed in EMR    Microbiology: Reviewed in EMR    Radiology: Reviewed    Thank you for allowing us to participate in the care of this patient. Please do not hesitate to call if you have any questions.     We will continue to follow with you and will make further recommendations based on his progress.    SAILAJA [facility]  [phone]  8/24/2024

## 2024-08-24 NOTE — ED QUICK NOTES
Orders for admission, patient is aware of plan and ready to go upstairs. Any questions, please call ED RN Salome at extension [phone].     Patient Covid vaccination status: Unvaccinated     COVID Test Ordered in ED: None    COVID Suspicion at Admission: N/A    Running Infusions:      Mental Status/LOC at time of transport: awake, smiles and gestures. Has words but few. Son states hx of dementia.    Other pertinent information: Sacral wound vac   CIWA score: N/A   NIH score:  N/A

## 2024-08-24 NOTE — PHYSICAL THERAPY NOTE
PT order received, chart reviewed. Spoke with RN and Dr Brady. Per chart review patient went to [facility] for IV antibiotics and then went home with family since last admit in July. They are using a lift, family caring for wounds. No family is present at this time to update on functional mobility at home for repositioning patient and if he is able to get up to chair at this time. Given wounds and infection would recommend RN staff lift and move patient with the overhead lift to prevent shear forces through skin.     PT will sign off at this time as he appears to be at his baseline. Please re-consult if needed.

## 2024-08-24 NOTE — ED QUICK NOTES
Pt to room with son, Faheem. Presents with sacral wound and wound vac.Presents for abnormal, elevated WBC. Pt non verbal for this RN. Smiles and tracts while in room. Hx of dementia reports family. Pt diapered. Labs collected and sent. Straight cath for sample and external cath placed. Pt with bilateral soft booties in place to relieve pressure to heals. Orders followed. Pt does take oral medication with apple sauce. No signs of difficulty swallowing. Son did verbalize he bit his tongue earlier. Cool drinks offered as well as cool snack to soothe. Tolerated well. Care ongoing.

## 2024-08-24 NOTE — H&P
[facility]     Yoseph Cordero Patient Status:  Emergency    1950 MRN [id number]   Location [facility] EMERGENCY DEPARTMENT Attending Wiliam Alexander MD   Hosp Day # 0 PCP Irving Sheets DO     Chief Complaint: Sacral decubitus ulcer infection     History of Present Illness: Yoseph Cordero is a 74 year old male with Pmhx of Dementia, HLD, PE on xarelto , history of seizures, history of factor V Leyden deficiency, history of CVA with chronic right hemiparesis, hypertension, obstructive sleep apnea was seen by nurse practitioner for his wound care at wound care centre and found to have elevated white count. He is sent for further evaluation of infected Decubitus ulcer, ID consulted from ED and he is started on Zosyn.  Patient lives at home and have care taker, He is non verbal, able to take po, son is at bedside.    Past Medical History:  Past Medical History:    Anxiety state, unspecified    CVA (cerebral infarction)    Dementia (HCC)    Depression    Heterozygous factor V Leiden mutation (HCC)    Other and unspecified hyperlipidemia    Other ill-defined conditions(799.89)    Cardiomegaly Pleural effusion w/idopathic pleuropuricarditis    Pulmonary embolism (HCC)    Stroke (HCC)    Unspecified sleep apnea        Past Surgical History:   Past Surgical History:   Procedure Laterality Date    Colonoscopy      Electrocardiogram, complete  1914    Scanned to Media Tab       Social History:  reports that he has quit smoking. He has never used smokeless tobacco. He reports that he does not drink alcohol and does not use drugs.    Family History:   Family History   Problem Relation Age of Onset    Cancer Father         Lung - Smoker  Cause of death    Other (Other) Mother         during     Diabetes Neg     Glaucoma Neg        Allergies: No Known Allergies    Medications:    Current Facility-Administered Medications on File Prior to Encounter   Medication  Dose Route Frequency Provider Last Rate Last Admin    [COMPLETED] piperacillin-tazobactam (Zosyn) 4.5 g in dextrose 5% 100 mL IVPB-ADDV  4.5 g Intravenous Once Erin Spring MD   Stopped at 07/20/24 1527    [COMPLETED] rivaroxaban (Xarelto) tab 20 mg  20 mg Oral Once Erin Spring MD   20 mg at 07/20/24 1341    [COMPLETED] lidocaine PF (Xylocaine-MPF) 1% injection  5 mL Intradermal Once Viau Bob Smith MD   5 mL at 07/19/24 1007    [COMPLETED] heparin (Porcine) 25861 units/250mL infusion (PE/DVT/THROMBUS) INITIAL DOSE  18 Units/kg/hr Intravenous Once Uvaldo Echevarria MD 17 mL/hr at 07/16/24 1151 25.449 Units/kg/hr at 07/16/24 1151    [COMPLETED] heparin (Porcine) 1000 UNIT/ML injection - BOLUS IV 2,000 Units  30 Units/kg Intravenous Once Uvaldo Echevarria MD   2,000 Units at 07/15/24 0243    [COMPLETED] heparin (Porcine) 1000 UNIT/ML injection - BOLUS IV 5,300 Units  80 Units/kg Intravenous Once Uvaldo Echevarria MD   5,300 Units at 07/13/24 0938    [COMPLETED] heparin (Porcine) 87200 units/250mL infusion (PE/DVT/THROMBUS) INITIAL DOSE  18 Units/kg/hr Intravenous Once Uvaldo Echevarria MD 12 mL/hr at 07/13/24 0940 18 Units/kg/hr at 07/13/24 0940    [COMPLETED] heparin (Porcine) 1000 UNIT/ML injection - BOLUS IV 2,000 Units  30 Units/kg Intravenous Once Uvaldo Echevarria MD   2,000 Units at 07/13/24 1640    [COMPLETED] magnesium sulfate in sterile water for injection 2 g/50mL IVPB premix 2 g  2 g Intravenous Once Uvaldo Echevarria MD 50 mL/hr at 07/12/24 1053 2 g at 07/12/24 1053    [COMPLETED] sodium chloride 0.9 % IV bolus 1,000 mL  1,000 mL Intravenous Once Uvaldo Echevarria MD   Stopped at 07/12/24 1107    [COMPLETED] potassium chloride 40 mEq in 250mL sodium chloride 0.9% IVPB premix  40 mEq Intravenous Once Uvaldo Echevarria MD 62.5 mL/hr at 07/12/24 1053 40 mEq at 07/12/24 1053    [COMPLETED] sodium chloride 0.9 % IV bolus 500 mL  500 mL Intravenous Once Sonia Damon  mL/hr at 07/12/24 2306 500 mL  at 24 2306    [COMPLETED] magnesium oxide (Mag-Ox) tab 400 mg  400 mg Oral Once Uvaldo Echevarria MD   400 mg at 24 0819    [COMPLETED] sodium chloride 0.9 % IV bolus 848 mL  848 mL Intravenous Once Uvaldo Echevarria  mL/hr at 24 0800 848 mL at 24 0800    [COMPLETED] potassium chloride 40 mEq in 250mL sodium chloride 0.9% IVPB premix  40 mEq Intravenous Once Sonia Damon MD 62.5 mL/hr at 07/10/24 0906 40 mEq at 07/10/24 0906    [COMPLETED] sodium chloride 0.9 % IV bolus 1,000 mL  1,000 mL Intravenous Once Sonia Damon MD 1,000 mL/hr at 07/10/24 231 1,000 mL at 07/10/24 231    [COMPLETED] acetaminophen (Tylenol) 160 MG/5ML oral liquid 1,000 mg  1,000 mg Oral Once Samaria Waller MD   1,000 mg at 24 191    [] sodium chloride 0.9 % IV bolus 1,845 mL  30 mL/kg (Ideal) Intravenous Continuous Samaria Waller MD   Stopped at 24    [COMPLETED] vancomycin (Vancocin) 1,000 mg in sodium chloride 0.9% 250 mL IVPB-ADDV  15 mg/kg Intravenous Once Samaria Waller  mL/hr at 24 1,000 mg at 24    [COMPLETED] piperacillin-tazobactam (Zosyn) 4.5 g in dextrose 5% 100 mL IVPB-ADDV  4.5 g Intravenous Once Samaria Waller MD   Stopped at 24     Current Outpatient Medications on File Prior to Encounter   Medication Sig Dispense Refill    Ferrous Sulfate 220 (44 Fe) MG/5ML Oral Solution Take 220 mg by mouth daily. 473 mL 0    ferrous sulfate 300 (60 Fe) MG/5ML Oral Solution Take 5 mL (300 mg total) by mouth daily. 1 each 0    metoprolol tartrate 25 MG Oral Tab Take 0.5 tablets (12.5 mg total) by mouth 2x Daily(Beta Blocker). 30 tablet 0    levETIRAcetam 100 MG/ML Oral Solution Take 2.5 mL (250 mg total) by mouth every morning.      levETIRAcetam 100 MG/ML Oral Solution Take 5 mL (500 mg total) by mouth at bedtime.      collagenase 250 UNIT/GM External Ointment Apply topically 2 (two) times a day.      acetaminophen 500 MG Oral Tab Take 1  tablet (500 mg total) by mouth every 8 (eight) hours.      sodium hypochlorite 0.125 % External Solution       [] piperacillin-tazobactam 3.375 (3-0.375) g Intravenous Recon Soln Inject 4,500 mg (4.5 g total) into the vein every 8 (eight) hours for 24 days. M86.171 Weekly CBC/diff, CMP, ESR, CRP fax labs to Dr. Hernandez [facility] 324 g 0    rivaroxaban (XARELTO) 20 MG Oral Tab Take 1 tablet (20 mg total) by mouth daily with food. 90 tablet 0       Review of Systems:   A comprehensive 14 point review of systems was completed.    Pertinent positives and negatives noted in the HPI.    Physical Exam:    /87   Pulse 109   Temp 98.9 °F (37.2 °C)   Resp 18   SpO2 100%   General: No acute distress. Nonverbal   HEENT: Normocephalic atraumatic. Moist mucous membranes. EOM-I. PERRLA. Anicteric.  Neck: No lymphadenopathy. No JVD. No carotid bruits.  Respiratory: Clear to auscultation bilaterally. No wheezes. No rhonchi.  Cardiovascular: S1, S2. Regular rate and rhythm. No murmurs, rubs or gallops. Equal pulses.   Chest and Back: No tenderness or deformity.  Abdomen: Soft, nontender, nondistended.  Positive bowel sounds. No rebound, guarding or organomegaly.  Neurologic: No focal neurological deficits. CNII-XII grossly intact. Residual right side weakness from stroke   Musculoskeletal: Moves all extremities.  Extremities: No edema or cyanosis.  Integument: No rashes or lesions.   Psychiatric: Appropriate mood and affect.      Diagnostic Data:      Labs:  Recent Labs   Lab 24  1352 24   WBC 15.8* 14.9*   HGB 12.0* 12.2*   MCV 86.0 83.7   .0* 497.0*       Recent Labs   Lab 24  1352 24   GLU 95 132*   BUN 20 22   CREATSERUM 0.68* 0.67*   CA 10.0 10.0   ALB 4.2  --     137   K 3.6 3.4*    100   CO2 26.0 29.0   ALKPHO 72  --    AST 25  --    ALT 26  --    BILT 0.2  --    TP 7.7  --        Estimated Creatinine Clearance: 85 mL/min (A) (based on SCr of 0.67 mg/dL  (L)).    No results for input(s): \"PTP\", \"INR\" in the last 168 hours.    COVID-19 Lab Results    COVID-19  Lab Results   Component Value Date    COVID19 Not Detected 05/07/2024    COVID19 Not Detected 10/24/2022    COVID19 Detected (A) 05/13/2021       Pro-Calcitonin  Recent Labs   Lab 08/23/24  2113   PCT 0.10*       Cardiac  No results for input(s): \"TROP\", \"PBNP\" in the last 168 hours.    Creatinine Kinase  No results for input(s): \"CK\" in the last 168 hours.    Inflammatory Markers  Recent Labs   Lab 08/23/24  1352   CRP 7.60*       No results for input(s): \"TROP\", \"TROPHS\", \"CK\" in the last 168 hours.    Imaging: Imaging data reviewed in Epic.      ASSESSMENT / PLAN:     Decubitus ulcer most likely infected  History of chronic wounds  Leukocytosis  Rule out sepsis  History of dementia  History of seizures  History of factor V Leyden deficiency  History of PE  On chronic anticoagulation    Admit patient to MedSurg unit  Started on broad-spectrum antibiotics  Wound care consulted  ID consulted  Supportive care  Continue home medications as tolerated  symptomatic management      Quality:  DVT Prophylaxis: On Xarelto  CODE status: Full   Chandra: None needed     Plan of care discussed with patient at the bedside, patient voiced his/her understanding of current treatment and plan.  All questions answered best able.    Bernadine Vazquez MD    Supplementary Documentation:

## 2024-08-24 NOTE — RESPIRATORY THERAPY NOTE
Bipap Evaluation: Pt is non-verbal. Son at bedside stated that his father has never used a bipap machine for sleeping and has never had problems with breathing during the night.

## 2024-08-24 NOTE — PLAN OF CARE
Problem: Patient Centered Care  Goal: Patient preferences are identified and integrated in the patient's plan of care  Description: Interventions:  - What would you like us to know as we care for you? From home with spouse  - Provide timely, complete, and accurate information to patient/family  - Incorporate patient and family knowledge, values, beliefs, and cultural backgrounds into the planning and delivery of care  - Encourage patient/family to participate in care and decision-making at the level they choose  - Honor patient and family perspectives and choices  Outcome: Progressing     Problem: Patient/Family Goals  Goal: Patient/Family Long Term Goal  Description: Patient's Long Term Goal: decrease infection risks    Interventions:  - Monitor vitals  - Monitor appropriate labs  - Administer medications as ordered  - Follow MD's orders  - Update patient on plan of care   - Discharge planning     - See additional Care Plan goals for specific interventions  Outcome: Progressing  Goal: Patient/Family Short Term Goal  Description: Patient's Short Term Goal: discharge home    Interventions:   - assess wounds,  -reposition frequently  -monitor skin  -pain control  - See additional Care Plan goals for specific interventions  Outcome: Progressing     Problem: SKIN/TISSUE INTEGRITY - ADULT  Goal: Skin integrity remains intact  Description: INTERVENTIONS  - Assess and document risk factors for pressure ulcer development  - Assess and document skin integrity  - Monitor for areas of redness and/or skin breakdown  - Initiate interventions, skin care algorithm/standards of care as needed  Outcome: Progressing  Goal: Incision(s), wounds(s) or drain site(s) healing without S/S of infection  Description: INTERVENTIONS:  - Assess and document risk factors for pressure ulcer development  - Assess and document skin integrity  - Assess and document dressing/incision, wound bed, drain sites and surrounding tissue  - Implement wound  care per orders  - Initiate isolation precautions as appropriate  - Initiate Pressure Ulcer prevention bundle as indicated  Outcome: Progressing  Goal: Oral mucous membranes remain intact  Description: INTERVENTIONS  - Assess oral mucosa and hygiene practices  - Implement preventative oral hygiene regimen  - Implement oral medicated treatments as ordered  Outcome: Progressing     Problem: NEUROLOGICAL - ADULT  Goal: Absence of seizures  Description: INTERVENTIONS  - Monitor for seizure activity  - Administer anti-seizure medications as ordered  - Monitor neurological status  Outcome: Progressing

## 2024-08-24 NOTE — ED INITIAL ASSESSMENT (HPI)
S: pt presents to ED with concerns of elevated wbc count in the presence of sacral wound, with wound vac.

## 2024-08-24 NOTE — PLAN OF CARE
Pt Admitted from ED with elevated WBC and pressure injuries. Med rec completed with the help from pt's son, md aware. Pt nonverbal, orientation akbar. Skin check completed. Wound vac in place on L hip, applied during wound appointment here. Iv antibiotics and fluids infusing. Pt repositioned frequently. Bed low and locked. Seizure precautions in place  Problem: Patient Centered Care  Goal: Patient preferences are identified and integrated in the patient's plan of care  Description: Interventions:  - What would you like us to know as we care for you? From home with spouse  - Provide timely, complete, and accurate information to patient/family  - Incorporate patient and family knowledge, values, beliefs, and cultural backgrounds into the planning and delivery of care  - Encourage patient/family to participate in care and decision-making at the level they choose  - Honor patient and family perspectives and choices  Outcome: Progressing     Problem: Patient/Family Goals  Goal: Patient/Family Long Term Goal  Description: Patient's Long Term Goal: decrease infection risks    Interventions:  - Monitor vitals  - Monitor appropriate labs  - Administer medications as ordered  - Follow MD's orders  - Update patient on plan of care   - Discharge planning     - See additional Care Plan goals for specific interventions  Outcome: Progressing  Goal: Patient/Family Short Term Goal  Description: Patient's Short Term Goal: discharge home    Interventions:   - assess wounds,  -reposition frequently  -monitor skin  -pain control  - See additional Care Plan goals for specific interventions  Outcome: Progressing     Problem: SKIN/TISSUE INTEGRITY - ADULT  Goal: Skin integrity remains intact  Description: INTERVENTIONS  - Assess and document risk factors for pressure ulcer development  - Assess and document skin integrity  - Monitor for areas of redness and/or skin breakdown  - Initiate interventions, skin care algorithm/standards of care as  needed  Outcome: Progressing  Goal: Incision(s), wounds(s) or drain site(s) healing without S/S of infection  Description: INTERVENTIONS:  - Assess and document risk factors for pressure ulcer development  - Assess and document skin integrity  - Assess and document dressing/incision, wound bed, drain sites and surrounding tissue  - Implement wound care per orders  - Initiate isolation precautions as appropriate  - Initiate Pressure Ulcer prevention bundle as indicated  Outcome: Progressing  Goal: Oral mucous membranes remain intact  Description: INTERVENTIONS  - Assess oral mucosa and hygiene practices  - Implement preventative oral hygiene regimen  - Implement oral medicated treatments as ordered  Outcome: Progressing     Problem: NEUROLOGICAL - ADULT  Goal: Absence of seizures  Description: INTERVENTIONS  - Monitor for seizure activity  - Administer anti-seizure medications as ordered  - Monitor neurological status  Outcome: Progressing     Problem: SAFETY ADULT - FALL  Goal: Free from fall injury  Description: INTERVENTIONS:  - Assess pt frequently for physical needs  - Identify cognitive and physical deficits and behaviors that affect risk of falls.  - [place] fall precautions as indicated by assessment.  - Educate pt/family on patient safety including physical limitations  - Instruct pt to call for assistance with activity based on assessment  - Modify environment to reduce risk of injury  - Provide assistive devices as appropriate  - Consider OT/PT consult to assist with strengthening/mobility  - Encourage toileting schedule  Outcome: Progressing

## 2024-08-25 LAB
ALBUMIN SERPL-MCNC: 3.3 G/DL (ref 3.2–4.8)
ALBUMIN/GLOB SERPL: 1.3 {RATIO} (ref 1–2)
ALP LIVER SERPL-CCNC: 58 U/L
ALT SERPL-CCNC: 17 U/L
ANION GAP SERPL CALC-SCNC: 6 MMOL/L (ref 0–18)
AST SERPL-CCNC: 14 U/L (ref ?–34)
BASOPHILS # BLD AUTO: 0.08 X10(3) UL (ref 0–0.2)
BASOPHILS NFR BLD AUTO: 0.4 %
BILIRUB SERPL-MCNC: 0.2 MG/DL (ref 0.2–1.1)
BUN BLD-MCNC: 23 MG/DL (ref 9–23)
BUN/CREAT SERPL: 35.4 (ref 10–20)
CALCIUM BLD-MCNC: 9 MG/DL (ref 8.7–10.4)
CHLORIDE SERPL-SCNC: 104 MMOL/L (ref 98–112)
CO2 SERPL-SCNC: 26 MMOL/L (ref 21–32)
CREAT BLD-MCNC: 0.65 MG/DL
DEPRECATED RDW RBC AUTO: 54.6 FL (ref 35.1–46.3)
EGFRCR SERPLBLD CKD-EPI 2021: 99 ML/MIN/1.73M2 (ref 60–?)
EOSINOPHIL # BLD AUTO: 1.93 X10(3) UL (ref 0–0.7)
EOSINOPHIL NFR BLD AUTO: 8.7 %
ERYTHROCYTE [DISTWIDTH] IN BLOOD BY AUTOMATED COUNT: 17 % (ref 11–15)
GLOBULIN PLAS-MCNC: 2.6 G/DL (ref 2–3.5)
GLUCOSE BLD-MCNC: 142 MG/DL (ref 70–99)
HCT VFR BLD AUTO: 38.9 %
HGB BLD-MCNC: 11.7 G/DL
IMM GRANULOCYTES # BLD AUTO: 0.29 X10(3) UL (ref 0–1)
IMM GRANULOCYTES NFR BLD: 1.3 %
INR BLD: 1.31 (ref 0.8–1.2)
LYMPHOCYTES # BLD AUTO: 0.97 X10(3) UL (ref 1–4)
LYMPHOCYTES NFR BLD AUTO: 4.4 %
MAGNESIUM SERPL-MCNC: 1.7 MG/DL (ref 1.6–2.6)
MCH RBC QN AUTO: 26.4 PG (ref 26–34)
MCHC RBC AUTO-ENTMCNC: 30.1 G/DL (ref 31–37)
MCV RBC AUTO: 87.8 FL
MONOCYTES # BLD AUTO: 0.99 X10(3) UL (ref 0.1–1)
MONOCYTES NFR BLD AUTO: 4.5 %
NEUTROPHILS # BLD AUTO: 17.96 X10 (3) UL (ref 1.5–7.7)
NEUTROPHILS # BLD AUTO: 17.96 X10(3) UL (ref 1.5–7.7)
NEUTROPHILS NFR BLD AUTO: 80.7 %
OSMOLALITY SERPL CALC.SUM OF ELEC: 288 MOSM/KG (ref 275–295)
PLATELET # BLD AUTO: 394 10(3)UL (ref 150–450)
POTASSIUM SERPL-SCNC: 3.5 MMOL/L (ref 3.5–5.1)
POTASSIUM SERPL-SCNC: 3.5 MMOL/L (ref 3.5–5.1)
PROT SERPL-MCNC: 5.9 G/DL (ref 5.7–8.2)
PROTHROMBIN TIME: 17.1 SECONDS (ref 11.6–14.8)
RBC # BLD AUTO: 4.43 X10(6)UL
SODIUM SERPL-SCNC: 136 MMOL/L (ref 136–145)
WBC # BLD AUTO: 22.2 X10(3) UL (ref 4–11)

## 2024-08-25 PROCEDURE — 99233 SBSQ HOSP IP/OBS HIGH 50: CPT | Performed by: HOSPITALIST

## 2024-08-25 PROCEDURE — 99222 1ST HOSP IP/OBS MODERATE 55: CPT | Performed by: SURGERY

## 2024-08-25 RX ORDER — MAGNESIUM OXIDE 400 MG/1
400 TABLET ORAL ONCE
Status: COMPLETED | OUTPATIENT
Start: 2024-08-25 | End: 2024-08-25

## 2024-08-25 NOTE — PLAN OF CARE
Problem: SKIN/TISSUE INTEGRITY - ADULT  Goal: Skin integrity remains intact  Description: INTERVENTIONS  - Assess and document risk factors for pressure ulcer development  - Assess and document skin integrity  - Monitor for areas of redness and/or skin breakdown  - Initiate interventions, skin care algorithm/standards of care as needed  Outcome: Progressing  Goal: Incision(s), wounds(s) or drain site(s) healing without S/S of infection  Description: INTERVENTIONS:  - Assess and document risk factors for pressure ulcer development  - Assess and document skin integrity  - Assess and document dressing/incision, wound bed, drain sites and surrounding tissue  - Implement wound care per orders  - Initiate isolation precautions as appropriate  - Initiate Pressure Ulcer prevention bundle as indicated  Outcome: Progressing  Goal: Oral mucous membranes remain intact  Description: INTERVENTIONS  - Assess oral mucosa and hygiene practices  - Implement preventative oral hygiene regimen  - Implement oral medicated treatments as ordered  Outcome: Progressing     Problem: Patient/Family Goals  Goal: Patient/Family Long Term Goal  Description: Patient's Long Term Goal: decrease infection risks    Interventions:  - Monitor vitals  - Monitor appropriate labs  - Administer medications as ordered  - Follow MD's orders  - Update patient on plan of care   - Discharge planning     - See additional Care Plan goals for specific interventions  Outcome: Progressing  Goal: Patient/Family Short Term Goal  Description: Patient's Short Term Goal: discharge home    Interventions:   - assess wounds,  -reposition frequently  -monitor skin  -pain control  - See additional Care Plan goals for specific interventions  Outcome: Progressing     Problem: SKIN/TISSUE INTEGRITY - ADULT  Goal: Skin integrity remains intact  Description: INTERVENTIONS  - Assess and document risk factors for pressure ulcer development  - Assess and document skin integrity  -  Monitor for areas of redness and/or skin breakdown  - Initiate interventions, skin care algorithm/standards of care as needed  Outcome: Progressing  Goal: Incision(s), wounds(s) or drain site(s) healing without S/S of infection  Description: INTERVENTIONS:  - Assess and document risk factors for pressure ulcer development  - Assess and document skin integrity  - Assess and document dressing/incision, wound bed, drain sites and surrounding tissue  - Implement wound care per orders  - Initiate isolation precautions as appropriate  - Initiate Pressure Ulcer prevention bundle as indicated  Outcome: Progressing  Goal: Oral mucous membranes remain intact  Description: INTERVENTIONS  - Assess oral mucosa and hygiene practices  - Implement preventative oral hygiene regimen  - Implement oral medicated treatments as ordered  Outcome: Progressing     Problem: NEUROLOGICAL - ADULT  Goal: Absence of seizures  Description: INTERVENTIONS  - Monitor for seizure activity  - Administer anti-seizure medications as ordered  - Monitor neurological status  Outcome: Progressing     Problem: SAFETY ADULT - FALL  Goal: Free from fall injury  Description: INTERVENTIONS:  - Assess pt frequently for physical needs  - Identify cognitive and physical deficits and behaviors that affect risk of falls.  - [place] fall precautions as indicated by assessment.  - Educate pt/family on patient safety including physical limitations  - Instruct pt to call for assistance with activity based on assessment  - Modify environment to reduce risk of injury  - Provide assistive devices as appropriate  - Consider OT/PT consult to assist with strengthening/mobility  - Encourage toileting schedule  Outcome: Progressing

## 2024-08-25 NOTE — HOME CARE LIAISON
Patient is current with [facility].  Please enter CHELLY order upon discharge.  RN PT OT

## 2024-08-25 NOTE — CONSULTS
[facility]  Report of Consultation    Yoseph Cordero Patient Status:  Inpatient    1950 MRN [id number]   Location [facility]5W Attending Alanna Lang DO   Hosp Day # 1 PCP Irving Sheets DO     Requesting Physician:  Dr. Bernadine Tai    Reason for Consultation:  Sacral decubitis wound    Chief Complaint:  Leukocytosis    History of Present Illness:  Yoseph Cordero is a 74 year old male with a history of dementia and CVA who presents to [facility] on 2024 for evaluation of elevated WBC.  The patient is a poor historian.  History is obtained from the son.  The patient's son reports he receives home health and he was noted to have elevated WBC during recent blood work.  He was advised to go to the hospital for evaluation.  The son states that he had an injury to the right ankle in May and has been bedbound since the injury.  He has developed a pressure ulcer to the sacrum and to the left hip.  He follows as an outpatient with the wound care center.  He had a wound VAC to the left hip and the sacral wound was treated with Medihoney.  He reports that the patient has been tolerating diet at home.  There has been no nausea or vomiting.  There has been no documented fevers.    Upon presentation to the hospital, the patient was afebrile and hemodynamically stable.  The patient has been noted to have low-grade temperature since admission with temperature of 100.7 °F yesterday.  WBC 23.8 hemoglobin 12.7 platelets 544,000.  PT 17.5 INR 1.35.  UA negative.    Past medical history includes dementia, CVA and previous PE.  He is on Xarelto daily at home.      History:  Past Medical History:    Anxiety state, unspecified    CVA (cerebral infarction)    Dementia (HCC)    Depression    Heterozygous factor V Leiden mutation (HCC)    Other and unspecified hyperlipidemia    Other ill-defined conditions(799.89)    Cardiomegaly Pleural effusion w/idopathic pleuropuricarditis     Pulmonary embolism (HCC)    Stroke (HCC)    Unspecified sleep apnea     Past Surgical History:   Procedure Laterality Date    Colonoscopy      Electrocardiogram, complete  1914    Scanned to Media Tab     Family History   Problem Relation Age of Onset    Cancer Father         Lung - Smoker  Cause of death    Other (Other) Mother         during     Diabetes Neg     Glaucoma Neg       reports that he has quit smoking. He has never used smokeless tobacco. He reports that he does not drink alcohol and does not use drugs.    Allergies:  No Known Allergies    Medications:  Medications Prior to Admission   Medication Sig    Nutritional Supplements (ABDOULAYE NUTRIVIGOR OR) Take by mouth.    metoprolol tartrate 25 MG Oral Tab Take 0.5 tablets (12.5 mg total) by mouth 2x Daily(Beta Blocker).    levETIRAcetam 100 MG/ML Oral Solution Take 5 mL (500 mg total) by mouth at bedtime.    collagenase 250 UNIT/GM External Ointment Apply topically 2 (two) times a day.    acetaminophen 500 MG Oral Tab Take 1 tablet (500 mg total) by mouth every 8 (eight) hours.    sodium hypochlorite 0.125 % External Solution     rivaroxaban (XARELTO) 20 MG Oral Tab Take 1 tablet (20 mg total) by mouth daily with food.    Ferrous Sulfate 220 (44 Fe) MG/5ML Oral Solution Take 220 mg by mouth daily.    ferrous sulfate 300 (60 Fe) MG/5ML Oral Solution Take 5 mL (300 mg total) by mouth daily.    levETIRAcetam 100 MG/ML Oral Solution Take 2.5 mL (250 mg total) by mouth every morning.    [] piperacillin-tazobactam 3.375 (3-0.375) g Intravenous Recon Soln Inject 4,500 mg (4.5 g total) into the vein every 8 (eight) hours for 24 days. M86.171 Weekly CBC/diff, CMP, ESR, CRP fax labs to Dr. Hernandez [facility]         Current Facility-Administered Medications:     piperacillin-tazobactam (Zosyn) 3.375 g in dextrose 5% 100 mL IVPB-ADDV, 3.375 g, Intravenous, Q8H    levETIRAcetam (Keppra) 100 MG/ML oral solution 250 mg, 250 mg, Oral, QAM     levETIRAcetam (Keppra) 100 MG/ML oral solution 500 mg, 500 mg, Oral, Nightly    metoprolol tartrate (Lopressor) partial tab 12.5 mg, 12.5 mg, Oral, 2x Daily(Beta Blocker)    rivaroxaban (Xarelto) tab 20 mg, 20 mg, Oral, Daily with food    sodium chloride 0.9% infusion, 75 mL/hr, Intravenous, Continuous    polyethylene glycol (PEG 3350) (Miralax) 17 g oral packet 17 g, 17 g, Oral, Daily PRN    sennosides (Senokot) tab 17.2 mg, 17.2 mg, Oral, Nightly PRN    benzonatate (Tessalon) cap 200 mg, 200 mg, Oral, TID PRN    calcium carbonate-vitamin D (Oyster Shell-D) 250-3.125 MG-MCG per tab 1 tablet, 1 tablet, Oral, BID with meals    ferrous sulfate 300 (60 Fe) MG/5ML oral syrup 300 mg, 300 mg, Oral, Daily    acetaminophen (Tylenol Extra Strength) tab 500 mg, 500 mg, Oral, Q4H PRN    morphINE PF 2 MG/ML injection 1 mg, 1 mg, Intravenous, Q2H PRN **OR** morphINE PF 2 MG/ML injection 2 mg, 2 mg, Intravenous, Q2H PRN **OR** morphINE PF 4 MG/ML injection 4 mg, 4 mg, Intravenous, Q2H PRN    HYDROcodone-acetaminophen (Norco) 5-325 MG per tab 1 tablet, 1 tablet, Oral, Q6H PRN    Review of Systems:  Review of Systems   Unable to perform ROS: Dementia       Physical Exam:  /56 (BP Location: Right arm)   Pulse 108   Temp 98.1 °F (36.7 °C) (Axillary)   Resp 20   Ht 5' 7\" (1.702 m)   Wt 127 lb 11.2 oz (57.9 kg)   SpO2 97%   BMI 20.00 kg/m²   Physical Exam  Constitutional:       General: He is not in acute distress.     Appearance: Normal appearance. He is not ill-appearing.   HENT:      Head: Normocephalic and atraumatic.   Cardiovascular:      Rate and Rhythm: Normal rate and regular rhythm.      Pulses: Normal pulses.   Pulmonary:      Effort: Pulmonary effort is normal. No respiratory distress.   Abdominal:      General: There is no distension.      Palpations: Abdomen is soft.      Tenderness: There is no abdominal tenderness. There is no guarding or rebound.   Musculoskeletal:         General: Normal range of motion.       Cervical back: Normal range of motion.   Skin:     General: Skin is warm and dry.             Comments: Sacral wound with wet-to-dry dressing in place.  Dressing was removed.  [place] healthy wound bed noted with minimal fibrinous exudate.  No evidence of necrotic tissue.  No drainage.  No bleeding.  Left hip wound with healthy pink wound bed.  No drainage or bleeding.  No erythema or induration.  Superficial abrasion noted to the right posterior ankle with minimal serous drainage on dressing.  No surrounding erythema or induration.   Neurological:      General: No focal deficit present.      Mental Status: He is alert. Mental status is at baseline.   Psychiatric:         Mood and Affect: Mood normal.         Behavior: Behavior normal.         Laboratory Data:  Recent Labs   Lab 08/23/24 2113 08/24/24 1758 08/25/24  0511   RBC 4.66 4.72 4.43   HGB 12.2* 12.7* 11.7*   HCT 39.0 40.2 38.9*   MCV 83.7 85.2 87.8   MCH 26.2 26.9 26.4   MCHC 31.3 31.6 30.1*   RDW 16.7* 16.9* 17.0*   NEPRELIM 11.03* 21.14* 17.96*   WBC 14.9* 23.8* 22.2*   .0* 544.0* 394.0       Recent Labs   Lab 08/23/24  1352 08/23/24 2113 08/24/24 1758 08/25/24  0511   GLU 95 132* 176* 142*   BUN 20 22 16 23   CREATSERUM 0.68* 0.67* 0.77 0.65*   CA 10.0 10.0 9.5 9.0   ALB 4.2  --  3.6 3.3    137 133* 136   K 3.6 3.4* 3.8 3.5  3.5    100 103 104   CO2 26.0 29.0 22.0 26.0   ALKPHO 72  --  67 58   AST 25  --  20 14   ALT 26  --  20 17   BILT 0.2  --  0.4 0.2   TP 7.7  --  6.6 5.9         Recent Labs   Lab 08/24/24  1821 08/25/24  0511   PTP 17.5* 17.1*   INR 1.35* 1.31*   PTT 31.5  --          No results found.              Medical Decision Making         Impression:  Patient Active Problem List   Diagnosis    Hemiplegia affecting right dominant side (HCC)    Chronic obstructive pulmonary disease (HCC)    Combined forms of age-related cataract of both eyes    Esophageal reflux    Late onset Alzheimer's disease with behavioral  disturbance (HCC)    Alcohol abuse    Calcification of aorta (HCC)    Alcoholism (HCC)    History of stroke    Dysphagia    Hyperglycemia    Weakness    Gait disturbance    Abnormal involuntary movement    Acute pulmonary embolism without acute cor pulmonale, unspecified pulmonary embolism type (HCC)    Factor V Leiden (HCC)    Leukocytosis    Tachycardia    Altered mental status, unspecified altered mental status type    Seizures (HCC)    Infected ulcer of skin, unspecified ulcer stage (HCC)    Infected decubitus ulcer    Transient neurological symptoms    Palliative care by specialist    Pressure injury of sacral region, stage 4 (HCC)    Pressure injury of right hip, stage 3 (HCC)    Pressure injury of right heel, stage 4 (HCC)    Pressure injury of right ankle, stage 2 (HCC)    Complicated open wound of left hip    Non-healing open wound of right heel    Leukocytosis, unspecified type    Sacral decubitus ulcer, stage IV (HCC)       Sacral decubitus ulcer  Open wound of the left hip  Open wound to the right heel  Dementia  History of PE, on Xarelto  Leukocytosis    Plan:  Wounds were examined with nurse chaperone present with no indication for surgical intervention at this time.  Recommend to continue with local wound care.  Wound care consult pending.  Continue antibiotics as per ID recommendations.    Criselda Conley PA-C  8/25/2024  10:53 AM      Addendum:    Pt seen and examined.  I agree with Criselda Conley'sSAILAJA note.  Santyl to pressure wounds.  Will plan for a gentle bedside excisional debridement of the necrotic tissues on the sacral wound.      Frederic Quershi MD

## 2024-08-25 NOTE — CM/SW NOTE
MDO to SW for HH.  Per Ara, pt is current w/[facility] for PT/OT and RN services.  CHELLY entered.    SW/CM to remain available for support and/or discharge planning.      Imani CALDERON MSW, LSW  Social Work/Case Management

## 2024-08-25 NOTE — PLAN OF CARE
Received pt in stable condition, wife by his side. VSS, on RA. Pt tolerated all scheduled medications, no complications noted at this time. Pt on IV Zosyn Q 8 hrs. 0.9 NS infusing continuously @75 ml/hr. Continuous telemetry & pulse oximetry monitoring in place. All wound care dressings dry & intact, dry dressings reinforced. Wound cre completed to left hip & sacrum area. All fall & safety precautions in place. Call light within pt.'s reach. Will continue to monitor for any new, acute changes.               Problem: Patient Centered Care  Goal: Patient preferences are identified and integrated in the patient's plan of care  Description: Interventions:  - What would you like us to know as we care for you? From home with spouse  - Provide timely, complete, and accurate information to patient/family  - Incorporate patient and family knowledge, values, beliefs, and cultural backgrounds into the planning and delivery of care  - Encourage patient/family to participate in care and decision-making at the level they choose  - Honor patient and family perspectives and choices  Outcome: Progressing     Problem: Patient/Family Goals  Goal: Patient/Family Long Term Goal  Description: Patient's Long Term Goal: decrease infection risks    Interventions:  - Monitor vitals  - Monitor appropriate labs  - Administer medications as ordered  - Follow MD's orders  - Update patient on plan of care   - Discharge planning     - See additional Care Plan goals for specific interventions  Outcome: Progressing  Goal: Patient/Family Short Term Goal  Description: Patient's Short Term Goal: discharge home    Interventions:   - assess wounds,  -reposition frequently  -monitor skin  -pain control  - See additional Care Plan goals for specific interventions  Outcome: Progressing     Problem: SKIN/TISSUE INTEGRITY - ADULT  Goal: Incision(s), wounds(s) or drain site(s) healing without S/S of infection  Description: INTERVENTIONS:  - Assess and  document risk factors for pressure ulcer development  - Assess and document skin integrity  - Assess and document dressing/incision, wound bed, drain sites and surrounding tissue  - Implement wound care per orders  - Initiate isolation precautions as appropriate  - Initiate Pressure Ulcer prevention bundle as indicated  Outcome: Not Progressing  Goal: Oral mucous membranes remain intact  Description: INTERVENTIONS  - Assess oral mucosa and hygiene practices  - Implement preventative oral hygiene regimen  - Implement oral medicated treatments as ordered  Outcome: Progressing     Problem: NEUROLOGICAL - ADULT  Goal: Absence of seizures  Description: INTERVENTIONS  - Monitor for seizure activity  - Administer anti-seizure medications as ordered  - Monitor neurological status  Outcome: Progressing     Problem: SAFETY ADULT - FALL  Goal: Free from fall injury  Description: INTERVENTIONS:  - Assess pt frequently for physical needs  - Identify cognitive and physical deficits and behaviors that affect risk of falls.  - [place] fall precautions as indicated by assessment.  - Educate pt/family on patient safety including physical limitations  - Instruct pt to call for assistance with activity based on assessment  - Modify environment to reduce risk of injury  - Provide assistive devices as appropriate  - Consider OT/PT consult to assist with strengthening/mobility  - Encourage toileting schedule  Outcome: Progressing

## 2024-08-26 ENCOUNTER — TELEPHONE (OUTPATIENT)
Dept: FAMILY MEDICINE CLINIC | Facility: CLINIC | Age: 74
End: 2024-08-26

## 2024-08-26 LAB
ANION GAP SERPL CALC-SCNC: 5 MMOL/L (ref 0–18)
BASOPHILS # BLD AUTO: 0.05 X10(3) UL (ref 0–0.2)
BASOPHILS # BLD AUTO: 0.06 X10(3) UL (ref 0–0.2)
BASOPHILS NFR BLD AUTO: 0.2 %
BASOPHILS NFR BLD AUTO: 0.4 %
BUN BLD-MCNC: 16 MG/DL (ref 9–23)
BUN/CREAT SERPL: 28.6 (ref 10–20)
CALCIUM BLD-MCNC: 9.3 MG/DL (ref 8.7–10.4)
CHLORIDE SERPL-SCNC: 106 MMOL/L (ref 98–112)
CO2 SERPL-SCNC: 28 MMOL/L (ref 21–32)
CREAT BLD-MCNC: 0.56 MG/DL
DEPRECATED RDW RBC AUTO: 53.1 FL (ref 35.1–46.3)
DEPRECATED RDW RBC AUTO: 53.7 FL (ref 35.1–46.3)
EGFRCR SERPLBLD CKD-EPI 2021: 104 ML/MIN/1.73M2 (ref 60–?)
EOSINOPHIL # BLD AUTO: 1.28 X10(3) UL (ref 0–0.7)
EOSINOPHIL # BLD AUTO: 1.72 X10(3) UL (ref 0–0.7)
EOSINOPHIL NFR BLD AUTO: 11.2 %
EOSINOPHIL NFR BLD AUTO: 5.4 %
ERYTHROCYTE [DISTWIDTH] IN BLOOD BY AUTOMATED COUNT: 16.9 % (ref 11–15)
ERYTHROCYTE [DISTWIDTH] IN BLOOD BY AUTOMATED COUNT: 17 % (ref 11–15)
GLUCOSE BLD-MCNC: 102 MG/DL (ref 70–99)
HCT VFR BLD AUTO: 33.7 %
HCT VFR BLD AUTO: 40.2 %
HGB BLD-MCNC: 10.5 G/DL
HGB BLD-MCNC: 12.7 G/DL
IMM GRANULOCYTES # BLD AUTO: 0.19 X10(3) UL (ref 0–1)
IMM GRANULOCYTES # BLD AUTO: 0.26 X10(3) UL (ref 0–1)
IMM GRANULOCYTES NFR BLD: 1.1 %
IMM GRANULOCYTES NFR BLD: 1.2 %
LYMPHOCYTES # BLD AUTO: 0.51 X10(3) UL (ref 1–4)
LYMPHOCYTES # BLD AUTO: 1.1 X10(3) UL (ref 1–4)
LYMPHOCYTES NFR BLD AUTO: 2.1 %
LYMPHOCYTES NFR BLD AUTO: 7.1 %
MAGNESIUM SERPL-MCNC: 1.9 MG/DL (ref 1.6–2.6)
MCH RBC QN AUTO: 26.8 PG (ref 26–34)
MCH RBC QN AUTO: 26.9 PG (ref 26–34)
MCHC RBC AUTO-ENTMCNC: 31.2 G/DL (ref 31–37)
MCHC RBC AUTO-ENTMCNC: 31.6 G/DL (ref 31–37)
MCV RBC AUTO: 85.2 FL
MCV RBC AUTO: 86 FL
MONOCYTES # BLD AUTO: 0.54 X10(3) UL (ref 0.1–1)
MONOCYTES # BLD AUTO: 0.92 X10(3) UL (ref 0.1–1)
MONOCYTES NFR BLD AUTO: 2.3 %
MONOCYTES NFR BLD AUTO: 6 %
NEUTROPHILS # BLD AUTO: 11.43 X10 (3) UL (ref 1.5–7.7)
NEUTROPHILS # BLD AUTO: 11.43 X10(3) UL (ref 1.5–7.7)
NEUTROPHILS # BLD AUTO: 21.14 X10 (3) UL (ref 1.5–7.7)
NEUTROPHILS # BLD AUTO: 21.14 X10(3) UL (ref 1.5–7.7)
NEUTROPHILS NFR BLD AUTO: 74.1 %
NEUTROPHILS NFR BLD AUTO: 88.9 %
OSMOLALITY SERPL CALC.SUM OF ELEC: 289 MOSM/KG (ref 275–295)
PLATELET # BLD AUTO: 417 10(3)UL (ref 150–450)
PLATELET # BLD AUTO: 544 10(3)UL (ref 150–450)
POTASSIUM SERPL-SCNC: 3.4 MMOL/L (ref 3.5–5.1)
RBC # BLD AUTO: 3.92 X10(6)UL
RBC # BLD AUTO: 4.72 X10(6)UL
SODIUM SERPL-SCNC: 139 MMOL/L (ref 136–145)
WBC # BLD AUTO: 15.4 X10(3) UL (ref 4–11)
WBC # BLD AUTO: 23.8 X10(3) UL (ref 4–11)

## 2024-08-26 PROCEDURE — 99233 SBSQ HOSP IP/OBS HIGH 50: CPT | Performed by: HOSPITALIST

## 2024-08-26 RX ORDER — POTASSIUM CHLORIDE 1.5 G/1.58G
40 POWDER, FOR SOLUTION ORAL ONCE
Status: COMPLETED | OUTPATIENT
Start: 2024-08-26 | End: 2024-08-26

## 2024-08-26 RX ORDER — CETIRIZINE HYDROCHLORIDE 5 MG/1
5 TABLET ORAL DAILY
Status: DISCONTINUED | OUTPATIENT
Start: 2024-08-26 | End: 2024-08-30

## 2024-08-26 NOTE — TELEPHONE ENCOUNTER
Dr. Sheets:    Brinda RN with [facility] calling to inform you that patient was admitted to [facility] on 8/23/24.

## 2024-08-26 NOTE — PLAN OF CARE
Daughter at bedside most of day. CT ordered, abx changed, zyrtec given for possible rash. IVF continued. Wound care performed by wound RN. Frequent rounding by staff      Problem: Patient Centered Care  Goal: Patient preferences are identified and integrated in the patient's plan of care  Description: Interventions:  - What would you like us to know as we care for you? From home with spouse  - Provide timely, complete, and accurate information to patient/family  - Incorporate patient and family knowledge, values, beliefs, and cultural backgrounds into the planning and delivery of care  - Encourage patient/family to participate in care and decision-making at the level they choose  - Honor patient and family perspectives and choices  Outcome: Progressing     Problem: Patient/Family Goals  Goal: Patient/Family Long Term Goal  Description: Patient's Long Term Goal: decrease infection risks    Interventions:  - Monitor vitals  - Monitor appropriate labs  - Administer medications as ordered  - Follow MD's orders  - Update patient on plan of care   - Discharge planning     - See additional Care Plan goals for specific interventions  Outcome: Progressing  Goal: Patient/Family Short Term Goal  Description: Patient's Short Term Goal: discharge home    Interventions:   - assess wounds,  -reposition frequently  -monitor skin  -pain control  - See additional Care Plan goals for specific interventions  Outcome: Progressing     Problem: SKIN/TISSUE INTEGRITY - ADULT  Goal: Skin integrity remains intact  Description: INTERVENTIONS  - Assess and document risk factors for pressure ulcer development  - Assess and document skin integrity  - Monitor for areas of redness and/or skin breakdown  - Initiate interventions, skin care algorithm/standards of care as needed  Outcome: Progressing  Goal: Incision(s), wounds(s) or drain site(s) healing without S/S of infection  Description: INTERVENTIONS:  - Assess and document risk factors for  pressure ulcer development  - Assess and document skin integrity  - Assess and document dressing/incision, wound bed, drain sites and surrounding tissue  - Implement wound care per orders  - Initiate isolation precautions as appropriate  - Initiate Pressure Ulcer prevention bundle as indicated  Outcome: Progressing  Goal: Oral mucous membranes remain intact  Description: INTERVENTIONS  - Assess oral mucosa and hygiene practices  - Implement preventative oral hygiene regimen  - Implement oral medicated treatments as ordered  Outcome: Progressing     Problem: NEUROLOGICAL - ADULT  Goal: Absence of seizures  Description: INTERVENTIONS  - Monitor for seizure activity  - Administer anti-seizure medications as ordered  - Monitor neurological status  Outcome: Progressing     Problem: SAFETY ADULT - FALL  Goal: Free from fall injury  Description: INTERVENTIONS:  - Assess pt frequently for physical needs  - Identify cognitive and physical deficits and behaviors that affect risk of falls.  - [place] fall precautions as indicated by assessment.  - Educate pt/family on patient safety including physical limitations  - Instruct pt to call for assistance with activity based on assessment  - Modify environment to reduce risk of injury  - Provide assistive devices as appropriate  - Consider OT/PT consult to assist with strengthening/mobility  - Encourage toileting schedule  Outcome: Progressing

## 2024-08-26 NOTE — PROGRESS NOTES
[facility]  Progress Note     Yoseph Cordero  : 1950    Status: Inpatient  Day #: 2    Attending: Ric Evans MD  PCP: Irving Sheets DO     Assessment and Plan:    Acute infection with sepsis on admission - fever, leukocytosis, tachycardia, lactic acidosis  Suspected Infected sacral decubitus ulcer, stage IV, POA  Chronic left hip pressure sore POA  Chronic right heel pressure ulcer with osteomyelitis  -general surgery and ID on consult  -wound care following  -s/p bedside debridement of sacral wound  -wound culture reviewed  -f/u blood cultures  -cont zosyn per ID  -CT C/A/P to eval for other source of infection  -family declined R BKA     Rash  -possible drug reaction, ID aware  -antihistamines prn    Other:  -dementia  -h/o CVA  -seizure d/o - cont keppra  -h/o factor V leiden with h/o PE (2024) - cont xarelto  -recent proteus bacteremia - finished 4 weeks of zosyn     Dietitian Malnutrition Assessment        Evaluation for Malnutrition: Criteria for non-severe malnutrition diagnosis-         chronic illness related to   Body fat mild depletion., Muscle mass mild depletion.       RD Malnutrition Care Plan: Intiated ONS (oral nutritional supplements).    Body Fat/Muscle Mass: Mild depletion body fat., Mild depletion muscle mass. orbital region. temple region., dorsal soto region., thigh region.    Physician Assessment    Patient has a diagnosis of moderate malnutrition       DVT Mechanical Prophylaxis:        DVT Pharmacologic Prophylaxis   Medication    rivaroxaban (Xarelto) tab 20 mg               Subjective:      Initial Chief Complaint:  fever, leukocytosis    Patient nonverbal    Objective:      Temp:  [98 °F (36.7 °C)-99.8 °F (37.7 °C)] 98 °F (36.7 °C)  Pulse:  [] 111  Resp:  [17-20] 17  BP: (105-122)/(46-68) 107/68  SpO2:  [98 %-100 %] 98 %  General:  Alert, no distress  HEENT:  Normocephalic, atraumatic  Cardiac:  Regular rate, regular rhythm  Pulmonary:  Clear to  auscultation bilaterally, respirations unlabored  Gastrointestinal:  Soft, non-tender, normal bowel sounds  Musculoskeletal:  No joint swelling  Extremities:  No edema, no cyanosis, no clubbing  Neurologic:  nonfocal  Psychiatric:  calm    Intake/Output Summary (Last 24 hours) at 8/26/2024 1449  Last data filed at 8/26/2024 1250  Gross per 24 hour   Intake 2107 ml   Output 5350 ml   Net -3243 ml         Recent Labs   Lab 08/24/24  1758 08/25/24  0511 08/26/24  0519   WBC 23.8* 22.2* 15.4*   HGB 12.7* 11.7* 10.5*   HCT 40.2 38.9* 33.7*   .0* 394.0 417.0   RBC 4.72 4.43 3.92   MCV 85.2 87.8 86.0   MCH 26.9 26.4 26.8   MCHC 31.6 30.1* 31.2   RDW 16.9* 17.0* 17.0*   NEPRELIM 21.14* 17.96* 11.43*     Recent Labs   Lab 08/23/24  1352 08/23/24  2113 08/24/24  1758 08/24/24  1821 08/25/24  0511 08/26/24  0519   BUN 20   < > 16  --  23 16   CREATSERUM 0.68*   < > 0.77  --  0.65* 0.56*   CA 10.0   < > 9.5  --  9.0 9.3   ALB 4.2  --  3.6  --  3.3  --       < > 133*  --  136 139   K 3.6   < > 3.8  --  3.5  3.5 3.4*      < > 103  --  104 106   CO2 26.0   < > 22.0  --  26.0 28.0   GLU 95   < > 176*  --  142* 102*   MG  --   --   --   --  1.7 1.9   BILT 0.2  --  0.4  --  0.2  --    AST 25  --  20  --  14  --    ALT 26  --  20  --  17  --    ALKPHO 72  --  67  --  58  --    TP 7.7  --  6.6  --  5.9  --    PTT  --   --   --  31.5  --   --    INR  --   --   --  1.35* 1.31*  --    CRP 7.60*  --   --   --   --   --     < > = values in this interval not displayed.       No results found.      Medications:   [START ON 8/27/2024] collagenase   Topical Daily    cefepime  1 g Intravenous Q8H    levETIRAcetam  250 mg Oral QAM    levETIRAcetam  500 mg Oral Nightly    metoprolol tartrate  12.5 mg Oral 2x Daily(Beta Blocker)    rivaroxaban  20 mg Oral Daily with food    calcium carbonate-vitamin D  1 tablet Oral BID with meals    ferrous sulfate  300 mg Oral Daily      PRN Meds:   polyethylene glycol (PEG 3350)     sennosides    benzonatate    acetaminophen    morphINE **OR** morphINE **OR** morphINE    HYDROcodone-acetaminophen    Supplementary Documentation:        MDM High. Time spent on chart/note review, review of labs/imaging, discussion with patient, physical exam, discussion with staff, consultants, coordinating care, writing progress note, discussion of plan of care.      Ric Evans MD

## 2024-08-26 NOTE — PROGRESS NOTES
08/26/24 0750   Wound 08/23/24 1 Heel Right   Date First Assessed: 08/23/24   Present on Original Admission: Yes   Wound Number (Wound Clinic Only): 1  Primary Wound Type: Pressure Injury  Location: Heel  Wound Location Orientation: Right   Wound Image    Site Assessment Moist;Fragile;Painful;Pink;Red   Closure Not approximated   Drainage Amount Scant   Drainage Description Serosanguineous   Treatments Cleansed;Saline;Site Care   Dressing 4x4s;Aquacel Foam;Gauze;Xeroform   Dressing Changed Changed   Dressing Status Clean;Dry;Intact;Dressing Changed   Wound Length (cm)   (traced by admit nurse)   Margins Well-defined edges   Madhuri-wound Assessment Dry;Fragile;Pink  (dry yellow skin)   Wound Granulation Tissue Red;Pink   Wound Bed Granulation (%) 95 %   Wound Bed Slough (%) 5 %   State of Healing Early/partial granulation   Wound Odor None   Exposed Structure Bone   Tunneling? No   Undermining? No   Sinus Tracts? No   Pressure Injury Stage 4   Wound 08/23/24 2 Hip Left   Date First Assessed: 08/23/24   Present on Original Admission: Yes   Wound Number (Wound Clinic Only): 2  Primary Wound Type: Pressure Injury  Location: Hip  Wound Location Orientation: Left   Wound Image    Site Assessment Moist;Painful;Fragile;Pale;Pink;Yellow   Closure Not approximated   Drainage Amount Moderate   Drainage Description Yellow;Serous   Treatments Cleansed;Saline;Site Care  (Santyl)   Dressing 4x4s;Aquacel Foam;Gauze  (damp saline gauze)   Dressing Changed Changed   Dressing Status Clean;Dry;Intact;Dressing Changed   Wound Length (cm)   (traced on admit)   Wound Depth (cm)   (adherent slough tissue)   Margins Well-defined edges;Epibole (Rolled edges)   Madhuri-wound Assessment Fragile;Pale;Pink   Wound Granulation Tissue Pink   Wound Bed Granulation (%) 5 %   Wound Bed Slough (%) 95 %   State of Healing Non-healing   Wound Odor None   Pressure Injury Stage U   Wound 08/23/24 3 Sacrum   Date First Assessed: 08/23/24   Present on  Original Admission: Yes   Wound Number (Wound Clinic Only): 3  Primary Wound Type: Pressure Injury  Location: Sacrum   Wound Image    Site Assessment Moist;Excoriated;Painful;Purple;Red;Granulation tissue;Yellow;Tan   Closure Not approximated   Drainage Amount Large   Drainage Description Yellow;Serosanguineous   Treatments Cleansed;Saline;Site Care  (Santyl)   Dressing 4x4s;Aquacel Foam;Gauze;Kerlix roll  (damp saline gauze)   Dressing Changed Changed   Dressing Status Clean;Dry;Intact;Dressing Changed   Wound Length (cm)   (traced on admit)   Margins Well-defined edges;Not attached   Madhuri-wound Assessment Fragile;Pink;Excoriated   Wound Granulation Tissue Red   Wound Bed Granulation (%) 60 %   Wound Bed Epithelium (%) 0 %   Wound Bed Slough (%) 40 %   Wound Bed Fibrin (%) 0 %   State of Healing Early/partial granulation   Wound Odor Mild   Exposed Structure Bone   Pressure Injury Stage 4   Wound 08/23/24 4 Ankle Right;Medial   Date First Assessed: 08/23/24   Present on Original Admission: Yes   Wound Number (Wound Clinic Only): 4  Primary Wound Type: Pressure Injury  Location: Ankle  Wound Location Orientation: Right;Medial   Wound Image    Site Assessment Fragile;Pink;Tan;Dry   Closure Not approximated   Drainage Amount None   Treatments Cleansed;Saline;Silver gel   Dressing 2x2s;Aquacel Foam;Gauze  (Santyl)   Dressing Changed Changed   Dressing Status Clean;Dry;Intact;Dressing Changed   Margins Poorly defined   Madhuri-wound Assessment Fragile;Dry;Intact;Pink;Red   Wound Bed Granulation (%) 0 %   Wound Bed Epithelium (%) 0 %   Wound Bed Slough (%) 0 %   Wound Bed Eschar (%) 100 %   Wound Bed Fibrin (%) 0 %   State of Healing Non-healing   Wound Odor None   Pressure Injury Stage U   Wound Follow Up   Follow up needed Yes   Comfort and Environment Interventions   Specialty Bed/Mattress   (Air pump is on the Isotour gel mattress)     WOUND CARE NOTE    History:  Past Medical History:    Anxiety state, unspecified     CVA (cerebral infarction)    Dementia (HCC)    Depression    Heterozygous factor V Leiden mutation (HCC)    Other and unspecified hyperlipidemia    Other ill-defined conditions(799.89)    Cardiomegaly Pleural effusion w/idopathic pleuropuricarditis    Pulmonary embolism (HCC)    Stroke (HCC)    Unspecified sleep apnea     Past Surgical History:   Procedure Laterality Date    Colonoscopy      Electrocardiogram, complete  01/08/1914    Scanned to Media Tab      Social History     Socioeconomic History    Marital status:    Tobacco Use    Smoking status: Former    Smokeless tobacco: Never   Vaping Use    Vaping status: Never Used   Substance and Sexual Activity    Alcohol use: No     Alcohol/week: 0.0 standard drinks of alcohol     Comment: none recently    Drug use: No   Other Topics Concern    Caffeine Concern Yes     Comment: 1 cups coffee daily    Exercise No   Social History Narrative    The patient does not use an assistive device..      The patient does live in a home with stairs.     Social Determinants of Health     Financial Resource Strain: Low Risk  (5/6/2024)    Financial Resource Strain     Difficulty of Paying Living Expenses: Not very hard     Med Affordability: No   Food Insecurity: No Food Insecurity (8/24/2024)    Food Insecurity     Food Insecurity: Never true   Transportation Needs: No Transportation Needs (8/24/2024)    Transportation Needs     Lack of Transportation: No   Housing Stability: Low Risk  (8/24/2024)    Housing Stability     Housing Instability: No       PLAN   Recommendations:  Dr. Qureshi and ID are following the pt.    Turn schedules  Specialty bed: Air pump is on the Isotour gel mattress  Heels elevated using pillows, heel boots to offload heels  Use of lift equipment  To prevent sliding: decrease head of bed and elevate foot of bed as medical condition tolerates  Prompt incontinence care  Moisture barrier for incontinence. May consider Silicone  Glucose control to help promote  wound healing    Wound(s)  Location: Sacrum, Left hip and right ankle  Cleansing  Saline   Topical Santyl as ordered by MD  Dressings Damp saline gauze, packed into left hip and sacrum  Secure with dry gauze and border foam  Frequency Daily and prn   Location: Right heel  Cleansing  Saline   Dressings Xeroform gauze, dry gauze  Secure with Border foam  Frequency daily       Discharge Recommendations: The pt. is from home with  care and also follows in the wound clinic      OBJECTIVE   RN Consult new sacrum, left hip, right heel and right ankle wounds      ASSESSMENT   Oral Score:  Oral Scale Score: 12    Chart Reviewed: yes    Wound(s):  The pt. is resting in bed, his nurse is at the bedside. He is complete care, He is known to wound care services. No family here at this time. The pt. is non verbal.  See the wound assessments, MD has Santyl ordered. Dressings changed, The pt. was repositioned in bed, Heel protectors reapplied. Per Dr Qureshi he may do a bedside debridement. Spoke with the nurse.       Allergies: Patient has no known allergies.    Labs:   Lab Results   Component Value Date    WBC 15.4 (H) 08/26/2024    HGB 10.5 (L) 08/26/2024    HCT 33.7 (L) 08/26/2024    .0 08/26/2024    CREATSERUM 0.56 (L) 08/26/2024    BUN 16 08/26/2024     08/26/2024    K 3.4 (L) 08/26/2024     08/26/2024    CO2 28.0 08/26/2024     (H) 08/26/2024    CA 9.3 08/26/2024    ALB 3.3 08/25/2024    ALKPHO 58 08/25/2024    BILT 0.2 08/25/2024    TP 5.9 08/25/2024    AST 14 08/25/2024    ALT 17 08/25/2024    MG 1.9 08/26/2024     No results found for: \"PREALBUMIN\"      Time Spent 45 Minutes.    Jessica Landry, SAMANTHA  [facility]  [facility]  [phone]

## 2024-08-26 NOTE — DIETARY NOTE
ADULT NUTRITION INITIAL ASSESSMENT    Pt is at high nutrition risk.  Pt meets moderate malnutrition criteria.      CRITERIA FOR MALNUTRITION DIAGNOSIS:  Criteria for non-severe malnutrition diagnosis: chronic illness related to body fat mild depletion and muscle mass mild depletion.    RECOMMENDATIONS TO MD: CPM    ADMITTING DIAGNOSIS:  Sacral decubitus ulcer, stage IV (HCC) [L89.154]  Leukocytosis, unspecified type [D72.829]  PERTINENT PAST MEDICAL HISTORY:   Past Medical History:    Anxiety state, unspecified    CVA (cerebral infarction)    Dementia (HCC)    Depression    Heterozygous factor V Leiden mutation (HCC)    Other and unspecified hyperlipidemia    Other ill-defined conditions(799.89)    Cardiomegaly Pleural effusion w/idopathic pleuropuricarditis    Pulmonary embolism (HCC)    Stroke (HCC)    Unspecified sleep apnea     PATIENT STATUS: Initial 08/26/24: Pt admit for Leukocytosis. PMH sig for CVA, Dementia, Non-Verbal. Pt assessed due to screening at risk for MD consult/Multiple Wounds/Order Stevan. Met with patient and daughter (Tyra) at bedside today. Reports focusing on making sure patient has adequate nutrition/protein at home. Reports patient has a good oral intake/appetite. Provides Pro-Heal (liquid protein) 1 ounce daily + Ensure daily + Stevan mixed with H2O BID. Reviewed sources of protein. Discussed estimated daily protein needs. Encouraged current efforts.      FOOD/NUTRITION RELATED HISTORY:  Appetite: Fair to good  Intake:  Good po intake at (B) today per RN. Continue to monitor.  Intake Meeting Needs: No, but oral nutrition supplements (ONS) to maximize  Percent Meals Eaten (last 3 days)       Date/Time Percent Meals Eaten (%)    08/24/24 1451 55 %    08/24/24 1905 90 %    08/25/24 1029 100 %    08/25/24 1857 75 %           Food Allergies: No Known Food Allergies (NKFA)  Cultural/Ethnic/Mu-ism Preferences: None    GASTROINTESTINAL: +BM 8/25/24.  Missing Teeth    MEDICATIONS: reviewed    [START ON 8/27/2024] collagenase   Topical Daily    piperacillin-tazobactam  3.375 g Intravenous Q8H    levETIRAcetam  250 mg Oral QAM    levETIRAcetam  500 mg Oral Nightly    metoprolol tartrate  12.5 mg Oral 2x Daily(Beta Blocker)    rivaroxaban  20 mg Oral Daily with food    calcium carbonate-vitamin D  1 tablet Oral BID with meals    ferrous sulfate  300 mg Oral Daily     LABS: reviewed  Recent Labs     08/24/24  1758 08/25/24  0511 08/26/24  0519   * 142* 102*   BUN 16 23 16   CREATSERUM 0.77 0.65* 0.56*   CA 9.5 9.0 9.3   MG  --  1.7 1.9   * 136 139   K 3.8 3.5  3.5 3.4*    104 106   CO2 22.0 26.0 28.0   OSMOCALC 281 288 289     NUTRITION RELATED PHYSICAL FINDINGS:  - Nutrition Focused Physical Exam (NFPE): mild depletion body fat orbital region and mild depletion muscle mass temple region, dorsal soto region, and thigh region. Difficult to assess.   - Fluid Accumulation: none  see RN documentation for details  - Skin Integrity: breakdown Multiple Wounds. Reviewed Wound Care RN documentation.  (R) Heel Stage 4; (R) Medial Ankle Unstageable; (L) Hip Unstageable; Sacrum Stage 4.   See RN documentation for details.    ANTHROPOMETRICS:  HT: 170.2 cm (5' 7\")  WT: 61.7 kg (136 lb 1.6 oz)   BMI: Body mass index is 21.32 kg/m².  BMI CLASSIFICATION: <22 considered underweight for advanced age  IBW: 148 lbs        92% IBW  Usual Body Wt: 130-135 lbs      100% UBW    WEIGHT HISTORY:  Patient Weight(s) for the past 336 hrs:   Weight   08/26/24 0406 61.7 kg (136 lb 1.6 oz)   08/24/24 0202 57.9 kg (127 lb 11.2 oz)   08/24/24 0126 61.2 kg (135 lb)     Wt Readings from Last 10 Encounters:   08/26/24 61.7 kg (136 lb 1.6 oz)   07/23/24 61.2 kg (135 lb)   07/17/24 60.7 kg (133 lb 12.8 oz)   07/09/24 61.6 kg (135 lb 11.2 oz)   06/03/24 61.7 kg (136 lb)   05/10/24 61.9 kg (136 lb 8 oz)   05/07/24 59.4 kg (131 lb)   05/05/24 59.5 kg (131 lb 3.2 oz)   05/02/24 59 kg (130 lb)   01/04/24 59.9 kg (132 lb)      NUTRITION DIAGNOSIS/PROBLEM:    Increased nutrient needs related to Increased demand for nutrient calories/protein as evidenced by Multiple Pressure Injury/Wounds.     NUTRITION INTERVENTION:     NUTRITION PRESCRIPTION:   Estimated Nutrition needs: --dosing wt of 61 kg - wt taken on 8/26/24  Calories: 1800 calories/day (30 calories per kg Dosing wt)  Protein:  g protein/day (1.5-2.0 g protein/kg Dosing wt)  Fluid Needs: 1800 ml     - Diet:       Procedures    Regular/General diet Is Patient on Accuchecks? No      - RD Malnutrition Care Plan: Initiated ONS (oral nutritional supplements)  - Meals and snacks: Encouraged adequate PO intake  - Medical Food Supplements-RD added Ensure Enlive (350 calories/ 20 g protein each) BID Vanilla, Chocolate, or Strawberry. Rational/use of oral supplements discussed.  Provide Ensure Enlive vanilla mixed with Stevan daily in am.   Provide Ensure Enlive chocolate or strawberry at lunch.   Provide Stevan mixed with water at dinner.   - Vitamin and mineral supplements: iron and vitamin D  Stevan also provides additional vitamins.   - Feeding assistance: feed by staff - Family assisting as well  - Nutrition education:  Reviewed importance of increased calorie/protein needs + adequate fluid intake for wound healing process. Noted RD discussed at prior hospital admission recently in July 2024.     - Coordination of nutrition care: collaboration with other providers  - Discharge and transfer of nutrition care to new setting or provider: monitor plans    MONITOR AND EVALUATE/NUTRITION GOALS:  - Food and Nutrient Intake:      Monitor: adequacy of PO intake and adequacy of supplement intake  - Food and Nutrient Administration:      Monitor: N/A  - Anthropometric Measurement:    Monitor weight  - Nutrition Goals:      maintain wt within 5%, PO and supplement greater than 75% of needs, and support wound healing    DIETITIAN FOLLOW UP: RD to follow and monitor nutrition status    Isabel  Magdalena RDN, LDN, CDE   Clinical Nutrition  Ext [phone]

## 2024-08-26 NOTE — PLAN OF CARE
Pt nonverbal, orientation HANK. Pts family at bedside updated on plan of care. Iv antibiotics infused. Pt's daughter expressed that she and/or her brother Faheem be notified when new meds are ordered/given to patient. Pt's daughter concerned about administering norco for pain relief, pt's daughter ok with pt having norco during the night. Pt repositioned frequently. Bed low and locked. seizure precautions in place  Problem: Patient Centered Care  Goal: Patient preferences are identified and integrated in the patient's plan of care  Description: Interventions:  - What would you like us to know as we care for you? From home with spouse  - Provide timely, complete, and accurate information to patient/family  - Incorporate patient and family knowledge, values, beliefs, and cultural backgrounds into the planning and delivery of care  - Encourage patient/family to participate in care and decision-making at the level they choose  - Honor patient and family perspectives and choices  Outcome: Progressing     Problem: Patient/Family Goals  Goal: Patient/Family Long Term Goal  Description: Patient's Long Term Goal: decrease infection risks    Interventions:  - Monitor vitals  - Monitor appropriate labs  - Administer medications as ordered  - Follow MD's orders  - Update patient on plan of care   - Discharge planning     - See additional Care Plan goals for specific interventions  Outcome: Progressing  Goal: Patient/Family Short Term Goal  Description: Patient's Short Term Goal: discharge home    Interventions:   - assess wounds,  -reposition frequently  -monitor skin  -pain control  - See additional Care Plan goals for specific interventions  Outcome: Progressing     Problem: SKIN/TISSUE INTEGRITY - ADULT  Goal: Skin integrity remains intact  Description: INTERVENTIONS  - Assess and document risk factors for pressure ulcer development  - Assess and document skin integrity  - Monitor for areas of redness and/or skin breakdown  -  Initiate interventions, skin care algorithm/standards of care as needed  Outcome: Progressing  Goal: Incision(s), wounds(s) or drain site(s) healing without S/S of infection  Description: INTERVENTIONS:  - Assess and document risk factors for pressure ulcer development  - Assess and document skin integrity  - Assess and document dressing/incision, wound bed, drain sites and surrounding tissue  - Implement wound care per orders  - Initiate isolation precautions as appropriate  - Initiate Pressure Ulcer prevention bundle as indicated  Outcome: Progressing  Goal: Oral mucous membranes remain intact  Description: INTERVENTIONS  - Assess oral mucosa and hygiene practices  - Implement preventative oral hygiene regimen  - Implement oral medicated treatments as ordered  Outcome: Progressing     Problem: NEUROLOGICAL - ADULT  Goal: Absence of seizures  Description: INTERVENTIONS  - Monitor for seizure activity  - Administer anti-seizure medications as ordered  - Monitor neurological status  Outcome: Progressing     Problem: SAFETY ADULT - FALL  Goal: Free from fall injury  Description: INTERVENTIONS:  - Assess pt frequently for physical needs  - Identify cognitive and physical deficits and behaviors that affect risk of falls.  - [place] fall precautions as indicated by assessment.  - Educate pt/family on patient safety including physical limitations  - Instruct pt to call for assistance with activity based on assessment  - Modify environment to reduce risk of injury  - Provide assistive devices as appropriate  - Consider OT/PT consult to assist with strengthening/mobility  - Encourage toileting schedule  Outcome: Progressing

## 2024-08-26 NOTE — PAYOR COMM NOTE
--------------  ADMISSION REVIEW     Payor: [facility] PE[employer] [facility]  Subscriber #:  [id number]  Authorization Number: [id number]    Admit date: 8/24/24  Admit time:  2:01 AM       REVIEW DOCUMENTATION:    Patient Seen in: [facility] Emergency Department    History     Chief Complaint   Patient presents with    Abnormal Labs       HPI   Lab work that was done resulted with an elevated white blood cell count prompting nurse practitioner to call patient's family and let them know that patient need to be started on antibiotics.  They opted to bring patient to the ED for IV antibiotics.  Patient has not had any fevers, sweats, coughing, diarrhea, according to patient's son    External Records Reviewed: Reviewed notes by wound care nurse practitioner dated today.  Patient has a large stage IV decubitus ulcer in the sacral area without significant surrounding erythema.  There is some foul odor.  Patient has a left hip stage IV decubitus ulcer that is healing well and has a wound VAC.  Patient has a right medial ankle stage III ulceration without any surrounding erythema that appears to be healing well.  He also has a right heel stage III ulcer that appears to be healing well.  Lab work does not as an outpatient showed a white blood cell count of 15,000.  Previous labs showed a white blood cell count of 5000    History reviewed.   Past Medical History:    Anxiety state, unspecified    CVA (cerebral infarction)    Dementia (HCC)    Depression    Heterozygous factor V Leiden mutation (HCC)    Other and unspecified hyperlipidemia    Other ill-defined conditions(799.89)    Cardiomegaly Pleural effusion w/idopathic pleuropuricarditis    Pulmonary embolism (HCC)    Stroke (HCC)    Unspecified sleep apnea       History reviewed.   Past Surgical History:   Procedure Laterality Date    Colonoscopy      Electrocardiogram, complete  01/08/1914    Scanned to Media Tab         Physical Exam     ED Triage Vitals [08/23/24 2049]   /74    Pulse 112   Resp 20   Temp 98.9 °F (37.2 °C)   Temp src    SpO2 96 %   O2 Device None (Room air)       Physical Exam   Constitutional: Alert, well nourished, NAD  HEENT: Normocephalic, PERRLA, MMM  CV: s1s2+, RRR, no m/r/g, normal distal pulses  Pulmonary/Chest: CTA b/l with no rales, wheezes.  No chest wall tenderness  Abdominal: Nontender.  Nondistended. Soft. Bowel sounds are normal.   Neck/Back:   : Stage IV decubitus ulcer in the sacrum with very mild surrounding erythema.  No significant drainage  Musculoskeletal: Left hip wound VAC overlying decubitus ulcer.  Normal range of motion. No deformity.  Right medial ankle and heel with stage III ulcers, no drainage, no significant surrounding erythema  Neurological: Awake, alert. Normal reflexes. No cranial nerve deficit.    Skin: Skin is warm and dry. No rash noted. No erythema.   Psychiatric:    Labs Reviewed   CBC WITH DIFFERENTIAL WITH PLATELET - Abnormal; Notable for the following components:       Result Value    WBC 14.9 (*)     HGB 12.2 (*)     RDW-SD 51.1 (*)     RDW 16.7 (*)     .0 (*)     Neutrophil Absolute Prelim 11.03 (*)     Neutrophil Absolute 11.03 (*)     Monocyte Absolute 1.21 (*)     All other components within normal limits   BASIC METABOLIC PANEL (8) - Abnormal; Notable for the following components:    Glucose 132 (*)     Potassium 3.4 (*)     Creatinine 0.67 (*)     BUN/CREA Ratio 32.8 (*)     All other components within normal limits   PROCALCITONIN - Abnormal; Notable for the following components:    Procalcitonin 0.10 (*)     All other components within normal limits   URINALYSIS WITH CULTURE REFLEX - Abnormal; Notable for the following components:    Blood Urine 2+ (*)     RBC Urine >10 (*)     All other components within normal limits   LACTIC ACID, PLASMA - Normal   BLOOD CULTURE   BLOOD CULTURE     ED Medications Administered:   Medications   piperacillin-tazobactam (Zosyn) 4.5 g in dextrose 5% 100 mL IVPB-ADDV (has no  administration in time range)   piperacillin-tazobactam (Zosyn) 3.375 g in dextrose 5% 100 mL IVPB-ADDV (has no administration in time range)   metoprolol tartrate (Lopressor) tab 25 mg (25 mg Oral Given 8/23/24 2232)   levETIRAcetam (Keppra) 500 mg/5mL injection 500 mg (500 mg Intravenous Given 8/23/24 2228)   sodium chloride 0.9 % IV bolus 1,000 mL (1,000 mL Intravenous New Bag 8/23/24 2228)       Disposition and Plan     Clinical Impression:  1. Leukocytosis, unspecified type    2. Sacral decubitus ulcer, stage IV (HCC)        Disposition:  Admit    Signed by Wiliam Alexander MD on 8/24/2024 12:49 AM           levETIRAcetam (Keppra) 500 mg/5mL injection 500 mg  Dose: 500 mg  Freq: Once Route: IV  Start: 08/23/24 2152 End: 08/23/24 2230   Admin Instructions:     metoprolol tartrate (Lopressor) tab 25 mg  Dose: 25 mg  Freq: Once Route: OR  Start: 08/23/24 2152 End: 08/23/24 2232  piperacillin-tazobactam (Zosyn) 4.5 g in dextrose 5% 100 mL IVPB-ADDV  Dose: 4.5 g  Freq: Once Route: IV  Last Dose: 4.5 g (08/24/24 0115)  Start: 08/24/24 0035 End: 08/24/24 0145   Admin Instructions:   sodium chloride 0.9 % IV bolus 1,000 mL  Dose: 1,000 mL  Freq: Once Route: IV  Last Dose: 1,000 mL (08/23/24 2228)  Start: 08/23/24 2152 End: 08/23/24 2328  sodium chloride 0.9% infusion  Rate: 75 mL/hr Dose: 75 mL/hr  Freq: Continuous Route: IV  Last Dose: 75 mL/hr (08/26/24 0500)  Start: 08/24/24 0215      HISTORY AND PHYSICAL      /87   Pulse 109   Temp 98.9 °F (37.2 °C)   Resp 18   SpO2 100%   General: No acute distress. Nonverbal   HEENT: Normocephalic atraumatic. Moist mucous membranes. EOM-I. PERRLA. Anicteric.  Neck: No lymphadenopathy. No JVD. No carotid bruits.  Respiratory: Clear to auscultation bilaterally. No wheezes. No rhonchi.  Cardiovascular: S1, S2. Regular rate and rhythm. No murmurs, rubs or gallops. Equal pulses.   Chest and Back: No tenderness or deformity.  Abdomen: Soft, nontender, nondistended.  Positive  bowel sounds. No rebound, guarding or organomegaly.  Neurologic: No focal neurological deficits. CNII-XII grossly intact. Residual right side weakness from stroke   Musculoskeletal: Moves all extremities.  Extremities: No edema or cyanosis.  Integument: No rashes or lesions.   Psychiatric: Appropriate mood and affect.        Diagnostic Data:       Labs:       Recent Labs   Lab 08/23/24  1352 08/23/24  2113   WBC 15.8* 14.9*   HGB 12.0* 12.2*   MCV 86.0 83.7   .0* 497.0*              Recent Labs   Lab 08/23/24  1352 08/23/24  2113   GLU 95 132*   BUN 20 22   CREATSERUM 0.68* 0.67*   CA 10.0 10.0   ALB 4.2  --     137   K 3.6 3.4*    100   CO2 26.0 29.0   ALKPHO 72  --    AST 25  --    ALT 26  --    BILT 0.2  --    TP 7.7  --        ASSESSMENT / PLAN:      Decubitus ulcer most likely infected  History of chronic wounds  Leukocytosis  Rule out sepsis  History of dementia  History of seizures  History of factor V Leyden deficiency  History of PE  On chronic anticoagulation     Admit patient to MedSurg unit  Started on broad-spectrum antibiotics  Wound care consulted  ID consulted  Supportive care  Continue home medications as tolerated  symptomatic management      ID CONSULT    8-24-24    Reason for Consultation:  Sacral wound infx     ASSESSMENT:     Antibiotics: Zosyn     # Sacral wound infection   # Leukocytosis, r/o bacteremia  # Sacral/L hip/BLE ankle/heel wounds                -  S/p L hip/sacral bedside debridement x2 7/2024               -  S/p R heel beside I&D 7/14 w/probe to bone, amputation refused by family     # Hx R heel OM  # Hx Proteus BSI 2/2 infected wounds, s/p 4wk zosyn, dc 8/11/24  # Dementia, CVA- mostly bedbound  # Hx PE     PLAN:     -  Continue empiric zosyn.  -  FU cx.  -  Local wound care. May benefit from surgical eval.  -  Follow fever curve, wbc  -  Reviewed labs, micro, imaging reports, available old records    General: NAD, does not interact, grimacing  HEENT:  NCAT  Neck: No lymphadenopathy.  Supple.  Cardiovascular: RRR  Respiratory: Symmetric expansion  Abdomen: Soft, nontender, nondistended.   Musculoskeletal: No edema noted  Integument: No diffuse rash, sacral wound w/necrotic tissue at superior aspect, no odor or surrounding erythema, R ankle small ulcer w/surrounding pink skin and necrotic base, L heel with hypergranulated base, L hip wound clean w/fibrous slough layer on base  :Purewick+       SURGERY CONSULT  8-25-24     Upon presentation to the hospital, the patient was afebrile and hemodynamically stable.  The patient has been noted to have low-grade temperature since admission with temperature of 100.7 °F yesterday.  WBC 23.8 hemoglobin 12.7 platelets 544,000.  PT 17.5 INR 1.35.  UA negative.     Comments: Sacral wound with wet-to-dry dressing in place.  Dressing was removed.  Cavour healthy wound bed noted with minimal fibrinous exudate.  No evidence of necrotic tissue.  No drainage.  No bleeding.  Left hip wound with healthy pink wound bed.  No drainage or bleeding.  No erythema or induration.  Superficial abrasion noted to the right posterior ankle with minimal serous drainage on dressing.  No surrounding erythema or induration.     Lab 08/25/24  0511   RBC 4.43   HGB 11.7*   HCT 38.9*   MCV 87.8   MCH 26.4   MCHC 30.1*   RDW 17.0*   NEPRELIM 17.96*   WBC 22.2*   .0              Recent Labs   Lab 08/24/24  1758 08/25/24  0511   * 142*   BUN 16 23   CREATSERUM 0.77 0.65*   CA 9.5 9.0   ALB 3.6 3.3   * 136   K 3.8 3.5  3.5    104   CO2 22.0 26.0   ALKPHO 67 58   AST 20 14   ALT 20 17   BILT 0.4 0.2   TP 6.6 5.9                 Recent Labs   Lab 08/24/24  1821 08/25/24  0511   PTP 17.5* 17.1*   INR 1.35* 1.31*   PTT 31.5  --         Sacral decubitus ulcer  Open wound of the left hip  Open wound to the right heel  Dementia  History of PE, on Xarelto  Leukocytosis     Plan:  Wounds were examined with nurse chaperone present with no  indication for surgical intervention at this time.  Recommend to continue with local wound care.  Wound care consult pending.  Continue antibiotics as per ID recommendations.    PER ATTENDING   Assessment and Plan:          Decubitus ulcer most likely infected - apprec surg consult. Plan for I and D . Cont iv abx. Await final cxs. ID consult   History of chronic wounds  Leukocytosis- repeat in am   Rule out sepsis - lactic acid 2.3 improving   History of dementia -stable   History of seizures  History of factor V Leyden deficiency  History of PE in May 2024 - ct reviewed with [facility] lower lobe pe. Hold for procedure   On chronic anticoagulation          MEDICATIONS ADMINISTERED IN LAST 1 DAY:  calcium carbonate-vitamin D ([place]yster [place]-D) 250-3.125 MG-MCG per tab 1 tablet       Date Action Dose Route User    8/26/2024 0745 Given 1 tablet Oral Radha Garvey RN    8/25/2024 1700 Given 1 tablet Oral Kassy Wallace RN          collagenase (Santyl) 250 UNIT/GM ointment       Date Action Dose Route User    8/26/2024 0930 Given (none) Topical Radha Garvey RN          ferrous sulfate 300 (60 Fe) MG/5ML oral syrup 300 mg       Date Action Dose Route User    8/26/2024 0745 Given 300 mg Oral Radha Garvey RN          HYDROcodone-acetaminophen (Norco) 5-325 MG per tab 1 tablet       Date Action Dose Route User    8/25/2024 2143 Given 1 tablet Oral Haley Martinez RN    8/25/2024 1227 Given 1 tablet Oral Kassy Wallace RN          levETIRAcetam (Keppra) 100 MG/ML oral solution 250 mg       Date Action Dose Route User    8/26/2024 0745 Given 250 mg Oral Radha Garvey RN          levETIRAcetam (Keppra) 100 MG/ML oral solution 500 mg       Date Action Dose Route User    8/25/2024 2017 Given 500 mg Oral Haley Martinez RN          metoprolol tartrate (Lopressor) partial tab 12.5 mg       Date Action Dose Route User    8/26/2024 0541 Given 12.5 mg Oral Haley Martinez RN    8/25/2024 1700 Given 12.5 mg Oral  Kassy Wallace RN          piperacillin-tazobactam (Zosyn) 3.375 g in dextrose 5% 100 mL IVPB-ADDV       Date Action Dose Route User    8/26/2024 0541 New Bag 3.375 g Intravenous Haley Martinez RN    8/25/2024 2143 New Bag 3.375 g Intravenous MarusaHaley bagley RN    8/25/2024 1300 New Bag 3.375 g Intravenous Kassy Wallace RN          potassium chloride (Klor-Con) 20 MEQ oral powder 40 mEq       Date Action Dose Route User    8/26/2024 0746 Given 40 mEq Oral Radha Garvey RN          rivaroxaban (Xarelto) tab 20 mg       Date Action Dose Route User    8/26/2024 0745 Given 20 mg Oral Radha Garvey RN          sodium chloride 0.9% infusion    75 ML HR        Date Action Dose Route User    8/26/2024 0500 New Bag 75 mL/hr Intravenous Haley Martinez RN

## 2024-08-26 NOTE — PROGRESS NOTES
INFECTIOUS DISEASE PROGRESS NOTE  [facility]  part of [facility] ID PROGRESS NOTE    Yoseph Cordero Patient Status:  Inpatient    1950 MRN [id number]   Location [facility]5W Attending Ric Evans MD   Hosp Day # 2 PCP Irving Sheets, DO     Subjective:  ROS limited. Daughter at bedside. Tmax 99.8. She notes new rash that started in the hospital on Saturday.    ASSESSMENT:    Antibiotics: Zosyn     # Acute fever with leukocytosis  # Sacral/L hip/BLE ankle/heel wounds                -  S/p L hip/sacral bedside debridement x2 2024               -  S/p R heel beside I&D  w/probe to bone, amputation refused by family   -  Sacral wound with E. Coli, Proteus, Enterococcus  # Macular rash ?drug rash to ?zosyn  # Hx R heel OM  # Hx Proteus BSI 2/2 infected wounds, s/p 4wk zosyn, dc 24  # Dementia, CVA- mostly bedbound  # Hx PE     PLAN:  -  Continue empiric zosyn.  -  Check CT C/A/P.  -  Monitor rash.  -  Follow fever curve, wbc.  -  Reviewed labs, micro, imaging reports, available old records.  -  D/w patient, family, primary, RN.     History of Present Illness:  74 year old male with a history of CVA, PE, Factor V Leiden, dementia, mostly bedbound      Pt was recently admitted here at [facility] in July with worsening wounds. Previous R foot XR on 24 with R foot OM. Found with Proteus BSI from infected wounds. Sacrum debrided x2 at bedside down to bone. R heel with exposed bone s/p I&D 24. Amp recommended. DC on 4wk iv zosyn through 24.Last seen by wound care APN on  with reports of foul smell of sacral wound. Wound orders adjusted. Sacral wound cx sent. Labs sent with SED 54, CRP 7.6. WBC 15.8 and came to ED for further eval. On admit, Tm 99.4. wbc 14.9. LA 1.6. PCT 0.10. Bcx sent. Started on zosyn. ID consulted.     Physical Exam:  /68 (BP Location: Right arm)   Pulse 111   Temp 98 °F (36.7 °C) (Axillary)   Resp 17   Ht 5' 7\" (1.702 m)    Wt 136 lb 1.6 oz (61.7 kg)   SpO2 98%   BMI 21.32 kg/m²     Gen:   Lethargic in bed  HEENT:  EOMI, neck supple  CV/lungs:  RRR, CTAB  Abdom:  Soft, NT/ND, +BS  Skin/extrem:  Wound pictures reviewed, macular rash on arms, torso, abdomen  Lines:  PIV+    Laboratory Data: Reviewed    Microbiology: Reviewed    Radiology: Reviewed      [facility]  [phone]  8/26/2024

## 2024-08-27 ENCOUNTER — APPOINTMENT (OUTPATIENT)
Dept: CT IMAGING | Facility: HOSPITAL | Age: 74
End: 2024-08-27
Attending: INTERNAL MEDICINE
Payer: MEDICARE

## 2024-08-27 LAB
ANION GAP SERPL CALC-SCNC: 5 MMOL/L (ref 0–18)
BASOPHILS # BLD AUTO: 0.04 X10(3) UL (ref 0–0.2)
BASOPHILS NFR BLD AUTO: 0.2 %
BUN BLD-MCNC: 19 MG/DL (ref 9–23)
BUN/CREAT SERPL: 33.9 (ref 10–20)
CALCIUM BLD-MCNC: 9.5 MG/DL (ref 8.7–10.4)
CHLORIDE SERPL-SCNC: 105 MMOL/L (ref 98–112)
CO2 SERPL-SCNC: 30 MMOL/L (ref 21–32)
CREAT BLD-MCNC: 0.56 MG/DL
DEPRECATED RDW RBC AUTO: 53.4 FL (ref 35.1–46.3)
EGFRCR SERPLBLD CKD-EPI 2021: 104 ML/MIN/1.73M2 (ref 60–?)
EOSINOPHIL # BLD AUTO: 1.87 X10(3) UL (ref 0–0.7)
EOSINOPHIL NFR BLD AUTO: 11 %
ERYTHROCYTE [DISTWIDTH] IN BLOOD BY AUTOMATED COUNT: 17.1 % (ref 11–15)
GLUCOSE BLD-MCNC: 107 MG/DL (ref 70–99)
HCT VFR BLD AUTO: 32.1 %
HGB BLD-MCNC: 10.1 G/DL
IMM GRANULOCYTES # BLD AUTO: 0.19 X10(3) UL (ref 0–1)
IMM GRANULOCYTES NFR BLD: 1.1 %
LYMPHOCYTES # BLD AUTO: 2.02 X10(3) UL (ref 1–4)
LYMPHOCYTES NFR BLD AUTO: 11.9 %
MCH RBC QN AUTO: 26.9 PG (ref 26–34)
MCHC RBC AUTO-ENTMCNC: 31.5 G/DL (ref 31–37)
MCV RBC AUTO: 85.4 FL
MONOCYTES # BLD AUTO: 1.19 X10(3) UL (ref 0.1–1)
MONOCYTES NFR BLD AUTO: 7 %
NEUTROPHILS # BLD AUTO: 11.64 X10 (3) UL (ref 1.5–7.7)
NEUTROPHILS # BLD AUTO: 11.64 X10(3) UL (ref 1.5–7.7)
NEUTROPHILS NFR BLD AUTO: 68.8 %
OSMOLALITY SERPL CALC.SUM OF ELEC: 293 MOSM/KG (ref 275–295)
PLATELET # BLD AUTO: 429 10(3)UL (ref 150–450)
POTASSIUM SERPL-SCNC: 3.5 MMOL/L (ref 3.5–5.1)
POTASSIUM SERPL-SCNC: 3.5 MMOL/L (ref 3.5–5.1)
RBC # BLD AUTO: 3.76 X10(6)UL
SODIUM SERPL-SCNC: 140 MMOL/L (ref 136–145)
WBC # BLD AUTO: 17 X10(3) UL (ref 4–11)

## 2024-08-27 PROCEDURE — 99233 SBSQ HOSP IP/OBS HIGH 50: CPT | Performed by: HOSPITALIST

## 2024-08-27 PROCEDURE — 11045 DBRDMT SUBQ TISS EACH ADDL: CPT

## 2024-08-27 PROCEDURE — 74177 CT ABD & PELVIS W/CONTRAST: CPT | Performed by: INTERNAL MEDICINE

## 2024-08-27 PROCEDURE — 0JB70ZZ EXCISION OF BACK SUBCUTANEOUS TISSUE AND FASCIA, OPEN APPROACH: ICD-10-PCS

## 2024-08-27 PROCEDURE — 11042 DBRDMT SUBQ TIS 1ST 20SQCM/<: CPT

## 2024-08-27 PROCEDURE — 71260 CT THORAX DX C+: CPT | Performed by: INTERNAL MEDICINE

## 2024-08-27 RX ORDER — ASCORBIC ACID 500 MG
500 TABLET ORAL DAILY
COMMUNITY

## 2024-08-27 RX ORDER — SENNOSIDES 8.6 MG
17.2 TABLET ORAL DAILY
Status: DISCONTINUED | OUTPATIENT
Start: 2024-08-27 | End: 2024-08-30

## 2024-08-27 RX ORDER — ASCORBIC ACID 500 MG
500 TABLET ORAL DAILY
Status: DISCONTINUED | OUTPATIENT
Start: 2024-08-27 | End: 2024-08-30

## 2024-08-27 NOTE — CDS QUERY
How to answer this Query:    1.) DON'T CLICK COSIGN BUTTON FIRST  2.) Click \"3 dots...\" to the right of cosign button and click EDIT on the toolbar.  2.) Type an \"X\" in the bracket for the diagnosis that applies. (You may also add additional clinical details as you feel necessary to substantiate your response).   3.) Finally click \"Sign\" to complete response.  Thank You    CLINICAL DOCUMENTATION CLARIFICATION    DEAR DR RADFORD    Please provide additional documentation in the patient's medical record supportive of your documented diagnosis of SEPSIS       ( ) Condition was ruled out and amended documentation provided in the medical record        ( x)  Condition was evident because: __wbc/lactic acid/hr_________________________                (please document in your next progress note)          ( ) Other (please specify)___________________________  ____________________________________________________________________________________    RISK FACTORS: HO CVA RT HEMIPARESIS, MULTIPLE DECUBITUS ULCERS,DEMENTIA, MALNUTRITION,     CLINICAL INDICATORS: ED=/74, , 98.9  WBC-15.8 LACTIC ACID 2.7/2.3, BLOOD CX NEG  SIRS SCORE-2, SOFA SCORE-0    TREATMENT: ID CONSULT, LABS, ZOSYN IV, BLOOD CX    If you have any questions, please contact Clinical :  Suzette OCHOA RN at [phone]     Thank You!    THIS FORM IS A PERMANENT PART OF THE MEDICAL RECORD

## 2024-08-27 NOTE — PLAN OF CARE
Pt is alert but not oriented. Nonverbal. Looks calms and content- pt grins/smiles and gives direct eye contact. Pt is bed bound Vitals signs stable. Remote tele with continuous O2. Frequent checks on rounds. Call light within reach, bed at lowest position and bed alarm in place. Large BM - disimpacted. BLE boots on. Pillows in use for comfort, q2turn. Wound dressing change done daily - completed this morning @0530. Pt consumed oral contrast for CT- pending to be completed this AM.     Problem: Patient Centered Care  Goal: Patient preferences are identified and integrated in the patient's plan of care  Description: Interventions:  - What would you like us to know as we care for you? From home with spouse  - Provide timely, complete, and accurate information to patient/family  - Incorporate patient and family knowledge, values, beliefs, and cultural backgrounds into the planning and delivery of care  - Encourage patient/family to participate in care and decision-making at the level they choose  - Honor patient and family perspectives and choices  Outcome: Progressing     Problem: Patient/Family Goals  Goal: Patient/Family Long Term Goal  Description: Patient's Long Term Goal: decrease infection risks    Interventions:  - Monitor vitals  - Monitor appropriate labs  - Administer medications as ordered  - Follow MD's orders  - Update patient on plan of care   - Discharge planning     - See additional Care Plan goals for specific interventions  Outcome: Progressing  Goal: Patient/Family Short Term Goal  Description: Patient's Short Term Goal: discharge home    Interventions:   - assess wounds,  -reposition frequently  -monitor skin  -pain control  - See additional Care Plan goals for specific interventions  Outcome: Progressing     Problem: SKIN/TISSUE INTEGRITY - ADULT  Goal: Skin integrity remains intact  Description: INTERVENTIONS  - Assess and document risk factors for pressure ulcer development  - Assess and document  skin integrity  - Monitor for areas of redness and/or skin breakdown  - Initiate interventions, skin care algorithm/standards of care as needed  Outcome: Progressing  Goal: Incision(s), wounds(s) or drain site(s) healing without S/S of infection  Description: INTERVENTIONS:  - Assess and document risk factors for pressure ulcer development  - Assess and document skin integrity  - Assess and document dressing/incision, wound bed, drain sites and surrounding tissue  - Implement wound care per orders  - Initiate isolation precautions as appropriate  - Initiate Pressure Ulcer prevention bundle as indicated  Outcome: Progressing  Goal: Oral mucous membranes remain intact  Description: INTERVENTIONS  - Assess oral mucosa and hygiene practices  - Implement preventative oral hygiene regimen  - Implement oral medicated treatments as ordered  Outcome: Progressing     Problem: SKIN/TISSUE INTEGRITY - ADULT  Goal: Incision(s), wounds(s) or drain site(s) healing without S/S of infection  Description: INTERVENTIONS:  - Assess and document risk factors for pressure ulcer development  - Assess and document skin integrity  - Assess and document dressing/incision, wound bed, drain sites and surrounding tissue  - Implement wound care per orders  - Initiate isolation precautions as appropriate  - Initiate Pressure Ulcer prevention bundle as indicated  Outcome: Progressing     Problem: SKIN/TISSUE INTEGRITY - ADULT  Goal: Oral mucous membranes remain intact  Description: INTERVENTIONS  - Assess oral mucosa and hygiene practices  - Implement preventative oral hygiene regimen  - Implement oral medicated treatments as ordered  Outcome: Progressing     Problem: NEUROLOGICAL - ADULT  Goal: Absence of seizures  Description: INTERVENTIONS  - Monitor for seizure activity  - Administer anti-seizure medications as ordered  - Monitor neurological status  Outcome: Progressing     Problem: SAFETY ADULT - FALL  Goal: Free from fall  injury  Description: INTERVENTIONS:  - Assess pt frequently for physical needs  - Identify cognitive and physical deficits and behaviors that affect risk of falls.  - [place] fall precautions as indicated by assessment.  - Educate pt/family on patient safety including physical limitations  - Instruct pt to call for assistance with activity based on assessment  - Modify environment to reduce risk of injury  - Provide assistive devices as appropriate  - Consider OT/PT consult to assist with strengthening/mobility  - Encourage toileting schedule  Outcome: Progressing

## 2024-08-27 NOTE — PROGRESS NOTES
[facility]  part of [facility]    Progress Note    Yoseph Cordero Patient Status:  Inpatient    1950 MRN [id number]   Location [facility]5W Attending Alanna Lang DO   Hosp Day # 3 PCP Irving Sheets DO     Chief complaint wound infection     Subjective:   Yoseph Cordero is a(n) 74 year old male pt nonverbal. More sleepy today      Objective:   Blood pressure 100/60, pulse 96, temperature 99.3 °F (37.4 °C), temperature source Axillary, resp. rate 18, height 5' 7\" (1.702 m), weight 137 lb 1.6 oz (62.2 kg), SpO2 98%.      Intake/Output Summary (Last 24 hours) at 2024 1449  Last data filed at 2024 1144  Gross per 24 hour   Intake 1535.6 ml   Output 3100 ml   Net -1564.4 ml       Patient Weight(s) for the past 336 hrs:   Weight   24 0526 137 lb 1.6 oz (62.2 kg)   24 0406 136 lb 1.6 oz (61.7 kg)   24 0202 127 lb 11.2 oz (57.9 kg)   24 0126 135 lb (61.2 kg)           General appearance: nonverbal and cooperative  Pulmonary:  clear to auscultation bilaterally  Cardiovascular: S1, S2 normal, no murmur, click, rub or gallop, regular rate and rhythm  Abdominal: soft, non-tender; bowel sounds normal; no masses,  no organomegaly  Extremities: extremities normal, atraumatic, no cyanosis or edema        Medicines:     Current Facility-Administered Medications   Medication Dose Route Frequency    sennosides (Senokot) tab 17.2 mg  17.2 mg Oral Daily    ascorbic acid (Vitamin C) tab 500 mg  500 mg Oral Daily    collagenase (Santyl) 250 UNIT/GM ointment   Topical Daily    cetirizine (ZyrTEC) tab 5 mg  5 mg Oral Daily    levETIRAcetam (Keppra) 100 MG/ML oral solution 250 mg  250 mg Oral QAM    levETIRAcetam (Keppra) 100 MG/ML oral solution 500 mg  500 mg Oral Nightly    metoprolol tartrate (Lopressor) partial tab 12.5 mg  12.5 mg Oral 2x Daily(Beta Blocker)    rivaroxaban (Xarelto) tab 20 mg  20 mg Oral Daily with food    sodium chloride 0.9% infusion   75 mL/hr Intravenous Continuous    polyethylene glycol (PEG 3350) (Miralax) 17 g oral packet 17 g  17 g Oral Daily PRN    benzonatate (Tessalon) cap 200 mg  200 mg Oral TID PRN    calcium carbonate-vitamin D (Oyster Shell-D) 250-3.125 MG-MCG per tab 1 tablet  1 tablet Oral BID with meals    ferrous sulfate 300 (60 Fe) MG/5ML oral syrup 300 mg  300 mg Oral Daily    acetaminophen (Tylenol Extra Strength) tab 500 mg  500 mg Oral Q4H PRN    morphINE PF 2 MG/ML injection 1 mg  1 mg Intravenous Q2H PRN    Or    morphINE PF 2 MG/ML injection 2 mg  2 mg Intravenous Q2H PRN    Or    morphINE PF 4 MG/ML injection 4 mg  4 mg Intravenous Q2H PRN    HYDROcodone-acetaminophen (Norco) 5-325 MG per tab 1 tablet  1 tablet Oral Q6H PRN           Ant-Infective Medications            piperacillin-tazobactam 3.375 (3-0.375) g Intravenous Recon Soln ()                Blood Pressure and Cardiac Medications            metoprolol tartrate 25 MG Oral Tab             sodium chloride 75 mL/hr (24 1330)           Lab Results   Component Value Date    WBC 17.0 (H) 2024    HGB 10.1 (L) 2024    HCT 32.1 (L) 2024    .0 2024    CREATSERUM 0.56 (L) 2024    BUN 19 2024     2024    K 3.5 2024    K 3.5 2024     2024    CO2 30.0 2024     (H) 2024    CA 9.5 2024    ALB 3.3 2024    ALKPHO 58 2024    BILT 0.2 2024    TP 5.9 2024    AST 14 2024    ALT 17 2024    PTT 31.5 2024    INR 1.31 (H) 2024    TSH 4.131 2024    PSA 16.20 (H) 2019    DDIMER 1.87 (H) 2024    ESRML 54 (H) 2024    CRP 7.60 (H) 2024    MG 1.9 2024       No results found.        Results:     CBC:    Lab Results   Component Value Date    WBC 17.0 (H) 2024    WBC 15.4 (H) 2024    WBC 22.2 (H) 2024     Lab Results   Component Value Date    HGB 10.1 (L) 2024    HGB 10.5 (L)  08/26/2024    HGB 11.7 (L) 08/25/2024      Lab Results   Component Value Date    .0 08/27/2024    .0 08/26/2024    .0 08/25/2024       Recent Labs   Lab 08/25/24  0511 08/26/24  0519 08/27/24  0613   * 102* 107*   BUN 23 16 19   CREATSERUM 0.65* 0.56* 0.56*   CA 9.0 9.3 9.5    139 140   K 3.5  3.5 3.4* 3.5  3.5    106 105   CO2 26.0 28.0 30.0         @[email or web address]    Assessment and Plan:         Decubitus ulcer most likely infected - apprec surg consult. Plan for I and D and bedside . Cont iv abx with zosyn. Await final cxs - ecoli, enterococcus, proteus. ID consult - discussed with Renate. WBC 17-15-22  History of chronic wounds  Leukocytosis- repeat in am   Rule out sepsis - lactic acid 2.3 improving   History of dementia -stable   History of seizures - keppra  History of factor V Leyden deficiency- xarelto  History of PE in May 2024 - ct reviewed with [facility] lower lobe pe. Hold for procedure   On chronic anticoagulation     Quality:  DVT Prophylaxis: On Xarelto  CODE status: Full   Chandra: None needed      Plan of care discussed with patient at the bedside, patient voiced his/her understanding of current treatment and plan.  All questions answered best able.     Discussed with pts son            Alanna Lang,          Chart reviewed, including current vitals, notes, labs and imaging  Labs ordered and medications adjusted as outlined above  Coordinate care with care team/consultants  Discussed with patient results of tests, management plan as outlined above, and the need for ongoing hospitalization  D/w RN     MDM high        8/25/2024

## 2024-08-27 NOTE — PROGRESS NOTES
INFECTIOUS DISEASE PROGRESS NOTE  [facility]  part of [facility] ID PROGRESS NOTE    Yoseph Cordero Patient Status:  Inpatient    1950 MRN [id number]   Location [facility]5W Attending Ric Evans MD   Hosp Day # 3 PCP Irving Sheets, DO     Subjective:  ROS limited. Son at bedside. Rash improved.    ASSESSMENT:    Antibiotics: Cefepime  Zosyn     # Acute fever with leukocytosis ?reactive to diarrhea  # Sacral/L hip/BLE ankle/heel wounds                -  S/p L hip/sacral bedside debridement x2 2024               -  S/p R heel beside I&D  w/probe to bone, amputation refused by family   -  Sacral wound with E. Coli, Proteus, Enterococcus  # Macular rash ?drug rash to zosyn  # Hx R heel OM  # Hx Proteus BSI 2/2 infected wounds, s/p 4wk zosyn, dc 24  # Dementia, CVA- mostly bedbound  # Hx PE     PLAN:  -  Will stop cefepime at this time and monitor off abx.  -  CT C/A/P reviewed.    -  Follow fever curve, wbc.  -  Reviewed labs, micro, imaging reports, available old records.  -  D/w patient, family, primary, RN.     History of Present Illness:  74 year old male with a history of CVA, PE, Factor V Leiden, dementia, mostly bedbound      Pt was recently admitted here at [facility] in July with worsening wounds. Previous R foot XR on 24 with R foot OM. Found with Proteus BSI from infected wounds. Sacrum debrided x2 at bedside down to bone. R heel with exposed bone s/p I&D 24. Amp recommended. DC on 4wk iv zosyn through 24.Last seen by wound care APN on  with reports of foul smell of sacral wound. Wound orders adjusted. Sacral wound cx sent. Labs sent with SED 54, CRP 7.6. WBC 15.8 and came to ED for further eval. On admit, Tm 99.4. wbc 14.9. LA 1.6. PCT 0.10. Bcx sent. Started on zosyn. ID consulted.     Physical Exam:  /66 (BP Location: Left arm)   Pulse 94   Temp 98.6 °F (37 °C) (Axillary)   Resp 18   Ht 5' 7\" (1.702 m)   Wt 137 lb 1.6  oz (62.2 kg)   SpO2 100%   BMI 21.47 kg/m²     Gen:   Eyes open, in bed  HEENT:  EOMI, neck supple  CV/lungs:  RRR, CTAB  Abdom:  Soft, NT/ND, +BS  Skin/extrem:  Wound pictures reviewed, macular rash on arms, torso, abdomen improved  Lines:  PIV+    Laboratory Data: Reviewed    Microbiology: Reviewed    Radiology: Reviewed      SAILAJA [facility]  [phone]  8/27/2024

## 2024-08-27 NOTE — PROCEDURES
Procedure Note  The risks, benefits, alternatives and expected recovery were explained.  The pt expressed understanding and wished to proceed with the procedure.      Preop:  Sacral decubitus ulcer with necrosis  Postop:  Same  Procedure: Sharp excisional debridement of sacral decubitus ulcer including skin and soft tissue measuring 9cm x 4cm  EBL:  Minimal  Description:  The skin was prepped and draped in sterile fashion. The necrotic tissue was sharply debrided using a #15 blade and sharp scissors. The area of necrotic tissue debrided measured 9cm x 4cm. Hemostasis was maintained. The wound was dressed with dry gauze. The patient tolerated the procedure well.     Taco Pearson PA-C

## 2024-08-28 ENCOUNTER — APPOINTMENT (OUTPATIENT)
Dept: GENERAL RADIOLOGY | Facility: HOSPITAL | Age: 74
End: 2024-08-28
Attending: HOSPITALIST
Payer: MEDICARE

## 2024-08-28 LAB
ANION GAP SERPL CALC-SCNC: 7 MMOL/L (ref 0–18)
BASOPHILS # BLD AUTO: 0.07 X10(3) UL (ref 0–0.2)
BASOPHILS NFR BLD AUTO: 0.5 %
BUN BLD-MCNC: 19 MG/DL (ref 9–23)
BUN/CREAT SERPL: 38 (ref 10–20)
CALCIUM BLD-MCNC: 9.3 MG/DL (ref 8.7–10.4)
CHLORIDE SERPL-SCNC: 104 MMOL/L (ref 98–112)
CO2 SERPL-SCNC: 28 MMOL/L (ref 21–32)
CREAT BLD-MCNC: 0.5 MG/DL
DEPRECATED RDW RBC AUTO: 54.1 FL (ref 35.1–46.3)
EGFRCR SERPLBLD CKD-EPI 2021: 108 ML/MIN/1.73M2 (ref 60–?)
EOSINOPHIL # BLD AUTO: 1.67 X10(3) UL (ref 0–0.7)
EOSINOPHIL NFR BLD AUTO: 10.7 %
ERYTHROCYTE [DISTWIDTH] IN BLOOD BY AUTOMATED COUNT: 17.3 % (ref 11–15)
GLUCOSE BLD-MCNC: 99 MG/DL (ref 70–99)
HCT VFR BLD AUTO: 29.5 %
HGB BLD-MCNC: 9.3 G/DL
IMM GRANULOCYTES # BLD AUTO: 0.21 X10(3) UL (ref 0–1)
IMM GRANULOCYTES NFR BLD: 1.4 %
LYMPHOCYTES # BLD AUTO: 1.98 X10(3) UL (ref 1–4)
LYMPHOCYTES NFR BLD AUTO: 12.7 %
MCH RBC QN AUTO: 27 PG (ref 26–34)
MCHC RBC AUTO-ENTMCNC: 31.5 G/DL (ref 31–37)
MCV RBC AUTO: 85.5 FL
MONOCYTES # BLD AUTO: 1.11 X10(3) UL (ref 0.1–1)
MONOCYTES NFR BLD AUTO: 7.1 %
NEUTROPHILS # BLD AUTO: 10.51 X10 (3) UL (ref 1.5–7.7)
NEUTROPHILS # BLD AUTO: 10.51 X10(3) UL (ref 1.5–7.7)
NEUTROPHILS NFR BLD AUTO: 67.6 %
OSMOLALITY SERPL CALC.SUM OF ELEC: 290 MOSM/KG (ref 275–295)
PLATELET # BLD AUTO: 381 10(3)UL (ref 150–450)
POTASSIUM SERPL-SCNC: 4.1 MMOL/L (ref 3.5–5.1)
RBC # BLD AUTO: 3.45 X10(6)UL
SODIUM SERPL-SCNC: 139 MMOL/L (ref 136–145)
WBC # BLD AUTO: 15.6 X10(3) UL (ref 4–11)

## 2024-08-28 PROCEDURE — 99233 SBSQ HOSP IP/OBS HIGH 50: CPT | Performed by: HOSPITALIST

## 2024-08-28 RX ORDER — SODIUM CHLORIDE 9 MG/ML
INJECTION, SOLUTION INTRAVENOUS CONTINUOUS
Status: DISCONTINUED | OUTPATIENT
Start: 2024-08-28 | End: 2024-08-29

## 2024-08-28 RX ORDER — LACTULOSE 10 G/15ML
20 SOLUTION ORAL EVERY 6 HOURS PRN
Status: DISCONTINUED | OUTPATIENT
Start: 2024-08-28 | End: 2024-08-30

## 2024-08-28 RX ORDER — LACTULOSE 10 G/15ML
30 SOLUTION ORAL ONCE
Status: COMPLETED | OUTPATIENT
Start: 2024-08-28 | End: 2024-08-28

## 2024-08-28 NOTE — PLAN OF CARE
Pt bladder scanned, MD orders to insert ibrahim. Daughter requesting a phone call prior to insertion to give consent. RN waiting on call back from daughter to insert ibrahim. Pt up to chair, tap enema instilled with minimal stool output. Pt to have lactulose Q6. IVF increased. Wound care done by RN. Frequent rounding by staff.      Problem: Patient Centered Care  Goal: Patient preferences are identified and integrated in the patient's plan of care  Description: Interventions:  - What would you like us to know as we care for you? From home with spouse  - Provide timely, complete, and accurate information to patient/family  - Incorporate patient and family knowledge, values, beliefs, and cultural backgrounds into the planning and delivery of care  - Encourage patient/family to participate in care and decision-making at the level they choose  - Honor patient and family perspectives and choices  Outcome: Progressing     Problem: Patient/Family Goals  Goal: Patient/Family Long Term Goal  Description: Patient's Long Term Goal: decrease infection risks    Interventions:  - Monitor vitals  - Monitor appropriate labs  - Administer medications as ordered  - Follow MD's orders  - Update patient on plan of care   - Discharge planning     - See additional Care Plan goals for specific interventions  Outcome: Progressing  Goal: Patient/Family Short Term Goal  Description: Patient's Short Term Goal: discharge home    Interventions:   - assess wounds,  -reposition frequently  -monitor skin  -pain control  - See additional Care Plan goals for specific interventions  Outcome: Progressing     Problem: SKIN/TISSUE INTEGRITY - ADULT  Goal: Incision(s), wounds(s) or drain site(s) healing without S/S of infection  Description: INTERVENTIONS:  - Assess and document risk factors for pressure ulcer development  - Assess and document skin integrity  - Assess and document dressing/incision, wound bed, drain sites and surrounding tissue  - Implement  wound care per orders  - Initiate isolation precautions as appropriate  - Initiate Pressure Ulcer prevention bundle as indicated  Outcome: Progressing  Goal: Oral mucous membranes remain intact  Description: INTERVENTIONS  - Assess oral mucosa and hygiene practices  - Implement preventative oral hygiene regimen  - Implement oral medicated treatments as ordered  Outcome: Progressing     Problem: NEUROLOGICAL - ADULT  Goal: Absence of seizures  Description: INTERVENTIONS  - Monitor for seizure activity  - Administer anti-seizure medications as ordered  - Monitor neurological status  Outcome: Progressing     Problem: SAFETY ADULT - FALL  Goal: Free from fall injury  Description: INTERVENTIONS:  - Assess pt frequently for physical needs  - Identify cognitive and physical deficits and behaviors that affect risk of falls.  - [place] fall precautions as indicated by assessment.  - Educate pt/family on patient safety including physical limitations  - Instruct pt to call for assistance with activity based on assessment  - Modify environment to reduce risk of injury  - Provide assistive devices as appropriate  - Consider OT/PT consult to assist with strengthening/mobility  - Encourage toileting schedule  Outcome: Progressing     Problem: SKIN/TISSUE INTEGRITY - ADULT  Goal: Skin integrity remains intact  Description: INTERVENTIONS  - Assess and document risk factors for pressure ulcer development  - Assess and document skin integrity  - Monitor for areas of redness and/or skin breakdown  - Initiate interventions, skin care algorithm/standards of care as needed  Outcome: Not Progressing

## 2024-08-28 NOTE — PLAN OF CARE
Problem: SKIN/TISSUE INTEGRITY - ADULT  Goal: Incision(s), wounds(s) or drain site(s) healing without S/S of infection  Description: INTERVENTIONS:  - Assess and document risk factors for pressure ulcer development  - Assess and document skin integrity  - Assess and document dressing/incision, wound bed, drain sites and surrounding tissue  - Implement wound care per orders  - Initiate isolation precautions as appropriate  - Initiate Pressure Ulcer prevention bundle as indicated  Outcome: Progressing     Problem: NEUROLOGICAL - ADULT  Goal: Absence of seizures  Description: INTERVENTIONS  - Monitor for seizure activity  - Administer anti-seizure medications as ordered  - Monitor neurological status  Outcome: Progressing     Problem: SAFETY ADULT - FALL  Goal: Free from fall injury  Description: INTERVENTIONS:  - Assess pt frequently for physical needs  - Identify cognitive and physical deficits and behaviors that affect risk of falls.  - [place] fall precautions as indicated by assessment.  - Educate pt/family on patient safety including physical limitations  - Instruct pt to call for assistance with activity based on assessment  - Modify environment to reduce risk of injury  - Provide assistive devices as appropriate  - Consider OT/PT consult to assist with strengthening/mobility  - Encourage toileting schedule  Outcome: Progressing     Went to CT scan this am, vital signs stable on return to unit.   Patient on room air and remote telemetry monitoring. Tolerating regular diet and thin liquids, requires 1:1 assistance with meals.   Wound debridement of sacral wound done by surgical PA at bedside, dry dressing placed post debridement. Patient's sons updated on plan of care at bedside.   Safety precautions in place, frequent nursing rounding completed, call light within reach. All questions answered and needs met.

## 2024-08-28 NOTE — PROGRESS NOTES
[facility]  part of [facility]    Progress Note    Yoseph Cordero Patient Status:  Inpatient    1950 MRN [id number]   Location [facility]5W Attending Grisel Zambrano,*   Hosp Day # 4 PCP Irving Sheets DO     Chief Complaint: smiling non verbal    Subjective:     Unable to perform ROS      Objective:   Blood pressure 118/56, pulse 82, temperature 98.2 °F (36.8 °C), temperature source Axillary, resp. rate 17, height 5' 7\" (1.702 m), weight 138 lb 3.2 oz (62.7 kg), SpO2 99%.  Physical Exam  Vitals and nursing note reviewed.   Constitutional:       Appearance: He is ill-appearing.   HENT:      Head: Normocephalic and atraumatic.   Cardiovascular:      Rate and Rhythm: Normal rate.      Pulses: Normal pulses.      Heart sounds: Murmur heard.   Pulmonary:      Effort: Pulmonary effort is normal.      Breath sounds: Normal breath sounds.   Abdominal:      General: Bowel sounds are normal.      Palpations: Abdomen is soft.   Genitourinary:     Comments: Some rectal tone no stool felt  Skin:     General: Skin is warm and dry.      Capillary Refill: Capillary refill takes less than 2 seconds.   Neurological:      Mental Status: He is alert. Mental status is at baseline.   Psychiatric:         Behavior: Behavior normal.         Judgment: Judgment normal.         Results:   Lab Results   Component Value Date    WBC 15.6 (H) 2024    HGB 9.3 (L) 2024    HCT 29.5 (L) 2024    .0 2024    CREATSERUM 0.50 (L) 2024    BUN 19 2024     2024    K 4.1 2024     2024    CO2 28.0 2024    GLU 99 2024    CA 9.3 2024    ALB 3.3 2024    ALKPHO 58 2024    BILT 0.2 2024    TP 5.9 2024    AST 14 2024    ALT 17 2024    PTT 31.5 2024    INR 1.31 (H) 2024    TSH 4.131 2024    PSA 16.20 (H) 2019    DDIMER 1.87 (H) 2024    ESRML 54 (H) 2024     CRP 7.60 (H) 08/23/2024    MG 1.9 08/26/2024    TROPHS 7 05/07/2024       No results found.        Assessment & Plan:   Decubitus ulcer most likely infected - apprec surg consult. Plan for I and D and bedside . Cont iv abx with zosyn. Await final cxs - ecoli, enterococcus, proteus. ID consult - discussed with Renate. WBC 17-15-22  S/p debridement pod#1  done 8/27  History of chronic wounds  Leukocytosis- repeat in am   Rule out sepsis - lactic acid 2.3 improving   History of dementia -stable   History of seizures - keppra  History of factor V Leyden deficiency- xarelto  History of PE in May 2024 - ct reviewed with [facility] lower lobe pe. Hold for procedure   On chronic anticoagulation   10. Non obs nephrolithiasis  Quality:  DVT Prophylaxis: On Xarelto  CODE status: Full   Chandra: None needed     Complex mdm; coordinating care with nurse and counseling pts daughter about constip/kidney stone/decub          STARR RADFORD MD

## 2024-08-28 NOTE — PLAN OF CARE
Pt is *** and ambulating well. Vitals signs stable. Remote tele with continuous O2. Pain controlled with meds. Frequent checks on rounds. Call light within reach, bed at lowest position and bed alarm in place. Non skid socks on.        Problem: Patient/Family Goals  Goal: Patient/Family Long Term Goal  Description: Patient's Long Term Goal: decrease infection risks    Interventions:  - Monitor vitals  - Monitor appropriate labs  - Administer medications as ordered  - Follow MD's orders  - Update patient on plan of care   - Discharge planning     - See additional Care Plan goals for specific interventions  Outcome: Progressing  Goal: Patient/Family Short Term Goal  Description: Patient's Short Term Goal: discharge home    Interventions:   - assess wounds,  -reposition frequently  -monitor skin  -pain control  - See additional Care Plan goals for specific interventions  Outcome: Progressing     Problem: SKIN/TISSUE INTEGRITY - ADULT  Goal: Skin integrity remains intact  Description: INTERVENTIONS  - Assess and document risk factors for pressure ulcer development  - Assess and document skin integrity  - Monitor for areas of redness and/or skin breakdown  - Initiate interventions, skin care algorithm/standards of care as needed  Outcome: Progressing  Goal: Incision(s), wounds(s) or drain site(s) healing without S/S of infection  Description: INTERVENTIONS:  - Assess and document risk factors for pressure ulcer development  - Assess and document skin integrity  - Assess and document dressing/incision, wound bed, drain sites and surrounding tissue  - Implement wound care per orders  - Initiate isolation precautions as appropriate  - Initiate Pressure Ulcer prevention bundle as indicated  Outcome: Progressing  Goal: Oral mucous membranes remain intact  Description: INTERVENTIONS  - Assess oral mucosa and hygiene practices  - Implement preventative oral hygiene regimen  - Implement oral medicated treatments as  ordered  Outcome: Progressing     Problem: NEUROLOGICAL - ADULT  Goal: Absence of seizures  Description: INTERVENTIONS  - Monitor for seizure activity  - Administer anti-seizure medications as ordered  - Monitor neurological status  Outcome: Progressing     Problem: SAFETY ADULT - FALL  Goal: Free from fall injury  Description: INTERVENTIONS:  - Assess pt frequently for physical needs  - Identify cognitive and physical deficits and behaviors that affect risk of falls.  - [place] fall precautions as indicated by assessment.  - Educate pt/family on patient safety including physical limitations  - Instruct pt to call for assistance with activity based on assessment  - Modify environment to reduce risk of injury  - Provide assistive devices as appropriate  - Consider OT/PT consult to assist with strengthening/mobility  - Encourage toileting schedule  Outcome: Progressing     Problem: NEUROLOGICAL - ADULT  Goal: Absence of seizures  Description: INTERVENTIONS  - Monitor for seizure activity  - Administer anti-seizure medications as ordered  - Monitor neurological status  Outcome: Progressing     Problem: SAFETY ADULT - FALL  Goal: Free from fall injury  Description: INTERVENTIONS:  - Assess pt frequently for physical needs  - Identify cognitive and physical deficits and behaviors that affect risk of falls.  - [place] fall precautions as indicated by assessment.  - Educate pt/family on patient safety including physical limitations  - Instruct pt to call for assistance with activity based on assessment  - Modify environment to reduce risk of injury  - Provide assistive devices as appropriate  - Consider OT/PT consult to assist with strengthening/mobility  - Encourage toileting schedule  Outcome: Progressing

## 2024-08-28 NOTE — PROGRESS NOTES
INFECTIOUS DISEASE PROGRESS NOTE  [facility]  part of [facility] ID PROGRESS NOTE    Yoseph Cordero Patient Status:  Inpatient    1950 MRN [id number]   Location [facility]5W Attending Ric Evans MD   Hosp Day # 4 PCP Irving Sheets, DO     Subjective:  ROS unable to be reviewed. No family currently at bedside.    ASSESSMENT:    Antibiotics: OFF  Zosyn, cefepime     # Acute fever with leukocytosis ?reactive to constipation  # Sacral/L hip/BLE ankle/heel wounds                -  S/p L hip/sacral bedside debridement x2 2024               -  S/p R heel beside I&D  w/probe to bone, amputation refused by family   -  Sacral wound with E. Coli, Proteus, Enterococcus  # Macular rash ?drug rash to zosyn  # Hx R heel OM  # Hx Proteus BSI 2/2 infected wounds, s/p 4wk zosyn, dc 24  # Dementia, CVA- mostly bedbound  # Hx PE     PLAN:  -  Continue to monitor off abx.  -  Local wound care.  -  GOC discussions.  -  Follow fever curve, wbc.  -  Reviewed labs, micro, imaging reports, available old records.  -  D/w RN.     History of Present Illness:  74 year old male with a history of CVA, PE, Factor V Leiden, dementia, mostly bedbound      Pt was recently admitted here at [facility] in July with worsening wounds. Previous R foot XR on 24 with R foot OM. Found with Proteus BSI from infected wounds. Sacrum debrided x2 at bedside down to bone. R heel with exposed bone s/p I&D 24. Amp recommended. DC on 4wk iv zosyn through 24.Last seen by wound care APN on  with reports of foul smell of sacral wound. Wound orders adjusted. Sacral wound cx sent. Labs sent with SED 54, CRP 7.6. WBC 15.8 and came to ED for further eval. On admit, Tm 99.4. wbc 14.9. LA 1.6. PCT 0.10. Bcx sent. Started on zosyn. ID consulted.     Physical Exam:  /56 (BP Location: Right arm)   Pulse 82   Temp 98.2 °F (36.8 °C) (Axillary)   Resp 17   Ht 5' 7\" (1.702 m)   Wt 138 lb 3.2 oz  (62.7 kg)   SpO2 99%   BMI 21.65 kg/m²     Gen:   Eyes open, in bed  HEENT:  EOMI, neck supple  CV/lungs:  RRR, CTAB  Abdom:  Soft, NT/ND, +BS  Skin/extrem:  Wound pictures reviewed, rash resolved  Lines:  PIV+    Laboratory Data: Reviewed    Microbiology: Reviewed    Radiology: Reviewed      [facility]  [phone]  8/28/2024

## 2024-08-29 ENCOUNTER — APPOINTMENT (OUTPATIENT)
Dept: GENERAL RADIOLOGY | Facility: HOSPITAL | Age: 74
End: 2024-08-29
Attending: HOSPITALIST
Payer: MEDICARE

## 2024-08-29 ENCOUNTER — APPOINTMENT (OUTPATIENT)
Dept: ULTRASOUND IMAGING | Facility: HOSPITAL | Age: 74
End: 2024-08-29
Attending: HOSPITALIST
Payer: MEDICARE

## 2024-08-29 LAB
ANION GAP SERPL CALC-SCNC: 6 MMOL/L (ref 0–18)
BASOPHILS # BLD AUTO: 0.09 X10(3) UL (ref 0–0.2)
BASOPHILS NFR BLD AUTO: 0.5 %
BUN BLD-MCNC: 22 MG/DL (ref 9–23)
BUN/CREAT SERPL: 41.5 (ref 10–20)
CALCIUM BLD-MCNC: 9.5 MG/DL (ref 8.7–10.4)
CHLORIDE SERPL-SCNC: 107 MMOL/L (ref 98–112)
CO2 SERPL-SCNC: 29 MMOL/L (ref 21–32)
CREAT BLD-MCNC: 0.53 MG/DL
DEPRECATED RDW RBC AUTO: 56.5 FL (ref 35.1–46.3)
EGFRCR SERPLBLD CKD-EPI 2021: 106 ML/MIN/1.73M2 (ref 60–?)
EOSINOPHIL # BLD AUTO: 1.65 X10(3) UL (ref 0–0.7)
EOSINOPHIL NFR BLD AUTO: 10 %
ERYTHROCYTE [DISTWIDTH] IN BLOOD BY AUTOMATED COUNT: 17.7 % (ref 11–15)
GLUCOSE BLD-MCNC: 113 MG/DL (ref 70–99)
HCT VFR BLD AUTO: 29.9 %
HGB BLD-MCNC: 8.9 G/DL
IMM GRANULOCYTES # BLD AUTO: 0.29 X10(3) UL (ref 0–1)
IMM GRANULOCYTES NFR BLD: 1.7 %
LYMPHOCYTES # BLD AUTO: 2.47 X10(3) UL (ref 1–4)
LYMPHOCYTES NFR BLD AUTO: 14.9 %
MAGNESIUM SERPL-MCNC: 1.7 MG/DL (ref 1.6–2.6)
MCH RBC QN AUTO: 26.2 PG (ref 26–34)
MCHC RBC AUTO-ENTMCNC: 29.8 G/DL (ref 31–37)
MCV RBC AUTO: 87.9 FL
MONOCYTES # BLD AUTO: 1.4 X10(3) UL (ref 0.1–1)
MONOCYTES NFR BLD AUTO: 8.4 %
NEUTROPHILS # BLD AUTO: 10.68 X10 (3) UL (ref 1.5–7.7)
NEUTROPHILS # BLD AUTO: 10.68 X10(3) UL (ref 1.5–7.7)
NEUTROPHILS NFR BLD AUTO: 64.5 %
OSMOLALITY SERPL CALC.SUM OF ELEC: 298 MOSM/KG (ref 275–295)
PHOSPHATE SERPL-MCNC: 2.8 MG/DL (ref 2.4–5.1)
PLATELET # BLD AUTO: 393 10(3)UL (ref 150–450)
POTASSIUM SERPL-SCNC: 3.5 MMOL/L (ref 3.5–5.1)
RBC # BLD AUTO: 3.4 X10(6)UL
SODIUM SERPL-SCNC: 142 MMOL/L (ref 136–145)
WBC # BLD AUTO: 16.6 X10(3) UL (ref 4–11)

## 2024-08-29 PROCEDURE — 76775 US EXAM ABDO BACK WALL LIM: CPT | Performed by: HOSPITALIST

## 2024-08-29 PROCEDURE — 99233 SBSQ HOSP IP/OBS HIGH 50: CPT | Performed by: HOSPITALIST

## 2024-08-29 PROCEDURE — 74018 RADEX ABDOMEN 1 VIEW: CPT | Performed by: HOSPITALIST

## 2024-08-29 RX ORDER — TAMSULOSIN HYDROCHLORIDE 0.4 MG/1
0.4 CAPSULE ORAL
Status: DISCONTINUED | OUTPATIENT
Start: 2024-08-29 | End: 2024-08-30

## 2024-08-29 RX ORDER — LACTULOSE 10 G/15ML
30 SOLUTION ORAL ONCE
Status: COMPLETED | OUTPATIENT
Start: 2024-08-29 | End: 2024-08-29

## 2024-08-29 RX ORDER — MAGNESIUM CARB/ALUMINUM HYDROX 105-160MG
296 TABLET,CHEWABLE ORAL ONCE
Status: COMPLETED | OUTPATIENT
Start: 2024-08-29 | End: 2024-08-29

## 2024-08-29 RX ORDER — MAGNESIUM OXIDE 400 MG/1
400 TABLET ORAL ONCE
Status: COMPLETED | OUTPATIENT
Start: 2024-08-29 | End: 2024-08-29

## 2024-08-29 NOTE — PLAN OF CARE
No family at bedside, daughter was called and updated on plan of care. Pt had US and xray done today. Stool burden lessening. Tap water enema performed with large output, pt tolerated well. Wound care performed. Fluids dc as pt has adequate PO intake.Plan for follow up xray in AM. Frequent rounding by staff      Problem: Patient Centered Care  Goal: Patient preferences are identified and integrated in the patient's plan of care  Description: Interventions:  - What would you like us to know as we care for you? From home with spouse  - Provide timely, complete, and accurate information to patient/family  - Incorporate patient and family knowledge, values, beliefs, and cultural backgrounds into the planning and delivery of care  - Encourage patient/family to participate in care and decision-making at the level they choose  - Honor patient and family perspectives and choices  Outcome: Progressing     Problem: Patient/Family Goals  Goal: Patient/Family Long Term Goal  Description: Patient's Long Term Goal: decrease infection risks    Interventions:  - Monitor vitals  - Monitor appropriate labs  - Administer medications as ordered  - Follow MD's orders  - Update patient on plan of care   - Discharge planning     - See additional Care Plan goals for specific interventions  Outcome: Progressing  Goal: Patient/Family Short Term Goal  Description: Patient's Short Term Goal: discharge home    Interventions:   - assess wounds,  -reposition frequently  -monitor skin  -pain control  - See additional Care Plan goals for specific interventions  Outcome: Progressing     Problem: SKIN/TISSUE INTEGRITY - ADULT  Goal: Skin integrity remains intact  Description: INTERVENTIONS  - Assess and document risk factors for pressure ulcer development  - Assess and document skin integrity  - Monitor for areas of redness and/or skin breakdown  - Initiate interventions, skin care algorithm/standards of care as needed  Outcome: Progressing  Goal:  Incision(s), wounds(s) or drain site(s) healing without S/S of infection  Description: INTERVENTIONS:  - Assess and document risk factors for pressure ulcer development  - Assess and document skin integrity  - Assess and document dressing/incision, wound bed, drain sites and surrounding tissue  - Implement wound care per orders  - Initiate isolation precautions as appropriate  - Initiate Pressure Ulcer prevention bundle as indicated  Outcome: Progressing  Goal: Oral mucous membranes remain intact  Description: INTERVENTIONS  - Assess oral mucosa and hygiene practices  - Implement preventative oral hygiene regimen  - Implement oral medicated treatments as ordered  Outcome: Progressing     Problem: NEUROLOGICAL - ADULT  Goal: Absence of seizures  Description: INTERVENTIONS  - Monitor for seizure activity  - Administer anti-seizure medications as ordered  - Monitor neurological status  Outcome: Progressing     Problem: SAFETY ADULT - FALL  Goal: Free from fall injury  Description: INTERVENTIONS:  - Assess pt frequently for physical needs  - Identify cognitive and physical deficits and behaviors that affect risk of falls.  - [place] fall precautions as indicated by assessment.  - Educate pt/family on patient safety including physical limitations  - Instruct pt to call for assistance with activity based on assessment  - Modify environment to reduce risk of injury  - Provide assistive devices as appropriate  - Consider OT/PT consult to assist with strengthening/mobility  - Encourage toileting schedule  Outcome: Progressing

## 2024-08-29 NOTE — PROGRESS NOTES
[facility]  part of [facility]    Progress Note    oYseph Cordero Patient Status:  Inpatient    1950 MRN [id number]   Location [facility]5W Attending Grisel Zambrano,*   Hosp Day # 5 PCP Irving Sheets DO     Chief Complaint: smiling non verbal    Subjective:     Unable to perform ROS      Objective:   Blood pressure 119/62, pulse 93, temperature 98.4 °F (36.9 °C), temperature source Axillary, resp. rate 17, height 5' 7\" (1.702 m), weight 136 lb 12.8 oz (62.1 kg), SpO2 97%.  Physical Exam  Vitals and nursing note reviewed.   Constitutional:       Appearance: He is ill-appearing.   HENT:      Head: Normocephalic and atraumatic.   Cardiovascular:      Rate and Rhythm: Normal rate.      Pulses: Normal pulses.      Heart sounds: Murmur heard.   Pulmonary:      Effort: Pulmonary effort is normal.      Breath sounds: Normal breath sounds.   Abdominal:      General: Bowel sounds are normal.      Palpations: Abdomen is soft.   Genitourinary:     Comments: Some rectal tone no stool felt  Skin:     General: Skin is warm and dry.      Capillary Refill: Capillary refill takes less than 2 seconds.   Neurological:      Mental Status: He is alert. Mental status is at baseline.   Psychiatric:         Behavior: Behavior normal.         Judgment: Judgment normal.         Results:   Lab Results   Component Value Date    WBC 16.6 (H) 2024    HGB 8.9 (L) 2024    HCT 29.9 (L) 2024    .0 2024    CREATSERUM 0.53 (L) 2024    BUN 22 2024     2024    K 3.5 2024     2024    CO2 29.0 2024     (H) 2024    CA 9.5 2024    ALB 3.3 2024    ALKPHO 58 2024    BILT 0.2 2024    TP 5.9 2024    AST 14 2024    ALT 17 2024    PTT 31.5 2024    INR 1.31 (H) 2024    TSH 4.131 2024    PSA 16.20 (H) 2019    DDIMER 1.87 (H) 2024    ESRML 54 (H)  08/23/2024    CRP 7.60 (H) 08/23/2024    MG 1.7 08/29/2024    PHOS 2.8 08/29/2024    TROPHS 7 05/07/2024       No results found.        Assessment & Plan:   Decubitus ulcer most likely infected - apprec surg consult. Plan for I and D and bedside . Off abxAwait final cxs - ecoli, enterococcus, proteus. ID consult - signed off  S/p debridement pod#2  done 8/27  History of chronic wounds  Leukocytosis- repeat in am   Rule out sepsis - lactic acid 2.3 improving   History of dementia -stable   History of seizures - keppra  History of factor V Leyden deficiency- xarelto  History of PE in May 2024 - ct reviewed with [facility] lower lobe pe. Hold for procedure   On chronic anticoagulation   10. Non obs nephrolithiasis  11. Splenic hemangioma recheck 6 mos us  Quality:  DVT Prophylaxis: On Xarelto  CODE status: Full   Chandra: None needed     Complex mdm; coordinating care with nurse/id/surg and counseling pts daughter about constip/kidney stone/decub          STARR RADFORD MD

## 2024-08-29 NOTE — PLAN OF CARE
POC discussed with wife and son Faheem. Ibrahim inserted per MD orders, pt had large BM overnight, turning patient Q2, sacral dressing changed because soiled. Fall precautions in place. Hourly rounding overnight, pillow placed between legs, ibrahim stat lock on back of R leg. Pt tolerating lactulose. Too administer Q6 per MD. To call and verify any new meds or narcotics with son or daughter before administration.       Problem: Patient Centered Care  Goal: Patient preferences are identified and integrated in the patient's plan of care  Description: Interventions:  - What would you like us to know as we care for you? From home with spouse  - Provide timely, complete, and accurate information to patient/family  - Incorporate patient and family knowledge, values, beliefs, and cultural backgrounds into the planning and delivery of care  - Encourage patient/family to participate in care and decision-making at the level they choose  - Honor patient and family perspectives and choices  Outcome: Progressing     Problem: Patient/Family Goals  Goal: Patient/Family Long Term Goal  Description: Patient's Long Term Goal: decrease infection risks    Interventions:  - Monitor vitals  - Monitor appropriate labs  - Administer medications as ordered  - Follow MD's orders  - Update patient on plan of care   - Discharge planning     - See additional Care Plan goals for specific interventions  Outcome: Progressing  Goal: Patient/Family Short Term Goal  Description: Patient's Short Term Goal: discharge home    Interventions:   - assess wounds,  -reposition frequently  -monitor skin  -pain control  - See additional Care Plan goals for specific interventions  Outcome: Progressing     Problem: SKIN/TISSUE INTEGRITY - ADULT  Goal: Skin integrity remains intact  Description: INTERVENTIONS  - Assess and document risk factors for pressure ulcer development  - Assess and document skin integrity  - Monitor for areas of redness and/or skin breakdown  -  Initiate interventions, skin care algorithm/standards of care as needed  Outcome: Progressing  Goal: Incision(s), wounds(s) or drain site(s) healing without S/S of infection  Description: INTERVENTIONS:  - Assess and document risk factors for pressure ulcer development  - Assess and document skin integrity  - Assess and document dressing/incision, wound bed, drain sites and surrounding tissue  - Implement wound care per orders  - Initiate isolation precautions as appropriate  - Initiate Pressure Ulcer prevention bundle as indicated  Outcome: Progressing  Goal: Oral mucous membranes remain intact  Description: INTERVENTIONS  - Assess oral mucosa and hygiene practices  - Implement preventative oral hygiene regimen  - Implement oral medicated treatments as ordered  Outcome: Progressing     Problem: NEUROLOGICAL - ADULT  Goal: Absence of seizures  Description: INTERVENTIONS  - Monitor for seizure activity  - Administer anti-seizure medications as ordered  - Monitor neurological status  Outcome: Progressing     Problem: SAFETY ADULT - FALL  Goal: Free from fall injury  Description: INTERVENTIONS:  - Assess pt frequently for physical needs  - Identify cognitive and physical deficits and behaviors that affect risk of falls.  - [place] fall precautions as indicated by assessment.  - Educate pt/family on patient safety including physical limitations  - Instruct pt to call for assistance with activity based on assessment  - Modify environment to reduce risk of injury  - Provide assistive devices as appropriate  - Consider OT/PT consult to assist with strengthening/mobility  - Encourage toileting schedule  Outcome: Progressing

## 2024-08-30 ENCOUNTER — APPOINTMENT (OUTPATIENT)
Dept: GENERAL RADIOLOGY | Facility: HOSPITAL | Age: 74
End: 2024-08-30
Attending: HOSPITALIST
Payer: MEDICARE

## 2024-08-30 VITALS
SYSTOLIC BLOOD PRESSURE: 114 MMHG | OXYGEN SATURATION: 96 % | HEART RATE: 106 BPM | WEIGHT: 136.81 LBS | HEIGHT: 67 IN | RESPIRATION RATE: 16 BRPM | DIASTOLIC BLOOD PRESSURE: 59 MMHG | BODY MASS INDEX: 21.47 KG/M2 | TEMPERATURE: 100 F

## 2024-08-30 LAB
ANION GAP SERPL CALC-SCNC: 6 MMOL/L (ref 0–18)
BASOPHILS # BLD AUTO: 0.1 X10(3) UL (ref 0–0.2)
BASOPHILS NFR BLD AUTO: 0.6 %
BUN BLD-MCNC: 19 MG/DL (ref 9–23)
BUN/CREAT SERPL: 37.3 (ref 10–20)
CALCIUM BLD-MCNC: 9.7 MG/DL (ref 8.7–10.4)
CHLORIDE SERPL-SCNC: 106 MMOL/L (ref 98–112)
CO2 SERPL-SCNC: 28 MMOL/L (ref 21–32)
CREAT BLD-MCNC: 0.51 MG/DL
DEPRECATED RDW RBC AUTO: 56.8 FL (ref 35.1–46.3)
EGFRCR SERPLBLD CKD-EPI 2021: 107 ML/MIN/1.73M2 (ref 60–?)
EOSINOPHIL # BLD AUTO: 1.43 X10(3) UL (ref 0–0.7)
EOSINOPHIL NFR BLD AUTO: 8.1 %
ERYTHROCYTE [DISTWIDTH] IN BLOOD BY AUTOMATED COUNT: 18.1 % (ref 11–15)
GLUCOSE BLD-MCNC: 114 MG/DL (ref 70–99)
HCT VFR BLD AUTO: 31.3 %
HGB BLD-MCNC: 9.8 G/DL
IMM GRANULOCYTES # BLD AUTO: 0.44 X10(3) UL (ref 0–1)
IMM GRANULOCYTES NFR BLD: 2.5 %
LYMPHOCYTES # BLD AUTO: 2.63 X10(3) UL (ref 1–4)
LYMPHOCYTES NFR BLD AUTO: 15 %
MAGNESIUM SERPL-MCNC: 1.9 MG/DL (ref 1.6–2.6)
MCH RBC QN AUTO: 27.1 PG (ref 26–34)
MCHC RBC AUTO-ENTMCNC: 31.3 G/DL (ref 31–37)
MCV RBC AUTO: 86.5 FL
MONOCYTES # BLD AUTO: 1.22 X10(3) UL (ref 0.1–1)
MONOCYTES NFR BLD AUTO: 6.9 %
NEUTROPHILS # BLD AUTO: 11.74 X10 (3) UL (ref 1.5–7.7)
NEUTROPHILS # BLD AUTO: 11.74 X10(3) UL (ref 1.5–7.7)
NEUTROPHILS NFR BLD AUTO: 66.9 %
OSMOLALITY SERPL CALC.SUM OF ELEC: 293 MOSM/KG (ref 275–295)
PHOSPHATE SERPL-MCNC: 2.9 MG/DL (ref 2.4–5.1)
PLATELET # BLD AUTO: 429 10(3)UL (ref 150–450)
POTASSIUM SERPL-SCNC: 3.7 MMOL/L (ref 3.5–5.1)
RBC # BLD AUTO: 3.62 X10(6)UL
SODIUM SERPL-SCNC: 140 MMOL/L (ref 136–145)
WBC # BLD AUTO: 17.6 X10(3) UL (ref 4–11)

## 2024-08-30 PROCEDURE — 74018 RADEX ABDOMEN 1 VIEW: CPT | Performed by: HOSPITALIST

## 2024-08-30 PROCEDURE — 99239 HOSP IP/OBS DSCHRG MGMT >30: CPT | Performed by: HOSPITALIST

## 2024-08-30 RX ORDER — POLYETHYLENE GLYCOL 3350 17 G/17G
17 POWDER, FOR SOLUTION ORAL DAILY PRN
Qty: 30 PACKET | Refills: 0 | Status: SHIPPED | OUTPATIENT
Start: 2024-08-30

## 2024-08-30 RX ORDER — HYDROCODONE BITARTRATE AND ACETAMINOPHEN 5; 325 MG/1; MG/1
1 TABLET ORAL EVERY 8 HOURS PRN
Qty: 12 TABLET | Refills: 0 | Status: SHIPPED | OUTPATIENT
Start: 2024-08-30

## 2024-08-30 RX ORDER — BENZONATATE 200 MG/1
200 CAPSULE ORAL 3 TIMES DAILY PRN
Qty: 12 CAPSULE | Refills: 0 | Status: SHIPPED | OUTPATIENT
Start: 2024-08-30

## 2024-08-30 RX ORDER — LACTULOSE 10 G/15ML
20 SOLUTION ORAL EVERY 6 HOURS PRN
Qty: 300 ML | Refills: 0 | Status: SHIPPED | OUTPATIENT
Start: 2024-08-30

## 2024-08-30 RX ORDER — TAMSULOSIN HYDROCHLORIDE 0.4 MG/1
0.4 CAPSULE ORAL DAILY
Qty: 30 CAPSULE | Refills: 0 | Status: SHIPPED | OUTPATIENT
Start: 2024-08-30 | End: 2024-09-29

## 2024-08-30 RX ORDER — CETIRIZINE HYDROCHLORIDE 5 MG/1
5 TABLET ORAL DAILY
Qty: 30 TABLET | Refills: 0 | Status: SHIPPED | OUTPATIENT
Start: 2024-08-31

## 2024-08-30 NOTE — DISCHARGE SUMMARY
Dc summary#[id number]  > 30 min spent on dc    Hospital Discharge Diagnoses: obstipation    Lace+ Score: 85  59-90 High Risk  29-58 Medium Risk  0-28   Low Risk.    TCM Follow-Up Recommendation:  LACE > 58: High Risk of readmission after discharge from the hospital.  Tcm fu recommended

## 2024-08-30 NOTE — PROGRESS NOTES
08/30/24 0745   Wound 08/23/24 1 Heel Right   Date First Assessed: 08/23/24   Present on Original Admission: Yes   Wound Number (Wound Clinic Only): 1  Primary Wound Type: Pressure Injury  Location: Heel  Wound Location Orientation: Right   Wound Image     Site Assessment Moist;Pink;Red   Drainage Amount Scant   Drainage Description Serosanguineous   Treatments Cleansed;Saline;Site Care   Dressing 2x2s;Aquacel Foam;Gauze;Xeroform   Dressing Changed Changed   Dressing Status Clean;Dry;Intact;Dressing Changed   Wound Length (cm) 3 cm   Wound Width (cm) 0.9 cm   Wound Surface Area (cm^2) 2.7 cm^2   Wound Depth (cm) 0.1 cm   Wound Volume (cm^3) 0.27 cm^3   Wound Healing % 99   Margins Well-defined edges   Madhuri-wound Assessment Fragile;Dry;Pink  (healing skin and dry akin)   Wound Granulation Tissue Red   Wound Bed Granulation (%) 100 %   State of Healing Fully granulated   Wound Odor None   Tunneling? No   Undermining? No   Sinus Tracts? No   [place] Scale   (healing stage 4)   Wound 08/23/24 2 Hip Left   Date First Assessed: 08/23/24   Present on Original Admission: Yes   Wound Number (Wound Clinic Only): 2  Primary Wound Type: Pressure Injury  Location: Hip  Wound Location Orientation: Left   Wound Image     Site Assessment Moist;Fragile;Painful;Pink;Red;Yellow   Closure Not approximated   Drainage Amount Small   Drainage Description Yellow;Serous;Green   Treatments Cleansed;Saline;Pharmaceutical agent;Santyl;Site Care;Packings   Dressing 4x4s;Aquacel Foam   Dressing Changed Changed   Dressing Status Clean;Dry;Intact;Dressing Changed   Wound Length (cm) 4.2 cm   Wound Width (cm) 3.3 cm   Wound Surface Area (cm^2) 13.86 cm^2   Wound Depth (cm)   (adherent slough tissue)   Margins Well-defined edges;Epibole (Rolled edges)   Madhuri-wound Assessment Dry;Fragile;Pink   Wound Granulation Tissue Pink   Wound Bed Granulation (%) 25 %   Wound Bed Slough (%) 75 %   State of Healing Non-healing   Wound Odor None   Tunneling? No    Undermining? No   Sinus Tracts? No   Pressure Injury Stage U   Wound 08/23/24 3 Sacrum   Date First Assessed: 08/23/24   Present on Original Admission: Yes   Wound Number (Wound Clinic Only): 3  Primary Wound Type: Pressure Injury  Location: Sacrum   Wound Image    Site Assessment Moist;Fragile;Pink;Painful;Red;Yellow;Tan   Closure Not approximated   Drainage Amount Large   Drainage Description Yellow;Serosanguineous  (stool on the dressing)   Treatments Cleansed;Saline;Santyl;Site Care;Packings   Dressing 4x4s;Gauze;Aquacel Foam   Dressing Changed Changed   Dressing Status Clean;Dry;Intact;Dressing Changed   Wound Length (cm) 8.8 cm   Wound Width (cm) 5.9 cm   Wound Surface Area (cm^2) 51.92 cm^2   Wound Depth (cm) 3 cm   Wound Volume (cm^3) 155.76 cm^3   Wound Healing % -22   Margins Well-defined edges;Not attached   Non-staged Wound Description Full thickness   Madhuri-wound Assessment Fragile;Pink  (close to anus)   Wound Granulation Tissue Red   Wound Bed Granulation (%) 80 %   Wound Bed Slough (%) 20 %   State of Healing Non-healing   Wound Odor None   Exposed Structure Bone   Tunneling? No   Undermining? Yes   Number of Undermines 1   Undermine 1 Start Position 7   Undermine 1 End Position 12  (1.3cm)   Sinus Tracts? No   Pressure Injury Stage 4   Wound 08/23/24 4 Ankle Right;Medial   Date First Assessed: 08/23/24   Present on Original Admission: Yes   Wound Number (Wound Clinic Only): 4  Primary Wound Type: Pressure Injury  Location: Ankle  Wound Location Orientation: Right;Medial   Wound Image    Site Assessment Fragile;Moist;Pink;Tan;Yellow;Painful   Closure Not approximated   Drainage Amount Scant   Drainage Description Mon;Serosanguineous   Treatments Cleansed;Packings;Saline;Site Care;Santyl   Dressing 2x2s;Aquacel Foam;Gauze   Dressing Changed Changed   Dressing Status Clean;Dressing Changed   Wound Length (cm) 1.2 cm   Wound Width (cm) 1.2 cm   Wound Surface Area (cm^2) 1.44 cm^2   Wound Depth (cm)    (aderhent slough)   Margins Poorly defined   Non-staged Wound Description Full thickness   Madhuri-wound Assessment Fragile;Dry;Pink   Wound Bed Eschar (%) 100 %   State of Healing Non-healing   Wound Odor None   Tunneling? No   Undermining? No   Sinus Tracts? No   Pressure Injury Stage U   Wound Follow Up   Follow up needed Yes    Wound Care Services  Follow up on the pt's pressure injuries. Spoke with the nurse,the pt. was medicated for pain. He is awake in bed, smiling, non verbal. See the wound assessments, Right heel is granulating and smaller, left hip is unchanged, 100 % adherent slough tissue, not vac appropriate per vac companies recommendations of 10 % of less non viable tissue in the wound bed. Right medial ankle unchanged. Sacrum s/p bedside debridement by [employer] KULKARNI 8/27/24. Adherent nonviable tissue is , noted stool on the dressing. Also not appropriate for a vac due the proximity of the anus and dressings have been soiled. The pt. is followed in the wound clinic, other dressings that have tried are Medi honey and 1/4 Dakins per notes and photos. The pt. was repositioned in bed, pillow is on the right side. Recommendation to CPM, the pt. has an air pump on the mattress. Call light in reach.

## 2024-08-30 NOTE — DISCHARGE INSTRUCTIONS
Brandon glass voiding trial  Goes with [facility]  Hhc/pt/ot  Cbc, bmp, mag, phos keeleyes to be called to dr cheek     Medication List        START taking these medications      benzonatate 200 MG Caps  Commonly known as: Tessalon  Take 1 capsule (200 mg total) by mouth 3 (three) times daily as needed for cough.     calcium carbonate-vitamin D 250-3.125 MG-MCG Tabs  Commonly known as: Oyster Shell-D  Take 1 tablet by mouth 2 (two) times daily with meals.     cetirizine 5 MG Tabs  Commonly known as: ZyrTEC  Take 1 tablet (5 mg total) by mouth daily.  Start taking on: August 31, 2024     HYDROcodone-acetaminophen 5-325 MG Tabs  Commonly known as: Norco  Take 1 tablet by mouth every 8 (eight) hours as needed for Pain. Watch drowsiness no alcohol     lactulose 10 GM/15ML Soln  Commonly known as: CHRONULAC  Take 30 mL (20 g total) by mouth every 6 (six) hours as needed (severe).     Polyethylene Glycol 3350 17 g Pack  Commonly known as: MIRALAX  Take 17 g by mouth daily as needed (If no bowel movement in last 24 hours).     Sennosides 17.2 MG Tabs  Take 1 tablet (17.2 mg total) by mouth daily.  Start taking on: August 31, 2024     tamsulosin 0.4 MG Caps  Commonly known as: Flomax  Take 1 capsule (0.4 mg total) by mouth daily.            CONTINUE taking these medications      acetaminophen 500 MG Tabs  Commonly known as: Tylenol Extra Strength     collagenase 250 UNIT/GM Oint  Commonly known as: Santyl     * ferrous sulfate 300 (60 Fe) MG/5ML Soln  Take 5 mL (300 mg total) by mouth daily.     * Ferrous Sulfate 220 (44 Fe) MG/5ML Soln  Take 220 mg by mouth daily.     ABDOULAYE NUTRIVIGOR OR     * levETIRAcetam 100 MG/ML Soln  Commonly known as: Keppra     * levETIRAcetam 100 MG/ML Soln  Commonly known as: Keppra     metoprolol tartrate 25 MG Tabs  Commonly known as: Lopressor  Take 0.5 tablets (12.5 mg total) by mouth 2x Daily(Beta Blocker).     rivaroxaban 20 MG Tabs  Commonly known as: Xarelto  Take 1 tablet (20 mg total) by mouth  daily with food.     Vitamin C 500 MG Tabs  Commonly known as: VITAMIN C           * This list has 4 medication(s) that are the same as other medications prescribed for you. Read the directions carefully, and ask your doctor or other care provider to review them with you.                STOP taking these medications      piperacillin-tazobactam 3.375 (3-0.375) g Solr  Commonly known as: Zosyn     sodium hypochlorite 0.125 % Soln  Commonly known as: Dakin's               Where to Get Your Medications        These medications were sent to [id number] - [place], [place] - [address] [facility], [phone], [phone]  [address], [place] [address]      Phone: [phone]   benzonatate 200 MG Caps  calcium carbonate-vitamin D 250-3.125 MG-MCG Tabs  cetirizine 5 MG Tabs  HYDROcodone-acetaminophen 5-325 MG Tabs  lactulose 10 GM/15ML Soln  Polyethylene Glycol 3350 17 g Pack  Sennosides 17.2 MG Tabs  tamsulosin 0.4 MG Caps        Discharge Instructions for Home Health Wound changes:  May start the vac on the left hip at home.   If the sacrum, right heel, right ankle or left hip injuries become odorous please start 1/4 St Dakins and gauze packing's to the wounds 2 times daily and prn if soiled

## 2024-08-30 NOTE — CM/SW NOTE
08/30/24 1503   Discharge disposition   Expected discharge disposition Home-Health   Post Acute Care Provider Residential   Discharge transportation [facility]  (7pm per [place])     SW informed HH provider of pt discharge and requested follow up with pt/family for SOC in the community.     RN to inform family of discharge.    Imani DEY, LSW  Social Work/Case Management

## 2024-08-30 NOTE — PLAN OF CARE
A&Ox0- nonverbal baseline. Pt max assist, voiding via ibrahim- remains in place, tolerating diet, on room air. Frequent rounding by nursing staff. Safety precautions maintained/call light within reach.  Patient discharged to home with family. IV removed, discharge education provided, patient sent with all personal belongings, and discharge instructions. Addressed additional questions.     Problem: Patient Centered Care  Goal: Patient preferences are identified and integrated in the patient's plan of care  Description: Interventions:  - What would you like us to know as we care for you? From home with spouse  - Provide timely, complete, and accurate information to patient/family  - Incorporate patient and family knowledge, values, beliefs, and cultural backgrounds into the planning and delivery of care  - Encourage patient/family to participate in care and decision-making at the level they choose  - Honor patient and family perspectives and choices  8/30/2024 1607 by Divine Slater RN  Outcome: Adequate for Discharge  8/30/2024 1416 by Divine Slater RN  Outcome: Progressing     Problem: Patient/Family Goals  Goal: Patient/Family Long Term Goal  Description: Patient's Long Term Goal: decrease infection risks    Interventions:  - Monitor vitals  - Monitor appropriate labs  - Administer medications as ordered  - Follow MD's orders  - Update patient on plan of care   - Discharge planning     - See additional Care Plan goals for specific interventions  8/30/2024 1607 by Divine Slater RN  Outcome: Adequate for Discharge  8/30/2024 1416 by Divine Slater RN  Outcome: Progressing  Goal: Patient/Family Short Term Goal  Description: Patient's Short Term Goal: discharge home    Interventions:   - assess wounds,  -reposition frequently  -monitor skin  -pain control  - See additional Care Plan goals for specific interventions  8/30/2024 1607 by Divine Slater RN  Outcome: Adequate for Discharge  8/30/2024 1416 by  Divine Slater RN  Outcome: Progressing     Problem: SKIN/TISSUE INTEGRITY - ADULT  Goal: Skin integrity remains intact  Description: INTERVENTIONS  - Assess and document risk factors for pressure ulcer development  - Assess and document skin integrity  - Monitor for areas of redness and/or skin breakdown  - Initiate interventions, skin care algorithm/standards of care as needed  8/30/2024 1607 by Divine Slater RN  Outcome: Adequate for Discharge  8/30/2024 1416 by Divine Slater RN  Outcome: Progressing  Goal: Incision(s), wounds(s) or drain site(s) healing without S/S of infection  Description: INTERVENTIONS:  - Assess and document risk factors for pressure ulcer development  - Assess and document skin integrity  - Assess and document dressing/incision, wound bed, drain sites and surrounding tissue  - Implement wound care per orders  - Initiate isolation precautions as appropriate  - Initiate Pressure Ulcer prevention bundle as indicated  8/30/2024 1607 by Divine Slater RN  Outcome: Adequate for Discharge  8/30/2024 1416 by Divine Slater RN  Outcome: Progressing  Goal: Oral mucous membranes remain intact  Description: INTERVENTIONS  - Assess oral mucosa and hygiene practices  - Implement preventative oral hygiene regimen  - Implement oral medicated treatments as ordered  8/30/2024 1607 by Divine Slater RN  Outcome: Adequate for Discharge  8/30/2024 1416 by Divine Slater RN  Outcome: Progressing     Problem: NEUROLOGICAL - ADULT  Goal: Absence of seizures  Description: INTERVENTIONS  - Monitor for seizure activity  - Administer anti-seizure medications as ordered  - Monitor neurological status  8/30/2024 1607 by Divine Slater RN  Outcome: Adequate for Discharge  8/30/2024 1416 by Divine Slater RN  Outcome: Progressing     Problem: SAFETY ADULT - FALL  Goal: Free from fall injury  Description: INTERVENTIONS:  - Assess pt frequently for physical needs  - Identify cognitive and  physical deficits and behaviors that affect risk of falls.  - [place] fall precautions as indicated by assessment.  - Educate pt/family on patient safety including physical limitations  - Instruct pt to call for assistance with activity based on assessment  - Modify environment to reduce risk of injury  - Provide assistive devices as appropriate  - Consider OT/PT consult to assist with strengthening/mobility  - Encourage toileting schedule  8/30/2024 1607 by Divine Slater, RN  Outcome: Adequate for Discharge  8/30/2024 1416 by Divine Slater, RN  Outcome: Progressing

## 2024-08-30 NOTE — DIETARY NOTE
ADULT NUTRITION REASSESSMENT    Pt is at high nutrition risk r/t Stage 4 PI.  Pt meets moderate malnutrition criteria.      CRITERIA FOR MALNUTRITION DIAGNOSIS:  Criteria for non-severe malnutrition diagnosis: chronic illness related to body fat mild depletion and muscle mass mild depletion.    RECOMMENDATIONS TO MD: See Nutrition Intervention for oral nutritional supplement (ONS) specifics     ADMITTING DIAGNOSIS:  Sacral decubitus ulcer, stage IV (HCC) [L89.154]  Leukocytosis, unspecified type [D72.829]  PERTINENT PAST MEDICAL HISTORY:   Past Medical History:    Anxiety state, unspecified    CVA (cerebral infarction)    Dementia (HCC)    Depression    Heterozygous factor V Leiden mutation (HCC)    Other and unspecified hyperlipidemia    Other ill-defined conditions(799.89)    Cardiomegaly Pleural effusion w/idopathic pleuropuricarditis    Pulmonary embolism (HCC)    Stroke (HCC)    Unspecified sleep apnea     PATIENT STATUS: Initial 08/26/24: Pt admit for Leukocytosis. PMH sig for CVA, Dementia, Non-Verbal. Pt assessed due to screening at risk for MD consult/Multiple Wounds/Order Stevan. Met with patient and daughter (Tyra) at bedside today. Reports focusing on making sure patient has adequate nutrition/protein at home. Reports patient has a good oral intake/appetite. Provides Pro-Heal (liquid protein) 1 ounce daily + Ensure daily + Stevan mixed with H2O BID. Reviewed sources of protein. Discussed estimated daily protein needs. Encouraged current efforts.      Update 08/30/24: RA completed per protocol. Chart reviewed, CT A/P with significant stool burden with distention of rectum completed 8/27. Wound debridement of sacral wound completed at bedside 8/27. Discussion with RN, good intake, drinking ONS. Intakes reviewed, % x 8 meals since last visit (good). CPM    FOOD/NUTRITION RELATED HISTORY:  Appetite: Good  Intake: ~% x8 meals documented since last visit + %  x 2 ONS per flowsheet  documentation  Intake Meeting Needs: Yes, and oral nutrition supplements (ONS) to maximize  Percent Meals Eaten (last 6 days)       Date/Time Percent Meals Eaten (%)    08/24/24 1451 55 %    08/24/24 1905 90 %    08/25/24 1029 100 %    08/25/24 1857 75 %    08/26/24 0900 100 %    08/26/24 1603 100 %    08/27/24 1144 100 %    08/27/24 2149 85 %     Percent Meals Eaten (%): dinner at 08/27/24 2149    08/28/24 1208 100 %    08/28/24 1704 75 %    08/29/24 1011 100 %    08/30/24 1114 100 %          Food Allergies: No Known Food Allergies (NKFA)  Cultural/Ethnic/Worship Preferences: None    GASTROINTESTINAL: +BM 8/30/2024 - medium;loose and constipation  Missing Teeth    MEDICATIONS: reviewed IVF stopped 8/29  Electrolyte replacement per protocol (non-cardiac)   tamsulosin  0.4 mg Oral Daily @ 0700    sennosides  17.2 mg Oral Daily    ascorbic acid  500 mg Oral Daily    collagenase   Topical Daily    cetirizine  5 mg Oral Daily    levETIRAcetam  250 mg Oral QAM    levETIRAcetam  500 mg Oral Nightly    metoprolol tartrate  12.5 mg Oral 2x Daily(Beta Blocker)    rivaroxaban  20 mg Oral Daily with food    calcium carbonate-vitamin D  1 tablet Oral BID with meals    ferrous sulfate  300 mg Oral Daily     LABS: reviewed  Recent Labs     08/28/24  0542 08/29/24  0434 08/30/24  0531   GLU 99 113* 114*   BUN 19 22 19   CREATSERUM 0.50* 0.53* 0.51*   CA 9.3 9.5 9.7   MG  --  1.7 1.9    142 140   K 4.1 3.5 3.7    107 106   CO2 28.0 29.0 28.0   PHOS  --  2.8 2.9   OSMOCALC 290 298* 293     NUTRITION RELATED PHYSICAL FINDINGS:  - Nutrition Focused Physical Exam (NFPE): mild depletion body fat orbital region and mild depletion muscle mass temple region, dorsal soto region, and thigh region. Difficult to assess.   Pt noted to be bed-bound at baseline  - Fluid Accumulation: none  see RN documentation for details  - Skin Integrity: Pressure Injury: Stage 4 on right heel (healing) and sacrum and unstageable on left hip and  right;medial ankle and at risk Multiple Wounds. See RN documentation for details.    ANTHROPOMETRICS:  HT: 170.2 cm (5' 7\")  WT: 62.1 kg (136 lb 12.8 oz)   Last Visit Wt: 136# 2 oz on 8/26/24  Admit wt: 127# 11 oz on 8/24/24  BMI: Body mass index is 21.43 kg/m².  BMI CLASSIFICATION: <22 considered underweight for advanced age  IBW: 148 lbs        92% IBW  Usual Body Wt: 130-135 lbs      101-105% UBW    WEIGHT HISTORY:  Patient Weight(s) for the past 336 hrs:   Weight   08/29/24 0540 62.1 kg (136 lb 12.8 oz)   08/28/24 0611 62.7 kg (138 lb 3.2 oz)   08/27/24 0526 62.2 kg (137 lb 1.6 oz)   08/26/24 0406 61.7 kg (136 lb 1.6 oz)   08/24/24 0202 57.9 kg (127 lb 11.2 oz)   08/24/24 0126 61.2 kg (135 lb)     Wt Readings from Last 10 Encounters:   08/29/24 62.1 kg (136 lb 12.8 oz)   07/23/24 61.2 kg (135 lb)   07/17/24 60.7 kg (133 lb 12.8 oz)   07/09/24 61.6 kg (135 lb 11.2 oz)   06/03/24 61.7 kg (136 lb)   05/10/24 61.9 kg (136 lb 8 oz)   05/07/24 59.4 kg (131 lb)   05/05/24 59.5 kg (131 lb 3.2 oz)   05/02/24 59 kg (130 lb)   01/04/24 59.9 kg (132 lb)     NUTRITION DIAGNOSIS/PROBLEM:   Increased nutrient needs related to Increased demand for nutrient calories/protein as evidenced by Multiple Pressure Injury/Wounds.     NUTRITION DIAGNOSIS PROGRESS:  Improvement (unresolved) - good intake + ONS    NUTRITION INTERVENTION:   NUTRITION PRESCRIPTION:   Estimated Nutrition needs: --dosing wt of 61 kg - wt taken on 8/26/24  Calories: 6527-3024 calories/day (30-35 calories per kg Dosing wt)  Protein: 79-92 g protein/day (1.3-1.5 g protein/kg Dosing wt)    - Diet:       Procedures    Regular/General diet Is Patient on Accuchecks? No      - RD Malnutrition Care Plan: Initiated ONS (oral nutritional supplements)  - Meals and snacks: Encouraged adequate PO intake  - Medical Food Supplements-RD continued Ensure Enlive (350 calories/ 20 g protein each) BID Vanilla, Chocolate, or Strawberry. Rational/use of oral supplements  discussed.  Provide Ensure Enlive vanilla mixed with Stevan daily in am.   Provide Ensure Enlive chocolate or strawberry at lunch.   Provide Stevan mixed with water at dinner.   - Vitamin and mineral supplements: iron, vitamin C, and vitamin D/MD Calles also provides additional vitamins.   - Feeding assistance: feed by staff - Family assisting as well  - Nutrition education:  Reviewed importance of increased calorie/protein needs + adequate fluid intake for wound healing process. Noted RD discussed at prior hospital admission recently in July 2024.     - Coordination of nutrition care: collaboration with other providers  - Discharge and transfer of nutrition care to new setting or provider: monitor plans    MONITOR AND EVALUATE/NUTRITION GOALS:  - Food and Nutrient Intake:      Monitor: adequacy of PO intake and adequacy of supplement intake  - Food and Nutrient Administration:      Monitor: N/A  - Anthropometric Measurement:    Monitor weight  - Nutrition Goals:      maintain wt within 5%, PO and supplement greater than 75% of needs, good supplement intake, labs within acceptable limits, euglycemia, minimize lean body mass loss, support wound healing, and improved GI status    DIETITIAN FOLLOW UP: RD to follow and monitor nutrition status    Loulou Sena MS, ROBINSON, RDN, LDN  Clinical Dietitian  P: [phone]

## 2024-08-30 NOTE — PROGRESS NOTES
INFECTIOUS DISEASE PROGRESS NOTE  [facility]  part of [facility] ID PROGRESS NOTE    Yoseph Cordero Patient Status:  Inpatient    1950 MRN [id number]   Location [facility]5W Attending Ric Evans MD   Hosp Day # 6 PCP Irving Sheets, DO     Subjective:  ROS unable to be reviewed. No acute events. Discharge today.    ASSESSMENT:    Antibiotics: OFF  Zosyn, cefepime     # Acute fever with leukocytosis ?reactive to constipation; no clear infectious etiology; partially related to eosinophilia    # Sacral/L hip/BLE ankle/heel wounds                -  S/p L hip/sacral bedside debridement x2 2024               -  S/p R heel beside I&D  w/probe to bone, amputation refused by family   -  Sacral wound with E. Coli, Proteus, Enterococcus  # Macular rash ?drug rash to zosyn  # Hx R heel OM  # Hx Proteus BSI 2/2 infected wounds, s/p 4wk zosyn, dc 24  # Dementia, CVA- mostly bedbound  # Hx PE     PLAN:    -  Continue to monitor off abx.  -  repeat CBC w/diff next week  -  Local wound care.  -  Follow fever curve, wbc.  -  Reviewed labs, micro, imaging reports  -  D/w daughter over phone, Primary     History of Present Illness:  74 year old male with a history of CVA, PE, Factor V Leiden, dementia, mostly bedbound      Pt was recently admitted here at [facility] in July with worsening wounds. Previous R foot XR on 24 with R foot OM. Found with Proteus BSI from infected wounds. Sacrum debrided x2 at bedside down to bone. R heel with exposed bone s/p I&D 24. Amp recommended. DC on 4wk iv zosyn through 24.Last seen by wound care APN on  with reports of foul smell of sacral wound. Wound orders adjusted. Sacral wound cx sent. Labs sent with SED 54, CRP 7.6. WBC 15.8 and came to ED for further eval. On admit, Tm 99.4. wbc 14.9. LA 1.6. PCT 0.10. Bcx sent. Started on zosyn. ID consulted.     Physical Exam:  /68 (BP Location: Right arm)   Pulse 98   Temp  98.7 °F (37.1 °C) (Axillary)   Resp 16   Ht 170.2 cm (5' 7\")   Wt 136 lb 12.8 oz (62.1 kg)   SpO2 96%   BMI 21.43 kg/m²     Gen:   Eyes open, in bed  HEENT:  EOMI, neck supple  Abdom:  Soft, NT/ND, +BS  Skin/extrem:  Wound pictures reviewed  Lines:  PIV+    Laboratory Data: Reviewed    Microbiology: Reviewed    Radiology: Reviewed      MD Mecca Bec[facility]  [phone]  8/28/2024

## 2024-08-30 NOTE — PLAN OF CARE
Pt is alert and nonverbal. Remote Tele. Pt is tolerating his ibrahim cath. Pt had a a couple of large BM. Pt was given sponge bath and pt dressing were changed. Safety precautions in place and increase rounding by staff.      Problem: Patient Centered Care  Goal: Patient preferences are identified and integrated in the patient's plan of care  Description: Interventions:  - What would you like us to know as we care for you? From home with spouse  - Provide timely, complete, and accurate information to patient/family  - Incorporate patient and family knowledge, values, beliefs, and cultural backgrounds into the planning and delivery of care  - Encourage patient/family to participate in care and decision-making at the level they choose  - Honor patient and family perspectives and choices  Outcome: Progressing     Problem: Patient/Family Goals  Goal: Patient/Family Long Term Goal  Description: Patient's Long Term Goal: decrease infection risks    Interventions:  - Monitor vitals  - Monitor appropriate labs  - Administer medications as ordered  - Follow MD's orders  - Update patient on plan of care   - Discharge planning     - See additional Care Plan goals for specific interventions  Outcome: Progressing  Goal: Patient/Family Short Term Goal  Description: Patient's Short Term Goal: discharge home    Interventions:   - assess wounds,  -reposition frequently  -monitor skin  -pain control  - See additional Care Plan goals for specific interventions  Outcome: Progressing     Problem: SKIN/TISSUE INTEGRITY - ADULT  Goal: Skin integrity remains intact  Description: INTERVENTIONS  - Assess and document risk factors for pressure ulcer development  - Assess and document skin integrity  - Monitor for areas of redness and/or skin breakdown  - Initiate interventions, skin care algorithm/standards of care as needed  Outcome: Progressing  Goal: Incision(s), wounds(s) or drain site(s) healing without S/S of infection  Description:  INTERVENTIONS:  - Assess and document risk factors for pressure ulcer development  - Assess and document skin integrity  - Assess and document dressing/incision, wound bed, drain sites and surrounding tissue  - Implement wound care per orders  - Initiate isolation precautions as appropriate  - Initiate Pressure Ulcer prevention bundle as indicated  Outcome: Progressing  Goal: Oral mucous membranes remain intact  Description: INTERVENTIONS  - Assess oral mucosa and hygiene practices  - Implement preventative oral hygiene regimen  - Implement oral medicated treatments as ordered  Outcome: Progressing     Problem: NEUROLOGICAL - ADULT  Goal: Absence of seizures  Description: INTERVENTIONS  - Monitor for seizure activity  - Administer anti-seizure medications as ordered  - Monitor neurological status  Outcome: Progressing     Problem: SAFETY ADULT - FALL  Goal: Free from fall injury  Description: INTERVENTIONS:  - Assess pt frequently for physical needs  - Identify cognitive and physical deficits and behaviors that affect risk of falls.  - [place] fall precautions as indicated by assessment.  - Educate pt/family on patient safety including physical limitations  - Instruct pt to call for assistance with activity based on assessment  - Modify environment to reduce risk of injury  - Provide assistive devices as appropriate  - Consider OT/PT consult to assist with strengthening/mobility  - Encourage toileting schedule  Outcome: Progressing

## 2024-08-31 NOTE — DISCHARGE SUMMARY
[facility]    PATIENT'S NAME: KUSH GODINEZ   ATTENDING PHYSICIAN: Grisel Zambrano MD   PATIENT ACCOUNT#:   [id number]    LOCATION:  [facility]  MEDICAL RECORD #:   [id number]       YOB: 1950  ADMISSION DATE:       08/23/2024      DISCHARGE DATE:  08/30/2024    DISCHARGE SUMMARY      One and a half hours were spent counseling family; coordinating care with Dr. Hernandez, Dr. Sepulveda, wound clinic, nursing here, wound clinic nurse in the outpatient; and otherwise preparing discharge.    HISTORY AND HOSPITAL COURSE:  This is a 74-year-old white male who was not particularly verbal with me, had a history of dementia, PE, previous seizures, who comes in with an elevated white count and a decubitus ulcer.  He is taken care of by his family after having unsatisfactory stays in nursing homes and rehab facilities.  He was admitted to the hospital, and he had a debridement of the decubitus ulcer by General Surgery and Infectious Disease saw him, and his wounds grew various bacteria likely contaminants..  Even though he had a high white count, ID held off antibiotics as the actual white count elevation probably was reactive related to severe constipation.  At one point, a stool ball of 28.5 cm across was noted.  The stool was removed with combination of disimpaction and enemas, and x-ray rechecked today showed it was not gone ,but it was greatly diminished over what had been.  He was eating well and did not seem to have any issues.  One issue he had from the recurrent constipation was retaining urine and retained 606 mL in his bladder which prompted me to place an indwelling Ibrahim catheter.  I spent much time explaining risk of infection with ibrahim vs the benefit of avoiding obstruction and the pain, inconvenience of straight cath 3 times a day for a family already overwhelmed. His decubitus ulcers were not deemed suitable to need a wound VAC by the wound nurse, so I called Ms. eGorge Sampson  with his wound care APN at the clinic and asked her to please leave wound VAC orders and wound care orders in great detail since she disagreed with our wound care assessment.  His family was very unsure of how to take care of him and required repeated counseling often asking the same questions repeatedly including about reasons for the Chandra catheter, why there are no antibiotics, etc.  Dr. Hernandez kindly spoke to them himself directly to reassure them about the elevated white blood count, and I arranged with Dr. Sepulveda for avoiding trial in 1 week and with the wound clinic to ease their discomfort , but I am afraid that was not very successful.  I told them what things to look for to bring him back, but I believe that they may become overwhelmed with his care at home and will bring him back anyway.    PHYSICAL EXAMINATION ON DISCHARGE:    VITAL SIGNS:  Tmax 99.6, pulse 106, respiratory rate 16, blood pressure 114/59, 96%.   LUNGS:  Occasional rhonchi.  HEART:  Normal S1, S2.  No S3.  ABDOMEN:  Soft and nontender.  EXTREMITIES:  Do not seem to have any edema, but he does have decubitus ulcers.    LABORATORY STUDIES:  Please see chart.    ASSESSMENT AND PLAN:    1.   Neutrophilia.  No antibiotics.  Probably reactive because of severe constipation, and patient is colonized by various bacteria, was debrided.  2.   Severe constipation, requiring multiple efforts to disimpact.  Now, his stool appears to be moving, and he is eating very well.    3.   Decubitus ulcers present on admission as per wound clinic and Surgery.  He will follow up with General Surgery and wound clinic to address the decubitus as best they can.  Please note that decubitus ulcers were present on admission.  4.   Retention of the urine.  Will have voiding trial with Dr. Sepulveda.  I texted him directly ,and he said he would set it up and I left Dr. Sepulveda's information to the chart.  5.   Code status is Full Code per records.  6.   He will follow up  with General Surgery and wound clinic to address the decubitus as best they can.  Please note that decubitus ulcers were present on admission.  7.   Dementia.  8.   Significant educational barriers to care.  I will ask Dr. Sheets to intervene if there are any other questions.  I believe I covered every possible angle but I did tell them that I cannot absolutely guarantee he will not have to return to the emergency room.  He is a frail older man with multiple medical problems and no matter how hard we try to keep him at home, he may have to come back, and they are not interested in rehab facilities or nursing homes currently because the patient's son alluded to bad and unsatisfactory experiences.    CONDITION ON DISCHARGE:  Stable as he can be.    CODE STATUS:  Full Code.    ACTIVITY:  As tolerated.    DIET:  As tolerated.     FOLLOWUP:  Going to be complex, that is going to include:  Dr. Sheets in 1 week, Dr. Sepulveda in his office for voiding trial in 1 week, Ms. George Sampson in 1 week or sooner for wound instructions.  CBC, BMP, and magnesium on Tuesday to be called to Dr. Sheets.  Patient being discharged with a Chandra.    DISCHARGE MEDICATIONS:    1.   Tylenol 500 mg every 8 hours as needed.     2.   Collagenase twice a day.  3.   Ferrous sulfate 220 mg daily.  4.   Keppra 250 mg every morning and 500 mg every evening.  5.   Metoprolol 12.5 mg twice a day.  6.   Nutritional supplements daily.  7.   Xarelto 20 mg daily.  8.   Vitamin C 500 mg daily.  9.   Please ask Dr. Sheets to review those iron doses.  10.   Tessalon Perles 200 mg 3 times a day as needed for cough.  11.   Calcium carbonate twice a day with meals.  12.   Cetirizine 5 mg daily.  13.   Norco 5/325 one tablet every 8 hours as needed for severe pain.  Watch drowsiness.  No alcohol.  14.   Lactulose 30 mL every 6 hours as needed for severe constipation.  15.   MiraLAX 17 g daily as needed.  16.   Senna 17.2 mg daily.  17.   Flomax 0.4 mg  daily.    RISK OF READMISSION:  Astronomically high.  TCM followup is recommended.I expect family to become overwhelmed and bring him back in a few days    Dictated By Grisel Zambrano MD  d: 08/30/2024 21:11:17  t: 08/31/2024 02:20:16  Job [id number]  [facility]/    cc: Irving Sheets, DO

## 2024-09-01 ENCOUNTER — TELEPHONE (OUTPATIENT)
Dept: FAMILY MEDICINE CLINIC | Facility: CLINIC | Age: 74
End: 2024-09-01

## 2024-09-01 NOTE — TELEPHONE ENCOUNTER
On call:  Received a page on August 31 from [facility], [place], that home health has resumed for wound care for pressure ulcers    JORY Sheets.

## 2024-09-03 ENCOUNTER — PATIENT OUTREACH (OUTPATIENT)
Dept: CASE MANAGEMENT | Age: 74
End: 2024-09-03

## 2024-09-03 ENCOUNTER — LAB REQUISITION (OUTPATIENT)
Dept: LAB | Facility: HOSPITAL | Age: 74
End: 2024-09-03
Payer: MEDICARE

## 2024-09-03 ENCOUNTER — TELEPHONE (OUTPATIENT)
Dept: SURGERY | Facility: CLINIC | Age: 74
End: 2024-09-03

## 2024-09-03 ENCOUNTER — TELEPHONE (OUTPATIENT)
Dept: FAMILY MEDICINE CLINIC | Facility: CLINIC | Age: 74
End: 2024-09-03

## 2024-09-03 DIAGNOSIS — L89.600: ICD-10-CM

## 2024-09-03 DIAGNOSIS — Z02.9 ENCOUNTERS FOR UNSPECIFIED ADMINISTRATIVE PURPOSE: Primary | ICD-10-CM

## 2024-09-03 DIAGNOSIS — Z01.89 ENCOUNTER FOR OTHER SPECIFIED SPECIAL EXAMINATIONS: ICD-10-CM

## 2024-09-03 LAB
ANION GAP SERPL CALC-SCNC: 7 MMOL/L (ref 0–18)
BASOPHILS # BLD AUTO: 0.1 X10(3) UL (ref 0–0.2)
BASOPHILS NFR BLD AUTO: 0.7 %
BUN BLD-MCNC: 20 MG/DL (ref 9–23)
BUN/CREAT SERPL: 35.7 (ref 10–20)
CALCIUM BLD-MCNC: 9.7 MG/DL (ref 8.7–10.4)
CHLORIDE SERPL-SCNC: 107 MMOL/L (ref 98–112)
CO2 SERPL-SCNC: 29 MMOL/L (ref 21–32)
CREAT BLD-MCNC: 0.56 MG/DL
DEPRECATED RDW RBC AUTO: 57.9 FL (ref 35.1–46.3)
EGFRCR SERPLBLD CKD-EPI 2021: 104 ML/MIN/1.73M2 (ref 60–?)
EOSINOPHIL # BLD AUTO: 1.99 X10(3) UL (ref 0–0.7)
EOSINOPHIL NFR BLD AUTO: 14.8 %
ERYTHROCYTE [DISTWIDTH] IN BLOOD BY AUTOMATED COUNT: 18.1 % (ref 11–15)
FASTING STATUS PATIENT QL REPORTED: YES
GLUCOSE BLD-MCNC: 103 MG/DL (ref 70–99)
HCT VFR BLD AUTO: 33.3 %
HGB BLD-MCNC: 10.3 G/DL
IMM GRANULOCYTES # BLD AUTO: 0.1 X10(3) UL (ref 0–1)
IMM GRANULOCYTES NFR BLD: 0.7 %
LYMPHOCYTES # BLD AUTO: 1.82 X10(3) UL (ref 1–4)
LYMPHOCYTES NFR BLD AUTO: 13.5 %
MAGNESIUM SERPL-MCNC: 2 MG/DL (ref 1.6–2.6)
MCH RBC QN AUTO: 26.9 PG (ref 26–34)
MCHC RBC AUTO-ENTMCNC: 30.9 G/DL (ref 31–37)
MCV RBC AUTO: 86.9 FL
MONOCYTES # BLD AUTO: 1.32 X10(3) UL (ref 0.1–1)
MONOCYTES NFR BLD AUTO: 9.8 %
NEUTROPHILS # BLD AUTO: 8.14 X10 (3) UL (ref 1.5–7.7)
NEUTROPHILS # BLD AUTO: 8.14 X10(3) UL (ref 1.5–7.7)
NEUTROPHILS NFR BLD AUTO: 60.5 %
OSMOLALITY SERPL CALC.SUM OF ELEC: 299 MOSM/KG (ref 275–295)
PHOSPHATE SERPL-MCNC: 3.3 MG/DL (ref 2.4–5.1)
PLATELET # BLD AUTO: 454 10(3)UL (ref 150–450)
POTASSIUM SERPL-SCNC: 3.4 MMOL/L (ref 3.5–5.1)
RBC # BLD AUTO: 3.83 X10(6)UL
SODIUM SERPL-SCNC: 143 MMOL/L (ref 136–145)
WBC # BLD AUTO: 13.5 X10(3) UL (ref 4–11)

## 2024-09-03 PROCEDURE — 80048 BASIC METABOLIC PNL TOTAL CA: CPT | Performed by: INTERNAL MEDICINE

## 2024-09-03 PROCEDURE — 85025 COMPLETE CBC W/AUTO DIFF WBC: CPT | Performed by: INTERNAL MEDICINE

## 2024-09-03 PROCEDURE — 85025 COMPLETE CBC W/AUTO DIFF WBC: CPT

## 2024-09-03 PROCEDURE — 84100 ASSAY OF PHOSPHORUS: CPT | Performed by: INTERNAL MEDICINE

## 2024-09-03 PROCEDURE — 83735 ASSAY OF MAGNESIUM: CPT

## 2024-09-03 PROCEDURE — 80048 BASIC METABOLIC PNL TOTAL CA: CPT

## 2024-09-03 PROCEDURE — 83735 ASSAY OF MAGNESIUM: CPT | Performed by: INTERNAL MEDICINE

## 2024-09-03 PROCEDURE — 84100 ASSAY OF PHOSPHORUS: CPT

## 2024-09-03 PROCEDURE — 1111F DSCHRG MED/CURRENT MED MERGE: CPT

## 2024-09-03 NOTE — PROGRESS NOTES
Transitions of Care Navigation  Discharge Date: 24  Contact Date: 9/3/2024    Transitions of Care Assessment:  HUMA Initial Assessment    General:  Assessment completed with: Family (Dtr Tyra)  Patient Subjective: NCM sopke with pt's daughter Tyra, states patient is doing okay. Pt has some pain per daughter, states he tries to pull on ibrahim cathter. Pt unable to rate pain due to dementia. Tyra states ibrahim catheter is draining well. Pt does not have fevers, chills, nausea, vomiting, shortness of breath, chest pain or any others that daughter is aware. Pt has appointment with wound clinic. Daughter states HH visited on Saturday.  Chief Complaint: Leukocytosis, unspecified type  Verify patient name and  with patient/ caregiver: Yes    Hospital Stay/Discharge:  Tell me what you understand of why you were in the hospital or emergency department: Pt with multiple symtpoms.  Prior to leaving the hospital were your Discharge Instructions reviewed with you?: Yes  Did you receive a copy of your written Discharge Instructions?: Yes  What questions do you have about your Discharge Instructions?: Pt's daughter declined any further questions.  Do you feel better or worse since you left the hospital or emergency department?: Better    Follow - Up Appointment:  Do you have a follow-up appointment?: No  Are there any barriers to getting to your follow-up appointment?: No    Home Health/DME:  Prior to leaving the hospital was Home Health (HH) arranged for you?: Yes  Has HH been out or set up a visit to see you?: Been out     Prior to leaving the hospital or emergency department was Durable Medical Equipment (DME), medical supplies, or infusions arranged for you?: N/A  Are DME/medical supply/infusions needs identified by staff during this assessment?: No     Medications/Diet:  Did any of your medications change, during or after your hospital stay or ED visit?: Yes  Do you have your new or updated medications?: Yes  Do you  understand what your medications are for and possible side effects?: Yes  Are there any reasons that keep you from taking your medication as prescribed?: No  Any concerns about medication refills?: No    Were you given a different diet per your Discharge Instructions?: No  Reason: N/A     Questions/Concerns:  Do you have any questions or concerns that have not been discussed?: No   HUMA Follow-up Assessment    General:  Assessment completed with: Family (Dtr Tyra)  Patient Subjective: NCM sopke with pt's daughter Tyra, states patient is doing okay. Pt has some pain per daughter, states he tries to pull on ibrahim cathter. Pt unable to rate pain due to dementia. Tyra states ibrahim catheter is draining well. Pt does not have fevers, chills, nausea, vomiting, shortness of breath, chest pain or any others that daughter is aware. Pt has appointment with wound clinic. Daughter states HH visited on Saturday.  Chief Complaint: Leukocytosis, unspecified type  Community Resources: Home Health ([facility])        Nursing Interventions:  All discharge instructions reviewed with the patient's daughter Tyra. Reviewed when to call MD vs when to call 911 or go the ED. Educated Tyra on the importance of taking all meds as prescribed as well as close f/u with PCP/specialists. Tyra verbalized understanding and will contact the office with any further questions or concerns. Patient's dtr denies fevers, chills, nausea, vomiting, shortness of breath, chest pain, or any other symptoms at this time.  [facility] attempted to schedule HFU, Tyra declined prefers virtual visit. NC sent TE to PCP office re: assistance in scheduling HFU appt. [facility] provided contact information for any further questions/concerns. Patient's dtr verbalized understanding and agreeable.           Medications:Reviewed medication list.  Medications are up to date.  Current Outpatient Medications   Medication Sig Dispense Refill    benzonatate 200 MG Oral Cap Take 1 capsule (200 mg  total) by mouth 3 (three) times daily as needed for cough. 12 capsule 0    calcium carbonate-vitamin D 250-3.125 MG-MCG Oral Tab Take 1 tablet by mouth 2 (two) times daily with meals. 60 tablet 0    cetirizine 5 MG Oral Tab Take 1 tablet (5 mg total) by mouth daily. 30 tablet 0    HYDROcodone-acetaminophen 5-325 MG Oral Tab Take 1 tablet by mouth every 8 (eight) hours as needed for Pain. Watch drowsiness no alcohol 12 tablet 0    lactulose 10 GM/15ML Oral Solution Take 30 mL (20 g total) by mouth every 6 (six) hours as needed (severe). 300 mL 0    polyethylene glycol, PEG 3350, 17 g Oral Powd Pack Take 17 g by mouth daily as needed (If no bowel movement in last 24 hours). 30 packet 0    sennosides 17.2 MG Oral Tab Take 1 tablet (17.2 mg total) by mouth daily. 30 tablet 0    tamsulosin 0.4 MG Oral Cap Take 1 capsule (0.4 mg total) by mouth daily. 30 capsule 0    Vitamin C 500 MG Oral Tab Take 1 tablet (500 mg total) by mouth daily.      Nutritional Supplements (ABDOULAYE NUTRIVIGOR OR) Take by mouth.      Ferrous Sulfate 220 (44 Fe) MG/5ML Oral Solution Take 220 mg by mouth daily. 473 mL 0    ferrous sulfate 300 (60 Fe) MG/5ML Oral Solution Take 5 mL (300 mg total) by mouth daily. 1 each 0    metoprolol tartrate 25 MG Oral Tab Take 0.5 tablets (12.5 mg total) by mouth 2x Daily(Beta Blocker). 30 tablet 0    levETIRAcetam 100 MG/ML Oral Solution Take 2.5 mL (250 mg total) by mouth every morning.      levETIRAcetam 100 MG/ML Oral Solution Take 5 mL (500 mg total) by mouth at bedtime.      collagenase 250 UNIT/GM External Ointment Apply topically 2 (two) times a day.      acetaminophen 500 MG Oral Tab Take 1 tablet (500 mg total) by mouth every 8 (eight) hours.      rivaroxaban (XARELTO) 20 MG Oral Tab Take 1 tablet (20 mg total) by mouth daily with food. 90 tablet 0         Follow-up Appointments:  Your appointments       Date & Time Appointment Department (Center)    Sep 13, 2024 2:00 PM CDT Wound Care Follow up with  George Sampson APRN [facility] ([facility])              [facility]  [facility]  [address]  [facility] [id number] [phone]            Transitional Care Clinic  Was TCC Ordered: No      Primary Care Provider (If no TCC appointment)  Does patient already have a PCP appointment scheduled? No  Nurse Care Manager Attempted to schedule PCP office TCM/HUMA appointment with patient   -If no appointment scheduled: Explain : pt's dtr prefers virtual visit.     Specialist  Does the patient have any other follow-up appointment(s) need to be scheduled? Yes   -If yes: Nurse Care Manager reviewed upcoming specialist appointments with patient: Yes   -Does the patient need assistance scheduling appointment(s): No    [x]  Patient verbally agrees to additional follow-up calls from Nurse Care Manager    Book By Date: 9/6/24

## 2024-09-03 NOTE — TELEPHONE ENCOUNTER
Pt's daughter called.  Hospital discharge instruction to schedule a voiding trail on 9-6-24. No appointments available.  Pt does have an internal catheter.  Please call.  Caller requested a message sent as urgent.

## 2024-09-03 NOTE — TELEPHONE ENCOUNTER
Spoke to patient's daughter for Transitions of Care call today.  Patient does not have an appointment scheduled at this time.  TCM/HUMA appointment needed by 9/6/24.  Please advise.    BOOK BY DATE: 9/6/24    Clinical staff:  Please follow-up with patient and try to get them to schedule as patient would greatly benefit from TCM/HUMA.  Thank you!       Pt's daughter Tyra prefers virtual visit. Please advise. Thank you.

## 2024-09-04 ENCOUNTER — TELEPHONE (OUTPATIENT)
Dept: FAMILY MEDICINE CLINIC | Facility: CLINIC | Age: 74
End: 2024-09-04

## 2024-09-04 NOTE — PAYOR COMM NOTE
--------------  DISCHARGE REVIEW    Payor: [employer] PENA [facility]  Subscriber #:  [id number]  Authorization Number: [id number]    Admit date: 8/24/24  Admit time:   2:01 AM  Discharge Date: 8/30/2024  7:00 PM     Admitting Physician:   Attending Physician:  Fanny att. providers found  Primary Care Physician: Irving Sheets DO          Discharge Summary Notes        Discharge Summary signed by Grisel Zambrano MD at 8/31/2024  7:48 AM        Author: Grisel Zambrano MD Specialty: HOSPITALIST Author Type: Physician    Filed: 8/31/2024  7:48 AM Status: Signed    : Grisel Zambrano MD (Physician)         DISCHARGE SUMMARY    HERBERT KUSH [id number]   YOB: 1950 [id number]         [facility]    PATIENT'S NAME: HERBERT KUSH   ATTENDING PHYSICIAN: Grisel Zambrano MD   PATIENT ACCOUNT#:   [id number]    LOCATION:  [address] [place] [address]  MEDICAL RECORD #:   [id number]       YOB: 1950  ADMISSION DATE:       08/23/2024      DISCHARGE DATE:  08/30/2024    DISCHARGE SUMMARY      One and a half hours were spent counseling family; coordinating care with Dr. Hernandez, Dr. Sepulveda, wound clinic, nursing here, wound clinic nurse in the outpatient; and otherwise preparing discharge.    HISTORY AND HOSPITAL COURSE:  This is a 74-year-old white male who was not particularly verbal with me, had a history of dementia, PE, previous seizures, who comes in with an elevated white count and a decubitus ulcer.  He is taken care of by his family after having unsatisfactory stays in nursing homes and rehab facilities.  He was admitted to the hospital, and he had a debridement of the decubitus ulcer by General Surgery and Infectious Disease saw him, and his wounds grew various bacteria likely contaminants..  Even though he had a high white count, ID held off antibiotics as the actual white count elevation probably was reactive related to severe constipation.  At one point, a stool ball  of 28.5 cm across was noted.  The stool was removed with combination of disimpaction and enemas, and x-ray rechecked today showed it was not gone ,but it was greatly diminished over what had been.  He was eating well and did not seem to have any issues.  One issue he had from the recurrent constipation was retaining urine and retained 606 mL in his bladder which prompted me to place an indwelling Ibrahim catheter.  I spent much time explaining risk of infection with ibrahim vs the benefit of avoiding obstruction and the pain, inconvenience of straight cath 3 times a day for a family already overwhelmed. His decubitus ulcers were not deemed suitable to need a wound VAC by the wound nurse, so I called Ms. George Sampson with his wound care APN at the clinic and asked her to please leave wound VAC orders and wound care orders in great detail since she disagreed with our wound care assessment.  His family was very unsure of how to take care of him and required repeated counseling often asking the same questions repeatedly including about reasons for the Ibrahim catheter, why there are no antibiotics, etc.  Dr. Hernandez kindly spoke to them himself directly to reassure them about the elevated white blood count, and I arranged with Dr. Sepulveda for avoiding trial in 1 week and with the wound clinic to ease their discomfort , but I am afraid that was not very successful.  I told them what things to look for to bring him back, but I believe that they may become overwhelmed with his care at home and will bring him back anyway.    PHYSICAL EXAMINATION ON DISCHARGE:    VITAL SIGNS:  Tmax 99.6, pulse 106, respiratory rate 16, blood pressure 114/59, 96%.   LUNGS:  Occasional rhonchi.  HEART:  Normal S1, S2.  No S3.  ABDOMEN:  Soft and nontender.  EXTREMITIES:  Do not seem to have any edema, but he does have decubitus ulcers.    LABORATORY STUDIES:  Please see chart.    ASSESSMENT AND PLAN:    1.   Neutrophilia.  No antibiotics.  Probably  reactive because of severe constipation, and patient is colonized by various bacteria, was debrided.  2.   Severe constipation, requiring multiple efforts to disimpact.  Now, his stool appears to be moving, and he is eating very well.    3.   Decubitus ulcers present on admission as per wound clinic and Surgery.  He will follow up with General Surgery and wound clinic to address the decubitus as best they can.  Please note that decubitus ulcers were present on admission.  4.   Retention of the urine.  Will have voiding trial with Dr. Sepulveda.  I texted him directly ,and he said he would set it up and I left Dr. Sepulveda's information to the chart.  5.   Code status is Full Code per records.  6.   He will follow up with General Surgery and wound clinic to address the decubitus as best they can.  Please note that decubitus ulcers were present on admission.  7.   Dementia.  8.   Significant educational barriers to care.  I will ask Dr. Sheets to intervene if there are any other questions.  I believe I covered every possible angle but I did tell them that I cannot absolutely guarantee he will not have to return to the emergency room.  He is a frail older man with multiple medical problems and no matter how hard we try to keep him at home, he may have to come back, and they are not interested in rehab facilities or nursing homes currently because the patient's son alluded to bad and unsatisfactory experiences.    CONDITION ON DISCHARGE:  Stable as he can be.    CODE STATUS:  Full Code.    ACTIVITY:  As tolerated.    DIET:  As tolerated.     FOLLOWUP:  Going to be complex, that is going to include:  Dr. Sheets in 1 week, Dr. Sepulveda in his office for voiding trial in 1 week, Ms. George Sampson in 1 week or sooner for wound instructions.  CBC, BMP, and magnesium on Tuesday to be called to Dr. Sheets.  Patient being discharged with a Chandra.    DISCHARGE MEDICATIONS:    1.   Tylenol 500 mg every 8 hours as needed.     2.    Collagenase twice a day.  3.   Ferrous sulfate 220 mg daily.  4.   Keppra 250 mg every morning and 500 mg every evening.  5.   Metoprolol 12.5 mg twice a day.  6.   Nutritional supplements daily.  7.   Xarelto 20 mg daily.  8.   Vitamin C 500 mg daily.  9.   Please ask Dr. Sheets to review those iron doses.  10.   Tessalon Perles 200 mg 3 times a day as needed for cough.  11.   Calcium carbonate twice a day with meals.  12.   Cetirizine 5 mg daily.  13.   Norco 5/325 one tablet every 8 hours as needed for severe pain.  Watch drowsiness.  No alcohol.  14.   Lactulose 30 mL every 6 hours as needed for severe constipation.  15.   MiraLAX 17 g daily as needed.  16.   Senna 17.2 mg daily.  17.   Flomax 0.4 mg daily.    RISK OF READMISSION:  Astronomically high.  TCM followup is recommended.I expect family to become overwhelmed and bring him back in a few days    Dictated By Grisel Zambrano MD  d: 08/30/2024 21:11:17  t: 08/31/2024 02:20:16  Job [id number]  [facility]/    cc: Irving Sheets DO      Electronically signed by Grisel Zambrano MD on 8/31/2024  7:48 AM         REVIEWER COMMENTS

## 2024-09-05 ENCOUNTER — TELEMEDICINE (OUTPATIENT)
Dept: FAMILY MEDICINE CLINIC | Facility: CLINIC | Age: 74
End: 2024-09-05
Payer: MEDICARE

## 2024-09-05 ENCOUNTER — TELEPHONE (OUTPATIENT)
Dept: FAMILY MEDICINE CLINIC | Facility: CLINIC | Age: 74
End: 2024-09-05

## 2024-09-05 DIAGNOSIS — L89.213 PRESSURE INJURY OF RIGHT HIP, STAGE 3 (HCC): ICD-10-CM

## 2024-09-05 DIAGNOSIS — K59.00 CONSTIPATION, UNSPECIFIED CONSTIPATION TYPE: ICD-10-CM

## 2024-09-05 DIAGNOSIS — L89.154 PRESSURE INJURY OF SACRAL REGION, STAGE 4 (HCC): Primary | ICD-10-CM

## 2024-09-05 DIAGNOSIS — F02.818 LATE ONSET ALZHEIMER'S DISEASE WITH BEHAVIORAL DISTURBANCE (HCC): ICD-10-CM

## 2024-09-05 DIAGNOSIS — G30.1 LATE ONSET ALZHEIMER'S DISEASE WITH BEHAVIORAL DISTURBANCE (HCC): ICD-10-CM

## 2024-09-05 PROCEDURE — 1159F MED LIST DOCD IN RCRD: CPT | Performed by: FAMILY MEDICINE

## 2024-09-05 PROCEDURE — 99213 OFFICE O/P EST LOW 20 MIN: CPT | Performed by: FAMILY MEDICINE

## 2024-09-05 PROCEDURE — 1160F RVW MEDS BY RX/DR IN RCRD: CPT | Performed by: FAMILY MEDICINE

## 2024-09-05 NOTE — PROGRESS NOTES
Patient presents today following up for sacralVIDEO VISIT    Decubitus ulcer and multiple skin ulcers.  Daughter reports that wound care comes 3 times weekly.  She reports that he is having some difficulty with bowel movements not responding to MiraLAX has been almost 3 days without a bowel movement.  She also reports that she is unable to procure the calcium preparation prescription that was sent.    Assessment history of decubitus ulcers with dimension constipation    Plan advised enema and continue treatment by wound care has appointment at wound clinic September 13    Advised calcium 1200 mg daily and vitamin D 600 IU daily until prescription can be secured.    Also advised daughter that she needs to ensure that the wound care being provided is consistent with the wound care ordered in the hospital through the wound clinic.

## 2024-09-06 ENCOUNTER — OFFICE VISIT (OUTPATIENT)
Dept: SURGERY | Facility: CLINIC | Age: 74
End: 2024-09-06

## 2024-09-06 DIAGNOSIS — R33.9 URINARY RETENTION: Primary | ICD-10-CM

## 2024-09-06 PROCEDURE — 99204 OFFICE O/P NEW MOD 45 MIN: CPT | Performed by: UROLOGY

## 2024-09-06 NOTE — PROGRESS NOTES
SUBJECTIVE:  Yoseph Cordero is a 74 year old male who presents for a consultation at the request of, and a copy of this note will be sent to, Irving Avila, for evaluation of  urinary retention. He states that the problem is unchanged. Symptoms include admitted to [facility] last week with an elevated blood white count and constipation with a large stool ball noted on abdominal pelvic imaging.  He underwent a bowel regiment.  Known to have multiple pressure/decubitus ulcers in his heel and his sacrum.  Discharged home August 31, 2024.    During his hospital stay, and while he was constipated he was noted to have a residual on bladder scan of 606 mL.  He has a history of dementia and is normally dependent on adult depends undergarments and a condom catheter at home.  An indwelling Chandra catheter was placed during his hospitalization and he was discharged home with a catheter.  He is currently on tamsulosin 0.4 mg daily which has been taking for a number of years.  I spoke with the patient's son who accompanied him today as well as the patient's daughter over the phone.  He appears to be having bowel movements at home.  He had a large bowel movement yesterday according to the daughter who lives with him.  He remains on as needed MiraLAX and lactulose as needed.  History cannot be obtained from the patient given his dementia history.  He is unable to stand up as he is in a wheelchair.  The son states he spent most of his days in the bed.. He denies any other complaints.    Allergies:   Allergies   Allergen Reactions    Zosyn [Piperacillin-Tazobactam In D5w] RASH     Pt developed pruritic rash on both arms while on this medication       History:  Past Medical History:    Anxiety state, unspecified    CVA (cerebral infarction)    Dementia (HCC)    Depression    Heterozygous factor V Leiden mutation (HCC)    Other and unspecified hyperlipidemia    Other ill-defined conditions(799.85)     Cardiomegaly Pleural effusion w/idopathic pleuropuricarditis    Pulmonary embolism (HCC)    Stroke (HCC)    Unspecified sleep apnea      Past Surgical History:   Procedure Laterality Date    Colonoscopy      Electrocardiogram, complete  1914    Scanned to Media Tab      Family History   Problem Relation Age of Onset    Cancer Father         Lung - Smoker  Cause of death    Other (Other) Mother         during     Diabetes Neg     Glaucoma Neg       Social History:   Social History     Socioeconomic History    Marital status:    Tobacco Use    Smoking status: Former    Smokeless tobacco: Never   Vaping Use    Vaping status: Never Used   Substance and Sexual Activity    Alcohol use: No     Alcohol/week: 0.0 standard drinks of alcohol     Comment: none recently    Drug use: No   Other Topics Concern    Caffeine Concern Yes     Comment: 1 cups coffee daily    Exercise No   Social History Narrative    The patient does not use an assistive device..      The patient does live in a home with stairs.     Social Determinants of Health     Financial Resource Strain: Low Risk  (2024)    Financial Resource Strain     Difficulty of Paying Living Expenses: Not very hard     Med Affordability: No   Food Insecurity: No Food Insecurity (2024)    Food Insecurity     Food Insecurity: Never true   Transportation Needs: No Transportation Needs (2024)    Transportation Needs     Lack of Transportation: No   Housing Stability: Low Risk  (2024)    Housing Stability     Housing Instability: No            REVIEW OF SYSTEMS:  Unable to obtain review of systems because of his dementia    OBJECTIVE:  There were no vitals taken for this visit.  He appears well, in no apparent distress.  Alert and oriented times three, pleasant and cooperative.  CARDIOVASCULAR:normal apical impulse  RESPIRATORY: no chest wall deformities or tenderness  ABDOMEN: abdomen is soft without significant tenderness, masses,  organomegaly or guarding    ASSESSMENT/PLAN  Encounter Diagnosis   Name Primary?    Urinary retention Yes   Recommend:  - We will remove his Chandra catheter at this time as he no longer appears to be constipated.  - Short-term follow-up in 1 week for a nurse visit to check bladder scan for postvoid residual volume.  - Continue on tamsulosin.  If residuals are elevated, consider starting him on dutasteride 0.5 mg daily.    No orders of the defined types were placed in this encounter.      Meds This Visit:  Requested Prescriptions      No prescriptions requested or ordered in this encounter       Imaging & Referrals:  None

## 2024-09-06 NOTE — PROGRESS NOTES
- pt presents for VT in wheelchair, pushed by his son, and accompanied by spouse surya.  Pt was discharged from hospital on 9/3/24 and hospital discharge instructions were to have pt complete VT on 24 (see 9/3 acute TE).    - I introduced myself and verified pt name and .  Pt presents with a wound vac, and I determine that pt is unable to transfer self to exam table.  Pt's son can lift pt on to table if need be.  I explained to son and spouse that the hospital discharge instructions were to do a voiding trial, and I explained the process.  Son then told me that pt would be unable to verbalize a need to urinate, as at home they use a condom catheter for this reason.  Pt is unable to urinate on command.    - I discussed pt with Dr. Sepulveda, who agreed to see pt and determine if VT is appropriate.  Upon completion of exam, Dr. Sepulveda ordered a decath, not a VT, and family would use the condom catheter going forward.  [facility] asks that I schedule a NV for approximately one week from today for a Bladder scan to be sure that pt is emptying is bladder with use of the condom catheter.    - While pt remains in the wheelchair, I deflated the balloon 9 ml of water, and then slowly removed the 16 Fr straight catheter.  Pt tolerated well.    - scheduled pt for NV on Monday,  @ 11am.  - encounter complete

## 2024-09-07 NOTE — TELEPHONE ENCOUNTER
Please review. Protocol Pass    Please advise if refill is appropriate.    Requested Prescriptions   Pending Prescriptions Disp Refills    METOPROLOL TARTRATE 25 MG Oral Tab [Pharmacy Med Name: METOPROLOL TARTRATE 25MG TABLETS] 30 tablet 0     Sig: TAKE 1/2 TABLET BY MOUTH TWICE DAILY       Hypertension Medications Protocol Passed - 9/7/2024 11:15 AM        Passed - CMP or BMP in past 12 months        Passed - Last BP reading less than 140/90     BP Readings from Last 1 Encounters:   08/30/24 114/59               Passed - In person appointment or virtual visit in the past 12 mos or appointment in next 3 mos     Recent Outpatient Visits              Yesterday Urinary retention    [facility] David Sepulveda MD    Office Visit    2 days ago Pressure injury of sacral region, stage 4 ([facility])    [facility] [address], [place] Irving Sheets DO    Telemedicine    2 weeks ago Pressure injury of sacral region, stage 4 ([facility])    [facility] George Sampson APRN    Office Visit    3 weeks ago Pressure injury of sacral region, stage 4 ([facility]    [facility]    4 weeks ago Pressure injury of sacral region, stage 4 ([facility])    [facility] George Sampson, BEAU    Office Visit          Future Appointments         Provider Department Appt Notes    [facility] 4 of 6 visits 7-2-24 to 7-2-25 #[id number]    In 1 week [facility] UROLOGY NURSE [facility] Bladder scan per VALDEMAR, see 9/6 OV note                    Passed - EGFRCR or GFRNAA > 50     GFR Evaluation  EGFRCR: 104 , resulted on 9/3/2024                 Future Appointments         Provider Department Appt Notes    [facility] 4 of 6 visits 7-2-24 to 7-2-25  #[id number]    In 1 week [facility] UROLOGY NURSE [facility] Bladder scan per VALDEMAR, see 9/6 OV note          Recent Outpatient Visits              Yesterday Urinary retention    [facility] David Sepulveda MD    Office Visit    2 days ago Pressure injury of sacral region, stage 4 ([facility]    [facility]    2 weeks ago Pressure injury of sacral region, stage 4 ([facility])    [facility], APRN    Office Visit    3 weeks ago Pressure injury of sacral region, stage 4 ([facility]     [facility]    4 weeks ago Pressure injury of sacral region, stage 4 ([facility])    [facility], George, APRN    Office Visit

## 2024-09-07 NOTE — TELEPHONE ENCOUNTER
Patient's daughter called to follow up on below medication request.     Patient only has until Monday worth of medication.

## 2024-09-09 RX ORDER — METOPROLOL TARTRATE 25 MG/1
12.5 TABLET, FILM COATED ORAL 2 TIMES DAILY
Qty: 90 TABLET | Refills: 3 | Status: SHIPPED | OUTPATIENT
Start: 2024-09-09

## 2024-09-10 ENCOUNTER — TELEPHONE (OUTPATIENT)
Dept: FAMILY MEDICINE CLINIC | Facility: CLINIC | Age: 74
End: 2024-09-10

## 2024-09-10 ENCOUNTER — PATIENT OUTREACH (OUTPATIENT)
Dept: CASE MANAGEMENT | Age: 74
End: 2024-09-10

## 2024-09-10 NOTE — PROGRESS NOTES
NCM attempted to contact patient for [facility]MA follow up. NO answer, unable to LM on VM, call continued to ring several times. No VM option. NCM will try calling back later.

## 2024-09-13 ENCOUNTER — LAB ENCOUNTER (OUTPATIENT)
Dept: LAB | Facility: HOSPITAL | Age: 74
End: 2024-09-13
Attending: NURSE PRACTITIONER
Payer: MEDICARE

## 2024-09-13 ENCOUNTER — OFFICE VISIT (OUTPATIENT)
Dept: WOUND CARE | Facility: HOSPITAL | Age: 74
End: 2024-09-13
Attending: NURSE PRACTITIONER
Payer: MEDICARE

## 2024-09-13 VITALS
OXYGEN SATURATION: 97 % | TEMPERATURE: 98 F | DIASTOLIC BLOOD PRESSURE: 62 MMHG | HEART RATE: 94 BPM | RESPIRATION RATE: 18 BRPM | SYSTOLIC BLOOD PRESSURE: 100 MMHG

## 2024-09-13 DIAGNOSIS — L89.154 PRESSURE INJURY OF SACRAL REGION, STAGE 4 (HCC): ICD-10-CM

## 2024-09-13 DIAGNOSIS — L89.614 PRESSURE INJURY OF RIGHT HEEL, STAGE 4 (HCC): ICD-10-CM

## 2024-09-13 DIAGNOSIS — R26.9 GAIT DISTURBANCE: ICD-10-CM

## 2024-09-13 DIAGNOSIS — L89.223 STAGE III PRESSURE ULCER OF LEFT HIP (HCC): ICD-10-CM

## 2024-09-13 DIAGNOSIS — L89.512 PRESSURE INJURY OF RIGHT ANKLE, STAGE 2 (HCC): ICD-10-CM

## 2024-09-13 DIAGNOSIS — L89.154 PRESSURE INJURY OF SACRAL REGION, STAGE 4 (HCC): Primary | ICD-10-CM

## 2024-09-13 LAB
BASOPHILS # BLD AUTO: 0.07 X10(3) UL (ref 0–0.2)
BASOPHILS NFR BLD AUTO: 0.6 %
CRP SERPL-MCNC: 1 MG/DL (ref ?–1)
DEPRECATED RDW RBC AUTO: 55 FL (ref 35.1–46.3)
EOSINOPHIL # BLD AUTO: 2.34 X10(3) UL (ref 0–0.7)
EOSINOPHIL NFR BLD AUTO: 18.6 %
ERYTHROCYTE [DISTWIDTH] IN BLOOD BY AUTOMATED COUNT: 17.4 % (ref 11–15)
HCT VFR BLD AUTO: 39.4 %
HGB BLD-MCNC: 12.4 G/DL
IMM GRANULOCYTES # BLD AUTO: 0.07 X10(3) UL (ref 0–1)
IMM GRANULOCYTES NFR BLD: 0.6 %
LYMPHOCYTES # BLD AUTO: 2.5 X10(3) UL (ref 1–4)
LYMPHOCYTES NFR BLD AUTO: 19.9 %
MCH RBC QN AUTO: 27.1 PG (ref 26–34)
MCHC RBC AUTO-ENTMCNC: 31.5 G/DL (ref 31–37)
MCV RBC AUTO: 86 FL
MONOCYTES # BLD AUTO: 1.08 X10(3) UL (ref 0.1–1)
MONOCYTES NFR BLD AUTO: 8.6 %
NEUTROPHILS # BLD AUTO: 6.5 X10 (3) UL (ref 1.5–7.7)
NEUTROPHILS # BLD AUTO: 6.5 X10(3) UL (ref 1.5–7.7)
NEUTROPHILS NFR BLD AUTO: 51.7 %
PLATELET # BLD AUTO: 380 10(3)UL (ref 150–450)
PLATELET MORPHOLOGY: NORMAL
RBC # BLD AUTO: 4.58 X10(6)UL
WBC # BLD AUTO: 12.6 X10(3) UL (ref 4–11)

## 2024-09-13 PROCEDURE — 36415 COLL VENOUS BLD VENIPUNCTURE: CPT

## 2024-09-13 PROCEDURE — 85025 COMPLETE CBC W/AUTO DIFF WBC: CPT

## 2024-09-13 PROCEDURE — 99214 OFFICE O/P EST MOD 30 MIN: CPT | Performed by: NURSE PRACTITIONER

## 2024-09-13 PROCEDURE — 85652 RBC SED RATE AUTOMATED: CPT

## 2024-09-13 PROCEDURE — 86140 C-REACTIVE PROTEIN: CPT

## 2024-09-13 NOTE — PROGRESS NOTES
Subjective   Yoseph Cordero is a 74 year old male.    Chief Complaint   Patient presents with    Wound Care     Follow up visit for right heel, ankle, left hip and sacrum.     HPI  9/13/2024: Patient is here with his son, daughter and wife. He is awake. He has wound vac on his left hip. Sacra wound has become granular, heel wound has closed, ankle wound is dark granular, no new wounds. His family is feeding him high protein, collagen and wound healing supplement, they have been repositioning him, home health nurse has been doing dressing changes as well as her daughter.  8/23/2024: Patient is here with his two sons and wife. He has wound vac on left hip. His son reporting the sacral wound has foul smell with back edges, no fever at time of this visit.     8/9/2024: Patient is here with her daughter who is providing the information, patient is non-verbal. Patient's daughter has taken patient home, home health nurse has not started as of now, she is doing all wounds the dressing changes. Patient has hospital bed with air mattress with lift.      7/25/2024: Patient is a 74-year-old male with a past medical history of dementia nonverbal at baseline, chronic right hemiparesis from prior CVA, seizure disorder on Keppra, VTE with factor V Leiden on Xarelto, anemia, chronic right heel stage IV, left ischial stage IV and sacral decubitus ulcer stage IV with recent hospitalization on 7/9-7/19 for severe sepsis secondary to Proteus bacteremia treated with IV Zosyn discharged on 7/19/2024 with midline with plan for 4 weeks of IV antibiotics tp [facility] for placement.   Per SNF, patient had acquired the pressure injuries at his right heel, left ischial/hip and sacral at home while he had home health services.  Per patient's daughter patient eats well, has multiple stool a day, incontinent. Patient's daughter is not happy with care her father has been getting.   Patient was here today 7/23/2024 to see   Sury WALDROP (Podiatrist) for heel wounds, I was asked us to see this patients today for multiple wounds that wound clinic was not aware he had at time of masha.     Review of Systems   Constitutional:  Negative for activity change.   Respiratory:  Negative for cough and shortness of breath.    Cardiovascular:  Negative for leg swelling.   Gastrointestinal:  Negative for abdominal distention.   Skin:  Positive for wound.        Multiple wounds   Neurological:  Positive for weakness.   Psychiatric/Behavioral:  Negative for agitation.       Objective  Physical Exam  Vitals reviewed.   Constitutional:       General: He is awake.      Appearance: He is underweight.      Comments: Non-verbal, unable to follow commends    Cardiovascular:      Rate and Rhythm: Normal rate and regular rhythm.      Pulses: Normal pulses.   Pulmonary:      Effort: Pulmonary effort is normal.   Abdominal:      General: Bowel sounds are normal.      Palpations: Abdomen is soft.   Musculoskeletal:      Right lower leg: No edema.      Left lower leg: No edema.   Skin:     General: Skin is warm.      Findings: Wounds on right heel, left ankle, left hip and sacrum present. No erythema or rash.      Nails: There is no clubbing.      Neurological:      Mental Status: Mental status is at baseline.   Wound Assessment  Wound 08/23/24 1 Heel Right (Active)   Date First Assessed: 08/23/24   Present on Original Admission: Yes   Wound Number (Wound Clinic Only): 1  Primary Wound Type: Pressure Injury  Location: Heel  Wound Location Orientation: Right      Assessments 9/13/2024  2:25 PM   Wound Image     Site Assessment Clean;Epithelialization;Pink   Drainage Amount None   Treatments Cleansed;Saline   Dressing Aquacel Foam   Dressing Changed New   Dressing Status Clean;Dry;Intact   Wound Length (cm) 0 cm   Wound Width (cm) 0 cm   Wound Surface Area (cm^2) 0 cm^2   Wound Depth (cm) 0 cm   Wound Volume (cm^3) 0 cm^3   Wound Healing % 100   Margins Flat and Intact    Madhuri-wound Assessment Hyperpigmented;Dry;Fragile   Wound Bed Epithelium (%) 100 %   State of Healing Epithelialized;Closed wound edges   Wound Odor None   Shape cluster       Active Orders   Date Order Priority Status Authorizing Provider   07/25/24 1711 OP Wound Dressing Routine Active George Sampson APRN     - Dressing:    Collagen     - Dressing:    Hydrofera transfer     - Dressing:    ABD pad     - Dressing:    Kerlix     - Cleansing:    Cleanse with normal saline or wound cleanser     - Frequency:    Change dressing three times a day       Inactive Orders   Date Order Priority Status Authorizing Provider   08/30/24 0759 Apply dressing Other ( Xeroform) Daily Routine Discontinued Grisel Zambrano MD     - Dressing type:    Other ( Xeroform)   08/26/24 0848 Apply dressing Other ( Xeroform) Daily Routine Discontinued Ric Evans MD     - Dressing type:    Other ( Xeroform)   08/24/24 0219 Consult to Wound Ostomy Routine Completed Bernadine Vazquez MD     - Reason for Consult:    Wound Care     - Wound Care Reason for Consult:    decubitus ulcer infection   08/24/24 0049 Consult to Wound Ostomy Routine Discontinued Bernadine Vazquez MD     - Reason for Consult:    Wound Care     - Wound Care Reason for Consult:    decubitus ulcer infection   07/23/24 1016 Debridement Pressure Injury Right Heel Routine Completed Sury Blake DPM       Wound 08/23/24 2 Hip Left (Active)   Date First Assessed: 08/23/24   Present on Original Admission: Yes   Wound Number (Wound Clinic Only): 2  Primary Wound Type: Pressure Injury  Location: Hip  Wound Location Orientation: Left      Assessments 9/13/2024  2:25 PM   Wound Image     Site Assessment Moist;Pink;Red;White   Closure Not approximated   Drainage Amount Moderate   Drainage Description Serosanguineous   Treatments Cleansed;Saline   Wound Vac Brand Medela   Dressing Wound vac sponge (collagen)   Dressing Changed New   Dressing Status Clean;Dry;Intact   Wound  Length (cm) 3 cm   Wound Width (cm) 3 cm   Wound Surface Area (cm^2) 9 cm^2   Wound Depth (cm) 1.2 cm   Wound Volume (cm^3) 10.8 cm^3   Wound Healing % 85   Margins Well-defined edges;Not attached   Madhuri-wound Assessment Hyperpigmented;Clean   Wound Granulation Tissue Pink   Wound Bed Granulation (%) 25 %   Exposed Structure Tendon   State of Healing Early/partial granulation   Wound Odor None   Undermining? Yes (11-3=0.9cm)   Number of Undermines 1   Undermine 1 Start Position 11   Undermine 1 End Position 3   Pressure Injury Stage Stage 4       Active Orders   Date Order Priority Status Authorizing Provider   07/25/24 1711 OP Wound Dressing Routine Active George Sampson APRN     - Dressing:    Dry gauze     - Dressing:    ABD pad     - Additional Wound Dressing Information:    traid     - Cleansing:    Cleanse with normal saline or wound cleanser     - Frequency:    Change dressing daily and PRN   07/25/24 1711 OP Wound Dressing Routine Active George Sampson APRN       Inactive Orders   Date Order Priority Status Authorizing Provider   08/26/24 0848 collagenase (Santyl) 250 UNIT/GM ointment Routine Discontinued Ric Evans MD     - Please indicate site of application:    Affected Area(s)   08/26/24 0848 Apply dressing Other (Santyl) Daily Routine Discontinued Ric Evans MD     - Dressing type:    Other (Santyl)   08/24/24 0219 Consult to Wound Ostomy Routine Completed Bernadine Vazquez MD     - Reason for Consult:    Wound Care     - Wound Care Reason for Consult:    decubitus ulcer infection   08/24/24 0049 Consult to Wound Ostomy Routine Discontinued Bernadine Vazquez MD     - Reason for Consult:    Wound Care     - Wound Care Reason for Consult:    decubitus ulcer infection       Wound 08/23/24 3 Sacrum (Active)   Date First Assessed: 08/23/24   Present on Original Admission: Yes   Wound Number (Wound Clinic Only): 3  Primary Wound Type: Pressure Injury  Location: Sacrum      Assessments 9/13/2024  2:25 PM   Wound  Image     Site Assessment Granulation tissue;Moist;Pink;Red   Closure Not approximated   Drainage Amount Large   Drainage Description Green;Serosanguineous;Yellow   Treatments Cleansed;Saline   Dressing Duoderm;Hydrofiber with silver (collagen, stas foam sponge; duoderm periwound)   Dressing Changed New   Dressing Status Dry;Intact;Clean   Wound Length (cm) 7.6 cm   Wound Width (cm) 5.8 cm   Wound Surface Area (cm^2) 44.08 cm^2   Wound Depth (cm) 2 cm   Wound Volume (cm^3) 88.16 cm^3   Wound Healing % 31   Margins Well-defined edges;Not attached   Madhuri-wound Assessment Hyperpigmented;Fragile;Excoriated   Wound Granulation Tissue Pink;Red   Wound Bed Granulation (%) 100 %   Wound Bed Epithelium (%) 0 %   Wound Bed Slough (%) 0 %   Wound Bed Eschar (%) 0 %   State of Healing Fully granulated   Wound Odor None   Undermining? Yes (7-3=3.3)   Number of Undermines 1   Undermine 1 Start Position 7   Undermine 1 End Position 3   Pressure Injury Stage Stage 4       Active Orders   Date Order Priority Status Authorizing Provider   07/25/24 1711 OP Wound Dressing Routine Active George Sampson APRN     - Dressing:    Dry gauze     - Dressing:    ABD pad     - Additional Wound Dressing Information:    dakin soaked     - Cleansing:    Cleanse with Dakins     - Frequency:    Change dressing two times a day   07/25/24 1711 OP Wound Dressing Routine Active George Sampson APRN       Inactive Orders   Date Order Priority Status Authorizing Provider   08/26/24 0848 collagenase (Santyl) 250 UNIT/GM ointment Routine Discontinued Ric Evans MD     - Please indicate site of application:    Affected Area(s)   08/26/24 0848 Apply dressing Other (Santyl) Daily Routine Discontinued iRc Evans MD     - Dressing type:    Other (Santyl)   08/25/24 1217 collagenase (Santyl) 250 UNIT/GM ointment Routine Discontinued Criselda Conley PA-C     - Please indicate site of application:    Affected Area(s)   08/24/24 0219 Consult to Wound Ostomy Routine  Completed Bernadine Vazquez MD     - Reason for Consult:    Wound Care     - Wound Care Reason for Consult:    decubitus ulcer infection   08/24/24 0049 Consult to Wound Ostomy Routine Discontinued Bernadine Vazquez MD     - Reason for Consult:    Wound Care     - Wound Care Reason for Consult:    decubitus ulcer infection   08/23/24 1539 Debridement Pressure Injury Sacrum Routine Completed George Sampson, APRN   07/23/24 1011 Debridement Pressure Injury Sacrum Routine Completed George Sampson, BEAU       Wound 08/23/24 4 Ankle Right;Medial (Active)   Date First Assessed: 08/23/24   Present on Original Admission: Yes   Wound Number (Wound Clinic Only): 4  Primary Wound Type: Pressure Injury  Location: Ankle  Wound Location Orientation: Right;Medial      Assessments 9/13/2024  2:25 PM   Wound Image     Site Assessment Granulation tissue;Red;Brown   Closure Not approximated   Drainage Amount Moderate   Drainage Description Serosanguineous;Yellow;Brown   Treatments Cleansed;Saline;Honey Gel   Dressing Aquacel Foam   Dressing Changed New   Dressing Status Clean;Dry;Intact   Wound Length (cm) 1.3 cm   Wound Width (cm) 1.1 cm   Wound Surface Area (cm^2) 1.43 cm^2   Wound Depth (cm) 0.2 cm   Wound Volume (cm^3) 0.286 cm^3   Margins Well-defined edges;Not attached   Madhuri-wound Assessment Hyperpigmented;Fragile   Wound Granulation Tissue Red   Wound Bed Granulation (%) 75 %   Wound Bed Fibrin (%) 25 %   State of Healing Fully granulated   Undermining? Yes (0.5)   Number of Undermines 1   Undermine 1 Start Position 12   Undermine 1 End Position 12   Pressure Injury Stage Stage 3       Inactive Orders   Date Order Priority Status Authorizing Provider   08/26/24 0848 collagenase (Santyl) 250 UNIT/GM ointment Routine Discontinued Ric Evans MD     - Please indicate site of application:    Affected Area(s)   08/26/24 0848 Apply dressing Other (Santyl) Daily Routine Discontinued Ric Evans MD     - Dressing type:    Other (Santyl)    08/24/24 0219 Consult to Wound Ostomy Routine Completed Bernadine Vazquez MD     - Reason for Consult:    Wound Care     - Wound Care Reason for Consult:    decubitus ulcer infection   08/24/24 0049 Consult to Wound Ostomy Routine Discontinued Bernadine Vazquez MD     - Reason for Consult:    Wound Care     - Wound Care Reason for Consult:    decubitus ulcer infection   07/25/24 1711 OP Wound Dressing Routine Completed George Sampson APRN     - Dressing:    Kerlix     - Dressing:    Dry gauze     - Dressing:    Honey gel     - Cleansing:    Cleanse with normal saline or wound cleanser     - Frequency:    Change dressing daily and PRN     Vital Signs    09/13/24 1416   BP: 100/62   Pulse: 94   Resp: 18   Temp: 97.5 °F (36.4 °C)     Allergies  Allergies   Allergen Reactions    Zosyn [Piperacillin-Tazobactam In D5w] RASH     Pt developed pruritic rash on both arms while on this medication     Assessment   Left hip wound, stage IV pressure injury:  -granular tissue, with exposed tendon, sightly smaller  -no erythema  -no foul odor     Sacral wound, stage IV pressure injury:  -granular slightly smaller  -no visible bone  -no erythema     Right heel wound, stage IV:  -skin is closed with boggy areas, high risk for re-ulceration, MRI on 7/10 showed osteomyelitis of dorsal calcaneus   -no erythema or other sign of infection     Right ankle wound,stage III pressure injury  -necrotic tissue, dark granulation  -no imaging has been done to r/o OM  -no erythema     Left lateral ankle and left heel wounds, closed.     Reviewed note and labs[facility]imaging [facility]  Recent labs: 9/3/2024: BUN 20, Creatinine 0.56, eGFR 104, H&H 10.3 & 33.3  7/15/2024: Albumin 3.6, Globulin 3.4, total protein 7.0    Encounter Diagnosis  1. Pressure injury of sacral region, stage 4 (HCC)    2. Stage III pressure ulcer of left hip (HCC)    3. Pressure injury of right heel, stage 4 (HCC)    4. Pressure injury of right ankle, stage 2 (HCC)     5. Gait disturbance        Problem List  Patient Active Problem List   Diagnosis    Hemiplegia affecting right dominant side (HCC)    Chronic obstructive pulmonary disease (HCC)    Combined forms of age-related cataract of both eyes    Esophageal reflux    Late onset Alzheimer's disease with behavioral disturbance (HCC)    Alcohol abuse    Calcification of aorta (HCC)    Alcoholism (HCC)    History of stroke    Dysphagia    Hyperglycemia    Weakness    Gait disturbance    Abnormal involuntary movement    Acute pulmonary embolism without acute cor pulmonale, unspecified pulmonary embolism type (HCC)    Factor V Leiden (HCC)    Leukocytosis    Tachycardia    Altered mental status, unspecified altered mental status type    Seizures (HCC)    Infected ulcer of skin, unspecified ulcer stage (HCC)    Infected decubitus ulcer    Transient neurological symptoms    Palliative care by specialist    Pressure injury of sacral region, stage 4 (HCC)    Pressure injury of right hip, stage 3 (HCC)    Pressure injury of right heel, stage 4 (HCC)    Pressure injury of right ankle, stage 2 (HCC)    Complicated open wound of left hip    Non-healing open wound of right heel    Leukocytosis, unspecified type    Sacral decubitus ulcer, stage IV (HCC)     Plan  Orders  Orders Placed This Encounter   Procedures    CBC With Differential With Platelet    Sed Rate, Westergren (Automated)    C-Reactive Protein   Ordered labs to assess inflammation and infection trend.   Palliative wound care: the goal of wound care is to reduce the risk for infection, slow the course of deterioration and promote client comfort and function. If the wounds become granular and shows tissue improvement, may processed to advanced dressing.  Left hip: Continue collagen negative pressure wound therapy, may change twice a week if patient tolerated. Patient's daughter stated the wound becomes foul smelling with twice a week, home health to assess and report the  reduction from three time to twice is appropriate or needed three times a week.      Scrum wound: may continue Santyl daily dressing as patient 's daughter prefers, my recommendation is to support the granulation tissue with collagen and silver alginate and secure with Tegaderm dressing, change twice a week and as needed.    Right ankle wound is worsening, needs pressure reduction, added medi honey to advanced dressing for debridement of this wound located on the bone.     Right heel, the wound has closed at this time, may use bordered foma to protect the newly closed wound, as I explained to his family due to underlying OM high risk for re-ulceration.      Patient is allowed to walk if able with PT, since the heel wound is not in the plantar surface.  Continue to re-position to reduce pressure on hip and sacral wound at least every two hours with pressure reduction mattress and seat cushion.  Continue using heel protector/heel boots to reduce pressure in the wound bed.  Monitor for sign of infection, redness, foul odor, warmth and increased drainage.  Discussed treatment plan with sons in detail, ordered labs to assess inflammatory and obtained wound culture after debridement and washing the wound veinously with saline, patient has infectious disease follow up in coming weeks, all questions answered.     Note to patient:The 21st Century Cures Act makes medical notes like these available to patients in the interest of transparency. Please be advised this is a medical document. Medical documents are intended to carry relevant information, facts as evident, and the clinical opinion of the practitioner. The medical note is intended as peer to peer communication and may appear blunt or direct. It is written in medical language and may contain abbreviations or verbiage that are unfamiliar.   Total face to face time was 70 min, more than 50% of the time was spent in counseling and/or coordination of care related to sacral  and left hip wounds  Follow-Up  Return in about 2 weeks (around 9/27/2024) for APN visit for 30 min.

## 2024-09-16 ENCOUNTER — TELEPHONE (OUTPATIENT)
Dept: WOUND CARE | Facility: HOSPITAL | Age: 74
End: 2024-09-16

## 2024-09-16 ENCOUNTER — NURSE ONLY (OUTPATIENT)
Dept: SURGERY | Facility: CLINIC | Age: 74
End: 2024-09-16

## 2024-09-16 ENCOUNTER — TELEPHONE (OUTPATIENT)
Dept: FAMILY MEDICINE CLINIC | Facility: CLINIC | Age: 74
End: 2024-09-16

## 2024-09-16 DIAGNOSIS — Z87.898 H/O URINARY RETENTION: Primary | ICD-10-CM

## 2024-09-16 DIAGNOSIS — L89.213 PRESSURE INJURY OF RIGHT HIP, STAGE 3 (HCC): ICD-10-CM

## 2024-09-16 DIAGNOSIS — L89.614 PRESSURE INJURY OF RIGHT HEEL, STAGE 4 (HCC): ICD-10-CM

## 2024-09-16 DIAGNOSIS — L89.304 PRESSURE INJURY OF BUTTOCK, STAGE 4, UNSPECIFIED LATERALITY (HCC): ICD-10-CM

## 2024-09-16 DIAGNOSIS — L89.154 PRESSURE INJURY OF SACRAL REGION, STAGE 4 (HCC): Primary | ICD-10-CM

## 2024-09-16 NOTE — TELEPHONE ENCOUNTER
----- Message from Babak NUNES sent at 9/16/2024  7:28 AM CDT -----  Regarding: Referral Request  Good Morning,  The above patient has [employer] and is need of additional insurance referrals to be seen in the [facility].  Please submit a request for 6 more visits.  Thanks  Babak

## 2024-09-16 NOTE — PROGRESS NOTES
I called pt and his son into the exam room and I introduced myself and verified the pt's name and  with his son who informs that pt has dementia. I explained that pt needs to empty his bladder and I will then perform a bladder scan. The pt's son informed that pt is incontinent of urine and wears a condom cath.and an adult diaper. I explained that I will then just perform a bladder scan. Pt was in a w/c and the son asked if we had a hydraulic table to use and I took them to room # 10. The son was able to completely lift his father out of the w/c and onto the exam table. I the loosened pt's shouts and pulled them down slightly and I also opened the velcro tabs on his adult depends and I applied the US jelly and performed the bladder scan which revealed 262 ml of retained urine. I told pt's son that I will inform VALEDMAR and return shortly. I left the room and s/w NADEENB to inform of pt's scan results and all of the info above. VALDEMAR asked if pt was on any prostate meds and I confirmed Tamsulosin and he stated that there were no further orders then.  I went back to the room and informed the pt's son of [facility]'s orders and I told the son to make sure pt does not get constipation and takes the Tamsulosin as ordered and call if any worsening changes. Son verbalized understanding and compliance. .

## 2024-09-16 NOTE — TELEPHONE ENCOUNTER
Alanna from \"reference lab\" is calling to speak to a nurse regarding this patient.   Lab can be reached at [phone].

## 2024-09-17 ENCOUNTER — TELEPHONE (OUTPATIENT)
Dept: WOUND CARE | Facility: HOSPITAL | Age: 74
End: 2024-09-17

## 2024-09-17 NOTE — TELEPHONE ENCOUNTER
[facility].  [employer] $200 message was left by pharmacy for the daughter Tyra

## 2024-09-19 ENCOUNTER — TELEPHONE (OUTPATIENT)
Dept: FAMILY MEDICINE CLINIC | Facility: CLINIC | Age: 74
End: 2024-09-19

## 2024-09-27 ENCOUNTER — OFFICE VISIT (OUTPATIENT)
Dept: WOUND CARE | Facility: HOSPITAL | Age: 74
End: 2024-09-27
Attending: NURSE PRACTITIONER
Payer: MEDICARE

## 2024-09-27 VITALS
RESPIRATION RATE: 16 BRPM | DIASTOLIC BLOOD PRESSURE: 60 MMHG | TEMPERATURE: 97 F | HEART RATE: 106 BPM | SYSTOLIC BLOOD PRESSURE: 103 MMHG | OXYGEN SATURATION: 98 %

## 2024-09-27 DIAGNOSIS — L89.223 STAGE III PRESSURE ULCER OF LEFT HIP (HCC): ICD-10-CM

## 2024-09-27 DIAGNOSIS — L89.154 PRESSURE INJURY OF SACRAL REGION, STAGE 4 (HCC): Primary | ICD-10-CM

## 2024-09-27 DIAGNOSIS — L89.614 PRESSURE INJURY OF RIGHT HEEL, STAGE 4 (HCC): ICD-10-CM

## 2024-09-27 DIAGNOSIS — L89.513 PRESSURE ULCER OF RIGHT ANKLE, STAGE 3 (HCC): ICD-10-CM

## 2024-09-27 PROCEDURE — 99213 OFFICE O/P EST LOW 20 MIN: CPT | Performed by: NURSE PRACTITIONER

## 2024-09-27 RX ORDER — TAMSULOSIN HYDROCHLORIDE 0.4 MG/1
0.4 CAPSULE ORAL DAILY
Qty: 90 CAPSULE | Refills: 3 | Status: SHIPPED | OUTPATIENT
Start: 2024-09-27

## 2024-09-27 NOTE — PROGRESS NOTES
Subjective   Yoseph Cordero is a 74 year old male.    Chief Complaint   Patient presents with    Wound Care     Bilateral legs and hip and sacrum     HPI  9/27/2024: Patient is here with his son and his wife for follow up wound care, his daughter is doing dressing changes, reporting the wounds on right heel closed now feels boggy on proximal section. Family continues to feed patient and reposition patient as needed.   9/13/2024: Patient is here with his son, daughter and wife. He is awake. He has wound vac on his left hip. Sacra wound has become granular, heel wound has closed, ankle wound is dark granular, no new wounds. His family is feeding him high protein, collagen and wound healing supplement, they have been repositioning him, home health nurse has been doing dressing changes as well as her daughter.  8/23/2024: Patient is here with his two sons and wife. He has wound vac on left hip. His son reporting the sacral wound has foul smell with back edges, no fever at time of this visit.     8/9/2024: Patient is here with her daughter who is providing the information, patient is non-verbal. Patient's daughter has taken patient home, home health nurse has not started as of now, she is doing all wounds the dressing changes. Patient has hospital bed with air mattress with lift.      7/25/2024: Patient is a 74-year-old male with a past medical history of dementia nonverbal at baseline, chronic right hemiparesis from prior CVA, seizure disorder on Keppra, VTE with factor V Leiden on Xarelto, anemia, chronic right heel stage IV, left ischial stage IV and sacral decubitus ulcer stage IV with recent hospitalization on 7/9-7/19 for severe sepsis secondary to Proteus bacteremia treated with IV Zosyn discharged on 7/19/2024 with midline with plan for 4 weeks of IV antibiotics tp [place] [facility]F for placement.   Per SNF, patient had acquired the pressure injuries at his right heel, left ischial/hip and sacral at  home while he had home health services.  Per patient's daughter patient eats well, has multiple stool a day, incontinent. Patient's daughter is not happy with care her father has been getting.   Patient was here today 7/23/2024 to see Dr. Sury WALDROP (Podiatrist) for heel wounds, I was asked us to see this patients today for multiple wounds that wound clinic was not aware he had at time of masha.     Review of Systems   Constitutional:  Negative for activity change.   Respiratory:  Negative for cough and shortness of breath.    Cardiovascular:  Negative for leg swelling.   Gastrointestinal:  Negative for abdominal distention.   Skin:  Positive for wound.        Multiple wounds   Neurological:  Positive for weakness.   Psychiatric/Behavioral:  Negative for agitation.       Objective  Physical Exam  Vitals reviewed.   Constitutional:       General: He is awake.      Appearance: He is underweight.      Comments: Non-verbal, unable to follow commends    Cardiovascular:      Rate and Rhythm: Normal rate and regular rhythm.      Pulses: Normal pulses.   Pulmonary:      Effort: Pulmonary effort is normal.   Abdominal:      General: Bowel sounds are normal.      Palpations: Abdomen is soft.   Musculoskeletal:      Right lower leg: No edema.      Left lower leg: No edema.   Skin:     General: Skin is warm.      Findings: Wounds on right heel, left ankle, left hip and sacrum present. No erythema or rash.      Nails: There is no clubbing.      Neurological:      Mental Status: Mental status is at baseline.   Wound Assessment  Wound 08/23/24 1 Heel Right (Active)   Date First Assessed: 08/23/24   Present on Original Admission: Yes   Wound Number (Wound Clinic Only): 1  Primary Wound Type: Pressure Injury  Location: Heel  Wound Location Orientation: Right      Assessments 9/27/2024 11:33 AM   Wound Image     Site Assessment Moist;Pink   Drainage Amount Small   Drainage Description Serosanguineous;Sanguineous   Treatments  Cleansed;Wound    Dressing Acticoat;Aquacel Foam   Dressing Changed Changed   Dressing Status Clean;Dry;Intact   Wound Length (cm) 0.1 cm   Wound Width (cm) 0.1 cm   Wound Surface Area (cm^2) 0.01 cm^2   Wound Depth (cm) 0.1 cm   Wound Volume (cm^3) 0.001 cm^3   Wound Healing % 100   Margins Attached edges   Non-staged Wound Description Full thickness   Madhuri-wound Assessment Hyperpigmented;Dry;Fragile   Wound Granulation Tissue Red   Wound Bed Granulation (%) 90 %   Wound Bed Epithelium (%) 0 %   Wound Bed Slough (%) 10 %   Wound Bed Eschar (%) 0 %   Wound Bed Fibrin (%) 0 %   State of Healing Non-healing   Wound Odor None       Active Orders   Date Order Priority Status Authorizing Provider   07/25/24 1711 OP Wound Dressing Routine Active George Sampson APRN     - Dressing:    Collagen     - Dressing:    Hydrofera transfer     - Dressing:    ABD pad     - Dressing:    Kerlix     - Cleansing:    Cleanse with normal saline or wound cleanser     - Frequency:    Change dressing three times a day       Inactive Orders   Date Order Priority Status Authorizing Provider   08/30/24 0759 Apply dressing Other ( Xeroform) Daily Routine Discontinued Grisel Zambrano MD     - Dressing type:    Other ( Xeroform)   08/26/24 0848 Apply dressing Other ( Xeroform) Daily Routine Discontinued Ric Evans MD     - Dressing type:    Other ( Xeroform)   08/24/24 0219 Consult to Wound Ostomy Routine Completed Bernadine Vazquez MD     - Reason for Consult:    Wound Care     - Wound Care Reason for Consult:    decubitus ulcer infection   08/24/24 0049 Consult to Wound Ostomy Routine Discontinued Bernadine Vazquez MD     - Reason for Consult:    Wound Care     - Wound Care Reason for Consult:    decubitus ulcer infection   07/23/24 1016 Debridement Pressure Injury Right Heel Routine Completed Sury Blake DPM       Wound 08/23/24 2 Hip Left (Active)   Date First Assessed: 08/23/24   Present on Original Admission: Yes    Wound Number (Wound Clinic Only): 2  Primary Wound Type: Pressure Injury  Location: Hip  Wound Location Orientation: Left      Assessments 9/27/2024 11:33 AM   Wound Image     Site Assessment Moist;Red;Yellow   Closure Not approximated   Drainage Amount Moderate   Drainage Description Serosanguineous   Treatments Cleansed;Wound    Wound Vac Brand Medela   Dressing Wound vac sponge (Fibracol)   Dressing Changed Changed   Dressing Status Clean;Dry;Intact   Wound Length (cm) 2.9 cm   Wound Width (cm) 2.6 cm   Wound Surface Area (cm^2) 7.54 cm^2   Wound Depth (cm) 1.2 cm   Wound Volume (cm^3) 9.048 cm^3   Wound Healing % 88   Margins Well-defined edges;Not attached   Non-staged Wound Description Full thickness   Madhuri-wound Assessment Dry;Pink   Wound Granulation Tissue Red   Wound Bed Granulation (%) 75 %   Wound Bed Epithelium (%) 0 %   Wound Bed Slough (%) 25 %   Wound Bed Eschar (%) 0 %   Wound Bed Fibrin (%) 0 %   Exposed Structure Tendon;Bone   State of Healing Early/partial granulation   Wound Odor Mild   Undermining? Yes (1.2)   Number of Undermines 1   Undermine 1 Start Position 7   Undermine 1 End Position 11   Pressure Injury Stage Stage 4       Active Orders   Date Order Priority Status Authorizing Provider   07/25/24 1711 OP Wound Dressing Routine Active George Sampson APRN     - Dressing:    Dry gauze     - Dressing:    ABD pad     - Additional Wound Dressing Information:    traid     - Cleansing:    Cleanse with normal saline or wound cleanser     - Frequency:    Change dressing daily and PRN   07/25/24 1711 OP Wound Dressing Routine Active George Sampson APRN       Inactive Orders   Date Order Priority Status Authorizing Provider   08/26/24 0848 collagenase (Santyl) 250 UNIT/GM ointment Routine Discontinued Ric Evans MD     - Please indicate site of application:    Affected Area(s)   08/26/24 0848 Apply dressing Other (Santyl) Daily Routine Discontinued Ric Evans MD     - Dressing type:    Other  (Santyl)   08/24/24 0219 Consult to Wound Ostomy Routine Completed Bernadine Vazquez MD     - Reason for Consult:    Wound Care     - Wound Care Reason for Consult:    decubitus ulcer infection   08/24/24 0049 Consult to Wound Ostomy Routine Discontinued Bernadine Vazquez MD     - Reason for Consult:    Wound Care     - Wound Care Reason for Consult:    decubitus ulcer infection       Wound 08/23/24 3 Sacrum (Active)   Date First Assessed: 08/23/24   Present on Original Admission: Yes   Wound Number (Wound Clinic Only): 3  Primary Wound Type: Pressure Injury  Location: Sacrum      Assessments 9/27/2024 11:33 AM   Wound Image     Site Assessment Moist;Red;Granulation tissue   Closure Not approximated   Drainage Amount Large   Drainage Description Serosanguineous   Treatments Cleansed;Wound ;Topical (Barrier/Moisturizer/Ointment) (Zinc oxide to periwound)   Dressing Hydrofiber with silver;Aquacel Foam   Dressing Changed Changed   Dressing Status Dry;Intact;Clean   Wound Length (cm) 6.5 cm   Wound Width (cm) 5.5 cm   Wound Surface Area (cm^2) 35.75 cm^2   Wound Depth (cm) 1.4 cm   Wound Volume (cm^3) 50.05 cm^3   Wound Healing % 61   Margins Well-defined edges;Not attached   Non-staged Wound Description Full thickness   Madhuri-wound Assessment Fragile;Pink   Wound Granulation Tissue Red   Wound Bed Granulation (%) 100 %   Wound Bed Epithelium (%) 0 %   Wound Bed Slough (%) 0 %   Wound Bed Eschar (%) 0 %   Wound Bed Fibrin (%) 0 %   State of Healing Fully granulated   Wound Odor None   Undermining? Yes (2.8)   Number of Undermines 1   Undermine 1 Start Position 7   Undermine 1 End Position 3   Pressure Injury Stage Stage 4       Active Orders   Date Order Priority Status Authorizing Provider   07/25/24 1711 OP Wound Dressing Routine Active George Sampson APRN     - Dressing:    Dry gauze     - Dressing:    ABD pad     - Additional Wound Dressing Information:    dakin soaked     - Cleansing:    Cleanse with Dakins      - Frequency:    Change dressing two times a day   07/25/24 1711 OP Wound Dressing Routine Active George Sampson, BEAU       Inactive Orders   Date Order Priority Status Authorizing Provider   08/26/24 0848 collagenase (Santyl) 250 UNIT/GM ointment Routine Discontinued Ric Eavns MD     - Please indicate site of application:    Affected Area(s)   08/26/24 0848 Apply dressing Other (Santyl) Daily Routine Discontinued Ric Evans MD     - Dressing type:    Other (Santyl)   08/25/24 1217 collagenase (Santyl) 250 UNIT/GM ointment Routine Discontinued Criselda Conley PA-C     - Please indicate site of application:    Affected Area(s)   08/24/24 0219 Consult to Wound Ostomy Routine Completed Bernadine Vazquez MD     - Reason for Consult:    Wound Care     - Wound Care Reason for Consult:    decubitus ulcer infection   08/24/24 0049 Consult to Wound Ostomy Routine Discontinued Bernadine Vazquez MD     - Reason for Consult:    Wound Care     - Wound Care Reason for Consult:    decubitus ulcer infection   08/23/24 1539 Debridement Pressure Injury Sacrum Routine Completed George Sampson, APRN   07/23/24 1011 Debridement Pressure Injury Sacrum Routine Completed George Sampson, BEAU       Wound 08/23/24 4 Ankle Right;Medial (Active)   Date First Assessed: 08/23/24   Present on Original Admission: Yes   Wound Number (Wound Clinic Only): 4  Primary Wound Type: Pressure Injury  Location: Ankle  Wound Location Orientation: Right;Medial      Assessments 9/27/2024 11:33 AM   Wound Image     Site Assessment Moist;Red;Granulation tissue   Closure Not approximated   Drainage Amount Moderate   Drainage Description Serosanguineous   Treatments Cleansed;Wound ;Topical (Barrier/Moisturizer/Ointment) (Zinc oxide to periwound)   Dressing Hydrofiber with silver;Aquacel Foam   Dressing Changed Changed   Dressing Status Clean;Dry;Intact   Wound Length (cm) 1.5 cm   Wound Width (cm) 1.4 cm   Wound Surface Area (cm^2) 2.1 cm^2   Wound Depth (cm) 0  cm   Wound Volume (cm^3) 0 cm^3   Margins Attached edges   Non-staged Wound Description Full thickness   Madhuri-wound Assessment Hyperpigmented;Fragile   Wound Granulation Tissue Red   Wound Bed Granulation (%) 100 %   Wound Bed Epithelium (%) 0 %   Wound Bed Slough (%) 0 %   Wound Bed Eschar (%) 0 %   Wound Bed Fibrin (%) 0 %   State of Healing Fully granulated   Wound Odor None   Pressure Injury Stage Stage 3       Inactive Orders   Date Order Priority Status Authorizing Provider   08/26/24 0848 collagenase (Santyl) 250 UNIT/GM ointment Routine Discontinued Ric Evans MD     - Please indicate site of application:    Affected Area(s)   08/26/24 0848 Apply dressing Other (Santyl) Daily Routine Discontinued Ric Evans MD     - Dressing type:    Other (Santyl)   08/24/24 0219 Consult to Wound Ostomy Routine Completed Bernadine Vazquez MD     - Reason for Consult:    Wound Care     - Wound Care Reason for Consult:    decubitus ulcer infection   08/24/24 0049 Consult to Wound Ostomy Routine Discontinued Bernadine Vazquez MD     - Reason for Consult:    Wound Care     - Wound Care Reason for Consult:    decubitus ulcer infection   07/25/24 1711 OP Wound Dressing Routine Completed George Sampson APRN     - Dressing:    Kerlix     - Dressing:    Dry gauze     - Dressing:    Honey gel     - Cleansing:    Cleanse with normal saline or wound cleanser     - Frequency:    Change dressing daily and PRN     Vital Signs    09/27/24 1119   BP: 103/60   Pulse: 106   Resp: 16   Temp: 97.3 °F (36.3 °C)   Allergies  Allergies   Allergen Reactions    Zosyn [Piperacillin-Tazobactam In D5w] RASH     Pt developed pruritic rash on both arms while on this medication     Assessment   Left hip wound, stage IV pressure injury:  -granular tissue, with less exposed tendon, sightly smaller  -no erythema  -no foul odor     Sacral wound, stage IV pressure injury:  -granular slightly smaller  -no visible bone  -no erythema     Right heel wound, stage  IV:  -skin is closed with boggy areas, with bulging bloody area which was opening and exracted blood, it probes toward head 3.5 cm  high risk for re-ulceration, MRI on 7/10 showed osteomyelitis of dorsal calcaneus   -no erythema or other sign of infection     Right ankle wound,stage III pressure injury possibly becoming stage IV  -bloody wound bed, no improvement  -no imaging has been done to r/o OM  -no erythema     Left lateral ankle and left heel wounds, closed.     Reviewed note and labs/imaging at [facility] and Care Every where.  Recent labs: 9/3/2024: BUN 20, Creatinine 0.56, eGFR 104, H&H 10.3 & 33.3  7/15/2024: Albumin 3.6, Globulin 3.4, total protein 7.0    Encounter Diagnosis  1. Pressure injury of sacral region, stage 4 (HCC)    2. Stage III pressure ulcer of left hip (HCC)    3. Pressure injury of right heel, stage 4 (HCC)    4. Pressure ulcer of right ankle, stage 3 (HCC)      Problem List  Patient Active Problem List   Diagnosis    Hemiplegia affecting right dominant side (HCC)    Chronic obstructive pulmonary disease (HCC)    Combined forms of age-related cataract of both eyes    Esophageal reflux    Late onset Alzheimer's disease with behavioral disturbance (HCC)    Alcohol abuse    Calcification of aorta (HCC)    Alcoholism (HCC)    History of stroke    Dysphagia    Hyperglycemia    Weakness    Gait disturbance    Abnormal involuntary movement    Acute pulmonary embolism without acute cor pulmonale, unspecified pulmonary embolism type (HCC)    Factor V Leiden (HCC)    Leukocytosis    Tachycardia    Altered mental status, unspecified altered mental status type    Seizures (HCC)    Infected ulcer of skin, unspecified ulcer stage (HCC)    Infected decubitus ulcer    Transient neurological symptoms    Palliative care by specialist    Pressure injury of sacral region, stage 4 (HCC)    Pressure injury of right hip, stage 3 (HCC)    Pressure injury of right heel, stage 4 (HCC)    Pressure injury of right ankle,  stage 2 (HCC)    Complicated open wound of left hip    Non-healing open wound of right heel    Leukocytosis, unspecified type    Sacral decubitus ulcer, stage IV (HCC)     Plan  Orders  Patient's daughter requested repeat lab work, in the present no change in his symptoms , we will do routine lab work monthly to assess inflammation and infection trend, no orders for today.     Palliative wound care: the goal of wound care is to reduce the risk for infection, slow the course of deterioration and promote client comfort and function. If the wounds become granular and shows tissue improvement, may processed to advanced dressing.    Left hip: Continue collagen negative pressure wound therapy, may change twice a week if patient tolerated.      Scrum wound: may continue collagen and silver alginate and secure with Tegaderm dressing, change twice a week and as needed.     Right ankle wound is worsening, needs pressure reduction, added medi honey to advanced dressing for debridement of this wound located on the bone.     Right heel, with OM, pack the opening with silver dressing and cover with foam dressing, may change every two or three days. May use bordered foma to protect the newly closed wound, as I explained to his family due to underlying OM high risk for re-ulceration.      Patient is allowed to walk if able with PT, since the heel wound is not in the plantar surface.  Continue to re-position to reduce pressure on hip and sacral wound at least every two hours with pressure reduction mattress and seat cushion.  Continue using heel protector/heel boots to reduce pressure in the wound bed.  Monitor for sign of infection, redness, foul odor, warmth and increased drainage.  Discussed treatment plan with the patient's son in detail, all questions answered.     Note to patient:The 21st Century Cures Act makes medical notes like these available to patients in the interest of transparency. Please be advised this is a medical  document. Medical documents are intended to carry relevant information, facts as evident, and the clinical opinion of the practitioner. The medical note is intended as peer to peer communication and may appear blunt or direct. It is written in medical language and may contain abbreviations or verbiage that are unfamiliar.   Total face to face time was 70 min, more than 50% of the time was spent in counseling and/or coordination of care related to sacral and left hip wounds  Follow-Up  Return in about 2 weeks (around 10/11/2024) for APN visit 30 min.

## 2024-09-27 NOTE — TELEPHONE ENCOUNTER
Refill for tamsulosin sent to pharmacy per protocol.     Benign Prostatic Hypertrophy Medications      Protocol Criteria:  Appointment scheduled in the past 12 months or in the next 2 months  If patients is between the ages of 50 and 70 then PSA done within the last 2 years and is either  Less than or equal to 4.0 ng/ml  Or if greater than 4.0 there is no significant increase (>0.5 ng/ml) in PSA over the last 2 years    Recent Outpatient Visits              Today Pressure injury of sacral region, stage 4 ([facility])    [facility] George Sampson APRN    Office Visit    1 week ago H/O urinary retention    [facility]    Nurse Only    2 weeks ago Pressure injury of sacral region, stage 4 ([facility])    [facility]    Office Visit    3 weeks ago Urinary retention    [facility] David Sepulveda MD    Office Visit    3 weeks ago Pressure injury of sacral region, stage 4 ([facility])    [facility], [address], [place],     Telemedicine          Future Appointments         Provider Department Appt Notes    In 3 weeks [facility] 6 of 6 visits 7-2-24 to 7-2-25 #[id number]  HH- Vac Room 5    In 1 month [facility] 1 of 6 visits 9-16-24 to 9-16-25 #[id number] HH-vac Room 5    In 2 months [facility] 2 of 6 visits 9-16-24 to 9-16-25 #[id number] HH-vac Room 5    In 3 months [facility] 3 of 6 visits 9-16-24 to 9-16-25 #[id number] HH-vac Room 5                PSA:    Lab Results   Component Value Date    PSA 16.20 (H) 03/11/2019    PSA 29.5 (H) 11/26/2018    PSA 5.4 (H) 03/22/2018       PSA Screen:    Lab Results   Component Value Date    PSAS 6.79 (H) 05/13/2021

## 2024-09-30 ENCOUNTER — TELEPHONE (OUTPATIENT)
Dept: FAMILY MEDICINE CLINIC | Facility: CLINIC | Age: 74
End: 2024-09-30

## 2024-09-30 NOTE — PROGRESS NOTES
CONG spoke with patient's daughter aman Mary per HIPAA, states she prefers a call back after 3:30pm or 4pm. CONG will try calling back later.

## 2024-10-01 ENCOUNTER — TELEPHONE (OUTPATIENT)
Dept: FAMILY MEDICINE CLINIC | Facility: CLINIC | Age: 74
End: 2024-10-01

## 2024-10-01 NOTE — TELEPHONE ENCOUNTER
Pt's daughter called back   Stated swelling of foot started Sunday , wearing air booties,use air mattress  Requesting a Televisit d/t difficult to bring to Clinic, stated she can send pictures if needed   Asking if Labs are needed

## 2024-10-01 NOTE — TELEPHONE ENCOUNTER
Brinda from [facility] calling for Patient.  Patient was seen yesterday by [facility] for re certification.  Brinda noticed Patient's top of right foot is swollen. Unknown cause.  No pain, no fever, no bruising per Brinda.  Patient able to bear weight while walking short distances.  Patient has medial ankle wound. Seen by wound care clinic last Friday.  Patient is wearing a heel protector boot while laying in bed.    Left message to call back, daughter, Tyra for further information.

## 2024-10-01 NOTE — TELEPHONE ENCOUNTER
Verbal order form signed by  and faxed back to [facility].  Forms sent to scanning.

## 2024-10-01 NOTE — PROGRESS NOTES
Transitions of Care Navigation  Discharge Date: 8/30/24  Contact Date: 10/1/2024    Transitions of Care Assessment:  [facility] Follow-up Assessment    General:  Assessment completed with: Family  Patient Subjective: NCM spoke with patient's daughter states he is feeling better. Patient's daughter states  continues to follow up with patient. She reports condom cathteter is draining well. Patient followed up with wound clinic last friday, everything went well. Patient's daughter requested update on tilt wheelchair order. Tyra-daughter denies pt having any fevers, chills, nausea, vomiting, or any other symptoms.  Chief Complaint: Leukocytosis, unspecified type  Community Resources: Home Health    Progress/Care Plan:  Is the patient progressing as planned?: Yes  Care Plan Update: Patient's daughter states patient is feeling well. She states patient's condom catheter is draining well. She denies patient having any symptoms at this time.  New Care Plan: Patient will continue to follow up with wound care. Tyra will continue to monitor patient's symptoms, if recurring or worsening will proceed to ED.  Frequency/Follow Up Plan: 14 day follow up     Notes:  Navigator Notes: NCSTEFANO reached out [employer] [facility] to check status on tilt wheelchair. Per Francoise they are still waiting for wheelchair to arrive. She states it is a special order and they do not have a timeframe of when it will arrive. NCM advised daughter to contact [employer] to speak with a manager for order update, daughter states it has been sometime they have been working on this.     Nursing Interventions:  All discharge instructions reviewed with the patient. Reviewed when to call MD vs when to call 911 or go the ED. Educated patient on the importance of taking all meds as prescribed as well as close f/u with PCP/specialists. Pt verbalized understanding and will contact the office with any further questions or concerns. Patient denies fevers, chills, nausea,  vomiting, shortness of breath, chest pain, or any other symptoms at this time.  NCM provided contact information for any further questions/concerns. Patient verbalized understanding and agreeable.         Medications:No medication changes.   Current Outpatient Medications   Medication Sig Dispense Refill    tamsulosin 0.4 MG Oral Cap TAKE 1 CAPSULE(0.4 MG) BY MOUTH DAILY 90 capsule 3    metoprolol tartrate 25 MG Oral Tab Take 0.5 tablets (12.5 mg total) by mouth 2 (two) times daily. 90 tablet 3    benzonatate 200 MG Oral Cap Take 1 capsule (200 mg total) by mouth 3 (three) times daily as needed for cough. 12 capsule 0    calcium carbonate-vitamin D 250-3.125 MG-MCG Oral Tab Take 1 tablet by mouth 2 (two) times daily with meals. 60 tablet 0    cetirizine 5 MG Oral Tab Take 1 tablet (5 mg total) by mouth daily. 30 tablet 0    HYDROcodone-acetaminophen 5-325 MG Oral Tab Take 1 tablet by mouth every 8 (eight) hours as needed for Pain. Watch drowsiness no alcohol 12 tablet 0    lactulose 10 GM/15ML Oral Solution Take 30 mL (20 g total) by mouth every 6 (six) hours as needed (severe). 300 mL 0    polyethylene glycol, PEG 3350, 17 g Oral Powd Pack Take 17 g by mouth daily as needed (If no bowel movement in last 24 hours). 30 packet 0    sennosides 17.2 MG Oral Tab Take 1 tablet (17.2 mg total) by mouth daily. 30 tablet 0    Vitamin C 500 MG Oral Tab Take 1 tablet (500 mg total) by mouth daily.      Nutritional Supplements (ABDOULAYE NUTRIVIGOR OR) Take by mouth.      Ferrous Sulfate 220 (44 Fe) MG/5ML Oral Solution Take 220 mg by mouth daily. 473 mL 0    ferrous sulfate 300 (60 Fe) MG/5ML Oral Solution Take 5 mL (300 mg total) by mouth daily. 1 each 0    levETIRAcetam 100 MG/ML Oral Solution Take 2.5 mL (250 mg total) by mouth every morning.      levETIRAcetam 100 MG/ML Oral Solution Take 5 mL (500 mg total) by mouth at bedtime.      collagenase 250 UNIT/GM External Ointment Apply topically 2 (two) times a day.       acetaminophen 500 MG Oral Tab Take 1 tablet (500 mg total) by mouth every 8 (eight) hours.      rivaroxaban (XARELTO) 20 MG Oral Tab Take 1 tablet (20 mg total) by mouth daily with food. 90 tablet 0

## 2024-10-01 NOTE — TELEPHONE ENCOUNTER
Spoke to daughter and she advised the top of his foot is mildly swollen, no open wound, not hot to touch. She advised the home health nurse basically just want to make Dr. Sheets aware. Patient was seen in the wound care on 9/27/24. Daughter advised she cannot keep taking him to the ER for every thing. She doesn't feel this warrant's an ER. She is willing to take  photo of the foot and will place the picture on my chart.

## 2024-10-03 ENCOUNTER — TELEPHONE (OUTPATIENT)
Dept: FAMILY MEDICINE CLINIC | Facility: CLINIC | Age: 74
End: 2024-10-03

## 2024-10-03 NOTE — TELEPHONE ENCOUNTER
HH orders and plan of care received, signed by provider and faxed back successfully.    Placed in HH folder with scan sheet.

## 2024-10-04 ENCOUNTER — APPOINTMENT (OUTPATIENT)
Dept: WOUND CARE | Facility: HOSPITAL | Age: 74
End: 2024-10-04
Attending: NURSE PRACTITIONER
Payer: MEDICARE

## 2024-10-08 ENCOUNTER — PATIENT OUTREACH (OUTPATIENT)
Dept: CASE MANAGEMENT | Age: 74
End: 2024-10-08

## 2024-10-09 ENCOUNTER — APPOINTMENT (OUTPATIENT)
Dept: WOUND CARE | Facility: HOSPITAL | Age: 74
End: 2024-10-09
Attending: NURSE PRACTITIONER
Payer: MEDICARE

## 2024-10-09 NOTE — PROGRESS NOTES
Transitions of Care Navigation  Discharge Date: 8/30/24  Contact Date: 10/9/2024    Transitions of Care Assessment:  [facility] Follow-up Assessment    General:  Assessment completed with: Family (daughter)  Patient Subjective: NCM spoke with patient's daughter Tyra, states patient is doing well. Tyra denies any fevers, chills, nausea, vomiting, shortness of breath, chest pain. She states condom cathter is draining well. Tyra palomo has not heard from [employer] [facility], regarding tilt wheelchair, NCM called [employer] spoke with Sarah, states they have yet to received stock on the tilt wheelchair. She will speak with supervisor and have them update Tyra.  Chief Complaint: Leukocytosis  Community Resources: Home Health    Progress/Care Plan:  Is the patient progressing as planned?: Yes  Care Plan Update: Patient's daughter Tyra, states patient is doing well. Tyra denies any fevers, chills, nausea, vomiting, shortness of breath, chest pain. She states condom cathter is draining well. Tyra states has not heard from [employer] [facility], regarding tilt wheelchair, NC called [employer] spoke with Sarah, states they have yet to received stock on the tilt wheelchair. She will speak with supervisor and have them update Tyra.  New Care Plan: Patient will continue to follow up with wound care. Daughter to monitor for worsening signs and symptoms.  Frequency/Follow Up Plan: 21 day follow up     Notes:  Navigator Notes: NCM reached out [employer] [facility] to check status on tilt wheelchair. Per Sarah they are still waiting for wheelchair to arrive. She states will discuss with supervisor and have them reach out to daughter for update.     Nursing Interventions:  All discharge instructions reviewed with the patient. Reviewed when to call MD vs when to call 911 or go the ED. Educated patient on the importance of taking all meds as prescribed as well as close f/u with PCP/specialists. Pt verbalized understanding  and will contact the office with any further questions or concerns. Patient denies fevers, chills, nausea, vomiting, shortness of breath, chest pain, or any other symptoms at this time.  NCM provided contact information for any further questions/concerns. Patient verbalized understanding and agreeable.         Medications:Reviewed medication list.  Medications are up to date.  Current Outpatient Medications   Medication Sig Dispense Refill    tamsulosin 0.4 MG Oral Cap TAKE 1 CAPSULE(0.4 MG) BY MOUTH DAILY 90 capsule 3    metoprolol tartrate 25 MG Oral Tab Take 0.5 tablets (12.5 mg total) by mouth 2 (two) times daily. 90 tablet 3    benzonatate 200 MG Oral Cap Take 1 capsule (200 mg total) by mouth 3 (three) times daily as needed for cough. 12 capsule 0    calcium carbonate-vitamin D 250-3.125 MG-MCG Oral Tab Take 1 tablet by mouth 2 (two) times daily with meals. 60 tablet 0    cetirizine 5 MG Oral Tab Take 1 tablet (5 mg total) by mouth daily. 30 tablet 0    HYDROcodone-acetaminophen 5-325 MG Oral Tab Take 1 tablet by mouth every 8 (eight) hours as needed for Pain. Watch drowsiness no alcohol 12 tablet 0    lactulose 10 GM/15ML Oral Solution Take 30 mL (20 g total) by mouth every 6 (six) hours as needed (severe). 300 mL 0    polyethylene glycol, PEG 3350, 17 g Oral Powd Pack Take 17 g by mouth daily as needed (If no bowel movement in last 24 hours). 30 packet 0    sennosides 17.2 MG Oral Tab Take 1 tablet (17.2 mg total) by mouth daily. 30 tablet 0    Vitamin C 500 MG Oral Tab Take 1 tablet (500 mg total) by mouth daily.      Nutritional Supplements (ABDOULAYE NUTRIVIGOR OR) Take by mouth.      Ferrous Sulfate 220 (44 Fe) MG/5ML Oral Solution Take 220 mg by mouth daily. 473 mL 0    ferrous sulfate 300 (60 Fe) MG/5ML Oral Solution Take 5 mL (300 mg total) by mouth daily. 1 each 0    levETIRAcetam 100 MG/ML Oral Solution Take 2.5 mL (250 mg total) by mouth every morning.      levETIRAcetam 100 MG/ML Oral Solution Take 5  mL (500 mg total) by mouth at bedtime.      collagenase 250 UNIT/GM External Ointment Apply topically 2 (two) times a day.      acetaminophen 500 MG Oral Tab Take 1 tablet (500 mg total) by mouth every 8 (eight) hours.      rivaroxaban (XARELTO) 20 MG Oral Tab Take 1 tablet (20 mg total) by mouth daily with food. 90 tablet 0

## 2024-10-10 ENCOUNTER — TELEPHONE (OUTPATIENT)
Dept: FAMILY MEDICINE CLINIC | Facility: CLINIC | Age: 74
End: 2024-10-10

## 2024-10-10 NOTE — TELEPHONE ENCOUNTER
[facility]  [phone]     Let him know if Dr Sheets is agreeable to continued home health care for new wheelchair education, safety for patient and care giver.

## 2024-10-16 ENCOUNTER — TELEPHONE (OUTPATIENT)
Dept: FAMILY MEDICINE CLINIC | Facility: CLINIC | Age: 74
End: 2024-10-16

## 2024-10-16 ENCOUNTER — APPOINTMENT (OUTPATIENT)
Dept: WOUND CARE | Facility: HOSPITAL | Age: 74
End: 2024-10-16
Attending: NURSE PRACTITIONER
Payer: MEDICARE

## 2024-10-16 ENCOUNTER — PATIENT OUTREACH (OUTPATIENT)
Dept: CASE MANAGEMENT | Age: 74
End: 2024-10-16

## 2024-10-16 NOTE — PROGRESS NOTES
Left message on mailbox for patient to call nurse care manager back for transitional care management/hospital follow-up call.  Nurse care  information included in message.

## 2024-10-16 NOTE — TELEPHONE ENCOUNTER
Late documentation-  orders [id number] received 10/03/24, signed by provider and faxed back successfully.     Placed in HH folder with scan sheet.

## 2024-10-18 ENCOUNTER — APPOINTMENT (OUTPATIENT)
Dept: WOUND CARE | Facility: HOSPITAL | Age: 74
End: 2024-10-18
Attending: NURSE PRACTITIONER
Payer: MEDICARE

## 2024-10-22 ENCOUNTER — TELEPHONE (OUTPATIENT)
Dept: FAMILY MEDICINE CLINIC | Facility: CLINIC | Age: 74
End: 2024-10-22

## 2024-10-22 NOTE — TELEPHONE ENCOUNTER
[facility]idential  orders received, signed by provider and faxed back successfully.    Placed in scanning.

## 2024-10-23 ENCOUNTER — APPOINTMENT (OUTPATIENT)
Dept: WOUND CARE | Facility: HOSPITAL | Age: 74
End: 2024-10-23
Attending: NURSE PRACTITIONER
Payer: MEDICARE

## 2024-10-23 ENCOUNTER — LAB ENCOUNTER (OUTPATIENT)
Dept: LAB | Facility: HOSPITAL | Age: 74
End: 2024-10-23
Attending: NURSE PRACTITIONER
Payer: MEDICARE

## 2024-10-23 ENCOUNTER — TELEPHONE (OUTPATIENT)
Dept: WOUND CARE | Facility: HOSPITAL | Age: 74
End: 2024-10-23

## 2024-10-23 VITALS
SYSTOLIC BLOOD PRESSURE: 112 MMHG | DIASTOLIC BLOOD PRESSURE: 60 MMHG | OXYGEN SATURATION: 97 % | HEART RATE: 124 BPM | TEMPERATURE: 97 F

## 2024-10-23 DIAGNOSIS — L89.223 STAGE III PRESSURE ULCER OF LEFT HIP (HCC): ICD-10-CM

## 2024-10-23 DIAGNOSIS — L89.512 PRESSURE INJURY OF RIGHT ANKLE, STAGE 2 (HCC): ICD-10-CM

## 2024-10-23 DIAGNOSIS — L89.614 PRESSURE INJURY OF RIGHT HEEL, STAGE 4 (HCC): ICD-10-CM

## 2024-10-23 DIAGNOSIS — L89.513 PRESSURE ULCER OF RIGHT ANKLE, STAGE 3 (HCC): ICD-10-CM

## 2024-10-23 DIAGNOSIS — L89.154 PRESSURE INJURY OF SACRAL REGION, STAGE 4 (HCC): Primary | ICD-10-CM

## 2024-10-23 DIAGNOSIS — L89.154 PRESSURE INJURY OF SACRAL REGION, STAGE 4 (HCC): ICD-10-CM

## 2024-10-23 DIAGNOSIS — D72.829 LEUKOCYTOSIS, UNSPECIFIED TYPE: ICD-10-CM

## 2024-10-23 DIAGNOSIS — R26.9 GAIT DISTURBANCE: ICD-10-CM

## 2024-10-23 LAB
ALBUMIN SERPL-MCNC: 4.2 G/DL (ref 3.2–4.8)
ALBUMIN/GLOB SERPL: 1.3 {RATIO} (ref 1–2)
ALP LIVER SERPL-CCNC: 73 U/L
ALT SERPL-CCNC: 17 U/L
ANION GAP SERPL CALC-SCNC: 10 MMOL/L (ref 0–18)
AST SERPL-CCNC: 24 U/L (ref ?–34)
BASOPHILS # BLD AUTO: 0.07 X10(3) UL (ref 0–0.2)
BASOPHILS NFR BLD AUTO: 0.5 %
BILIRUB SERPL-MCNC: 0.3 MG/DL (ref 0.2–1.1)
BUN BLD-MCNC: 21 MG/DL (ref 9–23)
BUN/CREAT SERPL: 33.9 (ref 10–20)
CALCIUM BLD-MCNC: 9.8 MG/DL (ref 8.7–10.4)
CHLORIDE SERPL-SCNC: 107 MMOL/L (ref 98–112)
CO2 SERPL-SCNC: 25 MMOL/L (ref 21–32)
CREAT BLD-MCNC: 0.62 MG/DL
CRP SERPL-MCNC: 6.6 MG/DL (ref ?–1)
DEPRECATED RDW RBC AUTO: 49.2 FL (ref 35.1–46.3)
EGFRCR SERPLBLD CKD-EPI 2021: 101 ML/MIN/1.73M2 (ref 60–?)
EOSINOPHIL # BLD AUTO: 0.38 X10(3) UL (ref 0–0.7)
EOSINOPHIL NFR BLD AUTO: 3 %
ERYTHROCYTE [DISTWIDTH] IN BLOOD BY AUTOMATED COUNT: 15.6 % (ref 11–15)
ERYTHROCYTE [SEDIMENTATION RATE] IN BLOOD: 123 MM/HR
ERYTHROCYTE [SEDIMENTATION RATE] IN BLOOD: 125 MM/HR
FASTING STATUS PATIENT QL REPORTED: NO
GLOBULIN PLAS-MCNC: 3.3 G/DL (ref 2–3.5)
GLUCOSE BLD-MCNC: 132 MG/DL (ref 70–99)
HCT VFR BLD AUTO: 38.1 %
HGB BLD-MCNC: 11.7 G/DL
IMM GRANULOCYTES # BLD AUTO: 0.16 X10(3) UL (ref 0–1)
IMM GRANULOCYTES NFR BLD: 1.2 %
LYMPHOCYTES # BLD AUTO: 1.61 X10(3) UL (ref 1–4)
LYMPHOCYTES NFR BLD AUTO: 12.6 %
MCH RBC QN AUTO: 26.5 PG (ref 26–34)
MCHC RBC AUTO-ENTMCNC: 30.7 G/DL (ref 31–37)
MCV RBC AUTO: 86.2 FL
MONOCYTES # BLD AUTO: 0.85 X10(3) UL (ref 0.1–1)
MONOCYTES NFR BLD AUTO: 6.6 %
NEUTROPHILS # BLD AUTO: 9.75 X10 (3) UL (ref 1.5–7.7)
NEUTROPHILS # BLD AUTO: 9.75 X10(3) UL (ref 1.5–7.7)
NEUTROPHILS NFR BLD AUTO: 76.1 %
OSMOLALITY SERPL CALC.SUM OF ELEC: 299 MOSM/KG (ref 275–295)
PLATELET # BLD AUTO: 475 10(3)UL (ref 150–450)
POTASSIUM SERPL-SCNC: 3.4 MMOL/L (ref 3.5–5.1)
PROT SERPL-MCNC: 7.5 G/DL (ref 5.7–8.2)
RBC # BLD AUTO: 4.42 X10(6)UL
SODIUM SERPL-SCNC: 142 MMOL/L (ref 136–145)
WBC # BLD AUTO: 12.8 X10(3) UL (ref 4–11)

## 2024-10-23 PROCEDURE — 11042 DBRDMT SUBQ TIS 1ST 20SQCM/<: CPT | Performed by: NURSE PRACTITIONER

## 2024-10-23 PROCEDURE — 80053 COMPREHEN METABOLIC PANEL: CPT

## 2024-10-23 PROCEDURE — 85025 COMPLETE CBC W/AUTO DIFF WBC: CPT

## 2024-10-23 PROCEDURE — 36415 COLL VENOUS BLD VENIPUNCTURE: CPT

## 2024-10-23 PROCEDURE — 86140 C-REACTIVE PROTEIN: CPT

## 2024-10-23 PROCEDURE — 85652 RBC SED RATE AUTOMATED: CPT

## 2024-10-23 RX ORDER — SODIUM HYPOCHLORITE 1.25 MG/ML
SOLUTION TOPICAL
Qty: 1 EACH | Refills: 5 | Status: SHIPPED | OUTPATIENT
Start: 2024-10-23

## 2024-10-23 RX ORDER — CLINDAMYCIN HYDROCHLORIDE 300 MG/1
300 CAPSULE ORAL 3 TIMES DAILY
Refills: 0 | Status: CANCELLED | OUTPATIENT
Start: 2024-10-23 | End: 2024-11-02

## 2024-10-23 NOTE — PROGRESS NOTES
Patient ID: Yoseph Cordero is a 74 year old male.    Debridement Pressure Injury Sacrum   Wound 08/23/24 3 Sacrum    Performed by: George Sampson APRN  Authorized by: George Sampson APRN      Consent   Consent obtained? verbal  Consent given by: patient  Risks discussed? procedural risks discussed  Time out called at 10/23/2024 11:41 AM  Immediately prior to the procedure a time out was called and the performing provider verified the correct patient, procedure, equipment, support staff, and site/side marked as required.    Debridement Details  Performed by: physician  Debridement type: surgical  Level of debridement: subcutaneous tissue  Pain control: none  Pain control administration type: topical    Pre-debridement measurements  Length (cm): 7.1  Width (cm): 5.2  Depth (cm): 2.5  Surface Area (cm^2): 36.92    Post-debridement measurements  Length (cm): 7.1  Width (cm): 5.2  Depth (cm): 2.5  Percent debrided: 25%  Surface Area (cm^2): 36.92  Area Debrided (cm^2): 9.23  Volume (cm^3): 92.3    Tissue and other material debrided: subcutaneous tissue  Devitalized tissue debrided: biofilm, fibrin, necrotic debris and slough  Instrument(s) utilized: forceps, blade and curette  Bleeding: medium  Hemostasis obtained with: not applicable  Procedural pain (0-10): 4  Post-procedural pain: 1   Response to treatment: procedure was tolerated well

## 2024-10-23 NOTE — PROGRESS NOTES
Subjective   Yoseph Cordero is a 74 year old male.    Chief Complaint   Patient presents with    Wound Recheck     Pt is accompanied by family (son and wife). Pt's son reports that the sacral wound has become worse in odor and is not healing well, and has developed eschar and slough. They are wanting to establish a plan of care.   Wound vac on hip is changed 2-3x weekly.   Sacrum, ankle and heel dressings are changed daily.        HPI  10/23/2024: Patient is here with his son and wife for wounds care, reporting scaral wound has foul smell.  9/27/2024: Patient is here with his son and his wife for follow up wound care, his daughter is doing dressing changes, reporting the wounds on right heel closed now feels boggy on proximal section. Family continues to feed patient and reposition patient as needed.   9/13/2024: Patient is here with his son, daughter and wife. He is awake. He has wound vac on his left hip. Sacra wound has become granular, heel wound has closed, ankle wound is dark granular, no new wounds. His family is feeding him high protein, collagen and wound healing supplement, they have been repositioning him, home health nurse has been doing dressing changes as well as her daughter.  8/23/2024: Patient is here with his two sons and wife. He has wound vac on left hip. His son reporting the sacral wound has foul smell with back edges, no fever at time of this visit.     8/9/2024: Patient is here with her daughter who is providing the information, patient is non-verbal. Patient's daughter has taken patient home, home health nurse has not started as of now, she is doing all wounds the dressing changes. Patient has hospital bed with air mattress with lift.      7/25/2024: Patient is a 74-year-old male with a past medical history of dementia nonverbal at baseline, chronic right hemiparesis from prior CVA, seizure disorder on Keppra, VTE with factor V Leiden on Xarelto, anemia, chronic right heel stage IV, left  ischial stage IV and sacral decubitus ulcer stage IV with recent hospitalization on 7/9-7/19 for severe sepsis secondary to Proteus bacteremia treated with IV Zosyn discharged on 7/19/2024 with midline with plan for 4 weeks of IV antibiotics tp [facility] extended-care [facility]F for placement.   Per SNF, patient had acquired the pressure injuries at his right heel, left ischial/hip and sacral at home while he had home health services.  Per patient's daughter patient eats well, has multiple stool a day, incontinent. Patient's daughter is not happy with care her father has been getting.   Patient was here today 7/23/2024 to see Dr. Sury WALDROP (Podiatrist) for heel wounds, I was asked us to see this patients today for multiple wounds that wound clinic was not aware he had at time of masha.     Review of Systems   Constitutional:  Negative for activity change.   Respiratory:  Negative for cough and shortness of breath.    Cardiovascular:  Negative for leg swelling.   Gastrointestinal:  Negative for abdominal distention.   Skin:  Positive for wound.        Multiple wounds   Neurological:  Positive for weakness.   Psychiatric/Behavioral:  Negative for agitation.       Objective  Physical Exam  Vitals reviewed.   Constitutional:       General: He is awake.      Appearance: He is underweight.      Comments: Non-verbal, unable to follow commends    Cardiovascular:      Rate and Rhythm: Normal rate and regular rhythm.      Pulses: Normal pulses.   Pulmonary:      Effort: Pulmonary effort is normal.   Abdominal:      General: Bowel sounds are normal.      Palpations: Abdomen is soft.   Musculoskeletal:      Right lower leg: No edema.      Left lower leg: No edema.   Skin:     General: Skin is warm.      Findings: Wounds on right heel, left ankle, left hip and sacrum present. No erythema or rash.      Nails: There is no clubbing.    Wound Assessment  Wound 08/23/24 1 Heel Right (Active)   Date First Assessed: 08/23/24   Present on  Original Admission: Yes   Wound Number (Wound Clinic Only): 1  Primary Wound Type: Pressure Injury  Location: Heel  Wound Location Orientation: Right      Assessments 7/23/2024  8:50 AM 10/23/2024 11:15 AM   Wound Image          Site Assessment -- Moist;[place]   Drainage Amount -- Moderate   Drainage Description -- Sanguineous;Serosanguineous   Treatments -- Cleansed;Vashe;Saline   Dressing -- Acticoat;Aquacel Foam   Dressing Changed -- Changed   Dressing Status -- Dry;Intact;Clean   Wound Length (cm) 6.5 cm 0.4 cm   Wound Width (cm) 4.4 cm 0.4 cm   Wound Surface Area (cm^2) 28.6 cm^2 0.16 cm^2   Wound Depth (cm) 1 cm 0.8 cm   Wound Volume (cm^3) 28.6 cm^3 0.128 cm^3   Wound Healing % -- 100   Margins -- Well-defined edges;Not attached   Non-staged Wound Description -- Full thickness   Madhuri-wound Assessment -- Dry;Hyperpigmented   Wound Granulation Tissue -- Red   Wound Bed Granulation (%) -- 100 %   Wound Bed Epithelium (%) -- 0 %   Wound Bed Slough (%) -- 0 %   Wound Bed Eschar (%) -- 0 %   Wound Bed Fibrin (%) -- 0 %   Exposed Structure Bone --   State of Healing -- Non-healing   Wound Odor -- None   Undermining? -- Yes (6-12 = 2cm)   Number of Undermines -- 1   Undermine 1 Start Position -- 6   Undermine 1 End Position -- 12       Active Orders   Date Order Priority Status Authorizing Provider   07/25/24 1711 OP Wound Dressing Routine Active George Sampson APRN     - Dressing:    Collagen     - Dressing:    Hydrofera transfer     - Dressing:    ABD pad     - Dressing:    Kerlix     - Cleansing:    Cleanse with normal saline or wound cleanser     - Frequency:    Change dressing three times a day       Inactive Orders   Date Order Priority Status Authorizing Provider   08/30/24 0759 Apply dressing Other ( Xeroform) Daily Routine Discontinued Grisel Zambrano MD     - Dressing type:    Other ( Xeroform)   08/26/24 0848 Apply dressing Other ( Xeroform) Daily Routine Discontinued Ric Evans MD     - Dressing  type:    Other ( Xeroform)   08/24/24 0219 Consult to Wound Ostomy Routine Completed Bernadine Vazquez MD     - Reason for Consult:    Wound Care     - Wound Care Reason for Consult:    decubitus ulcer infection   08/24/24 0049 Consult to Wound Ostomy Routine Discontinued Bernadine Vazquez MD     - Reason for Consult:    Wound Care     - Wound Care Reason for Consult:    decubitus ulcer infection   07/23/24 1016 Debridement Pressure Injury Right Heel Routine Completed Sury Blake DPM       Wound 08/23/24 2 Hip Left (Active)   Date First Assessed: 08/23/24   Present on Original Admission: Yes   Wound Number (Wound Clinic Only): 2  Primary Wound Type: Pressure Injury  Location: Hip  Wound Location Orientation: Left      Assessments 7/23/2024  8:50 AM 10/23/2024 11:15 AM   Wound Image         Site Assessment Moist;Tan;Yellow Moist;Yellow;Pink   Closure Not approximated Not approximated   Drainage Amount Large Moderate   Drainage Description Serous;Yellow Serosanguineous   Treatments Wound ;Vashe (traid) Cleansed;Wound    Wound Vac Brand -- Medela   Dressing ABD Pad;Gauze Wound vac sponge (rex; duoderm strips to periwound.)   Dressing Changed Changed Changed   Dressing Status Dressing Changed Clean;Dry;Intact   Wound Length (cm) -- 2.5 cm   Wound Width (cm) -- 2.6 cm   Wound Surface Area (cm^2) -- 6.5 cm^2   Wound Depth (cm) -- 1 cm   Wound Volume (cm^3) -- 6.5 cm^3   Wound Healing % -- 91   Margins Poorly defined Well-defined edges;Not attached   Non-staged Wound Description Full thickness --   Madhuri-wound Assessment Purple;Pink;Painful Dry;Pink   Wound Granulation Tissue -- Pink   Wound Bed Granulation (%) -- 70 %   Wound Bed Epithelium (%) -- 0 %   Wound Bed Slough (%) 100 % 30 %   Wound Bed Eschar (%) -- 0 %   Wound Bed Fibrin (%) -- 0 %   Exposed Structure -- Tendon;Bone   State of Healing Undermining Early/partial granulation;Non-healing   Wound Odor None --   Undermining? Yes Yes (7 to  3 = 1.5 cm)   Number of Undermines 1 1   Undermine 1 Start Position 12 (0.3cm depth) 7   Undermine 1 End Position 12 3   Pressure Injury Stage -- Stage 4       Active Orders   Date Order Priority Status Authorizing Provider   07/25/24 1711 OP Wound Dressing Routine Active George Sampson APRN     - Dressing:    Dry gauze     - Dressing:    ABD pad     - Additional Wound Dressing Information:    traid     - Cleansing:    Cleanse with normal saline or wound cleanser     - Frequency:    Change dressing daily and PRN   07/25/24 1711 OP Wound Dressing Routine Active George Sampson APRN       Inactive Orders   Date Order Priority Status Authorizing Provider   08/26/24 0848 collagenase (Santyl) 250 UNIT/GM ointment Routine Discontinued Ric Evans MD     - Please indicate site of application:    Affected Area(s)   08/26/24 0848 Apply dressing Other (Santyl) Daily Routine Discontinued Ric Evans MD     - Dressing type:    Other (Santyl)   08/24/24 0219 Consult to Wound Ostomy Routine Completed Bernadine Vazquez MD     - Reason for Consult:    Wound Care     - Wound Care Reason for Consult:    decubitus ulcer infection   08/24/24 0049 Consult to Wound Ostomy Routine Discontinued Bernadine Vazquez MD     - Reason for Consult:    Wound Care     - Wound Care Reason for Consult:    decubitus ulcer infection       Wound 08/23/24 3 Sacrum (Active)   Date First Assessed: 08/23/24   Present on Original Admission: Yes   Wound Number (Wound Clinic Only): 3  Primary Wound Type: Pressure Injury  Location: Sacrum      Assessments 7/23/2024  8:50 AM 10/23/2024 11:49 AM   Wound Image               Site Assessment Moist;Painful;Yellow;Red Moist;Red;Granulation tissue   Closure Not approximated Not approximated   Drainage Amount Large Large   Drainage Description Yellow Yellow;Serosanguineous;Brown   Treatments Wound ;Topical (Barrier/Moisturizer/Ointment);Special Tape Cleansed;Wound ;Topical (Barrier/Moisturizer/Ointment);Vashe    Dressing ABD Pad;Gauze (dakins) Gauze;Aquacel Foam (dakin's solution)   Dressing Changed Changed Changed   Dressing Status -- Clean;Dry;Intact   Wound Length (cm) 7 cm 7.1 cm   Wound Width (cm) 7 cm 5.2 cm   Wound Surface Area (cm^2) 49 cm^2 36.92 cm^2   Wound Depth (cm) 2.6 cm 2.5 cm   Wound Volume (cm^3) 127.4 cm^3 92.3 cm^3   Wound Healing % -- 28   Margins Poorly defined Well-defined edges   Non-staged Wound Description Full thickness Full thickness   Madhuri-wound Assessment Maceration;Painful;Excoriated;Pink Moist   Wound Granulation Tissue Pink Pink;Red   Wound Bed Granulation (%) 30 % 80 %   Wound Bed Epithelium (%) -- 0 %   Wound Bed Slough (%) 40 % 20 %   Wound Bed Eschar (%) 30 % 0 %   Wound Bed Fibrin (%) -- 0 %   Exposed Structure Bone Necrosis;Bone --   State of Healing -- Non-healing;Early/partial granulation   Wound Odor Mild None   Undermining? Yes (1.8 cm) Yes   Number of Undermines 1 1   Undermine 1 Start Position 7 6   Undermine 1 End Position 11 4   Pressure Injury Stage Stage 4 Stage 4       Active Orders   Date Order Priority Status Authorizing Provider   07/25/24 1711 OP Wound Dressing Routine Active George Sampson APRN     - Dressing:    Dry gauze     - Dressing:    ABD pad     - Additional Wound Dressing Information:    dakin soaked     - Cleansing:    Cleanse with Dakins     - Frequency:    Change dressing two times a day   07/25/24 1711 OP Wound Dressing Routine Active George Sampson APRN       Inactive Orders   Date Order Priority Status Authorizing Provider   10/23/24 1141 Debridement Pressure Injury Sacrum Routine Completed George Sampson APRN   08/26/24 0848 collagenase (Santyl) 250 UNIT/GM ointment Routine Discontinued Ric Evans MD     - Please indicate site of application:    Affected Area(s)   08/26/24 0848 Apply dressing Other (Santyl) Daily Routine Discontinued Ric Evans MD     - Dressing type:    Other (Santyl)   08/25/24 1217 collagenase (Santyl) 250 UNIT/GM ointment Routine  Discontinued Criselda Conley PA-C     - Please indicate site of application:    Affected Area(s)   08/24/24 0219 Consult to Wound Ostomy Routine Completed Bernadine Vazquez MD     - Reason for Consult:    Wound Care     - Wound Care Reason for Consult:    decubitus ulcer infection   08/24/24 0049 Consult to Wound Ostomy Routine Discontinued Bernadine Vazquez MD     - Reason for Consult:    Wound Care     - Wound Care Reason for Consult:    decubitus ulcer infection   08/23/24 1539 Debridement Pressure Injury Sacrum Routine Completed George Sampson APRN   07/23/24 1011 Debridement Pressure Injury Sacrum Routine Completed George Sampson, BEAU       Wound 08/23/24 4 Ankle Right;Medial (Active)   Date First Assessed: 08/23/24   Present on Original Admission: Yes   Wound Number (Wound Clinic Only): 4  Primary Wound Type: Pressure Injury  Location: Ankle  Wound Location Orientation: Right;Medial      Assessments 7/23/2024  8:50 AM 10/23/2024 11:15 AM   Wound Image         Site Assessment Moist;Pink Fragile;Purple;Pink   Closure Not approximated Approximated   Drainage Amount Scant None   Drainage Description Serosanguineous --   Treatments Wound ;Honey Gel Cleansed;Wound ;Topical (Barrier/Moisturizer/Ointment)   Dressing Kerlix roll Aquacel Foam   Dressing Changed Changed New   Dressing Status Dressing Changed Clean;Dry;Intact   Wound Length (cm) 0.6 cm 0 cm   Wound Width (cm) 0.8 cm 0 cm   Wound Surface Area (cm^2) 0.48 cm^2 0 cm^2   Wound Depth (cm) 0 cm 0 cm   Wound Volume (cm^3) 0 cm^3 0 cm^3   Margins Poorly defined Poorly defined;Flat and Intact   Non-staged Wound Description Full thickness --   Madhuri-wound Assessment -- Hyperpigmented;Fragile   Wound Bed Granulation (%) 20 % --   Wound Bed Epithelium (%) -- 100 %   Wound Bed Eschar (%) 80 % --   State of Healing Non-healing Closed wound edges   Wound Odor None None   Pressure Injury Stage Unstageable Deep Tissue       Inactive Orders   Date Order Priority  Status Authorizing Provider   08/26/24 0848 collagenase (Santyl) 250 UNIT/GM ointment Routine Discontinued Ric Evans MD     - Please indicate site of application:    Affected Area(s)   08/26/24 0848 Apply dressing Other (Santyl) Daily Routine Discontinued Ric Evans MD     - Dressing type:    Other (Santyl)   08/24/24 0219 Consult to Wound Ostomy Routine Completed Bernadine Vazquez MD     - Reason for Consult:    Wound Care     - Wound Care Reason for Consult:    decubitus ulcer infection   08/24/24 0049 Consult to Wound Ostomy Routine Discontinued Bernadine Vazquez MD     - Reason for Consult:    Wound Care     - Wound Care Reason for Consult:    decubitus ulcer infection   07/25/24 1711 OP Wound Dressing Routine Completed George Sampson APRN     - Dressing:    Kerlix     - Dressing:    Dry gauze     - Dressing:    Honey gel     - Cleansing:    Cleanse with normal saline or wound cleanser     - Frequency:    Change dressing daily and PRN       Negative Pressure Wound Therapy Hip Anterior;Left (Active)   Placement Date/Time: 10/23/24 1137   Location: Hip  Wound Location Orientation: Anterior;Left      Assessments 10/23/2024 11:15 AM   Wound photographed/measured Yes   Machine Status (On) Yes   Site Assessment Pink;Yellow;Painful;Moist   Madhuri-wound Assessment Clean;Intact;Dry;Pink   Unit Type Medela   Dressing Type Black foam;Collagen   Number of Foam Pieces Used 1   Cycle Continuous;On   Target Pressure (mmHg) 125   Drainage Description Serosanguineous   Dressing Status Clean;Dry;Intact       No associated orders.     Negative Pressure Wound Therapy Hip Anterior;Left (Active)   10/23/24 1137 Hip   Placed by External Staff?:    Inserted by:    Wound Type:    Wound Location Orientation: Anterior;Left   Removal Reason:    Wound photographed/measured Yes 10/23/24 1115   Machine Status (On) Yes 10/23/24 1115   Site Assessment Pink;Yellow;Painful;Moist 10/23/24 1115   Madhuri-wound Assessment Clean;Intact;Dry;Pink 10/23/24  1115   Unit Type Medela 10/23/24 1115   Dressing Type Black foam;Collagen 10/23/24 1115   Number of Foam Pieces Used 1 10/23/24 1115   Cycle Continuous;On 10/23/24 1115   Target Pressure (mmHg) 125 10/23/24 1115   Drainage Description Serosanguineous 10/23/24 1115   Dressing Status Clean;Dry;Intact 10/23/24 1115     Vital Signs    10/23/24 1109   BP: 112/60   Pulse: (!) 124   Temp: 97.3 °F (36.3 °C)     Allergies  Allergies[1]  Assessment   Left hip wound, stage IV pressure injury:  -granular tissue, with less exposed tendon, sightly smaller  -no erythema  -no foul odor     Sacral wound, stage IV pressure injury:  -granular  with right side of the wound with necrotic tissue with undermining  -palpable bone, with foul smell  -no erythema     Right heel wound, stage IV:  -small open wound, granular wound bed, no erythema  high risk for re-ulceration, MRI on 7/10 showed osteomyelitis of dorsal calcaneus   -no erythema or other sign of infection     Right ankle wound,stage III pressure injury possibly becoming stage IV  -closed wound with discoloration of skin, fragile skin  -no imaging has been done to r/o OM  -no erythema     Left lateral ankle and left heel wounds, closed.     Reviewed note and labs/imaging at [facility] Every where.  Recent labs: 9/3/2024: BUN 20, Creatinine 0.56, eGFR 104, H&H 10.3 & 33.3  7/15/2024: Albumin 3.6, Globulin 3.4, total protein 7.0    Encounter Diagnosis  1. Pressure injury of sacral region, stage 4 (HCC)    2. Stage III pressure ulcer of left hip (HCC)    3. Pressure injury of right heel, stage 4 (HCC)    4. Pressure ulcer of right ankle, stage 3 (HCC)    5. Pressure injury of right ankle, stage 2 (HCC)    6. Gait disturbance      Problem List  Patient Active Problem List   Diagnosis    Hemiplegia affecting right dominant side (HCC)    Chronic obstructive pulmonary disease (HCC)    Combined forms of age-related cataract of both eyes    Esophageal reflux    Late onset Alzheimer's  disease with behavioral disturbance (HCC)    Alcohol abuse    Calcification of aorta (HCC)    Alcoholism (HCC)    History of stroke    Dysphagia    Hyperglycemia    Weakness    Gait disturbance    Abnormal involuntary movement    Acute pulmonary embolism without acute cor pulmonale, unspecified pulmonary embolism type (HCC)    Factor V Leiden (HCC)    Leukocytosis    Tachycardia    Altered mental status, unspecified altered mental status type    Seizures (HCC)    Infected ulcer of skin, unspecified ulcer stage (HCC)    Infected decubitus ulcer    Transient neurological symptoms    Palliative care by specialist    Pressure injury of sacral region, stage 4 (HCC)    Pressure injury of right hip, stage 3 (HCC)    Pressure injury of right heel, stage 4 (HCC)    Pressure injury of right ankle, stage 2 (HCC)    Complicated open wound of left hip    Non-healing open wound of right heel    Leukocytosis, unspecified type    Sacral decubitus ulcer, stage IV (HCC)     Plan  Orders  Orders Placed This Encounter   Procedures    CBC With Differential With Platelet    C-Reactive Protein    Sed Rate, Westergren (Automated)    Comp Metabolic Panel (14)    Debridement Pressure Injury Sacrum   Palliative wound care: the goal of wound care is to reduce the risk for infection, slow the course of deterioration and promote client comfort and function. If the wounds become granular and shows tissue improvement, may processed to advanced dressing.     Left hip: Continue silver collagen negative pressure wound therapy, may change twice a week if patient tolerated.      Scrum wound: at this time Dakin's solution with gauze, twice a day for one week then resume may continue collagen and silver alginate and secure with Tegaderm dressing, change twice a week and as needed.     Right ankle wound continue with foam dressing.     Right heel, with OM, pack the opening with silver dressing (Acticoat) and cover with foam dressing, may change every two  or three days. May use bordered foma to protect the newly closed wound, as I explained to his family due to underlying OM high risk for re-ulceration.      Patient is allowed to walk if able with PT, since the heel wound is not in the plantar surface.  Continue to re-position to reduce pressure on hip and sacral wound at least every two hours with pressure reduction mattress and seat cushion.  Continue using heel protector/heel boots to reduce pressure in the wound bed.  Monitor for sign of infection, redness, foul odor, warmth and increased drainage.  Discussed treatment plan with the patient's son in detail, all questions answered.     Note to patient:The 21st Century Cures Act makes medical notes like these available to patients in the interest of transparency. Please be advised this is a medical document. Medical documents are intended to carry relevant information, facts as evident, and the clinical opinion of the practitioner. The medical note is intended as peer to peer communication and may appear blunt or direct. It is written in medical language and may contain abbreviations or verbiage that are unfamiliar.   Total face to face time was 70 min, more than 50% of the time was spent in counseling and/or coordination of care related to sacral and left hip wounds  Follow-Up  Return in about 4 weeks (around 11/20/2024) for APN visit 30 min.            [1]   Allergies  Allergen Reactions    Zosyn [Piperacillin-Tazobactam In D5w] RASH     Pt developed pruritic rash on both arms while on this medication

## 2024-10-23 NOTE — TELEPHONE ENCOUNTER
Verified name and  of patient.    Daughter of patient (on release of information) calling to request prescription for antibiotics from Dr. Sheets for infection.    She was advised per BEAU Vazquez in wound care that it is advised that patient go to the emergency room for treatment due to antibiotic allergies.    Note from BEAU Vazquez:  Wound Care Called, discussed labs results, abnormal labs, unable to manage the infection due to antibiotic allergies, needs to go to ED. she verbalzied understanding.   Reason for Call History        She declines. She states that she does not want to bring patient to the emergency room.  This RN reiterated the risks if they do not present to ER today- she verbalized understanding and states that she wants a message sent to Dr. Sheets to request antibiotic prescription. She was advised that Dr. Sheets is not in the office today and that there will not be a response until some time tomorrow or later than that. She was advised that they should not wait to have him have the infection treated and that he needs to go to the emergency room as recommended by BEAU Vazquez.    She verbalized understanding of recommendations but states again that she wants to wait for Dr. Sheets's response.

## 2024-10-23 NOTE — PROGRESS NOTES
Transitions of Care Navigation  Discharge Date: 8/30/24  Contact Date: 10/23/2024    Transitions of Care Assessment:  HUMA Graduation Assessment    General:  Assessment completed with: Family (Ibeth)  Patient Subjective: CONG spoke with aman Cristina per HIPAA, she states patient is doing okay. Pt continues to follow with [facility]. He had an appointment with wound care nurse today. Patient denies any fevers, chills, nausea, vomiting, shortness of breath, chest pain or any others. Tyra states they are doing okay at this time, she denies any questions or concerns at this time. Tyra states they finally had the tilt wheelchair delivered by [employer].  Community Resources: Home Health    Care Plan/Instructions:   Care Plan Summary (Recap of navigation period including # of ED & Hospital Admission, and if goals met or unmet): CONG spoke with aman Cristina per HIPAA, she states patient is doing okay. Pt continues to follow with [facility]. He had an appointment with wound care nurse today. Patient denies any fevers, chills, nausea, vomiting, shortness of breath, chest pain or any others. Tyra states they are doing okay at this time, she denies any questions or concerns at this time.  Patient Graduation Instructions (Ongoing barriers to care identified, Areas of Need, Areas of Progress): Tyra states they will continue to follow up with wound care. She declines any further outreaches. she denies any questions at this time.     Nursing Interventions:  All discharge instructions reviewed with the patient. Reviewed when to call MD vs when to call 911 or go the ED. Educated patient on the importance of taking all meds as prescribed as well as close f/u with PCP/specialists. Pt verbalized understanding and will contact the office with any further questions or concerns. Patient denies fevers, chills, nausea, vomiting, shortness of breath, chest pain, or any other symptoms at this time.  CONG attempted to schedule HFU,  patient declined will call PCP/TCC office directly. NCM sent TE to PCP office re: assistance in scheduling HFU appt. NCM provided contact information for any further questions/concerns. Patient verbalized understanding and agreeable.

## 2024-10-24 ENCOUNTER — HOSPITAL ENCOUNTER (INPATIENT)
Facility: HOSPITAL | Age: 74
LOS: 3 days | Discharge: HOME HEALTH | End: 2024-10-27
Attending: STUDENT IN AN ORGANIZED HEALTH CARE EDUCATION/TRAINING PROGRAM | Admitting: HOSPITALIST
Payer: MEDICARE

## 2024-10-24 ENCOUNTER — APPOINTMENT (OUTPATIENT)
Dept: CT IMAGING | Facility: HOSPITAL | Age: 74
End: 2024-10-24
Attending: STUDENT IN AN ORGANIZED HEALTH CARE EDUCATION/TRAINING PROGRAM
Payer: MEDICARE

## 2024-10-24 ENCOUNTER — TELEPHONE (OUTPATIENT)
Dept: FAMILY MEDICINE CLINIC | Facility: CLINIC | Age: 74
End: 2024-10-24

## 2024-10-24 DIAGNOSIS — L89.154 SACRAL DECUBITUS ULCER, STAGE IV (HCC): Primary | ICD-10-CM

## 2024-10-24 PROBLEM — L03.90 CELLULITIS: Status: ACTIVE | Noted: 2024-10-24

## 2024-10-24 PROBLEM — L89.159 SACRAL DECUBITUS ULCER: Status: ACTIVE | Noted: 2024-10-24

## 2024-10-24 LAB
ALBUMIN SERPL-MCNC: 4.4 G/DL (ref 3.2–4.8)
ALBUMIN/GLOB SERPL: 1.2 {RATIO} (ref 1–2)
ALP LIVER SERPL-CCNC: 75 U/L
ALT SERPL-CCNC: 19 U/L
ANION GAP SERPL CALC-SCNC: 6 MMOL/L (ref 0–18)
AST SERPL-CCNC: 20 U/L (ref ?–34)
BASOPHILS # BLD AUTO: 0.08 X10(3) UL (ref 0–0.2)
BASOPHILS NFR BLD AUTO: 0.6 %
BILIRUB SERPL-MCNC: 0.2 MG/DL (ref 0.2–1.1)
BILIRUB UR QL: NEGATIVE
BUN BLD-MCNC: 28 MG/DL (ref 9–23)
BUN/CREAT SERPL: 41.8 (ref 10–20)
CALCIUM BLD-MCNC: 10.3 MG/DL (ref 8.7–10.4)
CHLORIDE SERPL-SCNC: 103 MMOL/L (ref 98–112)
CLARITY UR: CLEAR
CO2 SERPL-SCNC: 30 MMOL/L (ref 21–32)
COLOR UR: COLORLESS
CREAT BLD-MCNC: 0.67 MG/DL
DEPRECATED RDW RBC AUTO: 49 FL (ref 35.1–46.3)
EGFRCR SERPLBLD CKD-EPI 2021: 99 ML/MIN/1.73M2 (ref 60–?)
EOSINOPHIL # BLD AUTO: 0.36 X10(3) UL (ref 0–0.7)
EOSINOPHIL NFR BLD AUTO: 2.7 %
ERYTHROCYTE [DISTWIDTH] IN BLOOD BY AUTOMATED COUNT: 15.4 % (ref 11–15)
GLOBULIN PLAS-MCNC: 3.8 G/DL (ref 2–3.5)
GLUCOSE BLD-MCNC: 105 MG/DL (ref 70–99)
GLUCOSE UR-MCNC: NORMAL MG/DL
HCT VFR BLD AUTO: 38.7 %
HGB BLD-MCNC: 12.2 G/DL
IMM GRANULOCYTES # BLD AUTO: 0.17 X10(3) UL (ref 0–1)
IMM GRANULOCYTES NFR BLD: 1.3 %
KETONES UR-MCNC: NEGATIVE MG/DL
LACTATE SERPL-SCNC: 1.6 MMOL/L (ref 0.5–2)
LEUKOCYTE ESTERASE UR QL STRIP.AUTO: NEGATIVE
LYMPHOCYTES # BLD AUTO: 1.75 X10(3) UL (ref 1–4)
LYMPHOCYTES NFR BLD AUTO: 13 %
MCH RBC QN AUTO: 27.4 PG (ref 26–34)
MCHC RBC AUTO-ENTMCNC: 31.5 G/DL (ref 31–37)
MCV RBC AUTO: 87 FL
MONOCYTES # BLD AUTO: 0.96 X10(3) UL (ref 0.1–1)
MONOCYTES NFR BLD AUTO: 7.1 %
NEUTROPHILS # BLD AUTO: 10.19 X10 (3) UL (ref 1.5–7.7)
NEUTROPHILS # BLD AUTO: 10.19 X10(3) UL (ref 1.5–7.7)
NEUTROPHILS NFR BLD AUTO: 75.3 %
NITRITE UR QL STRIP.AUTO: NEGATIVE
OSMOLALITY SERPL CALC.SUM OF ELEC: 294 MOSM/KG (ref 275–295)
PH UR: 6 [PH] (ref 5–8)
PLATELET # BLD AUTO: 575 10(3)UL (ref 150–450)
POTASSIUM SERPL-SCNC: 3.6 MMOL/L (ref 3.5–5.1)
PROT SERPL-MCNC: 8.2 G/DL (ref 5.7–8.2)
PROT UR-MCNC: NEGATIVE MG/DL
RBC # BLD AUTO: 4.45 X10(6)UL
SODIUM SERPL-SCNC: 139 MMOL/L (ref 136–145)
SP GR UR STRIP: 1.01 (ref 1–1.03)
UROBILINOGEN UR STRIP-ACNC: NORMAL
WBC # BLD AUTO: 13.5 X10(3) UL (ref 4–11)

## 2024-10-24 PROCEDURE — 99223 1ST HOSP IP/OBS HIGH 75: CPT | Performed by: HOSPITALIST

## 2024-10-24 PROCEDURE — 72193 CT PELVIS W/DYE: CPT | Performed by: STUDENT IN AN ORGANIZED HEALTH CARE EDUCATION/TRAINING PROGRAM

## 2024-10-24 RX ORDER — ONDANSETRON 2 MG/ML
4 INJECTION INTRAMUSCULAR; INTRAVENOUS EVERY 6 HOURS PRN
Status: DISCONTINUED | OUTPATIENT
Start: 2024-10-24 | End: 2024-10-27

## 2024-10-24 RX ORDER — METOCLOPRAMIDE HYDROCHLORIDE 5 MG/ML
10 INJECTION INTRAMUSCULAR; INTRAVENOUS EVERY 8 HOURS PRN
Status: DISCONTINUED | OUTPATIENT
Start: 2024-10-24 | End: 2024-10-27

## 2024-10-24 RX ORDER — LEVETIRACETAM 100 MG/ML
250 SOLUTION ORAL 2 TIMES DAILY
Status: DISCONTINUED | OUTPATIENT
Start: 2024-10-24 | End: 2024-10-27

## 2024-10-24 RX ORDER — ACETAMINOPHEN 500 MG
500 TABLET ORAL EVERY 4 HOURS PRN
Status: DISCONTINUED | OUTPATIENT
Start: 2024-10-24 | End: 2024-10-27

## 2024-10-24 RX ORDER — SODIUM PHOSPHATE, DIBASIC AND SODIUM PHOSPHATE, MONOBASIC 7; 19 G/230ML; G/230ML
1 ENEMA RECTAL ONCE AS NEEDED
Status: DISCONTINUED | OUTPATIENT
Start: 2024-10-24 | End: 2024-10-27

## 2024-10-24 RX ORDER — BISACODYL 10 MG
10 SUPPOSITORY, RECTAL RECTAL
Status: DISCONTINUED | OUTPATIENT
Start: 2024-10-24 | End: 2024-10-27

## 2024-10-24 RX ORDER — HEPARIN SODIUM 5000 [USP'U]/ML
5000 INJECTION, SOLUTION INTRAVENOUS; SUBCUTANEOUS EVERY 12 HOURS SCHEDULED
Status: DISCONTINUED | OUTPATIENT
Start: 2024-10-24 | End: 2024-10-26

## 2024-10-24 RX ORDER — SODIUM CHLORIDE 9 MG/ML
INJECTION, SOLUTION INTRAVENOUS CONTINUOUS
Status: DISCONTINUED | OUTPATIENT
Start: 2024-10-24 | End: 2024-10-27

## 2024-10-24 RX ORDER — POLYETHYLENE GLYCOL 3350 17 G/17G
17 POWDER, FOR SOLUTION ORAL DAILY PRN
Status: DISCONTINUED | OUTPATIENT
Start: 2024-10-24 | End: 2024-10-27

## 2024-10-24 RX ORDER — SENNOSIDES 8.6 MG
17.2 TABLET ORAL NIGHTLY PRN
Status: DISCONTINUED | OUTPATIENT
Start: 2024-10-24 | End: 2024-10-27

## 2024-10-24 NOTE — TELEPHONE ENCOUNTER
Left message to proceed to the ER    Mychart reviewed:    Last read by Yoseph Cordero at  7:27 PM on 10/23/2024.

## 2024-10-24 NOTE — ED INITIAL ASSESSMENT (HPI)
Pt presents to the ED with daughter for elevated CRP and sed rate results that were drawn yesterday. Daughter reports paitent has wound to left hip/sacrum and medial heel. Wound vac applied to left hip currently. Denies any fevers. History of dementia.

## 2024-10-24 NOTE — ED PROVIDER NOTES
Patient Seen in: [facility] Emergency Department      History     Chief Complaint   Patient presents with    Abnormal Labs     Stated Complaint: Abnormal Results    Subjective:   HPI      74-year-old male history of anxiety dementia CVA, HOLLI, multiple chronic wounds including left hip wound with wound VAC in chronic sacral decubitus ulcer for which she follows with wound care.  He was sent to the ER due to abnormal laboratory studies yesterday he was seen in wound care setting and had debridement of necrotic portion of sacral decubitus ulcer.  Labs were obtained which showed elevated ESR and CRP.  He was sent due to concerns for possible sacral decubitus infection.  No fever.  Family reports has been more sleepy.  Not on antibiotics currently.    Objective:     Past Medical History:    Anxiety state, unspecified    CVA (cerebral infarction)    Dementia (HCC)    Depression    Heterozygous factor V Leiden mutation (HCC)    Other and unspecified hyperlipidemia    Other ill-defined conditions(799.89)    Cardiomegaly Pleural effusion w/idopathic pleuropuricarditis    Pulmonary embolism (HCC)    Stroke (HCC)    Unspecified sleep apnea              No pertinent past surgical history.              No pertinent social history.                Physical Exam     ED Triage Vitals [10/24/24 1253]   /73   Pulse 96   Resp 18   Temp 97.4 °F (36.3 °C)   Temp src Temporal   SpO2 98 %   O2 Device None (Room air)       Current Vitals:   Vital Signs  BP: 121/61  Pulse: 93  Resp: 23  Temp: 97.4 °F (36.3 °C)  Temp src: Temporal  MAP (mmHg): 78    Oxygen Therapy  SpO2: 100 %  O2 Device: None (Room air)        Physical Exam    Constitutional: arousable to voice,   HENT: mmm, no lesions,  Neck: normal range of motion, no tenderness, supple.  Eyes: PERRL, EOMI, conjunctiva normal, no discharge. Sclera anicteric.  Cardiovascular: rr no murmur  Respiratory: Normal breath sounds, no respiratory distress, no wheezing, no chest  tenderness.  GI: Bowel sounds normal, Soft, no tenderness, no masses, no pulsatile masses.  -sacral decub as documented below    : No CVA tenderness.  Skin: Warm, dry, no erythema, no rash.  Musculoskeletal: Intact distal pulses, no edema, no tenderness, no cyanosis, no clubbing. Good range of motion in all major joints. No tenderness to palpation or major deformities noted. Back- No tenderness.  Neurologic: Alert & oriented x 1,grossly moves all four ext  Psych: Calm, cooperative, nl affect      ED Course     Labs Reviewed   CBC WITH DIFFERENTIAL WITH PLATELET - Abnormal; Notable for the following components:       Result Value    WBC 13.5 (*)     HGB 12.2 (*)     HCT 38.7 (*)     RDW-SD 49.0 (*)     RDW 15.4 (*)     .0 (*)     Neutrophil Absolute Prelim 10.19 (*)     Neutrophil Absolute 10.19 (*)     All other components within normal limits   COMP METABOLIC PANEL (14) - Abnormal; Notable for the following components:    Glucose 105 (*)     BUN 28 (*)     Creatinine 0.67 (*)     BUN/CREA Ratio 41.8 (*)     Globulin  3.8 (*)     All other components within normal limits   URINALYSIS WITH CULTURE REFLEX - Abnormal; Notable for the following components:    Urine Color Colorless (*)     Blood Urine Trace (*)     RBC Urine 3-5 (*)     All other components within normal limits   LACTIC ACID, PLASMA - Normal   RAINBOW DRAW LAVENDER   RAINBOW DRAW LIGHT GREEN   RAINBOW DRAW BLUE   BLOOD CULTURE   BLOOD CULTURE   AEROBIC BACTERIAL CULTURE                   MDM      74M presenting with elevated ESR and CRP in the context of sacral decubitus ulcer.  On arrival vitals are stable and reassuring.  On exam sacral decubitus ulcers clean margins, does not appear grossly infected  -Will plan for labs and CT pelvis to evaluate for signs of osteomyelitis or abscess      CT and my dependent review without evidence of abscess.  Workup with mild elevation WBC otherwise no acute findings.  Discussed with general surgery ID, will  start Vanco and cefepime per ID recommendations.    Admission disposition: 10/24/2024  5:27 PM           Medical Decision Making      Disposition and Plan     Clinical Impression:  1. Sacral decubitus ulcer, stage IV (HCC)         Disposition:  Admit  10/24/2024  5:27 pm    Follow-up:  No follow-up provider specified.  We recommend that you schedule follow up care with a primary care provider within the next three months to obtain basic health screening including reassessment of your blood pressure.      Medications Prescribed:  Current Discharge Medication List              Supplementary Documentation:         Hospital Problems       Present on Admission  Date Reviewed: 10/23/2024            ICD-10-CM Noted POA    Sacral decubitus ulcer L89.159 10/24/2024 Unknown

## 2024-10-24 NOTE — ED QUICK NOTES
Orders for admission, patient is aware of plan and ready to go upstairs. Any questions, please call ED RN Josie at extension [phone].     Patient Covid vaccination status: Unvaccinated     COVID Test Ordered in ED: None    COVID Suspicion at Admission: N/A    Running Infusions:  None    Mental Status/LOC at time of transport: baseline non-verbal    Other pertinent information: wound vac to left hip, -120 suction. BM x2 in ED  CIWA score: N/A   NIH score:  N/A

## 2024-10-24 NOTE — TELEPHONE ENCOUNTER
Left Voicemail  for daughter Tyra to call back our office. Office phone number provided with office telephone hours.     Patient has not presented to an [facility] ED or IC , nor nothing noted in Care Everywhere   [employer] message was read on 10/23 instructing patient to go to ER     Routing as For Your Information

## 2024-10-25 LAB
ANION GAP SERPL CALC-SCNC: 8 MMOL/L (ref 0–18)
BASOPHILS # BLD AUTO: 0.07 X10(3) UL (ref 0–0.2)
BASOPHILS NFR BLD AUTO: 0.7 %
BUN BLD-MCNC: 15 MG/DL (ref 9–23)
BUN/CREAT SERPL: 31.9 (ref 10–20)
CALCIUM BLD-MCNC: 9.8 MG/DL (ref 8.7–10.4)
CHLORIDE SERPL-SCNC: 108 MMOL/L (ref 98–112)
CO2 SERPL-SCNC: 26 MMOL/L (ref 21–32)
CREAT BLD-MCNC: 0.47 MG/DL
DEPRECATED RDW RBC AUTO: 48.9 FL (ref 35.1–46.3)
EGFRCR SERPLBLD CKD-EPI 2021: 110 ML/MIN/1.73M2 (ref 60–?)
EOSINOPHIL # BLD AUTO: 0.56 X10(3) UL (ref 0–0.7)
EOSINOPHIL NFR BLD AUTO: 5.8 %
ERYTHROCYTE [DISTWIDTH] IN BLOOD BY AUTOMATED COUNT: 15.4 % (ref 11–15)
GLUCOSE BLD-MCNC: 97 MG/DL (ref 70–99)
HCT VFR BLD AUTO: 33.4 %
HGB BLD-MCNC: 10.7 G/DL
IMM GRANULOCYTES # BLD AUTO: 0.1 X10(3) UL (ref 0–1)
IMM GRANULOCYTES NFR BLD: 1 %
LYMPHOCYTES # BLD AUTO: 2.09 X10(3) UL (ref 1–4)
LYMPHOCYTES NFR BLD AUTO: 21.7 %
MCH RBC QN AUTO: 27.6 PG (ref 26–34)
MCHC RBC AUTO-ENTMCNC: 32 G/DL (ref 31–37)
MCV RBC AUTO: 86.3 FL
MONOCYTES # BLD AUTO: 0.85 X10(3) UL (ref 0.1–1)
MONOCYTES NFR BLD AUTO: 8.8 %
NEUTROPHILS # BLD AUTO: 5.98 X10 (3) UL (ref 1.5–7.7)
NEUTROPHILS # BLD AUTO: 5.98 X10(3) UL (ref 1.5–7.7)
NEUTROPHILS NFR BLD AUTO: 62 %
OSMOLALITY SERPL CALC.SUM OF ELEC: 295 MOSM/KG (ref 275–295)
PLATELET # BLD AUTO: 438 10(3)UL (ref 150–450)
POTASSIUM SERPL-SCNC: 3.4 MMOL/L (ref 3.5–5.1)
RBC # BLD AUTO: 3.87 X10(6)UL
SODIUM SERPL-SCNC: 142 MMOL/L (ref 136–145)
WBC # BLD AUTO: 9.7 X10(3) UL (ref 4–11)

## 2024-10-25 PROCEDURE — 99233 SBSQ HOSP IP/OBS HIGH 50: CPT | Performed by: HOSPITALIST

## 2024-10-25 RX ORDER — DOCUSATE SODIUM 100 MG/1
100 CAPSULE, LIQUID FILLED ORAL 2 TIMES DAILY
Status: DISCONTINUED | OUTPATIENT
Start: 2024-10-25 | End: 2024-10-27

## 2024-10-25 RX ORDER — LACTULOSE 10 G/15ML
20 SOLUTION ORAL EVERY 6 HOURS PRN
Status: DISCONTINUED | OUTPATIENT
Start: 2024-10-25 | End: 2024-10-27

## 2024-10-25 RX ORDER — POLYETHYLENE GLYCOL 3350 17 G/17G
17 POWDER, FOR SOLUTION ORAL 2 TIMES DAILY
Status: DISCONTINUED | OUTPATIENT
Start: 2024-10-25 | End: 2024-10-27

## 2024-10-25 RX ORDER — SODIUM HYPOCHLORITE 1.25 MG/ML
SOLUTION TOPICAL 2 TIMES DAILY
Status: DISCONTINUED | OUTPATIENT
Start: 2024-10-25 | End: 2024-10-27

## 2024-10-25 RX ORDER — POTASSIUM CHLORIDE 1.5 G/1.58G
40 POWDER, FOR SOLUTION ORAL ONCE
Status: COMPLETED | OUTPATIENT
Start: 2024-10-25 | End: 2024-10-25

## 2024-10-25 RX ORDER — ASCORBIC ACID 500 MG
500 TABLET ORAL DAILY
Status: DISCONTINUED | OUTPATIENT
Start: 2024-10-26 | End: 2024-10-27

## 2024-10-25 RX ORDER — TAMSULOSIN HYDROCHLORIDE 0.4 MG/1
0.4 CAPSULE ORAL DAILY
Status: DISCONTINUED | OUTPATIENT
Start: 2024-10-25 | End: 2024-10-27

## 2024-10-25 NOTE — OCCUPATIONAL THERAPY NOTE
Orders received. Chart reviewed. Pt has been seen for OT skilled services inpatient in recent admissions. Pt is dependent for ADLs and mobility and not appropriate for services. Has 24 hour assist at home. Will sign off.      Elicia Leon OT  [facility]  Inpatient Rehabilitation  Occupational Therapy  [phone]

## 2024-10-25 NOTE — PROGRESS NOTES
10/25/24 0814   Wound 08/23/24 3 Sacrum   Date First Assessed: 08/23/24   Present on Original Admission: Yes   Wound Number (Wound Clinic Only): 3  Primary Wound Type: Pressure Injury  Location: Sacrum   Wound Image     Site Assessment Fragile;Granulation tissue;Moist;Painful;Pink;Red;Yellow   Drainage Amount Small   Drainage Description Serosanguineous  (pt. just had a bm , stool in the dressing, cleansed)   Treatments Cleansed;Saline;Site Care;Topical (Barrier/Moisturizer/Ointment)  (zinc to buttocks)   Dressing 4x4s;Aquacel Foam;Gauze  (damp saline gauze, nurse to pack with 1/4 St Dakins gauze when it arrives from pharmacy)   Dressing Changed Changed   Dressing Status Clean;Dry;Intact;Dressing Changed   Wound Length (cm)   (traced by RN)   Wound Depth (cm) 2.6 cm   Margins Well-defined edges   Flori-wound Assessment Excoriated;Fragile;Painful;Pink   Wound Granulation Tissue Red;Pink   Wound Bed Granulation (%) 85 %   Wound Bed Slough (%) 15 %   Exposed Structure Bone   State of Healing Early/partial granulation;Non-healing   Wound Odor None   Pressure Injury Stage 4   Wound 08/23/24 2 Hip Left   Date First Assessed: 08/23/24   Present on Original Admission: Yes   Wound Number (Wound Clinic Only): 2  Primary Wound Type: Pressure Injury  Location: Hip  Wound Location Orientation: Left   Wound Image    Site Assessment Fragile;Moist;Painful;Red;Granulation tissue;Yellow   Drainage Amount Scant   Drainage Description Serosanguineous   Treatments Cleansed;Saline;Site Care  (skin prep to the flori wound)   Wound Vac Brand KCI   Dressing Wound vac sponge   Dressing Changed Changed   Dressing Status Clean;Intact;Dry;Dressing Changed   Wound Length (cm) 2.6 cm   Wound Width (cm) 3 cm   Wound Surface Area (cm^2) 7.8 cm^2   Wound Depth (cm) 1 cm   Wound Volume (cm^3) 7.8 cm^3   Wound Healing % 89   Margins Not attached   Non-staged Wound Description Full thickness   Flori-wound Assessment Fragile;Pink   Wound Granulation  Tissue Red;Pink   Wound Bed Granulation (%) 80 %   Wound Bed Slough (%) 20 %   Exposed Structure Muscle;Tendon   State of Healing Early/partial granulation;Non-healing   Wound Odor None   Tunneling? No   Undermining? Yes   Number of Undermines 1   Undermine 1 Start Position 6   Undermine 1 End Position 12  (depth 1 cm)   Pressure Injury Stage 4   Wound 08/23/24 1 Heel Right;Medial   Date First Assessed: 08/23/24   Present on Original Admission: Yes   Wound Number (Wound Clinic Only): 1  Primary Wound Type: Pressure Injury  Location: Heel  Wound Location Orientation: Right;Medial   Wound Image    Site Assessment Fragile;Pink;Purple   Drainage Amount Small   Drainage Description Yellow;Serosanguineous   Treatments Cleansed;Saline;Site Care   Dressing Hydrofiber with silver;2x2s;Aquacel Foam   Dressing Changed Changed   Dressing Status Clean;Dry;Intact;Dressing Changed   Wound Length (cm)   (traced by RN)   Madhuri-wound Assessment Fragile;Dry;Pink   State of Healing Early/partial granulation   Wound Odor None   Tunneling? No   Undermining? No   Sinus Tracts? No   Pressure Injury Stage 2   Wound 10/24/24 4 Ankle Right;Medial   Date First Assessed: 10/24/24   Present on Original Admission: Yes   Wound Number (Wound Clinic Only): 4  Primary Wound Type: Pressure Injury  Location: Ankle  Wound Location Orientation: Right;Medial  Wound Description (Comments): healing intact skin   Wound Image    Site Assessment Dry;Intact;Fragile;Pink   Drainage Amount None   Treatments Cleansed   Dressing Aquacel Foam   Dressing Changed Changed   Dressing Status Clean;Dry;Intact;Dressing Changed   Wound Length (cm) 1.2 cm   Wound Width (cm) 1.12 cm   Wound Surface Area (cm^2) 1.344 cm^2   Wound Depth (cm) 0.1 cm   Wound Volume (cm^3) 0.1344 cm^3   Madhuri-wound Assessment Dry;Intact;Fragile;Pink   Wound Granulation Tissue Pink   Wound Bed Epithelium (%) 100 %   State of Healing Epithelialized   Wound Odor None   Tunneling? No   Undermining? No    Sinus Tracts? No   Pressure Injury Stage 2  (healing)   Negative Pressure Wound Therapy Hip Left   Placement Date: 10/25/24   Inserted by: REGINA Landry RN  Wound Type: Pressure ulcer: stage IV  Location: Hip  Wound Location Orientation: Left   Wound photographed/measured Yes   Machine Status (On) Yes   Site Assessment Moist;Painful;Pink;Red;Fragile;Granulation tissue;Yellow   Madhuri-wound Assessment Excoriated;Fragile;Pink;Painful   Unit Type Vac Ulta   Dressing Type Black foam   Number of Foam Pieces Used 2   Cycle Continuous;On   Target Pressure (mmHg) 125   Drainage Description Serosanguineous   Dressing Status Clean;Dry;Intact;Dressing Changed   Canister Changed No   Wound Follow Up   Follow up needed Yes   Comfort and Environment Interventions   Specialty Bed/Mattress   (staff to apply a air pump to the mattress)     WOUND CARE NOTE    History:  Past Medical History:    Anxiety state, unspecified    CVA (cerebral infarction)    Dementia (HCC)    Depression    Heterozygous factor V Leiden mutation (HCC)    Other and unspecified hyperlipidemia    Other ill-defined conditions(799.89)    Cardiomegaly Pleural effusion w/idopathic pleuropuricarditis    Pulmonary embolism (HCC)    Stroke (HCC)    Unspecified sleep apnea     Past Surgical History:   Procedure Laterality Date    Colonoscopy      Electrocardiogram, complete  01/08/1914    Scanned to Media Tab      Social History     Socioeconomic History    Marital status:    Tobacco Use    Smoking status: Former    Smokeless tobacco: Never   Vaping Use    Vaping status: Never Used   Substance and Sexual Activity    Alcohol use: No     Alcohol/week: 0.0 standard drinks of alcohol     Comment: none recently    Drug use: No   Other Topics Concern    Caffeine Concern Yes     Comment: 1 cups coffee daily    Exercise No   Social History Narrative    The patient does not use an assistive device..      The patient does live in a home with stairs.     Social Drivers of Health      Financial Resource Strain: Low Risk  (5/6/2024)    Financial Resource Strain     Difficulty of Paying Living Expenses: Not very hard     Med Affordability: No   Food Insecurity: No Food Insecurity (10/24/2024)    Food Insecurity     Food Insecurity: Never true   Transportation Needs: No Transportation Needs (10/24/2024)    Transportation Needs     Lack of Transportation: No   Housing Stability: Low Risk  (10/24/2024)    Housing Stability     Housing Instability: No       PLAN   Recommendations:   ID is currently on consult  VAC standing orders  VAC troubleshooting instructions on the machine  Staff to monitor VAC seal and repair seal if indicated.  Follow VAC troubleshooting instructions  Routine VAC dressing changes  Turn schedules  Specialty bed: air pump to be added to the Isotour gel mattress  Heels elevated using pillows, heel wedge or heel boots to offload heels  Use of lift equipment  To prevent sliding: decrease head of bed and elevate foot of bed as medical condition tolerates  Prompt incontinence care  Moisture barrier for incontinence. May consider Zinc skin barrier to buttocks  Glucose control to help promote wound healing    Wound(s)  Location: Sacrum  Cleansing  Saline  Zinc to excoriated buttocks  Topical 1/4 St Dakins damp on gauze pads  Dressings pack into the depth , dry gauze  Secure with sacral foam  Frequency Q 12 hrs and prn   Location: Left Hip  Cleansing  Saline   Topical Skin prep to the flori wound  Dressings vac dressing packed into the depth  Secure with vac drape  Frequency Q 48 to 72 hours   Location: Right medial  heel  Cleansing  Saline   Dressings Silver alginate, dry gauze  Secure with border foam  Frequency daily   Location: Right medial ankle  Cleansing  Saline   Dressings border foam  Frequency daily       Discharge Recommendations: The pt. is home with family and he sees the wound clinic.      OBJECTIVE   MD Consult new sacrum, left hip, right heel and right ankle  wounds      ASSESSMENT   Oarl Score:  Oral Scale Score: 10    Chart Reviewed: yes    Wound(s):  The pt. is awake in bed,he is non verbal, smiling, the pt. is known to wound care services for several admissions. The pt. is followed in the wound clinic. The pt. is complete care, 2 nursing students are at the bedside assisting. The pt's family refused for the left hip vac to be removed on admit per the nurse. I will change the vac dressing and connect to the hospital vac. See the wound assessments, the pt. has pain with the wound care and dressings, Nurses medicated the pt. All dressings were completed. He has a mushy brown stool, he was cleansed and new linen and gown applied. Left heel is intact, heel protectors re applied, he was turned the pillow is on the left side. Spoke with the nurse and call light in reach. Left the pt.'s home vac on the shelf by the TV.         Allergies: Zosyn [piperacillin-tazobactam in d5w]    Labs:   Lab Results   Component Value Date    WBC 9.7 10/25/2024    HGB 10.7 (L) 10/25/2024    HCT 33.4 (L) 10/25/2024    .0 10/25/2024    CREATSERUM 0.47 (L) 10/25/2024    BUN 15 10/25/2024     10/25/2024    K 3.4 (L) 10/25/2024     10/25/2024    CO2 26.0 10/25/2024    GLU 97 10/25/2024    CA 9.8 10/25/2024    ALB 4.4 10/24/2024    ALKPHO 75 10/24/2024    BILT 0.2 10/24/2024    TP 8.2 10/24/2024    AST 20 10/24/2024    ALT 19 10/24/2024    MG 2.0 09/03/2024    PHOS 3.3 09/03/2024     No results found for: \"PREALBUMIN\"      Time Spent 1 Hour.    Jessica Landry RN  [facility]  [facility]  [phone]

## 2024-10-25 NOTE — PLAN OF CARE
Problem: SKIN/TISSUE INTEGRITY - ADULT  Goal: Skin integrity remains intact  Description: INTERVENTIONS  - Assess and document risk factors for pressure ulcer development  - Assess and document skin integrity  - Monitor for areas of redness and/or skin breakdown  - Initiate interventions, skin care algorithm/standards of care as needed  Outcome: Progressing

## 2024-10-25 NOTE — PLAN OF CARE
Pt family refused to have wound vac removed until wound nurse sees pt as he was just seen on Wednesday at the wound clinic.   Problem: Patient Centered Care  Goal: Patient preferences are identified and integrated in the patient's plan of care  Description: Interventions:  - What would you like us to know as we care for you?   - Provide timely, complete, and accurate information to patient/family  - Incorporate patient and family knowledge, values, beliefs, and cultural backgrounds into the planning and delivery of care  - Encourage patient/family to participate in care and decision-making at the level they choose  - Honor patient and family perspectives and choices  Outcome: Progressing     Problem: Patient/Family Goals  Goal: Patient/Family Long Term Goal  Description: Patient's Long Term Goal:     Interventions:  -   - See additional Care Plan goals for specific interventions  Outcome: Progressing  Goal: Patient/Family Short Term Goal  Description: Patient's Short Term Goal:     Interventions:   -   - See additional Care Plan goals for specific interventions  Outcome: Progressing     Problem: SKIN/TISSUE INTEGRITY - ADULT  Goal: Skin integrity remains intact  Description: INTERVENTIONS  - Assess and document risk factors for pressure ulcer development  - Assess and document skin integrity  - Monitor for areas of redness and/or skin breakdown  - Initiate interventions, skin care algorithm/standards of care as needed  Outcome: Progressing  Goal: Incision(s), wounds(s) or drain site(s) healing without S/S of infection  Description: INTERVENTIONS:  - Assess and document risk factors for pressure ulcer development  - Assess and document skin integrity  - Assess and document dressing/incision, wound bed, drain sites and surrounding tissue  - Implement wound care per orders  - Initiate isolation precautions as appropriate  - Initiate Pressure Ulcer prevention bundle as indicated  Outcome: Progressing     Problem: PAIN -  ADULT  Goal: Verbalizes/displays adequate comfort level or patient's stated pain goal  Description: INTERVENTIONS:  - Encourage pt to monitor pain and request assistance  - Assess pain using appropriate pain scale  - Administer analgesics based on type and severity of pain and evaluate response  - Implement non-pharmacological measures as appropriate and evaluate response  - Consider cultural and social influences on pain and pain management  - Manage/alleviate anxiety  - Utilize distraction and/or relaxation techniques  - Monitor for opioid side effects  - Notify MD/LIP if interventions unsuccessful or patient reports new pain  - Anticipate increased pain with activity and pre-medicate as appropriate  Outcome: Progressing     Problem: SAFETY ADULT - FALL  Goal: Free from fall injury  Description: INTERVENTIONS:  - Assess pt frequently for physical needs  - Identify cognitive and physical deficits and behaviors that affect risk of falls.  - Bath fall precautions as indicated by assessment.  - Educate pt/family on patient safety including physical limitations  - Instruct pt to call for assistance with activity based on assessment  - Modify environment to reduce risk of injury  - Provide assistive devices as appropriate  - Consider OT/PT consult to assist with strengthening/mobility  - Encourage toileting schedule  Outcome: Progressing     Problem: DISCHARGE PLANNING  Goal: Discharge to home or other facility with appropriate resources  Description: INTERVENTIONS:  - Identify barriers to discharge w/pt and caregiver  - Include patient/family/discharge partner in discharge planning  - Arrange for needed discharge resources and transportation as appropriate  - Identify discharge learning needs (meds, wound care, etc)  - Arrange for interpreters to assist at discharge as needed  - Consider post-discharge preferences of patient/family/discharge partner  - Complete POLST form as appropriate  - Assess patient's ability  to be responsible for managing their own health  - Refer to Case Management Department for coordinating discharge planning if the patient needs post-hospital services based on physician/LIP order or complex needs related to functional status, cognitive ability or social support system  Outcome: Progressing

## 2024-10-25 NOTE — ED QUICK NOTES
Report received from SAMANTHA Galindo    Patient resting comfortably, awaiting transport.  Family at bedside.  Respirations regular and unlabored.

## 2024-10-25 NOTE — PHYSICAL THERAPY NOTE
PHYSICAL THERAPY EVALUATION - INPATIENT     Room Number: 558/558-A  Evaluation Date: 10/25/2024  Type of Evaluation: Initial   Physician Order: PT Eval and Treat    Presenting Problem: Sacral decubitis ulcer stage IV  Co-Morbidities : hemiplegia R side, ETOH, AMS, seizures, Alziemers, Bed bound with non healing ulcers  Reason for Therapy: Mobility Dysfunction and Discharge Planning    PHYSICAL THERAPY ASSESSMENT   Patient is a 74 year old male admitted 10/24/2024 for Sacral decubiti and multiple non healing wounds.  Prior to admission, patient's baseline is assist with all bed mobility and ADL's with family and caregiver assist at home, bed bound not ambulatory penitentiary.  Patient is currently functioning at baseline with bed mobility, transfers, maintaining seated position, standing prolonged periods, and performing household tasks.  Patient is requiring dependent as a result of the following impairments: decreased functional strength, decreased endurance/aerobic capacity, pain, impaired sitting balance, impaired coordination, impaired motor planning, decreased muscular endurance, difficulty maintaining precautions, and medical status.  Physical Therapy will continue to follow for duration of hospitalization.    Pt presents at a penitentiary level of care with assist for all mobility and ADL's in the home bed bound. D/C PT at this time penitentiary level of care. Ongoing wound care needs with skilled RN care. Return home with caregiver assist is anticipated as medical progress allows / palliative comfort support encouraged.     PLAN DURING HOSPITALIZATION  Nursing Mobility Recommendation : Lift Equipment  PT Device Recommendation: Other (Comment) (bed bound)  PT Treatment Plan:  (D/C PT)           PHYSICAL THERAPY MEDICAL/SOCIAL HISTORY   History related to current admission: Patient is a 74-year-old  male with chronic right hemiparesis, bedbound, with chronic sacral left ischial and right heel decubitus ulcers,  follows up closely with the wound clinic. Last hospitalization was in August 2024 with a similar presentation and had multiple debridements for his sacrum and currently has a wound VAC to his left ischial area. Today, he was sent to the emergency department for evaluation of rising white blood cell count and elevated platelets. CBC showed white blood cell count of 13.5 with left shift, platelet count 575 with left shift. Chemistry and liver function tests were unremarkable. Urinalysis showed no evidence of urinary tract infection. CT scan of the pelvis showed large left paracentral sacral decubitus ulcer involving the lower sacrum and coccyx, underlying osteolysis with no periosteal reaction or irregular osseous erosions to suggest acute osteomyelitis, no drainable abscess. There is a very large stool ball within the rectum measuring 8.7 x 9 cm. There is moderate distention of the urinary bladder. The remainder of the examination is unremarkable. Patient was started empirically on IV vancomycin and cefepime, and he will be admitted to the hospital for further management. Blood cultures were obtained. No reported fever in the emergency room.      Problem List  Principal Problem:    Sacral decubitus ulcer, stage IV ([facility])  Active Problems:    Sacral decubitus ulcer    Cellulitis      HOME SITUATION  Type of Home: House  Home Layout: Able to live on main level                     Lives With: Family;Caregiver part-time (assist with all mobility and ADL's long-term level of care)              Prior Level of [place]: Lives at home long-term level of care bed bound family assist with all mobility and ADL's.     SUBJECTIVE  Not verbal with PT     PHYSICAL THERAPY EXAMINATION   OBJECTIVE  Precautions: Bed/chair alarm  Fall Risk: High fall risk    WEIGHT BEARING RESTRICTION       PAIN ASSESSMENT  Rating: Unable to rate          COGNITION  Overall Cognitive Status:  Impaired  A/O x 1  RANGE OF MOTION AND STRENGTH  ASSESSMENT  Upper extremity ROM and strength are within functional limits RUE hemiparesis hypertonicity  Lower extremity ROM is within functional limits   Lower extremity strength is within functional limits RLE hypertonicity flexion synergy pattern    ACTIVITY TOLERANCE  Pulse: 98  Heart Rate Source: Monitor  Resp: 16  BP: 126/58  BP Location: Right arm  BP Method: Automatic  Patient Position: Lying    O2 WALK       AM-PAC '6-Clicks' INPATIENT SHORT FORM - BASIC MOBILITY  How much difficulty does the patient currently have...  Patient Difficulty: Turning over in bed (including adjusting bedclothes, sheets and blankets)?: Unable   Patient Difficulty: Sitting down on and standing up from a chair with arms (e.g., wheelchair, bedside commode, etc.): Unable   Patient Difficulty: Moving from lying on back to sitting on the side of the bed?: Unable   How much help from another person does the patient currently need...   Help from Another: Moving to and from a bed to a chair (including a wheelchair)?: Total   Help from Another: Need to walk in hospital room?: Total   Help from Another: Climbing 3-5 steps with a railing?: Total     AM-PAC Score:  Raw Score: 6   Approx Degree of Impairment: 100%   Standardized Score (AM-PAC Scale): 23.55   CMS Modifier (G-Code): CN    FUNCTIONAL ABILITY STATUS     Rolling: maximum assist  Supine to Sit: maximum assist  Sit to Supine: dependent  Sit to Stand: dependent    Exercise/Education Provided:  Bed mobility  Body mechanics  ROM  Lower therapeutic exercise:  Ankle pumps  Heel slides  SLR    Skilled Therapy Provided: Pt ed with therex in bed for PROM / AAROM R sided sameer paresis hypertonicity. Pt requires max a for all bed mobility and repositioning. Pt is bed bound with all mobility and ADL's with caregiver and family assist. Pt will benefit from ongoing caregiver family assist at home for comfort care and assist with ADL's and mobility. Pt is at a residential level of care D/C PT  services emphasize comfort care / palliative support to reduce burden of care at home. Ongoing wound care skilled RN care is anticipated.     The patient's Approx Degree of Impairment: 100% has been calculated based on documentation in the [facility] '6 clicks' Inpatient Basic Mobility Short Form.  Research supports that patients with this level of impairment may benefit from a return home with caregiver support.  Final disposition will be made by interdisciplinary medical team.    Patient End of Session: In bed;With  staff;Needs met;Call light within reach;RN aware of session/findings;All patient questions and concerns addressed;Alarm set    D/C PT pt is at a care home level of care no skilled PT needs pt is bed bound at base line with assist for all mobility and ADL's.     Patient Evaluation Complexity Level:  History Low - no personal factors and/or co-morbidities   Examination of body systems Low -  addressing 1-2 elements   Clinical Presentation Low- Stable   Clinical Decision Making  Low Complexity

## 2024-10-25 NOTE — PAYOR COMM NOTE
--------------  ADMISSION REVIEW     [place] [facility] [place] #:  [id number]  Authorization Number: [id number]    Admit date: 10/24/24  Admit time:  8:36 PM       REVIEW DOCUMENTATION:     ED Provider Notes        ED Provider Notes signed by Jose A Barry MD at 10/24/2024  8:32 PM       Author: Jose A Barry MD Service: -- Author Type: Physician    Filed: 10/24/2024  8:32 PM Date of Service: 10/24/2024  4:59 PM Status: Signed    : Jose A Barry MD (Physician)           Patient Seen in: [facility] Emergency Department      History     Chief Complaint   Patient presents with    Abnormal Labs     Stated Complaint: Abnormal Results    Subjective:   HPI      74-year-old male history of anxiety dementia CVA, HOLLI, multiple chronic wounds including left hip wound with wound VAC in chronic sacral decubitus ulcer for which she follows with wound care.  He was sent to the ER due to abnormal laboratory studies yesterday he was seen in wound care setting and had debridement of necrotic portion of sacral decubitus ulcer.  Labs were obtained which showed elevated ESR and CRP.  He was sent due to concerns for possible sacral decubitus infection.  No fever.  Family reports has been more sleepy.  Not on antibiotics currently.    Objective:     Past Medical History:    Anxiety state, unspecified    CVA (cerebral infarction)    Dementia (HCC)    Depression    Heterozygous factor V Leiden mutation (HCC)    Other and unspecified hyperlipidemia    Other ill-defined conditions(799.89)    Cardiomegaly Pleural effusion w/idopathic pleuropuricarditis    Pulmonary embolism (HCC)    Stroke (HCC)    Unspecified sleep apnea                Physical Exam     ED Triage Vitals [10/24/24 1253]   /73   Pulse 96   Resp 18   Temp 97.4 °F (36.3 °C)   Temp src Temporal   SpO2 98 %   O2 Device None (Room air)       Current Vitals:   Vital Signs  BP: 121/61  Pulse: 93  Resp: 23  Temp: 97.4 °F (36.3  °C)  Temp src: Temporal  MAP (mmHg): 78    Oxygen Therapy  SpO2: 100 %  O2 Device: None (Room air)        Physical Exam    Constitutional: arousable to voice,   HENT: mmm, no lesions,  Neck: normal range of motion, no tenderness, supple.  Eyes: PERRL, EOMI, conjunctiva normal, no discharge. Sclera anicteric.  Cardiovascular: rr no murmur  Respiratory: Normal breath sounds, no respiratory distress, no wheezing, no chest tenderness.  GI: Bowel sounds normal, Soft, no tenderness, no masses, no pulsatile masses.  -sacral decub as documented below    : No CVA tenderness.  Skin: Warm, dry, no erythema, no rash.  Musculoskeletal: Intact distal pulses, no edema, no tenderness, no cyanosis, no clubbing. Good range of motion in all major joints. No tenderness to palpation or major deformities noted. Back- No tenderness.  Neurologic: Alert & oriented x 1,grossly moves all four ext  Psych: Calm, cooperative, nl affect      ED Course     Labs Reviewed   CBC WITH DIFFERENTIAL WITH PLATELET - Abnormal; Notable for the following components:       Result Value    WBC 13.5 (*)     HGB 12.2 (*)     HCT 38.7 (*)     RDW-SD 49.0 (*)     RDW 15.4 (*)     .0 (*)     Neutrophil Absolute Prelim 10.19 (*)     Neutrophil Absolute 10.19 (*)     All other components within normal limits   COMP METABOLIC PANEL (14) - Abnormal; Notable for the following components:    Glucose 105 (*)     BUN 28 (*)     Creatinine 0.67 (*)     BUN/CREA Ratio 41.8 (*)     Globulin  3.8 (*)     All other components within normal limits   URINALYSIS WITH CULTURE REFLEX - Abnormal; Notable for the following components:    Urine Color Colorless (*)     Blood Urine Trace (*)     RBC Urine 3-5 (*)     All other components within normal limits   LACTIC ACID, PLASMA - Normal   RAINBOW DRAW LAVENDER   RAINBOW DRAW LIGHT GREEN   RAINBOW DRAW BLUE   BLOOD CULTURE   BLOOD CULTURE   AEROBIC BACTERIAL CULTURE         MDM      74M presenting with elevated ESR and CRP in  the context of sacral decubitus ulcer.  On arrival vitals are stable and reassuring.  On exam sacral decubitus ulcers clean margins, does not appear grossly infected  -Will plan for labs and CT pelvis to evaluate for signs of osteomyelitis or abscess      CT and my dependent review without evidence of abscess.  Workup with mild elevation WBC otherwise no acute findings.  Discussed with general surgery ID, will start Vanco and cefepime per ID recommendations.    Admission disposition: 10/24/2024  5:27 PM           Medical Decision Making      Disposition and Plan     Clinical Impression:  1. Sacral decubitus ulcer, stage IV (HCC)         Disposition:  Admit     Hospital Problems       Present on Admission  Date Reviewed: 10/23/2024            ICD-10-CM Noted POA    Sacral decubitus ulcer L89.159 10/24/2024 Unknown             Signed by Jose A Barry MD on 10/24/2024  8:32 PM         10/24 H&P    CHIEF COMPLAINT:  Leukocytosis and concern for an infected decubitus ulcer.     HISTORY OF PRESENT ILLNESS:  Patient is a 74-year-old  male with chronic right hemiparesis, bedbound, with chronic sacral left ischial and right heel decubitus ulcers, follows up closely with the wound clinic.  Last hospitalization was in August 2024 with a similar presentation and had multiple debridements for his sacrum and currently has a wound VAC to his left ischial area.  Today, he was sent to the emergency department for evaluation of rising white blood cell count and elevated platelets.  CBC showed white blood cell count of 13.5 with left shift, platelet count 575 with left shift.  Chemistry and liver function tests were unremarkable.  Urinalysis showed no evidence of urinary tract infection.  CT scan of the pelvis showed large left paracentral sacral decubitus ulcer involving the lower sacrum and coccyx, underlying osteolysis with no periosteal reaction or irregular osseous erosions to suggest acute osteomyelitis, no  drainable abscess.  There is a very large stool ball within the rectum measuring 8.7 x 9 cm.  There is moderate distention of the urinary bladder.  The remainder of the examination is unremarkable.  Patient was started empirically on IV vancomycin and cefepime, and he will be admitted to the hospital for further management.  Blood cultures were obtained.  No reported fever in the emergency room.     PAST MEDICAL HISTORY:  Reviewing the records, patient has a history of cerebrovascular accident with chronic right hemiparesis.  Chronic decubitus ulcer on the right heel, his left ischial and sacral area.  He had wound VAC on his left ischial area.  Factor V Leiden and pulmonary embolism, currently anticoagulated with Xarelto; dementia; anxiety; hypertension; hyperlipidemia; and obstructive sleep apnea.     PAST SURGICAL HISTORY:  Multiple debridements for sacral right heel and left ischial ulcers.     MEDICATIONS:  Please see medication reconciliation list.      FAMILY HISTORY:  Father had lung cancer.     SOCIAL HISTORY:  Ex-tobacco user and alcohol user.  No current tobacco, alcohol, or drug use.  Bedbound.  Requires assistance in his basic activities of daily living.      REVIEW OF SYSTEMS:  Difficult to obtain from the patient himself.  He is nonverbal.  Reportedly, he had multiple debridements of his sacral ulcer and right heel ulcer.  He had a wound VAC to his left ischial area.  Last debridement was yesterday per the family, but he was sent over to the emergency room today for rising white blood cell count and concern for infection.     PHYSICAL EXAMINATION:    GENERAL:  Alert.  Does not appear to be in distress.  VITAL SIGNS:  Temperature 97.4, pulse 91, respiratory rate 18, blood pressure 110/53, pulse ox 100% on room air.  HEENT:  Atraumatic.  Oropharynx clear.  Dry mucous membranes.  Normal hard and soft palate.  Eyes:  Anicteric sclerae.  NECK:  Supple.  No lymphadenopathy.  Trachea midline.  LUNGS:  Clear  to auscultation bilaterally.  Normal respiratory effort.  HEART:  Regular rate and rhythm.  S1 and S2 auscultated.  No murmur.  ABDOMEN:  Soft, nondistended.  No tenderness.  Positive bowel sounds.  EXTREMITIES:  No peripheral edema, clubbing or cyanosis.  NEUROLOGIC:  Right hemiparesis.  SKIN:  Sacral decubitus ulcer stage IV, status post debridement with granulation tissue at the base.  Mild cellulitic changes around the edges but no lynette infection etiology.  Right heel medial aspect pressure ulcer with exposure of the subdermal area, again no clear infectious etiology or cellulitis.  Left ischial area with wound VAC.     ASSESSMENT:  1.       Rising leukocytosis and thrombocytosis suggestive of possible early infection.  2.       Multiple pressure ulcers, sacrum, left ischial and right heel.  3.       Fecal impaction.     PLAN:  Patient will be admitted to general medical floor.  Bowel prep protocol.  IV cefepime and vancomycin.  General surgery consult and wound service consult.  Monitor temperature curve.  Follow up on blood cultures.  Further recommendations to follow.     Dictated By Kina Hodgson MD    10/25 ID consult     Reason for Consultation:  Elevated ESR/CRP     ASSESSMENT:     Antibiotics: Vancomycin, cefepime     # Acute leukocytosis, likely reactive to fecal impaction   # Chronic sacral wound               - Wound swab with Proteus mirabilis/E. coli  # Elevated ESR/CRP  # Multiple wounds               -  S/p L hip/sacral bedside debridement x2 7/2024               -  S/p R heel beside I&D 7/14 w/probe to bone, amputation refused by family               -  Sacral wound with E. Coli, Proteus, Enterococcus  # Hx R heel OM  # Hx Proteus BSI 2/2 infected wounds, s/p 4wk zosyn, dc 8/11/24  # Dementia, CVA---bedbound  # Hx PE  # ADR to zosyn     PLAN:  -  Continue on vancomycin and cefepime for now.  -  Follow fever curve, wbc.  -  Reviewed labs, micro, imaging reports, available old records.  -  Case  d/w patient, wound care RN.     History of Present Illness:  Yoseph Cordero is a 74 year old male with a history of CVA, PE, factor V Leiden, dementia, bedbound, known to our service for Proteus bacteremia with R foot osteomyelitis 2024 s/p I&D, along with sacral wound s/p two bedside debridements, completed four week course of zosyn on 24, who was seen during admission 2024 for fever and leukocytosis, along with constipation where antibiotics were stopped, who presented to [facility] ED on 10/24 with elevated ESR/CRP results that were drawn on 10/23 with CRP 6.60 and  with reported foul odor to sacral wound. Has hip wound vac in place. On arrival, afebrile, wbc 13.5, CT pelvis with large stool ball within the rectum and large sacral ulcer without abscess with urinary bladder distension, started on vancomycin and cefepime. ID consulted.     History:  Past Medical History       Past Medical History:    Anxiety state, unspecified    CVA (cerebral infarction)    Dementia (HCC)    Depression    Heterozygous factor V Leiden mutation (HCC)    Other and unspecified hyperlipidemia    Other ill-defined conditions(799.89)     Cardiomegaly Pleural effusion w/idopathic pleuropuricarditis    Pulmonary embolism (HCC)    Stroke (HCC)    Unspecified sleep apnea         Past Surgical History         Past Surgical History:   Procedure Laterality Date    Colonoscopy        Electrocardiogram, complete   1914     Scanned to Media Tab         Family History         Family History   Problem Relation Age of Onset    Cancer Father           Lung - Smoker  Cause of death    Other (Other) Mother           during     Diabetes Neg      Glaucoma Neg            reports that he has quit smoking. He has never used smokeless tobacco. He reports that he does not drink alcohol and does not use drugs.     Allergies:  [Allergies]    [Allergies]        Allergen Reactions    Zosyn [Piperacillin-Tazobactam In D5w] RASH       Pt  developed pruritic rash on both arms while on this medication         Medications:    Current Hospital Medications      Current Facility-Administered Medications:     sodium hypochlorite (Dakin's) 0.125 % external solution, , Topical, BID    ceFEPIme (Maxipime) 1 g in sodium chloride 0.9% 100 mL IVPB-MBP, 1 g, Intravenous, Q8H    vancomycin (Vancocin) 1,000 mg in sodium chloride 0.9% 250 mL IVPB-ADDV, 15 mg/kg, Intravenous, Q24H    sodium chloride 0.9% infusion, , Intravenous, Continuous    heparin (Porcine) 5000 UNIT/ML injection 5,000 Units, 5,000 Units, Subcutaneous, 2 times per day    acetaminophen (Tylenol Extra Strength) tab 500 mg, 500 mg, Oral, Q4H PRN    ondansetron (Zofran) 4 MG/2ML injection 4 mg, 4 mg, Intravenous, Q6H PRN    metoclopramide (Reglan) 5 mg/mL injection 10 mg, 10 mg, Intravenous, Q8H PRN    polyethylene glycol (PEG 3350) (Miralax) 17 g oral packet 17 g, 17 g, Oral, Daily PRN    sennosides (Senokot) tab 17.2 mg, 17.2 mg, Oral, Nightly PRN    bisacodyl (Dulcolax) 10 MG rectal suppository 10 mg, 10 mg, Rectal, Daily PRN    fleet enema (Fleet) rectal enema 133 mL, 1 enema, Rectal, Once PRN    levETIRAcetam (Keppra) 100 MG/ML oral solution 250 mg, 250 mg, Oral, BID    metoprolol tartrate (Lopressor) partial tab 12.5 mg, 12.5 mg, Oral, BID        Review of Systems:  ROS unable to be reviewed due to clinical status.     Physical Exam:  Vital signs: Blood pressure 126/58, pulse 99, temperature 97.5 °F (36.4 °C), temperature source Oral, resp. rate 16, height 5' 7\" (1.702 m), weight 137 lb 9.6 oz (62.4 kg), SpO2 99%.     General: Awake, in bed  HEENT: Moist mucous membranes. EOMI  Neck: No lymphadenopathy.  Supple.  Cardiovascular: RRR  Respiratory: CTAB  Abdomen: Soft, no TTP  Musculoskeletal: No edema noted  Integument: Wound pictures reviewed  Lines: PIV+  : Primofit+     Laboratory Data:      Recent Labs   Lab 10/25/24  0550   RBC 3.87   HGB 10.7*   HCT 33.4*   MCV 86.3   MCH 27.6   MCHC 32.0    RDW 15.4*   NEPRELIM 5.98   WBC 9.7   .0            Recent Labs   Lab 10/23/24  1140 10/24/24  1419 10/25/24  0549   * 105* 97   BUN 21 28* 15   CREATSERUM 0.62* 0.67* 0.47*   CA 9.8 10.3 9.8   ALB 4.2 4.4  --     139 142   K 3.4* 3.6 3.4*    103 108   CO2 25.0 30.0 26.0   ALKPHO 73 75  --    AST 24 20  --    ALT 17 19  --    BILT 0.3 0.2  --    TP 7.5 8.2  --          Microbiology: Reviewed in EMR     Radiology: Reviewed     Thank you for allowing us to participate in the care of this patient. Please do not hesitate to call if you have any questions.   We will continue to follow with you and will make further recommendations based on his progress.     Renate Feliz PA-C   [facility], [place], operated by [facility]  [phone]  10/25/2024               Attestation signed by Vijay Hernandez MD at 10/25/2024  4:55 PM     ID Attending:     Notes, labs, chart reviewed.  Agree with assessment and plan as discussed with SAILAJA and documented in SAILAJA note.  D/w with wound care outpatient. Previous cx noted. Wound images reviewed. Imaging reviewed. No clear signs of active infection. D/c abx and monitor off. D/w RN, ED, staff. Continue local wound care.  More than 50% of clinical time and 100% of MDM was performed by me.  D/w staff  Will follow     Vijay Hernandez MD                 10/25 IM       Subjective:      Pt nonverbal at baseline.  Pt only looks at me.  Does not answer questions.     Objective:   Blood pressure 126/58, pulse 98, temperature 97.5 °F (36.4 °C), temperature source Oral, resp. rate 16, height 5' 7\" (1.702 m), weight 137 lb 9.6 oz (62.4 kg), SpO2 99%.     Gen:   Pt nonverbal at baseline.  Pt only looks at me.  CV:   RRR, no m/g/r  Pulm:   CTA bilat  Abd:   +bs, soft, NT, ND  LE:   No c/c/e  Neuro:   right hemiparesis     Results:            Lab Results   Component Value Date     WBC 9.7 10/25/2024     HGB 10.7 (L) 10/25/2024     HCT 33.4 (L) 10/25/2024     .0  10/25/2024     CREATSERUM 0.47 (L) 10/25/2024     BUN 15 10/25/2024      10/25/2024     K 3.4 (L) 10/25/2024      10/25/2024     CO2 26.0 10/25/2024     GLU 97 10/25/2024     CA 9.8 10/25/2024     ALB 4.4 10/24/2024     ALKPHO 75 10/24/2024     BILT 0.2 10/24/2024     TP 8.2 10/24/2024     AST 20 10/24/2024     ALT 19 10/24/2024     PTT 31.5 08/24/2024     INR 1.31 (H) 08/25/2024     TSH 4.131 07/12/2024     PSA 16.20 (H) 03/11/2019     DDIMER 1.87 (H) 07/14/2024     ESRML 125 (H) 10/23/2024     ESRML 123 (H) 10/23/2024     CRP 6.60 (H) 10/23/2024     MG 2.0 09/03/2024     PHOS 3.3 09/03/2024         CT PELVIS(CONTRAST ONLY) (CPT=72193)     Result Date: 10/24/2024  PROCEDURE:         CT PELVIS (CONTRAST ONLY) (CPT=72193)  COMPARISON:      [facility], CT CHEST+ABDOMEN+PELVIS(ALL CNTRST ONLY)(CPT=71260/44215), 8/27/2024, 7:34 AM.  INDICATIONS:       sacral decubitus ulcer  TECHNIQUE:     CT images were obtained with non-ionic intravenous contrast material.  Automated exposure control for dose reduction was used. Adjustment of the mA and/or kV was done based on the patient's size. Use of iterative reconstruction technique for  dose reduction was used.  Dose information is transmitted to the ACR (American College of Radiology) NRDR (National Radiology Data Registry) which includes the Dose Index Registry.  FINDINGS/IMPRESSION:  1. There is a large left paracentral sacral decubitus ulcer involving the lower sacrum and coccyx.  There is underlying osteolysis the no periosteal reaction or irregular osseous erosions to suggest acute osteomyelitis.  There are no drainable abscesses.  2. There is a very large stool ball within the rectum measuring 8.7 by 9.0 cm.  Although there are no associated abnormalities, the patient would likely benefit from distant pack shin.  3. There is moderate distention of the urinary bladder which measures approximately 12 by 12 x 10 cm. This is of unclear etiology but  could be secondary to a large amount of stool within the rectum resulting in extrinsic compression of the prostatic urethra.   The remainder of the examination is unremarkable.  Specifically, the remaining visualized bowel appears grossly normal.  Although there is minimal atherosclerotic plaque within the distal abdominal aorta, the distal abdominal aorta and vessels are normal  in their course and caliber.  There is no free fluid or organized fluid collection.  There is no significant adenopathy.    Dictated by (CST): Lincoln Cheng MD on 10/24/2024 at 3:56 PM     Finalized by (CST): Lincoln Cheng MD on 10/24/2024 at 4:02 PM                Assessment and Plan:      Rising leukocytosis and thrombocytosis suggestive of possible early infection.  Multiple pressure ulcers, sacrum, left ischial and right heel.  Concern for infected decubitus ulcer.  - wound care and general surgery on consult  - follow CBC  - cont cefepime and vancomycin  - cont wound care     Fecal impaction.  - colace bid  - miralax bid  - dulcolax supp prn  - lactulose prn     Hx of CVA with right sided hemiparesis     Hx of dementia.  Nonverbal at baseline.     Hx of factor V Leiden mutation  - xarelto on hold for now.   Resume if no further debridement.   Heparin for dvt proph.     dvt proph:    heparin       Code status:    Full        MDM:    High       MEDICATIONS ADMINISTERED IN LAST 1 DAY:  acetaminophen (Tylenol Extra Strength) tab 500 mg       Date Action Dose Route User    10/25/2024 0855 Given 500 mg Oral Ana Lilia Graf RN    10/24/2024 2304 Given 500 mg Oral Jacqueline Dela Cruz RN          ceFEPIme (Maxipime) 1 g in sodium chloride 0.9% 100 mL IVPB-MBP       Date Action Dose Route User    10/25/2024 1450 New Bag 1 g Intravenous Shefali Piña RN    10/25/2024 0514 New Bag 1 g Intravenous Jacqueline Dela Cruz RN    10/24/2024 2304 New Bag 1 g Intravenous Jacqueline Dela Cruz RN          ceFEPIme (Maxpime) 2 g in sodium chloride  0.9% 100 mL IVPB-MBP       Date Action Dose Route User    10/24/2024 1818 New Bag 2 g Intravenous Josie Elizabeth RN          sodium hypochlorite (Dakin's) 0.125 % external solution       Date Action Dose Route User    10/25/2024 1452 Given (none) Topical Shefali Piña RN          docusate sodium (Colace) cap 100 mg       Date Action Dose Route User    10/25/2024 1450 Given 100 mg Oral Shefali Piña RN          heparin (Porcine) 5000 UNIT/ML injection 5,000 Units       Date Action Dose Route User    10/25/2024 0855 Given 5,000 Units Subcutaneous (Left Lower Abdomen) Ana Lilia Graf RN    10/24/2024 2304 Given 5,000 Units Subcutaneous (Right Lower Abdomen) Jacqueline Dela Cruz RN          levETIRAcetam (Keppra) 100 MG/ML oral solution 250 mg       Date Action Dose Route User    10/25/2024 0855 Given 250 mg Oral Ana Lilia Graf RN    10/24/2024 2304 Given 250 mg Oral Jacqueline Dela Cruz RN          metoprolol tartrate (Lopressor) partial tab 12.5 mg       Date Action Dose Route User    10/25/2024 0854 Given 12.5 mg Oral Ana Lilia Graf RN    10/24/2024 2304 Given 12.5 mg Oral Jacqueline Dela Cruz RN          polyethylene glycol (PEG 3350) (Miralax) 17 g oral packet 17 g       Date Action Dose Route User    10/25/2024 1450 Given 17 g Oral Shefali Piña RN          potassium chloride (Klor-Con) 20 MEQ oral powder 40 mEq       Date Action Dose Route User    10/25/2024 0855 Given 40 mEq Oral Ana Lilia Graf RN          sodium chloride 0.9% infusion       Date Action Dose Route User    10/24/2024 2304 New Bag (none) Intravenous Jacqueline Dela Cruz RN          tamsulosin (Flomax) cap 0.4 mg       Date Action Dose Route User    10/25/2024 1450 Given 0.4 mg Oral Shefali Piña RN          vancomycin (Vancocin) 1,000 mg in sodium chloride 0.9% 250 mL IVPB-ADDV       Date Action Dose Route User    10/24/2024 1951 New Bag 1,000 mg Intravenous Josie Elizabeth, RN            Vitals (last day)       Date/Time Temp Pulse Resp  BP SpO2 Weight O2 Device O2 Flow Rate (L/min) Who    10/25/24 1448 98.6 °F (37 °C) 87 18 120/74 99 % -- None (Room air) -- CE    10/25/24 1130 -- 98 16 126/58 -- -- -- -- DF    10/25/24 0856 97.5 °F (36.4 °C) 99 16 126/58 99 % -- -- -- AO    10/25/24 0513 97.6 °F (36.4 °C) 91 18 134/76 98 % -- None (Room air) -- RB    10/25/24 0444 -- -- -- -- -- 137 lb 9.6 oz (62.4 kg) -- -- RB    10/25/24 0224 -- 81 -- -- -- -- -- -- GT    10/24/24 2256 -- 106 -- 118/57 -- -- -- -- RB    10/24/24 2050 98.6 °F (37 °C) 102 21 131/72 99 % 137 lb 9.6 oz (62.4 kg) None (Room air) -- EO    10/24/24 2000 -- 93 23 121/61 100 % -- None (Room air) -- SC    10/24/24 1930 -- 94 23 123/54 100 % -- None (Room air) -- AK    10/24/24 1900 -- 93 16 118/53 100 % -- None (Room air) -- AK    10/24/24 1830 -- 100 18 114/68 100 % -- None (Room air) -- AK    10/24/24 1730 -- 91 18 110/53 100 % -- None (Room air) -- AK    10/24/24 1715 -- 96 19 123/62 100 % -- None (Room air) -- AK    10/24/24 1430 -- 98 20 122/67 99 % -- None (Room air) -- AK    10/24/24 1253 97.4 °F (36.3 °C) 96 18 115/73 98 % -- None (Room air) -- VR

## 2024-10-25 NOTE — PROGRESS NOTES
[facility]  part of [facility]    Progress Note    Yoseph Cordero Patient Status:  Inpatient    1950 MRN [id number]   Location [facility] 5SW/SE Attending Yordan Jesus MD   Hosp Day # 1 PCP Irving Sheets,        Subjective:     Pt nonverbal at baseline.  Pt only looks at me.  Does not answer questions.    Objective:   Blood pressure 126/58, pulse 98, temperature 97.5 °F (36.4 °C), temperature source Oral, resp. rate 16, height 5' 7\" (1.702 m), weight 137 lb 9.6 oz (62.4 kg), SpO2 99%.    Gen:   Pt nonverbal at baseline.  Pt only looks at me.  CV:   RRR, no m/g/r  Pulm:   CTA bilat  Abd:   +bs, soft, NT, ND  LE:   No c/c/e  Neuro:   right hemiparesis    Results:     Lab Results   Component Value Date    WBC 9.7 10/25/2024    HGB 10.7 (L) 10/25/2024    HCT 33.4 (L) 10/25/2024    .0 10/25/2024    CREATSERUM 0.47 (L) 10/25/2024    BUN 15 10/25/2024     10/25/2024    K 3.4 (L) 10/25/2024     10/25/2024    CO2 26.0 10/25/2024    GLU 97 10/25/2024    CA 9.8 10/25/2024    ALB 4.4 10/24/2024    ALKPHO 75 10/24/2024    BILT 0.2 10/24/2024    TP 8.2 10/24/2024    AST 20 10/24/2024    ALT 19 10/24/2024    PTT 31.5 2024    INR 1.31 (H) 2024    TSH 4.131 2024    PSA 16.20 (H) 2019    DDIMER 1.87 (H) 2024    ESRML 125 (H) 10/23/2024    ESRML 123 (H) 10/23/2024    CRP 6.60 (H) 10/23/2024    MG 2.0 2024    PHOS 3.3 2024       CT PELVIS(CONTRAST ONLY) (CPT=72193)    Result Date: 10/24/2024  PROCEDURE: CT PELVIS (CONTRAST ONLY) (CPT=72193)  COMPARISON: [facility], CT CHEST+ABDOMEN+PELVIS(ALL CNTRST ONLY)(CPT=71260/92352), 2024, 7:34 AM.  INDICATIONS: sacral decubitus ulcer  TECHNIQUE:   CT images were obtained with non-ionic intravenous contrast material.  Automated exposure control for dose reduction was used. Adjustment of the mA and/or kV was done based on the patient's size. Use of iterative  reconstruction technique for  dose reduction was used.  Dose information is transmitted to the ACR (American College of Radiology) NRDR (National Radiology Data Registry) which includes the Dose Index Registry.  FINDINGS/IMPRESSION:  1. There is a large left paracentral sacral decubitus ulcer involving the lower sacrum and coccyx.  There is underlying osteolysis the no periosteal reaction or irregular osseous erosions to suggest acute osteomyelitis.  There are no drainable abscesses.  2. There is a very large stool ball within the rectum measuring 8.7 by 9.0 cm.  Although there are no associated abnormalities, the patient would likely benefit from distant pack shin.  3. There is moderate distention of the urinary bladder which measures approximately 12 by 12 x 10 cm. This is of unclear etiology but could be secondary to a large amount of stool within the rectum resulting in extrinsic compression of the prostatic urethra.   The remainder of the examination is unremarkable.  Specifically, the remaining visualized bowel appears grossly normal.  Although there is minimal atherosclerotic plaque within the distal abdominal aorta, the distal abdominal aorta and vessels are normal  in their course and caliber.  There is no free fluid or organized fluid collection.  There is no significant adenopathy.    Dictated by (CST): Lincoln Cheng MD on 10/24/2024 at 3:56 PM     Finalized by (CST): Lincoln Cheng MD on 10/24/2024 at 4:02 PM               Assessment and Plan:     Rising leukocytosis and thrombocytosis suggestive of possible early infection.  Multiple pressure ulcers, sacrum, left ischial and right heel.  Concern for infected decubitus ulcer.  - wound care and general surgery on consult  - follow CBC  - cont cefepime and vancomycin  - cont wound care    Fecal impaction.  - colace bid  - miralax bid  - dulcolax supp prn  - lactulose prn    Hx of CVA with right sided hemiparesis    Hx of dementia.  Nonverbal at  baseline.    Hx of factor V Leiden mutation  - xarelto on hold for now.   Resume if no further debridement.   Heparin for dvt proph.     dvt proph:    heparin      Code status:    Full       MDM:    High Yordan Vazquez MD  10/25/2024

## 2024-10-25 NOTE — CM/SW NOTE
10/25/24 1500   CM/SW Referral Data   Referral Source Physician   Reason for Referral Discharge planning   Informant Daughter   Medical Hx   Does patient have an established PCP? Yes  (Irving Sheets)   Patient Info   Patient's Current Mental Status at Time of Assessment Confused or unable to complete assessment   Patient's Home Environment House   Number of Levels in Home 2   Number of Stair in Home Pt stays on 1st floor   Patient lives with Spouse/Significant other   Patient Status Prior to Admission   Independent with ADLs and Mobility No   Pt. requires assistance with Housework;Driving;Meals;Bathing;Ambulating;Dressing;Medications;Toileting;Finances   Services in place prior to admission Home Health Care;DME/Supplies at home   Home Health Provider Info [facility]  (RN and PT)   Type of DME/Supplies Standard Walker;Wheelchair;Commode;Hospital Bed;Rosaura Lift;Recliner Lift Chair   Discharge Needs   Anticipated D/C needs Home health care;Medical equipment     Pt discussed during nursing rounds. Dx sacral decubitus ulcer w/wound vac. Home w/spouse, WC bound prior to admission. Daughter Tyra serves as pt's primary CG. Current w/[facility] for wound vac. Current w.[facility] for RN and PT services, CHELLY entered. Pt has all required DME in place at home per Tyra. Tyra confirmed pt should dc home w/same services once medically stable. Tyra also notified  that she will provide transport at dc since she has a transfer chair and an appropriate vehicle for patient's needs.    Plan: Home w/spouse with dtr as CG with [facility] and [facility] for wound vac pending medical clearance.    / to remain available for support and/or discharge planning.     GARCÍA Humphrey    [phone]

## 2024-10-25 NOTE — H&P
[facility]    PATIENT'S NAME: KUSH GODINEZ   ATTENDING PHYSICIAN: Kina Hodgson MD   PATIENT ACCOUNT#:   [id number]    LOCATION:  [address] [place] [address]  MEDICAL RECORD #:   [id number]       YOB: 1950  ADMISSION DATE:       10/24/2024    HISTORY AND PHYSICAL EXAMINATION    CHIEF COMPLAINT:  Leukocytosis and concern for an infected decubitus ulcer.    HISTORY OF PRESENT ILLNESS:  Patient is a 74-year-old  male with chronic right hemiparesis, bedbound, with chronic sacral left ischial and right heel decubitus ulcers, follows up closely with the wound clinic.  Last hospitalization was in August 2024 with a similar presentation and had multiple debridements for his sacrum and currently has a wound VAC to his left ischial area.  Today, he was sent to the emergency department for evaluation of rising white blood cell count and elevated platelets.  CBC showed white blood cell count of 13.5 with left shift, platelet count 575 with left shift.  Chemistry and liver function tests were unremarkable.  Urinalysis showed no evidence of urinary tract infection.  CT scan of the pelvis showed large left paracentral sacral decubitus ulcer involving the lower sacrum and coccyx, underlying osteolysis with no periosteal reaction or irregular osseous erosions to suggest acute osteomyelitis, no drainable abscess.  There is a very large stool ball within the rectum measuring 8.7 x 9 cm.  There is moderate distention of the urinary bladder.  The remainder of the examination is unremarkable.  Patient was started empirically on IV vancomycin and cefepime, and he will be admitted to the hospital for further management.  Blood cultures were obtained.  No reported fever in the emergency room.    PAST MEDICAL HISTORY:  Reviewing the records, patient has a history of cerebrovascular accident with chronic right hemiparesis.  Chronic decubitus ulcer on the right heel, his left ischial and sacral area.  He had wound  VAC on his left ischial area.  Factor V Leiden and pulmonary embolism, currently anticoagulated with Xarelto; dementia; anxiety; hypertension; hyperlipidemia; and obstructive sleep apnea.    PAST SURGICAL HISTORY:  Multiple debridements for sacral right heel and left ischial ulcers.    MEDICATIONS:  Please see medication reconciliation list.     FAMILY HISTORY:  Father had lung cancer.    SOCIAL HISTORY:  Ex-tobacco user and alcohol user.  No current tobacco, alcohol, or drug use.  Bedbound.  Requires assistance in his basic activities of daily living.     REVIEW OF SYSTEMS:  Difficult to obtain from the patient himself.  He is nonverbal.  Reportedly, he had multiple debridements of his sacral ulcer and right heel ulcer.  He had a wound VAC to his left ischial area.  Last debridement was yesterday per the family, but he was sent over to the emergency room today for rising white blood cell count and concern for infection.    PHYSICAL EXAMINATION:    GENERAL:  Alert.  Does not appear to be in distress.  VITAL SIGNS:  Temperature 97.4, pulse 91, respiratory rate 18, blood pressure 110/53, pulse ox 100% on room air.  HEENT:  Atraumatic.  Oropharynx clear.  Dry mucous membranes.  Normal hard and soft palate.  Eyes:  Anicteric sclerae.  NECK:  Supple.  No lymphadenopathy.  Trachea midline.  LUNGS:  Clear to auscultation bilaterally.  Normal respiratory effort.  HEART:  Regular rate and rhythm.  S1 and S2 auscultated.  No murmur.  ABDOMEN:  Soft, nondistended.  No tenderness.  Positive bowel sounds.  EXTREMITIES:  No peripheral edema, clubbing or cyanosis.  NEUROLOGIC:  Right hemiparesis.  SKIN:  Sacral decubitus ulcer stage IV, status post debridement with granulation tissue at the base.  Mild cellulitic changes around the edges but no lynette infection etiology.  Right heel medial aspect pressure ulcer with exposure of the subdermal area, again no clear infectious etiology or cellulitis.  Left ischial area with wound  VAC.    ASSESSMENT:  1.   Rising leukocytosis and thrombocytosis suggestive of possible early infection.  2.   Multiple pressure ulcers, sacrum, left ischial and right heel.  3.   Fecal impaction.    PLAN:  Patient will be admitted to general medical floor.  Bowel prep protocol.  IV cefepime and vancomycin.  General surgery consult and wound service consult.  Monitor temperature curve.  Follow up on blood cultures.  Further recommendations to follow.    Dictated By Kina Hodgson MD  d: 10/24/2024 18:12:59  t: 10/24/2024 18:56:25  Job [id number]  FB/

## 2024-10-25 NOTE — PROGRESS NOTES
[facility] Pharmacy Dosing Service      Initial Pharmacokinetic Consult for Vancomycin Dosing     Yoseph Cordero is a 74 year old male who is being initiated on vancomycin therapy for cellulitis.  Pharmacy has been asked to dose vancomycin by Dr Hodgson.  The initial treatment and monitoring approach will be steady state AUC strategy.        Weight and Temperature:    Wt Readings from Last 1 Encounters:   10/24/24 62.4 kg (137 lb 9.6 oz)        Temp Readings from Last 1 Encounters:   10/24/24 98.6 °F (37 °C) (Oral)      Labs:   Recent Labs   Lab 10/23/24  1140 10/24/24  1419   CREATSERUM 0.62* 0.67*      Estimated Creatinine Clearance: 86.7 mL/min (A) (based on SCr of 0.67 mg/dL (L)).     Recent Labs   Lab 10/23/24  1140 10/24/24  1419   WBC 12.8* 13.5*          The Pharmacokinetic Target is:     to 600 mg-h/L and trough <=15 mg/L    Renal Dosing Considerations:    None     Assessment/Plan:   Initial/Loading dose: Has received 1000 mg IV (15 mg/kg, capped at 2250 mg) x 1 initial dose.      Maintenance dose: Pharmacy will dose vancomycin at 1000 mg IV every 24 hours    Monitorin) Plan for vancomycin peak and trough to be obtained at steady state    2) Pharmacy will order SCr as clinically indicated to assess renal function.    3) Pharmacy will monitor for toxicity and efficacy, adjust vancomycin dose and/or frequency, and order vancomycin levels as appropriate per the Pharmacy and Therapeutics Committee approved protocol until discontinuation of the medication.       We appreciate the opportunity to assist in the care of this patient.     Eduar Luis PharmD  10/24/2024  9:22 PM  [place]  Pharmacy Extension: [phone]

## 2024-10-25 NOTE — CONSULTS
[facility]  part of [facility] ID CONSULT NOTE    Yoseph Cordero Patient Status:  Inpatient    1950 MRN [id number]   Location [facility] 5SW/SE Attending Yordan Jesus MD   Hosp Day # 1 PCP Irving Sheets,        Reason for Consultation:  Elevated ESR/CRP    ASSESSMENT:    Antibiotics: Vancomycin, cefepime    # Acute leukocytosis, likely reactive to fecal impaction   # Chronic sacral wound   - Wound swab with Proteus mirabilis/E. coli  # Elevated ESR/CRP  # Multiple wounds               -  S/p L hip/sacral bedside debridement x2 2024               -  S/p R heel beside I&D  w/probe to bone, amputation refused by family               -  Sacral wound with E. Coli, Proteus, Enterococcus  # Hx R heel OM  # Hx Proteus BSI 2/2 infected wounds, s/p 4wk zosyn, dc 24  # Dementia, CVA---bedbound  # Hx PE  # ADR to zosyn    PLAN:  -  Continue on vancomycin and cefepime for now.  -  Follow fever curve, wbc.  -  Reviewed labs, micro, imaging reports, available old records.  -  Case d/w patient, wound care RN.    History of Present Illness:  Yoseph Cordero is a 74 year old male with a history of CVA, PE, factor V Leiden, dementia, bedbound, known to our service for Proteus bacteremia with R foot osteomyelitis 2024 s/p I&D, along with sacral wound s/p two bedside debridements, completed four week course of zosyn on 24, who was seen during admission 2024 for fever and leukocytosis, along with constipation where antibiotics were stopped, who presented to [facility] ED on 10/24 with elevated ESR/CRP results that were drawn on 10/23 with CRP 6.60 and  with reported foul odor to sacral wound. Has hip wound vac in place. On arrival, afebrile, wbc 13.5, CT pelvis with large stool ball within the rectum and large sacral ulcer without abscess with urinary bladder distension, started on vancomycin and cefepime. ID consulted.    History:  Past Medical History:     Anxiety state, unspecified    CVA (cerebral infarction)    Dementia (HCC)    Depression    Heterozygous factor V Leiden mutation (HCC)    Other and unspecified hyperlipidemia    Other ill-defined conditions(799.89)    Cardiomegaly Pleural effusion w/idopathic pleuropuricarditis    Pulmonary embolism (HCC)    Stroke (HCC)    Unspecified sleep apnea     Past Surgical History:   Procedure Laterality Date    Colonoscopy      Electrocardiogram, complete  1914    Scanned to Media Tab     Family History   Problem Relation Age of Onset    Cancer Father         Lung - Smoker  Cause of death    Other (Other) Mother         during     Diabetes Neg     Glaucoma Neg       reports that he has quit smoking. He has never used smokeless tobacco. He reports that he does not drink alcohol and does not use drugs.    Allergies:  Allergies[1]    Medications:    Current Facility-Administered Medications:     sodium hypochlorite (Dakin's) 0.125 % external solution, , Topical, BID    ceFEPIme (Maxipime) 1 g in sodium chloride 0.9% 100 mL IVPB-MBP, 1 g, Intravenous, Q8H    vancomycin (Vancocin) 1,000 mg in sodium chloride 0.9% 250 mL IVPB-ADDV, 15 mg/kg, Intravenous, Q24H    sodium chloride 0.9% infusion, , Intravenous, Continuous    heparin (Porcine) 5000 UNIT/ML injection 5,000 Units, 5,000 Units, Subcutaneous, 2 times per day    acetaminophen (Tylenol Extra Strength) tab 500 mg, 500 mg, Oral, Q4H PRN    ondansetron (Zofran) 4 MG/2ML injection 4 mg, 4 mg, Intravenous, Q6H PRN    metoclopramide (Reglan) 5 mg/mL injection 10 mg, 10 mg, Intravenous, Q8H PRN    polyethylene glycol (PEG 3350) (Miralax) 17 g oral packet 17 g, 17 g, Oral, Daily PRN    sennosides (Senokot) tab 17.2 mg, 17.2 mg, Oral, Nightly PRN    bisacodyl (Dulcolax) 10 MG rectal suppository 10 mg, 10 mg, Rectal, Daily PRN    fleet enema (Fleet) rectal enema 133 mL, 1 enema, Rectal, Once PRN    levETIRAcetam (Keppra) 100 MG/ML oral solution 250 mg, 250 mg, Oral,  BID    metoprolol tartrate (Lopressor) partial tab 12.5 mg, 12.5 mg, Oral, BID    Review of Systems:  ROS unable to be reviewed due to clinical status.    Physical Exam:  Vital signs: Blood pressure 126/58, pulse 99, temperature 97.5 °F (36.4 °C), temperature source Oral, resp. rate 16, height 5' 7\" (1.702 m), weight 137 lb 9.6 oz (62.4 kg), SpO2 99%.    General: Awake, in bed  HEENT: Moist mucous membranes. EOMI  Neck: No lymphadenopathy.  Supple.  Cardiovascular: RRR  Respiratory: CTAB  Abdomen: Soft, no TTP  Musculoskeletal: No edema noted  Integument: Wound pictures reviewed  Lines: PIV+  : Primofit+    Laboratory Data:  Recent Labs   Lab 10/25/24  0550   RBC 3.87   HGB 10.7*   HCT 33.4*   MCV 86.3   MCH 27.6   MCHC 32.0   RDW 15.4*   NEPRELIM 5.98   WBC 9.7   .0     Recent Labs   Lab 10/23/24  1140 10/24/24  1419 10/25/24  0549   * 105* 97   BUN 21 28* 15   CREATSERUM 0.62* 0.67* 0.47*   CA 9.8 10.3 9.8   ALB 4.2 4.4  --     139 142   K 3.4* 3.6 3.4*    103 108   CO2 25.0 30.0 26.0   ALKPHO 73 75  --    AST 24 20  --    ALT 17 19  --    BILT 0.3 0.2  --    TP 7.5 8.2  --        Microbiology: Reviewed in EMR    Radiology: Reviewed    Thank you for allowing us to participate in the care of this patient. Please do not hesitate to call if you have any questions.   We will continue to follow with you and will make further recommendations based on his progress.    SAILAJA Chavez [facility]  [phone]  10/25/2024           [1]   Allergies  Allergen Reactions    Zosyn [Piperacillin-Tazobactam In D5w] RASH     Pt developed pruritic rash on both arms while on this medication

## 2024-10-26 LAB
ANION GAP SERPL CALC-SCNC: 8 MMOL/L (ref 0–18)
BASOPHILS # BLD AUTO: 0.05 X10(3) UL (ref 0–0.2)
BASOPHILS NFR BLD AUTO: 0.4 %
BUN BLD-MCNC: 9 MG/DL (ref 9–23)
BUN/CREAT SERPL: 17.3 (ref 10–20)
CALCIUM BLD-MCNC: 9.6 MG/DL (ref 8.7–10.4)
CHLORIDE SERPL-SCNC: 106 MMOL/L (ref 98–112)
CO2 SERPL-SCNC: 28 MMOL/L (ref 21–32)
CREAT BLD-MCNC: 0.52 MG/DL
DEPRECATED RDW RBC AUTO: 47.6 FL (ref 35.1–46.3)
EGFRCR SERPLBLD CKD-EPI 2021: 106 ML/MIN/1.73M2 (ref 60–?)
EOSINOPHIL # BLD AUTO: 0.5 X10(3) UL (ref 0–0.7)
EOSINOPHIL NFR BLD AUTO: 4.1 %
ERYTHROCYTE [DISTWIDTH] IN BLOOD BY AUTOMATED COUNT: 15.3 % (ref 11–15)
GLUCOSE BLD-MCNC: 109 MG/DL (ref 70–99)
HCT VFR BLD AUTO: 32.2 %
HGB BLD-MCNC: 10.5 G/DL
IMM GRANULOCYTES # BLD AUTO: 0.12 X10(3) UL (ref 0–1)
IMM GRANULOCYTES NFR BLD: 1 %
LYMPHOCYTES # BLD AUTO: 2.04 X10(3) UL (ref 1–4)
LYMPHOCYTES NFR BLD AUTO: 16.9 %
MCH RBC QN AUTO: 27.4 PG (ref 26–34)
MCHC RBC AUTO-ENTMCNC: 32.6 G/DL (ref 31–37)
MCV RBC AUTO: 84.1 FL
MONOCYTES # BLD AUTO: 0.98 X10(3) UL (ref 0.1–1)
MONOCYTES NFR BLD AUTO: 8.1 %
NEUTROPHILS # BLD AUTO: 8.39 X10 (3) UL (ref 1.5–7.7)
NEUTROPHILS # BLD AUTO: 8.39 X10(3) UL (ref 1.5–7.7)
NEUTROPHILS NFR BLD AUTO: 69.5 %
OSMOLALITY SERPL CALC.SUM OF ELEC: 293 MOSM/KG (ref 275–295)
PLATELET # BLD AUTO: 471 10(3)UL (ref 150–450)
POTASSIUM SERPL-SCNC: 3.5 MMOL/L (ref 3.5–5.1)
POTASSIUM SERPL-SCNC: 3.5 MMOL/L (ref 3.5–5.1)
RBC # BLD AUTO: 3.83 X10(6)UL
SODIUM SERPL-SCNC: 142 MMOL/L (ref 136–145)
WBC # BLD AUTO: 12.1 X10(3) UL (ref 4–11)

## 2024-10-26 PROCEDURE — 99233 SBSQ HOSP IP/OBS HIGH 50: CPT | Performed by: STUDENT IN AN ORGANIZED HEALTH CARE EDUCATION/TRAINING PROGRAM

## 2024-10-26 NOTE — DIETARY NOTE
ADULT NUTRITION INITIAL ASSESSMENT    Pt is at high nutrition risk r/t stage 4 PI.  Pt does not meet malnutrition criteria.      RECOMMENDATIONS TO MD: See Nutrition Intervention for oral nutritional supplement (ONS) specifics     ADMITTING DIAGNOSIS:  Sacral decubitus ulcer, stage IV (HCC) [L89.154]  PERTINENT PAST MEDICAL HISTORY:   Past Medical History:    Anxiety state, unspecified    CVA (cerebral infarction)    Dementia (HCC)    Depression    Heterozygous factor V Leiden mutation (HCC)    Other and unspecified hyperlipidemia    Other ill-defined conditions(799.89)    Cardiomegaly Pleural effusion w/idopathic pleuropuricarditis    Pulmonary embolism (HCC)    Stroke (HCC)    Unspecified sleep apnea     PATIENT STATUS: Initial 10/26/24: Pt admit for sacral decubitus ulcer. PMH noted above. Pt assessed due to screening at risk for pressure injury (PI) - stage 2 R hell/right ankle and Stage 4 Left hip/sacrum. RD also consulted for wounds. Pt known to nutrition services from previous admissions, last seen 8/30/24 and met criteria for chronic moderate protein calorie malnutrition (Chronic MPCM). Chart reviewed, pt admitted for abnormal labs. Pt noted with chronic wounds including left hip wound with wound vac - last debridement noted to be day prior to admission (PTA) per family. Discussion with RN, eating well - no concerns. Pt noted to be non-verbal and bed-bound at baseline. Intakes reviewed, % x 5 meals since admission (averaging 82% overall - good). Pt visited, no family at bedside. Pt unable to provide any diet/weight history. Current weight 137# 9.6 oz. EMR weight review, last known weight 136# 13 oz on 8/29/24. Per EMR weight review, pt noted weighing 135# 11 oz on 7/9/24 - stable, 128# 12 oz on 5/3/24 - weight gain, and 132# on 1/4/24 - stable. Weight appears relatively stable per EMR review.    Spoke with daughter (Tyra) via phone to obtain diet/weight history. Daughter reports good intake/appetite  prior to admission (PTA). No known weight loss but difficult to weight pt as bed-bound/wheelchair bound at baseline. Daughter reports using high protein diet. Providing 2 oz Proheal daily, Stevan 2x/day and Protein shake (Orgain) daily. Daughter agreeable to ONS per discussion. +ONS.    FOOD/NUTRITION RELATED HISTORY:  Appetite:  good appetite PTA  Intake: ~% x5 meals documented since admit averaging 82% overall (good)  Intake Meeting Needs: Yes, and oral nutrition supplements (ONS) to maximize  Percent Meals Eaten (last 6 days)       Date/Time Percent Meals Eaten (%)    10/25/24 0814 100 %    10/25/24 1313 100 %    10/25/24 1833 100 %    10/26/24 1030 60 %    10/26/24 1400 50 %          Food Allergies: No Known Food Allergies (NKFA)  Cultural/Ethnic/Cheondoism Preferences: Not Obtained    GASTROINTESTINAL: +BM 10/26/24 - smear;soft    MEDICATIONS: reviewed IVF noted   rivaroxaban  20 mg Oral Daily with food    sodium hypochlorite   Topical BID    docusate sodium  100 mg Oral BID    polyethylene glycol (PEG 3350)  17 g Oral BID    Vitamin C  500 mg Oral Daily    tamsulosin  0.4 mg Oral Daily    levETIRAcetam  250 mg Oral BID    metoprolol tartrate  12.5 mg Oral BID      sodium chloride 100 mL/hr at 10/26/24 1024     LABS: reviewed Hypokalemia resolved  Recent Labs     10/24/24  1419 10/25/24  0549 10/26/24  0743   * 97 109*   BUN 28* 15 9   CREATSERUM 0.67* 0.47* 0.52*   CA 10.3 9.8 9.6    142 142   K 3.6 3.4* 3.5  3.5    108 106   CO2 30.0 26.0 28.0   OSMOCALC 294 295 293     NUTRITION RELATED PHYSICAL FINDINGS:  - Nutrition Focused Physical Exam (NFPE): moderate depletion body fat triceps region  Lower extremity atrophy anticipated given bed-bound/wheelchair bound at baseline  - Fluid Accumulation: none  see RN documentation for details  - Skin Integrity: Pressure Injury: Stage 2 on right;medial heel, right;medial ankle (healing) and Stage 4 on sacrum, left hip and at risk see RN  documentation for details. Wound vac noted to left hip    ANTHROPOMETRICS:  HT: 170.2 cm (5' 7\")  WT: 62.4 kg (137 lb 9.6 oz)   BMI: Body mass index is 21.55 kg/m².  BMI CLASSIFICATION: 19-24.9 kg/m2 - WNL  IBW: 148 lbs        93% IBW  Usual Body Wt: 130-135 lbs per EMR review      102-106% UBW    WEIGHT HISTORY:  Patient Weight(s) for the past 336 hrs:   Weight   10/25/24 0444 62.4 kg (137 lb 9.6 oz)   10/24/24 2050 62.4 kg (137 lb 9.6 oz)     Wt Readings from Last 10 Encounters:   10/25/24 62.4 kg (137 lb 9.6 oz)   08/29/24 62.1 kg (136 lb 12.8 oz)   07/23/24 61.2 kg (135 lb)   07/17/24 60.7 kg (133 lb 12.8 oz)   07/09/24 61.6 kg (135 lb 11.2 oz)   06/03/24 61.7 kg (136 lb)   05/10/24 61.9 kg (136 lb 8 oz)   05/07/24 59.4 kg (131 lb)   05/05/24 59.5 kg (131 lb 3.2 oz)   05/02/24 59 kg (130 lb)     NUTRITION DIAGNOSIS/PROBLEM:   Increased nutrient needs related to Increased demand for nutrient as evidenced by multiple pressure injuries    NUTRITION INTERVENTION:   NUTRITION PRESCRIPTION:   Estimated Nutrition needs: --dosing wt of 62.4 kg - wt taken on 10/25/24  Calories: 3527-6969 calories/day (30-35 calories per kg Dosing wt)  Protein: 81-94 g protein/day (1.3-1.5 g protein/kg Dosing wt)    - Diet:       Procedures    Regular/General diet Texture Consistency: Soft / Easy to Chew ; Is Patient on Accuchecks? No      - RD Care Plan: Initiated ONS (oral nutritional supplements)  - Meals and snacks: Encouraged adequate PO intake  - Medical Food Supplements-RD added Ensure Enlive (350 calories/ 20 g protein each) BID Vanilla, Chocolate, or Strawberry and Stevan BID Orange (2.5 g collagen protein and 90 calories per packet). Rational/use of oral supplements discussed.    ** Mix 1 Stevan in water at breakfast and 1 Stevan in vanilla Ensure Enlive at dinner  ** Provide Ensure Enlive chocolate or strawberry at lunch    - Vitamin and mineral supplements: vitamin C/MD  - Feeding assistance: meal set up and feed  - Nutrition  education: Discussed importance of adequate energy and protein intake     - Coordination of nutrition care: collaboration with other providers  - Discharge and transfer of nutrition care to new setting or provider: monitor plans from home with family    MONITOR AND EVALUATE/NUTRITION GOALS:  - Food and Nutrient Intake:      Monitor: adequacy of PO intake and adequacy of supplement intake  - Food and Nutrient Administration:      Monitor: N/A  - Anthropometric Measurement:    Monitor weight  - Nutrition Goals:      maintain wt within 5%, PO and supplement greater than 75% of needs, good supplement intake, labs within acceptable limits, euglycemia, support wound healing, and improved GI status    DIETITIAN FOLLOW UP: RD to follow and monitor nutrition status    Loulou Sena MS, ROBINSON, RDN, LDN  [id number]

## 2024-10-26 NOTE — PROGRESS NOTES
Progress Note     Yoseph Cordero Patient Status:  Inpatient    1950 MRN [id number]   Location [facility] 5SW/SE Attending Hannah Anaya MD   Hosp Day # 2 PCP Irving Sheets,      Subjective:   S: Patient nonverbal. NAD. Wound vac in place.     Review of Systems:   10 point ROS completed and was negative, except for pertinent positive and negatives stated in subjective.    Objective:   Vital signs:  Temp:  [97.5 °F (36.4 °C)-98.8 °F (37.1 °C)] 98.4 °F (36.9 °C)  Pulse:  [] 107  Resp:  [16-18] 18  BP: (120-138)/(58-76) 130/58  SpO2:  [96 %-99 %] 96 %    Wt Readings from Last 6 Encounters:   10/25/24 137 lb 9.6 oz (62.4 kg)   24 136 lb 12.8 oz (62.1 kg)   24 135 lb (61.2 kg)   24 133 lb 12.8 oz (60.7 kg)   24 135 lb 11.2 oz (61.6 kg)   24 136 lb (61.7 kg)         Physical Exam:    Gen:   Pt nonverbal at baseline.  Pt only looks at me.  CV:   RRR, no m/g/r  Pulm:   CTA bilat  Abd:   +bs, soft, NT, ND  LE:   No c/c/e  Neuro:   right hemiparesis  Integument: wound vac in place      Results:   Diagnostic Data:      Labs:    Labs Last 24 Hours:   BMP     CBC    Other     Na 142 Cl 106 BUN 9 Glu 109   Hb 10.5   PTT - Procal -   K 3.5; 3.5 CO2 28.0 Cr 0.52   WBC 12.1 >< .0  INR - CRP -   Renal Lytes Endo    Hct 32.2   Trop - D dim -   eGFR - Ca 9.6 POC Gluc  -    LFT   pBNP - Lactic -   eGFR AA - PO4 - A1c -   AST - APk - Prot -  LDL -     Mg - TSH -   ALT - T david - Alb -        COVID-19 Lab Results    COVID-19  Lab Results   Component Value Date    COVID19 Not Detected 2024    COVID19 Not Detected 10/24/2022    COVID19 Detected (A) 2021       Pro-Calcitonin  No results for input(s): \"PCT\" in the last 168 hours.    Cardiac  No results for input(s): \"TROP\", \"PBNP\" in the last 168 hours.    Creatinine Kinase  No results for input(s): \"CK\" in the last 168 hours.    Inflammatory Markers  Recent Labs   Lab 10/23/24  1140   CRP 6.60*       Imaging:  Imaging data reviewed in Epic.    Medications:    sodium hypochlorite   Topical BID    docusate sodium  100 mg Oral BID    polyethylene glycol (PEG 3350)  17 g Oral BID    Vitamin C  500 mg Oral Daily    tamsulosin  0.4 mg Oral Daily    heparin  5,000 Units Subcutaneous 2 times per day    levETIRAcetam  250 mg Oral BID    metoprolol tartrate  12.5 mg Oral BID       Assessment & Plan:   ASSESSMENT / PLAN:     Multiple pressure ulcers, sacrum, left ischial and right heel.  - Acute on chronic  - Some in itial concern for infected decubitus ulcer with leukocytosis  - wound care and general surgery on consult  - follow CBC  - ID following, cont cefepime and vancomycin  - cont wound care  - follow cultures      Fecal impaction.  - colace bid  - miralax bid  - dulcolax supp prn  - lactulose prn     Hx of CVA with right sided hemiparesis     Hx of dementia.  Nonverbal at baseline.     Hx of factor V Leiden mutation  - No debridement planned, restart xarelto        Dvt proph:    heparin    Code status:    Full     Dispo: pending cultures, clinical course. Home with spouse, daughter.         MDM: High   I personally spent time on chart/note review, review of labs/imaging, discussion with patient, physical exam, discussion with staff, consultants, coordinating care, writing progress note, and discussion of plan of care.      Hannah Anaya MD    Supplementary Documentation:         **Certification      PHYSICIAN Certification of Need for Inpatient Hospitalization - Initial Certification    Patient will require inpatient services that will reasonably be expected to span two midnight's based on the clinical documentation in H+P.   Based on patients current state of illness, I anticipate that, after discharge, patient will require TBD.

## 2024-10-26 NOTE — PLAN OF CARE
Problem: Patient Centered Care  Goal: Patient preferences are identified and integrated in the patient's plan of care  Description: Interventions:  - What would you like us to know as we care for you?   - Provide timely, complete, and accurate information to patient/family  - Incorporate patient and family knowledge, values, beliefs, and cultural backgrounds into the planning and delivery of care  - Encourage patient/family to participate in care and decision-making at the level they choose  - Honor patient and family perspectives and choices  Outcome: Progressing     Problem: Patient/Family Goals  Goal: Patient/Family Long Term Goal  Description: Patient's Long Term Goal:     Interventions  -   - See additional Care Plan goals for specific interventions  Outcome: Progressing  Goal: Patient/Family Short Term Goal  Description: Patient's Short Term Goal:     Interventions:   -   - See additional Care Plan goals for specific interventions  Outcome: Progressing     Problem: SKIN/TISSUE INTEGRITY - ADULT  Goal: Skin integrity remains intact  Description: INTERVENTIONS  - Assess and document risk factors for pressure ulcer development  - Assess and document skin integrity  - Monitor for areas of redness and/or skin breakdown  - Initiate interventions, skin care algorithm/standards of care as needed  Outcome: Progressing  Goal: Incision(s), wounds(s) or drain site(s) healing without S/S of infection  Description: INTERVENTIONS:  - Assess and document risk factors for pressure ulcer development  - Assess and document skin integrity  - Assess and document dressing/incision, wound bed, drain sites and surrounding tissue  - Implement wound care per orders  - Initiate isolation precautions as appropriate  - Initiate Pressure Ulcer prevention bundle as indicated  Outcome: Progressing     Problem: PAIN - ADULT  Goal: Verbalizes/displays adequate comfort level or patient's stated pain goal  Description: INTERVENTIONS:  -  Encourage pt to monitor pain and request assistance  - Assess pain using appropriate pain scale  - Administer analgesics based on type and severity of pain and evaluate response  - Implement non-pharmacological measures as appropriate and evaluate response  - Consider cultural and social influences on pain and pain management  - Manage/alleviate anxiety  - Utilize distraction and/or relaxation techniques  - Monitor for opioid side effects  - Notify MD/LIP if interventions unsuccessful or patient reports new pain  - Anticipate increased pain with activity and pre-medicate as appropriate  Outcome: Progressing     Problem: SAFETY ADULT - FALL  Goal: Free from fall injury  Description: INTERVENTIONS:  - Assess pt frequently for physical needs  - Identify cognitive and physical deficits and behaviors that affect risk of falls.  - [place] fall precautions as indicated by assessment.  - Educate pt/family on patient safety including physical limitations  - Instruct pt to call for assistance with activity based on assessment  - Modify environment to reduce risk of injury  - Provide assistive devices as appropriate  - Consider OT/PT consult to assist with strengthening/mobility  - Encourage toileting schedule  Outcome: Progressing     Problem: DISCHARGE PLANNING  Goal: Discharge to home or other facility with appropriate resources  Description: INTERVENTIONS:  - Identify barriers to discharge w/pt and caregiver  - Include patient/family/discharge partner in discharge planning  - Arrange for needed discharge resources and transportation as appropriate  - Identify discharge learning needs (meds, wound care, etc)  - Arrange for interpreters to assist at discharge as needed  - Consider post-discharge preferences of patient/family/discharge partner  - Complete POLST form as appropriate  - Assess patient's ability to be responsible for managing their own health  - Refer to Case Management Department for coordinating discharge  planning if the patient needs post-hospital services based on physician/LIP order or complex needs related to functional status, cognitive ability or social support system  Outcome: Progressing

## 2024-10-26 NOTE — PLAN OF CARE
Problem: Patient Centered Care  Goal: Patient preferences are identified and integrated in the patient's plan of care  Description: Interventions:  - What would you like us to know as we care for you?   - Provide timely, complete, and accurate information to patient/family  - Incorporate patient and family knowledge, values, beliefs, and cultural backgrounds into the planning and delivery of care  - Encourage patient/family to participate in care and decision-making at the level they choose  - Honor patient and family perspectives and choices  Outcome: Progressing     Problem: SKIN/TISSUE INTEGRITY - ADULT  Goal: Skin integrity remains intact  Description: INTERVENTIONS  - Assess and document risk factors for pressure ulcer development  - Assess and document skin integrity  - Monitor for areas of redness and/or skin breakdown  - Initiate interventions, skin care algorithm/standards of care as needed  Outcome: Not Progressing  Goal: Incision(s), wounds(s) or drain site(s) healing without S/S of infection  Description: INTERVENTIONS:  - Assess and document risk factors for pressure ulcer development  - Assess and document skin integrity  - Assess and document dressing/incision, wound bed, drain sites and surrounding tissue  - Implement wound care per orders  - Initiate isolation precautions as appropriate  - Initiate Pressure Ulcer prevention bundle as indicated  Outcome: Progressing     Problem: PAIN - ADULT  Goal: Verbalizes/displays adequate comfort level or patient's stated pain goal  Description: INTERVENTIONS:  - Encourage pt to monitor pain and request assistance  - Assess pain using appropriate pain scale  - Administer analgesics based on type and severity of pain and evaluate response  - Implement non-pharmacological measures as appropriate and evaluate response  - Consider cultural and social influences on pain and pain management  - Manage/alleviate anxiety  - Utilize distraction and/or relaxation  techniques  - Monitor for opioid side effects  - Notify MD/LIP if interventions unsuccessful or patient reports new pain  - Anticipate increased pain with activity and pre-medicate as appropriate  Outcome: Progressing     Problem: SAFETY ADULT - FALL  Goal: Free from fall injury  Description: INTERVENTIONS:  - Assess pt frequently for physical needs  - Identify cognitive and physical deficits and behaviors that affect risk of falls.  - [place] fall precautions as indicated by assessment.  - Educate pt/family on patient safety including physical limitations  - Instruct pt to call for assistance with activity based on assessment  - Modify environment to reduce risk of injury  - Provide assistive devices as appropriate  - Consider OT/PT consult to assist with strengthening/mobility  - Encourage toileting schedule  Outcome: Progressing     Problem: DISCHARGE PLANNING  Goal: Discharge to home or other facility with appropriate resources  Description: INTERVENTIONS:  - Identify barriers to discharge w/pt and caregiver  - Include patient/family/discharge partner in discharge planning  - Arrange for needed discharge resources and transportation as appropriate  - Identify discharge learning needs (meds, wound care, etc)  - Arrange for interpreters to assist at discharge as needed  - Consider post-discharge preferences of patient/family/discharge partner  - Complete POLST form as appropriate  - Assess patient's ability to be responsible for managing their own health  - Refer to Case Management Department for coordinating discharge planning if the patient needs post-hospital services based on physician/LIP order or complex needs related to functional status, cognitive ability or social support system  Outcome: Progressing

## 2024-10-27 VITALS
BODY MASS INDEX: 21.6 KG/M2 | TEMPERATURE: 99 F | WEIGHT: 137.63 LBS | HEART RATE: 92 BPM | SYSTOLIC BLOOD PRESSURE: 109 MMHG | OXYGEN SATURATION: 95 % | RESPIRATION RATE: 16 BRPM | DIASTOLIC BLOOD PRESSURE: 54 MMHG | HEIGHT: 67 IN

## 2024-10-27 PROCEDURE — 99239 HOSP IP/OBS DSCHRG MGMT >30: CPT | Performed by: STUDENT IN AN ORGANIZED HEALTH CARE EDUCATION/TRAINING PROGRAM

## 2024-10-27 NOTE — DISCHARGE SUMMARY
[facility]  part of formerly [facility]    Discharge Summary    Yoseph Cordero Patient Status:  Inpatient    1950 MRN [id number]   Location [facility] 5SW/SE Attending Hannah Anaya MD   Hosp Day # 3 PCP Irving Sheets DO     Date of Admission: 10/24/2024   Date of Discharge: 10/27/24    Admitting Diagnosis: Sacral decubitus ulcer, stage IV (HCC) [L89.154]    Disposition: Home    Discharge Diagnosis: .Principal Problem:    Sacral decubitus ulcer, stage IV (HCC)  Active Problems:    Sacral decubitus ulcer    Cellulitis      Hospital Course:   Reason for Admission: Leukocytosis and concern for an infected decubitus ulcer.       Discharge Physical Exam:     Gen:   Pt nonverbal at baseline.  Pt only looks at me.  CV:   RRR, no m/g/r  Pulm:   CTA bilat  Abd:   +bs, soft, NT, ND  LE:   No c/c/e  Neuro:   right hemiparesis  Integument: wound vac in place        Hospital Course:   Patient is a 74-year-old  male with chronic right hemiparesis, bedbound, with chronic sacral left ischial and right heel decubitus ulcers, follows up closely with the wound clinic. Last hospitalization was in 2024 with a similar presentation and had multiple debridements for his sacrum and currently has a wound VAC to his left ischial area.   This admission he was sent to the emergency department for evaluation of rising white blood cell count and elevated platelets. CBC showed white blood cell count of 13.5 with left shift, platelet count 575 with left shift. CT scan of the pelvis showed large left paracentral sacral decubitus ulcer involving the lower sacrum and coccyx, underlying osteolysis with no periosteal reaction or irregular osseous erosions to suggest acute osteomyelitis, no drainable abscess. There is a very large stool ball within the rectum measuring 8.7 x 9 cm. Patient was started empirically on IV vancomycin and cefepime, and he was admitted with ID consult. His hospital course was  unremarkable, patient nonverbal at baseline. Later ID decided to take patient off antibiotics for chronic wound. He is to [facility] with wound vac outpatient follow up. No Abx on discharge.Sent home with wound vac, with  [facility]. Discussed with son prior to discharge.       Complications: None    Consultants         Provider   Role Specialty     Prakash Rollins MD      Consulting Physician Internal Medicine     Vijay Hernandez MD      Consulting Physician INFECTIOUS DISEASES            Pending Labs       Order Current Status    Blood Culture Preliminary result    Blood Culture Preliminary result            Discharge Plan:   Discharge Condition: Stable    Current Discharge Medication List        Home Meds - Unchanged    Details   sodium hypochlorite 0.125 % External Solution 10 ml to three gauze, pack in the wound      tamsulosin 0.4 MG Oral Cap TAKE 1 CAPSULE(0.4 MG) BY MOUTH DAILY      metoprolol tartrate 25 MG Oral Tab Take 0.5 tablets (12.5 mg total) by mouth 2 (two) times daily.      calcium carbonate-vitamin D 250-3.125 MG-MCG Oral Tab Take 1 tablet by mouth 2 (two) times daily with meals.      lactulose 10 GM/15ML Oral Solution Take 30 mL (20 g total) by mouth every 6 (six) hours as needed (severe).      polyethylene glycol, PEG 3350, 17 g Oral Powd Pack Take 17 g by mouth daily as needed (If no bowel movement in last 24 hours).      sennosides 17.2 MG Oral Tab Take 1 tablet (17.2 mg total) by mouth daily.      Vitamin C 500 MG Oral Tab Take 1 tablet (500 mg total) by mouth daily.      Nutritional Supplements (ABDOULAYE NUTRIVIGOR OR) Take 1 packet by mouth 3 (three) times daily with meals.      ferrous sulfate 300 (60 Fe) MG/5ML Oral Solution Take 5 mL (300 mg total) by mouth daily.      levETIRAcetam 100 MG/ML Oral Solution Take 2.5 mL (250 mg total) by mouth 2 (two) times daily.      collagenase 250 UNIT/GM External Ointment Apply topically 2 (two) times a day.      acetaminophen 500 MG Oral Tab Take 1  tablet (500 mg total) by mouth every 8 (eight) hours.      rivaroxaban (XARELTO) 20 MG Oral Tab Take 1 tablet (20 mg total) by mouth daily with food.                 Discharge Diet: As tolerated    Discharge Activity: As tolerated    Follow up:      Follow-up Information       Irving Sheets, DO. Go in 2 day(s).    Specialty: Family Medicine  Why: Please see your primary care team for hospital discharge follow up.  Contact information:  [address] [place] [address]  [phone]                                   Other Discharge Instructions:         Resume services with [facility]  [phone]          >30 minutes spent on discharge orders, coordination, and planning.     Hannah Anaya MD  10/27/2024

## 2024-10-27 NOTE — PLAN OF CARE
Problem: SKIN/TISSUE INTEGRITY - ADULT  Goal: Skin integrity remains intact  Description: INTERVENTIONS  - Assess and document risk factors for pressure ulcer development  - Assess and document skin integrity  - Monitor for areas of redness and/or skin breakdown  - Initiate interventions, skin care algorithm/standards of care as needed  Outcome: Progressing  Goal: Incision(s), wounds(s) or drain site(s) healing without S/S of infection  Description: INTERVENTIONS:  - Assess and document risk factors for pressure ulcer development  - Assess and document skin integrity  - Assess and document dressing/incision, wound bed, drain sites and surrounding tissue  - Implement wound care per orders  - Initiate isolation precautions as appropriate  - Initiate Pressure Ulcer prevention bundle as indicated  Outcome: Progressing     Problem: PAIN - ADULT  Goal: Verbalizes/displays adequate comfort level or patient's stated pain goal  Description: INTERVENTIONS:  - Encourage pt to monitor pain and request assistance  - Assess pain using appropriate pain scale  - Administer analgesics based on type and severity of pain and evaluate response  - Implement non-pharmacological measures as appropriate and evaluate response  - Consider cultural and social influences on pain and pain management  - Manage/alleviate anxiety  - Utilize distraction and/or relaxation techniques  - Monitor for opioid side effects  - Notify MD/LIP if interventions unsuccessful or patient reports new pain  - Anticipate increased pain with activity and pre-medicate as appropriate  Outcome: Progressing     Problem: SAFETY ADULT - FALL  Goal: Free from fall injury  Description: INTERVENTIONS:  - Assess pt frequently for physical needs  - Identify cognitive and physical deficits and behaviors that affect risk of falls.  - [place] fall precautions as indicated by assessment.  - Educate pt/family on patient safety including physical limitations  - Instruct pt to  call for assistance with activity based on assessment  - Modify environment to reduce risk of injury  - Provide assistive devices as appropriate  - Consider OT/PT consult to assist with strengthening/mobility  - Encourage toileting schedule  Outcome: Progressing     Problem: DISCHARGE PLANNING  Goal: Discharge to home or other facility with appropriate resources  Description: INTERVENTIONS:  - Identify barriers to discharge w/pt and caregiver  - Include patient/family/discharge partner in discharge planning  - Arrange for needed discharge resources and transportation as appropriate  - Identify discharge learning needs (meds, wound care, etc)  - Arrange for interpreters to assist at discharge as needed  - Consider post-discharge preferences of patient/family/discharge partner  - Complete POLST form as appropriate  - Assess patient's ability to be responsible for managing their own health  - Refer to Case Management Department for coordinating discharge planning if the patient needs post-hospital services based on physician/LIP order or complex needs related to functional status, cognitive ability or social support system  Outcome: Progressing

## 2024-10-27 NOTE — CM/SW NOTE
10/27/24 0900   Discharge disposition   Expected discharge disposition Home-Health   Post Acute Care Provider [facility]   Discharge transportation Private car     The patient received a MDO for discharge.    The patient is reserved with [facility].    The patient will be transported home by his daughter via private car.    ALMA ROSA/DERICK to remain available for support and/or discharge planning.     Priscilla Polo MSW, LSW  Discharge Planner [id number]

## 2024-10-27 NOTE — PLAN OF CARE
Patient DC instructions given. All questions answered. IV taken out. Patient DC home with family via car.   Problem: Patient Centered Care  Goal: Patient preferences are identified and integrated in the patient's plan of care  Description: Interventions:  - What would you like us to know as we care for you?   - Provide timely, complete, and accurate information to patient/family  - Incorporate patient and family knowledge, values, beliefs, and cultural backgrounds into the planning and delivery of care  - Encourage patient/family to participate in care and decision-making at the level they choose  - Honor patient and family perspectives and choices  Outcome: Adequate for Discharge     Problem: SKIN/TISSUE INTEGRITY - ADULT  Goal: Skin integrity remains intact  Description: INTERVENTIONS  - Assess and document risk factors for pressure ulcer development  - Assess and document skin integrity  - Monitor for areas of redness and/or skin breakdown  - Initiate interventions, skin care algorithm/standards of care as needed  Outcome: Adequate for Discharge  Goal: Incision(s), wounds(s) or drain site(s) healing without S/S of infection  Description: INTERVENTIONS:  - Assess and document risk factors for pressure ulcer development  - Assess and document skin integrity  - Assess and document dressing/incision, wound bed, drain sites and surrounding tissue  - Implement wound care per orders  - Initiate isolation precautions as appropriate  - Initiate Pressure Ulcer prevention bundle as indicated  Outcome: Adequate for Discharge     Problem: PAIN - ADULT  Goal: Verbalizes/displays adequate comfort level or patient's stated pain goal  Description: INTERVENTIONS:  - Encourage pt to monitor pain and request assistance  - Assess pain using appropriate pain scale  - Administer analgesics based on type and severity of pain and evaluate response  - Implement non-pharmacological measures as appropriate and evaluate response  - Consider  cultural and social influences on pain and pain management  - Manage/alleviate anxiety  - Utilize distraction and/or relaxation techniques  - Monitor for opioid side effects  - Notify MD/LIP if interventions unsuccessful or patient reports new pain  - Anticipate increased pain with activity and pre-medicate as appropriate  Outcome: Adequate for Discharge     Problem: SAFETY ADULT - FALL  Goal: Free from fall injury  Description: INTERVENTIONS:  - Assess pt frequently for physical needs  - Identify cognitive and physical deficits and behaviors that affect risk of falls.  - [place] fall precautions as indicated by assessment.  - Educate pt/family on patient safety including physical limitations  - Instruct pt to call for assistance with activity based on assessment  - Modify environment to reduce risk of injury  - Provide assistive devices as appropriate  - Consider OT/PT consult to assist with strengthening/mobility  - Encourage toileting schedule  Outcome: Adequate for Discharge     Problem: DISCHARGE PLANNING  Goal: Discharge to home or other facility with appropriate resources  Description: INTERVENTIONS:  - Identify barriers to discharge w/pt and caregiver  - Include patient/family/discharge partner in discharge planning  - Arrange for needed discharge resources and transportation as appropriate  - Identify discharge learning needs (meds, wound care, etc)  - Arrange for interpreters to assist at discharge as needed  - Consider post-discharge preferences of patient/family/discharge partner  - Complete POLST form as appropriate  - Assess patient's ability to be responsible for managing their own health  - Refer to Case Management Department for coordinating discharge planning if the patient needs post-hospital services based on physician/LIP order or complex needs related to functional status, cognitive ability or social support system  Outcome: Adequate for Discharge

## 2024-10-28 ENCOUNTER — PATIENT OUTREACH (OUTPATIENT)
Dept: CASE MANAGEMENT | Age: 74
End: 2024-10-28

## 2024-10-28 ENCOUNTER — TELEPHONE (OUTPATIENT)
Dept: INTERNAL MEDICINE UNIT | Facility: HOSPITAL | Age: 74
End: 2024-10-28

## 2024-10-28 DIAGNOSIS — Z02.9 ENCOUNTERS FOR UNSPECIFIED ADMINISTRATIVE PURPOSE: Primary | ICD-10-CM

## 2024-10-28 PROCEDURE — 1111F DSCHRG MED/CURRENT MED MERGE: CPT

## 2024-10-28 NOTE — TELEPHONE ENCOUNTER
Received call from pharmacy regarding prescription for collagenase, asking for dimensions of wound. Prescriber reviewed wound nurse documentation and reached out to wound RN who saw patient during hospital stay. Patient did not have collagenase ordered to any of his wounds during hospital admission. Patient previously had order for Collagenase to left hip. While hospitalized had wound  VAC in place to left hip. Reviewed dimensions of left hip wound with pharmacy staff.

## 2024-10-28 NOTE — PROGRESS NOTES
Spoke to daughter, Tyra- on release. Verified patient's name and date of birth.     She did get voicemail to call for update but has not had a chance to call. She asked to schedule a hospital follow up appointment.     Future Appointments   Date Time Provider Department Center   10/29/2024  2:00 PM Irving Sheets DO [facility] [place]   11/13/2024 11:00 AM George Sampson APRN [facility] [place]   12/11/2024 11:00 AM George Sampson APRN [facility]

## 2024-10-28 NOTE — PAYOR COMM NOTE
--------------  CONTINUED STAY REVIEW    Payor: RAYMOND PENA O  Subscriber #:  [id number]  Authorization Number: [id number]    Admit date: 10/24/24  Admit time:  8:36 PM    REVIEW DOCUMENTATION:        10/26 IM    Subjective:  S: Patient nonverbal. NAD. Wound vac in place.      Review of Systems:   10 point ROS completed and was negative, except for pertinent positive and negatives stated in subjective.        Objective:  Vital signs:  Temp:  [97.5 °F (36.4 °C)-98.8 °F (37.1 °C)] 98.4 °F (36.9 °C)  Pulse:  [] 107  Resp:  [16-18] 18  BP: (120-138)/(58-76) 130/58  SpO2:  [96 %-99 %] 96 %         Wt Readings from Last 6 Encounters:   10/25/24 137 lb 9.6 oz (62.4 kg)   08/29/24 136 lb 12.8 oz (62.1 kg)   07/23/24 135 lb (61.2 kg)   07/17/24 133 lb 12.8 oz (60.7 kg)   07/09/24 135 lb 11.2 oz (61.6 kg)   06/03/24 136 lb (61.7 kg)            Physical Exam:    Gen:   Pt nonverbal at baseline.  Pt only looks at me.  CV:   RRR, no m/g/r  Pulm:   CTA bilat  Abd:   +bs, soft, NT, ND  LE:   No c/c/e  Neuro:   right hemiparesis  Integument: wound vac in place           Results:  Diagnostic Data:       Labs:     Labs Last 24 Hours:                      BMP         CBC       Other      Na 142 Cl 106 BUN 9 Glu 109     Hb 10.5     PTT - Procal -    K 3.5; 3.5 CO2 28.0 Cr 0.52     WBC 12.1 >< .0   INR - CRP -    Renal Lytes Endo       Hct 32.2     Trop - D dim -    eGFR - Ca 9.6 POC Gluc  -       LFT     pBNP - Lactic -    eGFR AA - PO4 - A1c -     AST - APk - Prot -   LDL -        Mg - TSH -     ALT - T david - Alb -                    Recent Labs   Lab 10/23/24  1140   CRP 6.60*         Imaging: Imaging data reviewed in Epic.     Medications:   Scheduled Medications    sodium hypochlorite   Topical BID    docusate sodium  100 mg Oral BID    polyethylene glycol (PEG 3350)  17 g Oral BID    Vitamin C  500 mg Oral Daily    tamsulosin  0.4 mg Oral Daily    heparin  5,000 Units Subcutaneous 2 times per day    levETIRAcetam  250  mg Oral BID    metoprolol tartrate  12.5 mg Oral BID               Assessment & Plan:  ASSESSMENT / PLAN:      Multiple pressure ulcers, sacrum, left ischial and right heel.  - Acute on chronic  - Some in itial concern for infected decubitus ulcer with leukocytosis  - wound care and general surgery on consult  - follow CBC  - ID following, cont cefepime and vancomycin  - cont wound care  - follow cultures      Fecal impaction.  - colace bid  - miralax bid  - dulcolax supp prn  - lactulose prn     Hx of CVA with right sided hemiparesis     Hx of dementia.  Nonverbal at baseline.     Hx of factor V Leiden mutation  - No debridement planned, restart xarelto         Dvt proph:    heparin    Code status:    Full     Dispo: pending cultures, clinical course. Home with spouse, daughter.            MDM: High   I personally spent time on chart/note review, review of labs/imaging, discussion with patient, physical exam, discussion with staff, consultants, coordinating care, writing progress note, and discussion of plan of care.      Hannah Anaya MD             MEDICATIONS ADMINISTERED IN LAST 1 DAY:  ascorbic acid (Vitamin C) tab 500 mg       Date Action Dose Route User    Discharged on 10/27/2024    10/27/2024 0917 Given 500 mg Oral Yudy Francis RN          sodium hypochlorite (Dakin's) 0.125 % external solution       Date Action Dose Route User    Discharged on 10/27/2024    10/27/2024 0900 Given (none) Topical Yudy Francis RN          docusate sodium (Colace) cap 100 mg       Date Action Dose Route User    Discharged on 10/27/2024    10/27/2024 0917 Given 100 mg Oral Yudy Francis RN          levETIRAcetam (Keppra) 100 MG/ML oral solution 250 mg       Date Action Dose Route User    Discharged on 10/27/2024    10/27/2024 0917 Given 250 mg Oral Yudy Francis RN          metoprolol tartrate (Lopressor) partial tab 12.5 mg       Date Action Dose Route User    Discharged on 10/27/2024    10/27/2024 0917 Given 12.5 mg Oral  Yudy Francis RN          polyethylene glycol (PEG 3350) (Miralax) 17 g oral packet 17 g       Date Action Dose Route User    Discharged on 10/27/2024    10/27/2024 0916 Given 17 g Oral Yudy Francis RN          rivaroxaban (Xarelto) tab 20 mg       Date Action Dose Route User    Discharged on 10/27/2024    10/27/2024 0917 Given 20 mg Oral Yudy Francis RN          tamsulosin (Flomax) cap 0.4 mg       Date Action Dose Route User    Discharged on 10/27/2024    10/27/2024 0917 Given 0.4 mg Oral Yudy Francis RN                          0917 CM-Given     1719-D/C'd                 ceFEPIme (Maxipime) 1 g in sodium chloride 0.9% 100 mL IVPB-MBP  Dose: 1 g  Freq: Every 8 hours Route: IV  Last Dose: 1 g (10/25/24 1450)  Start: 10/24/24 2200 End: 10/25/24 1453   Order specific questions:             2304 RB-New Bag      0514 RB-New Bag     1450 CE-New Bag     1453-D/C'd        ceFEPIme (Maxpime) 2 g in sodium chloride 0.9% 100 mL IVPB-MBP  Dose: 2 g  Freq: Once Route: IV  Last Dose: Stopped (10/24/24 1936)  Start: 10/24/24 1700 End: 10/24/24 1936   Order specific questions:             1818 AK-New Bag     1936 AK-Stopped                     Medications 10/18 10/19 10/20 10/21 10/22 10/23 10/24 10/25 10/26 10/27   sodium chloride 0.9% infusion  Rate: 100 mL/hr  Freq: Continuous Route: IV  Start: 10/24/24 2115 End: 10/27/24 1719          2304 RB-New Bag       0049 RB-New Bag     1024 CM-New Bag     2042 CG-New Bag      1719-D/C'd              Vitals (last day) before discharge       Date/Time Temp Pulse Resp BP SpO2 Weight O2 Device O2 Flow Rate (L/min) [facility]    10/27/24 0855 98.9 °F (37.2 °C) 92 16 109/54 95 % -- None (Room air) -- CM    10/27/24 0300 98.3 °F (36.8 °C) 87 18 111/55 97 % -- None (Room air) --     10/26/24 2040 99.3 °F (37.4 °C) 101 18 112/54 97 % -- None (Room air) --     10/26/24 1900 -- 100 -- -- -- -- -- --     10/26/24 1524 98.9 °F (37.2 °C) 88 18 103/69 97 % -- None (Room air) --     10/26/24 1023  98.4 °F (36.9 °C) 107 18 130/58 96 % -- None (Room air) -- CM    10/26/24 0359 97.5 °F (36.4 °C) 94 16 121/64 97 % -- None (Room air) -- RB                  Discharge Notification    Patient Name: KUSH GODINEZ  Payor: RAYMOND PENA [facility]  Subscriber #: [id number]  Authorization Number: [id number]  Admit Date/Time: 10/24/2024 1:51 PM  Discharge Date/Time: 10/27/2024 3:19 PM

## 2024-10-28 NOTE — PROGRESS NOTES
Transitions of Care Navigation  Discharge Date: 10/27/24  Contact Date: 10/28/2024    Transitions of Care Assessment:  HUMA Initial Assessment    General:  Assessment completed with: Family (Tyra)  Patient Subjective: NCM spoke with patient's daughter aman Mary per HIPAA, states pt is feeling better. Tyra states she does not see any symptoms on pt, as far as fevers, chills, nausea, vomiting, shortness of breath, chest pain or any other symptoms at this time. Tyra states pt has HH visiting and also follows with wound care. Tyra states otherwise patient is doing well overall. Patient's daughter asking about wound care supplies,  has reached out to HH nurse for this.  Chief Complaint: Sacral decubitus ulcer, stage IV  Verify patient name and  with patient/ caregiver: Yes    Hospital Stay/Discharge:  Tell me what you understand of why you were in the hospital or emergency department: pt returned to ED due to his wound.  Prior to leaving the hospital were your Discharge Instructions reviewed with you?: Yes  Did you receive a copy of your written Discharge Instructions?: Yes  What questions do you have about your Discharge Instructions?: Tyra declined any questions or concerns.  Do you feel better or worse since you left the hospital or emergency department?: Better    Follow - Up Appointment:  Do you have a follow-up appointment?: Yes  Date: 10/29/24  Physician: PCP Dr. Sheets  Are there any barriers to getting to your follow-up appointment?: No    Home Health/DME:  Prior to leaving the hospital was Home Health (HH) arranged for you?: Yes  Has HH been out or set up a visit to see you?: Been out     Prior to leaving the hospital or emergency department was Durable Medical Equipment (DME), medical supplies, or infusions arranged for you?: No  Are DME/medical supply/infusions needs identified by staff during this assessment?: No     Medications/Diet:  Did any of your medications change, during or after your  hospital stay or ED visit?: No  Do you understand what your medications are for and possible side effects?: Yes  Are there any reasons that keep you from taking your medication as prescribed?: No  Any concerns about medication refills?: No    Were you given a different diet per your Discharge Instructions?: No  Reason: N/A     Questions/Concerns:  Do you have any questions or concerns that have not been discussed?: No   HUMA Follow-up Assessment    General:  Assessment completed with: Family (Tyra)  Patient Subjective: NCM spoke with patient's daughter aman Mary per HIPAA, states pt is feeling better. Tyra states she does not see any symptoms on pt, as far as fevers, chills, nausea, vomiting, shortness of breath, chest pain or any other symptoms at this time. Tyra states pt has HH visiting and also follows with wound care. Tyra states otherwise patient is doing well overall. Patient's daughter asking about wound care supplies,  has reached out to HH nurse for this.  Chief Complaint: Sacral decubitus ulcer, stage IV  Community Resources: Home Health ([facility])    Nursing Interventions:  All discharge instructions reviewed with the patient. Reviewed when to call MD vs when to call 911 or go the ED. Educated patient on the importance of taking all meds as prescribed as well as close f/u with PCP/specialists. Pt verbalized understanding and will contact the office with any further questions or concerns. Patient denies fevers, chills, nausea, vomiting, shortness of breath, chest pain, or any other symptoms at this time.   NCM provided contact information for any further questions/concerns. Patient verbalized understanding and agreeable.           Medications:Reviewed medication list.  Medications are up to date.  Current Outpatient Medications   Medication Sig Dispense Refill    collagenase 250 UNIT/GM External Ointment Apply 1 Application topically in the morning and 1 Application before bedtime. 2 g 1    sodium  hypochlorite 0.125 % External Solution 10 ml to three gauze, pack in the wound 1 each 5    tamsulosin 0.4 MG Oral Cap TAKE 1 CAPSULE(0.4 MG) BY MOUTH DAILY 90 capsule 3    metoprolol tartrate 25 MG Oral Tab Take 0.5 tablets (12.5 mg total) by mouth 2 (two) times daily. 90 tablet 3    calcium carbonate-vitamin D 250-3.125 MG-MCG Oral Tab Take 1 tablet by mouth 2 (two) times daily with meals. 60 tablet 0    lactulose 10 GM/15ML Oral Solution Take 30 mL (20 g total) by mouth every 6 (six) hours as needed (severe). 300 mL 0    polyethylene glycol, PEG 3350, 17 g Oral Powd Pack Take 17 g by mouth daily as needed (If no bowel movement in last 24 hours). 30 packet 0    sennosides 17.2 MG Oral Tab Take 1 tablet (17.2 mg total) by mouth daily. 30 tablet 0    Vitamin C 500 MG Oral Tab Take 1 tablet (500 mg total) by mouth daily.      Nutritional Supplements (ABDOULAYE NUTRIVIGOR OR) Take 1 packet by mouth 3 (three) times daily with meals.      ferrous sulfate 300 (60 Fe) MG/5ML Oral Solution Take 5 mL (300 mg total) by mouth daily. 1 each 0    levETIRAcetam 100 MG/ML Oral Solution Take 2.5 mL (250 mg total) by mouth 2 (two) times daily.      acetaminophen 500 MG Oral Tab Take 1 tablet (500 mg total) by mouth every 8 (eight) hours.      rivaroxaban (XARELTO) 20 MG Oral Tab Take 1 tablet (20 mg total) by mouth daily with food. 90 tablet 0         Follow-up Appointments:  Your appointments       Date & Time Appointment Department (Center)    Oct 29, 2024 2:00 PM [facility] Follow Up with Irving Sheets DO [facility], [address], [place] ([facility] [place])        Nov 13, 2024 11:00 AM CST Wound Care Follow up with George Sampson APRN [facility] ([facility])        Dec 11, 2024 11:00 AM CST Wound Care Follow up with George Sampson APRN [facility] ([facility]  [facility]  [address]  [facility] [address]  [phone] [facility], [place] [facility] [place]  [address]  [place] [address] [phone]            Transitional [facility] Clinic  Was TCC Ordered: No      Primary Care Provider (If no TCC appointment)  Does patient already have a PCP appointment scheduled? Yes  Nurse Care Manager Confirmed PCP office TCM/HUMA appointment with patient       Specialist  Does the patient have any other follow-up appointment(s) need to be scheduled? Yes   -If yes: Nurse Care Manager reviewed upcoming specialist appointments with patient: Yes   -Does the patient need assistance scheduling appointment(s): No    [x]  Patient verbally agrees to additional follow-up calls from Nurse Care Manager    Book By Date: 11/3/24

## 2024-10-29 ENCOUNTER — OFFICE VISIT (OUTPATIENT)
Dept: FAMILY MEDICINE CLINIC | Facility: CLINIC | Age: 74
End: 2024-10-29

## 2024-10-29 VITALS
DIASTOLIC BLOOD PRESSURE: 70 MMHG | HEART RATE: 93 BPM | SYSTOLIC BLOOD PRESSURE: 109 MMHG | HEIGHT: 67 IN | BODY MASS INDEX: 22 KG/M2 | RESPIRATION RATE: 17 BRPM

## 2024-10-29 DIAGNOSIS — L89.154 PRESSURE INJURY OF SACRAL REGION, STAGE 4 (HCC): ICD-10-CM

## 2024-10-29 DIAGNOSIS — D64.9 ANEMIA, UNSPECIFIED TYPE: ICD-10-CM

## 2024-10-29 DIAGNOSIS — G81.91 HEMIPLEGIA AFFECTING RIGHT DOMINANT SIDE, UNSPECIFIED ETIOLOGY, UNSPECIFIED HEMIPLEGIA TYPE (HCC): ICD-10-CM

## 2024-10-29 DIAGNOSIS — R94.01 ABNORMAL EEG: Primary | ICD-10-CM

## 2024-10-29 DIAGNOSIS — Z86.73 HISTORY OF STROKE: ICD-10-CM

## 2024-10-29 DIAGNOSIS — F02.818 LATE ONSET ALZHEIMER'S DISEASE WITH BEHAVIORAL DISTURBANCE (HCC): ICD-10-CM

## 2024-10-29 DIAGNOSIS — G30.1 LATE ONSET ALZHEIMER'S DISEASE WITH BEHAVIORAL DISTURBANCE (HCC): ICD-10-CM

## 2024-10-29 DIAGNOSIS — L89.213 PRESSURE INJURY OF RIGHT HIP, STAGE 3 (HCC): ICD-10-CM

## 2024-10-29 DIAGNOSIS — L89.614 PRESSURE INJURY OF RIGHT HEEL, STAGE 4 (HCC): ICD-10-CM

## 2024-10-29 PROCEDURE — 99496 TRANSJ CARE MGMT HIGH F2F 7D: CPT | Performed by: FAMILY MEDICINE

## 2024-10-29 PROCEDURE — 1160F RVW MEDS BY RX/DR IN RCRD: CPT | Performed by: FAMILY MEDICINE

## 2024-10-29 PROCEDURE — G0009 ADMIN PNEUMOCOCCAL VACCINE: HCPCS | Performed by: FAMILY MEDICINE

## 2024-10-29 PROCEDURE — 1111F DSCHRG MED/CURRENT MED MERGE: CPT | Performed by: FAMILY MEDICINE

## 2024-10-29 PROCEDURE — 1159F MED LIST DOCD IN RCRD: CPT | Performed by: FAMILY MEDICINE

## 2024-10-29 PROCEDURE — 3074F SYST BP LT 130 MM HG: CPT | Performed by: FAMILY MEDICINE

## 2024-10-29 PROCEDURE — 90677 PCV20 VACCINE IM: CPT | Performed by: FAMILY MEDICINE

## 2024-10-29 PROCEDURE — 3078F DIAST BP <80 MM HG: CPT | Performed by: FAMILY MEDICINE

## 2024-10-31 ENCOUNTER — TELEPHONE (OUTPATIENT)
Dept: WOUND CARE | Facility: HOSPITAL | Age: 74
End: 2024-10-31

## 2024-10-31 NOTE — TELEPHONE ENCOUNTER
Gali from [facility] is requesting the most recent progress notes and wound measurements faxed.   Routed to RN pool to address.   Fax: [phone]  Phone: [phone]

## 2024-11-05 ENCOUNTER — PATIENT OUTREACH (OUTPATIENT)
Dept: CASE MANAGEMENT | Age: 74
End: 2024-11-05

## 2024-11-07 ENCOUNTER — TELEPHONE (OUTPATIENT)
Dept: FAMILY MEDICINE CLINIC | Facility: CLINIC | Age: 74
End: 2024-11-07

## 2024-11-12 ENCOUNTER — TELEPHONE (OUTPATIENT)
Dept: FAMILY MEDICINE CLINIC | Facility: CLINIC | Age: 74
End: 2024-11-12

## 2024-11-12 NOTE — TELEPHONE ENCOUNTER
[facility]  orders received, signed by provider and faxed back successfully.    Placed in scanning

## 2024-11-13 ENCOUNTER — TELEPHONE (OUTPATIENT)
Dept: FAMILY MEDICINE CLINIC | Facility: CLINIC | Age: 74
End: 2024-11-13

## 2024-11-13 ENCOUNTER — OFFICE VISIT (OUTPATIENT)
Dept: WOUND CARE | Facility: HOSPITAL | Age: 74
End: 2024-11-13
Attending: NURSE PRACTITIONER
Payer: MEDICARE

## 2024-11-13 VITALS
TEMPERATURE: 99 F | OXYGEN SATURATION: 96 % | RESPIRATION RATE: 18 BRPM | HEART RATE: 97 BPM | SYSTOLIC BLOOD PRESSURE: 117 MMHG | DIASTOLIC BLOOD PRESSURE: 72 MMHG

## 2024-11-13 DIAGNOSIS — L89.154 PRESSURE INJURY OF SACRAL REGION, STAGE 4 (HCC): ICD-10-CM

## 2024-11-13 DIAGNOSIS — L89.213 PRESSURE INJURY OF RIGHT HIP, STAGE 3 (HCC): Primary | ICD-10-CM

## 2024-11-13 DIAGNOSIS — L89.614 PRESSURE INJURY OF RIGHT HEEL, STAGE 4 (HCC): ICD-10-CM

## 2024-11-13 DIAGNOSIS — R26.9 GAIT DISTURBANCE: ICD-10-CM

## 2024-11-13 DIAGNOSIS — L89.304 PRESSURE INJURY OF BUTTOCK, STAGE 4, UNSPECIFIED LATERALITY (HCC): ICD-10-CM

## 2024-11-13 PROCEDURE — 99213 OFFICE O/P EST LOW 20 MIN: CPT | Performed by: NURSE PRACTITIONER

## 2024-11-13 NOTE — TELEPHONE ENCOUNTER
SAMANTHA Parry  calling from [facility]     Brinda palomo will not be seeing patient today as the patient has an appointment with the wound clinic    Brinda will see the patient  on Friday 11/15    Routing as For Your Information

## 2024-11-13 NOTE — PATIENT INSTRUCTIONS
Sacral wound: looks great, continue Dakin's moisten gauze cover with foam dressing, zinc oxide or Vaseline to intact skin around the wound, daily or as needed (if contaminated with stool).    Hip wound: soak with Vashe for 5-10 min before application of new wound vac dressing to reduce foul odor, vac change as ordered previously two times a week and as needed if dislodged or disconnected, at 125 mmHg.    Ankle and heel wound, clean with Vashe and cover with foam dressing, we did foam with silver, change twice a week and as needed (if they are soiled).    Continue with high protein, high fiber diet, multi vitamin diet.  Monitor for signs of infection (at least 3) fever, chills, redness in skin, warmth, foul smell, pus, increased drainage. Call us or go to ED for evaluation.

## 2024-11-13 NOTE — PROGRESS NOTES
Subjective   Yoseph Cordero is a 74 year old male.    Chief Complaint   Patient presents with    Wound Recheck     Right ankle and heel, left hip, and sacrum      HPI  11/13/2024: Patient is here with his son and wife for his wounds, his son reporting the wounds are improving, no new concerns.   10/23/2024: Patient is here with his son and wife for wounds care, reporting scaral wound has foul smell.  9/27/2024: Patient is here with his son and his wife for follow up wound care, his daughter is doing dressing changes, reporting the wounds on right heel closed now feels boggy on proximal section. Family continues to feed patient and reposition patient as needed.   9/13/2024: Patient is here with his son, daughter and wife. He is awake. He has wound vac on his left hip. Sacra wound has become granular, heel wound has closed, ankle wound is dark granular, no new wounds. His family is feeding him high protein, collagen and wound healing supplement, they have been repositioning him, home health nurse has been doing dressing changes as well as her daughter.  8/23/2024: Patient is here with his two sons and wife. He has wound vac on left hip. His son reporting the sacral wound has foul smell with back edges, no fever at time of this visit.     8/9/2024: Patient is here with her daughter who is providing the information, patient is non-verbal. Patient's daughter has taken patient home, home health nurse has not started as of now, she is doing all wounds the dressing changes. Patient has hospital bed with air mattress with lift.      7/25/2024: Patient is a 74-year-old male with a past medical history of dementia nonverbal at baseline, chronic right hemiparesis from prior CVA, seizure disorder on Keppra, VTE with factor V Leiden on Xarelto, anemia, chronic right heel stage IV, left ischial stage IV and sacral decubitus ulcer stage IV with recent hospitalization on 7/9-7/19 for severe sepsis secondary to Proteus bacteremia  treated with IV Zosyn discharged on 7/19/2024 with midline with plan for 4 weeks of IV antibiotics tp [facility]care [facility]F for placement.   Per SNF, patient had acquired the pressure injuries at his right heel, left ischial/hip and sacral at home while he had home health services.  Per patient's daughter patient eats well, has multiple stool a day, incontinent. Patient's daughter is not happy with care her father has been getting.   Patient was here today 7/23/2024 to see Dr. Sury WALDROP (Podiatrist) for heel wounds, I was asked us to see this patients today for multiple wounds that wound clinic was not aware he had at time of masha.     Review of Systems   Constitutional:  Negative for activity change.   Respiratory:  Negative for cough and shortness of breath.    Cardiovascular:  Negative for leg swelling.   Gastrointestinal:  Negative for abdominal distention.   Skin:  Positive for wound.        Multiple wounds   Neurological:  Positive for weakness.   Psychiatric/Behavioral:  Negative for agitation.       Objective  Physical Exam  Vitals reviewed.   Constitutional:       General: He is awake.      Appearance: He is underweight.      Comments: Non-verbal, unable to follow commends    Cardiovascular:      Rate and Rhythm: Normal rate and regular rhythm.      Pulses: Normal pulses.   Pulmonary:      Effort: Pulmonary effort is normal.   Abdominal:      General: Bowel sounds are normal.      Palpations: Abdomen is soft.   Musculoskeletal:      Right lower leg: No edema.      Left lower leg: No edema.   Skin:     General: Skin is warm.      Findings: Wounds on right heel, left ankle, left hip and sacrum present. No erythema or rash.      Nails: There is no clubbing.    Wound Assessment  Wound 08/23/24 1 Heel Right;Medial (Active)   Date First Assessed: 08/23/24   Present on Original Admission: Yes   Wound Number (Wound Clinic Only): 1  Primary Wound Type: Pressure Injury  Location: Heel  Wound Location  Orientation: Right;Medial      Assessments 7/23/2024  8:50 AM 11/13/2024 10:48 AM   Wound Image          Site Assessment -- Moist;Red   Closure -- Not approximated   Drainage Amount -- Moderate   Drainage Description -- Sanguineous   Treatments -- Cleansed;Vashe;Saline   Dressing -- Aquacel Foam (Silver bordered foam)   Dressing Changed -- Changed   Dressing Status -- Dry;Intact;Clean   Wound Length (cm) 6.5 cm 0.2 cm   Wound Width (cm) 4.4 cm 0.2 cm   Wound Surface Area (cm^2) 28.6 cm^2 0.04 cm^2   Wound Depth (cm) 1 cm 0.5 cm   Wound Volume (cm^3) 28.6 cm^3 0.02 cm^3   Wound Healing % -- 100   Margins -- Well-defined edges;Not attached   Non-staged Wound Description -- Full thickness   Madhuri-wound Assessment -- Dry;Hyperpigmented   Wound Granulation Tissue -- Red   Wound Bed Granulation (%) -- 100 %   Wound Bed Epithelium (%) -- 0 %   Wound Bed Slough (%) -- 0 %   Wound Bed Eschar (%) -- 0 %   Wound Bed Fibrin (%) -- 0 %   Exposed Structure Bone --   State of Healing -- Fully granulated   Wound Odor -- None   Tunneling? -- Yes   Number of Tunnels -- 1   Tunnel 1 Location -- 12   Tunnel 1 Depth -- 2.5   Undermining? -- No       Active Orders   Date Order Priority Status Authorizing Provider   07/25/24 1711 OP Wound Dressing Routine Active George Sampson APRN     - Dressing:    Collagen     - Dressing:    Hydrofera transfer     - Dressing:    ABD pad     - Dressing:    Kerlix     - Cleansing:    Cleanse with normal saline or wound cleanser     - Frequency:    Change dressing three times a day       Wound 08/23/24 2 Hip Left (Active)   Date First Assessed: 08/23/24   Present on Original Admission: Yes   Wound Number (Wound Clinic Only): 2  Primary Wound Type: Pressure Injury  Location: Hip  Wound Location Orientation: Left      Assessments 7/23/2024  8:50 AM 11/13/2024 10:48 AM   Wound Image         Site Assessment Moist;Tan;Yellow Moist;Pink;Red;Granulation tissue   Closure Not approximated Not approximated    Drainage Amount Large Moderate   Drainage Description Serous;Yellow Serosanguineous;Odor   Treatments Wound ;Vashe (traid) Cleansed;Wound    Wound Vac Brand -- Medela   Dressing ABD Pad;Gauze Wound vac sponge   Dressing Changed Changed Changed   Dressing Status Dressing Changed Clean;Dry;Intact   Wound Length (cm) -- 2 cm   Wound Width (cm) -- 2 cm   Wound Surface Area (cm^2) -- 4 cm^2   Wound Depth (cm) -- 1.5 cm   Wound Volume (cm^3) -- 6 cm^3   Wound Healing % -- 92   Margins Poorly defined Well-defined edges;Not attached   Non-staged Wound Description Full thickness Full thickness   Madhuri-wound Assessment Purple;Pink;Painful Moist;Pink;Fragile   Wound Granulation Tissue -- Red;Pink   Wound Bed Granulation (%) -- 90 %   Wound Bed Epithelium (%) -- 0 %   Wound Bed Slough (%) 100 % 10 %   Wound Bed Eschar (%) -- 0 %   Wound Bed Fibrin (%) -- 0 %   Exposed Structure -- Tendon;Bone   State of Healing Undermining Fully granulated;Undermining   Wound Odor None Mild   Undermining? Yes Yes (7-12=1.5cm)   Number of Undermines 1 1   Undermine 1 Start Position 12 (0.3cm depth) 7   Undermine 1 End Position 12 12   Pressure Injury Stage -- Stage 4       Active Orders   Date Order Priority Status Authorizing Provider   07/25/24 1711 OP Wound Dressing Routine Active George Sampson APRN     - Dressing:    Dry gauze     - Dressing:    ABD pad     - Additional Wound Dressing Information:    traid     - Cleansing:    Cleanse with normal saline or wound cleanser     - Frequency:    Change dressing daily and PRN       Wound 08/23/24 3 Sacrum (Active)   Date First Assessed: 08/23/24   Present on Original Admission: Yes   Wound Number (Wound Clinic Only): 3  Primary Wound Type: Pressure Injury  Location: Sacrum      Assessments 7/23/2024  8:50 AM 11/13/2024 10:48 AM   Wound Image         Site Assessment Moist;Painful;Yellow;Red Moist;Red;Granulation tissue   Closure Not approximated Not approximated   Drainage Amount  Large Moderate   Drainage Description Yellow Serosanguineous   Treatments Wound ;Topical (Barrier/Moisturizer/Ointment);Special Tape Cleansed;Vashe   Dressing ABD Pad;Gauze (dakins) Piyush;Gauze;Aquacel Foam (Dakin's moistened piyush packed into wound, dry gauze, bordered foam)   Dressing Changed Changed Changed   Dressing Status -- Clean;Dry;Intact   Wound Length (cm) 7 cm 6.2 cm   Wound Width (cm) 7 cm 4.8 cm   Wound Surface Area (cm^2) 49 cm^2 29.76 cm^2   Wound Depth (cm) 2.6 cm 2 cm   Wound Volume (cm^3) 127.4 cm^3 59.52 cm^3   Wound Healing % -- 53   Margins Poorly defined Well-defined edges;Not attached;Epibole (Rolled edges)   Non-staged Wound Description Full thickness Full thickness   Madhuri-wound Assessment Maceration;Painful;Excoriated;Pink Clean;Dry;Intact;Induration   Wound Granulation Tissue Pink Red   Wound Bed Granulation (%) 30 % 100 %   Wound Bed Epithelium (%) -- 0 %   Wound Bed Slough (%) 40 % 0 %   Wound Bed Eschar (%) 30 % 0 %   Wound Bed Fibrin (%) -- 0 %   Exposed Structure Bone Necrosis;Bone --   State of Healing -- Fully granulated   Wound Odor Mild Mild   Undermining? Yes (1.8 cm) Yes (7-2=2.7cm)   Number of Undermines 1 1   Undermine 1 Start Position 7 7   Undermine 1 End Position 11 2   Pressure Injury Stage Stage 4 Stage 4       Active Orders   Date Order Priority Status Authorizing Provider   07/25/24 1711 OP Wound Dressing Routine Active George Sampson APRN     - Dressing:    Dry gauze     - Dressing:    ABD pad     - Additional Wound Dressing Information:    dakin soaked     - Cleansing:    Cleanse with Dakins     - Frequency:    Change dressing two times a day       Wound 10/24/24 4 Ankle Right;Medial (Active)   Date First Assessed: 10/24/24   Present on Original Admission: Yes   Wound Number (Wound Clinic Only): 4  Primary Wound Type: Pressure Injury  Location: Ankle  Wound Location Orientation: Right;Medial  Wound Description (Comments): healing intact skin      Assessments  7/23/2024  8:50 AM 11/13/2024 10:48 AM   Wound Image         Site Assessment Moist;Pink Clean;Dry;Intact;Epithelialization   Closure Not approximated Approximated   Drainage Amount Scant None   Drainage Description Serosanguineous --   Treatments Wound ;Honey Gel Cleansed;Saline;Wound    Dressing Kerlix roll Aquacel Foam (Silver bordered foam)   Dressing Changed Changed Changed   Dressing Status Dressing Changed Clean;Dry;Intact   Wound Length (cm) 0.6 cm 0 cm   Wound Width (cm) 0.8 cm 0 cm   Wound Surface Area (cm^2) 0.48 cm^2 0 cm^2   Wound Depth (cm) 0 cm 0 cm   Wound Volume (cm^3) 0 cm^3 0 cm^3   Margins Poorly defined Poorly defined;Flat and Intact   Non-staged Wound Description Full thickness Not applicable   Madhuri-wound Assessment -- Clean;Dry;Intact   Wound Bed Granulation (%) 20 % 0 %   Wound Bed Epithelium (%) -- 100 %   Wound Bed Slough (%) -- 0 %   Wound Bed Eschar (%) 80 % 0 %   Wound Bed Fibrin (%) -- 0 %   State of Healing Non-healing Closed wound edges;Epithelialized   Wound Odor None None   Tunneling? -- Yes   Number of Tunnels -- 1   Tunnel 1 Location -- 12   Tunnel 1 Depth -- 2.5   Pressure Injury Stage Unstageable Deep Tissue       Inactive Orders   Date Order Priority Status Authorizing Provider   10/25/24 0857 Apply dressing Foam Daily Routine Discontinued Yordan Jesus MD     - Dressing type:    Foam   Negative Pressure Wound Therapy Hip Anterior;Left (Active)   Placement Date/Time: 10/23/24 1137   Location: Hip  Wound Location Orientation: Anterior;Left      Assessments 10/23/2024 11:15 AM 11/13/2024 10:48 AM   Wound photographed/measured Yes Yes   Machine Status (On) Yes Yes   Site Assessment Pink;Yellow;Painful;Moist Moist;Pink;Red   Madhuri-wound Assessment Clean;Intact;Dry;Pink Clean;Intact;Dry;Pink   Unit Type Medela Medela   Dressing Type Black foam;Collagen Black foam   Number of Foam Pieces Used 1 1   Cycle Continuous;On Continuous;On   Target Pressure (mmHg) 125 125    Drainage Description Serosanguineous Serosanguineous   Dressing Status Clean;Dry;Intact Clean;Dry;Intact   Canister Changed -- Yes         Negative Pressure Wound Therapy Hip Anterior;Left (Active)   10/23/24 1137 Hip   Placed by External Staff?:    Inserted by:    Wound Type:    Wound Location Orientation: Anterior;Left   Removal Reason:    Wound photographed/measured Yes 11/13/24 1048   Machine Status (On) Yes 11/13/24 1048   Site Assessment Moist;Pink;Red 11/13/24 1048   Madhuri-wound Assessment Clean;Intact;Dry;Pink 11/13/24 1048   Unit Type Medela 11/13/24 1048   Dressing Type Black foam 11/13/24 1048   Number of Foam Pieces Used 1 11/13/24 1048   Cycle Continuous;On 11/13/24 1048   Target Pressure (mmHg) 125 11/13/24 1048   Drainage Description Serosanguineous 11/13/24 1048   Dressing Status Clean;Dry;Intact 11/13/24 1048   Canister Changed Yes 11/13/24 1048       Negative Pressure Wound Therapy Hip Left (Active)   10/25/24  Hip   Placed by External Staff?:    Inserted by: REGINA Landry RN   Wound Type: Pressure ulcer: stage IV   Wound Location Orientation: Left   Removal Reason:      Vital Signs    11/13/24 1022   BP: 117/72   Pulse: 97   Resp: 18   Temp: 98.7 °F (37.1 °C)   PainSc: 8 - (Severe)   Allergies  Allergies[1]    Assessment   Left hip wound, stage IV pressure injury:  -granular tissue, with less exposed tendon, sightly smaller  -no erythema  -no foul odor     Sacral wound, stage IV pressure injury:  -granular  with right side of the wound with necrotic tissue with undermining  -palpable bone, with foul smell  -no erythema     Right heel wound, stage IV:  -small open wound, granular wound bed, no erythema  high risk for re-ulceration, MRI on 7/10 showed osteomyelitis of dorsal calcaneus   -no erythema or other sign of infection     Right ankle wound,stage III pressure injury possibly becoming stage IV  -closed wound with discoloration of skin, fragile skin  -no imaging has been done to r/o OM  -no  erythema     Left lateral ankle and left heel wounds, closed.     Reviewed note and labs/imaging at [facility] and Care Every where.  Recent labs: 9/3/2024: BUN 20, Creatinine 0.56, eGFR 104, H&H 10.3 & 33.3  7/15/2024: Albumin 3.6, Globulin 3.4, total protein 7.0    Encounter Diagnosis  1. Pressure injury of right hip, stage 3 (HCC)    2. Pressure injury of right heel, stage 4 (HCC)    3. Pressure injury of buttock, stage 4, unspecified laterality (HCC)    4. Pressure injury of sacral region, stage 4 (HCC)    5. Gait disturbance      Problem List  Patient Active Problem List   Diagnosis    Hemiplegia affecting right dominant side (HCC)    Chronic obstructive pulmonary disease (HCC)    Combined forms of age-related cataract of both eyes    Esophageal reflux    Late onset Alzheimer's disease with behavioral disturbance (HCC)    Alcohol abuse    Calcification of aorta (HCC)    Alcoholism (HCC)    History of stroke    Dysphagia    Hyperglycemia    Weakness    Gait disturbance    Abnormal involuntary movement    Acute pulmonary embolism without acute cor pulmonale, unspecified pulmonary embolism type (HCC)    Factor V Leiden (HCC)    Leukocytosis    Tachycardia    Altered mental status, unspecified altered mental status type    Seizures (HCC)    Infected ulcer of skin, unspecified ulcer stage (HCC)    Infected decubitus ulcer    Transient neurological symptoms    Palliative care by specialist    Pressure injury of sacral region, stage 4 (HCC)    Pressure injury of right hip, stage 3 (HCC)    Pressure injury of right heel, stage 4 (HCC)    Pressure injury of right ankle, stage 2 (HCC)    Complicated open wound of left hip    Non-healing open wound of right heel    Leukocytosis, unspecified type    Sacral decubitus ulcer, stage IV (HCC)    Sacral decubitus ulcer    Cellulitis     Plan  Orders  Palliative wound care: the goal of wound care is to reduce the risk for infection, slow the course of deterioration and promote client  comfort and function. If the wounds become granular and shows tissue improvement, may processed to advanced dressing.     Left hip: Continue negative pressure wound therapy, may change twice a week if patient tolerated.      Sacral wound: at this time Dakin's solution with gauze, twice a day for one week change twice a week and as needed.     Right ankle wound continue with foam dressing.     Right heel, with OM, silver foam dressing, may change every two or three days. May use bordered foma to protect the newly closed wound, as I explained to his family due to underlying OM high risk for re-ulceration.   Continue to re-position to reduce pressure on hip and sacral wound at least every two hours with pressure reduction mattress and seat cushion.  Continue using heel protector/heel boots to reduce pressure in the wound bed.  Monitor for sign of infection, redness, foul odor, warmth and increased drainage.  Discussed treatment plan with the patient's son in detail, all questions answered.     Note to patient:The 21st Century Cures Act makes medical notes like these available to patients in the interest of transparency. Please be advised this is a medical document. Medical documents are intended to carry relevant information, facts as evident, and the clinical opinion of the practitioner. The medical note is intended as peer to peer communication and may appear blunt or direct. It is written in medical language and may contain abbreviations or verbiage that are unfamiliar.   Total face to face time was 60 min, more than 50% of the time was spent in counseling and/or coordination of care related to sacral and left hip wounds  Follow-Up  Return 2-4 weeks, for APN x 30 minutes.              [1]   Allergies  Allergen Reactions    Zosyn [Piperacillin-Tazobactam In D5w] RASH     Pt developed pruritic rash on both arms while on this medication

## 2024-11-15 ENCOUNTER — APPOINTMENT (OUTPATIENT)
Dept: WOUND CARE | Facility: HOSPITAL | Age: 74
End: 2024-11-15
Attending: NURSE PRACTITIONER
Payer: MEDICARE

## 2024-11-22 ENCOUNTER — TELEPHONE (OUTPATIENT)
Dept: FAMILY MEDICINE CLINIC | Facility: CLINIC | Age: 74
End: 2024-11-22

## 2024-11-22 NOTE — TELEPHONE ENCOUNTER
I received a call from Brinda SINGH from [facility] and she will like to re-certify  patient for nursing. Do you approve? A verbal order is fine

## 2024-11-23 NOTE — TELEPHONE ENCOUNTER
I called Brinda ISNGH [facility] --> Patient contacted and made aware of Dr. Sheets' note below. She verbalized understanding. No further questions or concerns at this time.

## 2024-11-27 ENCOUNTER — TELEPHONE (OUTPATIENT)
Dept: FAMILY MEDICINE CLINIC | Facility: CLINIC | Age: 74
End: 2024-11-27

## 2024-11-27 ENCOUNTER — APPOINTMENT (OUTPATIENT)
Dept: WOUND CARE | Facility: HOSPITAL | Age: 74
End: 2024-11-27
Attending: NURSE PRACTITIONER
Payer: MEDICARE

## 2024-12-04 ENCOUNTER — APPOINTMENT (OUTPATIENT)
Dept: WOUND CARE | Facility: HOSPITAL | Age: 74
End: 2024-12-04
Attending: NURSE PRACTITIONER
Payer: MEDICARE

## 2024-12-06 ENCOUNTER — TELEPHONE (OUTPATIENT)
Dept: FAMILY MEDICINE CLINIC | Facility: CLINIC | Age: 74
End: 2024-12-06

## 2024-12-06 NOTE — TELEPHONE ENCOUNTER
Patient's daughter Tyra is calling to inquire if labs are needed to check her father's Iron levels since he's been getting Iron sucrose via IV. She is also requesting if any other labs need to be completed as well so the home health RN can draw them and is he going to be receiving IV Iron again?    Please advise

## 2024-12-09 ENCOUNTER — LAB REQUISITION (OUTPATIENT)
Dept: LAB | Facility: HOSPITAL | Age: 74
End: 2024-12-09
Payer: MEDICARE

## 2024-12-09 DIAGNOSIS — D64.9 ANEMIA, UNSPECIFIED: ICD-10-CM

## 2024-12-09 LAB
BASOPHILS # BLD AUTO: 0.06 X10(3) UL (ref 0–0.2)
BASOPHILS NFR BLD AUTO: 0.5 %
DEPRECATED RDW RBC AUTO: 50.4 FL (ref 35.1–46.3)
EOSINOPHIL # BLD AUTO: 0.48 X10(3) UL (ref 0–0.7)
EOSINOPHIL NFR BLD AUTO: 4 %
ERYTHROCYTE [DISTWIDTH] IN BLOOD BY AUTOMATED COUNT: 15.6 % (ref 11–15)
HCT VFR BLD AUTO: 42.8 %
HGB BLD-MCNC: 13.8 G/DL
IMM GRANULOCYTES # BLD AUTO: 0.06 X10(3) UL (ref 0–1)
IMM GRANULOCYTES NFR BLD: 0.5 %
LYMPHOCYTES # BLD AUTO: 2.6 X10(3) UL (ref 1–4)
LYMPHOCYTES NFR BLD AUTO: 21.4 %
MCH RBC QN AUTO: 28.5 PG (ref 26–34)
MCHC RBC AUTO-ENTMCNC: 32.2 G/DL (ref 31–37)
MCV RBC AUTO: 88.4 FL
MONOCYTES # BLD AUTO: 1.05 X10(3) UL (ref 0.1–1)
MONOCYTES NFR BLD AUTO: 8.7 %
NEUTROPHILS # BLD AUTO: 7.88 X10 (3) UL (ref 1.5–7.7)
NEUTROPHILS # BLD AUTO: 7.88 X10(3) UL (ref 1.5–7.7)
NEUTROPHILS NFR BLD AUTO: 64.9 %
PLATELET # BLD AUTO: 352 10(3)UL (ref 150–450)
RBC # BLD AUTO: 4.84 X10(6)UL
WBC # BLD AUTO: 12.1 X10(3) UL (ref 4–11)

## 2024-12-09 PROCEDURE — 85025 COMPLETE CBC W/AUTO DIFF WBC: CPT | Performed by: FAMILY MEDICINE

## 2024-12-09 NOTE — TELEPHONE ENCOUNTER
Advised daughter Tyra(on HIPAA) of Dr. Sheets's note. Daughter verbalized understanding. Indicated that would like SAMANTHA Golden with [facility] to draw the CBC today since coming out to see patient at 1pm. SAMANTHA Golden's number is [phone].     Called SAMANTHA Golden and left detailed message on confidential voicemail. Advised to call back to confirm that received message. Also called [facility] and left detailed message on confidential voicemail with information to draw CBC with diff/platelets today.  To call back to confirm message received.

## 2024-12-10 ENCOUNTER — TELEPHONE (OUTPATIENT)
Dept: WOUND CARE | Facility: HOSPITAL | Age: 74
End: 2024-12-10

## 2024-12-10 NOTE — TELEPHONE ENCOUNTER
Gali from [facility] has called and stated she will need a most current wound care notes from December faxed to her at [phone].

## 2024-12-11 ENCOUNTER — TELEPHONE (OUTPATIENT)
Dept: FAMILY MEDICINE CLINIC | Facility: CLINIC | Age: 74
End: 2024-12-11

## 2024-12-11 ENCOUNTER — APPOINTMENT (OUTPATIENT)
Dept: WOUND CARE | Facility: HOSPITAL | Age: 74
End: 2024-12-11
Attending: NURSE PRACTITIONER
Payer: MEDICARE

## 2024-12-11 NOTE — TELEPHONE ENCOUNTER
Called patient's daughter , verified name and  of her father Yoseph. I helped daughter to schedule a voiding trial appointment for this week (see ER note).   Patient's daughter agreed, verbalized understandings and has no further questions.     PROCEDURES:  Total thyroidectomy 11-Dec-2024 10:49:18  Manny Hicks

## 2024-12-11 NOTE — TELEPHONE ENCOUNTER
HH orders and poc received, signed by provider and faxed back successfully.    Placed in HH folder with scan sheet.

## 2024-12-12 RX ORDER — LEVETIRACETAM 500 MG/1
500 TABLET ORAL DAILY
Qty: 90 TABLET | Refills: 3 | Status: SHIPPED | OUTPATIENT
Start: 2024-12-12

## 2024-12-12 RX ORDER — MIDAZOLAM HYDROCHLORIDE 2 MG/ML
5 SYRUP ORAL DAILY
Qty: 473 ML | Refills: 1 | OUTPATIENT
Start: 2024-12-12

## 2024-12-12 NOTE — TELEPHONE ENCOUNTER
Dr. Sheets, please kindly review. This medication has no protocol attached.    No future appointments with family medicine/internal medicine.  Last office visit: 10/29/24    Requested Prescriptions   Pending Prescriptions Disp Refills    XARELTO 20 MG Oral Tab [Pharmacy Med Name: XARELTO 20 MG TABLET] 90 tablet 1     Sig: TAKE 1 TABLET BY MOUTH EVERY DAY       There is no refill protocol information for this order          Future Appointments         Provider Department Appt Notes    In 3 weeks George Sampson APRN [facility] 2 of 6 visits 9-16-24 to 9-16-25 #[id number] [facility] Room 5          Recent Outpatient Visits              4 weeks ago Pressure injury of right hip, stage 3 ([facility])    [facility] George Sampson APRN    Office Visit    1 month ago Abnormal EEG    [facility] [address], [place] Irving Sheets DO    Office Visit    1 month ago Pressure injury of sacral region, stage 4 ([facility])    [facility] George Sampson APRN    Office Visit    2 months ago Pressure injury of sacral region, stage 4 ([facility])    [facility] George Sampson APRN    Office Visit    2 months ago H/O urinary retention    [facility]    Nurse Only

## 2024-12-12 NOTE — TELEPHONE ENCOUNTER
Daughter Tyra(on HIPAA) indicated that patient had blood work done on 12/9/2024. Anemia is improving. Wanted to see if needs to continue the same dosage of iron? If so, needs a refill. Medication already pended.     Also patient is taking keppra 500mg 1 tablet daily. Was on the keppra 500mg 1 tablet 2 times daily, but it was making the patient more sleepy so in the hospital they were giving patient 1 daily, so daughter continued the same dose at home. Medication pended. Only has a few pills left. Please advise.

## 2024-12-12 NOTE — TELEPHONE ENCOUNTER
Refill passed per [facility] protocol.    Toma Sullivan, RN3 minutes ago (11:09 AM)     KW  Daughter Tyra(on HIPAA) indicated that patient had blood work done on 12/9/2024. Anemia is improving. Wanted to see if needs to continue the same dosage of iron? If so, needs a refill. Medication already pended.      Also patient is taking keppra 500mg 1 tablet daily. Was on the keppra 500mg 1 tablet 2 times daily, but it was making the patient more sleepy so in the hospital they were giving patient 1 daily, so daughter continued the same dose at home. Medication pended. Only has a few pills left. Please advise.          Note          Requested Prescriptions   Pending Prescriptions Disp Refills    Ferrous Sulfate 220 (44 Fe) MG/5ML Oral Solution [Pharmacy Med Name: FERROUS SULFATE 220MG/5ML ELIXIR] 473 mL 1     Sig: TAKE 5 ML BY MOUTH DAILY       There is no refill protocol information for this order       levETIRAcetam (KEPPRA) 500 MG Oral Tab 90 tablet 3     Sig: Take 1 tablet (500 mg total) by mouth daily.       Neurology Medications Passed - 12/12/2024 11:14 AM        Passed - In person appointment or virtual visit in the past 6 mos or appointment in next 3 mos     Recent Outpatient Visits              4 weeks ago Pressure injury of right hip, stage 3 ([facility])    [facility] George Sampson, BEAU    Office Visit    1 month ago Abnormal EEG    [facility], [address], [place] Irving Sheets DO    Office Visit    1 month ago Pressure injury of sacral region, stage 4 ([facility])    [facility] George Sampson APRN    Office Visit    2 months ago Pressure injury of sacral region, stage 4 ([facility])    [facility] VinayGeorge, BEAU    Office Visit    2 months ago H/O urinary retention    [facility]    Nurse Only          Future Appointments         Provider Department Appt Notes    In 3 weeks  George Sampson APRN [facility] 2 of 6 visits 9-16-24 to 9-16-25 #[phone] HH-vac Room 5                       Recent Outpatient Visits              4 weeks ago Pressure injury of right hip, stage 3 ([facility])    [facility] George Sampson, BEAU    Office Visit    1 month ago Abnormal EEG    [facility], [address], [place] Irving Sheets DO    Office Visit    1 month ago Pressure injury of sacral region, stage 4 ([facility])    [facility] George Sampson, BEAU    Office Visit    2 months ago Pressure injury of sacral region, stage 4 ([facility])    [facility] George Sampson, BEAU    Office Visit    2 months ago H/O urinary retention    [facility]    Nurse Only          Future Appointments         Provider Department Appt Notes    In 3 weeks George Sampson APRN [facility] 2 of 6 visits 9-16-24 to 9-16-25 #[phone] HH-vac Room 5

## 2024-12-16 RX ORDER — FERROUS SULFATE 300 MG/5ML
300 LIQUID (ML) ORAL DAILY
Qty: 450 ML | Refills: 0 | Status: SHIPPED | OUTPATIENT
Start: 2024-12-16

## 2024-12-16 NOTE — TELEPHONE ENCOUNTER
Please review.  Protocol failed / Has no protocol.    Ferrous refill request refused 12/12/24 as Not appropriate.      Order is still active on chart.  Is patient to continue taking or should order be discontinued?     Requested Prescriptions   Pending Prescriptions Disp Refills    ferrous sulfate 300 (60 Fe) MG/5ML Oral Solution 473 mL 0     Sig: Take 5 mL (300 mg total) by mouth daily.       There is no refill protocol information for this order        Future Appointments         Provider Department Appt Notes    In 3 weeks George Sampson APRN [facility] 2 of 6 visits 9-16-24 to 9-16-25 #[phone] -vac Room 5          Recent Outpatient Visits              1 month ago Pressure injury of right hip, stage 3 ([facility])    [facility] George Sampson APRN    Office Visit    1 month ago Abnormal EEG    [facility], [address], [place] Irving Sheets DO    Office Visit    1 month ago Pressure injury of sacral region, stage 4 ([facility])    [facility] George Sampson APRN    Office Visit    2 months ago Pressure injury of sacral region, stage 4 ([facility])    [facility] George Sampson APRN    Office Visit    3 months ago H/O urinary retention    [facility]    Nurse Only

## 2024-12-17 ENCOUNTER — TELEPHONE (OUTPATIENT)
Dept: FAMILY MEDICINE CLINIC | Facility: CLINIC | Age: 74
End: 2024-12-17

## 2024-12-17 NOTE — TELEPHONE ENCOUNTER
[facility]  orders dated 12/9,12/3 x 2 physician orders received, signed by provider and faxed back successfully.    Placed in HH folder with scan sheet.

## 2024-12-19 ENCOUNTER — TELEPHONE (OUTPATIENT)
Dept: FAMILY MEDICINE CLINIC | Facility: CLINIC | Age: 74
End: 2024-12-19

## 2024-12-19 DIAGNOSIS — D64.9 ANEMIA, UNSPECIFIED TYPE: Primary | ICD-10-CM

## 2024-12-19 RX ORDER — MIDAZOLAM HYDROCHLORIDE 2 MG/ML
5 SYRUP ORAL DAILY
Qty: 473 ML | Refills: 0 | Status: SHIPPED | OUTPATIENT
Start: 2024-12-19

## 2024-12-19 NOTE — TELEPHONE ENCOUNTER
Received a call from [facility] regarding the dose of the:    ferrous sulfate 300 (60 Fe) MG/5ML Oral Solution 450 mL 0 12/16/2024 --   Sig:   Take 5 mL (300 mg total) by mouth daily.             She stated this dose doesn't exist but they have Ferrous sulfate 220 mg/5 mls orally daily    See pended

## 2024-12-20 ENCOUNTER — TELEPHONE (OUTPATIENT)
Dept: FAMILY MEDICINE CLINIC | Facility: CLINIC | Age: 74
End: 2024-12-20

## 2024-12-20 NOTE — TELEPHONE ENCOUNTER
Brinda from [facility] calling to report patient had one episode of diarrhea today.  Normal bowel movements throughout week.  Per Brinda patient is afebrile and vital signs stable.  No nausea/emesis. Patient tolerating fluids. No blood in stool or dark tarry stool    Advised to continue to monitor and push fluids and Brinda verbalizes understanding.    Dr Kortney Rodriguez.

## 2024-12-27 ENCOUNTER — APPOINTMENT (OUTPATIENT)
Dept: WOUND CARE | Facility: HOSPITAL | Age: 74
End: 2024-12-27
Attending: NURSE PRACTITIONER
Payer: MEDICARE

## 2025-01-08 ENCOUNTER — APPOINTMENT (OUTPATIENT)
Dept: WOUND CARE | Facility: HOSPITAL | Age: 75
End: 2025-01-08
Attending: NURSE PRACTITIONER
Payer: MEDICARE

## 2025-01-09 ENCOUNTER — TELEPHONE (OUTPATIENT)
Dept: FAMILY MEDICINE CLINIC | Facility: CLINIC | Age: 75
End: 2025-01-09

## 2025-01-09 ENCOUNTER — MED REC SCAN ONLY (OUTPATIENT)
Dept: FAMILY MEDICINE CLINIC | Facility: CLINIC | Age: 75
End: 2025-01-09

## 2025-01-16 ENCOUNTER — OFFICE VISIT (OUTPATIENT)
Dept: WOUND CARE | Facility: HOSPITAL | Age: 75
End: 2025-01-16
Attending: NURSE PRACTITIONER
Payer: MEDICARE

## 2025-01-16 VITALS
TEMPERATURE: 97 F | HEART RATE: 95 BPM | SYSTOLIC BLOOD PRESSURE: 99 MMHG | RESPIRATION RATE: 18 BRPM | OXYGEN SATURATION: 98 % | DIASTOLIC BLOOD PRESSURE: 62 MMHG

## 2025-01-16 DIAGNOSIS — L89.614 PRESSURE INJURY OF RIGHT HEEL, STAGE 4 (HCC): ICD-10-CM

## 2025-01-16 DIAGNOSIS — L89.213 PRESSURE INJURY OF RIGHT HIP, STAGE 3 (HCC): ICD-10-CM

## 2025-01-16 DIAGNOSIS — L89.154 PRESSURE INJURY OF SACRAL REGION, STAGE 4 (HCC): Primary | ICD-10-CM

## 2025-01-16 PROCEDURE — 99213 OFFICE O/P EST LOW 20 MIN: CPT | Performed by: NURSE PRACTITIONER

## 2025-01-16 NOTE — PROGRESS NOTES
Subjective   Yoseph Cordero is a 74 year old male.    Chief Complaint   Patient presents with    Wound Recheck     Sacrum, right ankle, right heel;left hip     HPI  1/16/2025: Patient is here for follow up with his son and wife. His daughter and home health have been doing dressing changes as ordered. The wounds on his feet has closed, the hip wound has reduced and sacral wound granular and smaller.   11/13/2024: Patient is here with his son and wife for his wounds, his son reporting the wounds are improving, no new concerns.   10/23/2024: Patient is here with his son and wife for wounds care, reporting scaral wound has foul smell.  9/27/2024: Patient is here with his son and his wife for follow up wound care, his daughter is doing dressing changes, reporting the wounds on right heel closed now feels boggy on proximal section. Family continues to feed patient and reposition patient as needed.   9/13/2024: Patient is here with his son, daughter and wife. He is awake. He has wound vac on his left hip. Sacra wound has become granular, heel wound has closed, ankle wound is dark granular, no new wounds. His family is feeding him high protein, collagen and wound healing supplement, they have been repositioning him, home health nurse has been doing dressing changes as well as her daughter.  8/23/2024: Patient is here with his two sons and wife. He has wound vac on left hip. His son reporting the sacral wound has foul smell with back edges, no fever at time of this visit.     8/9/2024: Patient is here with her daughter who is providing the information, patient is non-verbal. Patient's daughter has taken patient home, home health nurse has not started as of now, she is doing all wounds the dressing changes. Patient has hospital bed with air mattress with lift.      7/25/2024: Patient is a 74-year-old male with a past medical history of dementia nonverbal at baseline, chronic right hemiparesis from prior CVA, seizure  disorder on Keppra, VTE with factor V Leiden on Xarelto, anemia, chronic right heel stage IV, left ischial stage IV and sacral decubitus ulcer stage IV with recent hospitalization on 7/9-7/19 for severe sepsis secondary to Proteus bacteremia treated with IV Zosyn discharged on 7/19/2024 with midline with plan for 4 weeks of IV antibiotics tp [facility] for placement.   Per SNF, patient had acquired the pressure injuries at his right heel, left ischial/hip and sacral at home while he had home health services.  Per patient's daughter patient eats well, has multiple stool a day, incontinent. Patient's daughter is not happy with care her father has been getting.   Patient was here today 7/23/2024 to see Dr. Sury WALDROP (Podiatrist) for heel wounds, I was asked us to see this patients today for multiple wounds that wound clinic was not aware he had at time of masha.     Review of Systems   Constitutional:  Negative for activity change.   Respiratory:  Negative for cough and shortness of breath.    Cardiovascular:  Negative for leg swelling.   Gastrointestinal:  Negative for abdominal distention.   Skin:  Positive for wound.        Multiple wounds   Neurological:  Positive for weakness.   Psychiatric/Behavioral:  Negative for agitation.       Objective  Physical Exam  Vitals reviewed.   Constitutional:       General: He is awake.      Appearance: He is underweight.      Comments: Non-verbal, unable to follow commends    Cardiovascular:      Rate and Rhythm: Normal rate and regular rhythm.      Pulses: Normal pulses.   Pulmonary:      Effort: Pulmonary effort is normal.   Abdominal:      General: Bowel sounds are normal.      Palpations: Abdomen is soft.   Musculoskeletal:      Right lower leg: No edema.      Left lower leg: No edema.   Skin:     General: Skin is warm.      Findings: Wounds on right heel, left ankle, left hip and sacrum present. No erythema or rash.      Nails: There is no clubbing.  Wound  Assessment  Wound 08/23/24 1 Heel Right;Medial (Active)   Date First Assessed: 08/23/24   Present on Original Admission: Yes   Wound Number (Wound Clinic Only): 1  Primary Wound Type: Pressure Injury  Location: Heel  Wound Location Orientation: Right;Medial      Assessments 7/23/2024  8:50 AM 1/16/2025 11:33 AM   Wound Image          Site Assessment -- Clean;Dry;Intact;Epithelialization   Closure -- Approximated   Drainage Amount -- None   Treatments -- Cleansed;Wound    Dressing -- Aquacel Foam   Dressing Changed -- Changed   Dressing Status -- Dry;Intact;Clean   Wound Length (cm) 6.5 cm 0 cm   Wound Width (cm) 4.4 cm 0 cm   Wound Surface Area (cm^2) 28.6 cm^2 0 cm^2   Wound Depth (cm) 1 cm 0 cm   Wound Volume (cm^3) 28.6 cm^3 0 cm^3   Wound Healing % -- 100   Margins -- Flat and Intact   Non-staged Wound Description -- Not applicable   Madhuri-wound Assessment -- Dry;Hyperpigmented   Wound Bed Granulation (%) -- 0 %   Wound Bed Epithelium (%) -- 100 %   Wound Bed Slough (%) -- 0 %   Wound Bed Eschar (%) -- 0 %   Wound Bed Fibrin (%) -- 0 %   Exposed Structure Bone --   State of Healing -- Epithelialized   Wound Odor -- None   Tunneling? -- No           Wound 08/23/24 2 Hip Left (Active)   Date First Assessed: 08/23/24   Present on Original Admission: Yes   Wound Number (Wound Clinic Only): 2  Primary Wound Type: Pressure Injury  Location: Hip  Wound Location Orientation: Left      Assessments 7/23/2024  8:50 AM 1/16/2025 11:33 AM   Wound Image         Site Assessment Moist;Tan;Yellow Moist;Pink   Closure Not approximated Not approximated   Drainage Amount Large Small   Drainage Description Serous;Yellow Brown   Treatments Wound ;Vashe (traid) Cleansed;Wound    Dressing ABD Pad;Gauze Gauze;Tape (Lacie)   Dressing Changed Changed Changed   Dressing Status Dressing Changed Clean;Dry;Intact   Wound Length (cm) -- 0.1 cm   Wound Width (cm) -- 0.1 cm   Wound Surface Area (cm^2) -- 0.01 cm^2   Wound  Depth (cm) -- 0.2 cm   Wound Volume (cm^3) -- 0.002 cm^3   Wound Healing % -- 100   Margins Poorly defined Well-defined edges   Non-staged Wound Description Full thickness Full thickness   Madhuri-wound Assessment Purple;Pink;Painful Dry;Pink;Fragile   Wound Bed Granulation (%) -- 100 %   Wound Bed Epithelium (%) -- 0 %   Wound Bed Slough (%) 100 % 0 %   Wound Bed Eschar (%) -- 0 %   Wound Bed Fibrin (%) -- 0 %   State of Healing Undermining Fully granulated   Wound Odor None None   Undermining? Yes No   Number of Undermines 1 --   Undermine 1 Start Position 12 (0.3cm depth) --   Undermine 1 End Position 12 --   Pressure Injury Stage -- Stage 4       Active Orders   Date Order Priority Status Authorizing Provider   07/25/24 1711 OP Wound Dressing Routine Active George Sampson APRN     - Dressing:    Dry gauze     - Dressing:    ABD pad     - Additional Wound Dressing Information:    traid     - Cleansing:    Cleanse with normal saline or wound cleanser     - Frequency:    Change dressing daily and PRN   07/25/24 1711 OP Wound Dressing Routine Active George Sampson APRN       Inactive Orders   Date Order Priority Status Authorizing Provider   08/26/24 0848 collagenase (Santyl) 250 UNIT/GM ointment Routine Discontinued Ric Evans MD     - Please indicate site of application:    Affected Area(s)   08/26/24 0848 Apply dressing Other (Santyl) Daily Routine Discontinued Ric Evans MD     - Dressing type:    Other (Santyl)   08/24/24 0219 Consult to Wound Ostomy Routine Completed Bernadine Vazquez MD     - Reason for Consult:    Wound Care     - Wound Care Reason for Consult:    decubitus ulcer infection   08/24/24 0049 Consult to Wound Ostomy Routine Discontinued Bernadine Vazquez MD     - Reason for Consult:    Wound Care     - Wound Care Reason for Consult:    decubitus ulcer infection       Wound 08/23/24 3 Sacrum (Active)   Date First Assessed: 08/23/24   Present on Original Admission: Yes   Wound Number (Wound Clinic  Only): 3  Primary Wound Type: Pressure Injury  Location: Sacrum      Assessments 7/23/2024  8:50 AM 1/16/2025 11:33 AM   Wound Image         Site Assessment Moist;Painful;Yellow;Red Moist;Red;Granulation tissue   Closure Not approximated Not approximated   Drainage Amount Large Large   Drainage Description Yellow Serous;Yellow   Treatments Wound ;Topical (Barrier/Moisturizer/Ointment);Special Tape Cleansed;Wound ;Topical (Barrier/Moisturizer/Ointment)   Dressing ABD Pad;Gauze (dakins) Gauze;ABD Pad;Tape (Zinc oxide to periwound, Dakin's moistened kerlix,)   Dressing Changed Changed Changed   Dressing Status -- Clean;Dry;Intact   Wound Length (cm) 7 cm 5.6 cm   Wound Width (cm) 7 cm 4 cm   Wound Surface Area (cm^2) 49 cm^2 22.4 cm^2   Wound Depth (cm) 2.6 cm 1 cm   Wound Volume (cm^3) 127.4 cm^3 22.4 cm^3   Wound Healing % -- 82   Margins Poorly defined Well-defined edges;Not attached;Epibole (Rolled edges)   Non-staged Wound Description Full thickness Full thickness   Madhuri-wound Assessment Maceration;Painful;Excoriated;Pink Clean;Dry;Intact   Wound Granulation Tissue Pink Red   Wound Bed Granulation (%) 30 % 100 %   Wound Bed Epithelium (%) -- 0 %   Wound Bed Slough (%) 40 % 0 %   Wound Bed Eschar (%) 30 % 0 %   Wound Bed Fibrin (%) -- 0 %   Exposed Structure Bone Necrosis;Bone --   State of Healing -- Fully granulated   Wound Odor Mild None   Undermining? Yes (1.8 cm) Yes (6-12 = 1.3cm)   Number of Undermines 1 1   Undermine 1 Start Position 7 6   Undermine 1 End Position 11 12   Pressure Injury Stage Stage 4 Stage 4       Active Orders   Date Order Priority Status Authorizing Provider   07/25/24 1711 OP Wound Dressing Routine Active George Sampson APRN     - Dressing:    Dry gauze     - Dressing:    ABD pad     - Additional Wound Dressing Information:    dakin soaked     - Cleansing:    Cleanse with Dakins     - Frequency:    Change dressing two times a day     Vital Signs    01/16/25 1125   BP:  99/62   Pulse: 95   Resp: 18   Temp: 97.3 °F (36.3 °C)     Allergies  Allergies[1]  Assessment   Left hip wound, stage IV pressure injury:  -very small open wound   -no erythema    Sacral wound, stage IV pressure injury:  -granular without any slough  -no foul smell  -no erythema     Right heel wound, stage IV:  -new skin with scar tissue  high risk for re-ulceration, MRI on 7/10 showed osteomyelitis of dorsal calcaneus   -no erythema or other sign of infection     Right ankle wound,stage III pressure injury possibly becoming stage IV  -closed wound (more than a month it has been closed) with discoloration of skin, fragile skin  -no imaging has been done to r/o OM  -no erythema     Left lateral ankle and left heel wounds, closed.     Reviewed note and labs/imaging at [facility] where.  Recent labs: 10/26/2024: BUN 9, Creatinine 0.52, eGFR 106  12/9/2024: H&H 13.8&42.8    Encounter Diagnosis  1. Pressure injury of sacral region, stage 4 (HCC)    2. Pressure injury of right hip, stage 3 (HCC)    3. Pressure injury of right heel, stage 4 (HCC)      Problem List  Patient Active Problem List   Diagnosis    Hemiplegia affecting right dominant side (HCC)    Chronic obstructive pulmonary disease (HCC)    Combined forms of age-related cataract of both eyes    Esophageal reflux    Late onset Alzheimer's disease with behavioral disturbance (HCC)    Alcohol abuse    Calcification of aorta (HCC)    Alcoholism (HCC)    History of stroke    Dysphagia    Hyperglycemia    Weakness    Gait disturbance    Abnormal involuntary movement    Acute pulmonary embolism without acute cor pulmonale, unspecified pulmonary embolism type (HCC)    Factor V Leiden (HCC)    Leukocytosis    Tachycardia    Altered mental status, unspecified altered mental status type    Seizures (HCC)    Infected ulcer of skin, unspecified ulcer stage (HCC)    Infected decubitus ulcer    Transient neurological symptoms    Palliative care by specialist     Pressure injury of sacral region, stage 4 (HCC)    Pressure injury of right hip, stage 3 (HCC)    Pressure injury of right heel, stage 4 (HCC)    Pressure injury of right ankle, stage 2 (HCC)    Complicated open wound of left hip    Non-healing open wound of right heel    Leukocytosis, unspecified type    Sacral decubitus ulcer, stage IV (HCC)    Sacral decubitus ulcer    Cellulitis   Plan  Orders  Palliative wound care: the goal of wound care is to reduce the risk for infection, slow the course of deterioration and promote client comfort and function. If the wounds become granular and shows tissue improvement, may processed to advanced dressing.     Left hip: The negative pressure wound therapy has been discontinued by home health as the wound has become too small to apply this therapy.     Sacral wound: at this time Dakin's solution with gauze, twice a day for one week change twice a week and as needed. We will order negative pressure wound therapy, upon approval, home health will apply the wound vac upon approval. May use collagen with NPWT to enhance healing.      Right ankle wound continue with foam dressing.     Right heel, with OM,  continue with offloading foam and boots.   Continue to re-position to reduce pressure on hip and sacral wound at least every two hours with pressure reduction mattress and seat cushion.  Continue using heel protector/heel boots to reduce pressure in the wound bed.  Monitor for sign of infection, redness, foul odor, warmth and increased drainage.  Discussed treatment plan with the patient's son in detail, all questions answered.     Note to patient:The 21st Century Cures Act makes medical notes like these available to patients in the interest of transparency. Please be advised this is a medical document. Medical documents are intended to carry relevant information, facts as evident, and the clinical opinion of the practitioner. The medical note is intended as peer to peer  communication and may appear blunt or direct. It is written in medical language and may contain abbreviations or verbiage that are unfamiliar.   Total face to face time was 60 min, more than 50% of the time was spent in counseling and/or coordination of care related to sacral and left hip wounds  Follow-Up  Return in about 6 weeks (around 2/27/2025) for APN x 30 minutes.              [1]   Allergies  Allergen Reactions    Zosyn [Piperacillin-Tazobactam In D5w] RASH     Pt developed pruritic rash on both arms while on this medication

## 2025-01-23 ENCOUNTER — TELEPHONE (OUTPATIENT)
Dept: FAMILY MEDICINE CLINIC | Facility: CLINIC | Age: 75
End: 2025-01-23

## 2025-01-23 NOTE — TELEPHONE ENCOUNTER
Dr Sheets, [facility]  Clinical Staff, please assist    [facility] will be faxing over orders to re certify patient for home health services.

## 2025-01-30 ENCOUNTER — TELEPHONE (OUTPATIENT)
Dept: FAMILY MEDICINE CLINIC | Facility: CLINIC | Age: 75
End: 2025-01-30

## 2025-02-05 ENCOUNTER — TELEPHONE (OUTPATIENT)
Dept: FAMILY MEDICINE CLINIC | Facility: CLINIC | Age: 75
End: 2025-02-05

## 2025-02-05 DIAGNOSIS — R73.9 HYPERGLYCEMIA: ICD-10-CM

## 2025-02-05 DIAGNOSIS — R56.9 SEIZURES (HCC): Primary | ICD-10-CM

## 2025-02-05 DIAGNOSIS — F10.20 ALCOHOLISM (HCC): ICD-10-CM

## 2025-02-05 NOTE — TELEPHONE ENCOUNTER
I received a call from Brinda SINGH from [facility] and she wanted to ask you if you want any blood work to be drawn for patient. Please advise

## 2025-02-06 NOTE — TELEPHONE ENCOUNTER
Spoke with home health RN and she is the one who will draw labs.  Faxed order to requested fax number for [facility] health and she will to have these done.   She asked to talk with lab On license of [facility] to assure drawn in correct tube for Levetiracetam level.   Transferred to  Kathryn to assist.     Blood will be brought to our lab location to process.

## 2025-02-07 ENCOUNTER — LAB REQUISITION (OUTPATIENT)
Dept: LAB | Facility: HOSPITAL | Age: 75
End: 2025-02-07
Payer: MEDICARE

## 2025-02-07 DIAGNOSIS — R56.9 UNSPECIFIED CONVULSIONS (HCC): ICD-10-CM

## 2025-02-07 DIAGNOSIS — R73.9 HYPERGLYCEMIA, UNSPECIFIED: ICD-10-CM

## 2025-02-07 LAB
ANION GAP SERPL CALC-SCNC: 8 MMOL/L (ref 0–18)
BASOPHILS # BLD AUTO: 0.09 X10(3) UL (ref 0–0.2)
BASOPHILS NFR BLD AUTO: 0.9 %
BUN BLD-MCNC: 13 MG/DL (ref 9–23)
BUN/CREAT SERPL: 19.7 (ref 10–20)
CALCIUM BLD-MCNC: 9.7 MG/DL (ref 8.7–10.4)
CHLORIDE SERPL-SCNC: 100 MMOL/L (ref 98–112)
CO2 SERPL-SCNC: 32 MMOL/L (ref 21–32)
CREAT BLD-MCNC: 0.66 MG/DL
DEPRECATED RDW RBC AUTO: 45.1 FL (ref 35.1–46.3)
EGFRCR SERPLBLD CKD-EPI 2021: 99 ML/MIN/1.73M2 (ref 60–?)
EOSINOPHIL # BLD AUTO: 0.56 X10(3) UL (ref 0–0.7)
EOSINOPHIL NFR BLD AUTO: 5.4 %
ERYTHROCYTE [DISTWIDTH] IN BLOOD BY AUTOMATED COUNT: 13.8 % (ref 11–15)
FASTING STATUS PATIENT QL REPORTED: NO
GLUCOSE BLD-MCNC: 68 MG/DL (ref 70–99)
HCT VFR BLD AUTO: 39.6 %
HGB BLD-MCNC: 12.7 G/DL
IMM GRANULOCYTES # BLD AUTO: 0.06 X10(3) UL (ref 0–1)
IMM GRANULOCYTES NFR BLD: 0.6 %
LYMPHOCYTES # BLD AUTO: 2.43 X10(3) UL (ref 1–4)
LYMPHOCYTES NFR BLD AUTO: 23.5 %
MCH RBC QN AUTO: 28.3 PG (ref 26–34)
MCHC RBC AUTO-ENTMCNC: 32.1 G/DL (ref 31–37)
MCV RBC AUTO: 88.4 FL
MONOCYTES # BLD AUTO: 0.99 X10(3) UL (ref 0.1–1)
MONOCYTES NFR BLD AUTO: 9.6 %
NEUTROPHILS # BLD AUTO: 6.2 X10 (3) UL (ref 1.5–7.7)
NEUTROPHILS # BLD AUTO: 6.2 X10(3) UL (ref 1.5–7.7)
NEUTROPHILS NFR BLD AUTO: 60 %
OSMOLALITY SERPL CALC.SUM OF ELEC: 288 MOSM/KG (ref 275–295)
PLATELET # BLD AUTO: 466 10(3)UL (ref 150–450)
POTASSIUM SERPL-SCNC: 3.7 MMOL/L (ref 3.5–5.1)
RBC # BLD AUTO: 4.48 X10(6)UL
SODIUM SERPL-SCNC: 140 MMOL/L (ref 136–145)
WBC # BLD AUTO: 10.3 X10(3) UL (ref 4–11)

## 2025-02-07 PROCEDURE — 80177 DRUG SCRN QUAN LEVETIRACETAM: CPT | Performed by: FAMILY MEDICINE

## 2025-02-07 PROCEDURE — 80048 BASIC METABOLIC PNL TOTAL CA: CPT | Performed by: FAMILY MEDICINE

## 2025-02-07 PROCEDURE — 85025 COMPLETE CBC W/AUTO DIFF WBC: CPT | Performed by: FAMILY MEDICINE

## 2025-02-11 ENCOUNTER — MED REC SCAN ONLY (OUTPATIENT)
Dept: FAMILY MEDICINE CLINIC | Facility: CLINIC | Age: 75
End: 2025-02-11

## 2025-02-11 LAB — LEVETIRACETAM LVL: 11.7 UG/ML

## 2025-02-19 ENCOUNTER — TELEPHONE (OUTPATIENT)
Dept: FAMILY MEDICINE CLINIC | Facility: CLINIC | Age: 75
End: 2025-02-19

## 2025-02-19 NOTE — TELEPHONE ENCOUNTER
HH orders/POC received, signed by provider and faxed back successfully.    Placed in HH folder with scan sheet.

## 2025-02-26 ENCOUNTER — TELEPHONE (OUTPATIENT)
Dept: FAMILY MEDICINE CLINIC | Facility: CLINIC | Age: 75
End: 2025-02-26

## 2025-02-26 ENCOUNTER — OFFICE VISIT (OUTPATIENT)
Dept: WOUND CARE | Facility: HOSPITAL | Age: 75
End: 2025-02-26
Attending: NURSE PRACTITIONER
Payer: MEDICARE

## 2025-02-26 VITALS
TEMPERATURE: 98 F | DIASTOLIC BLOOD PRESSURE: 69 MMHG | OXYGEN SATURATION: 91 % | RESPIRATION RATE: 16 BRPM | SYSTOLIC BLOOD PRESSURE: 106 MMHG | HEART RATE: 91 BPM

## 2025-02-26 DIAGNOSIS — L89.154 PRESSURE INJURY OF SACRAL REGION, STAGE 4 (HCC): Primary | ICD-10-CM

## 2025-02-26 DIAGNOSIS — S91.105A OPEN WOUND OF LESSER TOE OF LEFT FOOT: ICD-10-CM

## 2025-02-26 DIAGNOSIS — S91.102A OPEN WOUND OF LEFT GREAT TOE, INITIAL ENCOUNTER: ICD-10-CM

## 2025-02-26 PROCEDURE — 99213 OFFICE O/P EST LOW 20 MIN: CPT | Performed by: NURSE PRACTITIONER

## 2025-02-26 RX ORDER — MUPIROCIN 20 MG/G
1 OINTMENT TOPICAL DAILY
Qty: 1 EACH | Refills: 2 | Status: SHIPPED | OUTPATIENT
Start: 2025-02-26 | End: 2025-03-12

## 2025-02-26 NOTE — PROGRESS NOTES
Weekly Wound Education Note    Teaching Provided To: Family  Training Topics: Dressing;Cleasing and general instructions;Skin care;Signs and symptoms of infection;Safe and effective use of medical equipement and /or supplies;Purpose and directions to contact wound care physician or PCP;Nutrition;Off-loading;Pressure relief  Training Method: Explain/Verbal;Demonstration  Training Response: Patient responds and understands        Notes: Left great toe and left 3rd toe: silver alginate dry gauze, tape sacrum: Medela Invia NPWT at 125 mmhg

## 2025-02-26 NOTE — PROGRESS NOTES
Subjective   Yoseph Cordero is a 74 year old male.    Chief Complaint   Patient presents with    Wound Care     Follow up visit for multiple wounds. Daughter would like us to take a look at both feet for feels wounds have been developing.     HPI  2/26/2025: Patient is here for follow up with his son. Home health nurse has been doing wound vac dressing changes on sacrum wound, he has new toe wounds on his left foot.  1/16/2025: Patient is here for follow up with his son and wife. His daughter and home health have been doing dressing changes as ordered. The wounds on his feet has closed, the hip wound has reduced and sacral wound granular and smaller.   11/13/2024: Patient is here with his son and wife for his wounds, his son reporting the wounds are improving, no new concerns.   10/23/2024: Patient is here with his son and wife for wounds care, reporting scaral wound has foul smell.  9/27/2024: Patient is here with his son and his wife for follow up wound care, his daughter is doing dressing changes, reporting the wounds on right heel closed now feels boggy on proximal section. Family continues to feed patient and reposition patient as needed.   9/13/2024: Patient is here with his son, daughter and wife. He is awake. He has wound vac on his left hip. Sacra wound has become granular, heel wound has closed, ankle wound is dark granular, no new wounds. His family is feeding him high protein, collagen and wound healing supplement, they have been repositioning him, home health nurse has been doing dressing changes as well as her daughter.  8/23/2024: Patient is here with his two sons and wife. He has wound vac on left hip. His son reporting the sacral wound has foul smell with back edges, no fever at time of this visit.     8/9/2024: Patient is here with her daughter who is providing the information, patient is non-verbal. Patient's daughter has taken patient home, home health nurse has not started as of now, she is  doing all wounds the dressing changes. Patient has hospital bed with air mattress with lift.      7/25/2024: Patient is a 74-year-old male with a past medical history of dementia nonverbal at baseline, chronic right hemiparesis from prior CVA, seizure disorder on Keppra, VTE with factor V Leiden on Xarelto, anemia, chronic right heel stage IV, left ischial stage IV and sacral decubitus ulcer stage IV with recent hospitalization on 7/9-7/19 for severe sepsis secondary to Proteus bacteremia treated with IV Zosyn discharged on 7/19/2024 with midline with plan for 4 weeks of IV antibiotics tp [facility] for placement.   Per SNF, patient had acquired the pressure injuries at his right heel, left ischial/hip and sacral at home while he had home health services.  Per patient's daughter patient eats well, has multiple stool a day, incontinent. Patient's daughter is not happy with care her father has been getting.   Patient was here today 7/23/2024 to see Dr. Sury WALDROP (Podiatrist) for heel wounds, I was asked us to see this patients today for multiple wounds that wound clinic was not aware he had at time of masha.     Review of Systems   Constitutional:  Negative for activity change.   Respiratory:  Negative for cough and shortness of breath.    Cardiovascular:  Negative for leg swelling.   Gastrointestinal:  Negative for abdominal distention.   Skin:  Positive for wound.        Multiple wounds   Neurological:  Positive for weakness.   Psychiatric/Behavioral:  Negative for agitation.       Objective  Physical Exam  Vitals reviewed.   Constitutional:       General: He is awake.      Appearance: He is underweight.      Comments: Non-verbal, unable to follow commends    Cardiovascular:      Rate and Rhythm: Normal rate and regular rhythm.      Pulses: Normal pulses.   Pulmonary:      Effort: Pulmonary effort is normal.   Abdominal:      General: Bowel sounds are normal.      Palpations: Abdomen is soft.    Musculoskeletal:      Right lower leg: No edema.      Left lower leg: No edema.   Skin:     General: Skin is warm.      Findings: Wounds on right heel, left ankle, left hip and sacrum present. No erythema or rash.      Nails: There is no clubbing.  Wound Assessment  Wound 08/23/24 2 Hip Left (Active)   Date First Assessed: 08/23/24   Present on Original Admission: Yes   Wound Number (Wound Clinic Only): 2  Primary Wound Type: Pressure Injury  Location: Hip  Wound Location Orientation: Left      Assessments 7/23/2024  8:50 AM 2/26/2025 11:34 AM   Wound Image         Site Assessment Moist;Tan;Yellow Dry;Clean;Intact;Epithelialization   Closure Not approximated Approximated   Drainage Amount Large None   Drainage Description Serous;Yellow --   Treatments Wound ;Vashe (traid) --   Dressing ABD Pad;Gauze Open to air   Dressing Changed Changed Other (Comment) (n/a)   Dressing Status Dressing Changed --   Wound Length (cm) -- 0 cm   Wound Width (cm) -- 0 cm   Wound Surface Area (cm^2) -- 0 cm^2   Wound Depth (cm) -- 0 cm   Wound Volume (cm^3) -- 0 cm^3   Wound Healing % -- 100   Margins Poorly defined Flat and Intact   Non-staged Wound Description Full thickness Not applicable   Madhuri-wound Assessment Purple;Pink;Painful Clean;Dry;Intact   Wound Bed Granulation (%) -- 0 %   Wound Bed Epithelium (%) -- 100 %   Wound Bed Slough (%) 100 % 0 %   Wound Bed Eschar (%) -- 0 %   Wound Bed Fibrin (%) -- 0 %   State of Healing Undermining Closed wound edges;Epithelialized   Wound Odor None None   Undermining? Yes No   Number of Undermines 1 --   Undermine 1 Start Position 12 (0.3cm depth) --   Undermine 1 End Position 12 --       Inactive Orders   Date Order Priority Status Authorizing Provider   01/16/25 1309 OP Wound Dressing Routine Discontinued George Sampson APRN     - Cleansing:    Cleanse with normal saline or wound cleanser     - Dressing:    Prism collagen     - Dressing:    Bordered foam     - Frequency:    Change  dressing two times per week       Wound 08/23/24 3 Sacrum (Active)   Date First Assessed: 08/23/24   Present on Original Admission: Yes   Wound Number (Wound Clinic Only): 3  Primary Wound Type: Pressure Injury  Location: Sacrum      Assessments 7/23/2024  8:50 AM 2/26/2025 11:34 AM   Wound Image         Site Assessment Moist;Painful;Yellow;Red Moist;Red;Granulation tissue   Closure Not approximated Not approximated   Drainage Amount Large Moderate   Drainage Description Yellow Serosanguineous   Treatments Wound ;Topical (Barrier/Moisturizer/Ointment);Special Tape Cleansed;Wound  (antifungal powder to periwound)   Dressing ABD Pad;Gauze (dakins) Wound vac sponge   Dressing Changed Changed Changed   Dressing Status -- Clean;Dry;Intact   Wound Length (cm) 7 cm 4.5 cm   Wound Width (cm) 7 cm 2.8 cm   Wound Surface Area (cm^2) 49 cm^2 12.6 cm^2   Wound Depth (cm) 2.6 cm 1 cm   Wound Volume (cm^3) 127.4 cm^3 12.6 cm^3   Wound Healing % -- 90   Margins Poorly defined Well-defined edges;Not attached;Epibole (Rolled edges)   Non-staged Wound Description Full thickness Full thickness   Madhuri-wound Assessment Maceration;Painful;Excoriated;Pink Clean;Dry;Intact   Wound Granulation Tissue Pink Red   Wound Bed Granulation (%) 30 % 100 %   Wound Bed Epithelium (%) -- 0 %   Wound Bed Slough (%) 40 % 0 %   Wound Bed Eschar (%) 30 % 0 %   Wound Bed Fibrin (%) -- 0 %   Exposed Structure Bone Necrosis;Bone --   State of Healing -- Fully granulated   Wound Odor Mild None   Undermining? Yes (1.8 cm) Yes (1.1 cm)   Number of Undermines 1 1   Undermine 1 Start Position 7 6   Undermine 1 End Position 11 12   Pressure Injury Stage Stage 4 Stage 4       Active Orders   Date Order Priority Status Authorizing Provider   02/26/25 1329 OP Wound Dressing Routine Active George Sampson APRN     - Cleansing:    Cleanse with normal saline or wound cleanser     - Cleansing:    Cleanse with Vashe     - Additional Wound Dressing  Information:    NPWT at 125 mmhg     - Frequency:    Change dressing three times per week           Wound 02/26/25 4 Toe (Comment which one) Left (Active)   Date First Assessed/Time First Assessed: 02/26/25 1132   Present on Original Admission: No   Wound Number (Wound Clinic Only): 4  Location: (c) Toe (Comment which one)  Wound Location Orientation: Left      Assessments 2/26/2025 11:34 AM   Wound Image     Site Assessment Moist;Red   Closure Not approximated   Drainage Amount None   Treatments Cleansed;Saline;Vashe   Dressing Hydrofiber with silver;2x2s;Gauze;Tape   Dressing Changed New   Dressing Status Clean;Dry;Intact   Wound Length (cm) 1.5 cm   Wound Width (cm) 0.4 cm   Wound Surface Area (cm^2) 0.6 cm^2   Wound Depth (cm) 0.1 cm   Wound Volume (cm^3) 0.06 cm^3   Margins Attached edges   Non-staged Wound Description Full thickness   Madhuri-wound Assessment Edema;Fragile;Pink   Wound Granulation Tissue Red   Wound Bed Granulation (%) 100 %   Wound Bed Epithelium (%) 0 %   Wound Bed Slough (%) 0 %   Wound Bed Eschar (%) 0 %   Wound Bed Fibrin (%) 0 %   State of Healing Non-healing       Active Orders   Date Order Priority Status Authorizing Provider   02/26/25 1329 OP Wound Dressing Routine Active George Sampson APRN     - Cleansing:    Cleanse with normal saline or wound cleanser     - Dressing:    Dry gauze     - Additional Wound Dressing Information:    Hydrofiber with silver, tape     - Frequency:    Change dressing three times per week       Wound 02/26/25 5 Toe (Comment which one) Dorsal;Left (Active)   Date First Assessed/Time First Assessed: 02/26/25 1133    Wound Number (Wound Clinic Only): 5  Location: (c) Toe (Comment which one)  Wound Location Orientation: Dorsal;Left      Assessments 2/26/2025 11:34 AM   Wound Image     Site Assessment Moist;Red   Closure Not approximated   Drainage Amount Scant   Drainage Description Serosanguineous   Treatments Cleansed;Saline;Vashe   Dressing Hydrofiber with  silver;2x2s;Gauze;Tape   Dressing Changed New   Dressing Status Clean;Dry;Intact   Wound Length (cm) 1 cm   Wound Width (cm) 0.4 cm   Wound Surface Area (cm^2) 0.4 cm^2   Wound Depth (cm) 0.1 cm   Wound Volume (cm^3) 0.04 cm^3   Margins Attached edges   Non-staged Wound Description Full thickness   Madhuri-wound Assessment Fragile;Edema;Blanchable erythema   Wound Granulation Tissue Red   Wound Bed Granulation (%) 100 %   Wound Bed Epithelium (%) 0 %   Wound Bed Slough (%) 0 %   Wound Bed Eschar (%) 0 %   Wound Bed Fibrin (%) 0 %   State of Healing Non-healing   Wound Odor None       Active Orders   Date Order Priority Status Authorizing Provider   02/26/25 1329 OP Wound Dressing Routine Active George Sampson APRN     - Cleansing:    Cleanse with normal saline or wound cleanser     - Dressing:    Dry gauze     - Additional Wound Dressing Information:    Hydrofiber with silver, tape     - Frequency:    Change dressing three times per week       Negative Pressure Wound Therapy Sacrum (Active)   Placement Date: 02/26/25   Wound Type: Pressure ulcer: stage IV  Location: Sacrum      Assessments 2/26/2025 11:34 AM   Wound photographed/measured Yes   Machine Status (On) Yes   Site Assessment Moist;Red;Granulation tissue   Madhuir-wound Assessment Clean;Dry;Intact   Unit Type Medela Invia   Dressing Type Black foam   Number of Foam Pieces Used 1   Cycle Continuous;On   Target Pressure (mmHg) 125   Drainage Description Serosanguineous   Dressing Status Clean;Dry;Intact       No associated orders.     Negative Pressure Wound Therapy Sacrum (Active)   02/26/25  Sacrum   Placed by External Staff?:    Inserted by:    Wound Type: Pressure ulcer: stage IV   Wound Location Orientation:    Removal Reason:    Wound photographed/measured Yes 02/26/25 1134   Machine Status (On) Yes 02/26/25 1134   Site Assessment Moist;Red;Granulation tissue 02/26/25 1134   Madhuri-wound Assessment Clean;Dry;Intact 02/26/25 1134   Unit Type Medela Invia  02/26/25 1134   Dressing Type Black foam 02/26/25 1134   Number of Foam Pieces Used 1 02/26/25 1134   Cycle Continuous;On 02/26/25 1134   Target Pressure (mmHg) 125 02/26/25 1134   Drainage Description Serosanguineous 02/26/25 1134   Dressing Status Clean;Dry;Intact 02/26/25 1134   Canister Changed -- (canister changed on monday 2/24 by home health per son) 02/26/25 1134     Vital Signs    02/26/25 1127   BP: 106/69   Pulse: 91   Resp: 16   Temp: 97.5 °F (36.4 °C)   Allergies  Allergies[1]    Assessment   New left foot, 1st and 3rd toe, medial side of nail bed, wound with slight erythema.    Left hip wound, stage IV pressure injury:  -closed wound with scar tissue  -no erythema     Sacral wound, stage IV pressure injury:  -granular without any slough, smaller  -no foul smell  -no erythema     Right heel wound, stage IV:  -continue to be closed wound with scar tissue   high risk for re-ulceration, MRI on 7/10 showed osteomyelitis of dorsal calcaneus   -no erythema or other sign of infection     Right ankle wound,stage III pressure injury possibly becoming stage IV  -closed wound (more than a month it has been closed) with discoloration of skin, fragile skin  -no imaging has been done to r/o OM  -no erythema     Left lateral ankle and left heel wounds, closed.     Reviewed note and labs/imaging at [facility] Every where.  Recent labs: 10/26/2024: BUN 9, Creatinine 0.52, eGFR 106  12/9/2024: H&H 13.8&42.8    Encounter Diagnosis  1. Pressure injury of sacral region, stage 4 (HCC)    2. Open wound of left great toe, initial encounter    3. Open wound of lesser toe of left foot        Problem List  Patient Active Problem List   Diagnosis    Hemiplegia affecting right dominant side (HCC)    Chronic obstructive pulmonary disease (HCC)    Combined forms of age-related cataract of both eyes    Esophageal reflux    Late onset Alzheimer's disease with behavioral disturbance (HCC)    Alcohol abuse    Calcification of aorta     Alcoholism (HCC)    History of stroke    Dysphagia    Hyperglycemia    Weakness    Gait disturbance    Abnormal involuntary movement    Acute pulmonary embolism without acute cor pulmonale, unspecified pulmonary embolism type (HCC)    Factor V Leiden (HCC)    Leukocytosis    Tachycardia    Altered mental status, unspecified altered mental status type    Seizures (HCC)    Infected ulcer of skin, unspecified ulcer stage (HCC)    Infected decubitus ulcer    Transient neurological symptoms    Palliative care by specialist    Pressure injury of sacral region, stage 4 (HCC)    Pressure injury of right hip, stage 3 (HCC)    Pressure injury of right heel, stage 4 (HCC)    Pressure injury of right ankle, stage 2 (HCC)    Complicated open wound of left hip    Non-healing open wound of right heel    Leukocytosis, unspecified type    Sacral decubitus ulcer, stage IV (HCC)    Sacral decubitus ulcer    Cellulitis     Plan  Orders  Orders Placed This Encounter   Procedures    OP Wound Dressing    OP Wound Dressing   Palliative wound care: the goal of wound care is to reduce the risk for infection, slow the course of deterioration and promote client comfort and function. If the wounds become granular and shows tissue improvement, may processed to advanced dressing.     Left 2nd and 1st toe: initiated silver alginate  dressing and change three times a week. May use topical antibiotic to use daily.    Left hip: no dressing is needed, continue pressure injury prevention.      Sacral wound: Continue NPWT at 125 mmHg, change dressing 2-3 times a week. If the wound becomes necrotic tissue resume daily Dakin's solution with gauze.     Right ankle wound continue with foam dressing.     Right heel, with OM,  continue with offloading foam and boots.   Continue to re-position to reduce pressure on hip and sacral wound at least every two hours with pressure reduction mattress and seat cushion.  Continue using heel protector/heel boots to  reduce pressure in the wound bed.  Monitor for sign of infection, redness, foul odor, warmth and increased drainage.  Discussed treatment plan with the patient's son in detail, all questions answered.     Note to patient:The 21st Century Cures Act makes medical notes like these available to patients in the interest of transparency. Please be advised this is a medical document. Medical documents are intended to carry relevant information, facts as evident, and the clinical opinion of the practitioner. The medical note is intended as peer to peer communication and may appear blunt or direct. It is written in medical language and may contain abbreviations or verbiage that are unfamiliar.   Total face to face time was 40 min, more than 50% of the time was spent in counseling and/or coordination of care related to sacral and left hip wounds    Follow-Up  Return 5 weeks, for APN visit x 30 mins.            [1]   Allergies  Allergen Reactions    Zosyn [Piperacillin-Tazobactam In D5w] RASH     Pt developed pruritic rash on both arms while on this medication

## 2025-02-27 NOTE — TELEPHONE ENCOUNTER
Forms not in media, unsure if received contacted [facility] left detailed message to re fax forms [phone].

## 2025-04-01 ENCOUNTER — MED REC SCAN ONLY (OUTPATIENT)
Dept: FAMILY MEDICINE CLINIC | Facility: CLINIC | Age: 75
End: 2025-04-01

## 2025-04-02 ENCOUNTER — APPOINTMENT (OUTPATIENT)
Dept: WOUND CARE | Facility: HOSPITAL | Age: 75
End: 2025-04-02
Attending: NURSE PRACTITIONER
Payer: MEDICARE

## 2025-04-09 ENCOUNTER — TELEPHONE (OUTPATIENT)
Dept: FAMILY MEDICINE CLINIC | Facility: CLINIC | Age: 75
End: 2025-04-09

## 2025-04-09 NOTE — TELEPHONE ENCOUNTER
#[id number]  orders received, signed by provider and faxed back successfully.    Placed in HH folder with scan sheet.

## 2025-04-16 ENCOUNTER — OFFICE VISIT (OUTPATIENT)
Dept: WOUND CARE | Facility: HOSPITAL | Age: 75
End: 2025-04-16
Attending: NURSE PRACTITIONER
Payer: MEDICARE

## 2025-04-16 VITALS
HEART RATE: 80 BPM | RESPIRATION RATE: 18 BRPM | OXYGEN SATURATION: 99 % | TEMPERATURE: 98 F | SYSTOLIC BLOOD PRESSURE: 111 MMHG | DIASTOLIC BLOOD PRESSURE: 63 MMHG

## 2025-04-16 DIAGNOSIS — S91.102D OPEN WOUND OF LEFT GREAT TOE, SUBSEQUENT ENCOUNTER: ICD-10-CM

## 2025-04-16 DIAGNOSIS — S91.105A OPEN WOUND OF LESSER TOE OF LEFT FOOT: ICD-10-CM

## 2025-04-16 DIAGNOSIS — L89.154 PRESSURE INJURY OF SACRAL REGION, STAGE 4 (HCC): Primary | ICD-10-CM

## 2025-04-16 PROBLEM — S91.102A OPEN WOUND OF LEFT GREAT TOE: Status: ACTIVE | Noted: 2025-04-16

## 2025-04-16 PROCEDURE — 97605 NEG PRS WND THER DME<=50SQCM: CPT

## 2025-04-16 NOTE — PROGRESS NOTES
Weekly Wound Education Note    Teaching Provided To: Family (son and wife)  Training Topics: Cleasing and general instructions, Dressing, Negative presssure therapy, Signs and symptoms of infection  Training Method: Demonstration, Explain/Verbal  Training Response: State content correctly        Notes: Sacrum: endoform, eakins seal, NPWT Medela, Left foot 1st & 3rd toe: Betadine open to air.

## 2025-04-16 NOTE — PROGRESS NOTES
Subjective   Yoseph Cordero is a 74 year old male.    Chief Complaint   Patient presents with    Wound Recheck     Here for sacrum wound recheck and 1st and 3rd toe of left foot.      HPI  4/16/2025: Patient is here with his son and wife. Home health services are doing wound vac dressing changes, sacral wound is improving, the toe wounds are closing, no new concern.   2/26/2025: Patient is here for follow up with his son. Home health nurse has been doing wound vac dressing changes on sacrum wound, he has new toe wounds on his left foot.  1/16/2025: Patient is here for follow up with his son and wife. His daughter and home health have been doing dressing changes as ordered. The wounds on his feet has closed, the hip wound has reduced and sacral wound granular and smaller.   11/13/2024: Patient is here with his son and wife for his wounds, his son reporting the wounds are improving, no new concerns.   10/23/2024: Patient is here with his son and wife for wounds care, reporting scaral wound has foul smell.  9/27/2024: Patient is here with his son and his wife for follow up wound care, his daughter is doing dressing changes, reporting the wounds on right heel closed now feels boggy on proximal section. Family continues to feed patient and reposition patient as needed.   9/13/2024: Patient is here with his son, daughter and wife. He is awake. He has wound vac on his left hip. Sacra wound has become granular, heel wound has closed, ankle wound is dark granular, no new wounds. His family is feeding him high protein, collagen and wound healing supplement, they have been repositioning him, home health nurse has been doing dressing changes as well as her daughter.  8/23/2024: Patient is here with his two sons and wife. He has wound vac on left hip. His son reporting the sacral wound has foul smell with back edges, no fever at time of this visit.     8/9/2024: Patient is here with her daughter who is providing the  information, patient is non-verbal. Patient's daughter has taken patient home, home health nurse has not started as of now, she is doing all wounds the dressing changes. Patient has hospital bed with air mattress with lift.      7/25/2024: Patient is a 74-year-old male with a past medical history of dementia nonverbal at baseline, chronic right hemiparesis from prior CVA, seizure disorder on Keppra, VTE with factor V Leiden on Xarelto, anemia, chronic right heel stage IV, left ischial stage IV and sacral decubitus ulcer stage IV with recent hospitalization on 7/9-7/19 for severe sepsis secondary to Proteus bacteremia treated with IV Zosyn discharged on 7/19/2024 with midline with plan for 4 weeks of IV antibiotics tp [facility] for placement.   Per SNF, patient had acquired the pressure injuries at his right heel, left ischial/hip and sacral at home while he had home health services.  Per patient's daughter patient eats well, has multiple stool a day, incontinent. Patient's daughter is not happy with care her father has been getting.   Patient was here today 7/23/2024 to see Dr. Sury WALDROP (Podiatrist) for heel wounds, I was asked us to see this patients today for multiple wounds that wound clinic was not aware he had at time of masha.     Review of Systems   Constitutional:  Negative for activity change.   Respiratory:  Negative for cough and shortness of breath.    Cardiovascular:  Negative for leg swelling.   Gastrointestinal:  Negative for abdominal distention.   Skin:  Positive for wound.        Multiple wounds   Neurological:  Positive for weakness.   Psychiatric/Behavioral:  Negative for agitation.       Objective  Physical Exam  Vitals reviewed.   Constitutional:       General: He is awake.      Appearance: He is underweight.      Comments: Non-verbal, unable to follow commends    Cardiovascular:      Rate and Rhythm: Normal rate and regular rhythm.      Pulses: Normal pulses.   Pulmonary:       Effort: Pulmonary effort is normal.   Abdominal:      General: Bowel sounds are normal.      Palpations: Abdomen is soft.   Musculoskeletal:      Right lower leg: No edema.      Left lower leg: No edema.   Skin:     General: Skin is warm.      Findings: Wounds on right heel, left ankle, left hip and sacrum present. No erythema or rash.      Nails: There is no clubbing.  Wound Assessment  Wound 08/23/24 3 Sacrum (Active)   Date First Assessed: 08/23/24   Present on Original Admission: Yes   Wound Number (Wound Clinic Only): 3  Primary Wound Type: Pressure Injury  Location: Sacrum      Assessments 7/23/2024  8:50 AM 4/16/2025  2:35 PM   Wound Image         Site Assessment Moist;Painful;Yellow;Red Moist;Red;Granulation tissue   Closure Not approximated Not approximated   Drainage Amount Large Moderate   Drainage Description Yellow Serosanguineous   Treatments Wound ;Topical (Barrier/Moisturizer/Ointment);Special Tape Cleansed;Wound    Dressing ABD Pad;Gauze (dakins) Wound vac sponge   Dressing Changed Changed Changed   Dressing Status -- Clean;Dry;Intact   Wound Length (cm) 7 cm 3 cm   Wound Width (cm) 7 cm 1.7 cm   Wound Surface Area (cm^2) 49 cm^2 4.01 cm^2   Wound Depth (cm) 2.6 cm 1 cm   Wound Volume (cm^3) 127.4 cm^3 2.67 cm^3   Wound Healing % -- 98   Margins Poorly defined Well-defined edges;Not attached;Epibole (Rolled edges)   Non-staged Wound Description Full thickness Full thickness   Madhuri-wound Assessment Maceration;Painful;Excoriated;Pink Clean;Dry;Intact   Wound Granulation Tissue Pink Red   Wound Bed Granulation (%) 30 % 100 %   Wound Bed Epithelium (%) -- 0 %   Wound Bed Slough (%) 40 % 0 %   Wound Bed Eschar (%) 30 % 0 %   Wound Bed Fibrin (%) -- 0 %   Exposed Structure Bone Necrosis;Bone --   State of Healing -- Fully granulated   Wound Odor Mild None   Undermining? Yes (1.8 cm) Yes (6-12 = 1.0 cm)   Number of Undermines 1 1   Undermine 1 Start Position 7 6   Undermine 1 End Position 11  12   Pressure Injury Stage Stage 4 Stage 4       Active Orders   Date Order Priority Status Authorizing Provider   04/16/25 1508 OP Wound Dressing Routine Active George Sampson APRN     - Cleansing:    Cleanse with normal saline or wound cleanser     - Dressing:    Collagen     - Additional Wound Dressing Information:    rajni seal to aid with seal,black vac foam     - Frequency:    Change dressing three times per week       Wound 02/26/25 4 Toe (Comment which one) Left (Active)   Date First Assessed/Time First Assessed: 02/26/25 1132   Present on Original Admission: No   Wound Number (Wound Clinic Only): 4  Location: (c) Toe (Comment which one)  Wound Location Orientation: Left      Assessments 2/26/2025 11:34 AM 4/16/2025  2:35 PM   Wound Image       Site Assessment Moist;Red Intact;Dry;Epithelialization   Closure Not approximated Approximated   Drainage Amount None None   Treatments Cleansed;Saline;Vashe Cleansed;Saline;Vashe;Betadine   Dressing Hydrofiber with silver;2x2s;Gauze;Tape Open to air   Dressing Changed New Other (Comment)   Dressing Status Clean;Dry;Intact Removed   Wound Length (cm) 1.5 cm 0 cm   Wound Width (cm) 0.4 cm 0 cm   Wound Surface Area (cm^2) 0.6 cm^2 0 cm^2   Wound Depth (cm) 0.1 cm 0 cm   Wound Volume (cm^3) 0.06 cm^3 0 cm^3   Wound Healing % -- 100   Margins Attached edges Flat and Intact   Non-staged Wound Description Full thickness --   Madhuri-wound Assessment Edema;Fragile;Pink Fragile;Dry   Wound Granulation Tissue Red --   Wound Bed Granulation (%) 100 % 0 %   Wound Bed Epithelium (%) 0 % 100 %   Wound Bed Slough (%) 0 % 0 %   Wound Bed Eschar (%) 0 % 0 %   Wound Bed Fibrin (%) 0 % 0 %   State of Healing Non-healing Epithelialized   Wound Odor -- None       Active Orders   Date Order Priority Status Authorizing Provider   04/16/25 1508 OP Wound Dressing Routine Active George Sampson APRN     - Cleansing:    Cleanse with normal saline or wound cleanser     - Dressing:    Betadine     -  Frequency:    Change dressing daily and PRN       Wound 02/26/25 5 Toe (Comment which one) Dorsal;Left (Active)   Date First Assessed/Time First Assessed: 02/26/25 1133    Wound Number (Wound Clinic Only): 5  Location: (c) Toe (Comment which one)  Wound Location Orientation: Dorsal;Left      Assessments 2/26/2025 11:34 AM 4/16/2025  2:35 PM   Wound Image       Site Assessment Moist;Red Dry;Intact;Epithelialization   Closure Not approximated Approximated   Drainage Amount Scant None   Drainage Description Serosanguineous --   Treatments Cleansed;Saline;Vashe Cleansed;Saline;Vashe;Betadine   Dressing Hydrofiber with silver;2x2s;Gauze;Tape Open to air   Dressing Changed New Other (Comment)   Dressing Status Clean;Dry;Intact Removed   Wound Length (cm) 1 cm 0 cm   Wound Width (cm) 0.4 cm 0 cm   Wound Surface Area (cm^2) 0.4 cm^2 0 cm^2   Wound Depth (cm) 0.1 cm 0 cm   Wound Volume (cm^3) 0.04 cm^3 0 cm^3   Wound Healing % -- 100   Margins Attached edges Flat and Intact   Non-staged Wound Description Full thickness --   Madhuri-wound Assessment Fragile;Edema;Blanchable erythema Fragile   Wound Granulation Tissue Red --   Wound Bed Granulation (%) 100 % 0 %   Wound Bed Epithelium (%) 0 % 100 %   Wound Bed Slough (%) 0 % 0 %   Wound Bed Eschar (%) 0 % 0 %   Wound Bed Fibrin (%) 0 % 0 %   State of Healing Non-healing Epithelialized   Wound Odor None None       Active Orders   Date Order Priority Status Authorizing Provider   04/16/25 1508 OP Wound Dressing Routine Active George Sampson APRN     - Cleansing:    Cleanse with normal saline or wound cleanser     - Dressing:    Betadine     - Frequency:    Change dressing daily and PRN       Negative Pressure Wound Therapy Sacrum (Active)   Placement Date: 02/26/25   Wound Type: Pressure ulcer: stage IV  Location: Sacrum      Assessments 2/26/2025 11:34 AM 4/16/2025  2:35 PM   Wound photographed/measured Yes Yes   Machine Status (On) Yes Yes   Site Assessment Moist;Red;Granulation  tissue Moist;Red;Granulation tissue   Madhuri-wound Assessment Clean;Dry;Intact Clean;Dry;Intact   Unit Type Medela Invia Medela Invia   Dressing Type Black foam Black foam   Number of Foam Pieces Used 1 1   Cycle Continuous;On Continuous;On   Target Pressure (mmHg) 125 125   Drainage Description Serosanguineous Serosanguineous   Dressing Status Clean;Dry;Intact Clean;Dry;Intact   Canister Changed -- Yes       No associated orders.     Negative Pressure Wound Therapy Sacrum (Active)   02/26/25  Sacrum   Placed by External Staff?:    Inserted by:    Wound Type: Pressure ulcer: stage IV   Wound Location Orientation:    Removal Reason:    Wound photographed/measured Yes 04/16/25 1435   Machine Status (On) Yes 04/16/25 1435   Site Assessment Moist;Red;Granulation tissue 04/16/25 1435   Madhuri-wound Assessment Clean;Dry;Intact 04/16/25 1435   Unit Type Medela Invia 04/16/25 1435   Dressing Type Black foam 04/16/25 1435   Number of Foam Pieces Used 1 04/16/25 1435   Cycle Continuous;On 04/16/25 1435   Target Pressure (mmHg) 125 04/16/25 1435   Drainage Description Serosanguineous 04/16/25 1435   Dressing Status Clean;Dry;Intact 04/16/25 1435   Canister Changed Yes 04/16/25 1435     Vital Signs    04/16/25 1434   BP: 111/63   Pulse: 80   Resp: 18   Temp: 97.5 °F (36.4 °C)   Allergies  Allergies[1]  Assessment   left foot, 1st toe, scan drainage, medial side of nail bed, no erythema.   Left 3rd toe, nail bed is attached now, no drainage, no erythema.    Left hip wound, stage IV pressure injury:  -closed wound with scar tissue  -no erythema     Sacral wound, stage IV pressure injury:  -granular without any slough, much smaller, 98% since 7/23/2024.  -no foul smell  -no erythema     Right heel wound, stage IV:  -continue to be closed wound with scar tissue   high risk for re-ulceration, MRI on 7/10 showed osteomyelitis of dorsal calcaneus   -no erythema or other sign of infection     Right ankle wound,stage III pressure injury  possibly becoming stage IV  -closed wound (more than a month it has been closed) with discoloration of skin, fragile skin  -no imaging has been done to r/o OM  -no erythema     Left lateral ankle and left heel wounds, closed.     Reviewed note and labs/imaging at [facility] Every where.  Recent labs: 10/26/2024: BUN 9, Creatinine 0.52, eGFR 106  12/9/2024: H&H 13.8&42.8    Encounter Diagnosis  1. Pressure injury of sacral region, stage 4 (HCC)    2. Open wound of left great toe, subsequent encounter    3. Open wound of lesser toe of left foot      Problem List  Problem List[2]  Plan  Orders  Orders Placed This Encounter   Procedures    OP Wound Dressing    OP Wound Dressing   Palliative wound care: the goal of wound care is to reduce the risk for infection, slow the course of deterioration and promote client comfort and function. If the wounds become granular and shows tissue improvement, may processed to advanced dressing.     Left 2nd and 1st toe: Initiated Betadine with Band-Aid dressing daily.  Left hip: no dressing is needed, continue pressure injury prevention.      Sacral wound: Continue NPWT at 125 mmHg with collagen, change dressing 2-3 times a week. If the wound becomes necrotic tissue resume daily Dakin's solution with gauze.     Right ankle wound continue with foam dressing.     Right heel, with OM,  continue with offloading foam and boots.   Continue to re-position to reduce pressure on hip and sacral wound at least every two hours with pressure reduction mattress and seat cushion.  Continue using heel protector/heel boots to reduce pressure in the wound bed.  Monitor for sign of infection, redness, foul odor, warmth and increased drainage.  Discussed treatment plan with the patient's son in detail, all questions answered.     Note to patient:The 21st Century Cures Act makes medical notes like these available to patients in the interest of transparency. Please be advised this is a medical document.  Medical documents are intended to carry relevant information, facts as evident, and the clinical opinion of the practitioner. The medical note is intended as peer to peer communication and may appear blunt or direct. It is written in medical language and may contain abbreviations or verbiage that are unfamiliar.   Total face to face time was 40 min, more than 50% of the time was spent in counseling and/or coordination of care related to sacral and left hip wounds  Follow-Up  Return in about 4 weeks (around 5/14/2025) for APN George x 30 minutes.              [1]   Allergies  Allergen Reactions    Zosyn [Piperacillin-Tazobactam In D5w] RASH     Pt developed pruritic rash on both arms while on this medication   [2]   Patient Active Problem List  Diagnosis    Hemiplegia affecting right dominant side (HCC)    Chronic obstructive pulmonary disease (HCC)    Combined forms of age-related cataract of both eyes    Esophageal reflux    Late onset Alzheimer's disease with behavioral disturbance (HCC)    Alcohol abuse    Calcification of aorta    Alcoholism (HCC)    History of stroke    Dysphagia    Hyperglycemia    Weakness    Gait disturbance    Abnormal involuntary movement    Acute pulmonary embolism without acute cor pulmonale, unspecified pulmonary embolism type (HCC)    Factor V Leiden (HCC)    Leukocytosis    Tachycardia    Altered mental status, unspecified altered mental status type    Seizures (HCC)    Infected ulcer of skin, unspecified ulcer stage (HCC)    Infected decubitus ulcer    Transient neurological symptoms    Palliative care by specialist    Pressure injury of sacral region, stage 4 (HCC)    Pressure injury of right hip, stage 3 (HCC)    Pressure injury of right heel, stage 4 (HCC)    Pressure injury of right ankle, stage 2 (HCC)    Complicated open wound of left hip    Non-healing open wound of right heel    Leukocytosis, unspecified type    Sacral decubitus ulcer, stage IV (HCC)    Sacral decubitus ulcer     Cellulitis    Open wound of lesser toe of left foot    Open wound of left great toe

## 2025-04-18 ENCOUNTER — TELEPHONE (OUTPATIENT)
Dept: FAMILY MEDICINE CLINIC | Facility: CLINIC | Age: 75
End: 2025-04-18

## 2025-04-18 NOTE — TELEPHONE ENCOUNTER
Call from Brinda SINGH for [facility].  They will need 12 more visits for home nursing care due manage Wound Vac.  Presently seeing him 3 times a week.  FYI to Dr Sheets. No call back needed.

## 2025-04-22 ENCOUNTER — TELEPHONE (OUTPATIENT)
Dept: FAMILY MEDICINE CLINIC | Facility: CLINIC | Age: 75
End: 2025-04-22

## 2025-05-13 ENCOUNTER — TELEPHONE (OUTPATIENT)
Dept: FAMILY MEDICINE CLINIC | Facility: CLINIC | Age: 75
End: 2025-05-13

## 2025-05-13 NOTE — TELEPHONE ENCOUNTER
Received a call from nurse with residential home health who states she is adding 4 more visits for this patient as he still has a wound vac. Dr. Sheets just FYI.

## 2025-05-15 ENCOUNTER — MED REC SCAN ONLY (OUTPATIENT)
Dept: FAMILY MEDICINE CLINIC | Facility: CLINIC | Age: 75
End: 2025-05-15

## 2025-05-21 ENCOUNTER — OFFICE VISIT (OUTPATIENT)
Dept: WOUND CARE | Facility: HOSPITAL | Age: 75
End: 2025-05-21
Attending: NURSE PRACTITIONER
Payer: MEDICARE

## 2025-05-21 ENCOUNTER — TELEPHONE (OUTPATIENT)
Dept: FAMILY MEDICINE CLINIC | Facility: CLINIC | Age: 75
End: 2025-05-21

## 2025-05-21 VITALS
TEMPERATURE: 97 F | DIASTOLIC BLOOD PRESSURE: 57 MMHG | OXYGEN SATURATION: 99 % | SYSTOLIC BLOOD PRESSURE: 100 MMHG | RESPIRATION RATE: 18 BRPM | HEART RATE: 78 BPM

## 2025-05-21 DIAGNOSIS — L89.154 PRESSURE INJURY OF SACRAL REGION, STAGE 4 (HCC): Primary | ICD-10-CM

## 2025-05-21 DIAGNOSIS — S91.102D OPEN WOUND OF LEFT GREAT TOE, SUBSEQUENT ENCOUNTER: ICD-10-CM

## 2025-05-21 PROCEDURE — 99213 OFFICE O/P EST LOW 20 MIN: CPT | Performed by: NURSE PRACTITIONER

## 2025-05-21 NOTE — PROGRESS NOTES
Subjective   Yoseph Cordero is a 74 year old male.    Chief Complaint   Patient presents with    Wound Recheck     Patient is here for a follow up check up of his sacral wound     HPI  5/21/2025: Patient is here with his son and wife. Home health services has been changing wound vac dressing three times a week. He continues to have small drainage from nail bed in the right left toe.   4/16/2025: Patient is here with his son and wife. Home health services are doing wound vac dressing changes, sacral wound is improving, the toe wounds are closing, no new concern.   2/26/2025: Patient is here for follow up with his son. Home health nurse has been doing wound vac dressing changes on sacrum wound, he has new toe wounds on his left foot.  1/16/2025: Patient is here for follow up with his son and wife. His daughter and home health have been doing dressing changes as ordered. The wounds on his feet has closed, the hip wound has reduced and sacral wound granular and smaller.   11/13/2024: Patient is here with his son and wife for his wounds, his son reporting the wounds are improving, no new concerns.   10/23/2024: Patient is here with his son and wife for wounds care, reporting scaral wound has foul smell.  9/27/2024: Patient is here with his son and his wife for follow up wound care, his daughter is doing dressing changes, reporting the wounds on right heel closed now feels boggy on proximal section. Family continues to feed patient and reposition patient as needed.   9/13/2024: Patient is here with his son, daughter and wife. He is awake. He has wound vac on his left hip. Sacra wound has become granular, heel wound has closed, ankle wound is dark granular, no new wounds. His family is feeding him high protein, collagen and wound healing supplement, they have been repositioning him, home health nurse has been doing dressing changes as well as her daughter.  8/23/2024: Patient is here with his two sons and wife. He has  wound vac on left hip. His son reporting the sacral wound has foul smell with back edges, no fever at time of this visit.     8/9/2024: Patient is here with her daughter who is providing the information, patient is non-verbal. Patient's daughter has taken patient home, home health nurse has not started as of now, she is doing all wounds the dressing changes. Patient has hospital bed with air mattress with lift.      7/25/2024: Patient is a 74-year-old male with a past medical history of dementia nonverbal at baseline, chronic right hemiparesis from prior CVA, seizure disorder on Keppra, VTE with factor V Leiden on Xarelto, anemia, chronic right heel stage IV, left ischial stage IV and sacral decubitus ulcer stage IV with recent hospitalization on 7/9-7/19 for severe sepsis secondary to Proteus bacteremia treated with IV Zosyn discharged on 7/19/2024 with midline with plan for 4 weeks of IV antibiotics [facility].   Per SNF, patient had acquired the pressure injuries at his right heel, left ischial/hip and sacral at home while he had home health services.  Per patient's daughter patient eats well, has multiple stool a day, incontinent. Patient's daughter is not happy with care her father has been getting.   Patient was here today 7/23/2024 to see Dr. Sury WALDROP (Podiatrist) for heel wounds, I was asked us to see this patients today for multiple wounds that wound clinic was not aware he had at time of masha.    Review of Systems  Constitutional:  Negative for activity change.   Respiratory:  Negative for cough and shortness of breath.    Cardiovascular:  Negative for leg swelling.   Gastrointestinal:  Negative for abdominal distention.   Skin:  Positive for wound.        Multiple wounds   Neurological:  Positive for weakness.   Psychiatric/Behavioral:  Negative for agitation.     Objective   Physical Exam  Vitals reviewed.   Constitutional:       General: He is awake.      Appearance: He  is underweight.      Comments: Non-verbal, unable to follow commends    Cardiovascular:      Rate and Rhythm: Normal rate and regular rhythm.      Pulses: Normal pulses.   Pulmonary:      Effort: Pulmonary effort is normal.   Abdominal:      General: Bowel sounds are normal.      Palpations: Abdomen is soft.   Musculoskeletal:      Right lower leg: No edema.      Left lower leg: No edema.   Skin:     General: Skin is warm.      Findings: Wounds on right heel, left ankle, left hip and sacrum present. No erythema or rash.      Nails: There is no clubbing.      Neurological:      Mental Status: Mental status is at baseline.   Wound Assessment  Wound 08/23/24 3 Sacrum (Active)   Date First Assessed: 08/23/24   Present on Original Admission: Yes   Wound Number (Wound Clinic Only): 3  Primary Wound Type: Pressure Injury  Location: Sacrum      Assessments 7/23/2024  8:50 AM 5/21/2025 11:44 AM   Wound Image         Site Assessment Moist;Painful;Yellow;Red Moist;Red;Granulation tissue   Closure Not approximated Not approximated   Drainage Amount Large Moderate   Drainage Description Yellow Serosanguineous   Treatments Wound ;Topical (Barrier/Moisturizer/Ointment);Special Tape Cleansed;Wound    Dressing ABD Pad;Gauze (dakins) Wound vac sponge (Fibracol)   Dressing Changed Changed Changed   Dressing Status -- Clean;Dry;Intact   Wound Length (cm) 7 cm 3.5 cm   Wound Width (cm) 7 cm 1.7 cm   Wound Surface Area (cm^2) 49 cm^2 4.67 cm^2   Wound Depth (cm) 2.6 cm 1 cm   Wound Volume (cm^3) 127.4 cm^3 3.115 cm^3   Wound Healing % -- 98   Margins Poorly defined Well-defined edges;Not attached;Epibole (Rolled edges)   Non-staged Wound Description Full thickness Full thickness   Madhuri-wound Assessment Maceration;Painful;Excoriated;Pink Clean;Dry;Intact   Wound Granulation Tissue Pink Red   Wound Bed Granulation (%) 30 % 100 %   Wound Bed Epithelium (%) -- 0 %   Wound Bed Slough (%) 40 % 0 %   Wound Bed Eschar (%) 30 % 0 %    Wound Bed Fibrin (%) -- 0 %   Exposed Structure Bone Necrosis;Bone --   State of Healing -- Fully granulated   Wound Odor Mild None   Undermining? Yes (1.8 cm) Yes (6-12=1 cm)   Number of Undermines 1 1   Undermine 1 Start Position 7 6   Undermine 1 End Position 11 12   Pressure Injury Stage Stage 4 Stage 4       Active Orders   Date Order Priority Status Authorizing Provider   04/16/25 1508 OP Wound Dressing Routine Active George Sampson APRN     - Cleansing:    Cleanse with normal saline or wound cleanser     - Dressing:    Collagen     - Additional Wound Dressing Information:    rajni seal to aid with seal,black vac foam     - Frequency:    Change dressing three times per week       Negative Pressure Wound Therapy Sacrum (Active)   Placement Date: 02/26/25   Wound Type: Pressure ulcer: stage IV  Location: Sacrum      Assessments 2/26/2025 11:34 AM 5/21/2025 11:44 AM   Wound photographed/measured Yes Yes   Machine Status (On) Yes Yes   Site Assessment Moist;Red;Granulation tissue Moist;Red;Granulation tissue   Madhuri-wound Assessment Clean;Dry;Intact Clean;Dry;Intact   Unit Type Medela Invia Medela Invia   Dressing Type Black foam Black foam;Collagen;Rajni seal   Number of Foam Pieces Used 1 1   Cycle Continuous;On Continuous;On   Target Pressure (mmHg) 125 125   Drainage Description Serosanguineous Serosanguineous   Dressing Status Clean;Dry;Intact Clean;Dry;Intact   Canister Changed -- No       No associated orders.     Negative Pressure Wound Therapy Sacrum (Active)   02/26/25  Sacrum   Placed by External Staff?:    Inserted by:    Wound Type: Pressure ulcer: stage IV   Wound Location Orientation:    Removal Reason:    Wound photographed/measured Yes 05/21/25 1144   Machine Status (On) Yes 05/21/25 1144   Site Assessment Moist;Red;Granulation tissue 05/21/25 1144   Madhuri-wound Assessment Clean;Dry;Intact 05/21/25 1144   Unit Type Medela Invia 05/21/25 1144   Dressing Type Black foam;Collagen;Rajni seal 05/21/25  1144   Number of Foam Pieces Used 1 05/21/25 1144   Cycle Continuous;On 05/21/25 1144   Target Pressure (mmHg) 125 05/21/25 1144   Drainage Description Serosanguineous 05/21/25 1144   Dressing Status Clean;Dry;Intact 05/21/25 1144   Canister Changed No 05/21/25 1144     Vital Signs    05/21/25 1140   BP: 100/57   Pulse: 78   Resp: 18   Temp: 96.8 °F (36 °C)   PainSc: 0 - (None)   Allergies  Allergies[1]  Assessment   Left foot, 1st toe, scan drainage, medial side of nail bed, no erythema.     Sacral wound, stage IV pressure injury:  -granular, 98% since 7/23/2024.  -no foul smell  -no erythema     Reviewed note and labs/imaging at [facility] Every where.  Recent labs: 10/26/2024: BUN 9, Creatinine 0.52, eGFR 106  12/9/2024: H&H 13.8&42.8     Encounter Diagnosis  1. Pressure injury of sacral region, stage 4 (HCC)    2. Open wound of left great toe, subsequent encounter      Problem List  Problem List[2]  Plan  Orders  Orders Placed This Encounter   Procedures    OP Wound Dressing   Palliative wound care: the goal of wound care is to reduce the risk for infection, slow the course of deterioration and promote client comfort and function.     Left 1st toe: Continue silver alginate with Band-Aid dressing daily.    Sacral wound: Continue NPWT at 125 mmHg with collagen, change dressing 2-3 times a week. If the wound becomes necrotic tissue resume daily Dakin's solution with gauze.    Continue to re-position to reduce pressure on hip and sacral wound at least every two hours with pressure reduction mattress and seat cushion.  Continue using heel protector/heel boots to reduce pressure in the healing wound bed.  Monitor for sign of infection, redness, foul odor, warmth and increased drainage.  Discussed treatment plan with the patient's son in detail, all questions answered.     Total face to face time was 40 min, more than 50% of the time was spent in counseling and/or coordination of care related to sacral and left hip  wounds.  Follow-Up  Return patient will transfer care to another company after Jun 17th., for Discharge from Wound Care clinic..              [1]   Allergies  Allergen Reactions    Zosyn [Piperacillin-Tazobactam In D5w] RASH     Pt developed pruritic rash on both arms while on this medication   [2]   Patient Active Problem List  Diagnosis    Hemiplegia affecting right dominant side (HCC)    Chronic obstructive pulmonary disease (HCC)    Combined forms of age-related cataract of both eyes    Esophageal reflux    Late onset Alzheimer's disease with behavioral disturbance (HCC)    Alcohol abuse    Calcification of aorta    Alcoholism (HCC)    History of stroke    Dysphagia    Hyperglycemia    Weakness    Gait disturbance    Abnormal involuntary movement    Acute pulmonary embolism without acute cor pulmonale, unspecified pulmonary embolism type (HCC)    Factor V Leiden (HCC)    Leukocytosis    Tachycardia    Altered mental status, unspecified altered mental status type    Seizures (HCC)    Infected ulcer of skin, unspecified ulcer stage (HCC)    Infected decubitus ulcer    Transient neurological symptoms    Palliative care by specialist    Pressure injury of sacral region, stage 4 (HCC)    Pressure injury of right hip, stage 3 (HCC)    Pressure injury of right heel, stage 4 (HCC)    Pressure injury of right ankle, stage 2 (HCC)    Complicated open wound of left hip    Non-healing open wound of right heel    Leukocytosis, unspecified type    Sacral decubitus ulcer, stage IV (HCC)    Sacral decubitus ulcer    Cellulitis    Open wound of lesser toe of left foot    Open wound of left great toe

## 2025-05-21 NOTE — TELEPHONE ENCOUNTER
[facility] indicated that plans to re-certify patient for home health next week. Patient still has the wound vac to his coccyx.

## 2025-06-04 ENCOUNTER — TELEPHONE (OUTPATIENT)
Dept: FAMILY MEDICINE CLINIC | Facility: CLINIC | Age: 75
End: 2025-06-04

## 2025-06-04 NOTE — TELEPHONE ENCOUNTER
Spoke with patient, JAZZY acuna.   Wants to discuss patient's medications.    He takes metoprolol 12.5 mg twice daily but sometimes she hold it when heart rate below 60.  In general heart rate runs 60-80 and blood pressure runs around 118/63.  Once it was 98/55.  Patient has dementia,  Has sacral decubitus which has improved to about the size of a quarter.  She has not been giving him tamsulosin.  Daughter asking if he can be weaned off of metoprolol?    Advised and scheduled video visit to discuss medication.    Future Appointments   Date Time Provider Department Center   2025  2:00 PM Irving Sheets,  [facility]

## 2025-06-05 ENCOUNTER — TELEMEDICINE (OUTPATIENT)
Dept: FAMILY MEDICINE CLINIC | Facility: CLINIC | Age: 75
End: 2025-06-05
Payer: MEDICARE

## 2025-06-05 DIAGNOSIS — F02.818 DEMENTIA DUE TO MEDICAL CONDITION WITH BEHAVIORAL DISTURBANCE (HCC): ICD-10-CM

## 2025-06-05 DIAGNOSIS — R56.9 SEIZURES (HCC): Primary | ICD-10-CM

## 2025-06-05 DIAGNOSIS — D64.9 ANEMIA, UNSPECIFIED TYPE: ICD-10-CM

## 2025-06-05 PROCEDURE — 99212 OFFICE O/P EST SF 10 MIN: CPT | Performed by: FAMILY MEDICINE

## 2025-06-05 PROCEDURE — 1159F MED LIST DOCD IN RCRD: CPT | Performed by: FAMILY MEDICINE

## 2025-06-05 PROCEDURE — 1160F RVW MEDS BY RX/DR IN RCRD: CPT | Performed by: FAMILY MEDICINE

## 2025-06-05 NOTE — PROGRESS NOTES
VIDEO VISIT    Subjective:   Yoseph Cordero is a 75 year old male who presents for No chief complaint on file.       History/Other:   History of Present Illness  Yoseph Cordero is a 75 year old male who presents for medication management and wound care follow-up. He is accompanied by his caregiver.    He has an enlarged prostate and previously used tamsulosin, but it has been discontinued for a month due to improved urination and bowel movements. He uses a condom catheter, changed daily, with mostly clear urine, though it was cloudy yesterday. Urination is adequate, and he is not constipated.    He has a history of stroke and takes metoprolol, with blood pressure monitored by his caregiver. Blood pressure readings are sometimes low, such as 99/54 mmHg, and metoprolol is administered based on these readings. He is also on levetiracetam and Xarelto. Iron supplements have been stopped as previous tests showed normal levels, though past blood work indicated anemia and high platelets.    He has a significant wound that has reduced in size from softball to quarter-sized. The wound is healing well, but his caregiver is seeking alternative wound care options due to the closure of the current wound care center.   No Further Nursing Notes to Review         Tobacco:  He smoked tobacco in the past but quit greater than 12 months ago.  Tobacco Use[1]     Current Medications[2]           Review of Systems:  Pertinent items are noted in HPI.      Objective:   There were no vitals taken for this visit. Estimated body mass index is 21.55 kg/m² as calculated from the following:    Height as of 10/29/24: 5' 7\" (1.702 m).    Weight as of 10/25/24: 137 lb 9.6 oz (62.4 kg).  Results  LABS  Hemoglobin: Low (02/2025)  Platelets: Elevated (02/2025)     Physical Exam          Assessment & Plan:   1. Seizures (HCC) (Primary)  -     Basic Metabolic Panel (8); Future; Expected date: 06/05/2025  -     CBC With Differential With Platelet;  Future; Expected date: 06/05/2025  -     Levetiracetam, Serum; Future; Expected date: 06/05/2025  2. Anemia, unspecified type  -     Basic Metabolic Panel (8); Future; Expected date: 06/05/2025  -     CBC With Differential With Platelet; Future; Expected date: 06/05/2025  -     Levetiracetam, Serum; Future; Expected date: 06/05/2025  3. 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 The wound care center is closing, necessitating a transition to another facility for continued care. The wound is expected to heal within a month or two, and the use of a wound vac is anticipated to be discontinued soon. Consideration for referral to general surgery is suggested as an alternative for wound evaluation.  - Book appointment at [facility] for wound care  - Consider referral to general surgery for wound evaluation  - Ensure updated orders for wound supplies to continue using the wound vac    Benign Prostatic Hyperplasia (BPH)  He was previously on tamsulosin to aid with urination and prevent constipation. Currently, he is urinating well with the use of a condom catheter, and bowel movements are regular. The urine is mostly clear, indicating adequate hydration and kidney function. Given the improvement in symptoms, the decision was made to discontinue tamsulosin. It is not expected to harm him to stop the medication as he is doing well without it.  - Discontinue tamsulosin    Hypertension  He is on metoprolol. Blood pressure readings have been low, sometimes as low as 99/54 mmHg. Metoprolol is being used to prevent stroke recurrence due to his stroke history. The  current plan is to administer metoprolol only if systolic blood pressure exceeds 110-120 mmHg to prevent hypotension and potential adverse effects. The dose of metoprolol is low at 12.5 mg twice daily, compared to a maximum dose of 450 mg per day.  - Administer metoprolol only if systolic blood pressure exceeds 110-120 mmHg    Atrial Fibrillation  He is on Xarelto for anticoagulation management. Regular monitoring is necessary to ensure there is no excessive bleeding due to anticoagulation therapy. Blood clotting will not be checked due to Xarelto use, but monitoring for excessive bleeding is essential.  - Monitor for signs of excessive bleeding    Anemia  Previous blood work indicated anemia and high platelet levels. He was on iron supplements, which have been discontinued as recent tests showed normal iron levels. Anemia will be re-evaluated with upcoming blood work.  - Order blood work to re-evaluate anemia    Seizure Disorder  He is on levetiracetam for seizure management.    General Health Maintenance  He is receiving home health care, including regular monitoring of blood pressure and other vital signs. Physical therapy is ongoing to improve mobility. Blood work is recommended every three months to monitor his condition.  - Continue physical therapy  - Maintain regular home health care visits  - Perform blood work every three months    Recording duration: 9 minutes        No follow-ups on file.        Irving Sheets, , 6/5/2025, 2:42 PM             [1]   Social History  Tobacco Use   Smoking Status Former   Smokeless Tobacco Never   [2]   Current Outpatient Medications   Medication Sig Dispense Refill    rivaroxaban (XARELTO) 20 MG Oral Tab Take 1 tablet (20 mg total) by mouth daily. 90 tablet 0    levETIRAcetam (KEPPRA) 500 MG Oral Tab Take 1 tablet (500 mg total) by mouth daily. 90 tablet 3    collagenase 250 UNIT/GM External Ointment Apply 1 Application topically in the morning and 1 Application before  bedtime. 2 g 1    sodium hypochlorite 0.125 % External Solution 10 ml to three gauze, pack in the wound 1 each 5    metoprolol tartrate 25 MG Oral Tab Take 0.5 tablets (12.5 mg total) by mouth 2 (two) times daily. 90 tablet 3    calcium carbonate-vitamin D 250-3.125 MG-MCG Oral Tab Take 1 tablet by mouth 2 (two) times daily with meals. 60 tablet 0    lactulose 10 GM/15ML Oral Solution Take 30 mL (20 g total) by mouth every 6 (six) hours as needed (severe). 300 mL 0    polyethylene glycol, PEG 3350, 17 g Oral Powd Pack Take 17 g by mouth daily as needed (If no bowel movement in last 24 hours). 30 packet 0    sennosides 17.2 MG Oral Tab Take 1 tablet (17.2 mg total) by mouth daily. 30 tablet 0    Vitamin C 500 MG Oral Tab Take 1 tablet (500 mg total) by mouth daily.      Nutritional Supplements (ABDOULAYE NUTRIVIGOR OR) Take 1 packet by mouth 3 (three) times daily with meals.      levETIRAcetam 100 MG/ML Oral Solution Take 2.5 mL (250 mg total) by mouth 2 (two) times daily.      acetaminophen 500 MG Oral Tab Take 1 tablet (500 mg total) by mouth every 8 (eight) hours.

## 2025-06-06 ENCOUNTER — TELEPHONE (OUTPATIENT)
Dept: FAMILY MEDICINE CLINIC | Facility: CLINIC | Age: 75
End: 2025-06-06

## 2025-06-06 NOTE — TELEPHONE ENCOUNTER
Patient siobhan called. States patient had televisit and request lab orders to be faxed to [facility].       Fax [phone]    Rightfaxed 3 lab requisitions : levetircetam, cbc w/diff, BMP.   Confirmation received.

## 2025-06-09 ENCOUNTER — TELEPHONE (OUTPATIENT)
Dept: FAMILY MEDICINE CLINIC | Facility: CLINIC | Age: 75
End: 2025-06-09

## 2025-06-09 PROCEDURE — 85025 COMPLETE CBC W/AUTO DIFF WBC: CPT | Performed by: FAMILY MEDICINE

## 2025-06-09 PROCEDURE — 80177 DRUG SCRN QUAN LEVETIRACETAM: CPT | Performed by: FAMILY MEDICINE

## 2025-06-09 PROCEDURE — 80048 BASIC METABOLIC PNL TOTAL CA: CPT | Performed by: FAMILY MEDICINE

## 2025-06-10 ENCOUNTER — TELEPHONE (OUTPATIENT)
Dept: FAMILY MEDICINE CLINIC | Facility: CLINIC | Age: 75
End: 2025-06-10

## 2025-06-10 ENCOUNTER — LAB REQUISITION (OUTPATIENT)
Dept: LAB | Facility: HOSPITAL | Age: 75
End: 2025-06-10
Payer: MEDICARE

## 2025-06-10 DIAGNOSIS — D64.9 ANEMIA, UNSPECIFIED: ICD-10-CM

## 2025-06-10 DIAGNOSIS — R56.9 UNSPECIFIED CONVULSIONS (HCC): ICD-10-CM

## 2025-06-10 DIAGNOSIS — F02.818 DEMENTIA IN OTHER DISEASES CLASSIFIED ELSEWHERE, UNSPECIFIED SEVERITY, WITH OTHER BEHAVIORAL DISTURBANCE (HCC): ICD-10-CM

## 2025-06-10 LAB
ANION GAP SERPL CALC-SCNC: 9 MMOL/L (ref 0–18)
BASOPHILS # BLD AUTO: 0.03 X10(3) UL (ref 0–0.2)
BASOPHILS NFR BLD AUTO: 0.3 %
BUN BLD-MCNC: 9 MG/DL (ref 9–23)
BUN/CREAT SERPL: 12.3 (ref 10–20)
CALCIUM BLD-MCNC: 9.7 MG/DL (ref 8.7–10.4)
CHLORIDE SERPL-SCNC: 103 MMOL/L (ref 98–112)
CO2 SERPL-SCNC: 29 MMOL/L (ref 21–32)
CREAT BLD-MCNC: 0.73 MG/DL (ref 0.7–1.3)
DEPRECATED RDW RBC AUTO: 49.5 FL (ref 35.1–46.3)
EGFRCR SERPLBLD CKD-EPI 2021: 95 ML/MIN/1.73M2 (ref 60–?)
EOSINOPHIL # BLD AUTO: 0.39 X10(3) UL (ref 0–0.7)
EOSINOPHIL NFR BLD AUTO: 4.1 %
ERYTHROCYTE [DISTWIDTH] IN BLOOD BY AUTOMATED COUNT: 14.8 % (ref 11–15)
GLUCOSE BLD-MCNC: 71 MG/DL (ref 70–99)
HCT VFR BLD AUTO: 40.8 % (ref 39–53)
HGB BLD-MCNC: 12.8 G/DL (ref 13–17.5)
IMM GRANULOCYTES # BLD AUTO: 0.02 X10(3) UL (ref 0–1)
IMM GRANULOCYTES NFR BLD: 0.2 %
LYMPHOCYTES # BLD AUTO: 2.53 X10(3) UL (ref 1–4)
LYMPHOCYTES NFR BLD AUTO: 26.4 %
MCH RBC QN AUTO: 28.4 PG (ref 26–34)
MCHC RBC AUTO-ENTMCNC: 31.4 G/DL (ref 31–37)
MCV RBC AUTO: 90.5 FL (ref 80–100)
MONOCYTES # BLD AUTO: 0.9 X10(3) UL (ref 0.1–1)
MONOCYTES NFR BLD AUTO: 9.4 %
NEUTROPHILS # BLD AUTO: 5.7 X10 (3) UL (ref 1.5–7.7)
NEUTROPHILS # BLD AUTO: 5.7 X10(3) UL (ref 1.5–7.7)
NEUTROPHILS NFR BLD AUTO: 59.6 %
OSMOLALITY SERPL CALC.SUM OF ELEC: 289 MOSM/KG (ref 275–295)
PLATELET # BLD AUTO: 339 10(3)UL (ref 150–450)
POTASSIUM SERPL-SCNC: 3.8 MMOL/L (ref 3.5–5.1)
RBC # BLD AUTO: 4.51 X10(6)UL (ref 3.8–5.8)
SODIUM SERPL-SCNC: 141 MMOL/L (ref 136–145)
WBC # BLD AUTO: 9.6 X10(3) UL (ref 4–11)

## 2025-06-10 NOTE — TELEPHONE ENCOUNTER
Orders from [facility] received, signed and faxed successfully.  Fax: [phone]

## 2025-06-12 LAB — LEVETIRACETAM LVL: <2 UG/ML

## 2025-06-16 ENCOUNTER — TELEPHONE (OUTPATIENT)
Dept: FAMILY MEDICINE CLINIC | Facility: CLINIC | Age: 75
End: 2025-06-16

## 2025-06-16 DIAGNOSIS — D64.9 ANEMIA, UNSPECIFIED TYPE: Primary | ICD-10-CM

## 2025-06-17 ENCOUNTER — TELEPHONE (OUTPATIENT)
Dept: NEUROLOGY | Facility: CLINIC | Age: 75
End: 2025-06-17

## 2025-06-17 ENCOUNTER — TELEPHONE (OUTPATIENT)
Dept: FAMILY MEDICINE CLINIC | Facility: CLINIC | Age: 75
End: 2025-06-17

## 2025-06-17 NOTE — TELEPHONE ENCOUNTER
Please see lab note from 6/9/25    Patient's daughter states nurse Chantel RN is home health nurse that draws myrtle's blood.  wants the hemoglobin redrawn in 3 weeks.    Spoke to SAMANTHA Golden from [facility]. Chantel notified that  ordered a hemoglobin and hematocrit to be done in 3 weeks. Order faxed to [facility] at [phone]

## 2025-06-17 NOTE — TELEPHONE ENCOUNTER
Phone call received from pt daughter. Pt daughter is concerned about the pt recent test results for his levetiracetam levels looked high to her. Pt daughter decided to change the way the pt takes this medication, she is giving him 250 mg BID. RN assisted pt daughter with setting up a virtual visit as the pt has a lot of difficulty leaving the house.     Medication Quantity Refills Start End   levETIRAcetam (KEPPRA) 500 MG Oral Tab 90 tablet 3 12/12/2024 --   Sig:   Take 1 tablet (500 mg total) by mouth daily.     Route:   Oral     Order #:   [id number]

## 2025-06-18 ENCOUNTER — TELEMEDICINE (OUTPATIENT)
Dept: NEUROLOGY | Facility: CLINIC | Age: 75
End: 2025-06-18
Payer: MEDICARE

## 2025-06-18 DIAGNOSIS — F02.C0 SEVERE ALZHEIMER'S DEMENTIA OF OTHER ONSET WITHOUT BEHAVIORAL DISTURBANCE, PSYCHOTIC DISTURBANCE, MOOD DISTURBANCE, OR ANXIETY (HCC): ICD-10-CM

## 2025-06-18 DIAGNOSIS — G40.009 LOCALIZATION-RELATED (FOCAL) (PARTIAL) IDIOPATHIC EPILEPSY AND EPILEPTIC SYNDROMES WITH SEIZURES OF LOCALIZED ONSET, NOT INTRACTABLE, WITHOUT STATUS EPILEPTICUS (HCC): Primary | ICD-10-CM

## 2025-06-18 DIAGNOSIS — G30.8 SEVERE ALZHEIMER'S DEMENTIA OF OTHER ONSET WITHOUT BEHAVIORAL DISTURBANCE, PSYCHOTIC DISTURBANCE, MOOD DISTURBANCE, OR ANXIETY (HCC): ICD-10-CM

## 2025-06-18 PROCEDURE — 1159F MED LIST DOCD IN RCRD: CPT | Performed by: INTERNAL MEDICINE

## 2025-06-18 PROCEDURE — G2211 COMPLEX E/M VISIT ADD ON: HCPCS | Performed by: INTERNAL MEDICINE

## 2025-06-18 PROCEDURE — 1160F RVW MEDS BY RX/DR IN RCRD: CPT | Performed by: INTERNAL MEDICINE

## 2025-06-18 PROCEDURE — 99213 OFFICE O/P EST LOW 20 MIN: CPT | Performed by: INTERNAL MEDICINE

## 2025-06-18 NOTE — PROGRESS NOTES
[facility]  [address] [place] [address], [address]  [facility] [address]  [phone]            Neurology Follow Up Visit     Referred By: Dr. Escobedo ref. provider found    Chief Complaint:   No chief complaint on file.      HPI:     Yoseph Cordero is a 75 year old male with left MCA stroke in his 20s, severe dementia, factor V Leiden mutation, pulmonary embolism on Xarelto who presents for follow up of seizures.  His daughter and power of  verbally consents to a telemedicine service with live, interactive video and audio on 6/18/2025.  Patient understands and accepts financial responsibility for any deductible, co-insurance and/or co-pays associated with this service.     Daughter provided most of the history.  He has been hospitalized several times over past year for various things including pressure wounds, ankle fracture.  Currently has a sacral wound vac. Daughter also feels dementia has been progressing as a result.  She thought he was excessively drowsy on Keppra 500mg BID, and so she decreased to 250mg BID back in February.  With this, drowsiness improved. She hasn't seen seizures but with the dementia he has a lot of ups and downs, and doesn't communicate well about his symptoms.   Levels were drawn by home health showing Keppra level of 2.5 recently which raised some concerns.  Looking back, he never had a clinical seizure but had encephalopathy and has found to have an abnormal EEG back in May 2024.  Was started on Keppra 500 twice daily at that time.  Did not significantly improved with the Keppra as he continued to have chronic actuating medical issues which resulted in recurrent hospitalization over the past year.  As a result of all this, not walking independently, using wheelchair primarily.  Daughter performs most ADLs including giving him meds crushed up in applesauce.      Past Medical History:    Anxiety state, unspecified    CVA (cerebral infarction)    Dementia  (HCC)    Depression    Heterozygous factor V Leiden mutation (HCC)    Other and unspecified hyperlipidemia    Other ill-defined conditions(799.89)    Cardiomegaly Pleural effusion w/idopathic pleuropuricarditis    Pulmonary embolism (HCC)    Stroke (HCC)    Unspecified sleep apnea       Past Surgical History:   Procedure Laterality Date    Colonoscopy      Electrocardiogram, complete  1914    Scanned to Media Tab       Social history:  History   Smoking Status    Former   Smokeless Tobacco    Never     History   Alcohol Use No     Comment: none recently     History   Drug Use No       Family History   Problem Relation Age of Onset    Cancer Father         Lung - Smoker  Cause of death    Other (Other) Mother         during     Diabetes Neg     Glaucoma Neg          Current Outpatient Medications:     rivaroxaban (XARELTO) 20 MG Oral Tab, Take 1 tablet (20 mg total) by mouth daily., Disp: 90 tablet, Rfl: 0    levETIRAcetam (KEPPRA) 500 MG Oral Tab, Take 1 tablet (500 mg total) by mouth daily., Disp: 90 tablet, Rfl: 3    collagenase 250 UNIT/GM External Ointment, Apply 1 Application topically in the morning and 1 Application before bedtime., Disp: 2 g, Rfl: 1    sodium hypochlorite 0.125 % External Solution, 10 ml to three gauze, pack in the wound, Disp: 1 each, Rfl: 5    metoprolol tartrate 25 MG Oral Tab, Take 0.5 tablets (12.5 mg total) by mouth 2 (two) times daily., Disp: 90 tablet, Rfl: 3    calcium carbonate-vitamin D 250-3.125 MG-MCG Oral Tab, Take 1 tablet by mouth 2 (two) times daily with meals., Disp: 60 tablet, Rfl: 0    lactulose 10 GM/15ML Oral Solution, Take 30 mL (20 g total) by mouth every 6 (six) hours as needed (severe)., Disp: 300 mL, Rfl: 0    polyethylene glycol, PEG 3350, 17 g Oral Powd Pack, Take 17 g by mouth daily as needed (If no bowel movement in last 24 hours)., Disp: 30 packet, Rfl: 0    sennosides 17.2 MG Oral Tab, Take 1 tablet (17.2 mg total) by mouth daily., Disp: 30  tablet, Rfl: 0    Vitamin C 500 MG Oral Tab, Take 1 tablet (500 mg total) by mouth daily., Disp: , Rfl:     Nutritional Supplements (ABDOULAYE NUTRIVIGOR OR), Take 1 packet by mouth 3 (three) times daily with meals., Disp: , Rfl:     levETIRAcetam 100 MG/ML Oral Solution, Take 2.5 mL (250 mg total) by mouth 2 (two) times daily., Disp: , Rfl:     acetaminophen 500 MG Oral Tab, Take 1 tablet (500 mg total) by mouth every 8 (eight) hours., Disp: , Rfl:     Allergies   Allergen Reactions    Zosyn [Piperacillin-Tazobactam In D5w] RASH     Pt developed pruritic rash on both arms while on this medication       ROS:   As in HPI, the rest of the 14 system review was done and was negative      Physical Exam:  There were no vitals filed for this visit.        Labs:    Lab Results   Component Value Date    TSH 4.131 07/12/2024     Lab Results   Component Value Date    HDL 63 (H) 03/11/2019     (H) 03/11/2019    TRIG 70 03/11/2019     Lab Results   Component Value Date    HGB 12.8 (L) 06/09/2025    HCT 40.8 06/09/2025    MCV 90.5 06/09/2025    WBC 9.6 06/09/2025    .0 06/09/2025      Lab Results   Component Value Date    BUN 9 06/09/2025    CREAT 0.8 07/07/2014    CA 9.7 06/09/2025    ALT 19 10/24/2024    AST 20 10/24/2024    ALKPHOS 61 06/30/2015    ALB 4.4 10/24/2024     06/09/2025    K 3.8 06/09/2025     06/09/2025    CO2 29.0 06/09/2025      I have reviewed labs.    Imaging Studies:  I have independently reviewed imaging.  CT brain 5/7/24: Severe left hemipshere encephalomalacia and global atrophy      Assessment   Yoseph Cordero is a 75 year old male with left MCA stroke in his 20s, severe dementia, factor V Leiden mutation, pulmonary embolism on Xarelto, who presents for follow up of seizures and dementia.  Had abnormal EEG in May but no clinical seizures have been noted.  Family was concerned that Keppra was causing drowsiness and reduced dose in February.  He has done better on the lower dose with  reduced drowsiness and no obvious seizures occurring.    1. 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 Severe Alzheimer's dementia of other onset without behavioral disturbance, psychotic disturbance, mood disturbance, or anxiety (HCC)  -Could be mixed vascular/AD but overall is severe at this point         Education and counseling provided to patient. Instructed patient to call my office or seek medical attention immediately if symptoms worsen.  Patient verbalized understanding of information given. All questions were answered. All side effects of drugs were discussed.     Time spent for examination, counseling and coordination of care such as potential treatment options- 28 minutes with more than 50% of the time spent counseling the patient.    Return to clinic in: Return in about 3 months (around 9/18/2025).    Nikki Sabillon MD

## 2025-07-01 ENCOUNTER — TELEPHONE (OUTPATIENT)
Dept: FAMILY MEDICINE CLINIC | Facility: CLINIC | Age: 75
End: 2025-07-01

## 2025-07-01 NOTE — TELEPHONE ENCOUNTER
Received a call form [facility] saying she is adding 8 additional visits on for the patient because she sees him for his wound vac.

## 2025-07-02 ENCOUNTER — TELEPHONE (OUTPATIENT)
Dept: FAMILY MEDICINE CLINIC | Facility: CLINIC | Age: 75
End: 2025-07-02

## 2025-07-09 ENCOUNTER — LAB REQUISITION (OUTPATIENT)
Dept: LAB | Facility: HOSPITAL | Age: 75
End: 2025-07-09
Payer: MEDICARE

## 2025-07-09 DIAGNOSIS — D64.9 ANEMIA, UNSPECIFIED: ICD-10-CM

## 2025-07-09 LAB
BASOPHILS # BLD AUTO: 0.06 X10(3) UL (ref 0–0.2)
BASOPHILS NFR BLD AUTO: 0.5 %
DEPRECATED RDW RBC AUTO: 49 FL (ref 35.1–46.3)
EOSINOPHIL # BLD AUTO: 0.45 X10(3) UL (ref 0–0.7)
EOSINOPHIL NFR BLD AUTO: 3.6 %
ERYTHROCYTE [DISTWIDTH] IN BLOOD BY AUTOMATED COUNT: 14.7 % (ref 11–15)
HCT VFR BLD AUTO: 40.3 % (ref 39–53)
HGB BLD-MCNC: 12.7 G/DL (ref 13–17.5)
IMM GRANULOCYTES # BLD AUTO: 0.06 X10(3) UL (ref 0–1)
IMM GRANULOCYTES NFR BLD: 0.5 %
LYMPHOCYTES # BLD AUTO: 2.23 X10(3) UL (ref 1–4)
LYMPHOCYTES NFR BLD AUTO: 18.1 %
MCH RBC QN AUTO: 28.3 PG (ref 26–34)
MCHC RBC AUTO-ENTMCNC: 31.5 G/DL (ref 31–37)
MCV RBC AUTO: 90 FL (ref 80–100)
MONOCYTES # BLD AUTO: 1.09 X10(3) UL (ref 0.1–1)
MONOCYTES NFR BLD AUTO: 8.8 %
NEUTROPHILS # BLD AUTO: 8.44 X10 (3) UL (ref 1.5–7.7)
NEUTROPHILS # BLD AUTO: 8.44 X10(3) UL (ref 1.5–7.7)
NEUTROPHILS NFR BLD AUTO: 68.5 %
PLATELET # BLD AUTO: 381 10(3)UL (ref 150–450)
RBC # BLD AUTO: 4.48 X10(6)UL (ref 3.8–5.8)
WBC # BLD AUTO: 12.3 X10(3) UL (ref 4–11)

## 2025-07-09 PROCEDURE — 85025 COMPLETE CBC W/AUTO DIFF WBC: CPT | Performed by: FAMILY MEDICINE

## 2025-07-09 PROCEDURE — 85025 COMPLETE CBC W/AUTO DIFF WBC: CPT

## 2025-07-14 ENCOUNTER — TELEPHONE (OUTPATIENT)
Dept: FAMILY MEDICINE CLINIC | Facility: CLINIC | Age: 75
End: 2025-07-14

## 2025-07-14 NOTE — TELEPHONE ENCOUNTER
Brinda RN - [facility] called, verified patient's Name and . States patient has a wound VAC in the sacral area. Skin breakdown is noted around the wound VAC site possibly due to extra moisture. She will treat it with wet-to-dry dressing for now and will continue to monitor.     Dr. Kortney CORLEY.

## 2025-07-18 NOTE — TELEPHONE ENCOUNTER
Verified name and  of patient.    SAMANTHA Parry from [facility], calling to state that she re-applied the wound vac on patient because the skin surround the wound is improved. She states that she will re-certify patient for skilled nursing and physical therapy at the end of next week.

## 2025-07-22 ENCOUNTER — TELEPHONE (OUTPATIENT)
Dept: FAMILY MEDICINE CLINIC | Facility: CLINIC | Age: 75
End: 2025-07-22

## 2025-07-28 ENCOUNTER — TELEPHONE (OUTPATIENT)
Dept: FAMILY MEDICINE CLINIC | Facility: CLINIC | Age: 75
End: 2025-07-28

## 2025-08-04 ENCOUNTER — LAB REQUISITION (OUTPATIENT)
Dept: LAB | Facility: HOSPITAL | Age: 75
End: 2025-08-04

## 2025-08-04 DIAGNOSIS — Z01.89 ENCOUNTER FOR OTHER SPECIFIED SPECIAL EXAMINATIONS: ICD-10-CM

## 2025-08-04 LAB
BASOPHILS # BLD AUTO: 0.05 X10(3) UL (ref 0–0.2)
BASOPHILS NFR BLD AUTO: 0.5 %
DEPRECATED RDW RBC AUTO: 47.8 FL (ref 35.1–46.3)
EOSINOPHIL # BLD AUTO: 0.56 X10(3) UL (ref 0–0.7)
EOSINOPHIL NFR BLD AUTO: 5.5 %
ERYTHROCYTE [DISTWIDTH] IN BLOOD BY AUTOMATED COUNT: 14.3 % (ref 11–15)
HCT VFR BLD AUTO: 39.7 % (ref 39–53)
HGB BLD-MCNC: 12.7 G/DL (ref 13–17.5)
IMM GRANULOCYTES # BLD AUTO: 0.04 X10(3) UL (ref 0–1)
IMM GRANULOCYTES NFR BLD: 0.4 %
LYMPHOCYTES # BLD AUTO: 2.3 X10(3) UL (ref 1–4)
LYMPHOCYTES NFR BLD AUTO: 22.4 %
MCH RBC QN AUTO: 28.8 PG (ref 26–34)
MCHC RBC AUTO-ENTMCNC: 32 G/DL (ref 31–37)
MCV RBC AUTO: 90 FL (ref 80–100)
MONOCYTES # BLD AUTO: 0.9 X10(3) UL (ref 0.1–1)
MONOCYTES NFR BLD AUTO: 8.8 %
NEUTROPHILS # BLD AUTO: 6.42 X10 (3) UL (ref 1.5–7.7)
NEUTROPHILS # BLD AUTO: 6.42 X10(3) UL (ref 1.5–7.7)
NEUTROPHILS NFR BLD AUTO: 62.4 %
PLATELET # BLD AUTO: 306 10(3)UL (ref 150–450)
PLATELETS.RETICULATED NFR BLD AUTO: 1.3 % (ref 0–7)
RBC # BLD AUTO: 4.41 X10(6)UL (ref 3.8–5.8)
WBC # BLD AUTO: 10.3 X10(3) UL (ref 4–11)

## 2025-08-04 PROCEDURE — 85025 COMPLETE CBC W/AUTO DIFF WBC: CPT | Performed by: FAMILY MEDICINE

## 2025-08-14 ENCOUNTER — TELEPHONE (OUTPATIENT)
Dept: FAMILY MEDICINE CLINIC | Facility: CLINIC | Age: 75
End: 2025-08-14

## 2025-08-14 ENCOUNTER — OFFICE VISIT (OUTPATIENT)
Dept: WOUND CARE | Facility: HOSPITAL | Age: 75
End: 2025-08-14
Attending: NURSE PRACTITIONER

## 2025-08-14 VITALS
HEART RATE: 75 BPM | TEMPERATURE: 98 F | RESPIRATION RATE: 16 BRPM | SYSTOLIC BLOOD PRESSURE: 146 MMHG | DIASTOLIC BLOOD PRESSURE: 84 MMHG

## 2025-08-14 DIAGNOSIS — L89.154 PRESSURE INJURY OF SACRAL REGION, STAGE 4 (HCC): Primary | ICD-10-CM

## 2025-08-14 PROCEDURE — 99214 OFFICE O/P EST MOD 30 MIN: CPT | Performed by: NURSE PRACTITIONER

## 2025-08-18 ENCOUNTER — TELEPHONE (OUTPATIENT)
Dept: FAMILY MEDICINE CLINIC | Facility: CLINIC | Age: 75
End: 2025-08-18

## 2025-08-19 RX ORDER — RIVAROXABAN 20 MG/1
20 TABLET, FILM COATED ORAL DAILY
Qty: 90 TABLET | Refills: 3 | Status: SHIPPED | OUTPATIENT
Start: 2025-08-19

## 2025-08-20 ENCOUNTER — TELEPHONE (OUTPATIENT)
Dept: FAMILY MEDICINE CLINIC | Facility: CLINIC | Age: 75
End: 2025-08-20

## (undated) NOTE — LETTER
01/04/22        Yoseph Lu      Dear Vikki Ferrell records indicate that you have outstanding lab work and or testing that was ordered for you and has not yet been completed:  Orders Placed This Encounter

## (undated) NOTE — LETTER
6/14/2024          Yoseph Cordero        [address] [place] [address]         To Whom It May Concern,    Patient cannot afford the Xarelto with Me program co-pay.    Patient cannot afford the insurance co-pay.    Patient needs to appeal the decision.    Sincerely,      Irving Sheets, DO  [address] [facility] [address]  [place] [address] [phone]

## (undated) NOTE — LETTER
Date: 1/16/2025  Patient name: Yoseph Cordero  YOB: 1950  Medical Record Number: [id number]  Primary Coverage: Payor: [facility] / Plan: [facility] HMO / Product Type: Medicare /   Secondary Coverage:   Insurance ID: [id number]  Patient Address: [address] [place] [address]  Telephone Information:   Home Phone [phone]   Mobile [phone]       Encounter Date: 1/16/2025  Provider: BEAU Walton  Diagnosis:     ICD-10-CM   1. Pressure injury of sacral region, stage 4 (HCC)  L89.154   2. Pressure injury of right hip, stage 3 (HCC)  L89.213   3. Pressure injury of right heel, stage 4 (HCC)  L89.614         Wound 08/23/24 1 Heel Right;Medial (Active)   Date First Assessed: 08/23/24   Present on Original Admission: Yes   Wound Number (Wound Clinic Only): 1  Primary Wound Type: Pressure Injury  Location: Heel  Wound Location Orientation: Right;Medial      Assessments 7/23/2024  8:50 AM 1/16/2025 11:33 AM   Wound Image        Site Assessment -- Clean;Dry;Intact;Epithelialization   Closure -- Approximated   Drainage Amount -- None   Treatments -- Cleansed;Wound    Dressing -- Aquacel Foam   Dressing Changed -- Changed   Dressing Status -- Dry;Intact;Clean   Wound Length (cm) 6.5 cm 0 cm   Wound Width (cm) 4.4 cm 0 cm   Wound Surface Area (cm^2) 28.6 cm^2 0 cm^2   Wound Depth (cm) 1 cm 0 cm   Wound Volume (cm^3) 28.6 cm^3 0 cm^3   Wound Healing % -- 100   Margins -- Flat and Intact   Non-staged Wound Description -- Not applicable   Madhuri-wound Assessment -- Dry;Hyperpigmented   Wound Bed Granulation (%) -- 0 %   Wound Bed Epithelium (%) -- 100 %   Wound Bed Slough (%) -- 0 %   Wound Bed Eschar (%) -- 0 %   Wound Bed Fibrin (%) -- 0 %   Exposed Structure Bone --   State of Healing -- Epithelialized   Wound Odor -- None   Tunneling? -- No       Active Orders   Date Order Priority Status Authorizing Provider   07/25/24 1711 OP Wound Dressing Routine Active George Sampson, APRN     -  Dressing:    Collagen     - Dressing:    Hydrofera transfer     - Dressing:    ABD pad     - Dressing:    Kerlix     - Cleansing:    Cleanse with normal saline or wound cleanser     - Frequency:    Change dressing three times a day       Inactive Orders   Date Order Priority Status Authorizing Provider   10/25/24 0857 Apply dressing Hydrofiber with silver Daily Routine Discontinued Yordan Jesus MD     - Dressing type:    Hydrofiber with silver   08/30/24 0759 Apply dressing Other ( Xeroform) Daily Routine Discontinued Grisel Zambrano MD     - Dressing type:    Other ( Xeroform)   08/26/24 0848 Apply dressing Other ( Xeroform) Daily Routine Discontinued Ric Evans MD     - Dressing type:    Other ( Xeroform)   08/24/24 0219 Consult to Wound Ostomy Routine Completed Bernadine Vazquez MD     - Reason for Consult:    Wound Care     - Wound Care Reason for Consult:    decubitus ulcer infection   08/24/24 0049 Consult to Wound Ostomy Routine Discontinued Bernadine Vazquez MD     - Reason for Consult:    Wound Care     - Wound Care Reason for Consult:    decubitus ulcer infection   07/23/24 1016 Debridement Pressure Injury Right Heel Routine Completed Sury Blake DPM       Wound 08/23/24 2 Hip Left (Active)   Date First Assessed: 08/23/24   Present on Original Admission: Yes   Wound Number (Wound Clinic Only): 2  Primary Wound Type: Pressure Injury  Location: Hip  Wound Location Orientation: Left      Assessments 7/23/2024  8:50 AM 1/16/2025 11:33 AM   Wound Image       Site Assessment Moist;Tan;Yellow Moist;Pink   Closure Not approximated Not approximated   Drainage Amount Large Small   Drainage Description Serous;Yellow Brown   Treatments Wound ;Vashe Cleansed;Wound    Dressing ABD Pad;Gauze Gauze;Tape   Dressing Changed Changed Changed   Dressing Status Dressing Changed Clean;Dry;Intact   Wound Length (cm) -- 0.1 cm   Wound Width (cm) -- 0.1 cm   Wound Surface Area (cm^2) -- 0.01  cm^2   Wound Depth (cm) -- 0.2 cm   Wound Volume (cm^3) -- 0.002 cm^3   Wound Healing % -- 100   Margins Poorly defined Well-defined edges   Non-staged Wound Description Full thickness Full thickness   Madhuri-wound Assessment Purple;Pink;Painful Dry;Pink;Fragile   Wound Bed Granulation (%) -- 100 %   Wound Bed Epithelium (%) -- 0 %   Wound Bed Slough (%) 100 % 0 %   Wound Bed Eschar (%) -- 0 %   Wound Bed Fibrin (%) -- 0 %   State of Healing Undermining Fully granulated   Wound Odor None None   Undermining? Yes No   Number of Undermines 1 --   Undermine 1 Start Position 12 --   Undermine 1 End Position 12 --   Pressure Injury Stage -- Stage 4       Active Orders   Date Order Priority Status Authorizing Provider   07/25/24 1711 OP Wound Dressing Routine Active George Sampson APRN     - Dressing:    Dry gauze     - Dressing:    ABD pad     - Additional Wound Dressing Information:    traid     - Cleansing:    Cleanse with normal saline or wound cleanser     - Frequency:    Change dressing daily and PRN   07/25/24 1711 OP Wound Dressing Routine Active George Sampson APRN       Inactive Orders   Date Order Priority Status Authorizing Provider   08/26/24 0848 collagenase (Santyl) 250 UNIT/GM ointment Routine Discontinued Ric Evans MD     - Please indicate site of application:    Affected Area(s)   08/26/24 0848 Apply dressing Other (Santyl) Daily Routine Discontinued Ric Evans MD     - Dressing type:    Other (Santyl)   08/24/24 0219 Consult to Wound Ostomy Routine Completed Bernadine Vazquez MD     - Reason for Consult:    Wound Care     - Wound Care Reason for Consult:    decubitus ulcer infection   08/24/24 0049 Consult to Wound Ostomy Routine Discontinued Bernadine Vazquez MD     - Reason for Consult:    Wound Care     - Wound Care Reason for Consult:    decubitus ulcer infection       Wound 08/23/24 3 Sacrum (Active)   Date First Assessed: 08/23/24   Present on Original Admission: Yes   Wound Number (Wound Clinic  Only): 3  Primary Wound Type: Pressure Injury  Location: Sacrum      Assessments 7/23/2024  8:50 AM 1/16/2025 11:33 AM   Wound Image       Site Assessment Moist;Painful;Yellow;Red Moist;Red;Granulation tissue   Closure Not approximated Not approximated   Drainage Amount Large Large   Drainage Description Yellow Serous;Yellow   Treatments Wound ;Topical (Barrier/Moisturizer/Ointment);Special Tape Cleansed;Wound ;Topical (Barrier/Moisturizer/Ointment)   Dressing ABD Pad;Gauze Gauze;ABD Pad;Tape   Dressing Changed Changed Changed   Dressing Status -- Clean;Dry;Intact   Wound Length (cm) 7 cm 5.6 cm   Wound Width (cm) 7 cm 4 cm   Wound Surface Area (cm^2) 49 cm^2 22.4 cm^2   Wound Depth (cm) 2.6 cm 1 cm   Wound Volume (cm^3) 127.4 cm^3 22.4 cm^3   Wound Healing % -- 82   Margins Poorly defined Well-defined edges;Not attached;Epibole (Rolled edges)   Non-staged Wound Description Full thickness Full thickness   Madhuri-wound Assessment Maceration;Painful;Excoriated;Pink Clean;Dry;Intact   Wound Granulation Tissue Pink Red   Wound Bed Granulation (%) 30 % 100 %   Wound Bed Epithelium (%) -- 0 %   Wound Bed Slough (%) 40 % 0 %   Wound Bed Eschar (%) 30 % 0 %   Wound Bed Fibrin (%) -- 0 %   Exposed Structure Bone Necrosis;Bone --   State of Healing -- Fully granulated   Wound Odor Mild None   Undermining? Yes Yes   Number of Undermines 1 1   Undermine 1 Start Position 7 6   Undermine 1 End Position 11 12   Pressure Injury Stage Stage 4 Stage 4       Active Orders   Date Order Priority Status Authorizing Provider   07/25/24 1711 OP Wound Dressing Routine Active George Sampson APRN     - Dressing:    Dry gauze     - Dressing:    ABD pad     - Additional Wound Dressing Information:    dakin soaked     - Cleansing:    Cleanse with Dakins     - Frequency:    Change dressing two times a day   07/25/24 1711 OP Wound Dressing Routine Active George Sampson APRN       Inactive Orders   Date Order Priority Status Authorizing  Provider   10/25/24 0857 Apply dressing Other (Dakins) Q12H Routine Discontinued Yordan Jesus MD     - Dressing type:    Other (Dakins)   10/25/24 0801 sodium hypochlorite (Dakin's) 0.125 % external solution Routine Discontinued Kina Hodgson MD     - Please indicate site of application:    Other     - Please type site of application:    sacrum   10/23/24 1141 Debridement Pressure Injury Sacrum Routine Completed George Sampson, BEAU   08/26/24 0848 collagenase (Santyl) 250 UNIT/GM ointment Routine Discontinued Ric Evans MD     - Please indicate site of application:    Affected Area(s)   08/26/24 0848 Apply dressing Other (Santyl) Daily Routine Discontinued Ric Evans MD     - Dressing type:    Other (Santyl)   08/25/24 1217 collagenase (Santyl) 250 UNIT/GM ointment Routine Discontinued Criselda Conley PA-C     - Please indicate site of application:    Affected Area(s)   08/24/24 0219 Consult to Wound Ostomy Routine Completed Bernadine Vazquez MD     - Reason for Consult:    Wound Care     - Wound Care Reason for Consult:    decubitus ulcer infection   08/24/24 0049 Consult to Wound Ostomy Routine Discontinued Bernadine Vazquez MD     - Reason for Consult:    Wound Care     - Wound Care Reason for Consult:    decubitus ulcer infection   08/23/24 1539 Debridement Pressure Injury Sacrum Routine Completed George Sampson, BEAU   07/23/24 1011 Debridement Pressure Injury Sacrum Routine Completed George Sampson, BEAU     Home Health Orders:     Wound: Left Hip     Wound Cleaning and Dressings:  Showering directions: May not shower  Wound cleansing:  Cleanse with normal saline, wound cleanser or Acetic Acid 0.25% solution  Wound cleaning frequency:  Cleanse with dressing changes  Wound product: Lacie, gauze, tape.  Dressing change frequency:  Change dressing 2x per week      Wound: Sacrum     Wound Cleaning and Dressings:  Showering directions: May not shower  Wound cleansing:  Cleanse with normal saline, wound cleanser  or Acetic Acid 0.25% solution or Vashe  Wound cleaning frequency:  Cleanse with dressing changes  Wound product: zinc oxide to the periwound, pack with moist dakins soaked gauze, covered by bordered foam or ABD pad and tape as covering.  Dressing change frequency: Change dressing daily and PRN if dressing becomes displaced or soiled by urine or feces.      We will apply for NPWT for the Sacrum today. Please apply NPWT at 125mmHg continuous suction and change the dressing 3x/week when approved. May add collagen to the wound bed to enhance tissue building. May need to use Hydrocholiod distal section of the wound to seal NPWT near rectum.      Wound: Right Heel - Healed. May cover with bordered foam is patient desires    Wound: Right Ankle  - Healed. May cover with bordered foam is patient desires     Miscellaneous/Additional Orders:  Offloading: Air mattress and re-positioning often (every two hours)    Follow Up:  Return in about 6 weeks (around 2/27/2025) for APN x 30 minutes.     YUN LIVINGSTON DNP, CWS, NPI [id number]

## (undated) NOTE — LETTER
Date: 4/16/2025  Patient name: Yoseph Cordero  YOB: 1950  Medical Record Number: [id number]  Primary Coverage: Payor: [facility] / Plan: [facility] HMO / Product Type: Medicare /   Secondary Coverage:   Insurance ID: [id number]  Patient Address: [address] [place] [address]  Telephone Information:   Home Phone [phone]   Mobile [phone]       Encounter Date: 4/16/2025  Provider: BEAU Walton  Diagnosis:     ICD-10-CM   1. Pressure injury of sacral region, stage 4 (HCC)  L89.154   2. Open wound of left great toe, subsequent encounter  S91.102D   3. Open wound of lesser toe of left foot  S91.105A         Wound 08/23/24 3 Sacrum (Active)   Date First Assessed: 08/23/24   Present on Original Admission: Yes   Wound Number (Wound Clinic Only): 3  Primary Wound Type: Pressure Injury  Location: Sacrum      Assessments 7/23/2024  8:50 AM 4/16/2025  2:35 PM   Wound Image       Site Assessment Moist;Painful;Yellow;Red Moist;Red;Granulation tissue   Closure Not approximated Not approximated   Drainage Amount Large Moderate   Drainage Description Yellow Serosanguineous   Treatments Wound ;Topical (Barrier/Moisturizer/Ointment);Special Tape Cleansed;Wound    Dressing ABD Pad;Gauze Wound vac sponge   Dressing Changed Changed Changed   Dressing Status -- Clean;Dry;Intact   Wound Length (cm) 7 cm 3 cm   Wound Width (cm) 7 cm 1.7 cm   Wound Surface Area (cm^2) 49 cm^2 4.01 cm^2   Wound Depth (cm) 2.6 cm 1 cm   Wound Volume (cm^3) 127.4 cm^3 2.67 cm^3   Wound Healing % -- 98   Margins Poorly defined Well-defined edges;Not attached;Epibole (Rolled edges)   Non-staged Wound Description Full thickness Full thickness   Madhuri-wound Assessment Maceration;Painful;Excoriated;Pink Clean;Dry;Intact   Wound Granulation Tissue Pink Red   Wound Bed Granulation (%) 30 % 100 %   Wound Bed Epithelium (%) -- 0 %   Wound Bed Slough (%) 40 % 0 %   Wound Bed Eschar (%) 30 % 0 %   Wound Bed Fibrin (%) --  0 %   Exposed Structure Bone Necrosis;Bone --   State of Healing -- Fully granulated   Wound Odor Mild None   Undermining? Yes Yes   Number of Undermines 1 1   Undermine 1 Start Position 7 6   Undermine 1 End Position 11 12   Pressure Injury Stage Stage 4 Stage 4       Active Orders   Date Order Priority Status Authorizing Provider   04/16/25 1508 OP Wound Dressing Routine Active George Sampson APRN     - Cleansing:    Cleanse with normal saline or wound cleanser     - Dressing:    Collagen     - Additional Wound Dressing Information:    rajni seal to aid with seal,black vac foam     - Frequency:    Change dressing three times per week   02/26/25 1329 OP Wound Dressing Routine Active George Sampson APRN     - Cleansing:    Cleanse with normal saline or wound cleanser     - Cleansing:    Cleanse with Vashe     - Additional Wound Dressing Information:    NPWT at 125 mmhg     - Frequency:    Change dressing three times per week       Inactive Orders   Date Order Priority Status Authorizing Provider   01/16/25 1309 OP Wound Dressing Routine Discontinued George Sampson APRN     - Cleansing:    Cleanse with normal saline or wound cleanser     - Dressing:    Dry gauze     - Dressing:    ABD pad     - Additional Wound Dressing Information:    Dakin's 0.25% solution  moist to dry dressing. Zinc oxide to periwound     - Frequency:    Change dressing daily and PRN   10/25/24 0857 Apply dressing Other (Dakins) Q12H Routine Discontinued Yordan Jesus MD     - Dressing type:    Other (Dakins)   10/25/24 0801 sodium hypochlorite (Dakin's) 0.125 % external solution Routine Discontinued Kina Hodgson MD     - Please indicate site of application:    Other     - Please type site of application:    sacrum   10/23/24 1141 Debridement Pressure Injury Sacrum Routine Completed George Sampson APRN   08/26/24 0848 collagenase (Santyl) 250 UNIT/GM ointment Routine Discontinued Ric Evans MD     - Please indicate site of application:     Affected Area(s)   08/26/24 0848 Apply dressing Other (Santyl) Daily Routine Discontinued Ric Evans MD     - Dressing type:    Other (Santyl)   08/25/24 1217 collagenase (Santyl) 250 UNIT/GM ointment Routine Discontinued Criselda Conley PA-C     - Please indicate site of application:    Affected Area(s)   08/24/24 0219 Consult to Wound Ostomy Routine Completed Bernadine Vazquez MD     - Reason for Consult:    Wound Care     - Wound Care Reason for Consult:    decubitus ulcer infection   08/24/24 0049 Consult to Wound Ostomy Routine Discontinued Bernadine Vazquez MD     - Reason for Consult:    Wound Care     - Wound Care Reason for Consult:    decubitus ulcer infection   08/23/24 1539 Debridement Pressure Injury Sacrum Routine Completed George Sampson APRN   07/25/24 1711 OP Wound Dressing Routine Discontinued George Sampson APRN     - Dressing:    Dry gauze     - Dressing:    ABD pad     - Additional Wound Dressing Information:    dakin soaked     - Cleansing:    Cleanse with Dakins     - Frequency:    Change dressing two times a day   07/25/24 1711 OP Wound Dressing Routine Discontinued George Sampson, BEAU   07/23/24 1011 Debridement Pressure Injury Sacrum Routine Completed George Sampson, BEAU       Wound 02/26/25 4 Toe (Comment which one) Left (Active)   Date First Assessed/Time First Assessed: 02/26/25 1132   Present on Original Admission: No   Wound Number (Wound Clinic Only): 4  Location: (c) Toe (Comment which one)  Wound Location Orientation: Left      Assessments 2/26/2025 11:34 AM 4/16/2025  2:35 PM   Wound Image       Site Assessment Moist;Red Intact;Dry;Epithelialization   Closure Not approximated Approximated   Drainage Amount None None   Treatments Cleansed;Saline;Vashe Cleansed;Saline;Vashe;Betadine   Dressing Hydrofiber with silver;2x2s;Gauze;Tape Open to air   Dressing Changed New Other (Comment)   Dressing Status Clean;Dry;Intact Removed   Wound Length (cm) 1.5 cm 0 cm   Wound Width (cm) 0.4 cm 0 cm    Wound Surface Area (cm^2) 0.6 cm^2 0 cm^2   Wound Depth (cm) 0.1 cm 0 cm   Wound Volume (cm^3) 0.06 cm^3 0 cm^3   Wound Healing % -- 100   Margins Attached edges Flat and Intact   Non-staged Wound Description Full thickness --   Madhuri-wound Assessment Edema;Fragile;Pink Fragile;Dry   Wound Granulation Tissue Red --   Wound Bed Granulation (%) 100 % 0 %   Wound Bed Epithelium (%) 0 % 100 %   Wound Bed Slough (%) 0 % 0 %   Wound Bed Eschar (%) 0 % 0 %   Wound Bed Fibrin (%) 0 % 0 %   State of Healing Non-healing Epithelialized   Wound Odor -- None       Active Orders   Date Order Priority Status Authorizing Provider   04/16/25 1508 OP Wound Dressing Routine Active George Sampson APRN     - Cleansing:    Cleanse with normal saline or wound cleanser     - Dressing:    Betadine     - Frequency:    Change dressing daily and PRN   02/26/25 1329 OP Wound Dressing Routine Active George Sampson APRN     - Cleansing:    Cleanse with normal saline or wound cleanser     - Dressing:    Dry gauze     - Additional Wound Dressing Information:    Hydrofiber with silver, tape     - Frequency:    Change dressing three times per week       Wound 02/26/25 5 Toe (Comment which one) Dorsal;Left (Active)   Date First Assessed/Time First Assessed: 02/26/25 1133    Wound Number (Wound Clinic Only): 5  Location: (c) Toe (Comment which one)  Wound Location Orientation: Dorsal;Left      Assessments 2/26/2025 11:34 AM 4/16/2025  2:35 PM   Wound Image       Site Assessment Moist;Red Dry;Intact;Epithelialization   Closure Not approximated Approximated   Drainage Amount Scant None   Drainage Description Serosanguineous --   Treatments Cleansed;Saline;Vashe Cleansed;Saline;Vashe;Betadine   Dressing Hydrofiber with silver;2x2s;Gauze;Tape Open to air   Dressing Changed New Other (Comment)   Dressing Status Clean;Dry;Intact Removed   Wound Length (cm) 1 cm 0 cm   Wound Width (cm) 0.4 cm 0 cm   Wound Surface Area (cm^2) 0.4 cm^2 0 cm^2   Wound Depth  (cm) 0.1 cm 0 cm   Wound Volume (cm^3) 0.04 cm^3 0 cm^3   Wound Healing % -- 100   Margins Attached edges Flat and Intact   Non-staged Wound Description Full thickness --   Madhuri-wound Assessment Fragile;Edema;Blanchable erythema Fragile   Wound Granulation Tissue Red --   Wound Bed Granulation (%) 100 % 0 %   Wound Bed Epithelium (%) 0 % 100 %   Wound Bed Slough (%) 0 % 0 %   Wound Bed Eschar (%) 0 % 0 %   Wound Bed Fibrin (%) 0 % 0 %   State of Healing Non-healing Epithelialized   Wound Odor None None       Active Orders   Date Order Priority Status Authorizing Provider   04/16/25 1508 OP Wound Dressing Routine Active George Sampson APRN     - Cleansing:    Cleanse with normal saline or wound cleanser     - Dressing:    Betadine     - Frequency:    Change dressing daily and PRN   02/26/25 1329 OP Wound Dressing Routine Active George Sampson APRN     - Cleansing:    Cleanse with normal saline or wound cleanser     - Dressing:    Dry gauze     - Additional Wound Dressing Information:    Hydrofiber with silver, tape     - Frequency:    Change dressing three times per week       Negative Pressure Wound Therapy Sacrum (Active)   Placement Date: 02/26/25   Wound Type: Pressure ulcer: stage IV  Location: Sacrum      Assessments 2/26/2025 11:34 AM 4/16/2025  2:35 PM   Wound photographed/measured Yes Yes   Machine Status (On) Yes Yes   Site Assessment Moist;Red;Granulation tissue Moist;Red;Granulation tissue   Madhuri-wound Assessment Clean;Dry;Intact Clean;Dry;Intact   Unit Type Medela Invia Medela Invia   Dressing Type Black foam Black foam   Number of Foam Pieces Used 1 1   Cycle Continuous;On Continuous;On   Target Pressure (mmHg) 125 125   Drainage Description Serosanguineous Serosanguineous   Dressing Status Clean;Dry;Intact Clean;Dry;Intact   Canister Changed -- Yes       No associated orders.     Wound Number: 4, 5 Left great toe and Left 3rd toe     Wound Cleaning and Dressings:  Showering directions: May shower with  protection  Wound cleansing:  Cleanse with normal saline or wound cleanser  Wound cleaning frequency: with dressing change  Wound product: Betadine with tape or bandaid  Dressing change frequency:  Change dressing daily     Wound Number: 3 Sacrum     Wound Cleaning and Dressings:  Showering directions: May shower with protection  Wound cleansing:  Cleanse with normal saline or wound cleanser  Wound cleaning frequency: with dressing change  Wound product: Collagen, black vac sponge, rajni seal to crease by gluteal fold to aid with seal  Dressing change frequency:  Change dressing 3x per week     Negative Pressure Wound Therapy:  NPWT:Medela Invia vac therapy, black form at 125 mmHg continuous. RN to change dressing 3x/week unless indicated below     Follow Up:  Return in about 4 weeks (around 5/14/2025) for NABILA Vazquez x 30 minutes.     YUN LIVINGSTON DNP, CWS, NPI [id number]

## (undated) NOTE — LETTER
Date: 9/27/2024  Patient name: Yoseph Cordero  YOB: 1950  Medical Record Number: [id number]  Primary Coverage: Payor: [facility] / Plan: [facility] HMO / Product Type: Medicare /   Secondary Coverage:   Insurance ID: [id number]  Patient Address: [address] [place] [address]  Telephone Information:   Home Phone [phone]   Mobile [phone]       Encounter Date: 9/27/2024  Provider: BEAU Walton  Diagnosis:     ICD-10-CM   1. Pressure injury of sacral region, stage 4 (HCC)  L89.154   2. 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 Pressure injury of right heel, stage 4 (HCC)  L89.614   4. Pressure ulcer of right ankle, stage 3 (HCC)  L89.513         Wound 08/23/24 1 Heel Right (Active)   Date First Assessed: 08/23/24   Present on Original Admission: Yes   Wound Number (Wound Clinic Only): 1  Primary Wound Type: Pressure Injury  Location: Heel  Wound Location Orientation: Right      Assessments 7/23/2024  8:50 AM 9/27/2024 11:33 AM   Wound Image        Site Assessment -- Moist;[place]   Drainage Amount -- Small   Drainage Description -- Serosanguineous;Sanguineous   Treatments -- Cleansed;Wound    Dressing -- Acticoat;Aquacel Foam   Dressing Changed -- Changed   Dressing Status -- Clean;Dry;Intact   Wound Length (cm) 6.5 cm 0.1 cm   Wound Width (cm) 4.4 cm 0.1 cm   Wound Surface Area (cm^2) 28.6 cm^2 0.01 cm^2   Wound Depth (cm) 1 cm 0.1 cm   Wound Volume (cm^3) 28.6 cm^3 0.001 cm^3   Wound Healing % -- 100   Margins -- Attached edges   Non-staged Wound Description -- Full thickness   Madhuri-wound Assessment -- Hyperpigmented;Dry;Fragile   Wound Granulation Tissue -- Red   Wound Bed Granulation (%) -- 90 %   Wound Bed Epithelium (%) -- 0 %   Wound Bed Slough (%) -- 10 %   Wound Bed Eschar (%) -- 0 %   Wound Bed Fibrin (%) -- 0 %   Exposed Structure Bone --   State of Healing -- Non-healing   Wound Odor -- None       Active Orders   Date Order Priority Status Authorizing  Provider   07/25/24 1711 OP Wound Dressing Routine Active George Sampson APRN     - Dressing:    Collagen     - Dressing:    Hydrofera transfer     - Dressing:    ABD pad     - Dressing:    Kerlix     - Cleansing:    Cleanse with normal saline or wound cleanser     - Frequency:    Change dressing three times a day       Inactive Orders   Date Order Priority Status Authorizing Provider   08/30/24 0759 Apply dressing Other ( Xeroform) Daily Routine Discontinued Grisel Zambrano MD     - Dressing type:    Other ( Xeroform)   08/26/24 0848 Apply dressing Other ( Xeroform) Daily Routine Discontinued Ric Evans MD     - Dressing type:    Other ( Xeroform)   08/24/24 0219 Consult to Wound Ostomy Routine Completed Bernadine Vazquez MD     - Reason for Consult:    Wound Care     - Wound Care Reason for Consult:    decubitus ulcer infection   08/24/24 0049 Consult to Wound Ostomy Routine Discontinued Bernadine Vazquez MD     - Reason for Consult:    Wound Care     - Wound Care Reason for Consult:    decubitus ulcer infection   07/23/24 1016 Debridement Pressure Injury Right Heel Routine Completed Sury Blake DPM       Wound 08/23/24 2 Hip Left (Active)   Date First Assessed: 08/23/24   Present on Original Admission: Yes   Wound Number (Wound Clinic Only): 2  Primary Wound Type: Pressure Injury  Location: Hip  Wound Location Orientation: Left      Assessments 7/23/2024  8:50 AM 9/27/2024 11:33 AM   Wound Image       Site Assessment Moist;Tan;Yellow Moist;Red;Yellow   Closure Not approximated Not approximated   Drainage Amount Large Moderate   Drainage Description Serous;Yellow Serosanguineous   Treatments Wound ;Vashe Cleansed;Wound    Wound Vac Brand -- Medela   Dressing ABD Pad;Gauze Wound vac sponge   Dressing Changed Changed Changed   Dressing Status Dressing Changed Clean;Dry;Intact   Wound Length (cm) -- 2.9 cm   Wound Width (cm) -- 2.6 cm   Wound Surface Area (cm^2) -- 7.54 cm^2   Wound  Depth (cm) -- 1.2 cm   Wound Volume (cm^3) -- 9.048 cm^3   Wound Healing % -- 88   Margins Poorly defined Well-defined edges;Not attached   Non-staged Wound Description Full thickness Full thickness   Madhuri-wound Assessment Purple;Pink;Painful Dry;Pink   Wound Granulation Tissue -- Red   Wound Bed Granulation (%) -- 75 %   Wound Bed Epithelium (%) -- 0 %   Wound Bed Slough (%) 100 % 25 %   Wound Bed Eschar (%) -- 0 %   Wound Bed Fibrin (%) -- 0 %   Exposed Structure -- Tendon;Bone   State of Healing Undermining Early/partial granulation   Wound Odor None Mild   Undermining? Yes Yes   Number of Undermines 1 1   Undermine 1 Start Position 12 7   Undermine 1 End Position 12 11   Pressure Injury Stage -- Stage 4       Active Orders   Date Order Priority Status Authorizing Provider   07/25/24 1711 OP Wound Dressing Routine Active George Sampson APRN     - Dressing:    Dry gauze     - Dressing:    ABD pad     - Additional Wound Dressing Information:    traid     - Cleansing:    Cleanse with normal saline or wound cleanser     - Frequency:    Change dressing daily and PRN   07/25/24 1711 OP Wound Dressing Routine Active George Sampson APRN       Inactive Orders   Date Order Priority Status Authorizing Provider   08/26/24 0848 collagenase (Santyl) 250 UNIT/GM ointment Routine Discontinued Ric Evans MD     - Please indicate site of application:    Affected Area(s)   08/26/24 0848 Apply dressing Other (Santyl) Daily Routine Discontinued Ric Evans MD     - Dressing type:    Other (Santyl)   08/24/24 0219 Consult to Wound Ostomy Routine Completed Bernadine Vazquez MD     - Reason for Consult:    Wound Care     - Wound Care Reason for Consult:    decubitus ulcer infection   08/24/24 0049 Consult to Wound Ostomy Routine Discontinued Bernadine Vazquez MD     - Reason for Consult:    Wound Care     - Wound Care Reason for Consult:    decubitus ulcer infection       Wound 08/23/24 3 Sacrum (Active)   Date First Assessed: 08/23/24    Present on Original Admission: Yes   Wound Number (Wound Clinic Only): 3  Primary Wound Type: Pressure Injury  Location: Sacrum      Assessments 7/23/2024  8:50 AM 9/27/2024 11:33 AM   Wound Image       Site Assessment Moist;Painful;Yellow;Red Moist;Red;Granulation tissue   Closure Not approximated Not approximated   Drainage Amount Large Large   Drainage Description Yellow Serosanguineous   Treatments Wound ;Topical (Barrier/Moisturizer/Ointment);Special Tape Cleansed;Wound ;Topical (Barrier/Moisturizer/Ointment)   Dressing ABD Pad;Gauze Hydrofiber with silver;Aquacel Foam   Dressing Changed Changed Changed   Dressing Status -- Dry;Intact;Clean   Wound Length (cm) 7 cm 6.5 cm   Wound Width (cm) 7 cm 5.5 cm   Wound Surface Area (cm^2) 49 cm^2 35.75 cm^2   Wound Depth (cm) 2.6 cm 1.4 cm   Wound Volume (cm^3) 127.4 cm^3 50.05 cm^3   Wound Healing % -- 61   Margins Poorly defined Well-defined edges;Not attached   Non-staged Wound Description Full thickness Full thickness   Madhuri-wound Assessment Maceration;Painful;Excoriated;Pink Fragile;Pink   Wound Granulation Tissue Pink Red   Wound Bed Granulation (%) 30 % 100 %   Wound Bed Epithelium (%) -- 0 %   Wound Bed Slough (%) 40 % 0 %   Wound Bed Eschar (%) 30 % 0 %   Wound Bed Fibrin (%) -- 0 %   Exposed Structure Bone Necrosis;Bone --   State of Healing -- Fully granulated   Wound Odor Mild None   Undermining? Yes Yes   Number of Undermines 1 1   Undermine 1 Start Position 7 7   Undermine 1 End Position 11 3   Pressure Injury Stage Stage 4 Stage 4       Active Orders   Date Order Priority Status Authorizing Provider   07/25/24 1711 OP Wound Dressing Routine Active George Sampson APRN     - Dressing:    Dry gauze     - Dressing:    ABD pad     - Additional Wound Dressing Information:    dakin soaked     - Cleansing:    Cleanse with Dakins     - Frequency:    Change dressing two times a day   07/25/24 1711 OP Wound Dressing Routine Active George Sampson APRN        Inactive Orders   Date Order Priority Status Authorizing Provider   08/26/24 0848 collagenase (Santyl) 250 UNIT/GM ointment Routine Discontinued Ric Evans MD     - Please indicate site of application:    Affected Area(s)   08/26/24 0848 Apply dressing Other (Santyl) Daily Routine Discontinued Ric Evans MD     - Dressing type:    Other (Santyl)   08/25/24 1217 collagenase (Santyl) 250 UNIT/GM ointment Routine Discontinued Criselda Conley PA-C     - Please indicate site of application:    Affected Area(s)   08/24/24 0219 Consult to Wound Ostomy Routine Completed Bernadine Vazquez MD     - Reason for Consult:    Wound Care     - Wound Care Reason for Consult:    decubitus ulcer infection   08/24/24 0049 Consult to Wound Ostomy Routine Discontinued Bernadine Vazquez MD     - Reason for Consult:    Wound Care     - Wound Care Reason for Consult:    decubitus ulcer infection   08/23/24 1539 Debridement Pressure Injury Sacrum Routine Completed George Sampson APRN   07/23/24 1011 Debridement Pressure Injury Sacrum Routine Completed George Sampson APRN       Wound 08/23/24 4 Ankle Right;Medial (Active)   Date First Assessed: 08/23/24   Present on Original Admission: Yes   Wound Number (Wound Clinic Only): 4  Primary Wound Type: Pressure Injury  Location: Ankle  Wound Location Orientation: Right;Medial      Assessments 7/23/2024  8:50 AM 9/27/2024 11:33 AM   Wound Image       Site Assessment Moist;Pink Moist;Red;Granulation tissue   Closure Not approximated Not approximated   Drainage Amount Scant Moderate   Drainage Description Serosanguineous Serosanguineous   Treatments Wound ;Honey Gel Cleansed;Wound ;Topical (Barrier/Moisturizer/Ointment)   Dressing Kerlix roll Hydrofiber with silver;Aquacel Foam   Dressing Changed Changed Changed   Dressing Status Dressing Changed Clean;Dry;Intact   Wound Length (cm) 0.6 cm 1.5 cm   Wound Width (cm) 0.8 cm 1.4 cm   Wound Surface Area (cm^2) 0.48 cm^2 2.1 cm^2   Wound  Depth (cm) 0 cm 0 cm   Wound Volume (cm^3) 0 cm^3 0 cm^3   Margins Poorly defined Attached edges   Non-staged Wound Description Full thickness Full thickness   Madhuri-wound Assessment -- Hyperpigmented;Fragile   Wound Granulation Tissue -- Red   Wound Bed Granulation (%) 20 % 100 %   Wound Bed Epithelium (%) -- 0 %   Wound Bed Slough (%) -- 0 %   Wound Bed Eschar (%) 80 % 0 %   Wound Bed Fibrin (%) -- 0 %   State of Healing Non-healing Fully granulated   Wound Odor None None   Pressure Injury Stage Unstageable Stage 3       Inactive Orders   Date Order Priority Status Authorizing Provider   08/26/24 0848 collagenase (Santyl) 250 UNIT/GM ointment Routine Discontinued Ric Evans MD     - Please indicate site of application:    Affected Area(s)   08/26/24 0848 Apply dressing Other (Santyl) Daily Routine Discontinued Ric Evans MD     - Dressing type:    Other (Santyl)   08/24/24 0219 Consult to Wound Ostomy Routine Completed Bernadine Vazquez MD     - Reason for Consult:    Wound Care     - Wound Care Reason for Consult:    decubitus ulcer infection   08/24/24 0049 Consult to Wound Ostomy Routine Discontinued Bernadine Vazquez MD     - Reason for Consult:    Wound Care     - Wound Care Reason for Consult:    decubitus ulcer infection   07/25/24 1711 OP Wound Dressing Routine Completed George Sampson APRN     - Dressing:    Kerlix     - Dressing:    Dry gauze     - Dressing:    Honey gel     - Cleansing:    Cleanse with normal saline or wound cleanser     - Frequency:    Change dressing daily and PRN     Home Health Orders:      Wounds: Left ankle and Left Heel: Healed. No dressings needed. Patient to wear offloading boots for protection.     Wound: Left Hip     Wound Cleaning and Dressings:  Showering directions: May not shower  Wound cleansing:  Cleanse with normal saline, wound cleanser or Acetic Acid 0.25% solution  Wound cleaning frequency:  Cleanse with dressing changes  Wound product: Collagen, Black foam, vac  drape  Dressing change frequency:  Change dressing 3x per week    Negative Pressure Wound Therapy:  Medela NPWT at 125mmHg continuous suction.     Wound: Sacrum     Wound Cleaning and Dressings:  Showering directions: May not shower  Wound cleansing:  Cleanse with normal saline, wound cleanser or Acetic Acid 0.25% solution or Vashe  Wound cleaning frequency:  Cleanse with dressing changes  Wound product: Duoderm sheets to the periwound, Fibracol collagen, silver alginate in the wound with bordered foam or ABD pad and tape as covering.  Dressing change frequency: Change dressing daily and PRN if dressing becomes displaced or soiled by urine or feces   \"may continue Santyl daily dressing as patient 's daughter prefers, my recommendation is to support the granulation tissue with collagen and silver alginate and secure with Tegaderm dressing, change twice a week and as needed.\"        Wound: Right Heel.      Wound Cleaning and Dressings:  Showering directions: May not shower  Wound cleansing:  Cleanse soap and water.   Wound cleaning frequency:  Cleanse with dressing changes  Wound product: Acticoat strip into tunneling at 12 o'clock, Bordered foam  Dressing change frequency: 3x/week     Wound: Right Ankle     Wound Cleaning and Dressings:  Showering directions: May not shower  Wound cleansing:  Cleanse with normal saline, wound cleanser or Acetic Acid 0.25% solution  Wound cleaning frequency:  Cleanse with dressing changes  Wound product: Honey gel or santyl if available, aquacel foam.  Dressing change frequency: Change dressing 3x/week     Miscellaneous/Additional Orders:  Offloading: Air mattress and re-positioning often (every two hours)      Follow Up:  Return in about 2 weeks (around 10/11/2024) for APN visit 30 min.       YUN LIVINGSTON DNP, CWS, NPI [id number]

## (undated) NOTE — LETTER
10/04/21        Yoseph Lu      Dear Allie Samrt records indicate that you have outstanding lab work and or testing that was ordered for you and has not yet been completed:  Orders Placed This Encounter

## (undated) NOTE — LETTER
Date: 5/21/2025  Patient name: Yoseph Cordero  YOB: 1950  Medical Record Number: [id number]  Primary Coverage: Payor: [facility] / Plan: [facility] HMO / Product Type: Medicare /   Secondary Coverage:   Insurance ID: [id number]  Patient Address: [address] [place] [address]  Telephone Information:   Home Phone [phone]   Mobile [phone]       Encounter Date: 5/21/2025  Provider: BEAU Walton  Diagnosis:     ICD-10-CM   1. Pressure injury of sacral region, stage 4 (HCC)  L89.154   2. Open wound of left great toe, subsequent encounter  S91.102D         Negative Pressure Wound Therapy Sacrum (Active)   Placement Date: 02/26/25   Wound Type: Pressure ulcer: stage IV  Location: Sacrum      Assessments 2/26/2025 11:34 AM 5/21/2025 11:44 AM   Wound photographed/measured Yes Yes   Machine Status (On) Yes Yes   Site Assessment Moist;Red;Granulation tissue Moist;Red;Granulation tissue   Madhuri-wound Assessment Clean;Dry;Intact Clean;Dry;Intact   Unit Type Medela Invia Medela Invia   Dressing Type Black foam Black foam;Collagen;Lalita seal   Number of Foam Pieces Used 1 1   Cycle Continuous;On Continuous;On   Target Pressure (mmHg) 125 125   Drainage Description Serosanguineous Serosanguineous   Dressing Status Clean;Dry;Intact Clean;Dry;Intact   Canister Changed -- No       Inactive Orders   Date Order Priority Status Authorizing Provider   05/21/25 1223 OP Wound Dressing Routine Completed George Sampson APRN     - Dressing:    Collagen     - Cleansing:    Cleanse with normal saline or wound cleanser     - Cleansing:    Cleanse with Vashe     - Frequency:    Change dressing three times per week   Wound Number: 3 Sacrum     Wound Cleaning and Dressings:  Showering directions: May shower with protection  Wound cleansing:  Cleanse with normal saline or wound cleanser  Wound cleaning frequency: with dressing change  Wound product: Collagen, black vac sponge, lalita seal to crease by gluteal fold  to aid with seal  Dressing change frequency:  Change dressing 3x per week     Negative Pressure Wound Therapy:  NPWT:Medela Invia vac therapy, black form at 125 mmHg continuous. RN to change dressing 3x/week unless indicated below     Follow Up:  Return patient will transfer care to another company after Jun 17th., for Discharge from Wound Care clinic..     YUN LIVINGSTON DNP, CWS, NPI [id number]

## (undated) NOTE — LETTER
Date: 2/26/2025  Patient name: Yoseph Cordero  YOB: 1950  Medical Record Number: [id number]  Primary Coverage: Payor: [facility] / Plan: [facility] HMO / Product Type: Medicare /   Secondary Coverage:   Insurance ID: [id number]  Patient Address: [address] [place] [address]  Telephone Information:   Home Phone [phone]   Mobile [phone]       Encounter Date: 2/26/2025  Provider: BEAU Walton  Diagnosis:     ICD-10-CM   1. Pressure injury of sacral region, stage 4 (HCC)  L89.154   2. Open wound of left great toe, initial encounter  S91.102A   3. Open wound of lesser toe of left foot  S91.105A         Wound 08/23/24 2 Hip Left (Active)   Date First Assessed: 08/23/24   Present on Original Admission: Yes   Wound Number (Wound Clinic Only): 2  Primary Wound Type: Pressure Injury  Location: Hip  Wound Location Orientation: Left      Assessments 7/23/2024  8:50 AM 2/26/2025 11:34 AM   Wound Image       Site Assessment Moist;Tan;Yellow Dry;Clean;Intact;Epithelialization   Closure Not approximated Approximated   Drainage Amount Large None   Drainage Description Serous;Yellow --   Treatments Wound ;Vashe --   Dressing ABD Pad;Gauze Open to air   Dressing Changed Changed Other (Comment)   Dressing Status Dressing Changed --   Wound Length (cm) -- 0 cm   Wound Width (cm) -- 0 cm   Wound Surface Area (cm^2) -- 0 cm^2   Wound Depth (cm) -- 0 cm   Wound Volume (cm^3) -- 0 cm^3   Wound Healing % -- 100   Margins Poorly defined Flat and Intact   Non-staged Wound Description Full thickness Not applicable   Madhuri-wound Assessment Purple;Pink;Painful Clean;Dry;Intact   Wound Bed Granulation (%) -- 0 %   Wound Bed Epithelium (%) -- 100 %   Wound Bed Slough (%) 100 % 0 %   Wound Bed Eschar (%) -- 0 %   Wound Bed Fibrin (%) -- 0 %   State of Healing Undermining Closed wound edges;Epithelialized   Wound Odor None None   Undermining? Yes No   Number of Undermines 1 --   Undermine 1 Start Position  12 --   Undermine 1 End Position 12 --       Inactive Orders   Date Order Priority Status Authorizing Provider   01/16/25 1309 OP Wound Dressing Routine Discontinued George Sampson APRN     - Cleansing:    Cleanse with normal saline or wound cleanser     - Dressing:    Prism collagen     - Dressing:    Bordered foam     - Frequency:    Change dressing two times per week   08/26/24 0848 collagenase (Santyl) 250 UNIT/GM ointment Routine Discontinued Ric Evans MD     - Please indicate site of application:    Affected Area(s)   08/26/24 0848 Apply dressing Other (Santyl) Daily Routine Discontinued Ric Evans MD     - Dressing type:    Other (Santyl)   08/24/24 0219 Consult to Wound Ostomy Routine Completed Bernadine Vazquez MD     - Reason for Consult:    Wound Care     - Wound Care Reason for Consult:    decubitus ulcer infection   08/24/24 0049 Consult to Wound Ostomy Routine Discontinued Bernadine Vazquez MD     - Reason for Consult:    Wound Care     - Wound Care Reason for Consult:    decubitus ulcer infection   07/25/24 1711 OP Wound Dressing Routine Discontinued George Sampson APRN     - Dressing:    Dry gauze     - Dressing:    ABD pad     - Additional Wound Dressing Information:    traid     - Cleansing:    Cleanse with normal saline or wound cleanser     - Frequency:    Change dressing daily and PRN   07/25/24 1711 OP Wound Dressing Routine Discontinued George Sampson APRN       Wound 08/23/24 3 Sacrum (Active)   Date First Assessed: 08/23/24   Present on Original Admission: Yes   Wound Number (Wound Clinic Only): 3  Primary Wound Type: Pressure Injury  Location: Sacrum      Assessments 7/23/2024  8:50 AM 2/26/2025 11:34 AM   Wound Image       Site Assessment Moist;Painful;Yellow;Red Moist;Red;Granulation tissue   Closure Not approximated Not approximated   Drainage Amount Large Moderate   Drainage Description Yellow Serosanguineous   Treatments Wound ;Topical (Barrier/Moisturizer/Ointment);Special Tape  Cleansed;Wound    Dressing ABD Pad;Gauze Wound vac sponge   Dressing Changed Changed Changed   Dressing Status -- Clean;Dry;Intact   Wound Length (cm) 7 cm 4.5 cm   Wound Width (cm) 7 cm 2.8 cm   Wound Surface Area (cm^2) 49 cm^2 12.6 cm^2   Wound Depth (cm) 2.6 cm 1 cm   Wound Volume (cm^3) 127.4 cm^3 12.6 cm^3   Wound Healing % -- 90   Margins Poorly defined Well-defined edges;Not attached;Epibole (Rolled edges)   Non-staged Wound Description Full thickness Full thickness   Madhuri-wound Assessment Maceration;Painful;Excoriated;Pink Clean;Dry;Intact   Wound Granulation Tissue Pink Red   Wound Bed Granulation (%) 30 % 100 %   Wound Bed Epithelium (%) -- 0 %   Wound Bed Slough (%) 40 % 0 %   Wound Bed Eschar (%) 30 % 0 %   Wound Bed Fibrin (%) -- 0 %   Exposed Structure Bone Necrosis;Bone --   State of Healing -- Fully granulated   Wound Odor Mild None   Undermining? Yes Yes   Number of Undermines 1 1   Undermine 1 Start Position 7 6   Undermine 1 End Position 11 12   Pressure Injury Stage Stage 4 Stage 4       Active Orders   Date Order Priority Status Authorizing Provider   02/26/25 1329 OP Wound Dressing Routine Active George Sampson APRN     - Cleansing:    Cleanse with normal saline or wound cleanser     - Cleansing:    Cleanse with Vashe     - Additional Wound Dressing Information:    NPWT at 125 mmhg     - Frequency:    Change dressing three times per week       Inactive Orders   Date Order Priority Status Authorizing Provider   01/16/25 1309 OP Wound Dressing Routine Discontinued George Sampson APRN     - Cleansing:    Cleanse with normal saline or wound cleanser     - Dressing:    Dry gauze     - Dressing:    ABD pad     - Additional Wound Dressing Information:    Dakin's 0.25% solution  moist to dry dressing. Zinc oxide to periwound     - Frequency:    Change dressing daily and PRN   10/25/24 0857 Apply dressing Other (Dakins) Q12H Routine Discontinued Yordan Jesus MD     - Dressing type:    Other  (Dakins)   10/25/24 0801 sodium hypochlorite (Dakin's) 0.125 % external solution Routine Discontinued Kina Hodgson MD     - Please indicate site of application:    Other     - Please type site of application:    sacrum   10/23/24 1141 Debridement Pressure Injury Sacrum Routine Completed George Sampson APRN   08/26/24 0848 collagenase (Santyl) 250 UNIT/GM ointment Routine Discontinued Ric Evans MD     - Please indicate site of application:    Affected Area(s)   08/26/24 0848 Apply dressing Other (Santyl) Daily Routine Discontinued Ric Evans MD     - Dressing type:    Other (Santyl)   08/25/24 1217 collagenase (Santyl) 250 UNIT/GM ointment Routine Discontinued Criselda Conley PA-C     - Please indicate site of application:    Affected Area(s)   08/24/24 0219 Consult to Wound Ostomy Routine Completed Bernadine Vazquez MD     - Reason for Consult:    Wound Care     - Wound Care Reason for Consult:    decubitus ulcer infection   08/24/24 0049 Consult to Wound Ostomy Routine Discontinued Bernadine Vazquez MD     - Reason for Consult:    Wound Care     - Wound Care Reason for Consult:    decubitus ulcer infection   08/23/24 1539 Debridement Pressure Injury Sacrum Routine Completed George Sampson APRN   07/25/24 1711 OP Wound Dressing Routine Discontinued George Sampson APRN     - Dressing:    Dry gauze     - Dressing:    ABD pad     - Additional Wound Dressing Information:    dakin soaked     - Cleansing:    Cleanse with Dakins     - Frequency:    Change dressing two times a day   07/25/24 1711 OP Wound Dressing Routine Discontinued George Sampson APRN   07/23/24 1011 Debridement Pressure Injury Sacrum Routine Completed George Sampson APRN       Wound 02/26/25 4 Toe (Comment which one) Left (Active)   Date First Assessed/Time First Assessed: 02/26/25 1132   Present on Original Admission: No   Wound Number (Wound Clinic Only): 4  Location: (c) Toe (Comment which one)  Wound Location Orientation: Left      Assessments  2/26/2025 11:34 AM   Wound Image     Site Assessment Moist;Red   Closure Not approximated   Drainage Amount None   Treatments Cleansed;Saline;Vashe   Dressing Hydrofiber with silver;2x2s;Gauze;Tape   Dressing Changed New   Dressing Status Clean;Dry;Intact   Wound Length (cm) 1.5 cm   Wound Width (cm) 0.4 cm   Wound Surface Area (cm^2) 0.6 cm^2   Wound Depth (cm) 0.1 cm   Wound Volume (cm^3) 0.06 cm^3   Margins Attached edges   Non-staged Wound Description Full thickness   Madhuri-wound Assessment Edema;Fragile;Pink   Wound Granulation Tissue Red   Wound Bed Granulation (%) 100 %   Wound Bed Epithelium (%) 0 %   Wound Bed Slough (%) 0 %   Wound Bed Eschar (%) 0 %   Wound Bed Fibrin (%) 0 %   State of Healing Non-healing       Active Orders   Date Order Priority Status Authorizing Provider   02/26/25 1329 OP Wound Dressing Routine Active George Sampson APRN     - Cleansing:    Cleanse with normal saline or wound cleanser     - Dressing:    Dry gauze     - Additional Wound Dressing Information:    Hydrofiber with silver, tape     - Frequency:    Change dressing three times per week       Wound 02/26/25 5 Toe (Comment which one) Dorsal;Left (Active)   Date First Assessed/Time First Assessed: 02/26/25 1133    Wound Number (Wound Clinic Only): 5  Location: (c) Toe (Comment which one)  Wound Location Orientation: Dorsal;Left      Assessments 2/26/2025 11:34 AM   Wound Image     Site Assessment Moist;Red   Closure Not approximated   Drainage Amount Scant   Drainage Description Serosanguineous   Treatments Cleansed;Saline;Vashe   Dressing Hydrofiber with silver;2x2s;Gauze;Tape   Dressing Changed New   Dressing Status Clean;Dry;Intact   Wound Length (cm) 1 cm   Wound Width (cm) 0.4 cm   Wound Surface Area (cm^2) 0.4 cm^2   Wound Depth (cm) 0.1 cm   Wound Volume (cm^3) 0.04 cm^3   Margins Attached edges   Non-staged Wound Description Full thickness   Madhuri-wound Assessment Fragile;Edema;Blanchable erythema   Wound Granulation  Tissue Red   Wound Bed Granulation (%) 100 %   Wound Bed Epithelium (%) 0 %   Wound Bed Slough (%) 0 %   Wound Bed Eschar (%) 0 %   Wound Bed Fibrin (%) 0 %   State of Healing Non-healing   Wound Odor None       Active Orders   Date Order Priority Status Authorizing Provider   02/26/25 1329 OP Wound Dressing Routine Active George Sampson APRN     - Cleansing:    Cleanse with normal saline or wound cleanser     - Dressing:    Dry gauze     - Additional Wound Dressing Information:    Hydrofiber with silver, tape     - Frequency:    Change dressing three times per week       Negative Pressure Wound Therapy Sacrum (Active)   Placement Date: 02/26/25   Wound Type: Pressure ulcer: stage IV  Location: Sacrum      Assessments 2/26/2025 11:34 AM   Wound photographed/measured Yes   Machine Status (On) Yes   Site Assessment Moist;Red;Granulation tissue   Madhuri-wound Assessment Clean;Dry;Intact   Unit Type Medela Invia   Dressing Type Black foam   Number of Foam Pieces Used 1   Cycle Continuous;On   Target Pressure (mmHg) 125   Drainage Description Serosanguineous   Dressing Status Clean;Dry;Intact       No associated orders.     Wound Number: 4, 5 Left great toe and Left 3rd toe    Wound Cleaning and Dressings:  Showering directions: May shower with protection  Wound cleansing:  Cleanse with normal saline or wound cleanser  Wound cleaning frequency: with dressing change  Wound product: Silver alginate, dry gauze, tape or bandaid  Dressing change frequency:  Change dressing 3x per week    Wound Number: 3 Sacrum    Wound Cleaning and Dressings:  Showering directions: May shower with protection  Wound cleansing:  Cleanse with normal saline or wound cleanser  Wound cleaning frequency: with dressing change  Wound product: black vac sponge  Dressing change frequency:  Change dressing 3x per week    Negative Pressure Wound Therapy:  NPWT:Medela Invia vac therapy, black form at 125 mmHg continuous. RN to change dressing 3x/week unless  indicated below    Patient's family is looking into Mobile Wound  [facility]     Follow Up:  Return 5 weeks, for APN visit x 30 mins.     YUN LIVINGSTON DNP, CWS, NPI [id number]

## (undated) NOTE — LETTER
Date: 8/9/2024  Patient name: Yoseph Cordero  YOB: 1950  Medical Record Number: [id number]  Primary Coverage: Payor: [facility] / Plan: [facility] HMO / Product Type: Medicare /   Secondary Coverage:   Insurance ID: [id number]  Patient Address: [address] [place] [address]  Telephone Information:   Home Phone [phone]   Mobile [phone]       Encounter Date: 8/9/2024  Provider: BEAU Walton  Diagnosis:     ICD-10-CM   1. Pressure injury of sacral region, stage 4 (HCC)  L89.154   2. 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 Pressure injury of right heel, stage 4 (HCC)  L89.614   4. Pressure injury of right ankle, stage 2 (HCC)  L89.512   5. Non-healing open wound of right heel  S91.301A   6. Complicated open wound of left hip, subsequent encounter  S71.002D   7. Gait disturbance  R26.9         Wound 07/23/24 1 Heel Right (Active)   Date First Assessed: 07/23/24    Wound Number (Wound Clinic Only): 1  Primary Wound Type: Pressure Injury  Location: Heel  Wound Location Orientation: Right      Assessments 7/23/2024  8:50 AM 8/9/2024 10:25 AM   Wound Image        Site Assessment -- Moist;Red;Yellow   Closure -- Not approximated   Drainage Amount -- Moderate   Drainage Description -- Serosanguineous   Treatments -- Cleansed;Wound ;Topical (Barrier/Moisturizer/Ointment);Honey Gel   Dressing -- Aquacel;Aquacel Foam   Dressing Changed -- Changed   Dressing Status -- Clean;Dry;Intact   Wound Length (cm) 6.5 cm 5 cm   Wound Width (cm) 4.4 cm 2.6 cm   Wound Surface Area (cm^2) 28.6 cm^2 13 cm^2   Wound Depth (cm) 1 cm 0.7 cm   Wound Volume (cm^3) 28.6 cm^3 9.1 cm^3   Wound Healing % -- 68   Margins -- Well-defined edges;Not attached   Non-staged Wound Description -- Full thickness   Madhuri-wound Assessment -- Dry;Intact   Wound Granulation Tissue -- Red   Wound Bed Granulation (%) -- 95 %   Wound Bed Epithelium (%) -- 0 %   Wound Bed Slough (%) -- 5 %   Wound Bed Eschar  (%) -- 0 %   Wound Bed Fibrin (%) -- 0 %   State of Healing -- Early/partial granulation   Wound Odor -- None   Exposed Structure Bone Bone   Tunneling? -- Yes   Number of Tunnels -- 1   Tunnel 1 Location -- 12   Tunnel 1 Depth -- 2   Pressure Injury Stage -- Stage 4       Active Orders   Date Order Priority Status Authorizing Provider   07/25/24 1711 OP Wound Dressing Routine Active George Sampson APRN     - Dressing:    Collagen     - Dressing:    Hydrofera transfer     - Dressing:    ABD pad     - Dressing:    Kerlix     - Cleansing:    Cleanse with normal saline or wound cleanser     - Frequency:    Change dressing three times a day   07/23/24 1016 Debridement Pressure Injury Right Heel Routine Active Sury Blake DPM       Wound 07/23/24 2 Hip Left (Active)   Date First Assessed: 07/23/24    Wound Number (Wound Clinic Only): 2  Primary Wound Type: Pressure Injury  Location: Hip  Wound Location Orientation: Left      Assessments 7/23/2024  8:50 AM 8/9/2024 10:25 AM   Wound Image       Site Assessment Moist;Tan;Yellow Moist;Yellow;Red   Closure Not approximated Not approximated   Drainage Amount Large Large   Drainage Description Serous;Yellow Serosanguineous   Treatments Wound ;Vashe Cleansed;Wound ;Topical (Barrier/Moisturizer/Ointment);Honey Gel   Dressing ABD Pad;Gauze Aquacel;Aquacel Foam   Dressing Changed Changed Changed   Dressing Status Dressing Changed Clean;Dry;Intact   Wound Length (cm) -- 3.6 cm   Wound Width (cm) -- 4.3 cm   Wound Surface Area (cm^2) -- 15.48 cm^2   Wound Depth (cm) -- 1 cm   Wound Volume (cm^3) -- 15.48 cm^3   Wound Healing % -- 79   Margins Poorly defined Well-defined edges;Epibole (Rolled edges)   Non-staged Wound Description Full thickness Full thickness   Madhuri-wound Assessment Purple;Pink;Painful Dry;Blanchable erythema   Wound Granulation Tissue -- Red   Wound Bed Granulation (%) -- 30 %   Wound Bed Epithelium (%) -- 0 %   Wound Bed Slough (%) 100 %  70 %   Wound Bed Eschar (%) -- 0 %   Wound Bed Fibrin (%) -- 0 %   State of Healing Undermining Early/partial granulation   Wound Odor None None   Undermining? Yes --   Number of Undermines 1 --   Undermine 1 Start Position 12 --   Undermine 1 End Position 12 --   Pressure Injury Stage -- Stage 3       Active Orders   Date Order Priority Status Authorizing Provider   07/25/24 1711 OP Wound Dressing Routine Active George Sampson APRN     - Dressing:    Dry gauze     - Dressing:    ABD pad     - Additional Wound Dressing Information:    traid     - Cleansing:    Cleanse with normal saline or wound cleanser     - Frequency:    Change dressing daily and PRN   07/25/24 1711 OP Wound Dressing Routine Active George Sampson APRN       Wound 07/23/24 3 Sacrum (Active)   Date First Assessed: 07/23/24    Wound Number (Wound Clinic Only): 3  Primary Wound Type: Pressure Injury  Location: Sacrum      Assessments 7/23/2024  8:50 AM 8/9/2024 10:25 AM   Wound Image       Site Assessment Moist;Painful;Yellow;Red Moist;Red;Yellow;Tan   Closure Not approximated Not approximated   Drainage Amount Large Large   Drainage Description Yellow Yellow;Brown   Treatments Wound ;Topical (Barrier/Moisturizer/Ointment);Special Tape Cleansed;Wound ;Topical (Barrier/Moisturizer/Ointment);Honey Gel   Dressing ABD Pad;Gauze Aquacel;Aquacel Foam   Dressing Changed Changed Changed   Dressing Status -- Clean;Dry;Intact   Wound Length (cm) 7 cm 7 cm   Wound Width (cm) 7 cm 5.5 cm   Wound Surface Area (cm^2) 49 cm^2 38.5 cm^2   Wound Depth (cm) 2.6 cm 3 cm   Wound Volume (cm^3) 127.4 cm^3 115.5 cm^3   Wound Healing % -- 9   Margins Poorly defined Well-defined edges;Not attached   Non-staged Wound Description Full thickness Full thickness   Madhuri-wound Assessment Maceration;Painful;Excoriated;Pink Excoriated;Pink   Wound Granulation Tissue Pink Red   Wound Bed Granulation (%) 30 % 70 %   Wound Bed Epithelium (%) -- 0 %   Wound Bed Slough (%)  40 % 30 %   Wound Bed Eschar (%) 30 % 0 %   Wound Bed Fibrin (%) -- 0 %   State of Healing -- Early/partial granulation   Wound Odor Mild Mild   Exposed Structure Bone Necrosis;Bone Bone   Undermining? Yes Yes   Number of Undermines 1 1   Undermine 1 Start Position 7 7   Undermine 1 End Position 11 12   Pressure Injury Stage Stage 4 Stage 4       Active Orders   Date Order Priority Status Authorizing Provider   07/25/24 1711 OP Wound Dressing Routine Active George Sampson APRN     - Dressing:    Dry gauze     - Dressing:    ABD pad     - Additional Wound Dressing Information:    dakin soaked     - Cleansing:    Cleanse with Dakins     - Frequency:    Change dressing two times a day   07/25/24 1711 OP Wound Dressing Routine Active George Sampson APRN   07/23/24 1011 Debridement Pressure Injury Sacrum Routine Active George Sampson APRN       Wound 07/23/24 4 Ankle Right;Medial (Active)   Date First Assessed: 07/23/24    Wound Number (Wound Clinic Only): 4  Primary Wound Type: Pressure Injury  Location: Ankle  Wound Location Orientation: Right;Medial      Assessments 7/23/2024  8:50 AM 8/9/2024 10:25 AM   Wound Image       Site Assessment Moist;Pink Moist;Red;Yellow   Closure Not approximated Not approximated   Drainage Amount Scant Moderate   Drainage Description Serosanguineous Serosanguineous   Treatments Wound ;Honey Gel Cleansed;Wound ;Topical (Barrier/Moisturizer/Ointment);Honey Gel   Dressing Kerlix roll Aquacel;Aquacel Foam   Dressing Changed Changed Changed   Dressing Status Dressing Changed Clean;Dry;Intact   Wound Length (cm) 0.6 cm 0.8 cm   Wound Width (cm) 0.8 cm 0.9 cm   Wound Surface Area (cm^2) 0.48 cm^2 0.72 cm^2   Wound Depth (cm) 0 cm 0.2 cm   Wound Volume (cm^3) 0 cm^3 0.144 cm^3   Margins Poorly defined Well-defined edges   Non-staged Wound Description Full thickness Full thickness   Madhuri-wound Assessment -- Dry;Intact   Wound Granulation Tissue -- Red   Wound Bed Granulation (%) 20 %  10 %   Wound Bed Epithelium (%) -- 0 %   Wound Bed Slough (%) -- 0 %   Wound Bed Eschar (%) 80 % 90 %   Wound Bed Fibrin (%) -- 0 %   State of Healing Non-healing Early/partial granulation   Wound Odor None None   Pressure Injury Stage Unstageable Unstageable       Inactive Orders   Date Order Priority Status Authorizing Provider   07/25/24 1711 OP Wound Dressing Routine Completed George Sampson APRN     - Dressing:    Kerlix     - Dressing:    Dry gauze     - Dressing:    Honey gel     - Cleansing:    Cleanse with normal saline or wound cleanser     - Frequency:    Change dressing daily and PRN       Wound 07/23/24 5 Ankle Left;Lateral (Active)   Date First Assessed: 07/23/24    Wound Number (Wound Clinic Only): 5  Location: Ankle  Wound Location Orientation: Left;Lateral      Assessments 7/23/2024  8:50 AM 8/9/2024 10:25 AM   Wound Image       Site Assessment Dry;Red Dry;Brown   Closure Not approximated Approximated   Drainage Amount None None   Drainage Description Serosanguineous --   Treatments Wound  Cleansed;Wound    Dressing Kerlix roll Open to air   Dressing Changed Changed --   Dressing Status Dressing Changed Removed   Wound Length (cm) 0.2 cm 0 cm   Wound Width (cm) 0.1 cm 0 cm   Wound Surface Area (cm^2) 0.02 cm^2 0 cm^2   Wound Depth (cm) 0 cm 0 cm   Wound Volume (cm^3) 0 cm^3 0 cm^3   Margins Poorly defined Flat and Intact   Non-staged Wound Description Full thickness Not applicable   Madhuri-wound Assessment Pink;Hemosiderin staining Clean;Dry;Intact   Wound Bed Granulation (%) -- 0 %   Wound Bed Epithelium (%) -- 100 %   Wound Bed Slough (%) -- 0 %   Wound Bed Eschar (%) 100 % 0 %   Wound Bed Fibrin (%) -- 0 %   State of Healing Non-healing Epithelialized   Wound Odor -- None       Inactive Orders   Date Order Priority Status Authorizing Provider   07/25/24 1711 OP Wound Dressing Routine Completed Georeg Sampson APRN     - Cleansing:    Cleanse with normal saline or wound cleanser     -  Frequency:    Change dressing daily and PRN       Wound 07/23/24 6 Heel Left (Active)   Date First Assessed: 07/23/24    Wound Number (Wound Clinic Only): 6  Primary Wound Type: Pressure Injury  Location: Heel  Wound Location Orientation: Left      Assessments 7/23/2024  8:50 AM 8/9/2024 10:25 AM   Wound Image       Site Assessment Painful;Yellow;Tan Dry;Brown   Closure Not approximated Approximated   Drainage Amount Large None   Drainage Description Yellow --   Treatments Wound ;Vashe;Special Tape;Honey Gel Cleansed;Wound    Dressing Gauze;ABD Pad Open to air   Dressing Changed Changed --   Dressing Status Dressing Changed Removed   Wound Length (cm) 0.2 cm 0 cm   Wound Width (cm) 0.2 cm 0 cm   Wound Surface Area (cm^2) 0.04 cm^2 0 cm^2   Wound Depth (cm) 0.1 cm 0 cm   Wound Volume (cm^3) 0.004 cm^3 0 cm^3   Wound Healing % -- 100   Margins Epibole (Rolled edges);Poorly defined Flat and Intact   Non-staged Wound Description Full thickness Not applicable   Madhuri-wound Assessment -- Clean;Dry;Intact   Wound Bed Granulation (%) -- 0 %   Wound Bed Epithelium (%) -- 100 %   Wound Bed Slough (%) 100 % 0 %   Wound Bed Eschar (%) -- 0 %   Wound Bed Fibrin (%) -- 0 %   State of Healing Non-healing Epithelialized   Wound Odor -- None   Pressure Injury Stage Unstageable --       Inactive Orders   Date Order Priority Status Authorizing Provider   07/25/24 1711 OP Wound Dressing Routine Completed George Sampson APRN     - Cleansing:    Cleanse with normal saline or wound cleanser     - Frequency:    Change dressing daily and PRN     Wounds: Left ankle and Left Heel: Healed. No dressings needed. Patient to wear offloading boots for protection.    Wound Number: All wounds    Wound Cleaning and Dressings:  Showering directions: May not shower  Wound cleansing:  Cleanse with normal saline or wound cleanser or Acetic Acid 25% solution  Wound cleaning frequency:  Cleanse with dressing changes  Wound product: Honey gel,  alginate or gauze, bordered foam. Sacral wound may need to be changed daily depending on drainage or if it becomes soiled with urine or feces.  Dressing change frequency:  Change dressing 3x per week    Miscellaneous/Additional Orders:  Offloading: Air mattress and re-positioning often (every two hours)    Follow Up:  Return 2-4 weeks, for APN visit 30 min.      YUN LIVINGSTON DNP, CWS, NPI [id number]

## (undated) NOTE — LETTER
Date: 8/12/2024  Patient name: Yoseph Cordero  YOB: 1950  Medical Record Number: [id number]  Primary Coverage: Payor: [facility] / Plan: [facility] HMO / Product Type: Medicare /   Secondary Coverage:   Insurance ID: [id number]  Patient Address: [address] [place] [address]  Telephone Information:   Home Phone [phone]   Mobile [phone]         Encounter Date: 8/9/2024  Provider: BEAU Walton  Diagnosis:     ICD-10-CM   1. Pressure injury of sacral region, stage 4 (HCC)  L89.154   2. 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 Pressure injury of right heel, stage 4 (HCC)  L89.614   4. Pressure injury of right ankle, stage 2 ([facility])  L89.512   5. Non-healing open wound of right heel  S91.301A   6. Complicated open wound of left hip, subsequent encounter  S71.002D   7. Gait disturbance  R26.9       Progress Note:  Subjective  Yoseph Cordero is a 74 year old male.    Chief Complaint   Patient presents with    Wound Care     Multiple wounds on bilateral feet, hip and sacral wounds     HPI  8/9/2024: Patient is here with her daughter who is providing the information, patient is non-verbal. Patient's daughter has taken patient home, home health nurse has not started as of now, she is doing all wounds the dressing changes. Patient has hospital bed with air mattress with lift.     7/25/2024: Patient is a 74-year-old male with a past medical history of dementia nonverbal at baseline, chronic right hemiparesis from prior CVA, seizure disorder on Keppra, VTE with factor V Leiden on Xarelto, anemia, chronic right heel stage IV, left ischial stage IV and sacral decubitus ulcer stage IV with recent hospitalization on 7/9-7/19 for severe sepsis secondary to Proteus bacteremia treated with IV Zosyn discharged on 7/19/2024 with midline with plan for 4 weeks of IV antibiotics tp [facility] extended-care SNF for placement.   Per SNF, patient had acquired the pressure injuries at his  right heel, left ischial/hip and sacral at home while he had home health services.  Per patient's daughter patient eats well, has multiple stool a day, incontinent. Patient's daughter is not happy with care her father has been getting.   Patient was here today 7/23/2024 to see Dr. Sury WALDROP (Podiatrist) for heel wounds, I was asked us to see this patients today for multiple wounds that wound clinic was not aware he had at time of masha.     Review of Systems   Constitutional:  Negative for activity change.   Respiratory:  Negative for cough and shortness of breath.    Cardiovascular:  Negative for leg swelling.   Gastrointestinal:  Negative for abdominal distention.   Skin:  Positive for wound.        Multiple wounds   Neurological:  Positive for weakness.   Psychiatric/Behavioral:  Negative for agitation.       Objective  Physical Exam  Vitals reviewed.   Constitutional:       General: He is awake.      Appearance: He is underweight. He is ill-appearing.      Comments: Non-verbal, unable to follow commends    Cardiovascular:      Rate and Rhythm: Normal rate and regular rhythm.      Pulses: Normal pulses.   Pulmonary:      Effort: Pulmonary effort is normal.   Abdominal:      General: Bowel sounds are normal.      Palpations: Abdomen is soft.   Musculoskeletal:      Right lower leg: No edema.      Left lower leg: No edema.   Skin:     General: Skin is warm.      Findings: Wound present. No erythema or rash.      Nails: There is no clubbing.           Neurological:      Mental Status: Mental status is at baseline.   Wound Assessment  Wound 07/23/24 1 Heel Right (Active)   Date First Assessed: 07/23/24    Wound Number (Wound Clinic Only): 1  Primary Wound Type: Pressure Injury  Location: Heel  Wound Location Orientation: Right      Assessments 8/9/2024 10:25 AM   Wound Image     Site Assessment Moist;Red;Yellow   Closure Not approximated   Drainage Amount Moderate   Drainage Description Serosanguineous   Treatments  Cleansed;Wound ;Topical (Barrier/Moisturizer/Ointment);Honey Gel   Dressing Aquacel;Aquacel Foam   Dressing Changed Changed   Dressing Status Clean;Dry;Intact   Wound Length (cm) 5 cm   Wound Width (cm) 2.6 cm   Wound Surface Area (cm^2) 13 cm^2   Wound Depth (cm) 0.7 cm   Wound Volume (cm^3) 9.1 cm^3   Wound Healing % 68   Margins Well-defined edges;Not attached   Non-staged Wound Description Full thickness   Madhuri-wound Assessment Dry;Intact   Wound Granulation Tissue Red   Wound Bed Granulation (%) 95 %   Wound Bed Epithelium (%) 0 %   Wound Bed Slough (%) 5 %   Wound Bed Eschar (%) 0 %   Wound Bed Fibrin (%) 0 %   State of Healing Early/partial granulation   Wound Odor None   Exposed Structure Bone   Tunneling? Yes   Number of Tunnels 1   Tunnel 1 Location 12   Tunnel 1 Depth 2   Pressure Injury Stage Stage 4       Active Orders   Date Order Priority Status Authorizing Provider   07/25/24 1711 OP Wound Dressing Routine Active George Sampson APRN     - Dressing:    Collagen     - Dressing:    Hydrofera transfer     - Dressing:    ABD pad     - Dressing:    Kerlix     - Cleansing:    Cleanse with normal saline or wound cleanser     - Frequency:    Change dressing three times a day   07/23/24 1016 Debridement Pressure Injury Right Heel Routine Active Sury Blake DPM       Wound 07/23/24 2 Hip Left (Active)   Date First Assessed: 07/23/24    Wound Number (Wound Clinic Only): 2  Primary Wound Type: Pressure Injury  Location: Hip  Wound Location Orientation: Left      Assessments 8/9/2024 10:25 AM   Wound Image     Site Assessment Moist;Yellow;Red   Closure Not approximated   Drainage Amount Large   Drainage Description Serosanguineous   Treatments Cleansed;Wound ;Topical (Barrier/Moisturizer/Ointment);Honey Gel   Dressing Aquacel;Aquacel Foam   Dressing Changed Changed   Dressing Status Clean;Dry;Intact   Wound Length (cm) 3.6 cm   Wound Width (cm) 4.3 cm   Wound Surface Area (cm^2) 15.48  cm^2   Wound Depth (cm) 1 cm   Wound Volume (cm^3) 15.48 cm^3   Wound Healing % 79   Margins Well-defined edges;Epibole (Rolled edges)   Non-staged Wound Description Full thickness   Madhuri-wound Assessment Dry;Blanchable erythema   Wound Granulation Tissue Red   Wound Bed Granulation (%) 30 %   Wound Bed Epithelium (%) 0 %   Wound Bed Slough (%) 70 %   Wound Bed Eschar (%) 0 %   Wound Bed Fibrin (%) 0 %   State of Healing Early/partial granulation   Wound Odor None   Pressure Injury Stage Stage 3       Active Orders   Date Order Priority Status Authorizing Provider   07/25/24 1711 OP Wound Dressing Routine Active George Sampson APRN     - Dressing:    Dry gauze     - Dressing:    ABD pad     - Additional Wound Dressing Information:    traid     - Cleansing:    Cleanse with normal saline or wound cleanser     - Frequency:    Change dressing daily and PRN   07/25/24 1711 OP Wound Dressing Routine Active George Sampson APRN       Wound 07/23/24 3 Sacrum (Active)   Date First Assessed: 07/23/24    Wound Number (Wound Clinic Only): 3  Primary Wound Type: Pressure Injury  Location: Sacrum      Assessments 8/9/2024 10:25 AM   Wound Image     Site Assessment Moist;Red;Yellow;Tan   Closure Not approximated   Drainage Amount Large   Drainage Description Yellow;Brown   Treatments Cleansed;Wound ;Topical (Barrier/Moisturizer/Ointment);Honey Gel   Dressing Aquacel;Aquacel Foam   Dressing Changed Changed   Dressing Status Clean;Dry;Intact   Wound Length (cm) 7 cm   Wound Width (cm) 5.5 cm   Wound Surface Area (cm^2) 38.5 cm^2   Wound Depth (cm) 3 cm   Wound Volume (cm^3) 115.5 cm^3   Wound Healing % 9   Margins Well-defined edges;Not attached   Non-staged Wound Description Full thickness   Madhuri-wound Assessment Excoriated;Pink   Wound Granulation Tissue Red   Wound Bed Granulation (%) 70 %   Wound Bed Epithelium (%) 0 %   Wound Bed Slough (%) 30 %   Wound Bed Eschar (%) 0 %   Wound Bed Fibrin (%) 0 %   State of Healing  Early/partial granulation   Wound Odor Mild   Exposed Structure Bone   Undermining? Yes   Number of Undermines 1   Undermine 1 Start Position 7   Undermine 1 End Position 12   Pressure Injury Stage Stage 4       Active Orders   Date Order Priority Status Authorizing Provider   07/25/24 1711 OP Wound Dressing Routine Active George Sampson APRN     - Dressing:    Dry gauze     - Dressing:    ABD pad     - Additional Wound Dressing Information:    dakin soaked     - Cleansing:    Cleanse with Dakins     - Frequency:    Change dressing two times a day   07/25/24 1711 OP Wound Dressing Routine Active George Sampson APRN   07/23/24 1011 Debridement Pressure Injury Sacrum Routine Active George Sampson APRN       Wound 07/23/24 4 Ankle Right;Medial (Active)   Date First Assessed: 07/23/24    Wound Number (Wound Clinic Only): 4  Primary Wound Type: Pressure Injury  Location: Ankle  Wound Location Orientation: Right;Medial      Assessments 8/9/2024 10:25 AM   Wound Image     Site Assessment Moist;Red;Yellow   Closure Not approximated   Drainage Amount Moderate   Drainage Description Serosanguineous   Treatments Cleansed;Wound ;Topical (Barrier/Moisturizer/Ointment);Honey Gel   Dressing Aquacel;Aquacel Foam   Dressing Changed Changed   Dressing Status Clean;Dry;Intact   Wound Length (cm) 0.8 cm   Wound Width (cm) 0.9 cm   Wound Surface Area (cm^2) 0.72 cm^2   Wound Depth (cm) 0.2 cm   Wound Volume (cm^3) 0.144 cm^3   Margins Well-defined edges   Non-staged Wound Description Full thickness   Madhuri-wound Assessment Dry;Intact   Wound Granulation Tissue Red   Wound Bed Granulation (%) 10 %   Wound Bed Epithelium (%) 0 %   Wound Bed Slough (%) 0 %   Wound Bed Eschar (%) 90 %   Wound Bed Fibrin (%) 0 %   State of Healing Early/partial granulation   Wound Odor None   Pressure Injury Stage Unstageable       Inactive Orders   Date Order Priority Status Authorizing Provider   07/25/24 1711 OP Wound Dressing Routine Completed  George Sampson APRN     - Dressing:    Kerlix     - Dressing:    Dry gauze     - Dressing:    Honey gel     - Cleansing:    Cleanse with normal saline or wound cleanser     - Frequency:    Change dressing daily and PRN       Wound 07/23/24 5 Ankle Left;Lateral (Active)   Date First Assessed: 07/23/24    Wound Number (Wound Clinic Only): 5  Location: Ankle  Wound Location Orientation: Left;Lateral      Assessments 8/9/2024 10:25 AM   Wound Image     Site Assessment Dry;Brown   Closure Approximated   Drainage Amount None   Treatments Cleansed;Wound    Dressing Open to air   Dressing Status Removed   Wound Length (cm) 0 cm   Wound Width (cm) 0 cm   Wound Surface Area (cm^2) 0 cm^2   Wound Depth (cm) 0 cm   Wound Volume (cm^3) 0 cm^3   Margins Flat and Intact   Non-staged Wound Description Not applicable   Madhuri-wound Assessment Clean;Dry;Intact   Wound Bed Granulation (%) 0 %   Wound Bed Epithelium (%) 100 %   Wound Bed Slough (%) 0 %   Wound Bed Eschar (%) 0 %   Wound Bed Fibrin (%) 0 %   State of Healing Epithelialized   Wound Odor None       Inactive Orders   Date Order Priority Status Authorizing Provider   07/25/24 1711 OP Wound Dressing Routine Completed George Sampson APRN     - Cleansing:    Cleanse with normal saline or wound cleanser     - Frequency:    Change dressing daily and PRN       Wound 07/23/24 6 Heel Left (Active)   Date First Assessed: 07/23/24    Wound Number (Wound Clinic Only): 6  Primary Wound Type: Pressure Injury  Location: Heel  Wound Location Orientation: Left      Assessments 8/9/2024 10:25 AM   Wound Image     Site Assessment Dry;Brown   Closure Approximated   Drainage Amount None   Treatments Cleansed;Wound    Dressing Open to air   Dressing Status Removed   Wound Length (cm) 0 cm   Wound Width (cm) 0 cm   Wound Surface Area (cm^2) 0 cm^2   Wound Depth (cm) 0 cm   Wound Volume (cm^3) 0 cm^3   Wound Healing % 100   Margins Flat and Intact   Non-staged Wound Description Not  applicable   Mahduri-wound Assessment Clean;Dry;Intact   Wound Bed Granulation (%) 0 %   Wound Bed Epithelium (%) 100 %   Wound Bed Slough (%) 0 %   Wound Bed Eschar (%) 0 %   Wound Bed Fibrin (%) 0 %   State of Healing Epithelialized   Wound Odor None       Inactive Orders   Date Order Priority Status Authorizing Provider   07/25/24 0162 OP Wound Dressing Routine Completed George Sampson APRN     - Cleansing:    Cleanse with normal saline or wound cleanser     - Frequency:    Change dressing daily and PRN     Vital Signs    08/09/24 1010   BP: 129/73   Pulse: 104   Resp: 16   Temp: 98.8 °F (37.1 °C)   Allergies  No Known Allergies    Assessment  Left hip wound, stage IV pressure injury:  -one third granula and adipose tissue/ connective tissue covering the hip bone  -no erythema, discoloration on the soft tissue surrounding the wound due to chronic inflammation of chronic wound, patient is on IV antibiotic via BASSAM  -no foul odor     Sacral wound, stage IV pressure injury:  -granular with some slough and necrotic tissue  -no visible bone  -no foul odor, no erythema  -scattered small pustule most likely fungal rash, due to persistent moisture in the skin    Right heel wound, stage III:  -granular tissue, heeling from un- stage-able  -no erythema or other sign of infection    Right ankle wound,stage III  -some granulation and exposed connective tissue  -no erythema of sign of infection    Left lateral ankle and left heel wounds, closed.    Reviewed note and labs/imaging at [facility] and Care Every where.  Recent labs: 7/18/2024: BUN 24, Creatinine 0.68, eGFR 98, H&H 9.0 & 27.7  7/15/2024: Albumin 3.6, Globulin 3.4, total protein 7.0    Encounter Diagnosis  1. Pressure injury of sacral region, stage 4 (HCC)    2. Stage III pressure ulcer of left hip (HCC)    3. Pressure injury of right heel, stage 4 (HCC)    4. Pressure injury of right ankle, stage 2 (HCC)    5. Non-healing open wound of right heel    6. Complicated open  wound of left hip, subsequent encounter    7. Gait disturbance      Problem List  Patient Active Problem List   Diagnosis    Hemiplegia affecting right dominant side (HCC)    Chronic obstructive pulmonary disease (HCC)    Combined forms of age-related cataract of both eyes    Esophageal reflux    Late onset Alzheimer's disease with behavioral disturbance (HCC)    Alcohol abuse    Calcification of aorta (HCC)    Alcoholism (HCC)    History of stroke    Dysphagia    Hyperglycemia    Weakness    Gait disturbance    Abnormal involuntary movement    Acute pulmonary embolism without acute cor pulmonale, unspecified pulmonary embolism type (HCC)    Factor V Leiden (HCC)    Leukocytosis    Tachycardia    Altered mental status, unspecified altered mental status type    Seizures (HCC)    Infected ulcer of skin, unspecified ulcer stage (HCC)    Infected decubitus ulcer    Transient neurological symptoms    Palliative care by specialist    Pressure injury of sacral region, stage 4 (HCC)    Pressure injury of right hip, stage 3 (HCC)    Pressure injury of right heel, stage 4 (HCC)    Pressure injury of right ankle, stage 2 (HCC)    Complicated open wound of left hip    Non-healing open wound of right heel     Plan  Orders  Orders Placed This Encounter   Procedures    OP Wound Dressing   Goal of care: Maintenance wound care: have healing potential, but various patient factors are compromising wound healing at this time.     Palliative wound care: the goal of wound care is to reduce the risk for infection, slow the course of deterioration and promote client comfort and function. If the wounds become granular and shows tissue improvement, may processed to advanced dressing and advanced modality including negative pressure wound therapy only on left hip. I had re-educated patient's daughter due to constant contamination of the wound with stool, it is impossible to keep NPWT working and seal without colostomy intervention (diverting  stool from rectum to surface of the abdomen).     Simplified dressings: detail instruction give to patient's daughter, use acetic acid 25% to clean the wounds, then apply thin layer of medi-honey on alginate or gauze and apply to the wound bed, protect the intact skin by zinc oxide cream and antifungal powder every other day and as needed. The sacral wound may need daily or more dressing changes due to contamination with stool.     Patient is allowed to walk per wound, since the heel wound is not in the plantar surface.  Continue to re-position to reduce pressure on hip and sacral wound.  Continue using heel protector/heel boots to reduce pressure in the wound bed.  Monitor for sign of infection, redness, foul odor, warmth and increased drainage.    Follow-Up  Return 2-4 weeks, for APN visit 30 min.           Wound Treatment Orders:  Wound Information/Order:  Wound Number: 2 - Left Hip  Continue dressing as ordered, Medi-honey (or Triad cream) and gauze or alginate daily.   We are planning for wound vac if it is approved.  Product: Medela Invia Motion NPWT at 125mmHg continuous suction. Black foam, medium sized sponge packages. Cannisters  Dispense: 90 days  Dressing Frequency:Change dressing 3x per week    Wound: Sacral wound  Continue dressing as ordered, Medi-honey (or Triad cream) and gauze or alginate daily.   If the wound become foul smelling, may use Dakin's sloution with gauze for 20 min between dressing changes.    Right ankle wound:  Continue dressing as ordered, Medi-honey (or Triad cream) and gauze or alginate daily.     Was a Debridement performed: Yes, Debridement type: mechanical         YUN LIVINGSTON DNP, CWS, NPI [id number]

## (undated) NOTE — Clinical Note
Spoke with dtmyles anderson today for TCM--please see notes. Confirmed 5/07/2024 TCM appt with you--thank you.  Future Appointments 5/7/2024   2:00 PM    Irving Sheets DO      [facility] [place] 5/8/2024   3:00 PM    Prieto Ling MD      [facility]

## (undated) NOTE — LETTER
Date: 9/13/2024  Patient name: Yoseph Cordero  YOB: 1950  Medical Record Number: [id number]  Primary Coverage: Payor: [facility] / Plan: [facility] HMO / Product Type: Medicare /   Secondary Coverage:   Insurance ID: [id number]  Patient Address: [address] [place] [address]  Telephone Information:   Home Phone [phone]   Mobile [phone]       Encounter Date: 9/13/2024  Provider: BEAU Walton  Diagnosis:     ICD-10-CM   1. Pressure injury of sacral region, stage 4 ([facility])  L89.154   2. 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 Pressure injury of right heel, stage 4 ([facility])  L89.614   4. Pressure injury of right ankle, stage 2 ([facility])  L89.512   5. Gait disturbance  R26.9         Wound 08/23/24 1 Heel Right (Active)   Date First Assessed: 08/23/24   Present on Original Admission: Yes   Wound Number (Wound Clinic Only): 1  Primary Wound Type: Pressure Injury  Location: Heel  Wound Location Orientation: Right      Assessments 7/23/2024  8:50 AM 9/13/2024  2:25 PM   Wound Image        Site Assessment -- Clean;Epithelialization;Pink   Drainage Amount -- None   Treatments -- Cleansed;Saline   Dressing -- Aquacel Foam   Dressing Changed -- New   Dressing Status -- Clean;Dry;Intact   Wound Length (cm) 6.5 cm 0 cm   Wound Width (cm) 4.4 cm 0 cm   Wound Surface Area (cm^2) 28.6 cm^2 0 cm^2   Wound Depth (cm) 1 cm 0 cm   Wound Volume (cm^3) 28.6 cm^3 0 cm^3   Wound Healing % -- 100   Margins -- Flat and Intact   Madhuri-wound Assessment -- Hyperpigmented;Dry;Fragile   Wound Bed Epithelium (%) -- 100 %   Exposed Structure Bone --   State of Healing -- Epithelialized;Closed wound edges   Wound Odor -- None   Shape -- cluster       Active Orders   Date Order Priority Status Authorizing Provider   07/25/24 1711 OP Wound Dressing Routine Active George Sampson APRN     - Dressing:    Collagen     - Dressing:    Hydrofera transfer     - Dressing:    ABD pad     - Dressing:    Kerlix     -  Cleansing:    Cleanse with normal saline or wound cleanser     - Frequency:    Change dressing three times a day       Inactive Orders   Date Order Priority Status Authorizing Provider   08/30/24 0759 Apply dressing Other ( Xeroform) Daily Routine Discontinued Grisel Zambrano MD     - Dressing type:    Other ( Xeroform)   08/26/24 0848 Apply dressing Other ( Xeroform) Daily Routine Discontinued Ric Evans MD     - Dressing type:    Other ( Xeroform)   08/24/24 0219 Consult to Wound Ostomy Routine Completed Bernadine Vazquez MD     - Reason for Consult:    Wound Care     - Wound Care Reason for Consult:    decubitus ulcer infection   08/24/24 0049 Consult to Wound Ostomy Routine Discontinued Bernadine Vazquez MD     - Reason for Consult:    Wound Care     - Wound Care Reason for Consult:    decubitus ulcer infection   07/23/24 1016 Debridement Pressure Injury Right Heel Routine Completed Sury Blake DPM       Wound 08/23/24 2 Hip Left (Active)   Date First Assessed: 08/23/24   Present on Original Admission: Yes   Wound Number (Wound Clinic Only): 2  Primary Wound Type: Pressure Injury  Location: Hip  Wound Location Orientation: Left      Assessments 7/23/2024  8:50 AM 9/13/2024  2:25 PM   Wound Image       Site Assessment Moist;Tan;Yellow Moist;Pink;Red;White   Closure Not approximated Not approximated   Drainage Amount Large Moderate   Drainage Description Serous;Yellow Serosanguineous   Treatments Wound ;Vashe Cleansed;Saline   Wound Vac Brand -- Medela   Dressing ABD Pad;Gauze Wound vac sponge   Dressing Changed Changed New   Dressing Status Dressing Changed Clean;Dry;Intact   Wound Length (cm) -- 3 cm   Wound Width (cm) -- 3 cm   Wound Surface Area (cm^2) -- 9 cm^2   Wound Depth (cm) -- 1.2 cm   Wound Volume (cm^3) -- 10.8 cm^3   Wound Healing % -- 85   Margins Poorly defined Well-defined edges;Not attached   Non-staged Wound Description Full thickness --   Madhuri-wound Assessment  Purple;Pink;Painful Hyperpigmented;Clean   Wound Granulation Tissue -- Pink   Wound Bed Granulation (%) -- 25 %   Wound Bed Slough (%) 100 % --   Exposed Structure -- Tendon   State of Healing Undermining Early/partial granulation   Wound Odor None None   Undermining? Yes Yes   Number of Undermines 1 1   Undermine 1 Start Position 12 11   Undermine 1 End Position 12 3   Pressure Injury Stage -- Stage 4       Active Orders   Date Order Priority Status Authorizing Provider   07/25/24 1711 OP Wound Dressing Routine Active George Sampson APRN     - Dressing:    Dry gauze     - Dressing:    ABD pad     - Additional Wound Dressing Information:    traid     - Cleansing:    Cleanse with normal saline or wound cleanser     - Frequency:    Change dressing daily and PRN   07/25/24 1711 OP Wound Dressing Routine Active George Sampson APRN       Inactive Orders   Date Order Priority Status Authorizing Provider   08/26/24 0848 collagenase (Santyl) 250 UNIT/GM ointment Routine Discontinued Ric Evans MD     - Please indicate site of application:    Affected Area(s)   08/26/24 0848 Apply dressing Other (Santyl) Daily Routine Discontinued Ric Evans MD     - Dressing type:    Other (Santyl)   08/24/24 0219 Consult to Wound Ostomy Routine Completed Bernadine Vazquez MD     - Reason for Consult:    Wound Care     - Wound Care Reason for Consult:    decubitus ulcer infection   08/24/24 0049 Consult to Wound Ostomy Routine Discontinued Bernadine Vazquez MD     - Reason for Consult:    Wound Care     - Wound Care Reason for Consult:    decubitus ulcer infection       Wound 08/23/24 3 Sacrum (Active)   Date First Assessed: 08/23/24   Present on Original Admission: Yes   Wound Number (Wound Clinic Only): 3  Primary Wound Type: Pressure Injury  Location: Sacrum      Assessments 7/23/2024  8:50 AM 9/13/2024  2:25 PM   Wound Image       Site Assessment Moist;Painful;Yellow;Red Granulation tissue;Moist;Pink;Red   Closure Not approximated Not  approximated   Drainage Amount Large Large   Drainage Description Yellow Green;Serosanguineous;Yellow   Treatments Wound ;Topical (Barrier/Moisturizer/Ointment);Special Tape Cleansed;Saline   Dressing ABD Pad;Gauze Duoderm;Hydrofiber with silver   Dressing Changed Changed New   Dressing Status -- Dry;Intact;Clean   Wound Length (cm) 7 cm 7.6 cm   Wound Width (cm) 7 cm 5.8 cm   Wound Surface Area (cm^2) 49 cm^2 44.08 cm^2   Wound Depth (cm) 2.6 cm 2 cm   Wound Volume (cm^3) 127.4 cm^3 88.16 cm^3   Wound Healing % -- 31   Margins Poorly defined Well-defined edges;Not attached   Non-staged Wound Description Full thickness --   Madhuri-wound Assessment Maceration;Painful;Excoriated;Pink Hyperpigmented;Fragile;Excoriated   Wound Granulation Tissue Pink Pink;Red   Wound Bed Granulation (%) 30 % 100 %   Wound Bed Epithelium (%) -- 0 %   Wound Bed Slough (%) 40 % 0 %   Wound Bed Eschar (%) 30 % 0 %   Exposed Structure Bone Necrosis;Bone --   State of Healing -- Fully granulated   Wound Odor Mild None   Undermining? Yes Yes   Number of Undermines 1 1   Undermine 1 Start Position 7 7   Undermine 1 End Position 11 3   Pressure Injury Stage Stage 4 Stage 4       Active Orders   Date Order Priority Status Authorizing Provider   07/25/24 1711 OP Wound Dressing Routine Active George Sampson APRN     - Dressing:    Dry gauze     - Dressing:    ABD pad     - Additional Wound Dressing Information:    dakin soaked     - Cleansing:    Cleanse with Dakins     - Frequency:    Change dressing two times a day   07/25/24 1711 OP Wound Dressing Routine Active George Sampson APRN       Inactive Orders   Date Order Priority Status Authorizing Provider   08/26/24 0848 collagenase (Santyl) 250 UNIT/GM ointment Routine Discontinued Ric Evans MD     - Please indicate site of application:    Affected Area(s)   08/26/24 0848 Apply dressing Other (Santyl) Daily Routine Discontinued Ric Evans MD     - Dressing type:    Other (Santyl)   08/25/24  1217 collagenase (Santyl) 250 UNIT/GM ointment Routine Discontinued Criselda Conley PA-C     - Please indicate site of application:    Affected Area(s)   08/24/24 0219 Consult to Wound Ostomy Routine Completed Bernadine Vazquez MD     - Reason for Consult:    Wound Care     - Wound Care Reason for Consult:    decubitus ulcer infection   08/24/24 0049 Consult to Wound Ostomy Routine Discontinued Bernadine Vazquez MD     - Reason for Consult:    Wound Care     - Wound Care Reason for Consult:    decubitus ulcer infection   08/23/24 1539 Debridement Pressure Injury Sacrum Routine Completed George Sampson APRN   07/23/24 1011 Debridement Pressure Injury Sacrum Routine Completed George Sampson APRN       Wound 08/23/24 4 Ankle Right;Medial (Active)   Date First Assessed: 08/23/24   Present on Original Admission: Yes   Wound Number (Wound Clinic Only): 4  Primary Wound Type: Pressure Injury  Location: Ankle  Wound Location Orientation: Right;Medial      Assessments 7/23/2024  8:50 AM 9/13/2024  2:25 PM   Wound Image       Site Assessment Moist;Pink Granulation tissue;Red;Brown   Closure Not approximated Not approximated   Drainage Amount Scant Moderate   Drainage Description Serosanguineous Serosanguineous;Yellow;Brown   Treatments Wound ;Honey Gel Cleansed;Saline;Honey Gel   Dressing Kerlix roll Aquacel Foam   Dressing Changed Changed New   Dressing Status Dressing Changed Clean;Dry;Intact   Wound Length (cm) 0.6 cm 1.3 cm   Wound Width (cm) 0.8 cm 1.1 cm   Wound Surface Area (cm^2) 0.48 cm^2 1.43 cm^2   Wound Depth (cm) 0 cm 0.2 cm   Wound Volume (cm^3) 0 cm^3 0.286 cm^3   Margins Poorly defined Well-defined edges;Not attached   Non-staged Wound Description Full thickness --   Madhuri-wound Assessment -- Hyperpigmented;Fragile   Wound Granulation Tissue -- Red   Wound Bed Granulation (%) 20 % 75 %   Wound Bed Eschar (%) 80 % --   Wound Bed Fibrin (%) -- 25 %   State of Healing Non-healing Fully granulated   Wound Odor  None --   Undermining? -- Yes   Number of Undermines -- 1   Undermine 1 Start Position -- 12   Undermine 1 End Position -- 12   Pressure Injury Stage Unstageable Stage 3       Inactive Orders   Date Order Priority Status Authorizing Provider   08/26/24 0848 collagenase (Santyl) 250 UNIT/GM ointment Routine Discontinued Ric Evans MD     - Please indicate site of application:    Affected Area(s)   08/26/24 0848 Apply dressing Other (Santyl) Daily Routine Discontinued Ric Evans MD     - Dressing type:    Other (Santyl)   08/24/24 0219 Consult to Wound Ostomy Routine Completed Bernadine Vazquez MD     - Reason for Consult:    Wound Care     - Wound Care Reason for Consult:    decubitus ulcer infection   08/24/24 0049 Consult to Wound Ostomy Routine Discontinued Bernadine Vazquez MD     - Reason for Consult:    Wound Care     - Wound Care Reason for Consult:    decubitus ulcer infection   07/25/24 1711 OP Wound Dressing Routine Completed George Sampson APRN     - Dressing:    Kerlix     - Dressing:    Dry gauze     - Dressing:    Honey gel     - Cleansing:    Cleanse with normal saline or wound cleanser     - Frequency:    Change dressing daily and PRN         Home Health Orders:      Wounds: Left ankle and Left Heel: Healed. No dressings needed. Patient to wear offloading boots for protection.     Wound: Left Hip     Wound Cleaning and Dressings:  Showering directions: May not shower  Wound cleansing:  Cleanse with normal saline, wound cleanser or Acetic Acid 0.25% solution  Wound cleaning frequency:  Cleanse with dressing changes  Wound product: Collagen, Black foam, vac drape  Dressing change frequency:  Change dressing 3x per week   Patient's daughter stated the wound becomes foul smelling with twice a week, home health to assess and report the reduction from three time to twice is appropriate or needed three times a week.   Negative Pressure Wound Therapy:  Medela NPWT at 125mmHg continuous suction.     Wound:  Sacrum     Wound Cleaning and Dressings:  Showering directions: May not shower  Wound cleansing:  Cleanse with normal saline, wound cleanser or Acetic Acid 0.25% solution or Vashe  Wound cleaning frequency:  Cleanse with dressing changes  Wound product: Duoderm sheets to the periwound, Fibracol collagen, silver alginate in the wound with bordered foam or ABD pad and tape as covering.  Dressing change frequency: Change dressing daily and PRN if dressing becomes displaced or soiled by urine or feces   \"may continue Santyl daily dressing as patient 's daughter prefers, my recommendation is to support the granulation tissue with collagen and silver alginate and secure with Tegaderm dressing, change twice a week and as needed.\"      Wound: Right Heel     Wound Cleaning and Dressings:  Showering directions: May not shower  Wound cleansing:  Cleanse soap and water.   Wound cleaning frequency:  Cleanse with dressing changes  Wound product: aquacel foam bandage for protection. Wound is healed.  Dressing change frequency: weekly   \"Right heel, the wound has closed at this time, may use bordered foma to protect the newly closed wound, as I explained to his family due to underlying OM high risk for re-ulceration. \"    Wound: Right Ankle     Wound Cleaning and Dressings:  Showering directions: May not shower  Wound cleansing:  Cleanse with normal saline, wound cleanser or Acetic Acid 0.25% solution  Wound cleaning frequency:  Cleanse with dressing changes  Wound product: Honey gel or santyl if available, aquacel foam.  Dressing change frequency: Change dressing 3x/week     Miscellaneous/Additional Orders:  Offloading: Air mattress and re-positioning often (every two hours)    Follow Up:  Return in about 2 weeks (around 9/27/2024) for APN visit for 30 min.     YUN LIVINGSTON DNP, CWS, NPI [id number]

## (undated) NOTE — LETTER
[facility]  part of [facility]     PICC INSERTION CONSENT     I agree to have a Peripherally Inserted Central Catheter (PICC) placed in my arm.   1. The PICC insertion procedure, care, maintenance, risks, benefits, and complications have been explained to me by my physician, ________________________, and I understand them.   2. I understand that this may not be the only way I can receive my medication. I understand that my physician has determined that the PICC would be the safest and most effective means of administering my medication at this time. If there are other options of giving medication into my veins those options have been explained to me by my physician and I have chosen this one.   3. I realize a nurse who has been specially trained and certified by the hospital and ’s representative to insert a PICC will perform this procedure. My catheter will be inserted by _____________________________.   4. I have been informed by my doctor of the nature and purpose of this procedure and the risks involved and the possibility of complications. I realize that this is an invasive procedure and has certain risks such as air embolism (air entering the catheter or my vein), arterial puncture (a tearing of one of my arteries), infection, irregular heartbeat and venous thrombosis (a blood clot in a vein) nerve injury and fracture of the catheter with or without migration.   5. In order to numb the area where the line will be placed, a small amount of anesthetic medication will be injected as ordered by my physician.   6. I understand that while the catheter will be placed in my upper arm the end of the catheter will come to rest in an area near my heart.     7. The person performing this procedure has discussed the potential benefits, risks, and side effects of the PICC; the likelihood of achieving goals; and potential problems that might occur during recuperation. They also discussed  reasonable alternatives to the PICC, including risks, benefits, and side effects related to the alternatives and risks related to not receiving this procedure.    8.  I have expressed any questions about this procedure to my physician or the PICC Proceduralist and he/she has answered them.  I certify that I have read and understand this consent to the insertion of a PICC.      _________________________________________________________   Date     Time     Patient/Guardian Signature       ____________________________________   Printed name of Patient/Guardian          ________________________________________________________________    Date        Time                   Witnessing RN Signature      Patient Name: Yoseph Cordero     : 1950                 Printed: 2024     Medical Record #: [id number]

## (undated) NOTE — LETTER
[place] [address]  Authorization for Invasive Procedures  Date: 7/14/24           Time: 1318    I hereby authorize Dr. Dao, my physician and his/her assistants (if applicable), which may include medical students, residents, and/or fellows, to perform the following surgical operation/ procedure and administer such anesthesia as may be determined necessary by my physician: right heel wound debridement at bedside with Dr Dao  on Yoseph Olmedouro  2.   I recognize that during the surgical operation/procedure, unforeseen conditions may necessitate additional or different procedures than those listed above.  I, therefore, further authorize and request that the above-named surgeon, assistants, or designees perform such procedures as are, in their judgment, necessary and desirable.    3.   My surgeon/physician has discussed prior to my surgery the potential benefits, risks and side effects of this procedure; the likelihood of achieving goals; and potential problems that might occur during recuperation.  They also discussed reasonable alternatives to the procedure, including risks, benefits, and side effects related to the alternatives and risks related to not receiving this procedure.  I have had all my questions answered and I acknowledge that no guarantee has been made as to the result that may be obtained.    4.   Should the need arise during my operation/procedure, which includes change of level of care prior to discharge, I also consent to the administration of blood and/or blood products.  Further, I understand that despite careful testing and screening of blood or blood products by collecting agencies, I may still be subject to ill effects as a result of receiving a blood transfusion and/or blood products.  The following are some, but not all, of the potential risks that can occur: fever and allergic reactions, hemolytic reactions, transmission of diseases such as Hepatitis, AIDS  and Cytomegalovirus (CMV) and fluid overload.  In the event that I wish to have an autologous transfusion of my own blood, or a directed donor transfusion, I will discuss this with my physician.   Check only if Refusing Blood or Blood Products  I understand refusal of blood or blood products as deemed necessary by my physician may have serious consequences to my condition to include possible death. I hereby assume responsibility for my refusal and release the hospital, its personnel, and my physicians from any responsibility for the consequences of my refusal.         o  Refuse         5.   I authorize the use of any specimen, organs, tissues, body parts or foreign objects that may be removed from my body during the operation/procedure for diagnosis, research or teaching purposes and their subsequent disposal by hospital authorities.  I also authorize the release of specimen test results and/or written reports to my treating physician on the hospital medical staff or other referring or consulting physicians involved in my care, at the discretion of the Pathologist or my treating physician.    6.   I consent to the photographing or videotaping of the operations or procedures to be performed, including appropriate portions of my body for medical, scientific, or educational purposes, provided my identity is not revealed by the pictures or by descriptive texts accompanying them.  If the procedure has been photographed/videotaped, the surgeon will obtain the original picture, image, videotape or CD.  The hospital will not be responsible for storage, release or maintenance of the picture, image, tape or CD.    7.   I consent to the presence of a  or observers in the operating room as deemed necessary by my physician or their designees.    8.   I recognize that in the event my procedure results in extended X-Ray/fluoroscopy time, I may develop a skin reaction.    9. If I have a Do Not Attempt  Resuscitation (DNAR) order in place, that status will be suspended while in the operating room, procedural suite, and during the recovery period unless otherwise explicitly stated by me (or a person authorized to consent on my behalf). The surgeon or my attending physician will determine when the applicable recovery period ends for purposes of reinstating the DNAR order.  10. Patients having a sterilization procedure: I understand that if the procedure is successful the results will be permanent and it will therefore be impossible for me to inseminate, conceive, or bear children.  I also understand that the procedure is intended to result in sterility, although the result has not been guaranteed.   11. I acknowledge that my physician has explained sedation/analgesia administration to me including the risk and benefits I consent to the administration of sedation/analgesia as may be necessary or desirable in the judgment of my physician.    I CERTIFY THAT I HAVE READ AND FULLY UNDERSTAND THE ABOVE CONSENT TO OPERATION and/or OTHER PROCEDURE.        ____________________________________       _________________________________      ______________________________  Signature of Patient         Signature of Responsible Person        Printed Name of Responsible Person        ____________________________________      _________________________________      ______________________________       Signature of Witness          Relationship to Patient                       Date                                       Time  Patient Name: Yoseph Cordero  : 1950    Reviewed: 2024   Printed: 2024  Medical Record #: [id number] Page 1 of 2             STATEMENT OF PHYSICIAN My signature below affirms that prior to the time of the procedure; I have explained to the patient and/or his/her legal representative, the risks and benefits involved in the proposed treatment and any reasonable alternative to the proposed  treatment. I have also explained the risks and benefits involved in refusal of the proposed treatment and alternatives to the proposed treatment and have answered the patient's questions. If I have a significant financial interest in a co-management agreement or a significant financial interest in any product or implant, or other significant relationship used in this procedure/surgery, I have disclosed this and had a discussion with my patient.     _______________________________________________________________ _____________________________  (Signature of Physician)                                                                                         (Date)                                   (Time)  Patient Name: Yoseph Cordero  : 1950    Reviewed: 2024   Printed: 2024  Medical Record #: [id number] Page 2 of 2

## (undated) NOTE — LETTER
Date: 10/23/2024  Patient name: Yoseph Cordero  YOB: 1950  Medical Record Number: [id number]  Primary Coverage: Payor: [facility] / Plan: [facility] HMO / Product Type: Medicare /   Secondary Coverage:   Insurance ID: [id number]  Patient Address: [address] [place] [address]  Telephone Information:   Home Phone [phone]   Mobile [phone]       Encounter Date: 10/23/2024  Provider: BEAU Walton  Diagnosis:     ICD-10-CM   1. Pressure injury of sacral region, stage 4 (HCC)  L89.154   2. 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 Pressure injury of right heel, stage 4 (HCC)  L89.614   4. Pressure ulcer of right ankle, stage 3 (HCC)  L89.513   5. Pressure injury of right ankle, stage 2 (HCC)  L89.512   6. Gait disturbance  R26.9         Wound 08/23/24 1 Heel Right (Active)   Date First Assessed: 08/23/24   Present on Original Admission: Yes   Wound Number (Wound Clinic Only): 1  Primary Wound Type: Pressure Injury  Location: Heel  Wound Location Orientation: Right      Assessments 7/23/2024  8:50 AM 10/23/2024 11:15 AM   Wound Image        Site Assessment -- Moist;[place]   Drainage Amount -- Moderate   Drainage Description -- Sanguineous;Serosanguineous   Treatments -- Cleansed;Vashe;Saline   Dressing -- Acticoat;Aquacel Foam   Dressing Changed -- Changed   Dressing Status -- Dry;Intact;Clean   Wound Length (cm) 6.5 cm 0.4 cm   Wound Width (cm) 4.4 cm 0.4 cm   Wound Surface Area (cm^2) 28.6 cm^2 0.16 cm^2   Wound Depth (cm) 1 cm 0.8 cm   Wound Volume (cm^3) 28.6 cm^3 0.128 cm^3   Wound Healing % -- 100   Margins -- Well-defined edges;Not attached   Non-staged Wound Description -- Full thickness   Madhuri-wound Assessment -- Dry;Hyperpigmented   Wound Granulation Tissue -- Red   Wound Bed Granulation (%) -- 100 %   Wound Bed Epithelium (%) -- 0 %   Wound Bed Slough (%) -- 0 %   Wound Bed Eschar (%) -- 0 %   Wound Bed Fibrin (%) -- 0 %   Exposed Structure Bone --   State of  Healing -- Non-healing   Wound Odor -- None   Undermining? -- Yes   Number of Undermines -- 1   Undermine 1 Start Position -- 6   Undermine 1 End Position -- 12       Active Orders   Date Order Priority Status Authorizing Provider   07/25/24 1711 OP Wound Dressing Routine Active George Sampson APRN     - Dressing:    Collagen     - Dressing:    Hydrofera transfer     - Dressing:    ABD pad     - Dressing:    Kerlix     - Cleansing:    Cleanse with normal saline or wound cleanser     - Frequency:    Change dressing three times a day       Inactive Orders   Date Order Priority Status Authorizing Provider   08/30/24 0759 Apply dressing Other ( Xeroform) Daily Routine Discontinued Grisel Zambrano MD     - Dressing type:    Other ( Xeroform)   08/26/24 0848 Apply dressing Other ( Xeroform) Daily Routine Discontinued Ric Evans MD     - Dressing type:    Other ( Xeroform)   08/24/24 0219 Consult to Wound Ostomy Routine Completed Bernadine Vazquez MD     - Reason for Consult:    Wound Care     - Wound Care Reason for Consult:    decubitus ulcer infection   08/24/24 0049 Consult to Wound Ostomy Routine Discontinued Bernadine Vazquez MD     - Reason for Consult:    Wound Care     - Wound Care Reason for Consult:    decubitus ulcer infection   07/23/24 1016 Debridement Pressure Injury Right Heel Routine Completed Sury Blake DPM       Wound 08/23/24 2 Hip Left (Active)   Date First Assessed: 08/23/24   Present on Original Admission: Yes   Wound Number (Wound Clinic Only): 2  Primary Wound Type: Pressure Injury  Location: Hip  Wound Location Orientation: Left      Assessments 7/23/2024  8:50 AM 10/23/2024 11:15 AM   Wound Image       Site Assessment Moist;Tan;Yellow Moist;Yellow;Pink   Closure Not approximated Not approximated   Drainage Amount Large Moderate   Drainage Description Serous;Yellow Serosanguineous   Treatments Wound ;Vashe Cleansed;Wound    Wound Vac Brand -- Medela   Dressing  ABD Pad;Gauze Wound vac sponge   Dressing Changed Changed Changed   Dressing Status Dressing Changed Clean;Dry;Intact   Wound Length (cm) -- 2.5 cm   Wound Width (cm) -- 2.6 cm   Wound Surface Area (cm^2) -- 6.5 cm^2   Wound Depth (cm) -- 1 cm   Wound Volume (cm^3) -- 6.5 cm^3   Wound Healing % -- 91   Margins Poorly defined Well-defined edges;Not attached   Non-staged Wound Description Full thickness --   Madhuri-wound Assessment Purple;Pink;Painful Dry;Pink   Wound Granulation Tissue -- Pink   Wound Bed Granulation (%) -- 70 %   Wound Bed Epithelium (%) -- 0 %   Wound Bed Slough (%) 100 % 30 %   Wound Bed Eschar (%) -- 0 %   Wound Bed Fibrin (%) -- 0 %   Exposed Structure -- Tendon;Bone   State of Healing Undermining Early/partial granulation;Non-healing   Wound Odor None --   Undermining? Yes Yes   Number of Undermines 1 1   Undermine 1 Start Position 12 7   Undermine 1 End Position 12 3   Pressure Injury Stage -- Stage 4       Active Orders   Date Order Priority Status Authorizing Provider   07/25/24 1711 OP Wound Dressing Routine Active George Sampson APRN     - Dressing:    Dry gauze     - Dressing:    ABD pad     - Additional Wound Dressing Information:    traid     - Cleansing:    Cleanse with normal saline or wound cleanser     - Frequency:    Change dressing daily and PRN   07/25/24 1711 OP Wound Dressing Routine Active George Sampson APRN       Inactive Orders   Date Order Priority Status Authorizing Provider   08/26/24 0848 collagenase (Santyl) 250 UNIT/GM ointment Routine Discontinued Ric Evans MD     - Please indicate site of application:    Affected Area(s)   08/26/24 0848 Apply dressing Other (Santyl) Daily Routine Discontinued Ric Evans MD     - Dressing type:    Other (Santyl)   08/24/24 0219 Consult to Wound Ostomy Routine Completed Bernadine Vazquez MD     - Reason for Consult:    Wound Care     - Wound Care Reason for Consult:    decubitus ulcer infection   08/24/24 0049 Consult to Wound Ostomy  Routine Discontinued Bernadine Vazquez MD     - Reason for Consult:    Wound Care     - Wound Care Reason for Consult:    decubitus ulcer infection       Wound 08/23/24 3 Sacrum (Active)   Date First Assessed: 08/23/24   Present on Original Admission: Yes   Wound Number (Wound Clinic Only): 3  Primary Wound Type: Pressure Injury  Location: Sacrum      Assessments 7/23/2024  8:50 AM 10/23/2024 11:49 AM   Wound Image       Site Assessment Moist;Painful;Yellow;Red Moist;Red;Granulation tissue   Closure Not approximated Not approximated   Drainage Amount Large Large   Drainage Description Yellow Yellow;Serosanguineous;Brown   Treatments Wound ;Topical (Barrier/Moisturizer/Ointment);Special Tape Cleansed;Wound ;Topical (Barrier/Moisturizer/Ointment);Vashe   Dressing ABD Pad;Gauze Gauze;Aquacel Foam   Dressing Changed Changed Changed   Dressing Status -- Clean;Dry;Intact   Wound Length (cm) 7 cm 7.1 cm   Wound Width (cm) 7 cm 5.2 cm   Wound Surface Area (cm^2) 49 cm^2 36.92 cm^2   Wound Depth (cm) 2.6 cm 2.5 cm   Wound Volume (cm^3) 127.4 cm^3 92.3 cm^3   Wound Healing % -- 28   Margins Poorly defined Well-defined edges   Non-staged Wound Description Full thickness Full thickness   Madhuri-wound Assessment Maceration;Painful;Excoriated;Pink Moist   Wound Granulation Tissue Pink Pink;Red   Wound Bed Granulation (%) 30 % 80 %   Wound Bed Epithelium (%) -- 0 %   Wound Bed Slough (%) 40 % 20 %   Wound Bed Eschar (%) 30 % 0 %   Wound Bed Fibrin (%) -- 0 %   Exposed Structure Bone Necrosis;Bone --   State of Healing -- Non-healing;Early/partial granulation   Wound Odor Mild None   Undermining? Yes Yes   Number of Undermines 1 1   Undermine 1 Start Position 7 6   Undermine 1 End Position 11 4   Pressure Injury Stage Stage 4 Stage 4       Active Orders   Date Order Priority Status Authorizing Provider   07/25/24 1711 OP Wound Dressing Routine Active George Sampson APRN     - Dressing:    Dry gauze     - Dressing:    ABD  pad     - Additional Wound Dressing Information:    dakin soaked     - Cleansing:    Cleanse with Dakins     - Frequency:    Change dressing two times a day   07/25/24 1711 OP Wound Dressing Routine Active George Sampson APRN       Inactive Orders   Date Order Priority Status Authorizing Provider   10/23/24 1141 Debridement Pressure Injury Sacrum Routine Completed George Sampson APRN   08/26/24 0848 collagenase (Santyl) 250 UNIT/GM ointment Routine Discontinued Ric Evans MD     - Please indicate site of application:    Affected Area(s)   08/26/24 0848 Apply dressing Other (Santyl) Daily Routine Discontinued Ric Evans MD     - Dressing type:    Other (Santyl)   08/25/24 1217 collagenase (Santyl) 250 UNIT/GM ointment Routine Discontinued Criselda Conley PA-C     - Please indicate site of application:    Affected Area(s)   08/24/24 0219 Consult to Wound Ostomy Routine Completed Bernadine Vazquez MD     - Reason for Consult:    Wound Care     - Wound Care Reason for Consult:    decubitus ulcer infection   08/24/24 0049 Consult to Wound Ostomy Routine Discontinued Bernadine Vazquez MD     - Reason for Consult:    Wound Care     - Wound Care Reason for Consult:    decubitus ulcer infection   08/23/24 1539 Debridement Pressure Injury Sacrum Routine Completed George Sampson APRN   07/23/24 1011 Debridement Pressure Injury Sacrum Routine Completed George Sampson APRN       Wound 08/23/24 4 Ankle Right;Medial (Active)   Date First Assessed: 08/23/24   Present on Original Admission: Yes   Wound Number (Wound Clinic Only): 4  Primary Wound Type: Pressure Injury  Location: Ankle  Wound Location Orientation: Right;Medial      Assessments 7/23/2024  8:50 AM 10/23/2024 11:15 AM   Wound Image       Site Assessment Moist;Pink Fragile;Purple;Pink   Closure Not approximated Approximated   Drainage Amount Scant None   Drainage Description Serosanguineous --   Treatments Wound ;Honey Gel Cleansed;Wound ;Topical  (Barrier/Moisturizer/Ointment)   Dressing Kerlix roll Aquacel Foam   Dressing Changed Changed New   Dressing Status Dressing Changed Clean;Dry;Intact   Wound Length (cm) 0.6 cm 0 cm   Wound Width (cm) 0.8 cm 0 cm   Wound Surface Area (cm^2) 0.48 cm^2 0 cm^2   Wound Depth (cm) 0 cm 0 cm   Wound Volume (cm^3) 0 cm^3 0 cm^3   Margins Poorly defined Poorly defined;Flat and Intact   Non-staged Wound Description Full thickness --   Madhuri-wound Assessment -- Hyperpigmented;Fragile   Wound Bed Granulation (%) 20 % --   Wound Bed Epithelium (%) -- 100 %   Wound Bed Eschar (%) 80 % --   State of Healing Non-healing Closed wound edges   Wound Odor None None   Pressure Injury Stage Unstageable Deep Tissue       Inactive Orders   Date Order Priority Status Authorizing Provider   08/26/24 0848 collagenase (Santyl) 250 UNIT/GM ointment Routine Discontinued Ric Evans MD     - Please indicate site of application:    Affected Area(s)   08/26/24 0848 Apply dressing Other (Santyl) Daily Routine Discontinued Ric Evans MD     - Dressing type:    Other (Santyl)   08/24/24 0219 Consult to Wound Ostomy Routine Completed Bernadine Vazquez MD     - Reason for Consult:    Wound Care     - Wound Care Reason for Consult:    decubitus ulcer infection   08/24/24 0049 Consult to Wound Ostomy Routine Discontinued Bernadine Vazquez MD     - Reason for Consult:    Wound Care     - Wound Care Reason for Consult:    decubitus ulcer infection   07/25/24 1711 OP Wound Dressing Routine Completed George Sampson APRN     - Dressing:    Kerlix     - Dressing:    Dry gauze     - Dressing:    Honey gel     - Cleansing:    Cleanse with normal saline or wound cleanser     - Frequency:    Change dressing daily and PRN       Negative Pressure Wound Therapy Hip Anterior;Left (Active)   Placement Date/Time: 10/23/24 1137   Location: Hip  Wound Location Orientation: Anterior;Left      Assessments 10/23/2024 11:15 AM   Wound photographed/measured Yes   Machine Status  (On) Yes   Site Assessment Pink;Yellow;Painful;Moist   Madhuri-wound Assessment Clean;Intact;Dry;Pink   Unit Type Medela   Dressing Type Black foam;Collagen   Number of Foam Pieces Used 1   Cycle Continuous;On   Target Pressure (mmHg) 125   Drainage Description Serosanguineous   Dressing Status Clean;Dry;Intact       No associated orders.     Home Health Orders:      Wounds: Left ankle and Left Heel: Healed. No dressings needed. Patient to wear offloading boots for protection.     Wound: Left Hip     Wound Cleaning and Dressings:  Showering directions: May not shower  Wound cleansing:  Cleanse with normal saline, wound cleanser or Acetic Acid 0.25% solution  Wound cleaning frequency:  Cleanse with dressing changes  Wound product: rex or silver Collagen, Black foam, vac drape. May use duoderm strips to periwound  Dressing change frequency:  Change dressing 3x per week     Negative Pressure Wound Therapy:  Medela NPWT at 125mmHg continuous suction.     Wound: Sacrum     Wound Cleaning and Dressings:  Showering directions: May not shower  Wound cleansing:  Cleanse with normal saline, wound cleanser or Acetic Acid 0.25% solution or Vashe  Wound cleaning frequency:  Cleanse with dressing changes  Wound product: Duoderm sheets to the periwound, pack with moist dakins soaked gauze in wound with bordered foam or ABD pad and tape as covering.  Dressing change frequency: Change dressing daily and PRN if dressing becomes displaced or soiled by urine or feces   \"may continue Santyl daily dressing as patient 's daughter prefers, my recommendation is to support the granulation tissue with collagen and silver alginate and secure with Tegaderm dressing, change twice a week and as needed.\"        Wound: Right Heel.      Wound Cleaning and Dressings:  Showering directions: May not shower  Wound cleansing:  Cleanse soap and water.   Wound cleaning frequency:  Cleanse with dressing changes  Wound product: Acticoat strip into tunneling  at 12 o'clock, Bordered foam  Dressing change frequency: 3x/week     Wound: Right Ankle     Wound Cleaning and Dressings:  Showering directions: May not shower  Wound cleansing:  Cleanse with normal saline, wound cleanser or Acetic Acid 0.25% solution  Wound cleaning frequency:  Cleanse with dressing changes  Wound product: aquacel foam.  Dressing change frequency: Change dressing 3x/week     Miscellaneous/Additional Orders:  Offloading: Air mattress and re-positioning often (every two hours)      Follow Up:  Return in about 4 weeks (around 11/20/2024) for APN visit 30 min.       YUN LIVINGSTON DNP, CWS, NPI [id number]

## (undated) NOTE — LETTER
Date: 11/13/2024  Patient name: Yoseph Cordero  YOB: 1950  Medical Record Number: [id number]  Primary Coverage: Payor: [facility] / Plan: [facility] HMO / Product Type: Medicare /   Secondary Coverage:   Insurance ID: [id number]  Patient Address: [address] [place] [address]  Telephone Information:   Home Phone [phone]   Mobile [phone]       Encounter Date: 11/13/2024  Provider: BEAU Walton  Diagnosis:     ICD-10-CM   1. Pressure injury of right hip, stage 3 (HCC)  L89.213   2. Pressure injury of sacral region, stage 4 (HCC)  L89.154   3. Pressure injury of right heel, stage 4 (HCC)  L89.614   4. Pressure injury of buttock, stage 4, unspecified laterality (HCC)  L89.304   5. Gait disturbance  R26.9         Wound 08/23/24 1 Heel Right;Medial (Active)   Date First Assessed: 08/23/24   Present on Original Admission: Yes   Wound Number (Wound Clinic Only): 1  Primary Wound Type: Pressure Injury  Location: Heel  Wound Location Orientation: Right;Medial      Assessments 7/23/2024  8:50 AM 11/13/2024 10:48 AM   Wound Image        Site Assessment -- Moist;Red   Closure -- Not approximated   Drainage Amount -- Moderate   Drainage Description -- Sanguineous   Treatments -- Cleansed;Vashe;Saline   Dressing -- Aquacel Foam   Dressing Changed -- Changed   Dressing Status -- Dry;Intact;Clean   Wound Length (cm) 6.5 cm 0.2 cm   Wound Width (cm) 4.4 cm 0.2 cm   Wound Surface Area (cm^2) 28.6 cm^2 0.04 cm^2   Wound Depth (cm) 1 cm 0.5 cm   Wound Volume (cm^3) 28.6 cm^3 0.02 cm^3   Wound Healing % -- 100   Margins -- Well-defined edges;Not attached   Non-staged Wound Description -- Full thickness   Madhuri-wound Assessment -- Dry;Hyperpigmented   Wound Granulation Tissue -- Red   Wound Bed Granulation (%) -- 100 %   Wound Bed Epithelium (%) -- 0 %   Wound Bed Slough (%) -- 0 %   Wound Bed Eschar (%) -- 0 %   Wound Bed Fibrin (%) -- 0 %   Exposed Structure Bone --   State of Healing -- Fully  granulated   Wound Odor -- None   Tunneling? -- Yes   Number of Tunnels -- 1   Tunnel 1 Location -- 12   Tunnel 1 Depth -- 2.5   Undermining? -- No       Active Orders   Date Order Priority Status Authorizing Provider   07/25/24 1711 OP Wound Dressing Routine Active George Sampson APRN     - Dressing:    Collagen     - Dressing:    Hydrofera transfer     - Dressing:    ABD pad     - Dressing:    Kerlix     - Cleansing:    Cleanse with normal saline or wound cleanser     - Frequency:    Change dressing three times a day       Inactive Orders   Date Order Priority Status Authorizing Provider   10/25/24 0857 Apply dressing Hydrofiber with silver Daily Routine Discontinued Yordan Jesus MD     - Dressing type:    Hydrofiber with silver   08/30/24 0759 Apply dressing Other ( Xeroform) Daily Routine Discontinued Grisel Zambrano MD     - Dressing type:    Other ( Xeroform)   08/26/24 0848 Apply dressing Other ( Xeroform) Daily Routine Discontinued Ric Evans MD     - Dressing type:    Other ( Xeroform)   08/24/24 0219 Consult to Wound Ostomy Routine Completed Bernadine Vazquez MD     - Reason for Consult:    Wound Care     - Wound Care Reason for Consult:    decubitus ulcer infection   08/24/24 0049 Consult to Wound Ostomy Routine Discontinued Bernadine Vazquez MD     - Reason for Consult:    Wound Care     - Wound Care Reason for Consult:    decubitus ulcer infection   07/23/24 1016 Debridement Pressure Injury Right Heel Routine Completed Sury Blake DPM       Wound 08/23/24 2 Hip Left (Active)   Date First Assessed: 08/23/24   Present on Original Admission: Yes   Wound Number (Wound Clinic Only): 2  Primary Wound Type: Pressure Injury  Location: Hip  Wound Location Orientation: Left      Assessments 7/23/2024  8:50 AM 11/13/2024 10:48 AM   Wound Image       Site Assessment Moist;Tan;Yellow Moist;Pink;Red;Granulation tissue   Closure Not approximated Not approximated   Drainage Amount Large Moderate    Drainage Description Serous;Yellow Serosanguineous;Odor   Treatments Wound ;Vashe Cleansed;Wound    Wound Vac Brand -- Medela   Dressing ABD Pad;Gauze Wound vac sponge   Dressing Changed Changed Changed   Dressing Status Dressing Changed Clean;Dry;Intact   Wound Length (cm) -- 2 cm   Wound Width (cm) -- 2 cm   Wound Surface Area (cm^2) -- 4 cm^2   Wound Depth (cm) -- 1.5 cm   Wound Volume (cm^3) -- 6 cm^3   Wound Healing % -- 92   Margins Poorly defined Well-defined edges;Not attached   Non-staged Wound Description Full thickness Full thickness   Madhuri-wound Assessment Purple;Pink;Painful Moist;Pink;Fragile   Wound Granulation Tissue -- Red;Pink   Wound Bed Granulation (%) -- 90 %   Wound Bed Epithelium (%) -- 0 %   Wound Bed Slough (%) 100 % 10 %   Wound Bed Eschar (%) -- 0 %   Wound Bed Fibrin (%) -- 0 %   Exposed Structure -- Tendon;Bone   State of Healing Undermining Fully granulated;Undermining   Wound Odor None Mild   Undermining? Yes Yes   Number of Undermines 1 1   Undermine 1 Start Position 12 7   Undermine 1 End Position 12 12   Pressure Injury Stage -- Stage 4       Active Orders   Date Order Priority Status Authorizing Provider   07/25/24 1711 OP Wound Dressing Routine Active George Sampson APRN     - Dressing:    Dry gauze     - Dressing:    ABD pad     - Additional Wound Dressing Information:    traid     - Cleansing:    Cleanse with normal saline or wound cleanser     - Frequency:    Change dressing daily and PRN   07/25/24 1711 OP Wound Dressing Routine Active George Sampson APRN       Inactive Orders   Date Order Priority Status Authorizing Provider   08/26/24 0848 collagenase (Santyl) 250 UNIT/GM ointment Routine Discontinued Ric Evans MD     - Please indicate site of application:    Affected Area(s)   08/26/24 0848 Apply dressing Other (Santyl) Daily Routine Discontinued Ric Evans MD     - Dressing type:    Other (Santyl)   08/24/24 0219 Consult to Wound Ostomy Routine Completed  Bernadine Vazquez MD     - Reason for Consult:    Wound Care     - Wound Care Reason for Consult:    decubitus ulcer infection   08/24/24 0049 Consult to Wound Ostomy Routine Discontinued Bernadine Vazquez MD     - Reason for Consult:    Wound Care     - Wound Care Reason for Consult:    decubitus ulcer infection       Wound 08/23/24 3 Sacrum (Active)   Date First Assessed: 08/23/24   Present on Original Admission: Yes   Wound Number (Wound Clinic Only): 3  Primary Wound Type: Pressure Injury  Location: Sacrum      Assessments 7/23/2024  8:50 AM 11/13/2024 10:48 AM   Wound Image       Site Assessment Moist;Painful;Yellow;Red Moist;Red;Granulation tissue   Closure Not approximated Not approximated   Drainage Amount Large Moderate   Drainage Description Yellow Serosanguineous   Treatments Wound ;Topical (Barrier/Moisturizer/Ointment);Special Tape Cleansed;Vashe   Dressing ABD Pad;Gauze Piyush;Gauze;Aquacel Foam   Dressing Changed Changed Changed   Dressing Status -- Clean;Dry;Intact   Wound Length (cm) 7 cm 6.2 cm   Wound Width (cm) 7 cm 4.8 cm   Wound Surface Area (cm^2) 49 cm^2 29.76 cm^2   Wound Depth (cm) 2.6 cm 2 cm   Wound Volume (cm^3) 127.4 cm^3 59.52 cm^3   Wound Healing % -- 53   Margins Poorly defined Well-defined edges;Not attached;Epibole (Rolled edges)   Non-staged Wound Description Full thickness Full thickness   Madhuri-wound Assessment Maceration;Painful;Excoriated;Pink Clean;Dry;Intact;Induration   Wound Granulation Tissue Pink Red   Wound Bed Granulation (%) 30 % 100 %   Wound Bed Epithelium (%) -- 0 %   Wound Bed Slough (%) 40 % 0 %   Wound Bed Eschar (%) 30 % 0 %   Wound Bed Fibrin (%) -- 0 %   Exposed Structure Bone Necrosis;Bone --   State of Healing -- Fully granulated   Wound Odor Mild Mild   Undermining? Yes Yes   Number of Undermines 1 1   Undermine 1 Start Position 7 7   Undermine 1 End Position 11 2   Pressure Injury Stage Stage 4 Stage 4       Active Orders   Date Order Priority Status  Authorizing Provider   07/25/24 1711 OP Wound Dressing Routine Active George Sampson APRN     - Dressing:    Dry gauze     - Dressing:    ABD pad     - Additional Wound Dressing Information:    dakin soaked     - Cleansing:    Cleanse with Dakins     - Frequency:    Change dressing two times a day   07/25/24 1711 OP Wound Dressing Routine Active George Sampson APRN       Inactive Orders   Date Order Priority Status Authorizing Provider   10/25/24 0857 Apply dressing Other (Dakins) Q12H Routine Discontinued Yordan Jesus MD     - Dressing type:    Other (Dakins)   10/25/24 0801 sodium hypochlorite (Dakin's) 0.125 % external solution Routine Discontinued Kina Hodgson MD     - Please indicate site of application:    Other     - Please type site of application:    sacrum   10/23/24 1141 Debridement Pressure Injury Sacrum Routine Completed George Sampson APRN   08/26/24 0848 collagenase (Santyl) 250 UNIT/GM ointment Routine Discontinued Ric Evans MD     - Please indicate site of application:    Affected Area(s)   08/26/24 0848 Apply dressing Other (Santyl) Daily Routine Discontinued Ric Evans MD     - Dressing type:    Other (Santyl)   08/25/24 1217 collagenase (Santyl) 250 UNIT/GM ointment Routine Discontinued Criselda Conley PA-C     - Please indicate site of application:    Affected Area(s)   08/24/24 0219 Consult to Wound Ostomy Routine Completed Bernadine Vazquez MD     - Reason for Consult:    Wound Care     - Wound Care Reason for Consult:    decubitus ulcer infection   08/24/24 0049 Consult to Wound Ostomy Routine Discontinued Bernadine Vazquez MD     - Reason for Consult:    Wound Care     - Wound Care Reason for Consult:    decubitus ulcer infection   08/23/24 1539 Debridement Pressure Injury Sacrum Routine Completed George Sampson APRN   07/23/24 1011 Debridement Pressure Injury Sacrum Routine Completed George Sampson APRN       Wound 10/24/24 4 Ankle Right;Medial (Active)   Date First Assessed: 10/24/24    Present on Original Admission: Yes   Wound Number (Wound Clinic Only): 4  Primary Wound Type: Pressure Injury  Location: Ankle  Wound Location Orientation: Right;Medial  Wound Description (Comments): healing intact skin      Assessments 7/23/2024  8:50 AM 11/13/2024 10:48 AM   Wound Image       Site Assessment Moist;Pink Clean;Dry;Intact;Epithelialization   Closure Not approximated Approximated   Drainage Amount Scant None   Drainage Description Serosanguineous --   Treatments Wound ;Honey Gel Cleansed;Saline;Wound    Dressing Kerlix roll Aquacel Foam   Dressing Changed Changed Changed   Dressing Status Dressing Changed Clean;Dry;Intact   Wound Length (cm) 0.6 cm 0 cm   Wound Width (cm) 0.8 cm 0 cm   Wound Surface Area (cm^2) 0.48 cm^2 0 cm^2   Wound Depth (cm) 0 cm 0 cm   Wound Volume (cm^3) 0 cm^3 0 cm^3   Margins Poorly defined Poorly defined;Flat and Intact   Non-staged Wound Description Full thickness Not applicable   Madhuri-wound Assessment -- Clean;Dry;Intact   Wound Bed Granulation (%) 20 % 0 %   Wound Bed Epithelium (%) -- 100 %   Wound Bed Slough (%) -- 0 %   Wound Bed Eschar (%) 80 % 0 %   Wound Bed Fibrin (%) -- 0 %   State of Healing Non-healing Closed wound edges;Epithelialized   Wound Odor None None   Tunneling? -- Yes   Number of Tunnels -- 1   Tunnel 1 Location -- 12   Tunnel 1 Depth -- 2.5   Pressure Injury Stage Unstageable Deep Tissue       Inactive Orders   Date Order Priority Status Authorizing Provider   10/25/24 0857 Apply dressing Foam Daily Routine Discontinued Yordan Jesus MD     - Dressing type:    Foam   08/26/24 0848 collagenase (Santyl) 250 UNIT/GM ointment Routine Discontinued Ric Evans MD     - Please indicate site of application:    Affected Area(s)   08/26/24 0848 Apply dressing Other (Santyl) Daily Routine Discontinued Rci Evans MD     - Dressing type:    Other (Santyl)   08/24/24 0219 Consult to Wound Ostomy Routine Completed Bernadine Vazquez MD     - Reason  for Consult:    Wound Care     - Wound Care Reason for Consult:    decubitus ulcer infection   08/24/24 0049 Consult to Wound Ostomy Routine Discontinued Bernadine Vazquez MD     - Reason for Consult:    Wound Care     - Wound Care Reason for Consult:    decubitus ulcer infection   07/25/24 1711 OP Wound Dressing Routine Completed George Sampson APRN     - Dressing:    Kerlix     - Dressing:    Dry gauze     - Dressing:    Honey gel     - Cleansing:    Cleanse with normal saline or wound cleanser     - Frequency:    Change dressing daily and PRN       Negative Pressure Wound Therapy Hip Anterior;Left (Active)   Placement Date/Time: 10/23/24 1137   Location: Hip  Wound Location Orientation: Anterior;Left      Assessments 10/23/2024 11:15 AM 11/13/2024 10:48 AM   Wound photographed/measured Yes Yes   Machine Status (On) Yes Yes   Site Assessment Pink;Yellow;Painful;Moist Moist;Pink;Red   Madhuri-wound Assessment Clean;Intact;Dry;Pink Clean;Intact;Dry;Pink   Unit Type Medela Medela   Dressing Type Black foam;Collagen Black foam   Number of Foam Pieces Used 1 1   Cycle Continuous;On Continuous;On   Target Pressure (mmHg) 125 125   Drainage Description Serosanguineous Serosanguineous   Dressing Status Clean;Dry;Intact Clean;Dry;Intact   Canister Changed -- Yes       No associated orders.       Negative Pressure Wound Therapy Hip Left (Active)   Placement Date: 10/25/24   Inserted by: REGINA Landry RN  Wound Type: Pressure ulcer: stage IV  Location: Hip  Wound Location Orientation: Left      Assessments 10/25/2024  8:14 AM 10/27/2024  8:55 AM   Wound photographed/measured Yes --   Machine Status (On) Yes Yes   Site Assessment Moist;Painful;Pink;Red;Fragile;Granulation tissue;Yellow Unable to assess   Madhuri-wound Assessment Excoriated;Fragile;Pink;Painful --   Unit Type Vac Ulta Vac Ulta   Dressing Type Black foam Black foam   Number of Foam Pieces Used 2 --   Cycle Continuous;On Continuous;On   Target Pressure (mmHg) 125 125    Drainage Description Serosanguineous --   Dressing Status Clean;Dry;Intact;Dressing Changed Clean;Dry;Intact   Canister Changed No --       No associated orders.     Home Health Orders:       Wound: Left Hip     Wound Cleaning and Dressings:  Showering directions: May not shower  Wound cleansing:  Cleanse with normal saline, wound cleanser or Acetic Acid 0.25% solution  Wound cleaning frequency:  Cleanse with dressing changes  Wound product: rex or silver Collagen, Black foam, vac drape. May use duoderm strips to periwound  Dressing change frequency:  Change dressing 3x per week     Negative Pressure Wound Therapy:  Medela NPWT at 125mmHg continuous suction.     Wound: Sacrum     Wound Cleaning and Dressings:  Showering directions: May not shower  Wound cleansing:  Cleanse with normal saline, wound cleanser or Acetic Acid 0.25% solution or Vashe  Wound cleaning frequency:  Cleanse with dressing changes  Wound product: zinc oxide to the periwound, pack with moist dakins soaked gauze, covered by bordered foam or ABD pad and tape as covering.  Dressing change frequency: Change dressing daily and PRN if dressing becomes displaced or soiled by urine or feces   \"may continue Santyl daily dressing as patient 's daughter prefers, my recommendation is to support the granulation tissue with collagen and silver alginate and secure with Tegaderm dressing, change twice a week and as needed.\"        Wound: Right Heel.      Wound Cleaning and Dressings:  Showering directions: May not shower  Wound cleansing:  Cleanse soap and water.   Wound cleaning frequency:  Cleanse with dressing changes  Wound product: Silver alginate/foam dressing. No packing neccesary. Bordered foam  Dressing change frequency: 3x/week     Wound: Right Ankle     Wound Cleaning and Dressings:  Showering directions: May not shower  Wound cleansing:  Cleanse with normal saline, wound cleanser or Acetic Acid 0.25% solution  Wound cleaning frequency:  Cleanse  with dressing changes  Wound product: aquacel foam.  Dressing change frequency: Change dressing 3x/week     Miscellaneous/Additional Orders:  Offloading: Air mattress and re-positioning often (every two hours)      Follow Up:  Return 2-4 weeks, for APN x 30 minutes.       YUN LIVINGSTON DNP, CWS, NPI [id number]

## (undated) NOTE — LETTER
[place] [address]  Authorization for Invasive Procedures  Date: 7/11/24            Time: 1530    I hereby authorize SHAD Mccartney , my physician and his/her assistants (if applicable), which may include medical students, residents, and/or fellows, to perform the following surgical operation/ procedure and administer such anesthesia as may be determined necessary by my physician: sharp excisional debridement of sacral and left hip decubitus ulcers  on Yoseph Pintauro  2.   I recognize that during the surgical operation/procedure, unforeseen conditions may necessitate additional or different procedures than those listed above.  I, therefore, further authorize and request that the above-named surgeon, assistants, or designees perform such procedures as are, in their judgment, necessary and desirable.    3.   My surgeon/physician has discussed prior to my surgery the potential benefits, risks and side effects of this procedure; the likelihood of achieving goals; and potential problems that might occur during recuperation.  They also discussed reasonable alternatives to the procedure, including risks, benefits, and side effects related to the alternatives and risks related to not receiving this procedure.  I have had all my questions answered and I acknowledge that no guarantee has been made as to the result that may be obtained.    4.   Should the need arise during my operation/procedure, which includes change of level of care prior to discharge, I also consent to the administration of blood and/or blood products.  Further, I understand that despite careful testing and screening of blood or blood products by collecting agencies, I may still be subject to ill effects as a result of receiving a blood transfusion and/or blood products.  The following are some, but not all, of the potential risks that can occur: fever and allergic reactions, hemolytic reactions, transmission of diseases such  as Hepatitis, AIDS and Cytomegalovirus (CMV) and fluid overload.  In the event that I wish to have an autologous transfusion of my own blood, or a directed donor transfusion, I will discuss this with my physician.   Check only if Refusing Blood or Blood Products  I understand refusal of blood or blood products as deemed necessary by my physician may have serious consequences to my condition to include possible death. I hereby assume responsibility for my refusal and release the hospital, its personnel, and my physicians from any responsibility for the consequences of my refusal.         o  Refuse         5.   I authorize the use of any specimen, organs, tissues, body parts or foreign objects that may be removed from my body during the operation/procedure for diagnosis, research or teaching purposes and their subsequent disposal by hospital authorities.  I also authorize the release of specimen test results and/or written reports to my treating physician on the hospital medical staff or other referring or consulting physicians involved in my care, at the discretion of the Pathologist or my treating physician.    6.   I consent to the photographing or videotaping of the operations or procedures to be performed, including appropriate portions of my body for medical, scientific, or educational purposes, provided my identity is not revealed by the pictures or by descriptive texts accompanying them.  If the procedure has been photographed/videotaped, the surgeon will obtain the original picture, image, videotape or CD.  The hospital will not be responsible for storage, release or maintenance of the picture, image, tape or CD.    7.   I consent to the presence of a  or observers in the operating room as deemed necessary by my physician or their designees.    8.   I recognize that in the event my procedure results in extended X-Ray/fluoroscopy time, I may develop a skin reaction.    9. If I have a Do Not  Attempt Resuscitation (DNAR) order in place, that status will be suspended while in the operating room, procedural suite, and during the recovery period unless otherwise explicitly stated by me (or a person authorized to consent on my behalf). The surgeon or my attending physician will determine when the applicable recovery period ends for purposes of reinstating the DNAR order.  10. Patients having a sterilization procedure: I understand that if the procedure is successful the results will be permanent and it will therefore be impossible for me to inseminate, conceive, or bear children.  I also understand that the procedure is intended to result in sterility, although the result has not been guaranteed.   11. I acknowledge that my physician has explained sedation/analgesia administration to me including the risk and benefits I consent to the administration of sedation/analgesia as may be necessary or desirable in the judgment of my physician.    I CERTIFY THAT I HAVE READ AND FULLY UNDERSTAND THE ABOVE CONSENT TO OPERATION and/or OTHER PROCEDURE.        ____________________________________       _________________________________      ______________________________  Signature of Patient         Signature of Responsible Person        Printed Name of Responsible Person        ____________________________________      _________________________________      ______________________________       Signature of Witness          Relationship to Patient                       Date                                       Time  Patient Name: Yoseph Cordero  : 1950    Reviewed: 2024   Printed: 2024  Medical Record #: [id number] Page 1 of 2             STATEMENT OF PHYSICIAN My signature below affirms that prior to the time of the procedure; I have explained to the patient and/or his/her legal representative, the risks and benefits involved in the proposed treatment and any reasonable alternative to the  proposed treatment. I have also explained the risks and benefits involved in refusal of the proposed treatment and alternatives to the proposed treatment and have answered the patient's questions. If I have a significant financial interest in a co-management agreement or a significant financial interest in any product or implant, or other significant relationship used in this procedure/surgery, I have disclosed this and had a discussion with my patient.     _______________________________________________________________ _____________________________  (Signature of Physician)                                                                                         (Date)                                   (Time)  Patient Name: Yoseph Cordero  : 1950    Reviewed: 2024   Printed: 2024  Medical Record #: [id number] Page 2 of 2

## (undated) NOTE — LETTER
Date: 7/23/2024  Patient name: Yoseph Cordero  YOB: 1950  Medical Record Number: [id number]  Primary Coverage: Payor: [facility] / Plan: [facility] HMO / Product Type: Medicare /   Secondary Coverage:   Insurance ID: [id number]  Patient Address: [address] [place] [address]  Telephone Information:   Home Phone [phone]   Mobile [phone]       Encounter Date: 7/23/2024  Provider: Sury Blake DPM  Diagnosis: No diagnosis found.      Wound 07/23/24 1 Heel Right (Active)   Date First Assessed: 07/23/24    Wound Number (Wound Clinic Only): 1  Primary Wound Type: Pressure Injury  Location: Heel  Wound Location Orientation: Right      Assessments 7/23/2024  8:50 AM 7/23/2024  8:51 AM   Wound Image       Wound Length (cm) 6.5 cm 6.5 cm   Wound Width (cm) 4.4 cm 4.4 cm   Wound Surface Area (cm^2) 28.6 cm^2 28.6 cm^2   Wound Depth (cm) 1 cm 1 cm   Wound Volume (cm^3) 28.6 cm^3 28.6 cm^3   Wound Healing % -- 0   Exposed Structure Bone --       Active Orders   Date Order Priority Status Authorizing Provider   07/23/24 1016 Debridement Pressure Injury Right Heel Routine Active Sury Blake DPM       Wound 07/23/24 2 Hip Left (Active)   Date First Assessed: 07/23/24    Wound Number (Wound Clinic Only): 2  Primary Wound Type: Pressure Injury  Location: Hip  Wound Location Orientation: Left      Assessments 7/23/2024  8:50 AM   Wound Image     Site Assessment Moist;Tan;Yellow   Closure Not approximated   Drainage Amount Large   Drainage Description Serous;Yellow   Treatments Wound ;Vashe   Dressing ABD Pad;Gauze   Dressing Changed Changed   Dressing Status Dressing Changed   Margins Poorly defined   Non-staged Wound Description Full thickness   Madhuri-wound Assessment Purple;Pink;Painful   Wound Bed Slough (%) 100 %   State of Healing Undermining   Wound Odor None   Undermining? Yes   Number of Undermines 1   Undermine 1 Start Position 12   Undermine 1 End Position 12        No associated orders.       Wound 07/23/24 3 Sacrum (Active)   Date First Assessed: 07/23/24    Wound Number (Wound Clinic Only): 3  Primary Wound Type: Pressure Injury  Location: Sacrum      Assessments 7/23/2024  8:50 AM 7/23/2024  8:51 AM   Wound Image      Site Assessment Moist;Painful;Yellow;Red --   Closure Not approximated --   Drainage Amount Large --   Drainage Description Yellow --   Treatments Wound ;Topical (Barrier/Moisturizer/Ointment);Special Tape --   Dressing ABD Pad;Gauze --   Dressing Changed Changed --   Wound Length (cm) 7 cm 7 cm   Wound Width (cm) 7 cm 7 cm   Wound Surface Area (cm^2) 49 cm^2 49 cm^2   Wound Depth (cm) 2.6 cm 2.6 cm   Wound Volume (cm^3) 127.4 cm^3 127.4 cm^3   Wound Healing % -- 0   Margins Poorly defined --   Non-staged Wound Description Full thickness --   Madhuri-wound Assessment Maceration;Painful;Excoriated;Pink --   Wound Granulation Tissue Pink --   Wound Bed Granulation (%) 30 % --   Wound Bed Slough (%) 40 % --   Wound Bed Eschar (%) 30 % --   Wound Odor Mild --   Exposed Structure Bone Necrosis;Bone --   Undermining? Yes --   Number of Undermines 1 --   Undermine 1 Start Position 7 --   Undermine 1 End Position 11 --   Pressure Injury Stage Stage 4 --       Active Orders   Date Order Priority Status Authorizing Provider   07/23/24 1011 Debridement Pressure Injury Sacrum Routine Active George Sampson APRN       Wound 07/23/24 4 Ankle Right;Medial (Active)   Date First Assessed: 07/23/24    Wound Number (Wound Clinic Only): 4  Primary Wound Type: Pressure Injury  Location: Ankle  Wound Location Orientation: Right;Medial      Assessments 7/23/2024  8:50 AM   Wound Image     Site Assessment Moist;Pink   Closure Not approximated   Drainage Amount Scant   Drainage Description Serosanguineous   Treatments Wound ;Honey Gel   Dressing Kerlix roll   Dressing Changed Changed   Dressing Status Dressing Changed   Wound Length (cm) 0.6 cm   Wound Width (cm) 0.8 cm    Wound Surface Area (cm^2) 0.48 cm^2   Wound Depth (cm) 0 cm   Wound Volume (cm^3) 0 cm^3   Margins Poorly defined   Non-staged Wound Description Full thickness   Wound Bed Granulation (%) 20 %   Wound Bed Eschar (%) 80 %   State of Healing Non-healing   Wound Odor None   Pressure Injury Stage Unstageable       No associated orders.       Wound 07/23/24 5 Ankle Left;Lateral (Active)   Date First Assessed: 07/23/24    Wound Number (Wound Clinic Only): 5  Location: Ankle  Wound Location Orientation: Left;Lateral      Assessments 7/23/2024  8:50 AM   Wound Image     Site Assessment Dry;Red   Closure Not approximated   Drainage Amount None   Drainage Description Serosanguineous   Treatments Wound    Dressing Kerlix roll   Dressing Changed Changed   Dressing Status Dressing Changed   Wound Length (cm) 0.2 cm   Wound Width (cm) 0.1 cm   Wound Surface Area (cm^2) 0.02 cm^2   Wound Depth (cm) 0 cm   Wound Volume (cm^3) 0 cm^3   Margins Poorly defined   Non-staged Wound Description Full thickness   Madhuri-wound Assessment Pink;Hemosiderin staining   Wound Bed Eschar (%) 100 %   State of Healing Non-healing       No associated orders.       Wound 07/23/24 6 Heel Left (Active)   Date First Assessed: 07/23/24    Wound Number (Wound Clinic Only): 6  Primary Wound Type: Pressure Injury  Location: Heel  Wound Location Orientation: Left      Assessments 7/23/2024  8:50 AM   Wound Image     Site Assessment Painful;Yellow;Tan   Closure Not approximated   Drainage Amount Large   Drainage Description Yellow   Treatments Wound ;Vashe;Special Tape;Honey Gel   Dressing Gauze;ABD Pad   Dressing Changed Changed   Dressing Status Dressing Changed   Wound Length (cm) 0.2 cm   Wound Width (cm) 0.2 cm   Wound Surface Area (cm^2) 0.04 cm^2   Wound Depth (cm) 0.1 cm   Wound Volume (cm^3) 0.004 cm^3   Margins Epibole (Rolled edges);Poorly defined   Non-staged Wound Description Full thickness   Wound Bed Slough (%) 100 %   State of  Healing Non-healing   Pressure Injury Stage Unstageable       No associated orders.     Wound Number: left hip    Wound Cleaning and Dressings:  Showering directions: May not shower  Wound cleansing:  Cleanse with Vashe  Wound cleaning frequency: Daily  Wound product: triad and gauze abd pad and kerlix  Dressing change frequency:  Change dressing daily and/or PRN  NEED TO DO WOUND VAC WHEN ORDERED BY NURSING FACILITY    Wound Number: sacral    Wound Cleaning and Dressings:  Showering directions: May not shower  Wound cleansing:  Cleanse with Dakins  Wound cleaning frequency: BID  Wound product: Dakins soaked gauze  Zinc to the periwound  Dressing change frequency:  Change dressing BID and/or PRN  NEED TO DO WOUND VAC WHEN ORDERED BY NURSING FACILITY  Y connect the wound to the left hip- do not bridge          Wound Number: right heel    Wound Cleaning and Dressings:  Showering directions: May not shower  Wound cleansing:  Cleanse with Vashe  Wound cleaning frequency: 3 times per week  Wound product: collagen and hydroferra transfer abd pad and kerlix  Dressing change frequency:  Change dressing three time per week and/or PRN      Wound Number: right medial ankle    Wound Cleaning and Dressings:  Showering directions: May not shower  Wound cleansing:  Cleanse with Vashe  Wound cleaning frequency: Daily  Wound product: Dry gauze and Kerlix  Dressing change frequency:  Change dressing daily and/or PRN  Enzymatic agent:  Honey    Wound Number: left heel left lateral foot     Wound Cleaning and Dressings:  Showering directions: May not shower  Wound cleansing:  Cleanse with soak and water  Wound cleaning frequency: Daily  Wound product: keep wound clean and dry remove pressure from wounds.  IF open then notify MD for orders  Dressing change frequency:  Change dressing daily and/or PRN      Negative Pressure Wound Therapy:  SACRAL AND LEFT HIP  NPWT: KCI vac therapy, black form at 125 mmHg continuous. RN to change  dressing 3x/week unless indicated below  Protect the periwound with hydrocolloid need to use Y connector for the wounds do not bridge the wounds      Miscellaneous/Additional Orders:  Offloading: No weight bearing, Air mattress, and Keep weight off of both left and right feet  Miscellaneous orders:   Supplement with a daily multivitamin  Increase dietary protein intake,   Start or continue taking Stevan Daily  Decrease sodium intake,   Incontinence care,   Monitor for signs and symptoms of infection  Turn schedule every 1-2 hours and prn  Heel boots and elevate heels off bed          Follow Up:  Return 2-4 weeks, for APN visit x 30 mins.